# Patient Record
Sex: MALE | Race: WHITE | NOT HISPANIC OR LATINO
[De-identification: names, ages, dates, MRNs, and addresses within clinical notes are randomized per-mention and may not be internally consistent; named-entity substitution may affect disease eponyms.]

---

## 2017-02-14 ENCOUNTER — APPOINTMENT (OUTPATIENT)
Dept: NUCLEAR MEDICINE | Facility: IMAGING CENTER | Age: 67
End: 2017-02-14

## 2017-02-14 ENCOUNTER — APPOINTMENT (OUTPATIENT)
Dept: MRI IMAGING | Facility: IMAGING CENTER | Age: 67
End: 2017-02-14

## 2017-02-14 ENCOUNTER — APPOINTMENT (OUTPATIENT)
Dept: CT IMAGING | Facility: IMAGING CENTER | Age: 67
End: 2017-02-14

## 2017-02-14 ENCOUNTER — OUTPATIENT (OUTPATIENT)
Dept: OUTPATIENT SERVICES | Facility: HOSPITAL | Age: 67
LOS: 1 days | End: 2017-02-14
Payer: MEDICARE

## 2017-02-14 DIAGNOSIS — Z00.8 ENCOUNTER FOR OTHER GENERAL EXAMINATION: ICD-10-CM

## 2017-02-14 PROCEDURE — 70553 MRI BRAIN STEM W/O & W/DYE: CPT

## 2017-02-14 PROCEDURE — 78306 BONE IMAGING WHOLE BODY: CPT

## 2017-02-14 PROCEDURE — 70549 MR ANGIOGRAPH NECK W/O&W/DYE: CPT

## 2017-02-14 PROCEDURE — 74177 CT ABD & PELVIS W/CONTRAST: CPT

## 2017-02-14 PROCEDURE — A9561: CPT

## 2017-02-14 PROCEDURE — 70544 MR ANGIOGRAPHY HEAD W/O DYE: CPT

## 2017-02-14 PROCEDURE — 82565 ASSAY OF CREATININE: CPT

## 2017-02-14 PROCEDURE — 71260 CT THORAX DX C+: CPT

## 2017-02-15 ENCOUNTER — OUTPATIENT (OUTPATIENT)
Dept: OUTPATIENT SERVICES | Facility: HOSPITAL | Age: 67
LOS: 1 days | End: 2017-02-15
Payer: MEDICARE

## 2017-02-15 ENCOUNTER — APPOINTMENT (OUTPATIENT)
Dept: MRI IMAGING | Facility: IMAGING CENTER | Age: 67
End: 2017-02-15

## 2017-02-15 DIAGNOSIS — Z00.8 ENCOUNTER FOR OTHER GENERAL EXAMINATION: ICD-10-CM

## 2017-02-15 PROCEDURE — 71550 MRI CHEST W/O DYE: CPT

## 2017-02-23 DIAGNOSIS — M89.8X8 OTHER SPECIFIED DISORDERS OF BONE, OTHER SITE: ICD-10-CM

## 2017-02-23 DIAGNOSIS — R91.8 OTHER NONSPECIFIC ABNORMAL FINDING OF LUNG FIELD: ICD-10-CM

## 2017-02-23 DIAGNOSIS — I63.9 CEREBRAL INFARCTION, UNSPECIFIED: ICD-10-CM

## 2017-02-23 DIAGNOSIS — K40.90 UNILATERAL INGUINAL HERNIA, WITHOUT OBSTRUCTION OR GANGRENE, NOT SPECIFIED AS RECURRENT: ICD-10-CM

## 2017-02-23 DIAGNOSIS — K57.30 DIVERTICULOSIS OF LARGE INTESTINE WITHOUT PERFORATION OR ABSCESS WITHOUT BLEEDING: ICD-10-CM

## 2017-08-04 ENCOUNTER — EMERGENCY (EMERGENCY)
Facility: HOSPITAL | Age: 67
LOS: 1 days | Discharge: HOME | End: 2017-08-04
Attending: EMERGENCY MEDICINE | Admitting: EMERGENCY MEDICINE
Payer: MEDICARE

## 2017-08-04 VITALS
OXYGEN SATURATION: 97 % | HEART RATE: 78 BPM | TEMPERATURE: 98 F | SYSTOLIC BLOOD PRESSURE: 162 MMHG | RESPIRATION RATE: 18 BRPM | DIASTOLIC BLOOD PRESSURE: 80 MMHG

## 2017-08-04 VITALS
OXYGEN SATURATION: 97 % | RESPIRATION RATE: 18 BRPM | TEMPERATURE: 98 F | SYSTOLIC BLOOD PRESSURE: 150 MMHG | HEART RATE: 80 BPM | DIASTOLIC BLOOD PRESSURE: 80 MMHG

## 2017-08-04 LAB
ACETONE SERPL-MCNC: NEGATIVE — SIGNIFICANT CHANGE UP
ALBUMIN SERPL ELPH-MCNC: 4.9 G/DL — SIGNIFICANT CHANGE UP (ref 3.3–5)
ALP SERPL-CCNC: 83 U/L — SIGNIFICANT CHANGE UP (ref 40–120)
ALT FLD-CCNC: 22 U/L RC — SIGNIFICANT CHANGE UP (ref 10–45)
ANION GAP SERPL CALC-SCNC: 16 MMOL/L — SIGNIFICANT CHANGE UP (ref 5–17)
AST SERPL-CCNC: 19 U/L — SIGNIFICANT CHANGE UP (ref 10–40)
B-OH-BUTYR SERPL-SCNC: 0.2 MMOL/L — SIGNIFICANT CHANGE UP
BASOPHILS # BLD AUTO: 0.1 K/UL — SIGNIFICANT CHANGE UP (ref 0–0.2)
BASOPHILS NFR BLD AUTO: 0.8 % — SIGNIFICANT CHANGE UP (ref 0–2)
BILIRUB SERPL-MCNC: 0.3 MG/DL — SIGNIFICANT CHANGE UP (ref 0.2–1.2)
BUN SERPL-MCNC: 27 MG/DL — HIGH (ref 7–23)
CALCIUM SERPL-MCNC: 10.6 MG/DL — HIGH (ref 8.4–10.5)
CHLORIDE SERPL-SCNC: 96 MMOL/L — SIGNIFICANT CHANGE UP (ref 96–108)
CHOLEST SERPL-MCNC: 179 MG/DL — SIGNIFICANT CHANGE UP (ref 10–199)
CO2 SERPL-SCNC: 28 MMOL/L — SIGNIFICANT CHANGE UP (ref 22–31)
CREAT SERPL-MCNC: 1.18 MG/DL — SIGNIFICANT CHANGE UP (ref 0.5–1.3)
EOSINOPHIL # BLD AUTO: 0.1 K/UL — SIGNIFICANT CHANGE UP (ref 0–0.5)
EOSINOPHIL NFR BLD AUTO: 1.2 % — SIGNIFICANT CHANGE UP (ref 0–6)
GAS PNL BLDV: SIGNIFICANT CHANGE UP
GLUCOSE SERPL-MCNC: 553 MG/DL — CRITICAL HIGH (ref 70–99)
HBA1C BLD-MCNC: 10.6 % — HIGH (ref 4–5.6)
HCT VFR BLD CALC: 51.3 % — HIGH (ref 39–50)
HDLC SERPL-MCNC: 53 MG/DL — SIGNIFICANT CHANGE UP (ref 40–125)
HGB BLD-MCNC: 17.1 G/DL — HIGH (ref 13–17)
LIPID PNL WITH DIRECT LDL SERPL: 76 MG/DL — SIGNIFICANT CHANGE UP
LYMPHOCYTES # BLD AUTO: 1.3 K/UL — SIGNIFICANT CHANGE UP (ref 1–3.3)
LYMPHOCYTES # BLD AUTO: 19.4 % — SIGNIFICANT CHANGE UP (ref 13–44)
MCHC RBC-ENTMCNC: 30.3 PG — SIGNIFICANT CHANGE UP (ref 27–34)
MCHC RBC-ENTMCNC: 33.3 GM/DL — SIGNIFICANT CHANGE UP (ref 32–36)
MCV RBC AUTO: 91.1 FL — SIGNIFICANT CHANGE UP (ref 80–100)
MONOCYTES # BLD AUTO: 0.6 K/UL — SIGNIFICANT CHANGE UP (ref 0–0.9)
MONOCYTES NFR BLD AUTO: 9.1 % — SIGNIFICANT CHANGE UP (ref 2–14)
NEUTROPHILS # BLD AUTO: 4.8 K/UL — SIGNIFICANT CHANGE UP (ref 1.8–7.4)
NEUTROPHILS NFR BLD AUTO: 69.5 % — SIGNIFICANT CHANGE UP (ref 43–77)
PLATELET # BLD AUTO: 278 K/UL — SIGNIFICANT CHANGE UP (ref 150–400)
POTASSIUM SERPL-MCNC: 4.6 MMOL/L — SIGNIFICANT CHANGE UP (ref 3.5–5.3)
POTASSIUM SERPL-SCNC: 4.6 MMOL/L — SIGNIFICANT CHANGE UP (ref 3.5–5.3)
PROT SERPL-MCNC: 8.2 G/DL — SIGNIFICANT CHANGE UP (ref 6–8.3)
RBC # BLD: 5.62 M/UL — SIGNIFICANT CHANGE UP (ref 4.2–5.8)
RBC # FLD: 11.7 % — SIGNIFICANT CHANGE UP (ref 10.3–14.5)
SODIUM SERPL-SCNC: 140 MMOL/L — SIGNIFICANT CHANGE UP (ref 135–145)
TOTAL CHOLESTEROL/HDL RATIO MEASUREMENT: 3.4 RATIO — SIGNIFICANT CHANGE UP (ref 3.4–9.6)
TRIGL SERPL-MCNC: 248 MG/DL — HIGH (ref 10–149)
TROPONIN T SERPL-MCNC: <0.01 NG/ML — SIGNIFICANT CHANGE UP (ref 0–0.06)
WBC # BLD: 6.9 K/UL — SIGNIFICANT CHANGE UP (ref 3.8–10.5)
WBC # FLD AUTO: 6.9 K/UL — SIGNIFICANT CHANGE UP (ref 3.8–10.5)

## 2017-08-04 PROCEDURE — 70450 CT HEAD/BRAIN W/O DYE: CPT

## 2017-08-04 PROCEDURE — 84295 ASSAY OF SERUM SODIUM: CPT

## 2017-08-04 PROCEDURE — 82435 ASSAY OF BLOOD CHLORIDE: CPT

## 2017-08-04 PROCEDURE — 82009 KETONE BODYS QUAL: CPT

## 2017-08-04 PROCEDURE — 70450 CT HEAD/BRAIN W/O DYE: CPT | Mod: 26

## 2017-08-04 PROCEDURE — 83605 ASSAY OF LACTIC ACID: CPT

## 2017-08-04 PROCEDURE — 80061 LIPID PANEL: CPT

## 2017-08-04 PROCEDURE — 70544 MR ANGIOGRAPHY HEAD W/O DYE: CPT

## 2017-08-04 PROCEDURE — 82803 BLOOD GASES ANY COMBINATION: CPT

## 2017-08-04 PROCEDURE — 70551 MRI BRAIN STEM W/O DYE: CPT | Mod: 26

## 2017-08-04 PROCEDURE — 70548 MR ANGIOGRAPHY NECK W/DYE: CPT

## 2017-08-04 PROCEDURE — 84484 ASSAY OF TROPONIN QUANT: CPT

## 2017-08-04 PROCEDURE — 82010 KETONE BODYS QUAN: CPT

## 2017-08-04 PROCEDURE — 70548 MR ANGIOGRAPHY NECK W/DYE: CPT | Mod: 26

## 2017-08-04 PROCEDURE — 84132 ASSAY OF SERUM POTASSIUM: CPT

## 2017-08-04 PROCEDURE — 99285 EMERGENCY DEPT VISIT HI MDM: CPT | Mod: 25

## 2017-08-04 PROCEDURE — 80053 COMPREHEN METABOLIC PANEL: CPT

## 2017-08-04 PROCEDURE — 85014 HEMATOCRIT: CPT

## 2017-08-04 PROCEDURE — 93005 ELECTROCARDIOGRAM TRACING: CPT

## 2017-08-04 PROCEDURE — 82330 ASSAY OF CALCIUM: CPT

## 2017-08-04 PROCEDURE — 99284 EMERGENCY DEPT VISIT MOD MDM: CPT | Mod: GC

## 2017-08-04 PROCEDURE — 83036 HEMOGLOBIN GLYCOSYLATED A1C: CPT

## 2017-08-04 PROCEDURE — 82947 ASSAY GLUCOSE BLOOD QUANT: CPT

## 2017-08-04 PROCEDURE — 85027 COMPLETE CBC AUTOMATED: CPT

## 2017-08-04 PROCEDURE — 70551 MRI BRAIN STEM W/O DYE: CPT

## 2017-08-04 RX ORDER — SODIUM CHLORIDE 9 MG/ML
1000 INJECTION INTRAMUSCULAR; INTRAVENOUS; SUBCUTANEOUS ONCE
Qty: 0 | Refills: 0 | Status: COMPLETED | OUTPATIENT
Start: 2017-08-04 | End: 2017-08-04

## 2017-08-04 RX ORDER — ACETAMINOPHEN 500 MG
325 TABLET ORAL EVERY 4 HOURS
Qty: 0 | Refills: 0 | Status: DISCONTINUED | OUTPATIENT
Start: 2017-08-04 | End: 2017-08-08

## 2017-08-04 RX ORDER — ATORVASTATIN CALCIUM 80 MG/1
40 TABLET, FILM COATED ORAL AT BEDTIME
Qty: 0 | Refills: 0 | Status: DISCONTINUED | OUTPATIENT
Start: 2017-08-04 | End: 2017-08-08

## 2017-08-04 RX ORDER — ASPIRIN/CALCIUM CARB/MAGNESIUM 324 MG
81 TABLET ORAL DAILY
Qty: 0 | Refills: 0 | Status: DISCONTINUED | OUTPATIENT
Start: 2017-08-04 | End: 2017-08-04

## 2017-08-04 RX ORDER — SODIUM CHLORIDE 9 MG/ML
1000 INJECTION INTRAMUSCULAR; INTRAVENOUS; SUBCUTANEOUS
Qty: 0 | Refills: 0 | Status: DISCONTINUED | OUTPATIENT
Start: 2017-08-04 | End: 2017-08-08

## 2017-08-04 RX ORDER — CLOPIDOGREL BISULFATE 75 MG/1
75 TABLET, FILM COATED ORAL DAILY
Qty: 0 | Refills: 0 | Status: DISCONTINUED | OUTPATIENT
Start: 2017-08-04 | End: 2017-08-08

## 2017-08-04 RX ORDER — ASPIRIN/CALCIUM CARB/MAGNESIUM 324 MG
325 TABLET ORAL ONCE
Qty: 0 | Refills: 0 | Status: DISCONTINUED | OUTPATIENT
Start: 2017-08-04 | End: 2017-08-08

## 2017-08-04 RX ORDER — ENOXAPARIN SODIUM 100 MG/ML
40 INJECTION SUBCUTANEOUS EVERY 24 HOURS
Qty: 0 | Refills: 0 | Status: DISCONTINUED | OUTPATIENT
Start: 2017-08-04 | End: 2017-08-04

## 2017-08-04 RX ADMIN — SODIUM CHLORIDE 3000 MILLILITER(S): 9 INJECTION INTRAMUSCULAR; INTRAVENOUS; SUBCUTANEOUS at 05:59

## 2017-08-04 RX ADMIN — SODIUM CHLORIDE 3000 MILLILITER(S): 9 INJECTION INTRAMUSCULAR; INTRAVENOUS; SUBCUTANEOUS at 11:30

## 2017-08-04 NOTE — CONSULT NOTE ADULT - ATTENDING COMMENTS
VASCULAR NEUROLOGY ATTENDING  The patient is seen and examined the history and imaging are reviewed. I agree with the resident note unless otherwise noted. On my exam he has L face weakness involving upper and lower face. He has mild dysarthria and dysphagia. Sensation is full. Eye movements full. He has slight gait ataxia. 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 His R vert ends in PICA his L vert has a stenosis. Suspect symptomatic intracranial atherosclerotic disease. Would start ASA, Plavix statin per SAMMPRIS criteria. Will need PT and SS evals. I had an extensive conversation with patient who is refusing to remain in the hospital. I advised that his symptoms may worsen and that potential dysphagia may cause a pneumonia. And ataxia may cause falls. He understands the risks and is apparently refusing to stay.

## 2017-08-04 NOTE — ED PROVIDER NOTE - CONSTITUTIONAL, MLM
normal... Well appearing, well nourished, awake, alert, oriented to person, place, time/situation and in no apparent distress.  left facial droop.

## 2017-08-04 NOTE — ED PROVIDER NOTE - PHYSICAL EXAMINATION
Uzair PGY3: uvular deviation of "ahh" to the right. Facial assymetry on left lower facial field with muscle weakness, intact upper eyebrow muscle strength.

## 2017-08-04 NOTE — ED PROVIDER NOTE - OBJECTIVE STATEMENT
Attendinyo male presents with left face numbness, facial droop, and left leg tingling and crampy sensation with intermittent weakness.  Pt noted symptoms at 1am.  No pain.  Of note glucose was 450 by EMS.  Pt states he ate a lot of sugar last night.  No vomiting or nausea.  Pt states he had bell's palsy in the past.

## 2017-08-04 NOTE — ED ADULT NURSE REASSESSMENT NOTE - NS ED NURSE REASSESS COMMENT FT1
Neurology currently at bedside w/ patient. Pt ambulating with steady gait. Neurology currently at bedside w/ patient. Pt ambulating with slightly unsteady gait.

## 2017-08-04 NOTE — CONSULT NOTE ADULT - ASSESSMENT
Pt is 67 yo M with a PMH of HTN, HLD, DM and a right Bell's Palsy (20 years ago) who presented to the emergency room due to a right facial droop, slurred speech and a throat tightening sensation. NIHSS: 2. DOLORES: 0. Considering hx and exam findings, most likely diagnosis is Bell's Palsy. However considering the pt's comorbidities further imaging should be pursued. At the same time pt's metabolic derangements including a very elevated glucose should be fixed to better assess neurological status.    Recommendations:  - Start SQ Insulin to better control pt's blood glucose  - Once glucose is better controlled can start Prednisone 60mg PO QD for 7 days  - Start Valacyclovir 1000mg PO TID for 7 days  - MRI Head w/ contrast  - Outpatient Neuro follow up

## 2017-08-04 NOTE — CONSULT NOTE ADULT - SUBJECTIVE AND OBJECTIVE BOX
Pt is 65 yo M with a PMH of HTN, HLD, DM and a right Bell's Palsy (20 years ago) who presented to the emergency room due to a right facial droop, slurred speech and a throat tightening sensation. Pt endorses that these symptoms started around noon the day prior to presentation and gradually worsened, so his daughter told him to call EMS. Associated with these symptoms the pt endorses a headache (that has now dissipated), otherwise pt denies any recent fevers, CP, cough, SOB, changes in BM/urination, recent sick contacts or travel. NIHSS: 2. DOLORES: 0.          MEDICATIONS  (STANDING):    MEDICATIONS  (PRN):    PAST MEDICAL & SURGICAL HISTORY:  Hypercholesterolemia  HTN (Hypertension)  Diabetes Mellitus Type II    FAMILY HISTORY:    Allergies    No Known Allergies    Intolerances        SHx - No smoking, No ETOH, No drug abuse      Review of Systems:  CONSTITUTIONAL:   HEENT:  No visual loss, blurred vision, double vision.  CARDIOVASCULAR:  No chest pain, chest pressure or chest discomfort.  RESPIRATORY:  No shortness of breath, cough.  NEUROLOGICAL:  See HPI  MUSCULOSKELETAL:  No muscle, back pain, joint pain or stiffness.        Vital Signs Last 24 Hrs  T(C): 36.9 (04 Aug 2017 04:20), Max: 36.9 (04 Aug 2017 04:20)  T(F): 98.4 (04 Aug 2017 04:20), Max: 98.4 (04 Aug 2017 04:20)  HR: 83 (04 Aug 2017 05:05) (80 - 83)  BP: 154/76 (04 Aug 2017 05:05) (150/80 - 154/76)  BP(mean): --  RR: 18 (04 Aug 2017 05:05) (18 - 18)  SpO2: 98% (04 Aug 2017 05:05) (97% - 98%)    General Exam:   General appearance: No acute distress    Neurological Exam:  Mental Status: Orientated to self, date and place.  Attention intact.  Mild dysarthria. Speech fluent.  Cranial Nerves:   PERRL, EOMI, VFF, no nystagmus. CN V1-3 intact to light touch . Right lower facial droop, with a mild upper facial droop.    Motor:   Tone: normal.                  Strength:     [] Upper extremity                      Delt       Bicep    Tricep                                                  R         5/5        5/5        5/5       5/5                                               L          5/5        5/5        5/5       5/5  [] Lower extremity                       HF          KE          KF        DF         PF                                               R        5/5 5/5 5/5 5/5 5/5                                               L         5/5 5/5 5/5 5/5 5/5  Pronator drift: none                 Dysmetria: None to finger-nose-finger or heel-shin-heel b/l  No truncal ataxia.    Tremor: No resting, postural or action tremor.  No myoclonus.    Sensation: intact to light touch throuhout    Deep Tendon Reflexes:              Biceps          Patellar        Ankle      Babinski                                         R       2+                2+               2+           downgoing                                         L        2+                2+               2+           downgoing    Gait: normal.    08-04    140  |  96  |  27<H>  ----------------------------<  553<HH>  4.6   |  28  |  1.18                                17.1   6.9   )-----------( 278      ( 04 Aug 2017 04:22 )             51.3       Radiology  < from: CT Brain Stroke Protocol (08.04.17 @ 04:50) >  No acute intracranial hemorrhage, mass effect, or CT evidence of an acute   vascular territorial infarct.    Mild chronic ischemic changes in the frontoparietal white matter, old   bilateral thalamic lacunar infarcts, and old left corona radiata/basal   ganglia infarct.

## 2017-08-04 NOTE — ED PROVIDER NOTE - PROGRESS NOTE DETAILS
Patient's FSG improving, now under 400, patient does not want insulin, wants to continue his oral hypoglycemics. Patient does not want to stay in the hospital, able to tolerate PO intake and ambulate. Patient understands the risks and benefits of staying versus being discharged, adamantly would want to go home rather than staying in the hospital.  - Rodríguez Castro MD Patient's FSG improving, now under 400, patient does not want insulin, wants to continue his oral hypoglycemics. Patient does not want to stay in the hospital, able to tolerate PO intake and ambulate. Ambulating well, does not want to wait for PT consult. Passed bedside speech swallow test without any evidence of aspiration. Patient understands the risks and benefits of staying versus being discharged, adamantly would want to go home rather than staying in the hospital. Patient to follow-up with Dr. Jakob Seay for outpatient follow-up and TTE. Neuro recommending 81mg ASA daily (Patient already taking 325mg daily).  - Rodríguez Castro MD

## 2017-08-04 NOTE — PROGRESS NOTE ADULT - ASSESSMENT
Pt is 65 yo M with a PMH of HTN, HLD, DM and a right Bell's Palsy (20 years ago) who presented to the emergency room due to a right facial droop, slurred speech and a throat tightening sensation. 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 Pt declined pt/ot assessment and ER dysphagia screen passed per staff.    -cont at least an asa 81mg from a neurological perspective. cont statin  -outpt f/u with Dr. Seay [phone] Pt is 65 yo M with a PMH of HTN, HLD, DM and a right Bell's Palsy (20 years ago) who presented to the emergency room due to a right facial droop, slurred speech and a throat tightening sensation. 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 Pt declined pt/ot assessment and ER dysphagia screen passed per staff.    - Cont at least an asa 81mg from a neurological perspective. cont Plavix for 3 months then asa only. cont statin  - Outpt f/u with Dr. Seay [phone]

## 2017-08-04 NOTE — ED ADULT NURSE REASSESSMENT NOTE - NS ED NURSE REASSESS COMMENT FT1
Report received from Vamsi BAEZ. Pt currently resting in bed, waiting for CDU consult. VSS. Will continue to monitor and assess while offering support and reassurance.

## 2017-08-04 NOTE — H&P ADULT - NSHPPHYSICALEXAM_GEN_ALL_CORE
Physical Exam: General appearance: No acute distress    Neurological Exam:  Mental Status: Orientated to self, date and place.  Attention intact.  Mild dysarthria. Speech fluent.  Cranial Nerves:   PERRL, EOMI, VFF, no nystagmus. CN V1-3 intact to light touch . Right lower facial droop, with a mild upper facial droop.    Motor:   Tone: normal.                  Strength:     [] Upper extremity                      Delt       Bicep    Tricep                                                  R         5/5 5/5 5/5 5/5                                               L          5/5 5/5 5/5 5/5  [] Lower extremity                       HF          KE          KF        DF         PF                                               R        5/5 5/5 5/5 5/5 5/5                                               L         5/5 5/5 5/5 5/5 5/5  Pronator drift: none                 Dysmetria: None to finger-nose-finger or heel-shin-heel b/l  No truncal ataxia.    Tremor: No resting, postural or action tremor.  No myoclonus.    Sensation: intact to light touch throghout     Deep Tendon Reflexes:              Biceps          Patellar        Ankle      Babinski                                         R       2+                2+               2+           downgoing                                         L        2+                2+               2+           downgoing    Gait: normal.

## 2017-08-04 NOTE — ED ADULT NURSE NOTE - OBJECTIVE STATEMENT
65 y/o M presents to the ED via EMS c/o numbness.  Pt presents with L sides facial droop, L leg numbness and numbness to L cheek and lips.    Pt states the symptoms started about 14 hours ago when he woke up.  Pt states he is coming to ED now because his daughter called the ambulance because she states he was off.  Pt is dysarthric with L sided facial droop in ED.  Pt also states he has some slurring of the speech throughout the day.  Pt has hx HTN, DM.  Finger stick in .  NIHSS Score of 2 in ED by stroke team.  No signs of distress.    A&Ox4.  Patient has good strength b/l.  VSS.  Pt safety and comfort measures provided.

## 2017-08-04 NOTE — H&P ADULT - ASSESSMENT
67 yo M with a PMH of HTN, HLD, DM and a right Bell's Palsy (20 years ago) who presented to the emergency room due to a right facial droop, slurred speech and a throat tightening sensation. NIHSS: 2. DOLORES: 0 w/ MRI findings of left lateral medullary infarct.     Plan:   - not a tPA candidate 2/2 to out of window   - TTE  - HgA1c, lipid panel   - ASA 81, Lipitor 80 mg
67 yo M with a PMH of HTN, HLD, DM and a right Bell's Palsy (20 years ago) who presented to the emergency room due to a right facial droop, slurred speech and a throat tightening sensation. NIHSS: 2. DOLORES: 0 w/ MRI findings of left lateral medullary infarct.     Plan:   - not a tPA candidate 2/2 to out of window   - TTE  - HgA1c, lipid panel   - ASA 81, Lipitor 80 mg

## 2017-08-04 NOTE — ED PROVIDER NOTE - PLAN OF CARE
1) Please follow-up with your primary care doctor within the next 3 days.  Please call today or tomorrow for an appointment.  If you cannot follow-up with your doctor(s), please return to the ED for any urgent issues.  2) If you have any worsening of symptoms or any other concerns please return to the ED immediately.  3) Please continue taking your home medications as directed.  4) You may have been given a copy of your labs and/or imaging.  Please go over these with your primary care doctor. 1) Please follow-up with your primary care doctor and Dr. Jakob Seay (neurology ([phone])) within the next 3 days for echocardiogram.  Please call today or tomorrow for an appointment.  If you cannot follow-up with your doctor(s), please return to the ED for any urgent issues.  2) If you have any worsening of symptoms or any other concerns please return to the ED immediately.  3) Please continue taking your home medications as directed. Continue your aspirin and your diabetes mellitus medications.  4) You may have been given a copy of your labs and/or imaging.  Please go over these with your primary care doctor.

## 2017-08-04 NOTE — ED PROVIDER NOTE - CARE PLAN
Principal Discharge DX:	Stroke  Instructions for follow-up, activity and diet:	1) Please follow-up with your primary care doctor within the next 3 days.  Please call today or tomorrow for an appointment.  If you cannot follow-up with your doctor(s), please return to the ED for any urgent issues.  2) If you have any worsening of symptoms or any other concerns please return to the ED immediately.  3) Please continue taking your home medications as directed.  4) You may have been given a copy of your labs and/or imaging.  Please go over these with your primary care doctor.  Secondary Diagnosis:	Hyperglycemia Principal Discharge DX:	Stroke  Instructions for follow-up, activity and diet:	1) Please follow-up with your primary care doctor and Dr. Jakbo Seay (neurology ([phone])) within the next 3 days for echocardiogram.  Please call today or tomorrow for an appointment.  If you cannot follow-up with your doctor(s), please return to the ED for any urgent issues.  2) If you have any worsening of symptoms or any other concerns please return to the ED immediately.  3) Please continue taking your home medications as directed. Continue your aspirin and your diabetes mellitus medications.  4) You may have been given a copy of your labs and/or imaging.  Please go over these with your primary care doctor.  Secondary Diagnosis:	Hyperglycemia Principal Discharge DX:	Stroke  Instructions for follow-up, activity and diet:	1) Please follow-up with your primary care doctor and Dr. Jakob Seay (neurology ([phone])) within the next 3 days for echocardiogram.  Please call today or tomorrow for an appointment.  If you cannot follow-up with your doctor(s), please return to the ED for any urgent issues.  2) If you have any worsening of symptoms or any other concerns please return to the ED immediately.  3) Please continue taking your home medications as directed. Continue your aspirin and your diabetes mellitus medications.  4) You may have been given a copy of your labs and/or imaging.  Please go over these with your primary care doctor.  Secondary Diagnosis:	Hyperglycemia

## 2017-08-04 NOTE — PROGRESS NOTE ADULT - SUBJECTIVE AND OBJECTIVE BOX
Neurology Follow up note    Name  FRANKO SPANN      S: no overnight complaints        MEDICATIONS  (STANDING):  atorvastatin 40 milliGRAM(s) Oral at bedtime  sodium chloride 0.9%. 1000 milliLiter(s) (75 mL/Hr) IV Continuous <Continuous>  aspirin 325 milliGRAM(s) Oral once      Allergies: No Known Allergies      Objective:   Vital Signs Last 24 Hrs  T(C): 36.9 (04 Aug 2017 12:25), Max: 36.9 (04 Aug 2017 04:20)  T(F): 98.4 (04 Aug 2017 12:25), Max: 98.4 (04 Aug 2017 04:20)  HR: 78 (04 Aug 2017 12:25) (66 - 83)  BP: 162/80 (04 Aug 2017 12:25) (150/80 - 164/81)  BP(mean): --  RR: 18 (04 Aug 2017 12:25) (18 - 18)  SpO2: 97% (04 Aug 2017 12:25) (96% - 98%)      Mental Status: Orientated to self, date and place.  Attention intact.  Mild dysarthria. Speech fluent.  Cranial Nerves:   PERRL, EOMI, VFF, no nystagmus. CN V1-3 intact to light touch . Right lower facial droop, with a mild upper facial droop.  Motor:5/5 x4 and able to independently ambulate  Sensory: grossly intact to light     08-04    140  |  96  |  27<H>  ----------------------------<  553<HH>  4.6   |  28  |  1.18    Ca    10.6<H>      04 Aug 2017 04:22    TPro  8.2  /  Alb  4.9  /  TBili  0.3  /  DBili  x   /  AST  19  /  ALT  22  /  AlkPhos  83  08-04 08-04    140  |  96  |  27<H>  ----------------------------<  553<HH>  4.6   |  28  |  1.18    Ca    10.6<H>      04 Aug 2017 04:22    TPro  8.2  /  Alb  4.9  /  TBili  0.3  /  DBili  x   /  AST  19  /  ALT  22  /  AlkPhos  83  08-04    LIVER FUNCTIONS - ( 04 Aug 2017 04:22 )  Alb: 4.9 g/dL / Pro: 8.2 g/dL / ALK PHOS: 83 U/L / ALT: 22 U/L RC / AST: 19 U/L / GGT: x             Radiology    MRI b sig for left lat medullary stroke Neurology Follow up note    Name  FRANKO FERNÁNDEZ: no overnight complaints. Pt declining admission.        MEDICATIONS  (STANDING):  atorvastatin 40 milliGRAM(s) Oral at bedtime  sodium chloride 0.9%. 1000 milliLiter(s) (75 mL/Hr) IV Continuous <Continuous>  aspirin 325 milliGRAM(s) Oral once      Allergies: No Known Allergies      Objective:   Vital Signs Last 24 Hrs  T(C): 36.9 (04 Aug 2017 12:25), Max: 36.9 (04 Aug 2017 04:20)  T(F): 98.4 (04 Aug 2017 12:25), Max: 98.4 (04 Aug 2017 04:20)  HR: 78 (04 Aug 2017 12:25) (66 - 83)  BP: 162/80 (04 Aug 2017 12:25) (150/80 - 164/81)  BP(mean): --  RR: 18 (04 Aug 2017 12:25) (18 - 18)  SpO2: 97% (04 Aug 2017 12:25) (96% - 98%)      Mental Status: Orientated to self, date and place.  Attention intact.  Mild dysarthria. Speech fluent.  Cranial Nerves:   PERRL, EOMI, VFF, no nystagmus. CN V1-3 intact to light touch . Right lower facial droop, with a mild upper facial droop.  Motor:5/5 x4 and able to independently ambulate  Sensory: grossly intact to light     08-04    140  |  96  |  27<H>  ----------------------------<  553<HH>  4.6   |  28  |  1.18    Ca    10.6<H>      04 Aug 2017 04:22    TPro  8.2  /  Alb  4.9  /  TBili  0.3  /  DBili  x   /  AST  19  /  ALT  22  /  AlkPhos  83  08-04    08-04    140  |  96  |  27<H>  ----------------------------<  553<HH>  4.6   |  28  |  1.18    Ca    10.6<H>      04 Aug 2017 04:22    TPro  8.2  /  Alb  4.9  /  TBili  0.3  /  DBili  x   /  AST  19  /  ALT  22  /  AlkPhos  83  08-04    LIVER FUNCTIONS - ( 04 Aug 2017 04:22 )  Alb: 4.9 g/dL / Pro: 8.2 g/dL / ALK PHOS: 83 U/L / ALT: 22 U/L RC / AST: 19 U/L / GGT: x             Radiology    MRI b sig for left lat medullary stroke

## 2017-08-04 NOTE — H&P ADULT - NSHPPHYSICALEXAM_GEN_ALL_CORE
General appearance: No acute distress    Neurological Exam:  Mental Status: Orientated to self, date and place.  Attention intact.  Mild dysarthria. Speech fluent.  Cranial Nerves:   PERRL, EOMI, VFF, no nystagmus. CN V1-3 intact to light touch . Right lower facial droop, with a mild upper facial droop.    Motor:   Tone: normal.                  Strength:     [] Upper extremity                      Delt       Bicep    Tricep                                                  R         5/5 5/5 5/5 5/5                                               L          5/5 5/5 5/5 5/5  [] Lower extremity                       HF          KE          KF        DF         PF                                               R        5/5 5/5 5/5 5/5 5/5                                               L         5/5 5/5 5/5 5/5 5/5  Pronator drift: none                 Dysmetria: None to finger-nose-finger or heel-shin-heel b/l  No truncal ataxia.    Tremor: No resting, postural or action tremor.  No myoclonus.    Sensation: intact to light touch throghout     Deep Tendon Reflexes:              Biceps          Patellar        Ankle      Babinski                                         R       2+                2+               2+           downgoing                                         L        2+                2+               2+           downgoing    Gait: normal.

## 2017-08-04 NOTE — ED PROVIDER NOTE - MEDICAL DECISION MAKING DETAILS
left facial droop with left leg crampy and tingling.  possible CVA given patient's risk factors.  could be bells palsy and diabetic complications due to hyperglycemia.  code stroke called.

## 2017-08-04 NOTE — H&P ADULT - HISTORY OF PRESENT ILLNESS
Pt is 67 yo M with a PMH of HTN, HLD, DM and a right Bell's Palsy (20 years ago) who presented to the emergency room due to a right facial droop, slurred speech and a throat tightening sensation. Pt endorses that these symptoms started around noon the day prior to presentation and gradually worsened, so his daughter told him to call EMS. Associated with these symptoms the pt endorses a headache (that has now dissipated), otherwise pt denies any recent fevers, CP, cough, SOB, changes in BM/urination, recent sick contacts or travel. NIHSS: 2. DOLORES: 0.  MRI showing left lateral medullary infarct, vessel imaging with no acute changes, but showing previously seen right vertebral that appears to terminate in the R PICA and narrowed distal left vertebral segment w/ irregular narrowing in the proximal basilar.
Pt is 67 yo M with a PMH of HTN, HLD, DM and a right Bell's Palsy (20 years ago) who presented to the emergency room due to a right facial droop, slurred speech and a throat tightening sensation. Pt endorses that these symptoms started around noon the day prior to presentation and gradually worsened, so his daughter told him to call EMS. Associated with these symptoms the pt endorses a headache (that has now dissipated), otherwise pt denies any recent fevers, CP, cough, SOB, changes in BM/urination, recent sick contacts or travel. NIHSS: 2. DOLORES: 0.  MRI showing left lateral medullary infarct, vessel imaging with no acute changes, but showing previously seen right vertebral that appears to terminate in the R PICA and narrowed distal left vertebral segment w/ irregular narrowing in the proximal basilar.

## 2017-08-05 NOTE — ED POST DISCHARGE NOTE - ADDITIONAL DOCUMENTATION
8/7/17: Spoke with patient regarding elevated A1C and elevated trigs. Patient has appointment with PCP today and will make doctor aware of results. -Lorene Davey PA-C

## 2017-08-29 ENCOUNTER — APPOINTMENT (OUTPATIENT)
Dept: SPEECH THERAPY | Facility: CLINIC | Age: 67
End: 2017-08-29

## 2017-08-29 ENCOUNTER — OUTPATIENT (OUTPATIENT)
Dept: OUTPATIENT SERVICES | Facility: HOSPITAL | Age: 67
LOS: 1 days | Discharge: HOME | End: 2017-08-29

## 2017-09-27 ENCOUNTER — OUTPATIENT (OUTPATIENT)
Dept: OUTPATIENT SERVICES | Facility: HOSPITAL | Age: 67
LOS: 1 days | End: 2017-09-27

## 2017-09-27 ENCOUNTER — APPOINTMENT (OUTPATIENT)
Dept: CV DIAGNOSITCS | Facility: HOSPITAL | Age: 67
End: 2017-09-27
Payer: MEDICARE

## 2017-09-27 ENCOUNTER — APPOINTMENT (OUTPATIENT)
Dept: RADIOLOGY | Facility: HOSPITAL | Age: 67
End: 2017-09-27
Payer: MEDICARE

## 2017-09-27 DIAGNOSIS — E11.65 TYPE 2 DIABETES MELLITUS WITH HYPERGLYCEMIA: ICD-10-CM

## 2017-09-27 DIAGNOSIS — I63.9 CEREBRAL INFARCTION, UNSPECIFIED: ICD-10-CM

## 2017-09-27 PROCEDURE — 74230 X-RAY XM SWLNG FUNCJ C+: CPT | Mod: 26

## 2017-09-27 PROCEDURE — 93923 UPR/LXTR ART STDY 3+ LVLS: CPT | Mod: 26

## 2017-10-11 DIAGNOSIS — R13.13 DYSPHAGIA, PHARYNGEAL PHASE: ICD-10-CM

## 2019-06-20 ENCOUNTER — APPOINTMENT (OUTPATIENT)
Dept: CT IMAGING | Facility: IMAGING CENTER | Age: 69
End: 2019-06-20
Payer: MEDICARE

## 2019-06-20 ENCOUNTER — OUTPATIENT (OUTPATIENT)
Dept: OUTPATIENT SERVICES | Facility: HOSPITAL | Age: 69
LOS: 1 days | End: 2019-06-20
Payer: MEDICARE

## 2019-06-20 DIAGNOSIS — Z00.8 ENCOUNTER FOR OTHER GENERAL EXAMINATION: ICD-10-CM

## 2019-06-20 PROCEDURE — 71250 CT THORAX DX C-: CPT | Mod: 26

## 2019-06-20 PROCEDURE — 70486 CT MAXILLOFACIAL W/O DYE: CPT | Mod: 26

## 2019-06-20 PROCEDURE — 70486 CT MAXILLOFACIAL W/O DYE: CPT

## 2019-06-20 PROCEDURE — 71250 CT THORAX DX C-: CPT

## 2019-12-22 ENCOUNTER — OUTPATIENT (OUTPATIENT)
Dept: OUTPATIENT SERVICES | Facility: HOSPITAL | Age: 69
LOS: 1 days | End: 2019-12-22
Payer: MEDICARE

## 2019-12-22 ENCOUNTER — APPOINTMENT (OUTPATIENT)
Dept: MRI IMAGING | Facility: IMAGING CENTER | Age: 69
End: 2019-12-22
Payer: MEDICARE

## 2019-12-22 DIAGNOSIS — Z00.8 ENCOUNTER FOR OTHER GENERAL EXAMINATION: ICD-10-CM

## 2019-12-22 PROCEDURE — 70551 MRI BRAIN STEM W/O DYE: CPT | Mod: 26

## 2019-12-22 PROCEDURE — 70551 MRI BRAIN STEM W/O DYE: CPT

## 2020-07-26 ENCOUNTER — APPOINTMENT (OUTPATIENT)
Dept: CT IMAGING | Facility: IMAGING CENTER | Age: 70
End: 2020-07-26
Payer: MEDICARE

## 2020-07-26 ENCOUNTER — OUTPATIENT (OUTPATIENT)
Dept: OUTPATIENT SERVICES | Facility: HOSPITAL | Age: 70
LOS: 1 days | End: 2020-07-26
Payer: MEDICARE

## 2020-07-26 DIAGNOSIS — Z00.8 ENCOUNTER FOR OTHER GENERAL EXAMINATION: ICD-10-CM

## 2020-07-26 PROCEDURE — 71250 CT THORAX DX C-: CPT

## 2020-07-26 PROCEDURE — 74176 CT ABD & PELVIS W/O CONTRAST: CPT

## 2020-07-26 PROCEDURE — 71250 CT THORAX DX C-: CPT | Mod: 26

## 2020-07-26 PROCEDURE — 74176 CT ABD & PELVIS W/O CONTRAST: CPT | Mod: 26

## 2021-03-14 ENCOUNTER — OUTPATIENT (OUTPATIENT)
Dept: OUTPATIENT SERVICES | Facility: HOSPITAL | Age: 71
LOS: 1 days | End: 2021-03-14
Payer: MEDICARE

## 2021-03-14 ENCOUNTER — APPOINTMENT (OUTPATIENT)
Dept: CT IMAGING | Facility: IMAGING CENTER | Age: 71
End: 2021-03-14
Payer: MEDICARE

## 2021-03-14 DIAGNOSIS — Z00.8 ENCOUNTER FOR OTHER GENERAL EXAMINATION: ICD-10-CM

## 2021-03-14 PROCEDURE — 74176 CT ABD & PELVIS W/O CONTRAST: CPT

## 2021-03-14 PROCEDURE — 74176 CT ABD & PELVIS W/O CONTRAST: CPT | Mod: 26,MH

## 2021-04-19 ENCOUNTER — APPOINTMENT (OUTPATIENT)
Dept: UROLOGY | Facility: CLINIC | Age: 71
End: 2021-04-19
Payer: MEDICARE

## 2021-04-19 VITALS
DIASTOLIC BLOOD PRESSURE: 83 MMHG | TEMPERATURE: 98 F | RESPIRATION RATE: 17 BRPM | WEIGHT: 210 LBS | HEART RATE: 71 BPM | BODY MASS INDEX: 31.01 KG/M2 | SYSTOLIC BLOOD PRESSURE: 172 MMHG

## 2021-04-19 PROCEDURE — 99205 OFFICE O/P NEW HI 60 MIN: CPT

## 2021-04-19 PROCEDURE — 51798 US URINE CAPACITY MEASURE: CPT

## 2021-04-26 ENCOUNTER — NON-APPOINTMENT (OUTPATIENT)
Age: 71
End: 2021-04-26

## 2021-04-27 ENCOUNTER — NON-APPOINTMENT (OUTPATIENT)
Age: 71
End: 2021-04-27

## 2021-04-27 LAB
ALBUMIN SERPL ELPH-MCNC: 4.1 G/DL
ALP BLD-CCNC: 95 U/L
ALT SERPL-CCNC: 25 U/L
ANION GAP SERPL CALC-SCNC: 14 MMOL/L
APPEARANCE: CLEAR
AST SERPL-CCNC: 32 U/L
BACTERIA UR CULT: NORMAL
BACTERIA: NEGATIVE
BILIRUB SERPL-MCNC: 0.4 MG/DL
BILIRUBIN URINE: NEGATIVE
BLOOD URINE: NEGATIVE
BUN SERPL-MCNC: 20 MG/DL
CALCIUM SERPL-MCNC: 10.2 MG/DL
CHLORIDE SERPL-SCNC: 101 MMOL/L
CO2 SERPL-SCNC: 29 MMOL/L
COLOR: NORMAL
CREAT SERPL-MCNC: 1.17 MG/DL
GLUCOSE QUALITATIVE U: ABNORMAL
GLUCOSE SERPL-MCNC: 59 MG/DL
HYALINE CASTS: 0 /LPF
KETONES URINE: NEGATIVE
LEUKOCYTE ESTERASE URINE: NEGATIVE
MICROSCOPIC-UA: NORMAL
NITRITE URINE: NEGATIVE
PH URINE: 6.5
POTASSIUM SERPL-SCNC: 5.3 MMOL/L
PROT SERPL-MCNC: 7.1 G/DL
PROTEIN URINE: ABNORMAL
PSA FREE FLD-MCNC: 28 %
PSA FREE SERPL-MCNC: 1.37 NG/ML
PSA SERPL-MCNC: 4.94 NG/ML
RED BLOOD CELLS URINE: 1 /HPF
SODIUM SERPL-SCNC: 144 MMOL/L
SPECIFIC GRAVITY URINE: 1.01
SQUAMOUS EPITHELIAL CELLS: 0 /HPF
URINE CYTOLOGY: NORMAL
UROBILINOGEN URINE: NORMAL
WHITE BLOOD CELLS URINE: 1 /HPF

## 2021-05-04 ENCOUNTER — OUTPATIENT (OUTPATIENT)
Dept: OUTPATIENT SERVICES | Facility: HOSPITAL | Age: 71
LOS: 1 days | End: 2021-05-04
Payer: MEDICARE

## 2021-05-04 ENCOUNTER — APPOINTMENT (OUTPATIENT)
Dept: ULTRASOUND IMAGING | Facility: HOSPITAL | Age: 71
End: 2021-05-04
Payer: MEDICARE

## 2021-05-04 DIAGNOSIS — Z00.00 ENCOUNTER FOR GENERAL ADULT MEDICAL EXAMINATION WITHOUT ABNORMAL FINDINGS: ICD-10-CM

## 2021-05-04 DIAGNOSIS — R97.20 ELEVATED PROSTATE SPECIFIC ANTIGEN [PSA]: ICD-10-CM

## 2021-05-04 DIAGNOSIS — E11.9 TYPE 2 DIABETES MELLITUS WITHOUT COMPLICATIONS: ICD-10-CM

## 2021-05-04 PROCEDURE — 76872 US TRANSRECTAL: CPT | Mod: 26

## 2021-05-04 PROCEDURE — 76700 US EXAM ABDOM COMPLETE: CPT

## 2021-05-04 PROCEDURE — 76700 US EXAM ABDOM COMPLETE: CPT | Mod: 26

## 2021-05-04 PROCEDURE — 76872 US TRANSRECTAL: CPT

## 2021-05-13 ENCOUNTER — NON-APPOINTMENT (OUTPATIENT)
Age: 71
End: 2021-05-13

## 2021-07-08 ENCOUNTER — APPOINTMENT (OUTPATIENT)
Dept: UROLOGY | Facility: CLINIC | Age: 71
End: 2021-07-08
Payer: MEDICARE

## 2021-07-08 DIAGNOSIS — R39.9 UNSPECIFIED SYMPTOMS AND SIGNS INVOLVING THE GENITOURINARY SYSTEM: ICD-10-CM

## 2021-07-08 PROCEDURE — 99214 OFFICE O/P EST MOD 30 MIN: CPT

## 2021-07-08 PROCEDURE — 51798 US URINE CAPACITY MEASURE: CPT

## 2021-07-22 ENCOUNTER — APPOINTMENT (OUTPATIENT)
Dept: UROLOGY | Facility: CLINIC | Age: 71
End: 2021-07-22

## 2021-10-13 ENCOUNTER — NON-APPOINTMENT (OUTPATIENT)
Age: 71
End: 2021-10-13

## 2021-10-13 ENCOUNTER — APPOINTMENT (OUTPATIENT)
Dept: NEUROLOGY | Facility: CLINIC | Age: 71
End: 2021-10-13
Payer: MEDICARE

## 2021-10-13 VITALS
OXYGEN SATURATION: 98 % | SYSTOLIC BLOOD PRESSURE: 167 MMHG | WEIGHT: 210 LBS | TEMPERATURE: 98.3 F | HEART RATE: 71 BPM | DIASTOLIC BLOOD PRESSURE: 80 MMHG | BODY MASS INDEX: 31.1 KG/M2 | HEIGHT: 69 IN

## 2021-10-13 DIAGNOSIS — Z86.79 PERSONAL HISTORY OF OTHER DISEASES OF THE CIRCULATORY SYSTEM: ICD-10-CM

## 2021-10-13 PROCEDURE — 99205 OFFICE O/P NEW HI 60 MIN: CPT

## 2021-10-13 RX ORDER — FOSINOPRIL SODIUM 40 MG/1
40 TABLET ORAL
Refills: 0 | Status: ACTIVE | COMMUNITY

## 2021-10-13 NOTE — CONSULT LETTER
[Dear  ___] : Dear  [unfilled], [Consult Letter:] : I had the pleasure of evaluating your patient, [unfilled]. [Please see my note below.] : Please see my note below. [Consult Closing:] : Thank you very much for allowing me to participate in the care of this patient.  If you have any questions, please do not hesitate to contact me. [Sincerely,] : Sincerely, [FreeTextEntry3] : Rohit Ortega MD\par Chief, Vascular Neurology and Neurology Service , NeuroEndovascular Surgery\par  of Neurology and Radiology\par [facility]\par Director, Comprehensive Stroke Center and Stroke Unit\par [facility]\par Director, NeuroEndovascular Surgery\par [facility]\par

## 2021-10-13 NOTE — HISTORY OF PRESENT ILLNESS
[FreeTextEntry1] : Mr. Rubi is a 70 year-old  man with PMH left lateral medullary stroke (2017), HTN, HLD, DM who was referred to me by Dr. Bird for stroke neurovascular evaluation. He reports a recent episode of right eye diplopia (skew) that lasted eight days, right-sided numbness and balance issues. He denies any other new TIA or stroke-like symptoms. According to Dr. Bird, outpatient MRI showed a pontine stroke and question of basilar artery dissection. I have not seen any of the imaging yet unfortunately. 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 He has a CTA scheduled for this coming week. He is currently on ASA, which he was taking at the time of the most recent stroke. In terms of risk factors, he reports poor diabetes control, with a HbA1c of 10. He states that he thinks his LDL is 70 and his BP runs in the 135 range. He does not smoke and according to his wife he does not exercise.

## 2021-10-13 NOTE — PHYSICAL EXAM
[General Appearance - Alert] : alert [General Appearance - In No Acute Distress] : in no acute distress [Oriented To Time, Place, And Person] : oriented to person, place, and time [Impaired Insight] : insight and judgment were intact [Affect] : the affect was normal [Mood] : the mood was normal [Person] : oriented to person [Place] : oriented to place [Time] : oriented to time [Short Term Intact] : short term memory intact [Span Intact] : the attention span was normal [Visual Intact] : visual attention was ~T not ~L decreased [Naming Objects] : no difficulty naming common objects [Fluency] : fluency intact [Cranial Nerves Optic (II)] : visual acuity intact bilaterally,  visual fields full to confrontation, pupils equal round and reactive to light [Cranial Nerves Oculomotor (III)] : extraocular motion intact [Cranial Nerves Trigeminal (V)] : facial sensation intact symmetrically [Cranial Nerves Facial (VII)] : face symmetrical [Cranial Nerves Vestibulocochlear (VIII)] : hearing was intact bilaterally [Cranial Nerves Glossopharyngeal (IX)] : tongue and palate midline [Cranial Nerves Accessory (XI - Cranial And Spinal)] : head turning and shoulder shrug symmetric [Cranial Nerves Hypoglossal (XII)] : there was no tongue deviation with protrusion [Motor Strength] : muscle strength was normal in all four extremities [Motor Handedness Left-Handed] : the patient is left hand dominant [Sensation Tactile Decrease] : light touch was intact [2+] : Patella left 2+ [Sclera] : the sclera and conjunctiva were normal [PERRL With Normal Accommodation] : pupils were equal in size, round, reactive to light, with normal accommodation [Extraocular Movements] : extraocular movements were intact [Full Visual Field] : full visual field [Outer Ear] : the ears and nose were normal in appearance [Hearing Threshold Finger Rub Not [place]] : hearing was normal [Neck Appearance] : the appearance of the neck was normal [] : no respiratory distress [Respiration, Rhythm And Depth] : normal respiratory rhythm and effort [Heart Rate And Rhythm] : heart rate was normal and rhythm regular [Edema] : there was no peripheral edema [Abnormal Walk] : normal gait [Motor Tone] : muscle strength and tone were normal [Skin Color & Pigmentation] : normal skin color and pigmentation [Skin Turgor] : normal skin turgor [Paresis Pronator Drift Right-Sided] : no pronator drift on the right [Paresis Pronator Drift Left-Sided] : no pronator drift on the left [Motor Strength Upper Extremities Bilaterally] : strength was normal in both upper extremities [Motor Strength Lower Extremities Bilaterally] : strength was normal in both lower extremities [Romberg's Sign] : Romberg's sign was negtive [Dysdiadochokinesia Bilaterally] : not present [Coordination - Dysmetria Impaired Finger-to-Nose Bilateral] : not present [FreeTextEntry7] : Hypersensitivity on RUE and RLE

## 2021-10-13 NOTE — REVIEW OF SYSTEMS
[Numbness] : numbness [Abnormal Sensation] : an abnormal sensation [Hypersensitivity] : hypersensitivity [As Noted in HPI] : as noted in HPI [Eyesight Problems] : eyesight problems [Fever] : no fever [Chills] : no chills [Anxiety] : no anxiety [Depression] : no depression [Confused or Disoriented] : no confusion [Memory Lapses or Loss] : no memory loss [Decr. Concentrating Ability] : no decrease in concentrating ability [Difficulty with Language] : no ~M difficulty with language [Changed Thought Patterns] : no change in thought patterns [Facial Weakness] : no facial weakness [Arm Weakness] : no arm weakness [Hand Weakness] : no hand weakness [Leg Weakness] : no leg weakness [Poor Coordination] : good coordination [Seizures] : no convulsions [Dizziness] : no dizziness [Difficulty Walking] : no difficulty walking [Inability to Walk] : able to walk [Loss Of Hearing] : no hearing loss [Chest Pain] : no chest pain [Palpitations] : no palpitations [Shortness Of Breath] : no shortness of breath [Cough] : no cough [SOB on Exertion] : no shortness of breath during exertion [Constipation] : no constipation [Diarrhea] : no diarrhea [Joint Pain] : no joint pain [Joint Swelling] : no joint swelling [Joint Stiffness] : no joint stiffness

## 2021-10-13 NOTE — DISCUSSION/SUMMARY
[FreeTextEntry1] : Mr. 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 Given the previous imaging, I doubt he has a dissection of the basilar artery, and what is being seen is plaque. Dr. Bird will bring me the CD this week of his MRI and MRA. I recommend beginning DAPT with ASA/Plavix but will review the CTA first. After three months, we will discontinue ASA and continue Plavix for secondary stroke prevention. We discussed the importance of risk factor control, better diet, and exercise. I will see him again next week after the CTA is completed and hopefully by then I will have seen his MRI imaging. [Antithrombotic therapy with ___] : antithrombotic therapy with  [unfilled] [Intensive Blood Pressure Control] : intensive blood pressure control [Lipid Lowering Therapy] : lipid lowering therapy [Patient encouraged to discuss with Primary MD] : I encouraged the patient to discuss these important issues with ~his/her~ primary care doctor [Goals and Counseling] : I have reviewed the goals of stroke risk factor modification. I counseled the patient on measures to reduce stroke risk, including the importance of medication compliance, risk factor control, exercise, healthy diet and avoidance of smoking. I reviewed stroke warning signs and symptoms and appropriate actions to take if such occur.

## 2021-10-15 ENCOUNTER — OUTPATIENT (OUTPATIENT)
Dept: OUTPATIENT SERVICES | Facility: HOSPITAL | Age: 71
LOS: 1 days | End: 2021-10-15
Payer: MEDICARE

## 2021-10-15 ENCOUNTER — APPOINTMENT (OUTPATIENT)
Dept: CT IMAGING | Facility: IMAGING CENTER | Age: 71
End: 2021-10-15
Payer: MEDICARE

## 2021-10-15 DIAGNOSIS — I63.9 CEREBRAL INFARCTION, UNSPECIFIED: ICD-10-CM

## 2021-10-15 PROCEDURE — 70498 CT ANGIOGRAPHY NECK: CPT | Mod: ME

## 2021-10-15 PROCEDURE — 70496 CT ANGIOGRAPHY HEAD: CPT | Mod: 26,ME

## 2021-10-15 PROCEDURE — G1004: CPT

## 2021-10-15 PROCEDURE — 70496 CT ANGIOGRAPHY HEAD: CPT | Mod: ME

## 2021-10-15 PROCEDURE — 82565 ASSAY OF CREATININE: CPT

## 2021-10-15 PROCEDURE — 70498 CT ANGIOGRAPHY NECK: CPT | Mod: 26,ME

## 2021-10-20 ENCOUNTER — NON-APPOINTMENT (OUTPATIENT)
Age: 71
End: 2021-10-20

## 2021-10-27 ENCOUNTER — NON-APPOINTMENT (OUTPATIENT)
Age: 71
End: 2021-10-27

## 2021-10-27 ENCOUNTER — APPOINTMENT (OUTPATIENT)
Dept: NEUROLOGY | Facility: CLINIC | Age: 71
End: 2021-10-27
Payer: MEDICARE

## 2021-10-27 VITALS — DIASTOLIC BLOOD PRESSURE: 89 MMHG | SYSTOLIC BLOOD PRESSURE: 188 MMHG

## 2021-10-27 VITALS
DIASTOLIC BLOOD PRESSURE: 88 MMHG | BODY MASS INDEX: 31.1 KG/M2 | SYSTOLIC BLOOD PRESSURE: 191 MMHG | TEMPERATURE: 97.9 F | WEIGHT: 210 LBS | HEART RATE: 70 BPM | HEIGHT: 69 IN | OXYGEN SATURATION: 97 %

## 2021-10-27 DIAGNOSIS — I65.1 OCCLUSION AND STENOSIS OF BASILAR ARTERY: ICD-10-CM

## 2021-10-27 PROCEDURE — 99215 OFFICE O/P EST HI 40 MIN: CPT

## 2021-10-27 NOTE — DISCUSSION/SUMMARY
[FreeTextEntry1] : Mr. Rubi is a 70 year-old  man with PMH left lateral medullary stroke (2017), new pontine stroke a couple of weeks ago, likely secondary to severe posterior circulation intracranial atherosclerotic stenosis, with risk factors of HTN, HLD, DM. 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 Plan to begin MRA with NOVA to evaluate blood flow in BA and bilateral PCA as per VERITAS. I recommend performing cerebral angiogram to further assess areas of intracranial stenosis and identify locations for stenting in the case that medical management fails. I reviewed the goals, alternatives, benefits and risks, including but not limited to stroke, bleeding, and dissection, of performing a cerebral angiogram and he agrees to proceed. Plan to perform cerebral angiogram November 4th. Continue DAPT as ordered. Follow-up with me in the office after cerebral angiogram and NOVA MRA.\par

## 2021-10-27 NOTE — REVIEW OF SYSTEMS
[Fever] : no fever [Chills] : no chills [Anxiety] : no anxiety [Depression] : no depression [Confused or Disoriented] : no confusion [Memory Lapses or Loss] : no memory loss [Decr. Concentrating Ability] : no decrease in concentrating ability [Difficulty with Language] : no ~M difficulty with language [Changed Thought Patterns] : no change in thought patterns [Facial Weakness] : no facial weakness [Arm Weakness] : no arm weakness [Hand Weakness] : no hand weakness [Leg Weakness] : no leg weakness [Poor Coordination] : good coordination [Seizures] : no convulsions [Dizziness] : no dizziness [Difficulty Walking] : no difficulty walking [Inability to Walk] : able to walk [Loss Of Hearing] : no hearing loss [Chest Pain] : no chest pain [Palpitations] : no palpitations [Shortness Of Breath] : no shortness of breath [Cough] : no cough [SOB on Exertion] : no shortness of breath during exertion [Constipation] : no constipation [Diarrhea] : no diarrhea [Joint Pain] : no joint pain [Joint Swelling] : no joint swelling [Joint Stiffness] : no joint stiffness

## 2021-10-27 NOTE — HISTORY OF PRESENT ILLNESS
[FreeTextEntry1] : Mr. Rubi is a 70 year-old  man with PMH left lateral medullary stroke (2017), HTN, HLD, DM who was referred to me by Dr. Bird for stroke neurovascular evaluation. He reports a recent episode of right eye diplopia (skew) that lasted eight days, right-sided numbness and balance issues.  According to Dr. Bird, outpatient MRI showed a pontine stroke and question of basilar artery dissection. CTA head and neck performed on 10/15/21 demonstrated severe vertebrobasilar disease including bilateral intracranial vertebral artery stenosis and multifocal moderate to severe stenosis within the basilar artery and the bilateral intracranial ICA segments. The origin of the left vertebral artery is not visualized on CTA. When I reviewed these results I called the patient and instructed him to begin DAPT with ASA and Plavix, however he has not started taking these medications because he wants to speak with his cardiologist first. He denies new TIA or stroke-like symptoms. In terms of risk factors, he reports poor diabetes control, with a HbA1c of 10. He states that he thinks his LDL is 70 and his BP runs in the 135 range. He does not smoke and according to his wife he does not exercise. He denies new TIA or stroke-like symptoms.

## 2021-10-27 NOTE — PHYSICAL EXAM
[Paresis Pronator Drift Right-Sided] : no pronator drift on the right [Paresis Pronator Drift Left-Sided] : no pronator drift on the left [Motor Strength Upper Extremities Bilaterally] : strength was normal in both upper extremities [Motor Strength Lower Extremities Bilaterally] : strength was normal in both lower extremities [Romberg's Sign] : Romberg's sign was negtive [Dysdiadochokinesia Bilaterally] : not present [Coordination - Dysmetria Impaired Finger-to-Nose Bilateral] : not present [FreeTextEntry7] : Hypersensitivity on RUE and RLE

## 2021-10-27 NOTE — CONSULT LETTER
[FreeTextEntry3] : Rohit Ortega MD\par Chief, Vascular Neurology and Neurology Service , NeuroEndovascular Surgery\par  of Neurology and Radiology\par [facility] [employer] [facility]\par Director, Comprehensive Stroke Center and Stroke Unit\par [facility]\par Director, NeuroEndovascular Surgery\par [facility]\par

## 2021-10-28 ENCOUNTER — APPOINTMENT (OUTPATIENT)
Dept: UROLOGY | Facility: CLINIC | Age: 71
End: 2021-10-28
Payer: MEDICARE

## 2021-10-28 VITALS
BODY MASS INDEX: 31.1 KG/M2 | HEART RATE: 73 BPM | SYSTOLIC BLOOD PRESSURE: 156 MMHG | WEIGHT: 210 LBS | DIASTOLIC BLOOD PRESSURE: 82 MMHG | HEIGHT: 69 IN

## 2021-10-28 DIAGNOSIS — N13.8 BENIGN PROSTATIC HYPERPLASIA WITH LOWER URINARY TRACT SYMPMS: ICD-10-CM

## 2021-10-28 DIAGNOSIS — R97.20 ELEVATED PROSTATE, SPECIFIC ANTIGEN [PSA]: ICD-10-CM

## 2021-10-28 DIAGNOSIS — N40.1 BENIGN PROSTATIC HYPERPLASIA WITH LOWER URINARY TRACT SYMPMS: ICD-10-CM

## 2021-10-28 PROCEDURE — 99214 OFFICE O/P EST MOD 30 MIN: CPT

## 2021-10-28 PROCEDURE — 51798 US URINE CAPACITY MEASURE: CPT

## 2021-11-01 ENCOUNTER — OUTPATIENT (OUTPATIENT)
Dept: OUTPATIENT SERVICES | Facility: HOSPITAL | Age: 71
LOS: 1 days | End: 2021-11-01
Payer: MEDICARE

## 2021-11-01 ENCOUNTER — OUTPATIENT (OUTPATIENT)
Dept: OUTPATIENT SERVICES | Facility: HOSPITAL | Age: 71
LOS: 1 days | End: 2021-11-01

## 2021-11-01 ENCOUNTER — APPOINTMENT (OUTPATIENT)
Dept: MRI IMAGING | Facility: HOSPITAL | Age: 71
End: 2021-11-01

## 2021-11-01 VITALS
RESPIRATION RATE: 16 BRPM | DIASTOLIC BLOOD PRESSURE: 82 MMHG | SYSTOLIC BLOOD PRESSURE: 155 MMHG | OXYGEN SATURATION: 96 % | TEMPERATURE: 97 F | HEIGHT: 68 IN | WEIGHT: 218.92 LBS | HEART RATE: 80 BPM

## 2021-11-01 DIAGNOSIS — I65.1 OCCLUSION AND STENOSIS OF BASILAR ARTERY: ICD-10-CM

## 2021-11-01 DIAGNOSIS — I63.9 CEREBRAL INFARCTION, UNSPECIFIED: ICD-10-CM

## 2021-11-01 DIAGNOSIS — Z11.52 ENCOUNTER FOR SCREENING FOR COVID-19: ICD-10-CM

## 2021-11-01 DIAGNOSIS — Z01.818 ENCOUNTER FOR OTHER PREPROCEDURAL EXAMINATION: ICD-10-CM

## 2021-11-01 DIAGNOSIS — Z98.890 OTHER SPECIFIED POSTPROCEDURAL STATES: Chronic | ICD-10-CM

## 2021-11-01 DIAGNOSIS — E11.9 TYPE 2 DIABETES MELLITUS WITHOUT COMPLICATIONS: ICD-10-CM

## 2021-11-01 DIAGNOSIS — I10 ESSENTIAL (PRIMARY) HYPERTENSION: ICD-10-CM

## 2021-11-01 DIAGNOSIS — Z00.00 ENCOUNTER FOR GENERAL ADULT MEDICAL EXAMINATION WITHOUT ABNORMAL FINDINGS: ICD-10-CM

## 2021-11-01 LAB
A1C WITH ESTIMATED AVERAGE GLUCOSE RESULT: 11.6 % — HIGH (ref 4–5.6)
ANION GAP SERPL CALC-SCNC: 17 MMOL/L — SIGNIFICANT CHANGE UP (ref 5–17)
BLD GP AB SCN SERPL QL: NEGATIVE — SIGNIFICANT CHANGE UP
BUN SERPL-MCNC: 26 MG/DL — HIGH (ref 7–23)
CALCIUM SERPL-MCNC: 10 MG/DL — SIGNIFICANT CHANGE UP (ref 8.4–10.5)
CHLORIDE SERPL-SCNC: 97 MMOL/L — SIGNIFICANT CHANGE UP (ref 96–108)
CO2 SERPL-SCNC: 24 MMOL/L — SIGNIFICANT CHANGE UP (ref 22–31)
CREAT SERPL-MCNC: 1.11 MG/DL — SIGNIFICANT CHANGE UP (ref 0.5–1.3)
ESTIMATED AVERAGE GLUCOSE: 286 MG/DL — HIGH (ref 68–114)
GLUCOSE SERPL-MCNC: 246 MG/DL — HIGH (ref 70–99)
HCT VFR BLD CALC: 46.4 % — SIGNIFICANT CHANGE UP (ref 39–50)
HGB BLD-MCNC: 15 G/DL — SIGNIFICANT CHANGE UP (ref 13–17)
MCHC RBC-ENTMCNC: 28.6 PG — SIGNIFICANT CHANGE UP (ref 27–34)
MCHC RBC-ENTMCNC: 32.3 GM/DL — SIGNIFICANT CHANGE UP (ref 32–36)
MCV RBC AUTO: 88.4 FL — SIGNIFICANT CHANGE UP (ref 80–100)
NRBC # BLD: 0 /100 WBCS — SIGNIFICANT CHANGE UP (ref 0–0)
PA ADP PRP-ACNC: 172 PRU — LOW (ref 194–417)
PLATELET # BLD AUTO: 279 K/UL — SIGNIFICANT CHANGE UP (ref 150–400)
PLATELET RESPONSE ASPIRIN RESULT: 395 ARU — SIGNIFICANT CHANGE UP (ref 350–700)
POTASSIUM SERPL-MCNC: 4.1 MMOL/L — SIGNIFICANT CHANGE UP (ref 3.5–5.3)
POTASSIUM SERPL-SCNC: 4.1 MMOL/L — SIGNIFICANT CHANGE UP (ref 3.5–5.3)
RBC # BLD: 5.25 M/UL — SIGNIFICANT CHANGE UP (ref 4.2–5.8)
RBC # FLD: 12.3 % — SIGNIFICANT CHANGE UP (ref 10.3–14.5)
RH IG SCN BLD-IMP: POSITIVE — SIGNIFICANT CHANGE UP
SARS-COV-2 RNA SPEC QL NAA+PROBE: SIGNIFICANT CHANGE UP
SODIUM SERPL-SCNC: 138 MMOL/L — SIGNIFICANT CHANGE UP (ref 135–145)
WBC # BLD: 7.78 K/UL — SIGNIFICANT CHANGE UP (ref 3.8–10.5)
WBC # FLD AUTO: 7.78 K/UL — SIGNIFICANT CHANGE UP (ref 3.8–10.5)

## 2021-11-01 PROCEDURE — 86900 BLOOD TYPING SEROLOGIC ABO: CPT

## 2021-11-01 PROCEDURE — 86850 RBC ANTIBODY SCREEN: CPT

## 2021-11-01 PROCEDURE — 83036 HEMOGLOBIN GLYCOSYLATED A1C: CPT

## 2021-11-01 PROCEDURE — 85027 COMPLETE CBC AUTOMATED: CPT

## 2021-11-01 PROCEDURE — 85576 BLOOD PLATELET AGGREGATION: CPT

## 2021-11-01 PROCEDURE — G0463: CPT

## 2021-11-01 PROCEDURE — C9803: CPT

## 2021-11-01 PROCEDURE — G1004: CPT

## 2021-11-01 PROCEDURE — U0003: CPT

## 2021-11-01 PROCEDURE — 70544 MR ANGIOGRAPHY HEAD W/O DYE: CPT | Mod: ME

## 2021-11-01 PROCEDURE — 36415 COLL VENOUS BLD VENIPUNCTURE: CPT

## 2021-11-01 PROCEDURE — 80048 BASIC METABOLIC PNL TOTAL CA: CPT

## 2021-11-01 PROCEDURE — 86901 BLOOD TYPING SEROLOGIC RH(D): CPT

## 2021-11-01 PROCEDURE — 70544 MR ANGIOGRAPHY HEAD W/O DYE: CPT | Mod: 26,ME

## 2021-11-01 PROCEDURE — U0005: CPT

## 2021-11-01 NOTE — H&P PST ADULT - PROBLEM SELECTOR PLAN 1
scheduled Cerebral Angiogram on 11/4/21.  preop instructions given  -Labs: CBC, BMP, A1c, T&S, P2Y12, ASA Response done in PST  -obtain Endocrinology clearance

## 2021-11-01 NOTE — H&P PST ADULT - OTHER CARE PROVIDERS
Dr. Derrell Leung [phone] (last visit 9/2021), Dr. Chery ([facility]) [phone]

## 2021-11-01 NOTE — H&P PST ADULT - HISTORY OF PRESENT ILLNESS
70 yr old male with PMH of  70 yr old male with PMH of HTN, HLD, Stroke 2017, DM Type 2, BPH. Pt c/o  diplopia 2 weeks ago that resolved on its own. Pt denies headaches, dizziness, or parasthenia. Pt evaluated by Dr. Ortega for a scheduled Cerebral Angiogram on 11/4/21. Pt denies recent travel or sick contact.     **Covid swab on 11/1/21 at [facility] 70 yr old male with PMH of HTN, HLD, Stroke 2017, DM Type 2, BPH. Pt c/o  diplopia 2 weeks ago that resolved on its own. Pt denies headaches, dizziness, or parasthenia. Pt evaluated by Dr. Ortega for a scheduled Cerebral Angiogram on 11/4/21. Pt denies recent travel or sick contact.     **Covid swab on 11/1/21 at [facility]    **Of note, Pt stated last A1c to be 10. Pt will need Endocrinology and Medical clearance. Case discussed with Dr. Allen.

## 2021-11-01 NOTE — H&P PST ADULT - NSICDXPASTMEDICALHX_GEN_ALL_CORE_FT
PAST MEDICAL HISTORY:  BPH with elevated PSA     Diabetes Mellitus Type II     HTN (Hypertension)     Hypercholesterolemia     Stroke 2017, 10/2021:  with right diplobia resolving in a week

## 2021-11-01 NOTE — H&P PST ADULT - NEGATIVE NEUROLOGICAL SYMPTOMS
no transient paralysis/no weakness/no paresthesias/no syncope/no tremors/no vertigo/no loss of sensation/no headache/no hemiparesis/no confusion

## 2021-11-02 ENCOUNTER — NON-APPOINTMENT (OUTPATIENT)
Age: 71
End: 2021-11-02

## 2021-11-04 ENCOUNTER — APPOINTMENT (OUTPATIENT)
Dept: NEUROLOGY | Facility: CLINIC | Age: 71
End: 2021-11-04

## 2022-01-26 ENCOUNTER — RX RENEWAL (OUTPATIENT)
Age: 72
End: 2022-01-26

## 2022-02-14 ENCOUNTER — NON-APPOINTMENT (OUTPATIENT)
Age: 72
End: 2022-02-14

## 2022-03-29 ENCOUNTER — RX RENEWAL (OUTPATIENT)
Age: 72
End: 2022-03-29

## 2022-10-15 ENCOUNTER — INPATIENT (INPATIENT)
Facility: HOSPITAL | Age: 72
LOS: 2 days | Discharge: HOME HEALTH | DRG: 871 | End: 2022-10-18
Attending: INTERNAL MEDICINE | Admitting: INTERNAL MEDICINE
Payer: MEDICARE

## 2022-10-15 VITALS
OXYGEN SATURATION: 98 % | SYSTOLIC BLOOD PRESSURE: 190 MMHG | HEIGHT: 68 IN | HEART RATE: 80 BPM | DIASTOLIC BLOOD PRESSURE: 130 MMHG | WEIGHT: 190.04 LBS

## 2022-10-15 DIAGNOSIS — Z98.890 OTHER SPECIFIED POSTPROCEDURAL STATES: Chronic | ICD-10-CM

## 2022-10-15 DIAGNOSIS — I10 ESSENTIAL (PRIMARY) HYPERTENSION: ICD-10-CM

## 2022-10-15 DIAGNOSIS — E11.9 TYPE 2 DIABETES MELLITUS WITHOUT COMPLICATIONS: ICD-10-CM

## 2022-10-15 DIAGNOSIS — N39.0 URINARY TRACT INFECTION, SITE NOT SPECIFIED: ICD-10-CM

## 2022-10-15 DIAGNOSIS — E78.5 HYPERLIPIDEMIA, UNSPECIFIED: ICD-10-CM

## 2022-10-15 PROBLEM — I63.9 CEREBRAL INFARCTION, UNSPECIFIED: Chronic | Status: ACTIVE | Noted: 2021-11-01

## 2022-10-15 PROBLEM — N40.0 BENIGN PROSTATIC HYPERPLASIA WITHOUT LOWER URINARY TRACT SYMPTOMS: Chronic | Status: ACTIVE | Noted: 2021-11-01

## 2022-10-15 LAB
ALBUMIN SERPL ELPH-MCNC: 3.7 G/DL — SIGNIFICANT CHANGE UP (ref 3.3–5)
ALP SERPL-CCNC: 85 U/L — SIGNIFICANT CHANGE UP (ref 40–120)
ALT FLD-CCNC: 17 U/L — SIGNIFICANT CHANGE UP (ref 10–45)
ANION GAP SERPL CALC-SCNC: 10 MMOL/L — SIGNIFICANT CHANGE UP (ref 5–17)
APPEARANCE UR: CLEAR — SIGNIFICANT CHANGE UP
APTT BLD: 33.7 SEC — SIGNIFICANT CHANGE UP (ref 27.5–35.5)
AST SERPL-CCNC: 19 U/L — SIGNIFICANT CHANGE UP (ref 10–40)
BACTERIA # UR AUTO: ABNORMAL
BASOPHILS # BLD AUTO: 0.06 K/UL — SIGNIFICANT CHANGE UP (ref 0–0.2)
BASOPHILS NFR BLD AUTO: 0.5 % — SIGNIFICANT CHANGE UP (ref 0–2)
BILIRUB SERPL-MCNC: 0.4 MG/DL — SIGNIFICANT CHANGE UP (ref 0.2–1.2)
BILIRUB UR-MCNC: NEGATIVE — SIGNIFICANT CHANGE UP
BUN SERPL-MCNC: 22 MG/DL — SIGNIFICANT CHANGE UP (ref 7–23)
CALCIUM SERPL-MCNC: 9.5 MG/DL — SIGNIFICANT CHANGE UP (ref 8.4–10.5)
CHLORIDE SERPL-SCNC: 101 MMOL/L — SIGNIFICANT CHANGE UP (ref 96–108)
CO2 SERPL-SCNC: 29 MMOL/L — SIGNIFICANT CHANGE UP (ref 22–31)
COLOR SPEC: SIGNIFICANT CHANGE UP
CREAT SERPL-MCNC: 1.34 MG/DL — HIGH (ref 0.5–1.3)
DIFF PNL FLD: ABNORMAL
EGFR: 56 ML/MIN/1.73M2 — LOW
EOSINOPHIL # BLD AUTO: 0.29 K/UL — SIGNIFICANT CHANGE UP (ref 0–0.5)
EOSINOPHIL NFR BLD AUTO: 2.3 % — SIGNIFICANT CHANGE UP (ref 0–6)
EPI CELLS # UR: 0 /HPF — SIGNIFICANT CHANGE UP
GLUCOSE BLDC GLUCOMTR-MCNC: 104 MG/DL — HIGH (ref 70–99)
GLUCOSE BLDC GLUCOMTR-MCNC: 178 MG/DL — HIGH (ref 70–99)
GLUCOSE SERPL-MCNC: 130 MG/DL — HIGH (ref 70–99)
GLUCOSE UR QL: ABNORMAL
HCT VFR BLD CALC: 46.7 % — SIGNIFICANT CHANGE UP (ref 39–50)
HGB BLD-MCNC: 15 G/DL — SIGNIFICANT CHANGE UP (ref 13–17)
HYALINE CASTS # UR AUTO: 5 /LPF — HIGH (ref 0–2)
IMM GRANULOCYTES NFR BLD AUTO: 0.5 % — SIGNIFICANT CHANGE UP (ref 0–0.9)
INR BLD: 1.07 RATIO — SIGNIFICANT CHANGE UP (ref 0.88–1.16)
KETONES UR-MCNC: SIGNIFICANT CHANGE UP
LEUKOCYTE ESTERASE UR-ACNC: ABNORMAL
LYMPHOCYTES # BLD AUTO: 1.18 K/UL — SIGNIFICANT CHANGE UP (ref 1–3.3)
LYMPHOCYTES # BLD AUTO: 9.5 % — LOW (ref 13–44)
MCHC RBC-ENTMCNC: 29 PG — SIGNIFICANT CHANGE UP (ref 27–34)
MCHC RBC-ENTMCNC: 32.1 GM/DL — SIGNIFICANT CHANGE UP (ref 32–36)
MCV RBC AUTO: 90.3 FL — SIGNIFICANT CHANGE UP (ref 80–100)
MONOCYTES # BLD AUTO: 0.77 K/UL — SIGNIFICANT CHANGE UP (ref 0–0.9)
MONOCYTES NFR BLD AUTO: 6.2 % — SIGNIFICANT CHANGE UP (ref 2–14)
NEUTROPHILS # BLD AUTO: 10.09 K/UL — HIGH (ref 1.8–7.4)
NEUTROPHILS NFR BLD AUTO: 81 % — HIGH (ref 43–77)
NITRITE UR-MCNC: NEGATIVE — SIGNIFICANT CHANGE UP
NRBC # BLD: 0 /100 WBCS — SIGNIFICANT CHANGE UP (ref 0–0)
NT-PROBNP SERPL-SCNC: 624 PG/ML — HIGH (ref 0–300)
PH UR: 8 — SIGNIFICANT CHANGE UP (ref 5–8)
PLATELET # BLD AUTO: 277 K/UL — SIGNIFICANT CHANGE UP (ref 150–400)
POTASSIUM SERPL-MCNC: 4.1 MMOL/L — SIGNIFICANT CHANGE UP (ref 3.5–5.3)
POTASSIUM SERPL-SCNC: 4.1 MMOL/L — SIGNIFICANT CHANGE UP (ref 3.5–5.3)
PROT SERPL-MCNC: 6.9 G/DL — SIGNIFICANT CHANGE UP (ref 6–8.3)
PROT UR-MCNC: ABNORMAL
PROTHROM AB SERPL-ACNC: 12.4 SEC — SIGNIFICANT CHANGE UP (ref 10.5–13.4)
RBC # BLD: 5.17 M/UL — SIGNIFICANT CHANGE UP (ref 4.2–5.8)
RBC # FLD: 12.2 % — SIGNIFICANT CHANGE UP (ref 10.3–14.5)
RBC CASTS # UR COMP ASSIST: 37 /HPF — HIGH (ref 0–4)
SARS-COV-2 RNA SPEC QL NAA+PROBE: SIGNIFICANT CHANGE UP
SODIUM SERPL-SCNC: 140 MMOL/L — SIGNIFICANT CHANGE UP (ref 135–145)
SP GR SPEC: 1.02 — SIGNIFICANT CHANGE UP (ref 1.01–1.02)
TROPONIN T, HIGH SENSITIVITY RESULT: 34 NG/L — SIGNIFICANT CHANGE UP (ref 0–51)
UROBILINOGEN FLD QL: NEGATIVE — SIGNIFICANT CHANGE UP
WBC # BLD: 12.45 K/UL — HIGH (ref 3.8–10.5)
WBC # FLD AUTO: 12.45 K/UL — HIGH (ref 3.8–10.5)
WBC UR QL: 138 /HPF — HIGH (ref 0–5)

## 2022-10-15 PROCEDURE — 70496 CT ANGIOGRAPHY HEAD: CPT | Mod: 26,MA

## 2022-10-15 PROCEDURE — 99291 CRITICAL CARE FIRST HOUR: CPT

## 2022-10-15 PROCEDURE — 0042T: CPT | Mod: MA

## 2022-10-15 PROCEDURE — 70551 MRI BRAIN STEM W/O DYE: CPT | Mod: 26,MA

## 2022-10-15 PROCEDURE — 70498 CT ANGIOGRAPHY NECK: CPT | Mod: 26,MA

## 2022-10-15 PROCEDURE — 70450 CT HEAD/BRAIN W/O DYE: CPT | Mod: 26,59,MA

## 2022-10-15 RX ORDER — CEFTRIAXONE 500 MG/1
1000 INJECTION, POWDER, FOR SOLUTION INTRAMUSCULAR; INTRAVENOUS EVERY 24 HOURS
Refills: 0 | Status: DISCONTINUED | OUTPATIENT
Start: 2022-10-16 | End: 2022-10-17

## 2022-10-15 RX ORDER — INSULIN LISPRO 100/ML
VIAL (ML) SUBCUTANEOUS
Refills: 0 | Status: DISCONTINUED | OUTPATIENT
Start: 2022-10-15 | End: 2022-10-18

## 2022-10-15 RX ORDER — LISINOPRIL 2.5 MG/1
40 TABLET ORAL DAILY
Refills: 0 | Status: DISCONTINUED | OUTPATIENT
Start: 2022-10-15 | End: 2022-10-18

## 2022-10-15 RX ORDER — DEXTROSE 50 % IN WATER 50 %
15 SYRINGE (ML) INTRAVENOUS ONCE
Refills: 0 | Status: DISCONTINUED | OUTPATIENT
Start: 2022-10-15 | End: 2022-10-18

## 2022-10-15 RX ORDER — GLUCAGON INJECTION, SOLUTION 0.5 MG/.1ML
1 INJECTION, SOLUTION SUBCUTANEOUS ONCE
Refills: 0 | Status: DISCONTINUED | OUTPATIENT
Start: 2022-10-15 | End: 2022-10-18

## 2022-10-15 RX ORDER — FUROSEMIDE 40 MG
40 TABLET ORAL EVERY 24 HOURS
Refills: 0 | Status: DISCONTINUED | OUTPATIENT
Start: 2022-10-15 | End: 2022-10-17

## 2022-10-15 RX ORDER — DEXTROSE 50 % IN WATER 50 %
25 SYRINGE (ML) INTRAVENOUS ONCE
Refills: 0 | Status: DISCONTINUED | OUTPATIENT
Start: 2022-10-15 | End: 2022-10-18

## 2022-10-15 RX ORDER — SODIUM CHLORIDE 9 MG/ML
1000 INJECTION, SOLUTION INTRAVENOUS
Refills: 0 | Status: DISCONTINUED | OUTPATIENT
Start: 2022-10-15 | End: 2022-10-18

## 2022-10-15 RX ORDER — CEFTRIAXONE 500 MG/1
1000 INJECTION, POWDER, FOR SOLUTION INTRAMUSCULAR; INTRAVENOUS ONCE
Refills: 0 | Status: COMPLETED | OUTPATIENT
Start: 2022-10-15 | End: 2022-10-15

## 2022-10-15 RX ORDER — FUROSEMIDE 40 MG
60 TABLET ORAL DAILY
Refills: 0 | Status: DISCONTINUED | OUTPATIENT
Start: 2022-10-15 | End: 2022-10-15

## 2022-10-15 RX ORDER — ATORVASTATIN CALCIUM 80 MG/1
80 TABLET, FILM COATED ORAL AT BEDTIME
Refills: 0 | Status: DISCONTINUED | OUTPATIENT
Start: 2022-10-15 | End: 2022-10-18

## 2022-10-15 RX ORDER — METOPROLOL TARTRATE 50 MG
200 TABLET ORAL DAILY
Refills: 0 | Status: DISCONTINUED | OUTPATIENT
Start: 2022-10-15 | End: 2022-10-18

## 2022-10-15 RX ORDER — ASPIRIN/CALCIUM CARB/MAGNESIUM 324 MG
325 TABLET ORAL DAILY
Refills: 0 | Status: DISCONTINUED | OUTPATIENT
Start: 2022-10-15 | End: 2022-10-16

## 2022-10-15 RX ORDER — CLOPIDOGREL BISULFATE 75 MG/1
75 TABLET, FILM COATED ORAL DAILY
Refills: 0 | Status: DISCONTINUED | OUTPATIENT
Start: 2022-10-15 | End: 2022-10-18

## 2022-10-15 RX ORDER — ENOXAPARIN SODIUM 100 MG/ML
40 INJECTION SUBCUTANEOUS EVERY 24 HOURS
Refills: 0 | Status: DISCONTINUED | OUTPATIENT
Start: 2022-10-15 | End: 2022-10-18

## 2022-10-15 RX ORDER — INSULIN GLARGINE 100 [IU]/ML
5 INJECTION, SOLUTION SUBCUTANEOUS AT BEDTIME
Refills: 0 | Status: DISCONTINUED | OUTPATIENT
Start: 2022-10-15 | End: 2022-10-16

## 2022-10-15 RX ORDER — AMLODIPINE BESYLATE 2.5 MG/1
5 TABLET ORAL ONCE
Refills: 0 | Status: COMPLETED | OUTPATIENT
Start: 2022-10-15 | End: 2022-10-15

## 2022-10-15 RX ORDER — DEXTROSE 50 % IN WATER 50 %
12.5 SYRINGE (ML) INTRAVENOUS ONCE
Refills: 0 | Status: DISCONTINUED | OUTPATIENT
Start: 2022-10-15 | End: 2022-10-18

## 2022-10-15 RX ADMIN — Medication 0: at 18:02

## 2022-10-15 RX ADMIN — Medication 200 MILLIGRAM(S): at 18:00

## 2022-10-15 RX ADMIN — CEFTRIAXONE 100 MILLIGRAM(S): 500 INJECTION, POWDER, FOR SOLUTION INTRAMUSCULAR; INTRAVENOUS at 23:30

## 2022-10-15 RX ADMIN — ATORVASTATIN CALCIUM 80 MILLIGRAM(S): 80 TABLET, FILM COATED ORAL at 22:04

## 2022-10-15 RX ADMIN — CEFTRIAXONE 100 MILLIGRAM(S): 500 INJECTION, POWDER, FOR SOLUTION INTRAMUSCULAR; INTRAVENOUS at 12:17

## 2022-10-15 RX ADMIN — INSULIN GLARGINE 5 UNIT(S): 100 INJECTION, SOLUTION SUBCUTANEOUS at 22:04

## 2022-10-15 RX ADMIN — ENOXAPARIN SODIUM 40 MILLIGRAM(S): 100 INJECTION SUBCUTANEOUS at 18:01

## 2022-10-15 RX ADMIN — AMLODIPINE BESYLATE 5 MILLIGRAM(S): 2.5 TABLET ORAL at 13:06

## 2022-10-15 RX ADMIN — Medication 325 MILLIGRAM(S): at 17:59

## 2022-10-15 RX ADMIN — CLOPIDOGREL BISULFATE 75 MILLIGRAM(S): 75 TABLET, FILM COATED ORAL at 17:59

## 2022-10-15 RX ADMIN — LISINOPRIL 40 MILLIGRAM(S): 2.5 TABLET ORAL at 18:01

## 2022-10-15 RX ADMIN — Medication 40 MILLIGRAM(S): at 15:50

## 2022-10-15 NOTE — H&P ADULT - PROBLEM/PLAN-3
DISPLAY PLAN FREE TEXT Island Pedicle Flap With Canthal Suspension Text: The defect edges were debeveled with a #15 scalpel blade.  Given the location of the defect, shape of the defect and the proximity to free margins an island pedicle advancement flap was deemed most appropriate.  Using a sterile surgical marker, an appropriate advancement flap was drawn incorporating the defect, outlining the appropriate donor tissue and placing the expected incisions within the relaxed skin tension lines where possible. The area thus outlined was incised deep to adipose tissue with a #15 scalpel blade.  The skin margins were undermined to an appropriate distance in all directions around the primary defect and laterally outward around the island pedicle utilizing iris scissors.  There was minimal undermining beneath the pedicle flap. A suspension suture was placed in the canthal tendon to prevent tension and prevent ectropion.

## 2022-10-15 NOTE — H&P ADULT - NSHPPHYSICALEXAM_GEN_ALL_CORE
PHYSICAL EXAM: vital signs noted on Sunrise  in no apparent distress  HEENT: HAZEL EOMI  Neck: Supple, no JVD, no thyromegaly  Lungs: decreased breath sounds bases   CVS: S1 S2 systolic murmur +   Abdomen: no tenderness, no organomegaly, BS present  Neuro: AO x 1-2 no focal weakness, no sensory abnormalities  Skin: warm, dry  Ext: no cyanosis or clubbing, 2 + edema right leg 1+ left leg  Msk: no joint swelling or deformities  Back: no CVA tenderness, no kyphosis/scoliosis

## 2022-10-15 NOTE — H&P ADULT - ASSESSMENT
71y PMH gentleman PMH DM2, HTN, CVA who presents to the Emergency Department via EMS on the morning of 10/15/22 now admitted for management and treatment of likely UTI with encephalopathy

## 2022-10-15 NOTE — H&P ADULT - PROBLEM SELECTOR PLAN 1
with sepsis and septic encephalopathy on admission   will continue ceftriaxone IV   urine culture pending   hemodynamically stable

## 2022-10-15 NOTE — ED ADULT NURSE NOTE - OBJECTIVE STATEMENT
71y male A&OX2 (doesn't know time) BIBEMS complaining of R side facial droop slurred speech. PMHX DM2, HTN, CVA. Code stroke was called and pt went straight to CT scan. Pt wife reports pt had slurred speech when pt was woken up. Pt wife states he hasn't been himself for the past two days. Pt wife states he loves staying in bed and watch TV. Pt follow commands. Pt is able to move all extremities strongly.  Pupils are equal and reactive to light. Labs was collected and sent to lab. Pt awaiting ct scan. Pt pending dispo. 71y male A&OX2 (doesn't know time) BIBEMS complaining of R side facial droop slurred speech. PMHX DM2, HTN, CVA. Code stroke was called and pt went straight to CT scan. Pt wife reports pt had slurred speech when pt was woken up. Pt wife states he hasn't been himself for the past two days. Pt follow commands. strength 5/5 in all extremities.  Pupils are equal and reactive to light. Labs was collected and sent to lab. Pt awaiting ct scan. Pt pending dispo. 71y male A&OX2 not oriented to time, BIBEMS from home complaining of R side facial droop slurred speech. PMHX DM2, HTN, CVA. Code stroke was called and sent directly to CT scan. As per wife at bedside reports pt had slurred speech when pt woke this am. Wife states he hasn't been himself for the past two days. Pt follow commands. strength 5/5 in all extremities.  Pupils are equal and reactive to light. Labs was collected and sent to lab. Pt awaiting ct scan. Pt pending dispo.

## 2022-10-15 NOTE — ED ADULT NURSE REASSESSMENT NOTE - NS ED NURSE REASSESS COMMENT FT1
pt BP improving s/p oral antihypertensive administration. pt attached to "primo"fit, 300 cc noted in container. pt in nad, pending admission bed.

## 2022-10-15 NOTE — CONSULT NOTE ADULT - CRITICAL CARE ATTENDING COMMENT
code stroke called and neurology emergently assessed patient at bedside.   Briefly    71y LH gentleman with DM2, HTN, prior strokes who presents to the ED via EMS on the morning of 10/15/22  complaining of R side facial droop slurred speech. Code stroke was called and pt went straight to CT scan.   Last known normal of 9pm the day prior. Pt wife states he hasn't been himself for the past two days but focal deficits were most notable today. Code Stroke called on arrival, NIHSS of 7.   CT Head noncontrast: No acute intracranial hemorrhage, midline shift, or hydrocephalus. Chronic bilateral thalamic lacunar infarcts and left corona radiata/basal ganglia infarct.   CTA Head and Neck:   CT brain perfusion: No core infarct. Delayed mean transit time in the bilateral occipital lobes and right frontal lobe may represent brain at risk versus artifact.  CTA head and neck: No large vessel occlusion. Wake Up Protocol initiated: No acute infarct.  MRI brain neg for AIS   + UA  +BEAR     No TPA given due to patient with no evidence of infarction on MRI and questionable last known well, no MT performed due to no evidence of LVO.  10/16 exam improved.  AMANDA AAOx3     Impression: Mild dysarthria, R facial droop, and subtle R hemiparesis likely due to recrudescence of L corona radiata infarct in the setting of UTI    Recommendations:      -  ASA 81mg PO daily.  - High dose statin therapy - atorvastatin 40mg PO daily. LDL goal <70mg/dL.   - Hemoglobin A1c and lipid panel  - treatment of infection per primary team   - telemetry  - PT/OT/SS/SLP, OOBC  - EEG if no improvement   - check FS, glucose control <180  - GI/DVT ppx  - Counseling on diet, exercise, and medication adherence was done  - Counseling on smoking cessation and alcohol consumption offered when appropriate.  - Pain assessed and judicious use of narcotics when appropriate was discussed.    - Stroke education given when appropriate.  - Importance of fall prevention discussed.   - Differential diagnosis and plan of care discussed with patient and/or family and primary team  - Thank you for allowing me to participate in the care of this patient. Call with questions.   - spoke with patient and son in ED at bedside   Aguila Barnett MD  Vascular Neurology  Office: [phone] code stroke called and neurology emergently assessed patient at bedside.   Briefly    71y LH gentleman with DM2 with also peripheral neuropathy, HTN, prior strokes who presents to the ED via EMS on the morning of 10/15/22  complaining of R side facial droop slurred speech. Code stroke was called and pt went straight to CT scan.   Last known normal of 9pm the day prior. Pt wife states he hasn't been himself for the past two days but focal deficits were most notable today. Code Stroke called on arrival, NIHSS of 7.   CT Head noncontrast: No acute intracranial hemorrhage, midline shift, or hydrocephalus. Chronic bilateral thalamic lacunar infarcts and left corona radiata/basal ganglia infarct.   CTA Head and Neck:   CT brain perfusion: No core infarct. Delayed mean transit time in the bilateral occipital lobes and right frontal lobe may represent brain at risk versus artifact.  CTA head and neck: No large vessel occlusion. Wake Up Protocol initiated: No acute infarct.  MRI brain neg for AIS   + UA  +BEAR     No TPA given due to patient with no evidence of infarction on MRI and questionable last known well, no MT performed due to no evidence of LVO.  10/16 exam improved.  AMANDA AAOx3     Impression: Mild dysarthria, R facial droop, and subtle R hemiparesis likely due to recrudescence of L corona radiata infarct in the setting of UTI    Recommendations:      -  ASA 81mg PO daily.  - High dose statin therapy - atorvastatin 40mg PO daily. LDL goal <70mg/dL.   - Hemoglobin A1c and lipid panel  - treatment of infection per primary team   - telemetry  - PT/OT/SS/SLP, OOBC  - EEG if no improvement   - check FS, glucose control <180  - GI/DVT ppx  - Counseling on diet, exercise, and medication adherence was done  - Counseling on smoking cessation and alcohol consumption offered when appropriate.  - Pain assessed and judicious use of narcotics when appropriate was discussed.    - Stroke education given when appropriate.  - Importance of fall prevention discussed.   - Differential diagnosis and plan of care discussed with patient and/or family and primary team  - Thank you for allowing me to participate in the care of this patient. Call with questions.   - spoke with patient and son in ED at bedside   Aguila Barnett MD  Vascular Neurology  Office: [phone]

## 2022-10-15 NOTE — ED PROVIDER NOTE - CLINICAL SUMMARY MEDICAL DECISION MAKING FREE TEXT BOX
72 yo M with hx of BPH, diabetes mellitus type 2, hypertension, & hypercholesterolemia presenting as code stroke for abnormal speech with possible R facial droop and RUE weakness. 72 yo M with hx of BPH, diabetes mellitus type 2, hypertension, & hypercholesterolemia presenting as code stroke for abnormal speech with possible R facial droop and RUE weakness. Mild facial droop and RUE strength deficit. VSS, afebrile. Code stroke initiated. DDx includes intracranial vs metabolic vs infectious etiology. Plan for labs, CT/CTA head/neck, and dispo pending workup and neuro recs.

## 2022-10-15 NOTE — ED PROVIDER NOTE - PHYSICAL EXAMINATION
Gen: NAD, AAOx2, non-toxic appearing  HEENT: NCAT, normal conjunctiva, oral mucosa moist  Lung: speaking in full sentences, good aeration bilaterally, lungs CTA b/l  CV: regular rate and rhythm. cap refill <2x. peripheral pulses 2+bilaterally   Abd: soft, ND, NT  MSK: no visible deformities  Neuro: Mild R facial droop, moving all extremities, 4/5 strength in RUE, 5/5 of RLE and LUE/LLE  Skin: Intact  Psych: normal affect

## 2022-10-15 NOTE — CONSULT NOTE ADULT - ASSESSMENT
71y LH gentleman who presents to the ED via EMS on the morning of 10/15/22  A&OX2 (doesn't know time) BIBEMS complaining of R side facial droop slurred speech. PMHX DM2, HTN, CVA. Code stroke was called and pt went straight to CT scan. Pt wife reports pt had slurred speech when pt was woken up. Last known normal of 9pm the day prior. Pt wife states he hasn't been himself for the past two days but focal deficits were most notable today. Code Stroke called on arrival, NIHSS of 7. CT Head noncontrast: No acute intracranial hemorrhage, midline shift, or hydrocephalus. Chronic bilateral thalamic lacunar infarcts and left corona radiata/basal ganglia infarct. CTA Head and Neck: CT brain perfusion: No core infarct. Delayed mean transit time in the bilateral occipital lobes and right frontal lobe may represent brain at risk versus artifact.CTA head and neck: No large vessel occlusion. Wake Up Protocol initiated: No acute infarct. Exam reveals a disoriented, encephalopathic patient with mild dysarthria, R facial droop, and subtle R hemiparesis. No evidence of infarction on MRI suggesting this may be recrudescence of L corona radiata infarct in the setting of UTI. No TPA given due to patient with no evidence of infarction on MRI and questionable last known well, no MT performed due to no evidence of LVO.      Impression: Mild dysarthria, R facial droop, and subtle R hemiparesis likely due to recrudescence of L corona radiata infarct in the setting of UTI    Recommendations:    Admit to medicine  Investigate and address any toxic/metabolic/infections insults, treat UTI  Continue with Aspirin 81mg qd po  Lipitor 80mg qd po  Neurochecks q4h  Repeat Head CT for any change in neuro examination  Supportive care as per primary team

## 2022-10-15 NOTE — ED PROVIDER NOTE - PROGRESS NOTE DETAILS
Thomas, PGY3: CT/MRI nondiagnostic. Patient noted to have UTI, abx ordered. Wife states that he still is not acting himself. Will admit for further management.

## 2022-10-15 NOTE — CONSULT NOTE ADULT - SUBJECTIVE AND OBJECTIVE BOX
HPI:      Patient is a 71y LH gentleman who presents to the ED via EMS on the morning of 10/15/22  A&OX2 (doesn't know time) BIBEMS complaining of R side facial droop slurred speech. PMHX DM2, HTN, CVA. Code stroke was called and pt went straight to CT scan. Pt wife reports pt had slurred speech when pt was woken up. Pt wife states he hasn't been himself for the past two days. Code Stroke called on arrival, NIHSS of 7. CT Head noncontrast: No acute intracranial hemorrhage, midline shift, or hydrocephalus. Chronic bilateral thalamic lacunar infarcts and left corona radiata/basal ganglia infarct. CTA Head and Neck: CT brain perfusion: No core infarct. Delayed mean transit time in the bilateral occipital lobes and right frontal lobe may represent brain at risk versus artifact. Consider MRI for further evaluation. CTA head and neck: No large vessel occlusion. Wake Up Protocol initiated: No acute infarct. Neurology consulted via Code Stroke    (Stroke only)  NIHSS: 7  MRS: 2  ICH: 0    PAST MEDICAL & SURGICAL HISTORY:  Diabetes Mellitus Type II    HTN (Hypertension)    Hypercholesterolemia    BPH with elevated PSA    Stroke  2017, 10/2021:  with right diplobia resolving in a week      S/P colonoscopy    MEDICATIONS (HOME):  Home Medications:  amLODIPine: 7.5 milligram(s) orally once a day (2021 15:44)  aspirin 325 mg oral tablet: 1 tab(s) orally once a day (2021 15:44)  atorvastatin 80 mg oral tablet: 1 tab(s) orally once a day (2021 15:44)  clopidogrel 75 mg oral tablet: 1 tab(s) orally once a day (2021 15:44)  fosinopril 40 mg oral tablet: 1 tab(s) orally once a day (2021 15:44)  furosemide: 60 milligram(s) orally once a day (2021 15:44)  glipiZIDE 10 mg oral tablet: 1 tab(s) orally 2 times a day (2021 15:44)  Jardiance 25 mg oral tablet: 1 tab(s) orally once a day (in the morning) (2021 15:44)  metFORMIN 1000 mg oral tablet: 1 tab(s) orally 2 times a day (2021 15:44)  metoprolol succinate 200 mg oral tablet, extended release: 1 tab(s) orally once a day (2021 15:44)  Soliqua 100/33 subcutaneous solution: 24 milliliter(s) subcutaneous once a day (2021 15:44)    MEDICATIONS  (STANDING):  amLODIPine   Tablet 5 milliGRAM(s) Oral Once    MEDICATIONS  (PRN):    ALLERGIES/INTOLERANCES:  Allergies  No Known Allergies    Intolerances    VITALS & EXAMINATION:  Vital Signs Last 24 Hrs  T(C): 36.7 (15 Oct 2022 10:38), Max: 36.7 (15 Oct 2022 10:38)  T(F): 98.1 (15 Oct 2022 10:38), Max: 98.1 (15 Oct 2022 10:38)  HR: 80 (15 Oct 2022 10:38) (80 - 80)  BP: 197/99 (15 Oct 2022 10:38) (190/130 - 197/99)  BP(mean): --  RR: 18 (15 Oct 2022 10:38) (18 - 18)  SpO2: 94% (15 Oct 2022 10:38) (94% - 98%)    Parameters below as of 15 Oct 2022 10:38  Patient On (Oxygen Delivery Method): nasal cannula  O2 Flow (L/min): 2      General:  Constitutional: Obese Male, appears stated age, in no apparent distress including pain  Head: Normocephalic & atraumatic.  Skin: No rashes, bruising, or discoloration.      Neurological (>12):  MS: Awake but requires frequent prompting to maintain arousal, alert, oriented to person, but not to place or situationNormal affect. Follows some one step commands intermittently    Language: Speech is dysarthric, dysfluent with impaired repetition & intermittent comprehension    CNs: PERRLA (R = 3mm, L = 3mm). VFF. EOMI no nystagmus,V1-3 intact to LT/pinprick, well developed masseter muscles b/l. R nasolabial fold flattening, full eye closure strength b/l, Mild left eye ptosis. Hearing grossly normal (rubbing fingers) b/l. Symmetric palate elevation in midline.  Head turning & shoulder shrug intact b/l. Tongue midline, normal movements, no atrophy.      Motor: Normal muscle bulk & tone. No noticeable tremor or seizure. R pronator drift, Drift in RLE              Deltoid	Biceps	Triceps	Wrist	Finger ABd	   R	4	5	5	5	5		4 	  L	5	5	5	5	5		5    	H-Flex	H-Ext	H-ABd	H-ADd	K-Flex	K-Ext	D-Flex	P-Flex  R	5	5	5	5	5	5	5	5 	   L	5	5	5	5	5	5	5	5	     Sensation: Intact to LT/PP b/l throughout.     Cortical: Extinction on DSS (neglect): none    Coordination:  No dysmetria to FTN/HTS    LABORATORY:  CBC                       15.0   12.45 )-----------( 277      ( 15 Oct 2022 10:20 )             46.7     Chem 10-15    140  |  101  |  22  ----------------------------<  130<H>  4.1   |  29  |  1.34<H>    Ca    9.5      15 Oct 2022 10:20    TPro  6.9  /  Alb  3.7  /  TBili  0.4  /  DBili  x   /  AST  19  /  ALT  17  /  AlkPhos  85  10-15    LFTs LIVER FUNCTIONS - ( 15 Oct 2022 10:20 )  Alb: 3.7 g/dL / Pro: 6.9 g/dL / ALK PHOS: 85 U/L / ALT: 17 U/L / AST: 19 U/L / GGT: x           Coagulopathy PT/INR - ( 15 Oct 2022 10:20 )   PT: 12.4 sec;   INR: 1.07 ratio         PTT - ( 15 Oct 2022 10:20 )  PTT:33.7 sec  Lipid Panel   A1c   Cardiac enzymes     U/A Urinalysis Basic - ( 15 Oct 2022 11:32 )    Color: Light Yellow / Appearance: Clear / S.020 / pH: x  Gluc: x / Ketone: Trace  / Bili: Negative / Urobili: Negative   Blood: x / Protein: 300 mg/dL / Nitrite: Negative   Leuk Esterase: Moderate / RBC: 37 /hpf /  /HPF   Sq Epi: x / Non Sq Epi: 0 /hpf / Bacteria: Few    Radiology (XR, CT, MR, U/S, TTE/SANDI):    CT Brain Stroke Protocol (10.15.22 @ 10:29)   IMPRESSION:  No acute intracranial hemorrhage, midline shift, or hydrocephalus.    Chronic bilateral thalamic lacunar infarcts and left corona radiata/basal   ganglia infarct.    CT Angio Brain Stroke Protocol  w/ IV Cont (10.15.22 @ 10:29)     IMPRESSION:  CT brain perfusion: No core infarct. Delayed mean transit time in the   bilateral occipital lobes and right frontal lobe may represent brain at   risk versus artifact. Consider MRI for further evaluation.    CTA head and neck: No large vessel occlusion.

## 2022-10-15 NOTE — H&P ADULT - HISTORY OF PRESENT ILLNESS
71y M with PMH DM2, HTN, CVA who presents to the Emergency Department  via EMS on the morning of 10/15/22. The patient usually AO x 2 (doesn't know time) BIBEMS complaining of R side facial droop and slurred speech. A Code Stroke was called and pt went straight to CT scan. Pt wife reports pt had slurred speech when pt was woken up. Pt wife states he hasn't been himself for the past two days. Code Stroke called on arrival, NIHSS of 7. CT Head noncontrast: No acute intracranial hemorrhage, midline shift, or hydrocephalus. Chronic bilateral thalamic lacunar infarcts and left corona radiata/basal ganglia infarct. CTA Head and Neck: CT brain perfusion: No core infarct. Delayed mean transit time in the bilateral occipital lobes and right frontal lobe may represent brain at risk versus artifact, an MR brain was done which did not reveal any new infarcts

## 2022-10-15 NOTE — ED PROVIDER NOTE - ATTENDING CONTRIBUTION TO CARE
Patient is a 71-year-old with history of BPH, diabetes mellitus type 2, hypertension, & hypercholesterolemia who reports with the family's complaint that he has right-sided facial droop slurred speech and possibly right upper extremity weakness.  Patient is speaking in Greenlandic when asked questions in English although he speaks English as primary language, and his fever responses are not congruent with the questions asked.  Patient has not had any fevers or chills of note per family member.  Mild flattening of the right nasolabial fold  ncat otherwise  non-tachycardic  non-tachypneic   cooperative, capacity and insight  In this physician's medical judgement based on clinical history and physical exam: Patient with signs and symptoms of confusion and weakness to the right face and right upper extremity along with altered speech.  We will get CT head, CTA head and neck neuro stroke code called prior to arrival we will get CBC CMP urinalysis and urine culture.  Will follow up on labs, analgesia, imaging, reassess and disposition to the inpatient team as clinically indicated.  *The above represents an initial assessment/impression. Please refer to my progress notes below for potential changes in patient clinical course*   MRI ordered for wake up stroke protocol   Patient noted to have uti and will admit secondary to confusion Patient is a 71-year-old with history of BPH, diabetes mellitus type 2, hypertension, & hypercholesterolemia who reports with the family's complaint that he has right-sided facial droop slurred speech and possibly right upper extremity weakness.  Patient is speaking in Pashto when asked questions in English although he speaks English as primary language, and his fever responses are not congruent with the questions asked.  Patient has not had any fevers or chills of note per family member.  Mild flattening of the right nasolabial fold  ncat otherwise  non-tachycardic  non-tachypneic   cooperative, capacity and insight  In this physician's medical judgement based on clinical history and physical exam: Patient with signs and symptoms of confusion and weakness to the right face and right upper extremity along with altered speech.  We will get CT head, CTA head and neck neuro stroke code called prior to arrival we will get CBC CMP urinalysis and urine culture.  Will follow up on labs, analgesia, imaging, reassess and disposition to the inpatient team as clinically indicated.  *The above represents an initial assessment/impression. Please refer to my progress notes below for potential changes in patient clinical course*   MRI ordered for wake up stroke protocol   Patient noted to have uti and will admit secondary to confusion  Patient endorsed to Dr. Nieto at the time of admission. Based on patient's history and physical exam, as well as the results of today's workup, I feel that patient warrants admission to the hospital for further workup/evaluation and continued management. I discussed the findings of today's workup with the patient and addressed the patient's questions and concerns. The patient was agreeable with admission. Our team spoke with the inpatient receiving team who accepted the patient for admission and subsequently took over the patient's care at the time of admission. The receiving team will follow up on pending labs, analgesia, any clinical imaging results, ancillary findings, reassess, and disposition as clinically indicated. Details of patient and plan conveyed to receiving physician team and conveyed back for understanding. There were no questions at this time about the patient's status, disposition, and plan. Patient's care to be taken over by receiving physician team at this time, all decisions regarding the progression of care will be made at their discretion.

## 2022-10-15 NOTE — H&P ADULT - PROBLEM SELECTOR PLAN 4
finger sticks with short acting insulin sliding scale  HbA1C  diabetic diet  Lantus small dose for now

## 2022-10-15 NOTE — H&P ADULT - NSHPREVIEWOFSYSTEMS_GEN_ALL_CORE
via daughter at bedside   Gen: no loss of wt no loss of appetite  ENT: no dizziness no hearing loss  Ophth: no blurring of vision no loss of vision  Resp: No cough no sputum production  CVS: No chest pain no palpitations no orthopnea  GI: no nausea, vomiting or diarrhea   : no dysuria, hematuria  Endo: no polyuria no excessive sweating  Neuro: see above HPI   Heme: No petechiae no easy bruising  Msk: No joint pain no swelling  Skin: No rash no itching

## 2022-10-15 NOTE — ED PROVIDER NOTE - OBJECTIVE STATEMENT
70 yo M with hx of DM2, HTN, CVA presenting with slurred speech since this morning. Code stroke initiated on arrival. LKN 10PM the night prior. Patient wife states that he was not acting himself this morning, speaking incoherently. She did not note any facial asymmetry or focal weakness. Patient denies complaints at time evaluation. Denies recent falls, headache, n/v, vision changes.

## 2022-10-16 DIAGNOSIS — I50.33 ACUTE ON CHRONIC DIASTOLIC (CONGESTIVE) HEART FAILURE: ICD-10-CM

## 2022-10-16 LAB
A1C WITH ESTIMATED AVERAGE GLUCOSE RESULT: 9.1 % — HIGH (ref 4–5.6)
ANION GAP SERPL CALC-SCNC: 12 MMOL/L — SIGNIFICANT CHANGE UP (ref 5–17)
BUN SERPL-MCNC: 22 MG/DL — SIGNIFICANT CHANGE UP (ref 7–23)
CALCIUM SERPL-MCNC: 9.1 MG/DL — SIGNIFICANT CHANGE UP (ref 8.4–10.5)
CHLORIDE SERPL-SCNC: 101 MMOL/L — SIGNIFICANT CHANGE UP (ref 96–108)
CO2 SERPL-SCNC: 26 MMOL/L — SIGNIFICANT CHANGE UP (ref 22–31)
CREAT SERPL-MCNC: 1.41 MG/DL — HIGH (ref 0.5–1.3)
EGFR: 53 ML/MIN/1.73M2 — LOW
ESTIMATED AVERAGE GLUCOSE: 214 MG/DL — HIGH (ref 68–114)
GLUCOSE BLDC GLUCOMTR-MCNC: 182 MG/DL — HIGH (ref 70–99)
GLUCOSE BLDC GLUCOMTR-MCNC: 231 MG/DL — HIGH (ref 70–99)
GLUCOSE BLDC GLUCOMTR-MCNC: 285 MG/DL — HIGH (ref 70–99)
GLUCOSE BLDC GLUCOMTR-MCNC: 312 MG/DL — HIGH (ref 70–99)
GLUCOSE SERPL-MCNC: 277 MG/DL — HIGH (ref 70–99)
HCT VFR BLD CALC: 42.6 % — SIGNIFICANT CHANGE UP (ref 39–50)
HCV AB S/CO SERPL IA: 0.06 S/CO — SIGNIFICANT CHANGE UP (ref 0–0.99)
HCV AB SERPL-IMP: SIGNIFICANT CHANGE UP
HGB BLD-MCNC: 13.9 G/DL — SIGNIFICANT CHANGE UP (ref 13–17)
MCHC RBC-ENTMCNC: 29.1 PG — SIGNIFICANT CHANGE UP (ref 27–34)
MCHC RBC-ENTMCNC: 32.6 GM/DL — SIGNIFICANT CHANGE UP (ref 32–36)
MCV RBC AUTO: 89.3 FL — SIGNIFICANT CHANGE UP (ref 80–100)
NRBC # BLD: 0 /100 WBCS — SIGNIFICANT CHANGE UP (ref 0–0)
PLATELET # BLD AUTO: 241 K/UL — SIGNIFICANT CHANGE UP (ref 150–400)
POTASSIUM SERPL-MCNC: 3.7 MMOL/L — SIGNIFICANT CHANGE UP (ref 3.5–5.3)
POTASSIUM SERPL-SCNC: 3.7 MMOL/L — SIGNIFICANT CHANGE UP (ref 3.5–5.3)
RBC # BLD: 4.77 M/UL — SIGNIFICANT CHANGE UP (ref 4.2–5.8)
RBC # FLD: 12 % — SIGNIFICANT CHANGE UP (ref 10.3–14.5)
SODIUM SERPL-SCNC: 139 MMOL/L — SIGNIFICANT CHANGE UP (ref 135–145)
WBC # BLD: 7.24 K/UL — SIGNIFICANT CHANGE UP (ref 3.8–10.5)
WBC # FLD AUTO: 7.24 K/UL — SIGNIFICANT CHANGE UP (ref 3.8–10.5)

## 2022-10-16 PROCEDURE — 93970 EXTREMITY STUDY: CPT | Mod: 26

## 2022-10-16 RX ORDER — INSULIN GLARGINE 100 [IU]/ML
15 INJECTION, SOLUTION SUBCUTANEOUS AT BEDTIME
Refills: 0 | Status: DISCONTINUED | OUTPATIENT
Start: 2022-10-16 | End: 2022-10-18

## 2022-10-16 RX ORDER — ASPIRIN/CALCIUM CARB/MAGNESIUM 324 MG
81 TABLET ORAL DAILY
Refills: 0 | Status: DISCONTINUED | OUTPATIENT
Start: 2022-10-16 | End: 2022-10-18

## 2022-10-16 RX ADMIN — Medication 40 MILLIGRAM(S): at 15:20

## 2022-10-16 RX ADMIN — ENOXAPARIN SODIUM 40 MILLIGRAM(S): 100 INJECTION SUBCUTANEOUS at 17:25

## 2022-10-16 RX ADMIN — LISINOPRIL 40 MILLIGRAM(S): 2.5 TABLET ORAL at 05:32

## 2022-10-16 RX ADMIN — CLOPIDOGREL BISULFATE 75 MILLIGRAM(S): 75 TABLET, FILM COATED ORAL at 09:14

## 2022-10-16 RX ADMIN — Medication 4: at 17:25

## 2022-10-16 RX ADMIN — Medication 1: at 14:28

## 2022-10-16 RX ADMIN — Medication 2: at 09:14

## 2022-10-16 RX ADMIN — INSULIN GLARGINE 15 UNIT(S): 100 INJECTION, SOLUTION SUBCUTANEOUS at 23:29

## 2022-10-16 RX ADMIN — CEFTRIAXONE 100 MILLIGRAM(S): 500 INJECTION, POWDER, FOR SOLUTION INTRAMUSCULAR; INTRAVENOUS at 23:29

## 2022-10-16 RX ADMIN — Medication 200 MILLIGRAM(S): at 05:32

## 2022-10-16 RX ADMIN — Medication 81 MILLIGRAM(S): at 09:14

## 2022-10-16 RX ADMIN — ATORVASTATIN CALCIUM 80 MILLIGRAM(S): 80 TABLET, FILM COATED ORAL at 22:45

## 2022-10-17 ENCOUNTER — TRANSCRIPTION ENCOUNTER (OUTPATIENT)
Age: 72
End: 2022-10-17

## 2022-10-17 LAB
ANION GAP SERPL CALC-SCNC: 9 MMOL/L — SIGNIFICANT CHANGE UP (ref 5–17)
BUN SERPL-MCNC: 21 MG/DL — SIGNIFICANT CHANGE UP (ref 7–23)
CALCIUM SERPL-MCNC: 9.1 MG/DL — SIGNIFICANT CHANGE UP (ref 8.4–10.5)
CHLORIDE SERPL-SCNC: 103 MMOL/L — SIGNIFICANT CHANGE UP (ref 96–108)
CO2 SERPL-SCNC: 30 MMOL/L — SIGNIFICANT CHANGE UP (ref 22–31)
CREAT SERPL-MCNC: 1.37 MG/DL — HIGH (ref 0.5–1.3)
CULTURE RESULTS: SIGNIFICANT CHANGE UP
EGFR: 55 ML/MIN/1.73M2 — LOW
GLUCOSE BLDC GLUCOMTR-MCNC: 165 MG/DL — HIGH (ref 70–99)
GLUCOSE BLDC GLUCOMTR-MCNC: 173 MG/DL — HIGH (ref 70–99)
GLUCOSE BLDC GLUCOMTR-MCNC: 279 MG/DL — HIGH (ref 70–99)
GLUCOSE BLDC GLUCOMTR-MCNC: 381 MG/DL — HIGH (ref 70–99)
GLUCOSE BLDC GLUCOMTR-MCNC: 97 MG/DL — SIGNIFICANT CHANGE UP (ref 70–99)
GLUCOSE SERPL-MCNC: 170 MG/DL — HIGH (ref 70–99)
POTASSIUM SERPL-MCNC: 4 MMOL/L — SIGNIFICANT CHANGE UP (ref 3.5–5.3)
POTASSIUM SERPL-SCNC: 4 MMOL/L — SIGNIFICANT CHANGE UP (ref 3.5–5.3)
SODIUM SERPL-SCNC: 142 MMOL/L — SIGNIFICANT CHANGE UP (ref 135–145)
SPECIMEN SOURCE: SIGNIFICANT CHANGE UP

## 2022-10-17 PROCEDURE — 93306 TTE W/DOPPLER COMPLETE: CPT | Mod: 26

## 2022-10-17 RX ORDER — FUROSEMIDE 40 MG
60 TABLET ORAL
Qty: 0 | Refills: 0 | DISCHARGE

## 2022-10-17 RX ORDER — METFORMIN HYDROCHLORIDE 850 MG/1
1 TABLET ORAL
Qty: 0 | Refills: 0 | DISCHARGE

## 2022-10-17 RX ORDER — ASPIRIN/CALCIUM CARB/MAGNESIUM 324 MG
1 TABLET ORAL
Qty: 0 | Refills: 0 | DISCHARGE
Start: 2022-10-17

## 2022-10-17 RX ORDER — EMPAGLIFLOZIN 10 MG/1
1 TABLET, FILM COATED ORAL
Qty: 0 | Refills: 0 | DISCHARGE

## 2022-10-17 RX ORDER — ASPIRIN/CALCIUM CARB/MAGNESIUM 324 MG
1 TABLET ORAL
Qty: 0 | Refills: 0 | DISCHARGE

## 2022-10-17 RX ORDER — FUROSEMIDE 40 MG
60 TABLET ORAL DAILY
Refills: 0 | Status: DISCONTINUED | OUTPATIENT
Start: 2022-10-18 | End: 2022-10-18

## 2022-10-17 RX ORDER — METOPROLOL TARTRATE 50 MG
1 TABLET ORAL
Qty: 0 | Refills: 0 | DISCHARGE

## 2022-10-17 RX ADMIN — INSULIN GLARGINE 15 UNIT(S): 100 INJECTION, SOLUTION SUBCUTANEOUS at 21:21

## 2022-10-17 RX ADMIN — Medication 81 MILLIGRAM(S): at 12:37

## 2022-10-17 RX ADMIN — Medication 1: at 08:55

## 2022-10-17 RX ADMIN — Medication 200 MILLIGRAM(S): at 05:02

## 2022-10-17 RX ADMIN — CLOPIDOGREL BISULFATE 75 MILLIGRAM(S): 75 TABLET, FILM COATED ORAL at 12:37

## 2022-10-17 RX ADMIN — LISINOPRIL 40 MILLIGRAM(S): 2.5 TABLET ORAL at 05:02

## 2022-10-17 RX ADMIN — Medication 3: at 17:49

## 2022-10-17 RX ADMIN — ENOXAPARIN SODIUM 40 MILLIGRAM(S): 100 INJECTION SUBCUTANEOUS at 17:50

## 2022-10-17 RX ADMIN — Medication 1: at 12:53

## 2022-10-17 RX ADMIN — Medication 40 MILLIGRAM(S): at 16:24

## 2022-10-17 RX ADMIN — ATORVASTATIN CALCIUM 80 MILLIGRAM(S): 80 TABLET, FILM COATED ORAL at 21:20

## 2022-10-17 NOTE — DISCHARGE NOTE PROVIDER - CARE PROVIDER_API CALL
Aguila Barnett)  Neurology; Vascular Neurology  [address] [facility] [address], [address]  [place] [address]  Phone: [phone]  Fax: [phone]  Follow Up Time: 2 weeks

## 2022-10-17 NOTE — DISCHARGE NOTE PROVIDER - HOSPITAL COURSE
71y PMH gentleman PMH DM2, HTN, CVA who presents to the Emergency Department via EMS on the morning of 10/15/22 now admitted for management and treatment of likely UTI with encephalopathy     Acute UTI.   ·  Plan: with sepsis and septic encephalopathy on admission   now resolved  continue ceftriaxone IV day 2 today  urine culture pending   remains hemodynamically stable.    Acute on chronic heart failure with preserved ejection fraction (HFpEF).   ·  Plan: on admission  now improved  leg edema improved   continue IV Lasix today  change to po tomorrow   echocardiogram ordered.    HTN (hypertension).   ·  Plan: uncontrolled on admission  much improved overnight   will continue home meds with hold parameters  increased as tolerated.     HLD (hyperlipidemia).   ·  Plan: continue statin.    Diabetes mellitus.   ·  Plan: finger sticks with short acting insulin sliding scale  HbA1C > 9  diabetic diet  increased Lantus to 15 units.    R/O Stroke.   ·  Plan: ruled out after admission  CT and MRI negative.     71y PMH gentleman PMH DM2, HTN, CVA who presents to the Emergency Department via EMS on the morning of 10/15/22 now admitted for management and treatment of likely UTI with encephalopathy     Acute UTI.   ·  Plan: with sepsis and septic encephalopathy on admission   now resolved  continue ceftriaxone IV day 2 today  urine culture pending   remains hemodynamically stable.    Acute on chronic heart failure with preserved ejection fraction (HFpEF).   ·  Plan: on admission  now improved  leg edema improved   continue IV Lasix today  change to po tomorrow   echocardiogram ordered.    HTN (hypertension).   ·  Plan: uncontrolled on admission  much improved overnight   will continue home meds with hold parameters  increased as tolerated.     HLD (hyperlipidemia).   ·  Plan: continue statin.    Diabetes mellitus.   ·  Plan: finger sticks with short acting insulin sliding scale  HbA1C > 9  diabetic diet  increased Lantus to 15 units.    R/O Stroke.   ·  Plan: ruled out after admission  CT and MRI negative.    As per attending, patient stable for discharge.

## 2022-10-17 NOTE — DISCHARGE NOTE PROVIDER - NSDCCPCAREPLAN_GEN_ALL_CORE_FT
PRINCIPAL DISCHARGE DIAGNOSIS  Diagnosis: Urinary tract infection  Assessment and Plan of Treatment: Please follow up with your primary care physician regarding your hospitalization and take all medications prescribed.      SECONDARY DISCHARGE DIAGNOSES  Diagnosis: Delirium  Assessment and Plan of Treatment: Please follow up with your primary care physician regarding your hospitalization and take all medications prescribed.

## 2022-10-17 NOTE — DISCHARGE NOTE PROVIDER - NSDCMRMEDTOKEN_GEN_ALL_CORE_FT
amLODIPine: 7.5 milligram(s) orally once a day  aspirin 325 mg oral tablet: 1 tab(s) orally once a day  atorvastatin 80 mg oral tablet: 1 tab(s) orally once a day  clopidogrel 75 mg oral tablet: 1 tab(s) orally once a day  fosinopril 40 mg oral tablet: 1 tab(s) orally once a day  furosemide: 60 milligram(s) orally once a day  glipiZIDE 10 mg oral tablet: 1 tab(s) orally 2 times a day  Jardiance 25 mg oral tablet: 1 tab(s) orally once a day (in the morning)  metFORMIN 1000 mg oral tablet: 1 tab(s) orally 2 times a day  metoprolol succinate 200 mg oral tablet, extended release: 1 tab(s) orally once a day  Kamilla Thomas: Maninder Thomas   Dx R26.81  Disp# 1  CLIFF 99  Soliqua 100/33 subcutaneous solution: 24 milliliter(s) subcutaneous once a day   amLODIPine: 7.5 milligram(s) orally once a day  aspirin 81 mg oral delayed release tablet: 1 tab(s) orally once a day  atorvastatin 80 mg oral tablet: 1 tab(s) orally once a day  clopidogrel 75 mg oral tablet: 1 tab(s) orally once a day  fosinopril 40 mg oral tablet: 1 tab(s) orally once a day  furosemide: 60 milligram(s) orally once a day  glipiZIDE 10 mg oral tablet: 1 tab(s) orally 2 times a day  Jardiance 25 mg oral tablet: 1 tab(s) orally once a day (in the morning)  metFORMIN 1000 mg oral tablet: 1 tab(s) orally 2 times a day  metoprolol succinate 200 mg oral tablet, extended release: 1 tab(s) orally once a day  Kamilla Thomas: Maninder Thomas   Dx R26.81  Disp# 1  CLIFF 99  Soliqua 100/33 subcutaneous solution: 24 milliliter(s) subcutaneous once a day

## 2022-10-17 NOTE — PHYSICAL THERAPY INITIAL EVALUATION ADULT - GAIT DISTANCE, PT EVAL
pt amb an additional 50ft x 2 using RW with Sup for safety awareness. Pt would benefit using RW at this time./10 feet

## 2022-10-17 NOTE — PHYSICAL THERAPY INITIAL EVALUATION ADULT - PERTINENT HX OF CURRENT PROBLEM, REHAB EVAL
71y PMH gentleman PMH DM2, HTN, CVA who presents to the Emergency Department via EMS on the morning of 10/15/22 now admitted for management and treatment of likely UTI with encephalopathy  CT brain perfusion: No core infarct. Delayed mean transit time in the bilateral occipital lobes and right frontal lobe may represent brain at risk versus artifact. Consider MRI for further evaluation. CTA head and neck: No large vessel occlusion. MR head: No acute infarct. VA duplex: No evidence of deep venous thrombosis in either lower extremity.

## 2022-10-17 NOTE — PHYSICAL THERAPY INITIAL EVALUATION ADULT - ADDITIONAL COMMENTS
Prior to admission pt reports being independent of all ADL's & functional mobility without AD. Pt resides in house with spouse, 2 steps to enter, 1 flight to bedroom. +HR. Pt has walk in shower.

## 2022-10-17 NOTE — PATIENT PROFILE ADULT - FALL HARM RISK - HARM RISK INTERVENTIONS

## 2022-10-17 NOTE — PATIENT PROFILE ADULT - NS PRO AD ANY ON CHART
8/25: RBC: 4.67, H/H: 11.6/37.5, BUN:7, Alkaline phosphatase: 245    6/22/21: A1c: 5.8% vs 6/21/21 6.3% ? No

## 2022-10-18 ENCOUNTER — TRANSCRIPTION ENCOUNTER (OUTPATIENT)
Age: 72
End: 2022-10-18

## 2022-10-18 VITALS
OXYGEN SATURATION: 97 % | DIASTOLIC BLOOD PRESSURE: 87 MMHG | TEMPERATURE: 98 F | SYSTOLIC BLOOD PRESSURE: 192 MMHG | RESPIRATION RATE: 18 BRPM | HEART RATE: 56 BPM

## 2022-10-18 LAB
GLUCOSE BLDC GLUCOMTR-MCNC: 172 MG/DL — HIGH (ref 70–99)
GLUCOSE BLDC GLUCOMTR-MCNC: 181 MG/DL — HIGH (ref 70–99)
GLUCOSE BLDC GLUCOMTR-MCNC: 309 MG/DL — HIGH (ref 70–99)

## 2022-10-18 PROCEDURE — 85025 COMPLETE CBC W/AUTO DIFF WBC: CPT

## 2022-10-18 PROCEDURE — 84132 ASSAY OF SERUM POTASSIUM: CPT

## 2022-10-18 PROCEDURE — 70498 CT ANGIOGRAPHY NECK: CPT | Mod: MA

## 2022-10-18 PROCEDURE — 99291 CRITICAL CARE FIRST HOUR: CPT

## 2022-10-18 PROCEDURE — 82962 GLUCOSE BLOOD TEST: CPT

## 2022-10-18 PROCEDURE — 84295 ASSAY OF SERUM SODIUM: CPT

## 2022-10-18 PROCEDURE — 83605 ASSAY OF LACTIC ACID: CPT

## 2022-10-18 PROCEDURE — 70551 MRI BRAIN STEM W/O DYE: CPT | Mod: MA

## 2022-10-18 PROCEDURE — 70450 CT HEAD/BRAIN W/O DYE: CPT | Mod: MA

## 2022-10-18 PROCEDURE — 83880 ASSAY OF NATRIURETIC PEPTIDE: CPT

## 2022-10-18 PROCEDURE — 82330 ASSAY OF CALCIUM: CPT

## 2022-10-18 PROCEDURE — 85730 THROMBOPLASTIN TIME PARTIAL: CPT

## 2022-10-18 PROCEDURE — 84484 ASSAY OF TROPONIN QUANT: CPT

## 2022-10-18 PROCEDURE — 93970 EXTREMITY STUDY: CPT

## 2022-10-18 PROCEDURE — 80048 BASIC METABOLIC PNL TOTAL CA: CPT

## 2022-10-18 PROCEDURE — 97162 PT EVAL MOD COMPLEX 30 MIN: CPT

## 2022-10-18 PROCEDURE — 70496 CT ANGIOGRAPHY HEAD: CPT | Mod: MA

## 2022-10-18 PROCEDURE — 87635 SARS-COV-2 COVID-19 AMP PRB: CPT

## 2022-10-18 PROCEDURE — 83036 HEMOGLOBIN GLYCOSYLATED A1C: CPT

## 2022-10-18 PROCEDURE — 96374 THER/PROPH/DIAG INJ IV PUSH: CPT

## 2022-10-18 PROCEDURE — 36415 COLL VENOUS BLD VENIPUNCTURE: CPT

## 2022-10-18 PROCEDURE — 81001 URINALYSIS AUTO W/SCOPE: CPT

## 2022-10-18 PROCEDURE — 86803 HEPATITIS C AB TEST: CPT

## 2022-10-18 PROCEDURE — 87086 URINE CULTURE/COLONY COUNT: CPT

## 2022-10-18 PROCEDURE — 82435 ASSAY OF BLOOD CHLORIDE: CPT

## 2022-10-18 PROCEDURE — 80053 COMPREHEN METABOLIC PANEL: CPT

## 2022-10-18 PROCEDURE — 82947 ASSAY GLUCOSE BLOOD QUANT: CPT

## 2022-10-18 PROCEDURE — 85610 PROTHROMBIN TIME: CPT

## 2022-10-18 PROCEDURE — 85027 COMPLETE CBC AUTOMATED: CPT

## 2022-10-18 PROCEDURE — 82803 BLOOD GASES ANY COMBINATION: CPT

## 2022-10-18 PROCEDURE — 85014 HEMATOCRIT: CPT

## 2022-10-18 PROCEDURE — 85018 HEMOGLOBIN: CPT

## 2022-10-18 PROCEDURE — 93306 TTE W/DOPPLER COMPLETE: CPT

## 2022-10-18 PROCEDURE — 0042T: CPT | Mod: MA

## 2022-10-18 RX ADMIN — Medication 4: at 17:45

## 2022-10-18 RX ADMIN — CLOPIDOGREL BISULFATE 75 MILLIGRAM(S): 75 TABLET, FILM COATED ORAL at 13:08

## 2022-10-18 RX ADMIN — Medication 81 MILLIGRAM(S): at 13:08

## 2022-10-18 RX ADMIN — Medication 200 MILLIGRAM(S): at 06:02

## 2022-10-18 RX ADMIN — Medication 60 MILLIGRAM(S): at 06:02

## 2022-10-18 RX ADMIN — Medication 1: at 13:07

## 2022-10-18 RX ADMIN — ENOXAPARIN SODIUM 40 MILLIGRAM(S): 100 INJECTION SUBCUTANEOUS at 17:46

## 2022-10-18 RX ADMIN — LISINOPRIL 40 MILLIGRAM(S): 2.5 TABLET ORAL at 06:03

## 2022-10-18 RX ADMIN — Medication 1: at 09:03

## 2022-10-18 NOTE — PROGRESS NOTE ADULT - PROBLEM SELECTOR PLAN 3
uncontrolled on admission  much improved   will continue home meds with hold parameters  resume home meds on discharge
much improved   will continue home meds with hold parameters  resume home meds on discharge
uncontrolled on admission  much improved overnight   will continue home meds with hold parameters  increased as tolerated

## 2022-10-18 NOTE — PROGRESS NOTE ADULT - PROBLEM SELECTOR PLAN 1
with sepsis and septic encephalopathy on admission   now resolved  continue ceftriaxone IV day 3 today  urine culture positive for CNS  will discontinue antibiotics after today
with sepsis and septic encephalopathy on admission   now resolved fully   completed antibiotics   no need for further antibiotics
with sepsis and septic encephalopathy on admission   now resolved  continue ceftriaxone IV day 2 today  urine culture pending   remains hemodynamically stable

## 2022-10-18 NOTE — DISCHARGE NOTE NURSING/CASE MANAGEMENT/SOCIAL WORK - PATIENT PORTAL LINK FT
Writer was almost finished with phone assessment with the pt when phone got disconnected.  Once reconnected, writer discussed pt's lack of follow through with PHP, IOP or OP and the benefits of following through with one of those treatment options. Pt states, \"I don't know why I didn't, you'd have to talk to my mom, that's my mom's thing.\"   Pt then states, \"I'm going to talk to the doctor, and I'm done. I am not dealing with this.\"  When writer explains that she will triage with the psychiatrist, pt states, \"that's great, I'm done,\" and hung up the phone on the writer.      You can access the FollowMyHealth Patient Portal offered by [facility] by registering at the following website: [email or web address]. By joining [facility]’s FollowMyHealth portal, you will also be able to view your health information using other applications (apps) compatible with our system.

## 2022-10-18 NOTE — PROGRESS NOTE ADULT - PROBLEM SELECTOR PROBLEM 2
Acute on chronic heart failure with preserved ejection fraction (HFpEF)

## 2022-10-18 NOTE — PROGRESS NOTE ADULT - NSPROGADDITIONALINFOA_GEN_ALL_CORE
- Counseling on diet, exercise, and medication adherence was done  - Counseling on smoking cessation and alcohol consumption offered when appropriate.  - Pain assessed and judicious use of narcotics when appropriate was discussed.    - Stroke education given when appropriate.  - Importance of fall prevention discussed.   - Differential diagnosis and plan of care discussed with patient and/or family and primary team
discussed with patient in detail, expresses understanding of treatment plans.  discussed with patient's family at bedside in detail
- Counseling on diet, exercise, and medication adherence was done  - Counseling on smoking cessation and alcohol consumption offered when appropriate.  - Pain assessed and judicious use of narcotics when appropriate was discussed.    - Stroke education given when appropriate.  - Importance of fall prevention discussed.   - Differential diagnosis and plan of care discussed with patient and/or family and primary team
compliance with low salt and diabetic diet reinforced at length   discussed with patient's wife at bedside in detail
discussed with patient in detail, expresses understanding of treatment plans.  discussed with patient's wife at bedside in detail   discussed with covering ACP  encounter 45 min  left message with PCP to call me on my cell

## 2022-10-18 NOTE — PROGRESS NOTE ADULT - SUBJECTIVE AND OBJECTIVE BOX
Patient is a 71y old  Male who presents with a chief complaint of altered mental status (15 Oct 2022 14:41)      DATE OF SERVICE: 10-16-22 @ 16:19    SUBJECTIVE / OVERNIGHT EVENTS: overnight events noted  much improved today    ROS:  Resp: No cough no sputum production  CVS: No chest pain no palpitations no orthopnea  GI: no N/V/D  : no dysuria, no hematuria  Neuro: no weakness no paresthesias  Heme: No petechiae no easy bruising  Msk: No joint pain no swelling  Skin: No rash no itching        MEDICATIONS  (STANDING):  aspirin enteric coated 81 milliGRAM(s) Oral daily  atorvastatin 80 milliGRAM(s) Oral at bedtime  cefTRIAXone   IVPB 1000 milliGRAM(s) IV Intermittent every 24 hours  clopidogrel Tablet 75 milliGRAM(s) Oral daily  dextrose 5%. 1000 milliLiter(s) (50 mL/Hr) IV Continuous <Continuous>  dextrose 5%. 1000 milliLiter(s) (100 mL/Hr) IV Continuous <Continuous>  dextrose 50% Injectable 25 Gram(s) IV Push once  dextrose 50% Injectable 12.5 Gram(s) IV Push once  dextrose 50% Injectable 25 Gram(s) IV Push once  enoxaparin Injectable 40 milliGRAM(s) SubCutaneous every 24 hours  furosemide   Injectable 40 milliGRAM(s) IV Push every 24 hours  glucagon  Injectable 1 milliGRAM(s) IntraMuscular once  insulin glargine Injectable (LANTUS) 15 Unit(s) SubCutaneous at bedtime  insulin lispro (ADMELOG) corrective regimen sliding scale   SubCutaneous three times a day before meals  lisinopril 40 milliGRAM(s) Oral daily  metoprolol succinate  milliGRAM(s) Oral daily    MEDICATIONS  (PRN):  dextrose Oral Gel 15 Gram(s) Oral once PRN Blood Glucose LESS THAN 70 milliGRAM(s)/deciliter        CAPILLARY BLOOD GLUCOSE      POCT Blood Glucose.: 182 mg/dL (16 Oct 2022 14:19)  POCT Blood Glucose.: 231 mg/dL (16 Oct 2022 08:35)  POCT Blood Glucose.: 178 mg/dL (15 Oct 2022 21:47)  POCT Blood Glucose.: 104 mg/dL (15 Oct 2022 17:56)    I&O's Summary      Vital Signs Last 24 Hrs  T(C): 37.1 (16 Oct 2022 13:32), Max: 37.6 (16 Oct 2022 02:43)  T(F): 98.8 (16 Oct 2022 13:32), Max: 99.6 (16 Oct 2022 02:43)  HR: 60 (16 Oct 2022 15:25) (52 - 81)  BP: 144/70 (16 Oct 2022 15:25) (144/70 - 166/77)  BP(mean): --  RR: 18 (16 Oct 2022 13:32) (17 - 18)  SpO2: 94% (16 Oct 2022 13:32) (92% - 96%)     PHYSICAL EXAM:   HEENT: HAZEL EOMI  Neck: Supple, no JVD  Lungs: bilateral basal crackles  CVS: S1 S2 systolic murmur +   Abdomen: no tenderness, no organomegaly, BS present  Neuro: AO x 3 today nonfocal   Skin: warm, dry  Ext: improved edema right leg   resolved edema left leg  Msk: no joint swelling   Back: no CVA tenderness,    LABS:                        13.9   7.24  )-----------( 241      ( 16 Oct 2022 06:53 )             42.6     10-16    139  |  101  |  22  ----------------------------<  277<H>  3.7   |  26  |  1.41<H>    Ca    9.1      16 Oct 2022 06:53    TPro  6.9  /  Alb  3.7  /  TBili  0.4  /  DBili  x   /  AST  19  /  ALT  17  /  AlkPhos  85  10-15    PT/INR - ( 15 Oct 2022 10:20 )   PT: 12.4 sec;   INR: 1.07 ratio         PTT - ( 15 Oct 2022 10:20 )  PTT:33.7 sec      Urinalysis Basic - ( 15 Oct 2022 11:32 )    Color: Light Yellow / Appearance: Clear / S.020 / pH: x  Gluc: x / Ketone: Trace  / Bili: Negative / Urobili: Negative   Blood: x / Protein: 300 mg/dL / Nitrite: Negative   Leuk Esterase: Moderate / RBC: 37 /hpf /  /HPF   Sq Epi: x / Non Sq Epi: 0 /hpf / Bacteria: Few          All consultant(s) notes reviewed and care discussed with other providers        Contact Number, Dr Nieto [phone]
Neurology Progress Note    S: Patient seen and examined. No new events overnight. patient denied CP, SOB, HA or pain.      Medication:  MEDICATIONS  (STANDING):  aspirin enteric coated 81 milliGRAM(s) Oral daily  atorvastatin 80 milliGRAM(s) Oral at bedtime  clopidogrel Tablet 75 milliGRAM(s) Oral daily  dextrose 5%. 1000 milliLiter(s) (50 mL/Hr) IV Continuous <Continuous>  dextrose 5%. 1000 milliLiter(s) (100 mL/Hr) IV Continuous <Continuous>  dextrose 50% Injectable 25 Gram(s) IV Push once  dextrose 50% Injectable 12.5 Gram(s) IV Push once  dextrose 50% Injectable 25 Gram(s) IV Push once  enoxaparin Injectable 40 milliGRAM(s) SubCutaneous every 24 hours  furosemide    Tablet 60 milliGRAM(s) Oral daily  glucagon  Injectable 1 milliGRAM(s) IntraMuscular once  insulin glargine Injectable (LANTUS) 15 Unit(s) SubCutaneous at bedtime  insulin lispro (ADMELOG) corrective regimen sliding scale   SubCutaneous three times a day before meals  lisinopril 40 milliGRAM(s) Oral daily  metoprolol succinate  milliGRAM(s) Oral daily    MEDICATIONS  (PRN):  dextrose Oral Gel 15 Gram(s) Oral once PRN Blood Glucose LESS THAN 70 milliGRAM(s)/deciliter      Vitals:    Vital Signs Last 24 Hrs  T(C): 36.8 (10-18-22 @ 12:52), Max: 37.2 (10-17-22 @ 22:00)  T(F): 98.2 (10-18-22 @ 12:52), Max: 98.9 (10-17-22 @ 22:00)  HR: 59 (10-18-22 @ 12:52) (57 - 62)  BP: 170/82 (10-18-22 @ 11:01) (152/71 - 200/90)  BP(mean): --  RR: 18 (10-18-22 @ 12:52) (17 - 18)  SpO2: 94% (10-18-22 @ 12:52) (94% - 97%)          General Exam:   General Appearance: Appropriately dressed and in no acute distress       Head: Normocephalic, atraumatic and no dysmorphic features  Ear, Nose, and Throat: Moist mucous membranes  CVS: S1S2+  Resp: No SOB, no wheeze or rhonchi  Abd: soft NTND  Extremities: No edema, no cyanosis  Skin: No bruises, no rashes     Neurological Exam:  Mental Status: Awake, alert and oriented x 2-3.  Able to follow simple and complex verbal commands. Able to name and repeat. fluent speech. No obvious aphasia mild dysarthria noted.   Cranial Nerves: PERRL, EOMI, VFFC, sensation V1-V3 intact,  R facial asymmetry , equal elevation of palate, scm/trap 5/5, tongue is midline on protrusion. no obvious papilledema on fundoscopic exam. Hearing is grossly intact.   Motor: Normal bulk, tone and strength throughout with mild R HP. Fine finger movements were intact and symmetric. no tremors or drift noted.    Sensation: Intact to light touch and pinprick throughout. no right/left confusion. no extinction to tactile on DSS.    Reflexes: 1+ throughout at biceps, brachioradialis, triceps, patellars and ankles bilaterally and equal. No clonus. R toe and L toe were both downgoing.  Coordination: No dysmetria on FNF or HKS  Gait: deferred     I personally reviewed the below data/images/labs:  no new labs     CBC Full  -  ( 16 Oct 2022 06:53 )  WBC Count : 7.24 K/uL  RBC Count : 4.77 M/uL  Hemoglobin : 13.9 g/dL  Hematocrit : 42.6 %  Platelet Count - Automated : 241 K/uL  Mean Cell Volume : 89.3 fl  Mean Cell Hemoglobin : 29.1 pg  Mean Cell Hemoglobin Concentration : 32.6 gm/dL  Auto Neutrophil # : x  Auto Lymphocyte # : x  Auto Monocyte # : x  Auto Eosinophil # : x  Auto Basophil # : x  Auto Neutrophil % : x  Auto Lymphocyte % : x  Auto Monocyte % : x  Auto Eosinophil % : x  Auto Basophil % : x    10-16    139  |  101  |  22  ----------------------------<  277<H>  3.7   |  26  |  1.41<H>    Ca    9.1      16 Oct 2022 06:53    TPro  6.9  /  Alb  3.7  /  TBili  0.4  /  DBili  x   /  AST  19  /  ALT  17  /  AlkPhos  85  10-15    LIVER FUNCTIONS - ( 15 Oct 2022 10:20 )  Alb: 3.7 g/dL / Pro: 6.9 g/dL / ALK PHOS: 85 U/L / ALT: 17 U/L / AST: 19 U/L / GGT: x           PT/INR - ( 15 Oct 2022 10:20 )   PT: 12.4 sec;   INR: 1.07 ratio         PTT - ( 15 Oct 2022 10:20 )  PTT:33.7 sec  Urinalysis Basic - ( 15 Oct 2022 11:32 )    Color: Light Yellow / Appearance: Clear / S.020 / pH: x  Gluc: x / Ketone: Trace  / Bili: Negative / Urobili: Negative   Blood: x / Protein: 300 mg/dL / Nitrite: Negative   Leuk Esterase: Moderate / RBC: 37 /hpf /  /HPF   Sq Epi: x / Non Sq Epi: 0 /hpf / Bacteria: Few      CT Brain Stroke Protocol (10.15.22 @ 10:29)   IMPRESSION:  No acute intracranial hemorrhage, midline shift, or hydrocephalus.    Chronic bilateral thalamic lacunar infarcts and left corona radiata/basal   ganglia infarct.    CT Angio Brain Stroke Protocol  w/ IV Cont (10.15.22 @ 10:29)     IMPRESSION:  CT brain perfusion: No core infarct. Delayed mean transit time in the   bilateral occipital lobes and right frontal lobe may represent brain at   risk versus artifact. Consider MRI for further evaluation.    CTA head and neck: No large vessel occlusion.    < from: MR Head No Cont (10.15.22 @ 11:39) >    ACC: [id number] EXAM:  MR BRAIN                          PROCEDURE DATE:  10/15/2022          INTERPRETATION:  Clinical Indication: Slurred speech, facial droop    Technique: Limited noncontrast brain MRI consisting of axial DWI and   axial T2 FLAIR was performed.    Comparison: CTA head and neck 10/15/2022. MRI brain 2019.    Findings:    Study is limited by patient motion.    There is no acute infarct.    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 Chronic lacunar infarcts are seen.    IMPRESSION:    No acute infarct.    --- End of Report ---            DAVID DURAN MD; Attending Radiologist  This document has been electronically signed. Oct 15 2022 12:21PM    < end of copied text >      
Neurology Progress Note    S: Patient seen and examined. No new events overnight. patient denied CP, SOB, HA or pain. wife at bedside     Medication:  aspirin enteric coated 81 milliGRAM(s) Oral daily  atorvastatin 80 milliGRAM(s) Oral at bedtime  cefTRIAXone   IVPB 1000 milliGRAM(s) IV Intermittent every 24 hours  clopidogrel Tablet 75 milliGRAM(s) Oral daily  dextrose 5%. 1000 milliLiter(s) IV Continuous <Continuous>  dextrose 5%. 1000 milliLiter(s) IV Continuous <Continuous>  dextrose 50% Injectable 25 Gram(s) IV Push once  dextrose 50% Injectable 12.5 Gram(s) IV Push once  dextrose 50% Injectable 25 Gram(s) IV Push once  dextrose Oral Gel 15 Gram(s) Oral once PRN  enoxaparin Injectable 40 milliGRAM(s) SubCutaneous every 24 hours  furosemide   Injectable 40 milliGRAM(s) IV Push every 24 hours  glucagon  Injectable 1 milliGRAM(s) IntraMuscular once  insulin glargine Injectable (LANTUS) 15 Unit(s) SubCutaneous at bedtime  insulin lispro (ADMELOG) corrective regimen sliding scale   SubCutaneous three times a day before meals  lisinopril 40 milliGRAM(s) Oral daily  metoprolol succinate  milliGRAM(s) Oral daily      Vitals:  Vital Signs Last 24 Hrs  T(C): 36.6 (17 Oct 2022 05:00), Max: 37.1 (16 Oct 2022 13:32)  T(F): 97.9 (17 Oct 2022 05:00), Max: 98.8 (16 Oct 2022 13:32)  HR: 62 (17 Oct 2022 05:00) (52 - 70)  BP: 177/82 (17 Oct 2022 05:00) (144/70 - 189/87)  BP(mean): --  RR: 18 (17 Oct 2022 05:00) (17 - 18)  SpO2: 98% (17 Oct 2022 05:00) (94% - 98%)    Parameters below as of 17 Oct 2022 05:00  Patient On (Oxygen Delivery Method): room air        General Exam:   General Appearance: Appropriately dressed and in no acute distress       Head: Normocephalic, atraumatic and no dysmorphic features  Ear, Nose, and Throat: Moist mucous membranes  CVS: S1S2+  Resp: No SOB, no wheeze or rhonchi  Abd: soft NTND  Extremities: No edema, no cyanosis  Skin: No bruises, no rashes     Neurological Exam:  Mental Status: Awake, alert and oriented x 2-3.  Able to follow simple and complex verbal commands. Able to name and repeat. fluent speech. No obvious aphasia mild dysarthria noted.   Cranial Nerves: PERRL, EOMI, VFFC, sensation V1-V3 intact,  R facial asymmetry , equal elevation of palate, scm/trap 5/5, tongue is midline on protrusion. no obvious papilledema on fundoscopic exam. Hearing is grossly intact.   Motor: Normal bulk, tone and strength throughout with mild R HP. Fine finger movements were intact and symmetric. no tremors or drift noted.    Sensation: Intact to light touch and pinprick throughout. no right/left confusion. no extinction to tactile on DSS.    Reflexes: 1+ throughout at biceps, brachioradialis, triceps, patellars and ankles bilaterally and equal. No clonus. R toe and L toe were both downgoing.  Coordination: No dysmetria on FNF or HKS  Gait: deferred     I personally reviewed the below data/images/labs:      CBC Full  -  ( 16 Oct 2022 06:53 )  WBC Count : 7.24 K/uL  RBC Count : 4.77 M/uL  Hemoglobin : 13.9 g/dL  Hematocrit : 42.6 %  Platelet Count - Automated : 241 K/uL  Mean Cell Volume : 89.3 fl  Mean Cell Hemoglobin : 29.1 pg  Mean Cell Hemoglobin Concentration : 32.6 gm/dL  Auto Neutrophil # : x  Auto Lymphocyte # : x  Auto Monocyte # : x  Auto Eosinophil # : x  Auto Basophil # : x  Auto Neutrophil % : x  Auto Lymphocyte % : x  Auto Monocyte % : x  Auto Eosinophil % : x  Auto Basophil % : x    10-16    139  |  101  |  22  ----------------------------<  277<H>  3.7   |  26  |  1.41<H>    Ca    9.1      16 Oct 2022 06:53    TPro  6.9  /  Alb  3.7  /  TBili  0.4  /  DBili  x   /  AST  19  /  ALT  17  /  AlkPhos  85  10-15    LIVER FUNCTIONS - ( 15 Oct 2022 10:20 )  Alb: 3.7 g/dL / Pro: 6.9 g/dL / ALK PHOS: 85 U/L / ALT: 17 U/L / AST: 19 U/L / GGT: x           PT/INR - ( 15 Oct 2022 10:20 )   PT: 12.4 sec;   INR: 1.07 ratio         PTT - ( 15 Oct 2022 10:20 )  PTT:33.7 sec  Urinalysis Basic - ( 15 Oct 2022 11:32 )    Color: Light Yellow / Appearance: Clear / S.020 / pH: x  Gluc: x / Ketone: Trace  / Bili: Negative / Urobili: Negative   Blood: x / Protein: 300 mg/dL / Nitrite: Negative   Leuk Esterase: Moderate / RBC: 37 /hpf /  /HPF   Sq Epi: x / Non Sq Epi: 0 /hpf / Bacteria: Few      CT Brain Stroke Protocol (10.15.22 @ 10:29)   IMPRESSION:  No acute intracranial hemorrhage, midline shift, or hydrocephalus.    Chronic bilateral thalamic lacunar infarcts and left corona radiata/basal   ganglia infarct.    CT Angio Brain Stroke Protocol  w/ IV Cont (10.15.22 @ 10:29)     IMPRESSION:  CT brain perfusion: No core infarct. Delayed mean transit time in the   bilateral occipital lobes and right frontal lobe may represent brain at   risk versus artifact. Consider MRI for further evaluation.    CTA head and neck: No large vessel occlusion.    < from: MR Head No Cont (10.15.22 @ 11:39) >    ACC: [id number] EXAM:  MR BRAIN                          PROCEDURE DATE:  10/15/2022          INTERPRETATION:  Clinical Indication: Slurred speech, facial droop    Technique: Limited noncontrast brain MRI consisting of axial DWI and   axial T2 FLAIR was performed.    Comparison: CTA head and neck 10/15/2022. MRI brain 2019.    Findings:    Study is limited by patient motion.    There is no acute infarct.    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 Chronic lacunar infarcts are seen.    IMPRESSION:    No acute infarct.    --- End of Report ---            DAVID DURAN MD; Attending Radiologist  This document has been electronically signed. Oct 15 2022 12:21PM    < end of copied text >      
Patient is a 71y old  Male who presents with a chief complaint of altered mental status (18 Oct 2022 07:43)      DATE OF SERVICE: 10-18-22 @ 15:34    SUBJECTIVE / OVERNIGHT EVENTS: overnight events noted    ROS:  Resp: No cough no sputum production  CVS: No chest pain no palpitations no orthopnea  GI: no N/V/D  : no dysuria, no hematuria  Neuro: no weakness no paresthesias  Heme: No petechiae no easy bruising  "I feel fine'         MEDICATIONS  (STANDING):  aspirin enteric coated 81 milliGRAM(s) Oral daily  atorvastatin 80 milliGRAM(s) Oral at bedtime  clopidogrel Tablet 75 milliGRAM(s) Oral daily  dextrose 5%. 1000 milliLiter(s) (100 mL/Hr) IV Continuous <Continuous>  dextrose 5%. 1000 milliLiter(s) (50 mL/Hr) IV Continuous <Continuous>  dextrose 50% Injectable 25 Gram(s) IV Push once  dextrose 50% Injectable 12.5 Gram(s) IV Push once  dextrose 50% Injectable 25 Gram(s) IV Push once  enoxaparin Injectable 40 milliGRAM(s) SubCutaneous every 24 hours  furosemide    Tablet 60 milliGRAM(s) Oral daily  glucagon  Injectable 1 milliGRAM(s) IntraMuscular once  insulin glargine Injectable (LANTUS) 15 Unit(s) SubCutaneous at bedtime  insulin lispro (ADMELOG) corrective regimen sliding scale   SubCutaneous three times a day before meals  lisinopril 40 milliGRAM(s) Oral daily  metoprolol succinate  milliGRAM(s) Oral daily    MEDICATIONS  (PRN):  dextrose Oral Gel 15 Gram(s) Oral once PRN Blood Glucose LESS THAN 70 milliGRAM(s)/deciliter        CAPILLARY BLOOD GLUCOSE      POCT Blood Glucose.: 181 mg/dL (18 Oct 2022 12:15)  POCT Blood Glucose.: 172 mg/dL (18 Oct 2022 08:29)  POCT Blood Glucose.: 381 mg/dL (17 Oct 2022 21:09)  POCT Blood Glucose.: 279 mg/dL (17 Oct 2022 17:15)    I&O's Summary    17 Oct 2022 07:01  -  18 Oct 2022 07:00  --------------------------------------------------------  IN: 0 mL / OUT: 500 mL / NET: -500 mL    18 Oct 2022 07:01  -  18 Oct 2022 15:34  --------------------------------------------------------  IN: 600 mL / OUT: 0 mL / NET: 600 mL        Vital Signs Last 24 Hrs  T(C): 36.8 (18 Oct 2022 12:52), Max: 37.2 (17 Oct 2022 22:00)  T(F): 98.2 (18 Oct 2022 12:52), Max: 98.9 (17 Oct 2022 22:00)  HR: 59 (18 Oct 2022 12:52) (57 - 62)  BP: 170/82 (18 Oct 2022 11:01) (152/71 - 200/90)  BP(mean): --  RR: 18 (18 Oct 2022 12:52) (17 - 18)  SpO2: 94% (18 Oct 2022 12:52) (94% - 97%)     PHYSICAL EXAM:   HEENT: HAZEL EOMI  Neck: Supple, no JVD  Lungs: clear lungs today  CVS: S1 S2 systolic murmur +   Abdomen: no tenderness  Neuro: AO x 3 today nonfocal   Skin: warm, dry  Ext: no edema    LABS:    10-17    142  |  103  |  21  ----------------------------<  170<H>  4.0   |  30  |  1.37<H>    Ca    9.1      17 Oct 2022 13:09                  All consultant(s) notes reviewed and care discussed with other providers        Contact Number, Dr Nieto [phone]
Patient is a 71y old  Male who presents with a chief complaint of altered mental status (17 Oct 2022 15:37)      DATE OF SERVICE: 10-17-22 @ 17:28    SUBJECTIVE / OVERNIGHT EVENTS: overnight events noted    ROS:  Resp: No cough no sputum production  CVS: No chest pain no palpitations no orthopnea  GI: no N/V/D  : no dysuria, no hematuria  "I feel fine'   wife at bedside         MEDICATIONS  (STANDING):  aspirin enteric coated 81 milliGRAM(s) Oral daily  atorvastatin 80 milliGRAM(s) Oral at bedtime  cefTRIAXone   IVPB 1000 milliGRAM(s) IV Intermittent every 24 hours  clopidogrel Tablet 75 milliGRAM(s) Oral daily  dextrose 5%. 1000 milliLiter(s) (50 mL/Hr) IV Continuous <Continuous>  dextrose 5%. 1000 milliLiter(s) (100 mL/Hr) IV Continuous <Continuous>  dextrose 50% Injectable 25 Gram(s) IV Push once  dextrose 50% Injectable 12.5 Gram(s) IV Push once  dextrose 50% Injectable 25 Gram(s) IV Push once  enoxaparin Injectable 40 milliGRAM(s) SubCutaneous every 24 hours  glucagon  Injectable 1 milliGRAM(s) IntraMuscular once  insulin glargine Injectable (LANTUS) 15 Unit(s) SubCutaneous at bedtime  insulin lispro (ADMELOG) corrective regimen sliding scale   SubCutaneous three times a day before meals  lisinopril 40 milliGRAM(s) Oral daily  metoprolol succinate  milliGRAM(s) Oral daily    MEDICATIONS  (PRN):  dextrose Oral Gel 15 Gram(s) Oral once PRN Blood Glucose LESS THAN 70 milliGRAM(s)/deciliter        CAPILLARY BLOOD GLUCOSE      POCT Blood Glucose.: 279 mg/dL (17 Oct 2022 17:15)  POCT Blood Glucose.: 165 mg/dL (17 Oct 2022 12:21)  POCT Blood Glucose.: 173 mg/dL (17 Oct 2022 08:38)  POCT Blood Glucose.: 285 mg/dL (16 Oct 2022 22:30)    I&O's Summary    16 Oct 2022 07:01  -  17 Oct 2022 07:00  --------------------------------------------------------  IN: 0 mL / OUT: 1000 mL / NET: -1000 mL        Vital Signs Last 24 Hrs  T(C): 36.9 (17 Oct 2022 16:54), Max: 37 (17 Oct 2022 00:44)  T(F): 98.5 (17 Oct 2022 16:54), Max: 98.6 (17 Oct 2022 00:44)  HR: 60 (17 Oct 2022 16:54) (56 - 70)  BP: 152/71 (17 Oct 2022 16:54) (151/69 - 189/87)  BP(mean): --  RR: 18 (17 Oct 2022 16:54) (17 - 18)  SpO2: 95% (17 Oct 2022 16:54) (94% - 98%)     PHYSICAL EXAM:   HEENT: HAZEL EOMI  Neck: Supple, no JVD  Lungs: clear lungs today  CVS: S1 S2 systolic murmur +   Abdomen: no tenderness  Neuro: AO x 3 today nonfocal   Skin: warm, dry  Ext: improved edema right leg   resolved edema left leg      LABS:                        13.9   7.24  )-----------( 241      ( 16 Oct 2022 06:53 )             42.6     10-17    142  |  103  |  21  ----------------------------<  170<H>  4.0   |  30  |  1.37<H>    Ca    9.1      17 Oct 2022 13:09                  All consultant(s) notes reviewed and care discussed with other providers        Contact Number, Dr Nieto [phone]

## 2022-10-18 NOTE — DISCHARGE NOTE NURSING/CASE MANAGEMENT/SOCIAL WORK - NSPROEXTENSIONSOFSELF_GEN_A_NUR
eyeglasses 45 y/o female presents to [facility] in preparation for debridement of osteomyleittis of right foot with Dr. Artis in [facility] under [facility] on 8/23/21 eyeglasses/walker

## 2022-10-18 NOTE — PROGRESS NOTE ADULT - PROBLEM SELECTOR PLAN 2
resolved now euvolemic   continue po furosemide 60 po qd   echocardiogram noted  normal LV function  severe LAE  change ASA to 81 mg po qd  no clear reason for 325 mg dose (adds no benefit and increases the likelihood of side effects)  also has no stents or known CAD however will leave on clopidogrel until sees outpatient cardiology
on admission  now improved  leg edema improved   change to po furosemide 60 po qd tomorrow   echocardiogram noted  normal LV function  severe LAE
on admission  now improved  leg edema improved   continue IV Lasix today  change to po tomorrow   echocardiogram ordered

## 2022-10-18 NOTE — PROGRESS NOTE ADULT - PROBLEM SELECTOR PLAN 5
finger sticks with short acting insulin sliding scale  finger sticks consistent with uncontrolled DM Type 2 with hyperglycemia   HbA1C > 9  diabetic diet  resume home DM meds on discharge
finger sticks with short acting insulin sliding scale  finger sticks consistent with uncontrolled DM Type 2 with hyperglycemia   HbA1C > 9  diabetic diet  continue Lantus to 15 units
finger sticks with short acting insulin sliding scale  HbA1C > 9  diabetic diet  increased Lantus to 15 units

## 2022-10-18 NOTE — PROGRESS NOTE ADULT - ASSESSMENT
71y LH gentleman with DM2 with also peripheral neuropathy, HTN, prior strokes who presents to the ED via EMS on the morning of 10/15/22  complaining of R side facial droop slurred speech. Code stroke was called and pt went straight to CT scan.   Last known normal of 9pm the day prior. Pt wife states he hasn't been himself for the past two days but focal deficits were most notable today. Code Stroke called on arrival, NIHSS of 7.   CT Head noncontrast: No acute intracranial hemorrhage, midline shift, or hydrocephalus. Chronic bilateral thalamic lacunar infarcts and left corona radiata/basal ganglia infarct.   CTA Head and Neck:   CT brain perfusion: No core infarct. Delayed mean transit time in the bilateral occipital lobes and right frontal lobe may represent brain at risk versus artifact.  CTA head and neck: No large vessel occlusion. Wake Up Protocol initiated: No acute infarct.  MRI brain neg for AIS   + UA  +BEAR   MRI brain neg for infarct   No TPA given due to patient with no evidence of infarction on MRI and questionable last known well, no MT performed due to no evidence of LVO.  10/16 exam improved.  AMANDA AAOx3   A1c 9.1  Impression: Mild dysarthria, R facial droop, and subtle R hemiparesis likely due to recrudescence of L corona radiata infarct in the setting of UTI    Recommendations:      -  ASA 81mg PO daily.  - High dose statin therapy - atorvastatin 40mg PO daily. LDL goal <70mg/dL.   -   lipid panel  - treatment of infection per primary team   - telemetry  - PT/OT/SS/SLP, OOBC  - EEG if no improvement   - check FS, glucose control <180  - GI/DVT ppx  - Thank you for allowing me to participate in the care of this patient. Call with questions.   - spoke with patient and son in ED at bedside 10/16, wife 10/17 bedside  - d/c planning when able. no further inpatient neuro workup.    Aguila Barnett MD  Vascular Neurology  Office: [phone].  
71y PMH gentleman PMH DM2, HTN, CVA who presents to the Emergency Department via EMS on the morning of 10/15/22 now admitted for management and treatment of likely UTI with encephalopathy 
 71y LH gentleman with DM2 with also peripheral neuropathy, HTN, prior strokes who presents to the ED via EMS on the morning of 10/15/22  complaining of R side facial droop slurred speech. Code stroke was called and pt went straight to CT scan.   Last known normal of 9pm the day prior. Pt wife states he hasn't been himself for the past two days but focal deficits were most notable today. Code Stroke called on arrival, NIHSS of 7.   CT Head noncontrast: No acute intracranial hemorrhage, midline shift, or hydrocephalus. Chronic bilateral thalamic lacunar infarcts and left corona radiata/basal ganglia infarct.   CTA Head and Neck:   CT brain perfusion: No core infarct. Delayed mean transit time in the bilateral occipital lobes and right frontal lobe may represent brain at risk versus artifact.  CTA head and neck: No large vessel occlusion. Wake Up Protocol initiated: No acute infarct.  MRI brain neg for AIS   + UA  +BEAR   MRI brain neg for infarct   No TPA given due to patient with no evidence of infarction on MRI and questionable last known well, no MT performed due to no evidence of LVO.  10/16 exam improved.  AMANDA AAOx3   A1c 9.1  Impression: Mild dysarthria, R facial droop, and subtle R hemiparesis likely due to recrudescence of L corona radiata infarct in the setting of UTI    Recommendations:      -  ASA 81mg PO daily.  - High dose statin therapy - atorvastatin 40mg PO daily. LDL goal <70mg/dL.   -   lipid panel  - treatment of infection per primary team   - telemetry  - PT/OT/SS/SLP, OOBC  - EEG if no improvement   - check FS, glucose control <180  - GI/DVT ppx  - Thank you for allowing me to participate in the care of this patient. Call with questions.   - spoke with patient and son in ED at bedside 10/16, wife 10/17 bedside  - d/c planning when able. no further inpatient neuro workup.    Aguila Barnett MD  Vascular Neurology  Office: [phone].  
71y PMH gentleman PMH DM2, HTN, CVA who presents to the Emergency Department via EMS on the morning of 10/15/22 now admitted for management and treatment of likely UTI with encephalopathy 
71y PMH gentleman PMH DM2, HTN, CVA who presents to the Emergency Department via EMS on the morning of 10/15/22 now admitted for management and treatment of likely UTI with encephalopathy

## 2022-10-18 NOTE — DISCHARGE NOTE NURSING/CASE MANAGEMENT/SOCIAL WORK - NSDCPEFALRISK_GEN_ALL_CORE
For information on Fall & Injury Prevention, visit: [email or web address] OR  [email or web address] OR  https://www.cdc.gov/steadi/patient.html

## 2022-11-03 ENCOUNTER — OFFICE (OUTPATIENT)
Dept: URBAN - METROPOLITAN AREA CLINIC 77 | Facility: CLINIC | Age: 72
Setting detail: OPHTHALMOLOGY
End: 2022-11-03
Payer: MEDICARE

## 2022-11-03 DIAGNOSIS — I63.50: ICD-10-CM

## 2022-11-03 DIAGNOSIS — H16.223: ICD-10-CM

## 2022-11-03 DIAGNOSIS — H43.813: ICD-10-CM

## 2022-11-03 DIAGNOSIS — H25.13: ICD-10-CM

## 2022-11-03 DIAGNOSIS — H43.12: ICD-10-CM

## 2022-11-03 DIAGNOSIS — E11.3592: ICD-10-CM

## 2022-11-03 DIAGNOSIS — H35.373: ICD-10-CM

## 2022-11-03 DIAGNOSIS — E11.3591: ICD-10-CM

## 2022-11-03 PROCEDURE — 92250 FUNDUS PHOTOGRAPHY W/I&R: CPT | Performed by: OPHTHALMOLOGY

## 2022-11-03 PROCEDURE — 99214 OFFICE O/P EST MOD 30 MIN: CPT | Performed by: OPHTHALMOLOGY

## 2022-11-03 PROCEDURE — 92235 FLUORESCEIN ANGRPH MLTIFRAME: CPT | Performed by: OPHTHALMOLOGY

## 2022-11-03 ASSESSMENT — KERATOMETRY
OS_K2POWER_DIOPTERS: 43.75
OS_AXISANGLE_DEGREES: 171
OD_K2POWER_DIOPTERS: 43.75
OS_K1POWER_DIOPTERS: 43.00
OD_AXISANGLE_DEGREES: 013
OD_K1POWER_DIOPTERS: 43.25

## 2022-11-03 ASSESSMENT — REFRACTION_MANIFEST
OS_CYLINDER: -0.50
OD_AXIS: 45
OD_VA1: 20/40
OS_SPHERE: -0.75
OS_VA1: 20/40
OD_SPHERE: +0.25
OS_ADD: +2.50
OD_CYLINDER: -1.00
OD_SPHERE: +0.75
OD_CYLINDER: -1.25
OD_VA1: 20/50
OS_CYLINDER: -1.00
OS_AXIS: 155
OS_VA1: 20/50
OS_SPHERE: -1.00
OD_ADD: +2.50
OD_AXIS: 80
OS_ADD: +2.75
OS_AXIS: 68

## 2022-11-03 ASSESSMENT — SPHEQUIV_DERIVED
OD_SPHEQUIV: 0.5
OS_SPHEQUIV: -1.25
OD_SPHEQUIV: 0.125
OS_SPHEQUIV: -1.25
OS_SPHEQUIV: -1.5
OD_SPHEQUIV: -0.25

## 2022-11-03 ASSESSMENT — AXIALLENGTH_DERIVED
OD_AL: 23.6897
OD_AL: 23.5433
OS_AL: 24.1371
OS_AL: 24.2395
OS_AL: 24.1371
OD_AL: 23.3987

## 2022-11-03 ASSESSMENT — TONOMETRY
OS_IOP_MMHG: 14
OD_IOP_MMHG: 13

## 2022-11-03 ASSESSMENT — VISUAL ACUITY
OD_BCVA: 20/70
OS_BCVA: 20/50

## 2022-11-03 ASSESSMENT — REFRACTION_AUTOREFRACTION
OS_SPHERE: -1.25
OS_AXIS: 157
OD_CYLINDER: -2.00
OD_AXIS: 81
OS_CYLINDER: -0.50
OD_SPHERE: +1.50

## 2022-11-03 ASSESSMENT — CONFRONTATIONAL VISUAL FIELD TEST (CVF)
OD_FINDINGS: FULL
OS_FINDINGS: FULL

## 2022-11-03 ASSESSMENT — SUPERFICIAL PUNCTATE KERATITIS (SPK)
OD_SPK: 1+
OS_SPK: 1+

## 2023-04-27 NOTE — ED ADULT TRIAGE NOTE - NS ED NOTE AC HIGH RISK COUNTRIES
No What Is The Reason For Today's Visit?: Full Body Skin Examination What Is The Reason For Today's Visit? (Being Monitored For X): concerning skin lesions on an annual basis Additional History: No spots of concern.

## 2023-05-17 ENCOUNTER — OFFICE (OUTPATIENT)
Dept: URBAN - METROPOLITAN AREA CLINIC 77 | Facility: CLINIC | Age: 73
Setting detail: OPHTHALMOLOGY
End: 2023-05-17
Payer: MEDICARE

## 2023-05-17 DIAGNOSIS — H16.223: ICD-10-CM

## 2023-05-17 DIAGNOSIS — H43.12: ICD-10-CM

## 2023-05-17 DIAGNOSIS — H25.13: ICD-10-CM

## 2023-05-17 DIAGNOSIS — H43.813: ICD-10-CM

## 2023-05-17 DIAGNOSIS — I63.50: ICD-10-CM

## 2023-05-17 DIAGNOSIS — E11.3592: ICD-10-CM

## 2023-05-17 DIAGNOSIS — H35.373: ICD-10-CM

## 2023-05-17 DIAGNOSIS — E11.3591: ICD-10-CM

## 2023-05-17 PROCEDURE — 99214 OFFICE O/P EST MOD 30 MIN: CPT | Performed by: OPHTHALMOLOGY

## 2023-05-17 PROCEDURE — 92250 FUNDUS PHOTOGRAPHY W/I&R: CPT | Performed by: OPHTHALMOLOGY

## 2023-05-17 ASSESSMENT — REFRACTION_MANIFEST
OS_AXIS: 155
OS_SPHERE: -0.75
OS_AXIS: 68
OD_VA1: 20/50
OD_CYLINDER: -1.00
OS_CYLINDER: -1.00
OD_SPHERE: +0.25
OD_ADD: +2.50
OD_SPHERE: +0.75
OD_AXIS: 80
OD_CYLINDER: -1.25
OS_VA1: 20/50
OD_VA1: 20/40
OS_VA1: 20/40
OS_ADD: +2.50
OD_AXIS: 45
OS_CYLINDER: -0.50
OS_ADD: +2.75
OS_SPHERE: -1.00

## 2023-05-17 ASSESSMENT — REFRACTION_AUTOREFRACTION
OS_AXIS: 157
OD_CYLINDER: -2.00
OS_SPHERE: -1.25
OS_CYLINDER: -0.50
OD_AXIS: 81
OD_SPHERE: +1.50

## 2023-05-17 ASSESSMENT — AXIALLENGTH_DERIVED
OD_AL: 23.5433
OS_AL: 24.2395
OD_AL: 23.6897
OD_AL: 23.3987
OS_AL: 24.1371
OS_AL: 24.1371

## 2023-05-17 ASSESSMENT — KERATOMETRY
OD_AXISANGLE_DEGREES: 013
OD_K1POWER_DIOPTERS: 43.25
OS_AXISANGLE_DEGREES: 171
OS_K1POWER_DIOPTERS: 43.00
OD_K2POWER_DIOPTERS: 43.75
OS_K2POWER_DIOPTERS: 43.75

## 2023-05-17 ASSESSMENT — SUPERFICIAL PUNCTATE KERATITIS (SPK)
OD_SPK: 1+
OS_SPK: 1+

## 2023-05-17 ASSESSMENT — CONFRONTATIONAL VISUAL FIELD TEST (CVF)
OD_FINDINGS: FULL
OS_FINDINGS: FULL

## 2023-05-17 ASSESSMENT — SPHEQUIV_DERIVED
OS_SPHEQUIV: -1.25
OS_SPHEQUIV: -1.5
OS_SPHEQUIV: -1.25
OD_SPHEQUIV: 0.125
OD_SPHEQUIV: 0.5
OD_SPHEQUIV: -0.25

## 2023-05-17 ASSESSMENT — VISUAL ACUITY
OS_BCVA: 20/50-
OD_BCVA: 20/40

## 2023-05-22 NOTE — DISCHARGE NOTE PROVIDER - PROVIDER TOKENS
PROVIDER:[TOKEN:[[id number]],FOLLOWUP:[2 weeks]] Spironolactone Pregnancy And Lactation Text: This medication can cause feminization of the male fetus and should be avoided during pregnancy. The active metabolite is also found in breast milk.

## 2023-05-31 ENCOUNTER — NON-APPOINTMENT (OUTPATIENT)
Age: 73
End: 2023-05-31

## 2023-05-31 ENCOUNTER — APPOINTMENT (OUTPATIENT)
Dept: CARDIOLOGY | Facility: CLINIC | Age: 73
End: 2023-05-31
Payer: MEDICARE

## 2023-05-31 VITALS — HEART RATE: 78 BPM | SYSTOLIC BLOOD PRESSURE: 180 MMHG | DIASTOLIC BLOOD PRESSURE: 94 MMHG

## 2023-05-31 VITALS
DIASTOLIC BLOOD PRESSURE: 100 MMHG | WEIGHT: 210 LBS | SYSTOLIC BLOOD PRESSURE: 160 MMHG | HEART RATE: 78 BPM | HEIGHT: 69 IN | OXYGEN SATURATION: 96 % | BODY MASS INDEX: 31.1 KG/M2

## 2023-05-31 VITALS — HEART RATE: 78 BPM | DIASTOLIC BLOOD PRESSURE: 90 MMHG | SYSTOLIC BLOOD PRESSURE: 170 MMHG

## 2023-05-31 DIAGNOSIS — R60.0 LOCALIZED EDEMA: ICD-10-CM

## 2023-05-31 DIAGNOSIS — I10 ESSENTIAL (PRIMARY) HYPERTENSION: ICD-10-CM

## 2023-05-31 DIAGNOSIS — E11.9 TYPE 2 DIABETES MELLITUS W/OUT COMPLICATIONS: ICD-10-CM

## 2023-05-31 DIAGNOSIS — E78.5 HYPERLIPIDEMIA, UNSPECIFIED: ICD-10-CM

## 2023-05-31 DIAGNOSIS — I44.7 LEFT BUNDLE-BRANCH BLOCK, UNSPECIFIED: ICD-10-CM

## 2023-05-31 DIAGNOSIS — R06.02 SHORTNESS OF BREATH: ICD-10-CM

## 2023-05-31 PROCEDURE — 36415 COLL VENOUS BLD VENIPUNCTURE: CPT

## 2023-05-31 PROCEDURE — 93000 ELECTROCARDIOGRAM COMPLETE: CPT

## 2023-05-31 PROCEDURE — 99205 OFFICE O/P NEW HI 60 MIN: CPT

## 2023-06-01 PROBLEM — R06.02 EXERCISE-INDUCED SHORTNESS OF BREATH: Status: ACTIVE | Noted: 2023-05-31

## 2023-06-01 PROBLEM — R60.0 EDEMA OF BOTH LOWER EXTREMITIES: Status: ACTIVE | Noted: 2023-06-01

## 2023-06-01 PROBLEM — I44.7 LBBB (LEFT BUNDLE BRANCH BLOCK): Status: ACTIVE | Noted: 2023-06-01

## 2023-06-01 LAB
ALBUMIN SERPL ELPH-MCNC: 3.7 G/DL
ALP BLD-CCNC: 88 U/L
ALT SERPL-CCNC: 16 U/L
ANION GAP SERPL CALC-SCNC: 11 MMOL/L
AST SERPL-CCNC: 17 U/L
BILIRUB SERPL-MCNC: 0.3 MG/DL
BUN SERPL-MCNC: 23 MG/DL
CALCIUM SERPL-MCNC: 9.7 MG/DL
CHLORIDE SERPL-SCNC: 101 MMOL/L
CHOLEST SERPL-MCNC: 182 MG/DL
CO2 SERPL-SCNC: 28 MMOL/L
CREAT SERPL-MCNC: 1.44 MG/DL
EGFR: 51 ML/MIN/1.73M2
ESTIMATED AVERAGE GLUCOSE: 200 MG/DL
GLUCOSE SERPL-MCNC: 259 MG/DL
HBA1C MFR BLD HPLC: 8.6 %
HDLC SERPL-MCNC: 56 MG/DL
LDLC SERPL CALC-MCNC: 87 MG/DL
NONHDLC SERPL-MCNC: 126 MG/DL
NT-PROBNP SERPL-MCNC: 627 PG/ML
POTASSIUM SERPL-SCNC: 4.6 MMOL/L
PROT SERPL-MCNC: 6.1 G/DL
SODIUM SERPL-SCNC: 140 MMOL/L
TRIGL SERPL-MCNC: 194 MG/DL
TSH SERPL-ACNC: 1.59 UIU/ML

## 2023-06-01 RX ORDER — METOPROLOL SUCCINATE 100 MG/1
100 TABLET, EXTENDED RELEASE ORAL
Qty: 360 | Refills: 0 | Status: ACTIVE | COMMUNITY
Start: 2023-02-27

## 2023-06-01 RX ORDER — EMPAGLIFLOZIN 25 MG/1
25 TABLET, FILM COATED ORAL DAILY
Qty: 90 | Refills: 1 | Status: ACTIVE | COMMUNITY

## 2023-06-01 RX ORDER — ASPIRIN 81 MG/1
81 TABLET, CHEWABLE ORAL
Qty: 90 | Refills: 1 | Status: DISCONTINUED | COMMUNITY
Start: 2021-10-20 | End: 2023-06-01

## 2023-06-02 ENCOUNTER — NON-APPOINTMENT (OUTPATIENT)
Age: 73
End: 2023-06-02

## 2023-06-02 NOTE — PHYSICAL EXAM
[Well Developed] : well developed [Well Nourished] : well nourished [No Acute Distress] : no acute distress [Normal Conjunctiva] : normal conjunctiva [Normal Venous Pressure] : normal venous pressure [No Carotid Bruit] : no carotid bruit [Normal S1, S2] : normal S1, S2 [No Murmur] : no murmur [No Rub] : no rub [No Gallop] : no gallop [Clear Lung Fields] : clear lung fields [Good Air Entry] : good air entry [No Respiratory Distress] : no respiratory distress  [Soft] : abdomen soft [Non Tender] : non-tender [No Masses/organomegaly] : no masses/organomegaly [Normal Bowel Sounds] : normal bowel sounds [Abnormal Gait] : abnormal gait [No Cyanosis] : no cyanosis [No Clubbing] : no clubbing [No Varicosities] : no varicosities [Normal Radial B/L] : normal radial B/L [Normal PT B/L] : normal PT B/L [Normal DP B/L] : normal DP B/L [No Rash] : no rash [No Skin Lesions] : no skin lesions [Moves all extremities] : moves all extremities [Normal Speech] : normal speech [Alert and Oriented] : alert and oriented [Normal memory] : normal memory [de-identified] : Mildly overweight [de-identified] : No carotid bruits noted.  No JVD at 90 degrees. [de-identified] : S2 paradoxically split.  No murmur no S3. [de-identified] : 2+ edema 1/3-1/2 up bilaterally.  Normal pedal pulses. [de-identified] : Probably mild residual defect from CVA in 20 17

## 2023-06-02 NOTE — CARDIOLOGY SUMMARY
[de-identified] : 2021.  Sinus rhythm with left bundle branch block. [de-identified] : October 17, 2022.  Done at [facility].  Normal mitral valve with no MR.  Calcified aortic valve with normal opening and no AI severe LAE.  Normal LV size and systolic function with LVEF 59%.  At 1 point the report says normal diastolic function and at another part it says indeterminant diastolic function.  Mild concentric LVH.  Normal RV size and function with no TR.  No pericardial effusion. [de-identified] : May 24, 2023.  Done at [facility].  The conclusion is that the differential includes subacute myocarditis, hypertensive heart disease, or early amyloid and recommended to repeat in 3 to 6 months.  There did seem to be asymmetric hypertrophy of the mid inferoseptum measuring 1.7 cm.  LVEF was 54%.  There were some abnormal findings but now active myocardial inflammation.  LV myocardial scar burden is 13%.  Normal right ventricular size and function.  Valves and other chambers within normal limits as well.

## 2023-06-02 NOTE — HISTORY OF PRESENT ILLNESS
[FreeTextEntry1] : May 31, 2023.  72-year-old man.  Patient was a 4 pack/day smoker but stopped at the age of 45.  Long history of hypertension and diabetes 2017 had a left lateral medullary's stroke with mild residual.  2021 had an episode of right eye diplopia that lasted for 8 days along with some right-sided numbness and balance issues outpatient MRI showed a pontine stroke and question of the basilar artery dissection.  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  Origin of left vertebral artery not visualized.  Based on these results the patient was placed on DAPT with aspirin and Plavix (this was supposed to be for 3 months and then stop aspirin and continue Plavix) patient is still on DAPT at this time with the aspirin dose being 325 mg.  At that time his diabetes was poorly controlled and blood pressure was acceptable and LDL was 70.  MRA was done November 1, 2021 showing bilateral cavernous internal carotid artery stenosis and distal left vertebral artery stenosis and just mild basilar stenosis.  Good intracranial flow using noninvasive flow MRA.  I do not believe patient has had any further neuro follow-up since November 2021.\par On October 15, 2022 the patient came to the emergency room with some slurred speech and a right facial droop and code stroke was called but work-up revealed no new neurologic findings and the patient had a UTI and most of his symptoms seem to resolve with treatment of the infection.(Felt to be a recrudescence of the left corona radiata infarct in the setting of a UTI).  Echocardiogram done at that time showed normal mitral valve with no MR, minimally calcified aortic valve with no AI, severe LAE, mild concentric LVH with normal systolic and diastolic function.  LVEF 59% with no wall motion abnormalities.  Normal RV size and function.  Patient also with chronic left bundle branch block.\par Patient claims that for the last few months his blood pressure has been very hard to control all of a sudden.  He has chronically been on amlodipine 5 mg, fosinopril 40 mg, metoprolol 200 twice daily, furosemide 60, along with his diabetic regimen of metformin 1000, Jardiance 25, glipizide 10, Lantus 16, and recently Mounjaro 5 mg was added.  In addition he is on atorvastatin 80.  Upon further discussion with the patient by the end of the exam it is clear that his compliance is somewhat suspect as especially on the Caoda[employer] holidays and on the Sabbath he does not take the medicines because he runs to the bathroom.  He does have significant edema as well.  Meanwhile he is still on clopidogrel +325 mg of aspirin.  His daughter who works at [facility] [employer] was discussing the problem with one of the cardiologist they are and they were referred to see me for further help in management of his blood pressure.  Also according to the patient there was a question of some abnormality on an office echo recently and the patient was sent to [facility] for a cardiac MRI about 2 weeks ago which reportedly was unremarkable but the results are unavailable at this time.  He denies exertional chest pain or shortness of breath.\par June 2, 2023. Reviewed the somewhat confusing MRI report from [place] dated May 24. Normal size left ventricle but severe hypertrophy with thickness up to 1.7 cm. LVEF 54%. Paradoxical septal motion from the left bundle branch block. Some findings consistent with interstitial edema but no active myocardial inflammation. Normal right ventricular size. Normal left atrial size. Valves normal. On late gadolinium enhancement imaging there is patchy delayed enhancement in the basal inferior, anterolateral, and inferolateral walls which are nonspecific. Differential includes hypertensive heart disease. Also focal mid wall delayed enhancement in the mid inferior wall. The conclusion is subacute myocarditis hypertensive heart disease or early amyloid and recommends repeating the cardiac MRI in 3 to 6 months if clinically indicated. Myocardial scar burden is 13%. All of these findings are despite the patient having an echocardiogram at [facility] just last October and had normal function and just mild concentric LVH. \par \par

## 2023-06-02 NOTE — REVIEW OF SYSTEMS
[Lower Ext Edema] : lower extremity edema [Urinary Frequency] : urinary frequency [Negative] : Heme/Lymph [Feeling Fatigued] : not feeling fatigued [Weight Loss (___ Lbs)] : no recent weight loss [Dyspnea on exertion] : not dyspnea during exertion [Chest Discomfort] : no chest discomfort [Palpitations] : no palpitations [Orthopnea] : no orthopnea [PND] : no PND [Syncope] : no syncope [Hematuria] : no hematuria [Pain During Urination] : no dysuria [FreeTextEntry6] : No known COPD despite previous extensive smoking history [FreeTextEntry8] : Prostate issues and urinary frequency [de-identified] : Left with some balance issues from his CVA and some paresthesias

## 2023-06-14 ENCOUNTER — APPOINTMENT (OUTPATIENT)
Dept: NEUROLOGY | Facility: CLINIC | Age: 73
End: 2023-06-14
Payer: MEDICARE

## 2023-06-14 VITALS
BODY MASS INDEX: 30.36 KG/M2 | WEIGHT: 205 LBS | HEIGHT: 69 IN | HEART RATE: 82 BPM | DIASTOLIC BLOOD PRESSURE: 79 MMHG | SYSTOLIC BLOOD PRESSURE: 173 MMHG

## 2023-06-14 PROCEDURE — 99215 OFFICE O/P EST HI 40 MIN: CPT

## 2023-06-14 RX ORDER — SILODOSIN 4 MG/1
4 CAPSULE ORAL
Qty: 90 | Refills: 3 | Status: DISCONTINUED | COMMUNITY
Start: 2021-05-14 | End: 2023-06-14

## 2023-06-14 NOTE — HISTORY OF PRESENT ILLNESS
[FreeTextEntry1] : He is a 72 year old left handed gentleman.\par \par Summary from Dr. 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 \par - MRI Brain 2021 To my eye, there was no acute infarct and there was no obvious pontine infarct. The study was suboptimal. \par - CTA Head and Neck: To my eye, there is moderate multifocal moderate to severe stenosis in the proximal basilar artery.  Moderate bilateral cavernous carotid artery calcifications causing severe stenosis on the left and mild to moderate stenosis on the right. Early termination of the right vertebral artery. There also appears to be left vertebral artery stenosis, though not officially commented on in the report.\par - MR NOVA 11/1/21: Bilateral cavernous internal carotid artery stenosis. Distal left vertebral artery stenosis(dominant vertebral artery).Mid basilar stenosis. Good intracranial flow using noninvasive flow MR angiography.\par \par 6/14/23\par He presents today with his wife. He presented to the hospital in October 2022 with dysarthria and was found to have a UTI. Repeat imaging did not reveal a new infarct. He has not had a stroke since 2021. He was scheduled to have a cerebral angiogram in 2021, but had to cancel it due to "high blood pressure" and has not rescheduled it.\par \par PCP Dr. Derrell York

## 2023-06-14 NOTE — DISCUSSION/SUMMARY
[FreeTextEntry1] : His neurologic exam shows mild hypnesthesia to temperature on the right side of his face and right leg, consistent with his previous lateral medullary infarct, but also temperature sensation loss on his left arm which is not readily explained; a stocking distribution deficit to temperature on both legs, consistent with diabetic polyneuropathy; loss of manual dexterity in his left hand which may be related to previous stroke or peripheral nerve entrapment; mild or mild-moderate postural instability, probably related to previous stroke and also possibly multifactorial.\par \par To summarize, he had a left lateral medullary infarct in 2017 presumably due to intracranial atherosclerosis (left vertebral artery stenosis) and a pontine infarct in 2021  likely due to basilar stenosis. His MRA NOVA in November 2021, confirmed severe intracranial atherosclerosis. He is neurologically stable and has not had a recurrent stroke since 2021 and intracranial flow is in the low-normal range. Since he is doing well clinically, there is likely no benefir to a cerebral angiogram at this time. This can be reconsidered if he develops recurrent infarction. \par \par He is taking Plavix for secondary stroke prevention.\par \par He should benefit from aggressive vascular risk factor control: Target LDL <70, control BP which was elevated today in the office, control diabetes, eat healthfully, and exercise as tolerated.\par \par He endorses daytime fatigue, raising concern for obstructive sleep apnea. I have requested a home sleep study.\par \par Carotid and transcranial Dopplers should be assessed; can be done with you or in our office.\par \par He can follow up in 3 months. I hope he remains free of further serious trouble.

## 2023-06-14 NOTE — PHYSICAL EXAM
[General Appearance - Alert] : alert [General Appearance - In No Acute Distress] : in no acute distress [Oriented To Time, Place, And Person] : oriented to person, place, and time [Impaired Insight] : insight and judgment were intact [Affect] : the affect was normal [Sclera] : the sclera and conjunctiva were normal [Full Visual Field] : full visual field [Outer Ear] : the ears and nose were normal in appearance [Examination Of The Oral Cavity] : the lips and gums were normal [Neck Appearance] : the appearance of the neck was normal [Neck Cervical Mass (___cm)] : no neck mass was observed [Auscultation Breath Sounds / Voice Sounds] : lungs were clear to auscultation bilaterally [Heart Rate And Rhythm] : heart rate was normal and rhythm regular [Heart Sounds] : normal S1 and S2 [Heart Sounds Gallop] : no gallops [Murmurs] : no murmurs [Heart Sounds Pericardial Friction Rub] : no pericardial rub [Arterial Pulses Carotid] : carotid pulses were normal with no bruits [Veins - Varicosity Changes] : there were no varicosital changes [Nail Clubbing] : no clubbing  or cyanosis of the fingernails [Musculoskeletal - Swelling] : no joint swelling seen [Motor Tone] : muscle strength and tone were normal [Skin Color & Pigmentation] : normal skin color and pigmentation [Skin Turgor] : normal skin turgor [] : no rash [FreeTextEntry1] : Generally looked well. Mental status exam: Alert, oriented x3, speech fluent/prosodic without paraphasias; comprehension intact; memory and fund of knowledge intact. On cranial nerve exam, cranial nerves II through XII was intact. On motor exam tone was normal. There was no drift. Fine finger movements are mildly clumsy on the left. Power was normal throughout. Mild action tremor bilaterally. Reflexes were 1+ in the arms, trace at the patella and absent at the Achilles, and plantar reflexes were silent on the right and downgoing on the left. Coordination at the arms was normal. Gait was mildly unsteady, turned in 3 steps while holding on, unable to walk in tandem. Romberg test was negative. Decreased sensation to temperature of the right face, left arm, and right leg.  Possible stocking distribution deficit to temperature  6 inches below the knees.\par

## 2023-09-20 ENCOUNTER — INPATIENT (INPATIENT)
Facility: HOSPITAL | Age: 73
LOS: 6 days | Discharge: HOME | DRG: 871 | End: 2023-09-27
Attending: INTERNAL MEDICINE | Admitting: INTERNAL MEDICINE
Payer: MEDICARE

## 2023-09-20 VITALS
TEMPERATURE: 98 F | RESPIRATION RATE: 18 BRPM | SYSTOLIC BLOOD PRESSURE: 112 MMHG | HEART RATE: 88 BPM | OXYGEN SATURATION: 94 % | DIASTOLIC BLOOD PRESSURE: 70 MMHG

## 2023-09-20 DIAGNOSIS — J18.9 PNEUMONIA, UNSPECIFIED ORGANISM: ICD-10-CM

## 2023-09-20 DIAGNOSIS — Z98.890 OTHER SPECIFIED POSTPROCEDURAL STATES: Chronic | ICD-10-CM

## 2023-09-20 LAB
ALBUMIN SERPL ELPH-MCNC: 3.4 G/DL — SIGNIFICANT CHANGE UP (ref 3.3–5)
ALP SERPL-CCNC: 67 U/L — SIGNIFICANT CHANGE UP (ref 40–120)
ALT FLD-CCNC: 13 U/L — SIGNIFICANT CHANGE UP (ref 10–45)
ANION GAP SERPL CALC-SCNC: 18 MMOL/L — HIGH (ref 5–17)
ANION GAP SERPL CALC-SCNC: 20 MMOL/L — HIGH (ref 5–17)
APPEARANCE UR: ABNORMAL
APTT BLD: 37.8 SEC — HIGH (ref 24.5–35.6)
AST SERPL-CCNC: 29 U/L — SIGNIFICANT CHANGE UP (ref 10–40)
B-OH-BUTYR SERPL-SCNC: 0.6 MMOL/L — HIGH
B-OH-BUTYR SERPL-SCNC: 0.7 MMOL/L — HIGH
BACTERIA # UR AUTO: NEGATIVE — SIGNIFICANT CHANGE UP
BASE EXCESS BLDV CALC-SCNC: -0.7 MMOL/L — SIGNIFICANT CHANGE UP (ref -2–3)
BASE EXCESS BLDV CALC-SCNC: -2.8 MMOL/L — LOW (ref -2–3)
BASOPHILS # BLD AUTO: 0 K/UL — SIGNIFICANT CHANGE UP (ref 0–0.2)
BASOPHILS NFR BLD AUTO: 0 % — SIGNIFICANT CHANGE UP (ref 0–2)
BILIRUB SERPL-MCNC: 0.8 MG/DL — SIGNIFICANT CHANGE UP (ref 0.2–1.2)
BILIRUB UR-MCNC: NEGATIVE — SIGNIFICANT CHANGE UP
BUN SERPL-MCNC: 40 MG/DL — HIGH (ref 7–23)
BUN SERPL-MCNC: 41 MG/DL — HIGH (ref 7–23)
CA-I SERPL-SCNC: 1.22 MMOL/L — SIGNIFICANT CHANGE UP (ref 1.15–1.33)
CA-I SERPL-SCNC: 1.27 MMOL/L — SIGNIFICANT CHANGE UP (ref 1.15–1.33)
CALCIUM SERPL-MCNC: 10 MG/DL — SIGNIFICANT CHANGE UP (ref 8.4–10.5)
CALCIUM SERPL-MCNC: 9.8 MG/DL — SIGNIFICANT CHANGE UP (ref 8.4–10.5)
CHLORIDE BLDV-SCNC: 102 MMOL/L — SIGNIFICANT CHANGE UP (ref 96–108)
CHLORIDE BLDV-SCNC: 102 MMOL/L — SIGNIFICANT CHANGE UP (ref 96–108)
CHLORIDE SERPL-SCNC: 101 MMOL/L — SIGNIFICANT CHANGE UP (ref 96–108)
CHLORIDE SERPL-SCNC: 102 MMOL/L — SIGNIFICANT CHANGE UP (ref 96–108)
CO2 BLDV-SCNC: 29 MMOL/L — HIGH (ref 22–26)
CO2 BLDV-SCNC: 30 MMOL/L — HIGH (ref 22–26)
CO2 SERPL-SCNC: 21 MMOL/L — LOW (ref 22–31)
CO2 SERPL-SCNC: 22 MMOL/L — SIGNIFICANT CHANGE UP (ref 22–31)
COLOR SPEC: YELLOW — SIGNIFICANT CHANGE UP
CREAT SERPL-MCNC: 1.82 MG/DL — HIGH (ref 0.5–1.3)
CREAT SERPL-MCNC: 1.9 MG/DL — HIGH (ref 0.5–1.3)
DIFF PNL FLD: ABNORMAL
EGFR: 37 ML/MIN/1.73M2 — LOW
EGFR: 39 ML/MIN/1.73M2 — LOW
EOSINOPHIL # BLD AUTO: 0 K/UL — SIGNIFICANT CHANGE UP (ref 0–0.5)
EOSINOPHIL NFR BLD AUTO: 0 % — SIGNIFICANT CHANGE UP (ref 0–6)
EPI CELLS # UR: 3 /HPF — SIGNIFICANT CHANGE UP
GAS PNL BLDV: 139 MMOL/L — SIGNIFICANT CHANGE UP (ref 136–145)
GAS PNL BLDV: 139 MMOL/L — SIGNIFICANT CHANGE UP (ref 136–145)
GAS PNL BLDV: SIGNIFICANT CHANGE UP
GIANT PLATELETS BLD QL SMEAR: PRESENT — SIGNIFICANT CHANGE UP
GLUCOSE BLDC GLUCOMTR-MCNC: 255 MG/DL — HIGH (ref 70–99)
GLUCOSE BLDV-MCNC: 219 MG/DL — HIGH (ref 70–99)
GLUCOSE BLDV-MCNC: 246 MG/DL — HIGH (ref 70–99)
GLUCOSE SERPL-MCNC: 221 MG/DL — HIGH (ref 70–99)
GLUCOSE SERPL-MCNC: 230 MG/DL — HIGH (ref 70–99)
GLUCOSE UR QL: ABNORMAL
HCO3 BLDV-SCNC: 27 MMOL/L — SIGNIFICANT CHANGE UP (ref 22–29)
HCO3 BLDV-SCNC: 28 MMOL/L — SIGNIFICANT CHANGE UP (ref 22–29)
HCT VFR BLD CALC: 47.8 % — SIGNIFICANT CHANGE UP (ref 39–50)
HCT VFR BLDA CALC: 42 % — SIGNIFICANT CHANGE UP (ref 39–51)
HCT VFR BLDA CALC: 46 % — SIGNIFICANT CHANGE UP (ref 39–51)
HGB BLD CALC-MCNC: 14.1 G/DL — SIGNIFICANT CHANGE UP (ref 12.6–17.4)
HGB BLD CALC-MCNC: 15.2 G/DL — SIGNIFICANT CHANGE UP (ref 12.6–17.4)
HGB BLD-MCNC: 15 G/DL — SIGNIFICANT CHANGE UP (ref 13–17)
HYALINE CASTS # UR AUTO: 1 /LPF — SIGNIFICANT CHANGE UP (ref 0–2)
INR BLD: 1.18 RATIO — SIGNIFICANT CHANGE UP (ref 0.85–1.18)
KETONES UR-MCNC: SIGNIFICANT CHANGE UP
LACTATE BLDV-MCNC: 3.3 MMOL/L — HIGH (ref 0.5–2)
LACTATE BLDV-MCNC: 4 MMOL/L — CRITICAL HIGH (ref 0.5–2)
LACTATE BLDV-MCNC: 5.7 MMOL/L — CRITICAL HIGH (ref 0.5–2)
LEUKOCYTE ESTERASE UR-ACNC: NEGATIVE — SIGNIFICANT CHANGE UP
LYMPHOCYTES # BLD AUTO: 0.48 K/UL — LOW (ref 1–3.3)
LYMPHOCYTES # BLD AUTO: 3.6 % — LOW (ref 13–44)
MAGNESIUM SERPL-MCNC: 1.7 MG/DL — SIGNIFICANT CHANGE UP (ref 1.6–2.6)
MANUAL SMEAR VERIFICATION: SIGNIFICANT CHANGE UP
MCHC RBC-ENTMCNC: 29.1 PG — SIGNIFICANT CHANGE UP (ref 27–34)
MCHC RBC-ENTMCNC: 31.4 GM/DL — LOW (ref 32–36)
MCV RBC AUTO: 92.6 FL — SIGNIFICANT CHANGE UP (ref 80–100)
METAMYELOCYTES # FLD: 6.4 % — HIGH (ref 0–0)
MONOCYTES # BLD AUTO: 0.48 K/UL — SIGNIFICANT CHANGE UP (ref 0–0.9)
MONOCYTES NFR BLD AUTO: 3.6 % — SIGNIFICANT CHANGE UP (ref 2–14)
MYELOCYTES NFR BLD: 2.7 % — HIGH (ref 0–0)
NEUTROPHILS # BLD AUTO: 11.1 K/UL — HIGH (ref 1.8–7.4)
NEUTROPHILS NFR BLD AUTO: 63.7 % — SIGNIFICANT CHANGE UP (ref 43–77)
NEUTS BAND # BLD: 20 % — HIGH (ref 0–8)
NITRITE UR-MCNC: NEGATIVE — SIGNIFICANT CHANGE UP
PCO2 BLDV: 62 MMHG — HIGH (ref 42–55)
PCO2 BLDV: 67 MMHG — HIGH (ref 42–55)
PH BLDV: 7.21 — LOW (ref 7.32–7.43)
PH BLDV: 7.26 — LOW (ref 7.32–7.43)
PH UR: 5.5 — SIGNIFICANT CHANGE UP (ref 5–8)
PHOSPHATE SERPL-MCNC: 3.9 MG/DL — SIGNIFICANT CHANGE UP (ref 2.5–4.5)
PLAT MORPH BLD: ABNORMAL
PLATELET # BLD AUTO: 292 K/UL — SIGNIFICANT CHANGE UP (ref 150–400)
PO2 BLDV: 14 MMHG — LOW (ref 25–45)
PO2 BLDV: 26 MMHG — SIGNIFICANT CHANGE UP (ref 25–45)
POTASSIUM BLDV-SCNC: 3.9 MMOL/L — SIGNIFICANT CHANGE UP (ref 3.5–5.1)
POTASSIUM BLDV-SCNC: 3.9 MMOL/L — SIGNIFICANT CHANGE UP (ref 3.5–5.1)
POTASSIUM SERPL-MCNC: 3.9 MMOL/L — SIGNIFICANT CHANGE UP (ref 3.5–5.3)
POTASSIUM SERPL-MCNC: 4.3 MMOL/L — SIGNIFICANT CHANGE UP (ref 3.5–5.3)
POTASSIUM SERPL-SCNC: 3.9 MMOL/L — SIGNIFICANT CHANGE UP (ref 3.5–5.3)
POTASSIUM SERPL-SCNC: 4.3 MMOL/L — SIGNIFICANT CHANGE UP (ref 3.5–5.3)
PROT SERPL-MCNC: 6.9 G/DL — SIGNIFICANT CHANGE UP (ref 6–8.3)
PROT UR-MCNC: ABNORMAL
PROTHROM AB SERPL-ACNC: 12.3 SEC — SIGNIFICANT CHANGE UP (ref 9.5–13)
RAPID RVP RESULT: SIGNIFICANT CHANGE UP
RBC # BLD: 5.16 M/UL — SIGNIFICANT CHANGE UP (ref 4.2–5.8)
RBC # FLD: 13.1 % — SIGNIFICANT CHANGE UP (ref 10.3–14.5)
RBC BLD AUTO: SIGNIFICANT CHANGE UP
RBC CASTS # UR COMP ASSIST: 7 /HPF — HIGH (ref 0–4)
SAO2 % BLDV: 14.1 % — LOW (ref 67–88)
SAO2 % BLDV: 37 % — LOW (ref 67–88)
SARS-COV-2 RNA SPEC QL NAA+PROBE: SIGNIFICANT CHANGE UP
SODIUM SERPL-SCNC: 142 MMOL/L — SIGNIFICANT CHANGE UP (ref 135–145)
SODIUM SERPL-SCNC: 142 MMOL/L — SIGNIFICANT CHANGE UP (ref 135–145)
SP GR SPEC: 1.02 — SIGNIFICANT CHANGE UP (ref 1.01–1.02)
TROPONIN T, HIGH SENSITIVITY RESULT: 233 NG/L — HIGH (ref 0–51)
TROPONIN T, HIGH SENSITIVITY RESULT: 268 NG/L — HIGH (ref 0–51)
UROBILINOGEN FLD QL: NEGATIVE — SIGNIFICANT CHANGE UP
WBC # BLD: 13.26 K/UL — HIGH (ref 3.8–10.5)
WBC # FLD AUTO: 13.26 K/UL — HIGH (ref 3.8–10.5)
WBC UR QL: 4 /HPF — SIGNIFICANT CHANGE UP (ref 0–5)

## 2023-09-20 PROCEDURE — 71045 X-RAY EXAM CHEST 1 VIEW: CPT | Mod: 26

## 2023-09-20 PROCEDURE — 99291 CRITICAL CARE FIRST HOUR: CPT

## 2023-09-20 PROCEDURE — 99222 1ST HOSP IP/OBS MODERATE 55: CPT | Mod: GC

## 2023-09-20 PROCEDURE — 93308 TTE F-UP OR LMTD: CPT | Mod: 26

## 2023-09-20 PROCEDURE — 71275 CT ANGIOGRAPHY CHEST: CPT | Mod: 26,MA

## 2023-09-20 RX ORDER — METOPROLOL TARTRATE 50 MG
1 TABLET ORAL
Qty: 0 | Refills: 0 | DISCHARGE

## 2023-09-20 RX ORDER — PIPERACILLIN AND TAZOBACTAM 4; .5 G/20ML; G/20ML
3.38 INJECTION, POWDER, LYOPHILIZED, FOR SOLUTION INTRAVENOUS ONCE
Refills: 0 | Status: DISCONTINUED | OUTPATIENT
Start: 2023-09-20 | End: 2023-09-20

## 2023-09-20 RX ORDER — DEXTROSE 50 % IN WATER 50 %
25 SYRINGE (ML) INTRAVENOUS ONCE
Refills: 0 | Status: DISCONTINUED | OUTPATIENT
Start: 2023-09-20 | End: 2023-09-27

## 2023-09-20 RX ORDER — DEXTROSE 50 % IN WATER 50 %
15 SYRINGE (ML) INTRAVENOUS ONCE
Refills: 0 | Status: DISCONTINUED | OUTPATIENT
Start: 2023-09-20 | End: 2023-09-27

## 2023-09-20 RX ORDER — FUROSEMIDE 40 MG
60 TABLET ORAL
Qty: 0 | Refills: 0 | DISCHARGE

## 2023-09-20 RX ORDER — CLOPIDOGREL BISULFATE 75 MG/1
1 TABLET, FILM COATED ORAL
Qty: 0 | Refills: 0 | DISCHARGE

## 2023-09-20 RX ORDER — INSULIN GLARGINE AND LIXISENATIDE 100; 33 U/ML; UG/ML
24 INJECTION, SOLUTION SUBCUTANEOUS
Qty: 0 | Refills: 0 | DISCHARGE

## 2023-09-20 RX ORDER — VANCOMYCIN HCL 1 G
1000 VIAL (EA) INTRAVENOUS ONCE
Refills: 0 | Status: COMPLETED | OUTPATIENT
Start: 2023-09-20 | End: 2023-09-20

## 2023-09-20 RX ORDER — METOPROLOL TARTRATE 50 MG
100 TABLET ORAL
Refills: 0 | Status: DISCONTINUED | OUTPATIENT
Start: 2023-09-20 | End: 2023-09-22

## 2023-09-20 RX ORDER — METFORMIN HYDROCHLORIDE 850 MG/1
1 TABLET ORAL
Qty: 0 | Refills: 0 | DISCHARGE

## 2023-09-20 RX ORDER — ALBUTEROL 90 UG/1
2.5 AEROSOL, METERED ORAL
Refills: 0 | Status: COMPLETED | OUTPATIENT
Start: 2023-09-20 | End: 2023-09-20

## 2023-09-20 RX ORDER — ATORVASTATIN CALCIUM 80 MG/1
1 TABLET, FILM COATED ORAL
Qty: 0 | Refills: 0 | DISCHARGE

## 2023-09-20 RX ORDER — SODIUM CHLORIDE 9 MG/ML
1000 INJECTION, SOLUTION INTRAVENOUS
Refills: 0 | Status: DISCONTINUED | OUTPATIENT
Start: 2023-09-20 | End: 2023-09-21

## 2023-09-20 RX ORDER — PANTOPRAZOLE SODIUM 20 MG/1
40 TABLET, DELAYED RELEASE ORAL DAILY
Refills: 0 | Status: DISCONTINUED | OUTPATIENT
Start: 2023-09-20 | End: 2023-09-27

## 2023-09-20 RX ORDER — GLUCAGON INJECTION, SOLUTION 0.5 MG/.1ML
1 INJECTION, SOLUTION SUBCUTANEOUS ONCE
Refills: 0 | Status: DISCONTINUED | OUTPATIENT
Start: 2023-09-20 | End: 2023-09-27

## 2023-09-20 RX ORDER — INSULIN LISPRO 100/ML
VIAL (ML) SUBCUTANEOUS
Refills: 0 | Status: DISCONTINUED | OUTPATIENT
Start: 2023-09-20 | End: 2023-09-27

## 2023-09-20 RX ORDER — FOSINOPRIL SODIUM 10 MG/1
1 TABLET ORAL
Qty: 0 | Refills: 0 | DISCHARGE

## 2023-09-20 RX ORDER — CEFEPIME 1 G/1
2000 INJECTION, POWDER, FOR SOLUTION INTRAMUSCULAR; INTRAVENOUS ONCE
Refills: 0 | Status: COMPLETED | OUTPATIENT
Start: 2023-09-20 | End: 2023-09-20

## 2023-09-20 RX ORDER — AMLODIPINE BESYLATE 2.5 MG/1
5 TABLET ORAL DAILY
Refills: 0 | Status: DISCONTINUED | OUTPATIENT
Start: 2023-09-20 | End: 2023-09-22

## 2023-09-20 RX ORDER — ATORVASTATIN CALCIUM 80 MG/1
80 TABLET, FILM COATED ORAL AT BEDTIME
Refills: 0 | Status: DISCONTINUED | OUTPATIENT
Start: 2023-09-20 | End: 2023-09-27

## 2023-09-20 RX ORDER — SODIUM CHLORIDE 9 MG/ML
500 INJECTION INTRAMUSCULAR; INTRAVENOUS; SUBCUTANEOUS ONCE
Refills: 0 | Status: COMPLETED | OUTPATIENT
Start: 2023-09-20 | End: 2023-09-20

## 2023-09-20 RX ORDER — INSULIN GLARGINE 100 [IU]/ML
15 INJECTION, SOLUTION SUBCUTANEOUS AT BEDTIME
Refills: 0 | Status: DISCONTINUED | OUTPATIENT
Start: 2023-09-20 | End: 2023-09-21

## 2023-09-20 RX ORDER — INSULIN GLARGINE 100 [IU]/ML
8 INJECTION, SOLUTION SUBCUTANEOUS ONCE
Refills: 0 | Status: COMPLETED | OUTPATIENT
Start: 2023-09-20 | End: 2023-09-20

## 2023-09-20 RX ORDER — SODIUM CHLORIDE 9 MG/ML
250 INJECTION INTRAMUSCULAR; INTRAVENOUS; SUBCUTANEOUS ONCE
Refills: 0 | Status: COMPLETED | OUTPATIENT
Start: 2023-09-20 | End: 2023-09-20

## 2023-09-20 RX ORDER — SODIUM CHLORIDE 9 MG/ML
1000 INJECTION, SOLUTION INTRAVENOUS
Refills: 0 | Status: DISCONTINUED | OUTPATIENT
Start: 2023-09-20 | End: 2023-09-27

## 2023-09-20 RX ORDER — CEFEPIME 1 G/1
1000 INJECTION, POWDER, FOR SOLUTION INTRAMUSCULAR; INTRAVENOUS EVERY 8 HOURS
Refills: 0 | Status: DISCONTINUED | OUTPATIENT
Start: 2023-09-20 | End: 2023-09-21

## 2023-09-20 RX ORDER — HEPARIN SODIUM 5000 [USP'U]/ML
5000 INJECTION INTRAVENOUS; SUBCUTANEOUS EVERY 8 HOURS
Refills: 0 | Status: DISCONTINUED | OUTPATIENT
Start: 2023-09-20 | End: 2023-09-22

## 2023-09-20 RX ORDER — EMPAGLIFLOZIN 10 MG/1
1 TABLET, FILM COATED ORAL
Qty: 0 | Refills: 0 | DISCHARGE

## 2023-09-20 RX ORDER — AMLODIPINE BESYLATE 2.5 MG/1
7.5 TABLET ORAL
Qty: 0 | Refills: 0 | DISCHARGE

## 2023-09-20 RX ORDER — DEXTROSE 50 % IN WATER 50 %
12.5 SYRINGE (ML) INTRAVENOUS ONCE
Refills: 0 | Status: DISCONTINUED | OUTPATIENT
Start: 2023-09-20 | End: 2023-09-27

## 2023-09-20 RX ORDER — CLOPIDOGREL BISULFATE 75 MG/1
75 TABLET, FILM COATED ORAL DAILY
Refills: 0 | Status: DISCONTINUED | OUTPATIENT
Start: 2023-09-20 | End: 2023-09-27

## 2023-09-20 RX ADMIN — ALBUTEROL 2.5 MILLIGRAM(S): 90 AEROSOL, METERED ORAL at 17:57

## 2023-09-20 RX ADMIN — ATORVASTATIN CALCIUM 80 MILLIGRAM(S): 80 TABLET, FILM COATED ORAL at 22:11

## 2023-09-20 RX ADMIN — CEFEPIME 100 MILLIGRAM(S): 1 INJECTION, POWDER, FOR SOLUTION INTRAMUSCULAR; INTRAVENOUS at 14:25

## 2023-09-20 RX ADMIN — ALBUTEROL 2.5 MILLIGRAM(S): 90 AEROSOL, METERED ORAL at 15:38

## 2023-09-20 RX ADMIN — SODIUM CHLORIDE 500 MILLILITER(S): 9 INJECTION INTRAMUSCULAR; INTRAVENOUS; SUBCUTANEOUS at 14:25

## 2023-09-20 RX ADMIN — HEPARIN SODIUM 5000 UNIT(S): 5000 INJECTION INTRAVENOUS; SUBCUTANEOUS at 21:52

## 2023-09-20 RX ADMIN — ALBUTEROL 2.5 MILLIGRAM(S): 90 AEROSOL, METERED ORAL at 19:25

## 2023-09-20 RX ADMIN — SODIUM CHLORIDE 250 MILLILITER(S): 9 INJECTION INTRAMUSCULAR; INTRAVENOUS; SUBCUTANEOUS at 19:24

## 2023-09-20 RX ADMIN — INSULIN GLARGINE 15 UNIT(S): 100 INJECTION, SOLUTION SUBCUTANEOUS at 21:58

## 2023-09-20 RX ADMIN — Medication 250 MILLIGRAM(S): at 14:54

## 2023-09-20 RX ADMIN — INSULIN GLARGINE 8 UNIT(S): 100 INJECTION, SOLUTION SUBCUTANEOUS at 15:36

## 2023-09-20 RX ADMIN — Medication 100 MILLIGRAM(S): at 18:39

## 2023-09-20 RX ADMIN — CEFEPIME 100 MILLIGRAM(S): 1 INJECTION, POWDER, FOR SOLUTION INTRAMUSCULAR; INTRAVENOUS at 21:50

## 2023-09-20 RX ADMIN — SODIUM CHLORIDE 60 MILLILITER(S): 9 INJECTION, SOLUTION INTRAVENOUS at 22:10

## 2023-09-20 NOTE — CONSULT NOTE ADULT - SUBJECTIVE AND OBJECTIVE BOX
CHIEF COMPLAINT: hypoxia     HPI: Pt is a 72 year old man with HFpEF (EF 59% 2022), DM2, htn, CVA x2 last in '21 who presents to the ED after feeling weak since last night. He had been feeling at baseline previously. He notes urinating more frequently over this time. Also with nonproductive cough over past 2 days or so. Denies fever/sweats. No dysuria or hematuria - just increased frequency. At one point he felt too weak to make it to the bathroom and slowly worked his way to the floor where his wife found him. She called EMS. According to EMS, O2sat initially high 70s. Of note, smoked 4 pks/day for 20 years, quit 31 years ago. Denies COPD.     ED: O2 improved to 90s on nonrebreather, then placed on BiPAP for WOB and hypercarbia. S/p 750cc IVF, 8u long acting insulin, 1 dose each cefepime and doxy.    PAST MEDICAL & SURGICAL HISTORY:  Diabetes Mellitus Type II      HTN (Hypertension)      Hypercholesterolemia      BPH with elevated PSA      Stroke  2017, 10/2021:  with right diplobia resolving in a week      S/P colonoscopy          FAMILY HISTORY:  No pertinent family history in first degree relatives        SOCIAL HISTORY:  Smoking: __ packs x ___ years  EtOH Use:  Marital Status:  Occupation:  Recent Travel:  Country of Birth:  Advance Directives:    Allergies    No Known Allergies    Intolerances            REVIEW OF SYSTEMS:  Constitutional: feels well, improvement of fatigue. No fevers  Eyes: no vision changes  ENT: on Bipap, uncomfortable but tolerating well  CV: no chest pain, palps   Resp: breathing comfortably on Bipap. +cough, nonproductive  GI: no abd pain, reports normal bowel movements   : +frequency, no dysuria. no blood   Neurological: feels at baseline    OBJECTIVE:  ICU Vital Signs Last 24 Hrs  T(C): 36.1 (20 Sep 2023 19:47), Max: 37.4 (20 Sep 2023 13:55)  T(F): 97 (20 Sep 2023 19:47), Max: 99.3 (20 Sep 2023 13:55)  HR: 95 (20 Sep 2023 19:47) (84 - 95)  BP: 124/87 (20 Sep 2023 19:47) (110/70 - 127/69)  BP(mean): 97 (20 Sep 2023 19:47) (84 - 97)  ABP: --  ABP(mean): --  RR: 24 (20 Sep 2023 19:47) (18 - 27)  SpO2: 97% (20 Sep 2023 19:47) (89% - 97%)    O2 Parameters below as of 20 Sep 2023 19:47  Patient On (Oxygen Delivery Method): mask, nonrebreather  O2 Flow (L/min): 12            CAPILLARY BLOOD GLUCOSE      POCT Blood Glucose.: 209 mg/dL (20 Sep 2023 14:48)      PHYSICAL EXAM:  General: NAD, on bipap   HEENT: on bipap, moist mucosa   Respiratory: breathing comfortably, able to speak. Coarse lung sounds throughout all fields. no wheezes  Cardiovascular: RRR, no mrg  Abdomen: obese, nontender. +BS  Extremities: wwp. 1+ pitting edema bl   Skin: no rashes noted  Neurological: ao3, conversational    HOSPITAL MEDICATIONS:  MEDICATIONS  (STANDING):    MEDICATIONS  (PRN):      LABS:                        15.0   13.26 )-----------( 292      ( 20 Sep 2023 13:22 )             47.8     09-20    142  |  102  |  41<H>  ----------------------------<  221<H>  3.9   |  22  |  1.82<H>    Ca    9.8      20 Sep 2023 16:57  Phos  3.9     09-20  Mg     1.7     09-20    TPro  6.9  /  Alb  3.4  /  TBili  0.8  /  DBili  x   /  AST  29  /  ALT  13  /  AlkPhos  67  09-20    PT/INR - ( 20 Sep 2023 13:22 )   PT: 12.3 sec;   INR: 1.18 ratio         PTT - ( 20 Sep 2023 13:22 )  PTT:37.8 sec  Urinalysis Basic - ( 20 Sep 2023 16:57 )    Color: x / Appearance: x / SG: x / pH: x  Gluc: 221 mg/dL / Ketone: x  / Bili: x / Urobili: x   Blood: x / Protein: x / Nitrite: x   Leuk Esterase: x / RBC: x / WBC x   Sq Epi: x / Non Sq Epi: x / Bacteria: x        Venous Blood Gas:  09-20 @ 16:57  7.26/62/26/28/37.0  VBG Lactate: 3.3  Venous Blood Gas:  09-20 @ 15:45  --/--/--/--/--  VBG Lactate: 4.0  Venous Blood Gas:  09-20 @ 13:05  7.21/67/14/27/14.1  VBG Lactate: 5.7      RECOMMENDATIONS:  - WOB improved on BIPAP and pCO2 67 -->62  - would continue BiPAP overnight and trend blood gas   - CT scan showing evidence of multifocal pneumonia - continue abx coverage and ensure full course is completed if pt insists on leaving hospital with po abx   - improvement of lactate 5.7-->3.3  - UA notable for glucose and no UTI  - BHB and anion gap noted; more likely AG due to lactate and is closing. Agree with insulin to manage sugars. BHB minimally elevated - pt taking several antidiabetic meds and no insulin so euglycemic DKA theoretically possible but not fitting clinical picture   - troponin downtrending, very low concern for ACS, more likely demand   - proBNP markedly elevated, noting history of HF (though no WMA noted on POCUS TTE)  would be judicious with any fluids given. Pitting edema and coarse lung sounds --> fluid overloaded on exam  - per wife, intubation would be outside the scope of what the patient would ever want as part of his care  - at this point, with improvement of hypercarbia and WOB, pt not requiring MICU level of care

## 2023-09-20 NOTE — ED PROVIDER NOTE - PROGRESS NOTE DETAILS
Andrew,nt currently on nonrebreather 15 L satting SPO2 94 to 95%, still with some increased work of breathing PCO2 67 and patient ordered for BiPAP.  Patient noted to have 20% bands, leukocytosis of 1113.26, lactate of 5.7.  Bedside POCUS was concerning for possible left lower lobe consolidation which in the setting may be pneumonia.  Patient was written for empiric antibiotics to cover pneumonia, IVC collapse with inspiration and patient was written for IV fluids.  Labs additionally notable for BEAR creatinine 1.9 baseline appears to be 1-1.3, troponin 268 in the setting of an BEAR, ECG shows T wave inversions in anterior all lateral leads which appears unchanged from prior ECG in 2022.  Beta hCG 0.6.  Acidemia of 7.21 likely multifactorial given elevated lactate level, beta hCG, hypercapnia.  CXR and and POCUS did not reveal lung pathology that would be deemed consistent with patient's degree of new hypoxia will obtain CT angio chest to rule out PE. Pt given Lantus for poss early DKA. d/w MICU, will see pt. -Panda Vang PA-C patient has acute heart failure / pulmonary edema. this is a contra-indication to 30 cc/kg fluid bolus. pt got  500 cc. Patient reassessed, appears to have clinically improved.  Patient now more awake and alert on BiPAP, reports feeling subjective improvement on BiPAP as well.  Patient SPO2 99% on BiPAP with FiO2 100, FiO2 is down titrated.  Patient responding appropriately to questioning.  Patient hemodynamically stable this time pending repeat blood work.  CT angio chest returned showing multifocal pneumonia, no PE. -Panda Vang PA-C Labs all grossly improving, beta hydroxybutyrate uptrending minimally to 0.7, patient received 8 units of subcu Lantus.  I discussed with MICU fellow who evaluated and reevaluated the patient in ED, they report that the patient does not require MICU level care at this time they do not believe that this is true DKA as acidemia is multifactorial given lactic acidosis, respiratory acidosis, BEAR with elevated BUN.  Anion gap is also improved with IV fluids, I performed a bedside ultrasound patient has collapsible IVC and A-line predominant lung fields, has a small right pleural effusion which may be infectious in etiology given multifocal pneumonia.  Patient still with traces of fluids from prior 500 cc bolus, doxycycline hanging now, patient with QTc over her 520 and thus did not give wen quinolone or macrolide for Pneumonia, will give additional 250 cc bolus at this time.  MICU does not have specific recommendations regarding insulin for this patient they report that they recommend IV hydration and standard insulin regimen from medicine team.  Patient stable for admission to medicine discussed with patient and family members serially throughout patient's stay in the ED.  Patient has clinically improved and reports subjective improvement, states he wanted a brief break from the BiPAP and was transiently placed on nonrebreather but is aware he needs to go back on BiPAP for further improvement.  Patient and family appreciative of care. -Panda Vang PA-C Patient currently on nonrebreather 15 L satting SPO2 94 to 95%, still with some increased work of breathing PCO2 67 and patient ordered for BiPAP.  Patient noted to have 20% bands, leukocytosis of 1113.26, lactate of 5.7.  Bedside POCUS was concerning for possible left lower lobe consolidation which in the setting may be pneumonia.  Patient was written for empiric antibiotics to cover pneumonia, IVC collapse with inspiration and patient was written for IV fluids.  Labs additionally notable for BEAR creatinine 1.9 baseline appears to be 1-1.3, troponin 268 in the setting of an BEAR, ECG shows T wave inversions in anterior all lateral leads which appears unchanged from prior ECG in 2022.  Beta hCG 0.6.  Acidemia of 7.21 likely multifactorial given elevated lactate level, beta hCG, hypercapnia.  CXR and and POCUS did not reveal lung pathology that would be deemed consistent with patient's degree of new hypoxia will obtain CT angio chest to rule out PE. Pt given Lantus for poss early DKA. d/w MICU, will see pt. -Panda Vang PA-C Pt received upon sign out from REZA Nguyen. PCP Dr. White. Will admit to unattached. Luz Can PA-C

## 2023-09-20 NOTE — H&P ADULT - NSHPPHYSICALEXAM_GEN_ALL_CORE
T(C): 36.1 (09-20-23 @ 19:47), Max: 37.4 (09-20-23 @ 13:55)  HR: 95 (09-20-23 @ 19:47) (84 - 95)  BP: 124/87 (09-20-23 @ 19:47) (110/70 - 127/69)  RR: 24 (09-20-23 @ 19:47) (18 - 27)  SpO2: 97% (09-20-23 @ 19:47) (89% - 97%)    PHYSICAL EXAM:  GENERAL: NAD, well-developed  HEAD:  Atraumatic, Normocephalic  EYES: EOMI, PERRLA, conjunctiva and sclera clear  NECK: Supple, No JVD  CHEST/LUNG: Clear to auscultation bilaterally; No wheeze  HEART: Regular rate and rhythm; No murmurs, rubs, or gallops  ABDOMEN: Soft, Nontender, Nondistended; Bowel sounds present  EXTREMITIES:  2+ Peripheral Pulses, No clubbing, cyanosis, or edema  PSYCH: AAOx3  NEUROLOGY: non-focal  SKIN: No rashes or lesions

## 2023-09-20 NOTE — H&P ADULT - NSHPREVIEWOFSYSTEMS_GEN_ALL_CORE
ROS as noted in HPI, all else negative.     REVIEW OF SYSTEMS:    CONSTITUTIONAL:  Weakness, lethargy, no fevers or chills  EYES/ENT:  No visual changes;  No vertigo or throat pain   NECK:  No pain or stiffness  RESPIRATORY:  No cough, wheezing, hemoptysis; with shortness of breath  CARDIOVASCULAR:  No chest pain or palpitations  GASTROINTESTINAL:  No abdominal or epigastric pain. No nausea, vomiting, or hematemesis; No diarrhea or constipation. No melena or hematochezia.  GENITOURINARY:  No dysuria, frequency or hematuria  MUSCULOSKELETAL:  Denies muscle pain, weakness  NEUROLOGICAL:  No numbness or weakness  SKIN:  No itching, rashes

## 2023-09-20 NOTE — ED ADULT NURSE REASSESSMENT NOTE - NS ED NURSE REASSESS COMMENT FT1
Admitting MD came down and spoke with family and need for BiPAP machine to be placed back, family member agreed, respiratory contacted @[phone] and pt placed back on BiPAP machine.

## 2023-09-20 NOTE — ED PROVIDER NOTE - CLINICAL SUMMARY MEDICAL DECISION MAKING FREE TEXT BOX
72-year-old male PMH of HTN, HLD, T2DM, BPH, age, prior CVA in 2017 and 2021 uncertain of residual deficits, HFpEF, Presents to the ED feeling unwell for several days, shortness of breath, general weakness, hypoxic requiring 6 L via nasal cannula, to maintain SPO2 sat over 92%, not on home O2.  On exam patient is chronically ill-appearing, difficulty speaking in full sentences.  Coarse breath sounds throughout with bibasilar rales R>L.  Bilateral lower extremity edema.  Differential diagnosis includes but not limited to all failure exacerbation, pneumonia, viral process, atypical ACS, less likely COPD exacerbation as patient has no endorsed history of COPD, PE, r/o superimposed DKA given hyperglycemia.  Plan for symptomatic treatment, infectious work-up including RVP, urine and blood cultures, troponin and proBNP, VBG and beta hydroxybutyrate, caution with any IV fluid administration consider diuretics, POCUS echo and lung to further guide management.  Will reassess, patient will require admission. -Panda Vang PA-C

## 2023-09-20 NOTE — ED ADULT NURSE REASSESSMENT NOTE - NS ED NURSE REASSESS COMMENT FT1
Post void bladder scan done, bladder scan shows approximately 50-60cc of urine in bladder via bladder scanner.

## 2023-09-20 NOTE — ED ADULT NURSE REASSESSMENT NOTE - NS ED NURSE REASSESS COMMENT FT1
pt cleaned and changed by ED RN, boosted in bed and made comfortable. Vital signs retaken and documented. Pt bladder scanned, currently bladder scan shows approximately 214cc of urine in bladder, pt denies need to urinate at this time. Will rescan bladder post next void. 70

## 2023-09-20 NOTE — ED PROCEDURE NOTE - PROCEDURE ADDITIONAL DETAILS
Emergency Department Focused Ultrasound performed at patient's bedside. The study was performed by a credentialed ultrasound provider. IVC collapsible, small right pleural effusion, A-line predominant lung fields, LV contractility grossly preserved. -Panda Vang PA-C
POCUS: Emergency Department Focused Ultrasound performed at patient's bedside.  The complete report will be available in PACS.

## 2023-09-20 NOTE — ED ADULT NURSE NOTE - OBJECTIVE STATEMENT
72 yr old male to ED via EMS with H/O HTN, TYPE 2 DIABETES, BPH . Per EMS pt became weak when going to BR and defecated himself with near syncope episode. Has not been feeling well x last several days and has not taken his meds today Pt also hyperglycemic with low 02 sat at 78% r/a at home. Patient  c/o shortness of breath. 6L O2 via NC to maintain SpO2 of 93% in ED. Denies fever or chills Denies abd pain Denies N/V/D. BEFAST neg Congestion noted. Rales diffuse auscultated.

## 2023-09-20 NOTE — ED PROVIDER NOTE - OBJECTIVE STATEMENT
72-year-old male PMH of HTN, HLD, T2DM, BPH, age, prior CVA in 2017 and 2021 uncertain of residual deficits, HFpEF reportedly on 20mg lasix QD (TTE from 2022 EF 59%) presents to the ED complaining of generalized weakness today, feeling generally unwell for several days is unable to quantify how many days or specific symptomatology.  Patient reports feeling some shortness of breath today was found to be hypoxic by EMS (78% on RA), and requiring 6L O2 via NC to maintain SpO2 of 93% in ED. patient also reports blood glucose at home by EMS was elevated but uncertain of the number, reports he has not taken any of his antidiabetic medication for at least a day due to feeling unwell, has not taken his daily Lasix.  Patient notes possibly some URI symptoms over the past few days.  Patient denies urinary complaints, abdominal pain, vomiting, diarrhea, melena, hematochezia.

## 2023-09-20 NOTE — ED ADULT NURSE REASSESSMENT NOTE - NS ED NURSE REASSESS COMMENT FT1
Pt asking to eat, no diet order put in, pt on 12L NRB, RN contacted admitting team MD Tim Allen via TEAMS, MD responded and pt not able to eat tonight as requires high amounts of O2 and will be placed back on BiPAP overnight and is unsafe to do so, pndng orders for BiPAP to be put in. Pt asking to eat, no diet order put in, pt on 12L NRB, RN contacted admitting team MD Tim Allen via TEAMS, MD responded and pt not able to eat tonight as requires high amounts of O2 and will be placed back on BiPAP overnight and is unsafe to do so, pndng orders for BiPAP to be put in. Pt family member unhappy and wants to speak with MD about situation, pnding MD to come down and speak with family member Pt asking to eat, no diet order put in, pt on 12L NRB, RN contacted admitting team MD Tim Allen via TEAMS, MD responded and pt not able to eat tonight as requires high amounts of O2 and will be placed back on BiPAP overnight and is unsafe to do so, pndng orders for BiPAP to be put in. Pt family member unhappy and wants to speak with MD about situation, pnding MD to come down and speak with family member. update*** MD Tim Allen responded and respiratory contacted to place pt back on BiPAP, pt family member now refusing BiPAP and MD made aware, family does not want any BiPAP machine applied onto pt until she physically sees and speaks with the MD. MD aware waiting to come to ED to assess pt.

## 2023-09-20 NOTE — ED PROVIDER NOTE - PHYSICAL EXAMINATION
GEN: Pt chronically ill appearing, answers questions appropriately.  PSYCH: Affect appropriate.  EYES: Sclera white w/o injection.   ENT: Head NCAT. MMM. Neck supple FROM.  RESP: SpO2 94% on 6L NC, speaking in partial sentences. Coarse breath sounds b/l, +bibasilar rales R>L.  CARDIAC: RRR, clear distinct S1, S2, no appreciable murmurs.  ABD: Abdomen soft, non-tender. No CVAT b/l.  VASC: 2+ radial and dorsalis pedis pulses b/l. 1+ b/l LE edema, no calf tenderness.  SKIN: No rashes on the trunk.

## 2023-09-20 NOTE — ED PROVIDER NOTE - SECONDARY DIAGNOSIS.
Acute respiratory failure with hypoxia and hypercapnia Severe sepsis with lactic acidosis BEAR (acute kidney injury) Bandemia

## 2023-09-20 NOTE — H&P ADULT - ASSESSMENT
72F Hx HTN, T2DM, CVAs x 2 (2017 and 2021), HFrEF 59%, BPH p/w ED Generalized Weakness, SOB and WOB started on AFMO2 escalated to BiPAP Support with CXR c/w Lt Retrocardiac Infiltrates R/O Aspiration.   - Acute Hypoxic Hypercarbic Respiratory Failure on BiPAP Support   - seen by MICU, not a candidate at present  - No Impending Respiratory Failure, answered questions through BiPAP   - Sponte TV > 450-500cc on minimal setting RR=16, 10/5, FiO2 80% SpO2 100%   - Wean BiPAP as tolerated over 6-8 Hr   - Serial vBG showed improvement PaCO2 67 to 62   - Clear speech and joking with son at bedside   - cont outptn meds  - IVF  - check TTE  - dvt ppx w HSC  - GI ppx w PPI

## 2023-09-20 NOTE — H&P ADULT - NSHPLABSRESULTS_GEN_ALL_CORE
15.0   13.26 )-----------( 292      ( 20 Sep 2023 13:22 )             47.8         142  |  101  |  40<H>  ----------------------------<  230<H>  4.3   |  21<L>  |  1.90<H>    Ca    10.0      20 Sep 2023 13:22    TPro  6.9  /  Alb  3.4  /  TBili  0.8  /  DBili  x   /  AST  29  /  ALT  13  /  AlkPhos  67            Urinalysis Basic - ( 20 Sep 2023 15:16 )    Color: Yellow / Appearance: Slightly Turbid / S.020 / pH: x  Gluc: x / Ketone: Trace  / Bili: Negative / Urobili: Negative   Blood: x / Protein: 300 mg/dL / Nitrite: Negative   Leuk Esterase: Negative / RBC: 7 /hpf / WBC 4 /HPF   Sq Epi: x / Non Sq Epi: x / Bacteria: Negative      PT/INR - ( 20 Sep 2023 13:22 )   PT: 12.3 sec;   INR: 1.18 ratio         PTT - ( 20 Sep 2023 13:22 )  PTT:37.8 sec  Lactate Trend        CAPILLARY BLOOD GLUCOSE      POCT Blood Glucose.: 209 mg/dL (20 Sep 2023 14:48)

## 2023-09-20 NOTE — ED PROCEDURE NOTE - US CPT CODES
07267 US Chest (PTX, Pleural Effussion/CHF vs COPD)/50823 Echocardiography Transthoracic with Image 2D (Echo/FAST)

## 2023-09-20 NOTE — ED PROVIDER NOTE - DATE/TIME 3
20-Sep-2023 16:29

## 2023-09-20 NOTE — ED PROVIDER NOTE - CRITICAL CARE ATTENDING CONTRIBUTION TO CARE
Attending Statement: This was a shared visit with the FELICITAS. I reviewed and verified the documentation and independently performed the documented:     Medical Decision Making.    ems reported to me pt collapsed to knees / hypoxic 78% ra / 96 over palp  pt feels cool / clammy / pale / bibasilar rales / le edema / no jvd / rrr / non-tender abdomen / holds arms and legs wo drift bl equal / very slow speech w/o dysarthria    bedside echo noted - see chart  xr to my independent interpretation showing lll pna  xr findings do not fully explain labs / bnp / hypoxia -> will do cta chest to check for PE    pt requires cpap/bipap. micu consulted and bedside

## 2023-09-20 NOTE — ED PROVIDER NOTE - CARE PLAN
1 Principal Discharge DX:	Multifocal pneumonia  Secondary Diagnosis:	Acute respiratory failure with hypoxia and hypercapnia  Secondary Diagnosis:	BEAR (acute kidney injury)  Secondary Diagnosis:	Severe sepsis with lactic acidosis  Secondary Diagnosis:	Bandemia

## 2023-09-21 DIAGNOSIS — J96.01 ACUTE RESPIRATORY FAILURE WITH HYPOXIA: ICD-10-CM

## 2023-09-21 DIAGNOSIS — E11.9 TYPE 2 DIABETES MELLITUS WITHOUT COMPLICATIONS: ICD-10-CM

## 2023-09-21 DIAGNOSIS — N17.9 ACUTE KIDNEY FAILURE, UNSPECIFIED: ICD-10-CM

## 2023-09-21 DIAGNOSIS — J18.9 PNEUMONIA, UNSPECIFIED ORGANISM: ICD-10-CM

## 2023-09-21 LAB
A1C WITH ESTIMATED AVERAGE GLUCOSE RESULT: 7.3 % — HIGH (ref 4–5.6)
ANION GAP SERPL CALC-SCNC: 15 MMOL/L — SIGNIFICANT CHANGE UP (ref 5–17)
BASE EXCESS BLDV CALC-SCNC: 1.2 MMOL/L — SIGNIFICANT CHANGE UP (ref -2–3)
BUN SERPL-MCNC: 47 MG/DL — HIGH (ref 7–23)
CA-I SERPL-SCNC: 1.19 MMOL/L — SIGNIFICANT CHANGE UP (ref 1.15–1.33)
CALCIUM SERPL-MCNC: 9.7 MG/DL — SIGNIFICANT CHANGE UP (ref 8.4–10.5)
CHLORIDE BLDV-SCNC: 104 MMOL/L — SIGNIFICANT CHANGE UP (ref 96–108)
CHLORIDE SERPL-SCNC: 103 MMOL/L — SIGNIFICANT CHANGE UP (ref 96–108)
CHLORIDE UR-SCNC: 23 MMOL/L — SIGNIFICANT CHANGE UP
CO2 BLDV-SCNC: 30 MMOL/L — HIGH (ref 22–26)
CO2 SERPL-SCNC: 24 MMOL/L — SIGNIFICANT CHANGE UP (ref 22–31)
CREAT ?TM UR-MCNC: 88 MG/DL — SIGNIFICANT CHANGE UP
CREAT SERPL-MCNC: 1.98 MG/DL — HIGH (ref 0.5–1.3)
CULTURE RESULTS: NO GROWTH — SIGNIFICANT CHANGE UP
EGFR: 35 ML/MIN/1.73M2 — LOW
ESTIMATED AVERAGE GLUCOSE: 163 MG/DL — HIGH (ref 68–114)
GAS PNL BLDV: 138 MMOL/L — SIGNIFICANT CHANGE UP (ref 136–145)
GAS PNL BLDV: SIGNIFICANT CHANGE UP
GAS PNL BLDV: SIGNIFICANT CHANGE UP
GLUCOSE BLDC GLUCOMTR-MCNC: 107 MG/DL — HIGH (ref 70–99)
GLUCOSE BLDC GLUCOMTR-MCNC: 151 MG/DL — HIGH (ref 70–99)
GLUCOSE BLDC GLUCOMTR-MCNC: 172 MG/DL — HIGH (ref 70–99)
GLUCOSE BLDC GLUCOMTR-MCNC: 242 MG/DL — HIGH (ref 70–99)
GLUCOSE BLDV-MCNC: 204 MG/DL — HIGH (ref 70–99)
GLUCOSE SERPL-MCNC: 197 MG/DL — HIGH (ref 70–99)
HCO3 BLDV-SCNC: 28 MMOL/L — SIGNIFICANT CHANGE UP (ref 22–29)
HCT VFR BLD CALC: 42.3 % — SIGNIFICANT CHANGE UP (ref 39–50)
HCT VFR BLDA CALC: 46 % — SIGNIFICANT CHANGE UP (ref 39–51)
HGB BLD CALC-MCNC: 15.2 G/DL — SIGNIFICANT CHANGE UP (ref 12.6–17.4)
HGB BLD-MCNC: 13.6 G/DL — SIGNIFICANT CHANGE UP (ref 13–17)
LACTATE BLDV-MCNC: 2.9 MMOL/L — HIGH (ref 0.5–2)
LEGIONELLA AG UR QL: NEGATIVE — SIGNIFICANT CHANGE UP
MCHC RBC-ENTMCNC: 29 PG — SIGNIFICANT CHANGE UP (ref 27–34)
MCHC RBC-ENTMCNC: 32.2 GM/DL — SIGNIFICANT CHANGE UP (ref 32–36)
MCV RBC AUTO: 90.2 FL — SIGNIFICANT CHANGE UP (ref 80–100)
MRSA PCR RESULT.: SIGNIFICANT CHANGE UP
NRBC # BLD: 0 /100 WBCS — SIGNIFICANT CHANGE UP (ref 0–0)
PCO2 BLDV: 52 MMHG — SIGNIFICANT CHANGE UP (ref 42–55)
PH BLDV: 7.34 — SIGNIFICANT CHANGE UP (ref 7.32–7.43)
PLATELET # BLD AUTO: 256 K/UL — SIGNIFICANT CHANGE UP (ref 150–400)
PO2 BLDV: 36 MMHG — SIGNIFICANT CHANGE UP (ref 25–45)
POTASSIUM BLDV-SCNC: 4.1 MMOL/L — SIGNIFICANT CHANGE UP (ref 3.5–5.1)
POTASSIUM SERPL-MCNC: 4.3 MMOL/L — SIGNIFICANT CHANGE UP (ref 3.5–5.3)
POTASSIUM SERPL-SCNC: 4.3 MMOL/L — SIGNIFICANT CHANGE UP (ref 3.5–5.3)
POTASSIUM UR-SCNC: 44 MMOL/L — SIGNIFICANT CHANGE UP
RBC # BLD: 4.69 M/UL — SIGNIFICANT CHANGE UP (ref 4.2–5.8)
RBC # FLD: 13.3 % — SIGNIFICANT CHANGE UP (ref 10.3–14.5)
S AUREUS DNA NOSE QL NAA+PROBE: SIGNIFICANT CHANGE UP
SAO2 % BLDV: 65.9 % — LOW (ref 67–88)
SODIUM SERPL-SCNC: 142 MMOL/L — SIGNIFICANT CHANGE UP (ref 135–145)
SODIUM UR-SCNC: 16 MMOL/L — SIGNIFICANT CHANGE UP
SPECIMEN SOURCE: SIGNIFICANT CHANGE UP
WBC # BLD: 15.05 K/UL — HIGH (ref 3.8–10.5)
WBC # FLD AUTO: 15.05 K/UL — HIGH (ref 3.8–10.5)

## 2023-09-21 PROCEDURE — 76770 US EXAM ABDO BACK WALL COMP: CPT | Mod: 26

## 2023-09-21 RX ORDER — CEFTRIAXONE 500 MG/1
INJECTION, POWDER, FOR SOLUTION INTRAMUSCULAR; INTRAVENOUS
Refills: 0 | Status: COMPLETED | OUTPATIENT
Start: 2023-09-21 | End: 2023-09-27

## 2023-09-21 RX ORDER — AZITHROMYCIN 500 MG/1
TABLET, FILM COATED ORAL
Refills: 0 | Status: DISCONTINUED | OUTPATIENT
Start: 2023-09-21 | End: 2023-09-23

## 2023-09-21 RX ORDER — AZITHROMYCIN 500 MG/1
500 TABLET, FILM COATED ORAL EVERY 24 HOURS
Refills: 0 | Status: DISCONTINUED | OUTPATIENT
Start: 2023-09-22 | End: 2023-09-23

## 2023-09-21 RX ORDER — ACETAMINOPHEN 500 MG
1000 TABLET ORAL ONCE
Refills: 0 | Status: COMPLETED | OUTPATIENT
Start: 2023-09-21 | End: 2023-09-21

## 2023-09-21 RX ORDER — CEFTRIAXONE 500 MG/1
1000 INJECTION, POWDER, FOR SOLUTION INTRAMUSCULAR; INTRAVENOUS EVERY 24 HOURS
Refills: 0 | Status: COMPLETED | OUTPATIENT
Start: 2023-09-22 | End: 2023-09-26

## 2023-09-21 RX ORDER — INSULIN GLARGINE 100 [IU]/ML
18 INJECTION, SOLUTION SUBCUTANEOUS AT BEDTIME
Refills: 0 | Status: DISCONTINUED | OUTPATIENT
Start: 2023-09-21 | End: 2023-09-22

## 2023-09-21 RX ORDER — CEFTRIAXONE 500 MG/1
1000 INJECTION, POWDER, FOR SOLUTION INTRAMUSCULAR; INTRAVENOUS ONCE
Refills: 0 | Status: COMPLETED | OUTPATIENT
Start: 2023-09-21 | End: 2023-09-21

## 2023-09-21 RX ORDER — AZITHROMYCIN 500 MG/1
500 TABLET, FILM COATED ORAL ONCE
Refills: 0 | Status: COMPLETED | OUTPATIENT
Start: 2023-09-21 | End: 2023-09-21

## 2023-09-21 RX ORDER — FUROSEMIDE 40 MG
40 TABLET ORAL ONCE
Refills: 0 | Status: COMPLETED | OUTPATIENT
Start: 2023-09-21 | End: 2023-09-21

## 2023-09-21 RX ORDER — INSULIN LISPRO 100/ML
6 VIAL (ML) SUBCUTANEOUS
Refills: 0 | Status: DISCONTINUED | OUTPATIENT
Start: 2023-09-21 | End: 2023-09-22

## 2023-09-21 RX ORDER — IPRATROPIUM/ALBUTEROL SULFATE 18-103MCG
3 AEROSOL WITH ADAPTER (GRAM) INHALATION EVERY 6 HOURS
Refills: 0 | Status: DISCONTINUED | OUTPATIENT
Start: 2023-09-21 | End: 2023-09-27

## 2023-09-21 RX ORDER — ACETYLCYSTEINE 200 MG/ML
3 VIAL (ML) MISCELLANEOUS EVERY 6 HOURS
Refills: 0 | Status: COMPLETED | OUTPATIENT
Start: 2023-09-21 | End: 2023-09-24

## 2023-09-21 RX ADMIN — Medication 2: at 08:06

## 2023-09-21 RX ADMIN — INSULIN GLARGINE 18 UNIT(S): 100 INJECTION, SOLUTION SUBCUTANEOUS at 21:56

## 2023-09-21 RX ADMIN — HEPARIN SODIUM 5000 UNIT(S): 5000 INJECTION INTRAVENOUS; SUBCUTANEOUS at 16:10

## 2023-09-21 RX ADMIN — Medication 100 MILLIGRAM(S): at 06:13

## 2023-09-21 RX ADMIN — Medication 1: at 12:26

## 2023-09-21 RX ADMIN — CEFTRIAXONE 1000 MILLIGRAM(S): 500 INJECTION, POWDER, FOR SOLUTION INTRAMUSCULAR; INTRAVENOUS at 16:12

## 2023-09-21 RX ADMIN — HEPARIN SODIUM 5000 UNIT(S): 5000 INJECTION INTRAVENOUS; SUBCUTANEOUS at 21:56

## 2023-09-21 RX ADMIN — Medication 400 MILLIGRAM(S): at 13:57

## 2023-09-21 RX ADMIN — Medication 40 MILLIGRAM(S): at 10:30

## 2023-09-21 RX ADMIN — HEPARIN SODIUM 5000 UNIT(S): 5000 INJECTION INTRAVENOUS; SUBCUTANEOUS at 06:13

## 2023-09-21 RX ADMIN — Medication 3 MILLILITER(S): at 12:26

## 2023-09-21 RX ADMIN — SODIUM CHLORIDE 60 MILLILITER(S): 9 INJECTION, SOLUTION INTRAVENOUS at 07:57

## 2023-09-21 RX ADMIN — PANTOPRAZOLE SODIUM 40 MILLIGRAM(S): 20 TABLET, DELAYED RELEASE ORAL at 12:26

## 2023-09-21 RX ADMIN — Medication 100 MILLIGRAM(S): at 06:22

## 2023-09-21 RX ADMIN — AZITHROMYCIN 500 MILLIGRAM(S): 500 TABLET, FILM COATED ORAL at 18:33

## 2023-09-21 RX ADMIN — Medication 3 MILLILITER(S): at 17:45

## 2023-09-21 RX ADMIN — CEFEPIME 100 MILLIGRAM(S): 1 INJECTION, POWDER, FOR SOLUTION INTRAMUSCULAR; INTRAVENOUS at 06:12

## 2023-09-21 RX ADMIN — Medication 3 MILLILITER(S): at 12:56

## 2023-09-21 RX ADMIN — Medication 3 MILLILITER(S): at 17:46

## 2023-09-21 RX ADMIN — AMLODIPINE BESYLATE 5 MILLIGRAM(S): 2.5 TABLET ORAL at 06:13

## 2023-09-21 NOTE — SWALLOW BEDSIDE ASSESSMENT ADULT - CONSISTENCIES ADMINISTERED
PO trials deferred given recent episode of desaturation earlier today (during high flow trial which required bipap support) and Pt's risk for aspiration.

## 2023-09-21 NOTE — CONSULT NOTE ADULT - SUBJECTIVE AND OBJECTIVE BOX
HPI:  72-year-old male PMH of HTN, HLD, T2DM, BPH, age, prior CVA in 2017 and 2021 uncertain of residual deficits, HFpEF reportedly on 20mg lasix QD (TTE from 2022 EF 59%) presents to the ED complaining of generalized weakness today, feeling generally unwell for several days is unable to quantify how many days or specific symptomatology.  Patient reports feeling some shortness of breath today was found to be hypoxic by EMS (78% on RA), and requiring 6L O2 via NC to maintain SpO2 of 93% in ED. patient also reports blood glucose at home by EMS was elevated but uncertain of the number, reports he has not taken any of his antidiabetic medication for at least a day due to feeling unwell, has not taken his daily Lasix.  Patient notes possibly some URI symptoms over the past few days.  Patient denies urinary complaints, abdominal pain, vomiting, diarrhea, melena, hematochezia. (20 Sep 2023 21:12)      Patient has history of diabetes, A1C  9.1 % on home oral DM meds and senait Rhodes was consulted for glycemic control.      PAST MEDICAL & SURGICAL HISTORY:  Diabetes Mellitus Type II      HTN (Hypertension)      Hypercholesterolemia      BPH with elevated PSA      Stroke  2017, 10/2021:  with right diplobia resolving in a week      S/P colonoscopy          FAMILY HISTORY:  No pertinent family history in first degree relatives        Social History:  no IVDA  no ETOH abuse   lives with family (20 Sep 2023 21:12)            HOME MEDICATIONS:  Home Medications:  amLODIPine 5 mg oral tablet: 1 tab(s) orally once a day (20 Sep 2023 21:52)  atorvastatin 80 mg oral tablet: 1 tab(s) orally once a day (20 Sep 2023 21:52)  cholecalciferol 25 mcg (1000 intl units) oral tablet: 2 tab(s) orally once a day (20 Sep 2023 21:51)  clopidogrel 75 mg oral tablet: 1 tab(s) orally once a day (20 Sep 2023 21:52)  fosinopril 40 mg oral tablet: 1 tab(s) orally once a day (20 Sep 2023 21:52)  furosemide 20 mg oral tablet: 1 tab(s) orally once a day (20 Sep 2023 21:52)  glipiZIDE 10 mg oral tablet, extended release: 1 tab(s) orally once a day (20 Sep 2023 21:52)  Jardiance 25 mg oral tablet: 1 tab(s) orally once a day (20 Sep 2023 21:52)  metFORMIN 1000 mg oral tablet: 1 tab(s) orally 2 times a day (20 Sep 2023 21:52)  metoprolol succinate 100 mg oral tablet, extended release: 2 tab(s) orally 2 times a day (20 Sep 2023 21:52)  Mounjaro 10 mg/0.5 mL subcutaneous solution: 10 milligram(s) subcutaneously once a week on mondays (20 Sep 2023 21:52)  omeprazole 20 mg oral delayed release capsule: 1 cap(s) orally once a day (20 Sep 2023 21:52)            MEDICATIONS  (STANDING):  acetylcysteine 20%  Inhalation 3 milliLiter(s) Inhalation every 6 hours  albuterol/ipratropium for Nebulization 3 milliLiter(s) Nebulizer every 6 hours  amLODIPine   Tablet 5 milliGRAM(s) Oral daily  atorvastatin 80 milliGRAM(s) Oral at bedtime  azithromycin  IVPB 500 milliGRAM(s) IV Intermittent once  azithromycin  IVPB      cefTRIAXone   IVPB      cefTRIAXone   IVPB 1000 milliGRAM(s) IV Intermittent once  clopidogrel Tablet 75 milliGRAM(s) Oral daily  dextrose 5%. 1000 milliLiter(s) (50 mL/Hr) IV Continuous <Continuous>  dextrose 5%. 1000 milliLiter(s) (100 mL/Hr) IV Continuous <Continuous>  dextrose 50% Injectable 12.5 Gram(s) IV Push once  dextrose 50% Injectable 25 Gram(s) IV Push once  dextrose 50% Injectable 25 Gram(s) IV Push once  glucagon  Injectable 1 milliGRAM(s) IntraMuscular once  heparin   Injectable 5000 Unit(s) SubCutaneous every 8 hours  insulin glargine Injectable (LANTUS) 18 Unit(s) SubCutaneous at bedtime  insulin lispro (ADMELOG) corrective regimen sliding scale   SubCutaneous three times a day before meals  insulin lispro Injectable (ADMELOG) 6 Unit(s) SubCutaneous three times a day before meals  metoprolol succinate  milliGRAM(s) Oral two times a day  pantoprazole  Injectable 40 milliGRAM(s) IV Push daily    MEDICATIONS  (PRN):  dextrose Oral Gel 15 Gram(s) Oral once PRN Blood Glucose LESS THAN 70 milliGRAM(s)/deciliter      Allergies    No Known Allergies    Intolerances        Review of Systems:  Neuro: No HA, no dizziness  Cardiovascular: No chest pain, no palpitations  Respiratory: no SOB, no cough  GI: No nausea, vomiting, abdominal pain  MSK: Denies joint/muscle pain      ALL OTHER SYSTEMS REVIEWED AND NEGATIVE        PHYSICAL EXAM:  VITALS: T(C): 38.6 (09-21-23 @ 13:40)  T(F): 101.5 (09-21-23 @ 13:40), Max: 101.5 (09-21-23 @ 13:40)  HR: 94 (09-21-23 @ 13:41) (82 - 104)  BP: 124/73 (09-21-23 @ 10:30) (120/74 - 128/77)  RR:  (22 - 33)  SpO2:  (93% - 102%)  Wt(kg): --  GENERAL: NAD, well-groomed, well-developed  NEURO:  alert and oriented  RESPIRATORY: Clear to auscultation bilaterally; No rales, rhonchi, wheezing  CARDIOVASCULAR: Si S2  GI: Soft, non distended, normal bowel sounds  MUSCULOSKELETAL: Moves all extremities equally       POCT Blood Glucose.: 172 mg/dL (09-21-23 @ 11:47)  POCT Blood Glucose.: 242 mg/dL (09-21-23 @ 08:04)  POCT Blood Glucose.: 255 mg/dL (09-20-23 @ 21:57)  POCT Blood Glucose.: 209 mg/dL (09-20-23 @ 14:48)                            13.6   15.05 )-----------( 256      ( 21 Sep 2023 07:02 )             42.3       09-21    142  |  103  |  47<H>  ----------------------------<  197<H>  4.3   |  24  |  1.98<H>    eGFR: 35<L>    Ca    9.7      09-21  Mg     1.7     09-20  Phos  3.9     09-20    TPro  6.9  /  Alb  3.4  /  TBili  0.8  /  DBili  x   /  AST  29  /  ALT  13  /  AlkPhos  67  09-20      Thyroid Function Tests:    Diet, Consistent Carbohydrate w/Evening Snack:   Minced and Moist (MINCEDMOIST)  Moderately Thick Liquids (MODTHICKLIQS)  Kosher (09-21-23 @ 12:36) [Active]          A1C with Estimated Average Glucose Result: 9.1 % (10-16-22 @ 06:53)                     HPI:  72-year-old male PMH of HTN, HLD, T2DM, BPH, age, prior CVA in 2017 and 2021 uncertain of residual deficits, HFpEF reportedly on 20mg lasix QD (TTE from 2022 EF 59%) presents to the ED complaining of generalized weakness today, feeling generally unwell for several days is unable to quantify how many days or specific symptomatology.  Patient reports feeling some shortness of  breath today was found to be hypoxic by EMS (78% on RA), and requiring 6L O2 via NC to maintain SpO2 of 93% in ED. patient also reports blood glucose at home by EMS was elevated but uncertain of the number, reports he has not taken any of his antidiabetic medication for at least a day due to feeling unwell, has not taken his daily Lasix.  Patient notes possibly some URI symptoms over the past few days.  Patient denies urinary complaints, abdominal pain, vomiting, diarrhea, melena, hematochezia. (20 Sep 2023 21:12)      Patient has history of diabetes, A1C  9.1 % on home oral DM meds and senait Rhodes was consulted for glycemic control.      PAST MEDICAL & SURGICAL HISTORY:  Diabetes Mellitus Type II      HTN (Hypertension)      Hypercholesterolemia      BPH with elevated PSA      Stroke  2017, 10/2021:  with right diplobia resolving in a week      S/P colonoscopy          FAMILY HISTORY:  No pertinent family history in first degree relatives        Social History:  no IVDA  no ETOH abuse   lives with family (20 Sep 2023 21:12)            HOME MEDICATIONS:  Home Medications:  amLODIPine 5 mg oral tablet: 1 tab(s) orally once a day (20 Sep 2023 21:52)  atorvastatin 80 mg oral tablet: 1 tab(s) orally once a day (20 Sep 2023 21:52)  cholecalciferol 25 mcg (1000 intl units) oral tablet: 2 tab(s) orally once a day (20 Sep 2023 21:51)  clopidogrel 75 mg oral tablet: 1 tab(s) orally once a day (20 Sep 2023 21:52)  fosinopril 40 mg oral tablet: 1 tab(s) orally once a day (20 Sep 2023 21:52)  furosemide 20 mg oral tablet: 1 tab(s) orally once a day (20 Sep 2023 21:52)  glipiZIDE 10 mg oral tablet, extended release: 1 tab(s) orally once a day (20 Sep 2023 21:52)  Jardiance 25 mg oral tablet: 1 tab(s) orally once a day (20 Sep 2023 21:52)  metFORMIN 1000 mg oral tablet: 1 tab(s) orally 2 times a day (20 Sep 2023 21:52)  metoprolol succinate 100 mg oral tablet, extended release: 2 tab(s) orally 2 times a day (20 Sep 2023 21:52)  Mounjaro 10 mg/0.5 mL subcutaneous solution: 10 milligram(s) subcutaneously once a week on mondays (20 Sep 2023 21:52)  omeprazole 20 mg oral delayed release capsule: 1 cap(s) orally once a day (20 Sep 2023 21:52)            MEDICATIONS  (STANDING):  acetylcysteine 20%  Inhalation 3 milliLiter(s) Inhalation every 6 hours  albuterol/ipratropium for Nebulization 3 milliLiter(s) Nebulizer every 6 hours  amLODIPine   Tablet 5 milliGRAM(s) Oral daily  atorvastatin 80 milliGRAM(s) Oral at bedtime  azithromycin  IVPB 500 milliGRAM(s) IV Intermittent once  azithromycin  IVPB      cefTRIAXone   IVPB      cefTRIAXone   IVPB 1000 milliGRAM(s) IV Intermittent once  clopidogrel Tablet 75 milliGRAM(s) Oral daily  dextrose 5%. 1000 milliLiter(s) (50 mL/Hr) IV Continuous <Continuous>  dextrose 5%. 1000 milliLiter(s) (100 mL/Hr) IV Continuous <Continuous>  dextrose 50% Injectable 12.5 Gram(s) IV Push once  dextrose 50% Injectable 25 Gram(s) IV Push once  dextrose 50% Injectable 25 Gram(s) IV Push once  glucagon  Injectable 1 milliGRAM(s) IntraMuscular once  heparin   Injectable 5000 Unit(s) SubCutaneous every 8 hours  insulin glargine Injectable (LANTUS) 18 Unit(s) SubCutaneous at bedtime  insulin lispro (ADMELOG) corrective regimen sliding scale   SubCutaneous three times a day before meals  insulin lispro Injectable (ADMELOG) 6 Unit(s) SubCutaneous three times a day before meals  metoprolol succinate  milliGRAM(s) Oral two times a day  pantoprazole  Injectable 40 milliGRAM(s) IV Push daily    MEDICATIONS  (PRN):  dextrose Oral Gel 15 Gram(s) Oral once PRN Blood Glucose LESS THAN 70 milliGRAM(s)/deciliter      Allergies    No Known Allergies    Intolerances        Review of Systems:  Neuro: No HA, no dizziness  Cardiovascular: No chest pain, no palpitations  Respiratory: no SOB, no cough  GI: No nausea, vomiting, abdominal pain  MSK: Denies joint/muscle pain      ALL OTHER SYSTEMS REVIEWED AND NEGATIVE        PHYSICAL EXAM:  VITALS: T(C): 38.6 (09-21-23 @ 13:40)  T(F): 101.5 (09-21-23 @ 13:40), Max: 101.5 (09-21-23 @ 13:40)  HR: 94 (09-21-23 @ 13:41) (82 - 104)  BP: 124/73 (09-21-23 @ 10:30) (120/74 - 128/77)  RR:  (22 - 33)  SpO2:  (93% - 102%)  Wt(kg): --  GENERAL: NAD, well-groomed, well-developed  NEURO:  alert and oriented  RESPIRATORY: Clear to auscultation bilaterally; No rales, rhonchi, wheezing  CARDIOVASCULAR: Si S2  GI: Soft, non distended, normal bowel sounds  MUSCULOSKELETAL: Moves all extremities equally       POCT Blood Glucose.: 172 mg/dL (09-21-23 @ 11:47)  POCT Blood Glucose.: 242 mg/dL (09-21-23 @ 08:04)  POCT Blood Glucose.: 255 mg/dL (09-20-23 @ 21:57)  POCT Blood Glucose.: 209 mg/dL (09-20-23 @ 14:48)                            13.6   15.05 )-----------( 256      ( 21 Sep 2023 07:02 )             42.3       09-21    142  |  103  |  47<H>  ----------------------------<  197<H>  4.3   |  24  |  1.98<H>    eGFR: 35<L>    Ca    9.7      09-21  Mg     1.7     09-20  Phos  3.9     09-20    TPro  6.9  /  Alb  3.4  /  TBili  0.8  /  DBili  x   /  AST  29  /  ALT  13  /  AlkPhos  67  09-20      Thyroid Function Tests:    Diet, Consistent Carbohydrate w/Evening Snack:   Minced and Moist (MINCEDMOIST)  Moderately Thick Liquids (MODTHICKLIQS)  Kosher (09-21-23 @ 12:36) [Active]          A1C with Estimated Average Glucose Result: 9.1 % (10-16-22 @ 06:53)

## 2023-09-21 NOTE — CONSULT NOTE ADULT - ASSESSMENT
Patient is a 72 year old male with PMH of HTN, HLD, T2DM, BPH, age, prior CVA in 2017 and 2021 uncertain of residual deficits, HFpEF reportedly on 20mg lasix QD (TTE from 2022 EF 59%) presents to the ED complaining of generalized weakness today, feeling generally unwell for several days is unable to quantify how many days or specific symptomatology.  Patient reports feeling some shortness of breath today was found to be hypoxic by EMS (78% on RA), and requiring 6L O2 via NC to maintain SpO2 of 93% in ED.    AHRF due to multifocal pneumonia   Leukocytosis likely due to above   - started on BIPAP--now plan to wean to NC this am  - RVP/COVID negative   - CXR reported LLL atelectasis vs consolidation   - CTA chest with no PE; noted with consolidations and nodular opacities in b/l LLs, RML and lingula   - per wife has not noted any s/s concerning for aspiration   - s/p cefepime, vancomycin, doxycycline in ER    Recommendations  Follow blood cultures, in process x2   Send sputum culture if able to expectorate  Follow MRSA PCR screen, in process   Follow legionella urine ag, in process  Send strep pneumoniae urine ag-ordered   Continue on cefepime and doxycycline for now   Pulmonary following, on nebs, chest PT, mucomyst  SLP evaluation, aspiration precautions   Monitor temps/WBC      Vinicio Steve M.D.  [facility], Division of Infectious Diseases  [phone]  After 5pm on weekdays and all day on weekends - please call [phone] Patient is a 72 year old male with PMH of HTN, HLD, T2DM, BPH, age, prior CVA in 2017 and 2021 uncertain of residual deficits, HFpEF reportedly on 20mg lasix QD (TTE from 2022 EF 59%) presents to the ED complaining of generalized weakness today, feeling generally unwell for several days is unable to quantify how many days or specific symptomatology.  Patient reports feeling some shortness of breath today was found to be hypoxic by EMS (78% on RA), and requiring 6L O2 via NC to maintain SpO2 of 93% in ED.    AHRF due to multifocal pneumonia   Leukocytosis likely due to above   - started on BIPAP--now plan to wean to NC this am  - RVP/COVID negative   - CXR reported LLL atelectasis vs consolidation   - CTA chest with no PE; noted with consolidations and nodular opacities in b/l LLs, RML and lingula   - per wife has not noted any s/s concerning for aspiration   - s/p cefepime, vancomycin, doxycycline in ER  - prior cultures noted, no significant growth    Recommendations  Follow blood cultures, in process x2   Send sputum culture if able to expectorate  Follow MRSA PCR screen, in process   Follow legionella urine ag, in process  Send strep pneumoniae urine ag-ordered   Continue on cefepime and doxycycline for now   Pulmonary following, on nebs, chest PT, mucomyst  Nephrology following for BEAR  SLP evaluation, aspiration precautions   Monitor temps/WBC, Praveen Steve M.D.  [facility], Division of Infectious Diseases  [phone]  After 5pm on weekdays and all day on weekends - please call [phone] Patient is a 72 year old male with PMH of HTN, HLD, T2DM, BPH, age, prior CVA in 2017 and 2021 uncertain of residual deficits, HFpEF reportedly on 20mg lasix QD (TTE from 2022 EF 59%) presents to the ED complaining of generalized weakness today, feeling generally unwell for several days is unable to quantify how many days or specific symptomatology.  Patient reports feeling some shortness of breath today was found to be hypoxic by EMS (78% on RA), and requiring 6L O2 via NC to maintain SpO2 of 93% in ED.    AHRF due to multifocal pneumonia   Leukocytosis likely due to above   - started on BIPAP--now plan to wean to NC this am  - RVP/COVID negative   - CXR reported LLL atelectasis vs consolidation   - CTA chest with no PE; noted with consolidations and nodular opacities in b/l LLs, RML and lingula   - per wife has not noted any s/s concerning for aspiration   - s/p cefepime, vancomycin, doxycycline in ER  - prior cultures noted, no significant growth    Recommendations  Follow blood cultures, in process x2   Send sputum culture if able to expectorate  Follow MRSA PCR screen, in process   Follow legionella urine ag, in process  Send strep pneumoniae urine ag-ordered   Continue on cefepime and doxycycline for now   Pulmonary following, on nebs, chest PT, mucomyst  Nephrology following for BEAR  SLP evaluation, aspiration precautions   Monitor temps/WBC, Cr   Continue rest of care per primary team    D/w NP Kierra; pts spouse in the ER  Vinicio Steve M.D.  [facility], Division of Infectious Diseases  [phone]  After 5pm on weekdays and all day on weekends - please call [phone]

## 2023-09-21 NOTE — CONSULT NOTE ADULT - ASSESSMENT
73 y/o M with PMH of HTN, HLD, T2DM, BPH, CVA in 2017 and 2021 uncertain of residual deficits, HFpEF reportedly on 20mg lasix QD (TTE from 2022 EF 59%). Presents with c/o generalized weakness, feeling generally unwell for several days is unable to quantify how many days or specific symptomatology. Endorses some SOB, cough. Found to be hypoxic by EMS (78% on RA). CTA chest neg PE, + multifocal PNA.

## 2023-09-21 NOTE — CONSULT NOTE ADULT - NS ATTEND AMEND GEN_ALL_CORE FT
pt with multifocal basal pna ?aspiration  change to ceftriaxone/zithromax  start hiflo nc 80% 60lpm and tirtate to keep sat>90%  s and s consult  dysphagia diet  bipap qhs and prn wob  dw pt and son
Chart, labs, vitals, radiology reviewed. Above H&P reviewed and edited where appropriate. Agree with history and physical exam. Agree with assessment and plan. I reviewed the overnight course of events and discussed the care with the patient/ family.  All the decisions in assessment and plan are solely made by me..

## 2023-09-21 NOTE — SWALLOW BEDSIDE ASSESSMENT ADULT - SLP GENERAL OBSERVATIONS
Pt awake, alert and oriented 2-3; son at b/s. +high flow; wet congested cough at baseline. Pt followed commands and answered simple questions; Pt endorses sensation of globus with solids pta but denies s/s of aspiration.

## 2023-09-21 NOTE — SWALLOW BEDSIDE ASSESSMENT ADULT - SLP PERTINENT HISTORY OF CURRENT PROBLEM
72 year old male with PMH of HTN, HLD, T2DM, BPH, age, prior CVA in 2017 and 2021 uncertain of residual deficits, HFpEF reportedly on 20mg lasix QD (TTE from 2022 EF 59%) presented to the ED complaining of generalized weakness today, feeling generally unwell for several days is unable to quantify how many days or specific symptomatology.  Patient reported feeling some shortness of breath on day of admit; hypoxic by EMS (78% on RA), and requiring 6L O2 via NC to maintain SpO2 of 93% in ED. Patient also reported blood glucose at home by EMS; reportedly Pt did not take any of his antidiabetic medication for at least a days pta due to feeling unwell and had not taken his daily Lasix.  Patient noted possibly some URI symptoms over the past few days. Pt admitted with multi focal Pneumonia and Acute respiratory failure with hypoxia and hypercapnia. Pt is followed by Pulmonary. 72 year old male with PMH of HTN, HLD, T2DM, BPH, age, prior CVA in 2017 and 2021 uncertain of residual deficits, HFpEF reportedly on 20mg lasix QD (TTE from 2022 EF 59%) presented to the ED complaining of generalized weakness today, feeling generally unwell for several days is unable to quantify how many days or specific symptomatology.  Patient reported feeling some shortness of breath on day of admit; hypoxic by EMS (78% on RA), and requiring 6L O2 via NC to maintain SpO2 of 93% in ED. Patient also reported blood glucose at home by EMS; reportedly Pt did not take any of his antidiabetic medication for at least a days pta due to feeling unwell and had not taken his daily Lasix.  Patient noted possibly some URI symptoms over the past few days. Pt admitted with multi focal Pneumonia and Acute respiratory failure with hypoxia and hypercapnia. Pt is followed by Pulmonary. Pt initially placed on Bipap in ED.

## 2023-09-21 NOTE — SWALLOW BEDSIDE ASSESSMENT ADULT - COMMENTS
Per Nephrology: CKD - stage 3, with nephrotic-range proteinuria - likely due to diabetic nephropathy; BEAR - mild - likely prerenally mediated  CT Chest: Impressions :No pulmonary embolus. Consolidations and nodular opacities within bilateral lower lobes, right middle lobe and lingula representing multifocal pneumonia. Recommend follow-up chest CT in 1-3 months to determine resolution. Recd: follow-up chest CT in 1-3 months to determine resolution. 9/21/23 Pt placed back on bipap, pt was desating to 88-90 on 6LNC; Pt then switched back to Nasal canula in the afternoon.    Per Nephrology: CKD - stage 3, with nephrotic-range proteinuria - likely due to diabetic nephropathy; BEAR - mild - likely prerenally mediated.     9/20/23 CT Chest: Impressions :No pulmonary embolus. Consolidations and nodular opacities within bilateral lower lobes, right middle lobe and lingula representing multifocal pneumonia. Recommend follow-up chest CT in 1-3 months to determine resolution. Recd: follow-up chest CT in 1-3 months to determine resolution.

## 2023-09-21 NOTE — PROGRESS NOTE ADULT - ASSESSMENT
72F Hx HTN, T2DM, CVAs x 2 (2017 and 2021), HFrEF 59%, BPH p/w ED Generalized Weakness, SOB and WOB started on AFMO2 escalated to BiPAP Support with CXR c/w Lt Retrocardiac Infiltrates R/O Aspiration.     - Acute Hypoxic Hypercarbic Respiratory Failure on BiPAP Support  2/2 Multifocal PNA 2/2 Aspiration, presumed  - seen by MICU on admission, not a candidate  - on 9/21 ptn is more awake, states he is hungry today, on BIPAP at present since shortly after removing BIPAP this am and after a meal became hypoxic again. Im concerned he is aspirating.   - need a speech eval and R/O CVA. seen by ID, Pulm, Renal, awaiting Neuro. ABx changed to CTX/Azithro. ptn spiked a temp, blood Cx re drawn. VSS otherwise. no events on tele  - DUonebs, Mucomyst, Chest PT, sputum Cx  - awaiting Legionella and S. Pneumoniae U Ag, awaiting MRSA/MSSA PCR  - nell, off IVF, give IV Lasix. no ARBS /ACE-I for now  - check TTE, check MR brain  - DM , on Insulin on a SS  - dvt ppx w HSC  - GI ppx w PPI

## 2023-09-21 NOTE — SWALLOW BEDSIDE ASSESSMENT ADULT - SWALLOW EVAL: DIAGNOSIS
Pt is a 73 y/o male admitted with multi focal pneumonia with episodes of hypoxia therefore PO trials deferred given Pt's risk for aspiration. Pharyngeal stage deficits not r/o at this time. Oral exam was unremarkable. Pt currently off Bipap and is receiving supplemental 02 via high flow NC.

## 2023-09-21 NOTE — PROGRESS NOTE ADULT - SUBJECTIVE AND OBJECTIVE BOX
Patient is a 72y old  Male who presents with a chief complaint of sepsis (21 Sep 2023 10:02)      SUBJECTIVE / OVERNIGHT EVENTS: ptn is more awake, states he is hungry today, on BIPAP at present since shortly after removing BIPAP this am and after a meal became hypoxic again. Im concerned he is aspirating. need a speech eval and R/O CVA. seen by ID, Pulm, Renal, awaiting Neuro. ABx changed to CTX/Azithro. ptn spiked a temp, blood Cx re drawn. VSS otherwise. no events on tele    MEDICATIONS  (STANDING):  acetylcysteine 20%  Inhalation 3 milliLiter(s) Inhalation every 6 hours  albuterol/ipratropium for Nebulization 3 milliLiter(s) Nebulizer every 6 hours  amLODIPine   Tablet 5 milliGRAM(s) Oral daily  atorvastatin 80 milliGRAM(s) Oral at bedtime  azithromycin  IVPB 500 milliGRAM(s) IV Intermittent once  azithromycin  IVPB      cefTRIAXone   IVPB 1000 milliGRAM(s) IV Intermittent once  cefTRIAXone   IVPB      clopidogrel Tablet 75 milliGRAM(s) Oral daily  dextrose 5%. 1000 milliLiter(s) (100 mL/Hr) IV Continuous <Continuous>  dextrose 5%. 1000 milliLiter(s) (50 mL/Hr) IV Continuous <Continuous>  dextrose 50% Injectable 12.5 Gram(s) IV Push once  dextrose 50% Injectable 25 Gram(s) IV Push once  dextrose 50% Injectable 25 Gram(s) IV Push once  glucagon  Injectable 1 milliGRAM(s) IntraMuscular once  heparin   Injectable 5000 Unit(s) SubCutaneous every 8 hours  insulin glargine Injectable (LANTUS) 18 Unit(s) SubCutaneous at bedtime  insulin lispro (ADMELOG) corrective regimen sliding scale   SubCutaneous three times a day before meals  insulin lispro Injectable (ADMELOG) 6 Unit(s) SubCutaneous three times a day before meals  metoprolol succinate  milliGRAM(s) Oral two times a day  pantoprazole  Injectable 40 milliGRAM(s) IV Push daily    MEDICATIONS  (PRN):  dextrose Oral Gel 15 Gram(s) Oral once PRN Blood Glucose LESS THAN 70 milliGRAM(s)/deciliter      Vital Signs Last 24 Hrs  T(F): 101.5 (09-21-23 @ 13:40), Max: 101.5 (09-21-23 @ 13:40)  HR: 94 (09-21-23 @ 13:41) (82 - 104)  BP: 124/73 (09-21-23 @ 10:30) (111/62 - 128/77)  RR: 25 (09-21-23 @ 13:41) (22 - 33)  SpO2: 98% (09-21-23 @ 13:41) (93% - 102%)  Telemetry:   CAPILLARY BLOOD GLUCOSE      POCT Blood Glucose.: 172 mg/dL (21 Sep 2023 11:47)  POCT Blood Glucose.: 242 mg/dL (21 Sep 2023 08:04)  POCT Blood Glucose.: 255 mg/dL (20 Sep 2023 21:57)  POCT Blood Glucose.: 209 mg/dL (20 Sep 2023 14:48)    I&O's Summary      PHYSICAL EXAM:  GENERAL: NAD, well-developed  HEAD:  Atraumatic, Normocephalic  EYES: EOMI, PERRLA, conjunctiva and sclera clear  NECK: Supple, No JVD  CHEST/LUNG: Clear to auscultation bilaterally; No wheeze  HEART: Regular rate and rhythm; No murmurs, rubs, or gallops  ABDOMEN: Soft, Nontender, Nondistended; Bowel sounds present  EXTREMITIES:  2+ Peripheral Pulses, No clubbing, cyanosis, or edema  PSYCH: AAOx3  NEUROLOGY: non-focal  SKIN: No rashes or lesions    LABS:                        13.6   15.05 )-----------( 256      ( 21 Sep 2023 07:02 )             42.3     09-21    142  |  103  |  47<H>  ----------------------------<  197<H>  4.3   |  24  |  1.98<H>    Ca    9.7      21 Sep 2023 07:02  Phos  3.9     09-20  Mg     1.7     09-20    TPro  6.9  /  Alb  3.4  /  TBili  0.8  /  DBili  x   /  AST  29  /  ALT  13  /  AlkPhos  67  09-20    PT/INR - ( 20 Sep 2023 13:22 )   PT: 12.3 sec;   INR: 1.18 ratio         PTT - ( 20 Sep 2023 13:22 )  PTT:37.8 sec      Urinalysis Basic - ( 21 Sep 2023 07:02 )    Color: x / Appearance: x / SG: x / pH: x  Gluc: 197 mg/dL / Ketone: x  / Bili: x / Urobili: x   Blood: x / Protein: x / Nitrite: x   Leuk Esterase: x / RBC: x / WBC x   Sq Epi: x / Non Sq Epi: x / Bacteria: x        RADIOLOGY & ADDITIONAL TESTS:    Imaging Personally Reviewed:    Consultant(s) Notes Reviewed:      Care Discussed with Consultants/Other Providers:

## 2023-09-21 NOTE — CONSULT NOTE ADULT - SUBJECTIVE AND OBJECTIVE BOX
HPI: Mr. Rubi is a 72 year-old man with history of multiple medical issues including hypertension, type 2 diabetes mellitus, BPH, cerebrovascular disease, and congestive heart failure with preserved EF. He presented yesterday to the [facility] ER with generalized weakness and malaise for several days, as well as shortness of breath for 1 day. He was noted by EMS to be hypoxic with SpO2 78% on room air. CTPA was performed in the ER; it was negative for PE, and instead suggested multifocal pneumonia.    Mr. Rubi was placed on IV Cefepime and Doxycycline on admission for presumed pneumonia. He received 750cc of isotonic IVF in boluses in the ER and was placed on 1/2NS @60cc/h. He was noted on admission to have azotemia with a creatinine of 1.9mg/dL; in light of this, a renal consultation was requested on admission.    I see that Mr. Rubi's medications from home include Fosinopril 40mg po qd, Lasix 20mg po qd, Jardiance, and Metformin.    PAST MEDICAL & SURGICAL HISTORY:  HTN  HLD  DM2  BPH  CVA - 2017, 2021  HFpEF    Allergies  No Known Allergies    SOCIAL HISTORY:  Denies ETOh,Smoking,     FAMILY HISTORY:  No pertinent family history in first degree relatives    REVIEW OF SYSTEMS:  CONSTITUTIONAL: (+)generalized weakness, (+)malaise  EYES/ENT: No visual changes;  No vertigo or throat pain   NECK: No pain or stiffness  RESPIRATORY: (+)SOB  CARDIOVASCULAR: No chest pain or palpitations  GASTROINTESTINAL: No abdominal or epigastric pain. No nausea, vomiting, or hematemesis; No diarrhea or constipation. No melena or hematochezia.  GENITOURINARY: No dysuria, frequency or hematuria  NEUROLOGICAL: No numbness or tingling  SKIN: No itching, burning, rashes, or lesions   All other review of systems is negative unless indicated above.    VITAL:  T(C): , Max: 37.4 (09-20-23 @ 13:55)  T(F): , Max: 99.3 (09-20-23 @ 13:55)  HR: 97 (09-21-23 @ 06:07)  BP: 120/74 (09-21-23 @ 05:33)  BP(mean): 89 (09-21-23 @ 04:32)  RR: 22 (09-21-23 @ 05:33)  SpO2: 102% (09-21-23 @ 06:07)    PHYSICAL EXAM:  Constitutional: NAD, Alert  HEENT: NCAT, MMM  Neck: Supple, No JVD  Respiratory: CTA-b/l  Cardiovascular: RRR s1s2, no m/r/g  Gastrointestinal: BS+, soft, NT/ND  Extremities: No peripheral edema b/l  Neurological: no focal deficits; strength grossly intact  Back: no CVAT b/l  Skin: No rashes, no nevi    LABS:                        13.6   15.05 )-----------( 256      ( 21 Sep 2023 07:02 )             42.3     Na(142)/K(4.3)/Cl(103)/HCO3(24)/BUN(47)/Cr(1.98)Glu(197)/Ca(9.7)/Mg(--)/PO4(--)    09-21 @ 07:02  Na(142)/K(3.9)/Cl(102)/HCO3(22)/BUN(41)/Cr(1.82)Glu(221)/Ca(9.8)/Mg(1.7)/PO4(3.9)    09-20 @ 16:57  Na(142)/K(4.3)/Cl(101)/HCO3(21)/BUN(40)/Cr(1.90)Glu(230)/Ca(10.0)/Mg(--)/PO4(--)    09-20 @ 13:22    (8/30/23) - BUN/creatinine: 21/1.55  (10/17/22) - BUN/creatinine: 21/1.37    (3/29/23) - Uacr 2501mg/g  (10/15/22) - UA - 300prot, 138wbc, 37rbc      IMAGING:  < from: CT Angio Chest PE Protocol w/ IV Cont (09.20.23 @ 15:15) >  No pulmonary embolus.  Consolidations and nodular opacities within bilateral lower lobes, right   middle lobe and lingula representing multifocal pneumonia. Recommend   follow-up chest CT in 1-3 months to determine resolution.    < from: POCUS ED TTE 2D F/U, Limited w/o Cont. (09.20.23 @ 13:52) >  No global LV hypokinesis.  Left lower lung atelectasis vs consolidation.      ASSESSMENT:  (1)CKD - stage 3, with nephrotic-range proteinuria - likely due to diabetic nephropathy  (2)BEAR - mild - likely prerenally mediated  (3)CV - acceptable volume status at present  (4)ID - multifocal pneumonia - on broad-spectrum abx    RECOMMEND:  (1)Can d/c IVF; no diuretics for now  (2)No ACEI/ARB for now  (3)Urine: lytes, creatinine, protein  (4)Renal US  (5)Dose new meds for GFR 30-40ml/min (present dosing is acceptable)    Thank you for involving [facility] in this patient's care.    With warm regards,    Rohit Magallanes MD   [facility]  Office: [phone]  Cell: [phone]             HPI: Mr. Rubi is a 72 year-old man with history of multiple medical issues including hypertension, type 2 diabetes mellitus, BPH, cerebrovascular disease, and congestive heart failure with preserved EF. He presented yesterday to the [facility] ER with generalized weakness and malaise for several days, as well as shortness of breath for 1 day. He was noted by EMS to be hypoxic with SpO2 78% on room air. CTPA was performed in the ER; it was negative for PE, and instead suggested multifocal pneumonia.    Mr. Rubi was placed on IV Cefepime and Doxycycline on admission for presumed pneumonia. He received 750cc of isotonic IVF in boluses in the ER and was placed on 1/2NS @60cc/h. He was noted on admission to have azotemia with a creatinine of 1.9mg/dL; in light of this, a renal consultation was requested on admission.    I see that Mr. Rubi's medications from home include Fosinopril 40mg po qd, Lasix 20mg po qd, Jardiance, and Metformin. Wife at bedside and providing history on patient's behalf. She denies known prior history of CKD/BEAR. She shares that his BIPAP device was removed earlier this a.m.; he remains altered/not at baseline mentation.    PAST MEDICAL & SURGICAL HISTORY:  HTN  HLD  DM2  BPH  CVA - 2017, 2021  HFpEF    Allergies  No Known Allergies    SOCIAL HISTORY:  Denies ETOh,Smoking,     FAMILY HISTORY:  No pertinent family history in first degree relatives    REVIEW OF SYSTEMS:  CONSTITUTIONAL: (+)generalized weakness, (+)malaise  EYES/ENT: No visual changes;  No vertigo or throat pain   NECK: No pain or stiffness  RESPIRATORY: (+)SOB  CARDIOVASCULAR: No chest pain or palpitations  GASTROINTESTINAL: No abdominal or epigastric pain. No nausea, vomiting, or hematemesis; No diarrhea or constipation. No melena or hematochezia.  GENITOURINARY: No dysuria, frequency or hematuria  NEUROLOGICAL: (+)confusion  SKIN: No itching, burning, rashes, or lesions   All other review of systems is negative unless indicated above.    VITAL:  T(C): , Max: 37.4 (09-20-23 @ 13:55)  T(F): , Max: 99.3 (09-20-23 @ 13:55)  HR: 97 (09-21-23 @ 06:07)  BP: 120/74 (09-21-23 @ 05:33)  BP(mean): 89 (09-21-23 @ 04:32)  RR: 22 (09-21-23 @ 05:33)  SpO2: 102% (09-21-23 @ 06:07)    PHYSICAL EXAM:  Constitutional: Lethargic/confused  HEENT: (+)DMM, (+)NCO2  Neck: Supple, No JVD  Respiratory: (+)b/l rhonchi  Cardiovascular: RRR s1s2, no m/r/g  Gastrointestinal: BS+, soft, NT/ND  Extremities: No peripheral edema b/l  Neurological: reduced generalized strength  Back: no CVAT b/l  Skin: No rashes, no nevi    LABS:                        13.6   15.05 )-----------( 256      ( 21 Sep 2023 07:02 )             42.3     Na(142)/K(4.3)/Cl(103)/HCO3(24)/BUN(47)/Cr(1.98)Glu(197)/Ca(9.7)/Mg(--)/PO4(--)    09-21 @ 07:02  Na(142)/K(3.9)/Cl(102)/HCO3(22)/BUN(41)/Cr(1.82)Glu(221)/Ca(9.8)/Mg(1.7)/PO4(3.9)    09-20 @ 16:57  Na(142)/K(4.3)/Cl(101)/HCO3(21)/BUN(40)/Cr(1.90)Glu(230)/Ca(10.0)/Mg(--)/PO4(--)    09-20 @ 13:22    (8/30/23) - BUN/creatinine: 21/1.55  (10/17/22) - BUN/creatinine: 21/1.37    (3/29/23) - Uacr 2501mg/g  (10/15/22) - UA - 300prot, 138wbc, 37rbc      IMAGING:  < from: CT Angio Chest PE Protocol w/ IV Cont (09.20.23 @ 15:15) >  No pulmonary embolus.  Consolidations and nodular opacities within bilateral lower lobes, right   middle lobe and lingula representing multifocal pneumonia. Recommend   follow-up chest CT in 1-3 months to determine resolution.    < from: POCUS ED TTE 2D F/U, Limited w/o Cont. (09.20.23 @ 13:52) >  No global LV hypokinesis.  Left lower lung atelectasis vs consolidation.      ASSESSMENT:  (1)CKD - stage 3, with nephrotic-range proteinuria - likely due to diabetic nephropathy  (2)BEAR - mild - likely prerenally mediated  (3)CV - acceptable volume status at present  (4)ID - multifocal pneumonia - on broad-spectrum abx    RECOMMEND:  (1)Can d/c IVF; no diuretics for now  (2)No ACEI/ARB for now  (3)Urine: lytes, creatinine, protein  (4)Renal US  (5)Dose new meds for GFR 30-40ml/min (present dosing is acceptable)    Thank you for involving [facility] in this patient's care.    With warm regards,    Rohit Magallanes MD   [facility]  Office: [phone]  Cell: [phone]             HPI: Mr. Rubi is a 72 year-old man with history of multiple medical issues including hypertension, type 2 diabetes mellitus, BPH, cerebrovascular disease, and congestive heart failure with preserved EF. He presented yesterday to the [facility] ER with generalized weakness and malaise for several days, as well as shortness of breath for 1 day. He was noted by EMS to be hypoxic with SpO2 78% on room air. CTPA was performed in the ER; it was negative for PE, and instead suggested multifocal pneumonia.    Mr. Rubi was placed on IV Cefepime and Doxycycline on admission for presumed pneumonia. He received 750cc of isotonic IVF in boluses in the ER and was placed on 1/2NS @60cc/h. He was noted on admission to have azotemia with a creatinine of 1.9mg/dL; in light of this, a renal consultation was requested on admission.    I see that Mr. Rubi's medications from home include Fosinopril 40mg po qd, Lasix 20mg po qd, Jardiance, and Metformin. Wife at bedside and providing history on patient's behalf. She denies known prior history of CKD/BEAR. She shares that his BIPAP device was removed earlier this a.m.; he remains altered/not at baseline mentation.    PAST MEDICAL & SURGICAL HISTORY:  HTN  HLD  DM2  BPH  CVA - 2017, 2021  HFpEF    Allergies  No Known Allergies    SOCIAL HISTORY:  Denies ETOh,Smoking,     FAMILY HISTORY:  No pertinent family history in first degree relatives    REVIEW OF SYSTEMS:  CONSTITUTIONAL: (+)generalized weakness, (+)malaise  EYES/ENT: No visual changes;  No vertigo or throat pain   NECK: No pain or stiffness  RESPIRATORY: (+)SOB  CARDIOVASCULAR: No chest pain or palpitations  GASTROINTESTINAL: No abdominal or epigastric pain. No nausea, vomiting, or hematemesis; No diarrhea or constipation. No melena or hematochezia.  GENITOURINARY: No dysuria, frequency or hematuria  NEUROLOGICAL: (+)confusion  SKIN: No itching, burning, rashes, or lesions   All other review of systems is negative unless indicated above.    VITAL:  T(C): , Max: 37.4 (09-20-23 @ 13:55)  T(F): , Max: 99.3 (09-20-23 @ 13:55)  HR: 97 (09-21-23 @ 06:07)  BP: 120/74 (09-21-23 @ 05:33)  BP(mean): 89 (09-21-23 @ 04:32)  RR: 22 (09-21-23 @ 05:33)  SpO2: 102% (09-21-23 @ 06:07)    PHYSICAL EXAM:  Constitutional: Lethargic/confused  HEENT: (+)DMM, (+)NCO2  Neck: Supple, No JVD  Respiratory: (+)b/l rhonchi  Cardiovascular: RRR s1s2, no m/r/g  Gastrointestinal: BS+, soft, NT/ND  Extremities: No peripheral edema b/l  Neurological: reduced generalized strength  Back: no CVAT b/l  Skin: No rashes, no nevi    LABS:                        13.6   15.05 )-----------( 256      ( 21 Sep 2023 07:02 )             42.3     Na(142)/K(4.3)/Cl(103)/HCO3(24)/BUN(47)/Cr(1.98)Glu(197)/Ca(9.7)/Mg(--)/PO4(--)    09-21 @ 07:02  Na(142)/K(3.9)/Cl(102)/HCO3(22)/BUN(41)/Cr(1.82)Glu(221)/Ca(9.8)/Mg(1.7)/PO4(3.9)    09-20 @ 16:57  Na(142)/K(4.3)/Cl(101)/HCO3(21)/BUN(40)/Cr(1.90)Glu(230)/Ca(10.0)/Mg(--)/PO4(--)    09-20 @ 13:22    (8/30/23) - BUN/creatinine: 21/1.55  (10/17/22) - BUN/creatinine: 21/1.37    (3/29/23) - Uacr 2501mg/g  (10/15/22) - UA - 300prot, 138wbc, 37rbc      IMAGING:  < from: CT Angio Chest PE Protocol w/ IV Cont (09.20.23 @ 15:15) >  No pulmonary embolus.  Consolidations and nodular opacities within bilateral lower lobes, right   middle lobe and lingula representing multifocal pneumonia. Recommend   follow-up chest CT in 1-3 months to determine resolution.    < from: POCUS ED TTE 2D F/U, Limited w/o Cont. (09.20.23 @ 13:52) >  No global LV hypokinesis.  Left lower lung atelectasis vs consolidation.      ASSESSMENT:  (1)CKD - stage 3, with nephrotic-range proteinuria - likely due to diabetic nephropathy  (2)BEAR - mild - likely prerenally mediated  (3)CV - acceptable volume status at present  (4)ID - multifocal pneumonia - on broad-spectrum abx    RECOMMEND:  (1)Can d/c IVF  (2)No objection to intermittent-dose IV Lasix  (3)No ACEI/ARB for now  (4)Urine: lytes, creatinine, protein  (5)Renal US  (6)Dose new meds for GFR 30-40ml/min (present dosing is acceptable)    Thank you for involving [facility] in this patient's care.    With warm regards,    Rohit Magallanes MD   [facility]  Office: [phone]  Cell: [phone]

## 2023-09-21 NOTE — CONSULT NOTE ADULT - SUBJECTIVE AND OBJECTIVE BOX
hospitals DIVISION OF INFECTIOUS DISEASES  CINTIA Razo, YANY [facility]  [phone]  ([phone] - weekdays after 5pm and weekends)    FRANKO SPANN  72y, Male  [id number]    HPI:  Patient is a 72 year old male with PMH of HTN, HLD, T2DM, BPH, age, prior CVA in 2017 and 2021 uncertain of residual deficits, HFpEF reportedly on 20mg lasix QD (TTE from 2022 EF 59%) presents to the ED complaining of generalized weakness today, feeling generally unwell for several days is unable to quantify how many days or specific symptomatology.  Patient reports feeling some shortness of breath today was found to be hypoxic by EMS (78% on RA), and requiring 6L O2 via NC to maintain SpO2 of 93% in ED. patient also reports blood glucose at home by EMS was elevated but uncertain of the number, reports he has not taken any of his antidiabetic medication for at least a day due to feeling unwell, has not taken his daily Lasix.  Patient notes possibly some URI symptoms over the past few days.  Patient denies urinary complaints, abdominal pain, vomiting, diarrhea, melena, hematochezia. Patient was evaluated by MICU 9/20. (20 Sep 2023 21:12) Patient seen and examined at bedside this morning in the ER, wife and NP Kierra present. Patients wife confirms above history, wants to have patient try NC. Patient currently denies dysuria, pain or fever.   ROS: 14 point review of systems completed, pertinent positives and negatives as per HPI.    Allergies: No Known Allergies    PMH -- Diabetes Mellitus Type II  HTN (Hypertension)  Hypercholesterolemia  BPH with elevated PSA  Stroke    PSH -- S/P colonoscopy  FH -- No pertinent family history in first degree relatives  Social History -- denies tobacco, alcohol or illicit drug use, , lives with wife     Physical Exam--  Vital Signs Last 24 Hrs  T(F): 98.2 (21 Sep 2023 05:33), Max: 99.3 (20 Sep 2023 13:55)  HR: 90 (21 Sep 2023 10:40) (82 - 104)  BP: 120/74 (21 Sep 2023 05:33) (110/70 - 128/77)  RR: 22 (21 Sep 2023 05:33) (18 - 27)  SpO2: 94% (21 Sep 2023 10:40) (89% - 102%)  General: no acute distress, BiPAP  HEENT: NC/AT, EOMI, anicteric, neck supple  Lungs: decreased breath sounds b/l  Heart: S1, S2 present, normal rate, no murmur heard   Abdomen: Soft. Nondistended. Nontender. BS present.   Neuro: awake, alert, answers, follows  Extremities: No cyanosis or clubbing. LE edema.   Skin: Warm. Dry. Good turgor. No visible rash.   Psychiatric: Appropriate affect and mood for situation.   Lines: PIV    Laboratory & Imaging Data--  CBC:                       13.6   15.05 )-----------( 256      ( 21 Sep 2023 07:02 )             42.3     WBC Count: 15.05 K/uL (09-21-23 @ 07:02)  WBC Count: 13.26 K/uL (09-20-23 @ 13:22)    CMP: 09-21    142  |  103  |  47<H>  ----------------------------<  197<H>  4.3   |  24  |  1.98<H>    Ca    9.7      21 Sep 2023 07:02  Phos  3.9     09-20  Mg     1.7     09-20    TPro  6.9  /  Alb  3.4  /  TBili  0.8  /  DBili  x   /  AST  29  /  ALT  13  /  AlkPhos  67  09-20    LIVER FUNCTIONS - ( 20 Sep 2023 13:22 )  Alb: 3.4 g/dL / Pro: 6.9 g/dL / ALK PHOS: 67 U/L / ALT: 13 U/L / AST: 29 U/L / GGT: x           Urinalysis (09.20.23 @ 15:16)    Glucose Qualitative, Urine: 1000 mg/dL   Blood, Urine: Small   pH Urine: 5.5   Color: Yellow   Urine Appearance: Slightly Turbid   Bilirubin: Negative   Ketone - Urine: Trace   Specific Gravity: 1.020   Protein, Urine: 300 mg/dL   Urobilinogen: Negative   Nitrite: Negative   Leukocyte Esterase Concentration: Negative    Microbiology: reviewed  Respiratory Viral Panel with COVID-19 by MICKEY (09.20.23 @ 13:20)    Rapid RVP Result: NotDete   SARS-CoV-2: [facility]: This Respiratory Panel uses polymerase chain reaction (PCR) to detect for  adenovirus; coronavirus (HKU1, NL63, 229E, OC43); human metapneumovirus  (hMPV); human enterovirus/rhinovirus (Entero/RV); influenza A; influenza  A/H1; influenza A/H3; influenza A/H1-2009; influenza B; parainfluenza  viruses 1, 2, 3, 4; respiratory syncytial virus; Mycoplasma pneumoniae;  Chlamydophila pneumoniae; and SARS-CoV-2.    Radiology--reviewed  < from: CT Angio Chest PE Protocol w/ IV Cont (09.20.23 @ 15:15) >  IMPRESSION:    No pulmonary embolus.    Consolidations and nodular opacities within bilateral lower lobes, right   middle lobe and lingula representing multifocal pneumonia. Recommend   follow-up chest CT in 1-3 months to determine resolution.    < end of copied text >    < from: Xray Chest 1 View- PORTABLE-Urgent (09.20.23 @ 14:52) >  IMPRESSION:    Patchy opacity at the left lung base, likely atelectasis or pneumonia and   small effusion. .    < end of copied text >    < from: POCUS ED TTE 2D F/U, Limited w/o Cont. (09.20.23 @ 13:52) >    IMPRESSION:  No global LV hypokinesis.  Left lower lung atelectasis vs consolidation.    < end of copied text >      Active Medications--  acetylcysteine 20%  Inhalation 3 milliLiter(s) Inhalation every 6 hours  albuterol/ipratropium for Nebulization 3 milliLiter(s) Nebulizer every 6 hours  amLODIPine   Tablet 5 milliGRAM(s) Oral daily  atorvastatin 80 milliGRAM(s) Oral at bedtime  cefepime   IVPB 1000 milliGRAM(s) IV Intermittent every 8 hours  clopidogrel Tablet 75 milliGRAM(s) Oral daily  dextrose 5%. 1000 milliLiter(s) IV Continuous <Continuous>  dextrose 5%. 1000 milliLiter(s) IV Continuous <Continuous>  dextrose 50% Injectable 25 Gram(s) IV Push once  dextrose 50% Injectable 25 Gram(s) IV Push once  dextrose 50% Injectable 12.5 Gram(s) IV Push once  dextrose Oral Gel 15 Gram(s) Oral once PRN  doxycycline IVPB 100 milliGRAM(s) IV Intermittent every 12 hours  glucagon  Injectable 1 milliGRAM(s) IntraMuscular once  heparin   Injectable 5000 Unit(s) SubCutaneous every 8 hours  insulin glargine Injectable (LANTUS) 18 Unit(s) SubCutaneous at bedtime  insulin lispro (ADMELOG) corrective regimen sliding scale   SubCutaneous three times a day before meals  insulin lispro Injectable (ADMELOG) 6 Unit(s) SubCutaneous three times a day before meals  metoprolol succinate  milliGRAM(s) Oral two times a day  pantoprazole  Injectable 40 milliGRAM(s) IV Push daily    Current Antimicrobials:   cefepime   IVPB 1000 milliGRAM(s) IV Intermittent every 8 hours  doxycycline IVPB 100 milliGRAM(s) IV Intermittent every 12 hours    Prior/Completed Antimicrobials:  cefepime   IVPB  doxycycline IVPB  vancomycin  IVPB.

## 2023-09-21 NOTE — CONSULT NOTE ADULT - SUBJECTIVE AND OBJECTIVE BOX
PULMONARY CONSULT    HPI: 73 y/o M with PMH of HTN, HLD, T2DM, BPH, CVA in 2017 and 2021 uncertain of residual deficits, HFpEF reportedly on 20mg lasix QD (TTE from 2022 EF 59%). Presents with c/o generalized weakness, feeling generally unwell for several days is unable to quantify how many days or specific symptomatology. Endorses some SOB, cough. Found to be hypoxic by EMS (78% on RA). CTA chest neg PE, + multifocal PNA. Initially requiring bipap in ED. Endorses difficult to expectorate sputum. Denies fevers, chills, CP, pleuritic CP.       PAST MEDICAL & SURGICAL HISTORY:  Diabetes Mellitus Type II  HTN (Hypertension)  Hypercholesterolemia  BPH with elevated PSA  Stroke 2017, 10/2021:  with right diplobia resolving in a week  S/P colonoscopy        Allergies  No Known Allergies        FAMILY HISTORY:  No pertinent family history in first degree relatives      Social history: former heavy smoker - quit abut 30 years ago     Review of Systems:  CONSTITUTIONAL: Per above   EYES: No eye pain, visual disturbances, or discharge  ENMT:  No difficulty hearing, tinnitus, vertigo; No sinus or throat pain  NECK: No pain or stiffness  RESPIRATORY: Per above  CARDIOVASCULAR: No chest pain, palpitations, dizziness, or leg swelling  GASTROINTESTINAL: No abdominal or epigastric pain. No nausea, vomiting, or hematemesis; No diarrhea or constipation. No melena or hematochezia.  GENITOURINARY: No dysuria, frequency, hematuria, or incontinence  NEUROLOGICAL: No headaches, memory loss, loss of strength, numbness, or tremors  SKIN: No itching, burning, rashes, or lesions   MUSCULOSKELETAL: No joint pain or swelling; No muscle, back, or extremity pain  PSYCHIATRIC: No depression, anxiety, mood swings, or difficulty sleeping      Medications:  MEDICATIONS  (STANDING):  acetylcysteine 20%  Inhalation 3 milliLiter(s) Inhalation every 6 hours  albuterol/ipratropium for Nebulization 3 milliLiter(s) Nebulizer every 6 hours  amLODIPine   Tablet 5 milliGRAM(s) Oral daily  atorvastatin 80 milliGRAM(s) Oral at bedtime  cefepime   IVPB 1000 milliGRAM(s) IV Intermittent every 8 hours  clopidogrel Tablet 75 milliGRAM(s) Oral daily  dextrose 5%. 1000 milliLiter(s) (50 mL/Hr) IV Continuous <Continuous>  dextrose 5%. 1000 milliLiter(s) (100 mL/Hr) IV Continuous <Continuous>  dextrose 50% Injectable 25 Gram(s) IV Push once  dextrose 50% Injectable 25 Gram(s) IV Push once  dextrose 50% Injectable 12.5 Gram(s) IV Push once  doxycycline IVPB 100 milliGRAM(s) IV Intermittent every 12 hours  furosemide   Injectable 40 milliGRAM(s) IV Push once  glucagon  Injectable 1 milliGRAM(s) IntraMuscular once  heparin   Injectable 5000 Unit(s) SubCutaneous every 8 hours  insulin glargine Injectable (LANTUS) 18 Unit(s) SubCutaneous at bedtime  insulin lispro (ADMELOG) corrective regimen sliding scale   SubCutaneous three times a day before meals  insulin lispro Injectable (ADMELOG) 6 Unit(s) SubCutaneous three times a day before meals  metoprolol succinate  milliGRAM(s) Oral two times a day  pantoprazole  Injectable 40 milliGRAM(s) IV Push daily    MEDICATIONS  (PRN):  dextrose Oral Gel 15 Gram(s) Oral once PRN Blood Glucose LESS THAN 70 milliGRAM(s)/deciliter            Vital Signs Last 24 Hrs  T(C): 36.8 (21 Sep 2023 05:33), Max: 37.4 (20 Sep 2023 13:55)  T(F): 98.2 (21 Sep 2023 05:33), Max: 99.3 (20 Sep 2023 13:55)  HR: 97 (21 Sep 2023 06:07) (82 - 104)  BP: 120/74 (21 Sep 2023 05:33) (110/70 - 128/77)  BP(mean): 89 (21 Sep 2023 04:32) (77 - 97)  RR: 22 (21 Sep 2023 05:33) (18 - 27)  SpO2: 102% (21 Sep 2023 06:07) (89% - 102%)    Parameters below as of 21 Sep 2023 05:33  Patient On (Oxygen Delivery Method): mask, nonrebreather  O2 Flow (L/min): 15        VBG pH 7.34 09-21 @ 07:02  VBG pCO2 52 09-21 @ 07:02  VBG O2 sat 65.9 09-21 @ 07:02  VBG lactate 2.9 09-21 @ 07:02  VBG pH 7.26 09-20 @ 16:57  VBG pCO2 62 09-20 @ 16:57  VBG O2 sat 37.0 09-20 @ 16:57  VBG lactate 3.3 09-20 @ 16:57  VBG pH -- 09-20 @ 15:45  VBG pCO2 -- 09-20 @ 15:45  VBG O2 sat -- 09-20 @ 15:45  VBG lactate 4.0 09-20 @ 15:45  VBG pH 7.21 09-20 @ 13:05  VBG pCO2 67 09-20 @ 13:05  VBG O2 sat 14.1 09-20 @ 13:05  VBG lactate 5.7 09-20 @ 13:05            LABS:                        13.6   15.05 )-----------( 256      ( 21 Sep 2023 07:02 )             42.3     09-21    142  |  103  |  47<H>  ----------------------------<  197<H>  4.3   |  24  |  1.98<H>    Ca    9.7      21 Sep 2023 07:02  Phos  3.9     09-20  Mg     1.7     09-20    TPro  6.9  /  Alb  3.4  /  TBili  0.8  /  DBili  x   /  AST  29  /  ALT  13  /  AlkPhos  67  09-20          CAPILLARY BLOOD GLUCOSE      POCT Blood Glucose.: 242 mg/dL (21 Sep 2023 08:04)    PT/INR - ( 20 Sep 2023 13:22 )   PT: 12.3 sec;   INR: 1.18 ratio         PTT - ( 20 Sep 2023 13:22 )  PTT:37.8 sec  Urinalysis Basic - ( 21 Sep 2023 07:02 )    Color: x / Appearance: x / SG: x / pH: x  Gluc: 197 mg/dL / Ketone: x  / Bili: x / Urobili: x   Blood: x / Protein: x / Nitrite: x   Leuk Esterase: x / RBC: x / WBC x   Sq Epi: x / Non Sq Epi: x / Bacteria: x                Physical Examination:    General: No acute distress.      HEENT: Pupils equal, reactive to light.  Symmetric.    PULM: bibasilar rhonchi     CVS: S1, S2    ABD: Soft, nondistended, nontender, normoactive bowel sounds, no masses    EXT: No edema, nontender    SKIN: Warm and well perfused, no rashes noted.    NEURO: Alert, oriented, interactive, nonfocal        RADIOLOGY REVIEWED    CT chest:  < from: CT Angio Chest PE Protocol w/ IV Cont (09.20.23 @ 15:15) >    FINDINGS:    PULMONARY VESSELS: No pulmonary embolus. Main pulmonary artery normal in   diameter.    HEART AND VASCULATURE: Heart size is normal. No pericardial effusion. No   aortic aneurysm or dissection. Mild coronary and aortic calcifications.    LUNGS, AIRWAYS, PLEURA: Patent central airways. Consolidations and   nodular opacities within bilateral lower lobes, right middle lobe and   lingula representing multifocal pneumonia. No pleural effusions or   pneumothorax.    MEDIASTINUM: Mildly enlarged subcarinal lymph node measuring 1.5 cm short   axis.    UPPER ABDOMEN: Within normal limits.    BONES AND SOFT TISSUES: No aggressive osseous lesion.    LOWER NECK: Within normal limits.    IMPRESSION:    No pulmonary embolus.    Consolidations and nodular opacities within bilateral lower lobes, right   middle lobe and lingula representing multifocal pneumonia. Recommend   follow-up chest CT in 1-3 months to determine resolution.      < end of copied text >

## 2023-09-21 NOTE — CONSULT NOTE ADULT - SUBJECTIVE AND OBJECTIVE BOX
[facility]), [facility]        Patient is a 72y old  Male who presents with a chief complaint of sepsis (21 Sep 2023 15:03)    Excerpt from H&P,"     72-year-old male PMH of HTN, HLD, T2DM, BPH, age, prior CVA in  and  uncertain of residual deficits, HFpEF reportedly on 20mg lasix QD (TTE from  EF 59%) presents to the ED complaining of generalized weakness today, feeling generally unwell for several days is unable to quantify how many days or specific symptomatology.  Patient reports feeling some shortness of breath today was found to be hypoxic by EMS (78% on RA), and requiring 6L O2 via NC to maintain SpO2 of 93% in ED. patient also reports blood glucose at home by EMS was elevated but uncertain of the number, reports he has not taken any of his antidiabetic medication for at least a day due to feeling unwell, has not taken his daily Lasix.  Patient notes possibly some URI symptoms over the past few days.  Patient denies urinary complaints, abdominal pain, vomiting, diarrhea, melena, hematochezia. (20 Sep 2023 21:12)           *****PAST MEDICAL / Surgical  HISTORY:  PAST MEDICAL & SURGICAL HISTORY:  Diabetes Mellitus Type II      HTN (Hypertension)      Hypercholesterolemia      BPH with elevated PSA      Stroke  2017, 10/2021:  with right diplobia resolving in a week      S/P colonoscopy               *****FAMILY HISTORY:  FAMILY HISTORY:  No pertinent family history in first degree relatives             *****SOCIAL HISTORY:  Alcohol: None  Smoking: None         *****ALLERGIES:   Allergies    No Known Allergies    Intolerances             *****MEDICATIONS: current medication reviewed and documented.   MEDICATIONS  (STANDING):  acetylcysteine 20%  Inhalation 3 milliLiter(s) Inhalation every 6 hours  albuterol/ipratropium for Nebulization 3 milliLiter(s) Nebulizer every 6 hours  amLODIPine   Tablet 5 milliGRAM(s) Oral daily  atorvastatin 80 milliGRAM(s) Oral at bedtime  azithromycin  IVPB 500 milliGRAM(s) IV Intermittent once  azithromycin  IVPB      cefTRIAXone   IVPB      cefTRIAXone   IVPB 1000 milliGRAM(s) IV Intermittent once  clopidogrel Tablet 75 milliGRAM(s) Oral daily  dextrose 5%. 1000 milliLiter(s) (50 mL/Hr) IV Continuous <Continuous>  dextrose 5%. 1000 milliLiter(s) (100 mL/Hr) IV Continuous <Continuous>  dextrose 50% Injectable 12.5 Gram(s) IV Push once  dextrose 50% Injectable 25 Gram(s) IV Push once  dextrose 50% Injectable 25 Gram(s) IV Push once  glucagon  Injectable 1 milliGRAM(s) IntraMuscular once  heparin   Injectable 5000 Unit(s) SubCutaneous every 8 hours  insulin glargine Injectable (LANTUS) 18 Unit(s) SubCutaneous at bedtime  insulin lispro (ADMELOG) corrective regimen sliding scale   SubCutaneous three times a day before meals  insulin lispro Injectable (ADMELOG) 6 Unit(s) SubCutaneous three times a day before meals  metoprolol succinate  milliGRAM(s) Oral two times a day  pantoprazole  Injectable 40 milliGRAM(s) IV Push daily    MEDICATIONS  (PRN):  dextrose Oral Gel 15 Gram(s) Oral once PRN Blood Glucose LESS THAN 70 milliGRAM(s)/deciliter           *****REVIEW OF SYSTEM:  GEN: no fever, no chills, no pain  RESP: no SOB, no cough, no sputum  CVS: no chest pain, no palpitations, no edema  GI: no abdominal pain, no nausea, no vomiting, no constipation, no diarrhea  : no dysurea, no frequency, no hematurea  Neuro: no headache, no dizziness  PSYCH: no anxiety, no depression  Derm : no itching, no rash         *****VITAL SIGNS:  T(C): 38.1 (23 @ 15:40), Max: 38.6 (23 @ 13:40)  HR: 94 (23 @ 13:41) (82 - 104)  BP: 124/72 (23 @ 12:30) (120/74 - 128/77)  RR: 25 (23 @ 13:41) (22 - 33)  SpO2: 98% (23 @ 13:41) (93% - 102%)  Wt(kg): --           *****PHYSICAL EXAM:   Alert oriented x 2  Attention comprehension are limited.  Able to name, repeat,  without any difficulty.   Able to follow 1 step commands.     EOMI fundi not visualized, blinks to threat   No facial asymmetry   Tongue is midline      Moving all 4 ext symmetrically no pronator drift   finger to nose no dysmetria    sensation is grossly symmetric  Gait : not assessed.  B/L down going toes               *****LAB AND IMAGIN.6   15.05 )-----------( 256      ( 21 Sep 2023 07:02 )             42.3                   142  |  103  |  47<H>  ----------------------------<  197<H>  4.3   |  24  |  1.98<H>    Ca    9.7      21 Sep 2023 07:02  Phos  3.9       Mg     1.7         TPro  6.9  /  Alb  3.4  /  TBili  0.8  /  DBili  x   /  AST  29  /  ALT  13  /  AlkPhos  67      PT/INR - ( 20 Sep 2023 13:22 )   PT: 12.3 sec;   INR: 1.18 ratio         PTT - ( 20 Sep 2023 13:22 )  PTT:37.8 sec                        Urinalysis Basic - ( 21 Sep 2023 07:02 )    Color: x / Appearance: x / SG: x / pH: x  Gluc: 197 mg/dL / Ketone: x  / Bili: x / Urobili: x   Blood: x / Protein: x / Nitrite: x   Leuk Esterase: x / RBC: x / WBC x   Sq Epi: x / Non Sq Epi: x / Bacteria: x        [All pertinent recent Imaging reports reviewed]         *****A S S E S S M E N T   A N D   P L A N :  Excerpt from H&P,"     72-year-old male PMH of HTN, HLD, T2DM, BPH, age, prior CVA in  and  uncertain of residual deficits, HFpEF reportedly on 20mg lasix QD (TTE from  EF 59%) presents to the ED complaining of generalized weakness today, feeling generally unwell for several days is unable to quantify how many days or specific symptomatology.  Patient reports feeling some shortness of breath today was found to be hypoxic by EMS (78% on RA), and requiring 6L O2 via NC to maintain SpO2 of 93% in ED. patient also reports blood glucose at home by EMS was elevated but uncertain of the number, reports he has not taken any of his antidiabetic medication for at least a day due to feeling unwell, has not taken his daily Lasix.  Patient notes possibly some URI symptoms over the past few days.  Patient denies urinary complaints, abdominal pain, vomiting, diarrhea, melena, hematochezia.         Problem/Recommendations 1: dysphagia likely progressive   in 2017 noted trace aspiration   in  pt had tia, mri neg presented with dysarthria facial droop     would repeat mri brain to eval for any new ischemia       would repeat s/s eval and consider swallow exercises   would change to thickened liquid   only feed when wide awake   nih 4   mrs 2       Problem/Recommendations 2:    pneumonia   ? obesity hypopnia syndrome possible pulm htn   r/o aspiration   pt with recent noncompliance to dm meds   weight loss          pt at risk for developing delirium, therefore please institute the following preventative measures if possible          - initiating early mobilization          -minimizing "tethers" - IV, oxygen, catheters, etc          -avoiding   sedatives          -maintaining hydration/nutrition          -avoid anticholinergics - diphenhydramine, etc          -pain control          -sleep wake cycle regulation; avoid day time somnolence           -supportive environment    ___________________________  Will follow with you.  Thank you,  Faye Mackenzie MD  Diplomate of the American Board of Neurology and Psychiatry.  Diplomate of the American Board of Vascular Neurology.   [facility] ([facility]), [facility]   Ph: [phone]    Differential diagnosis and plan of care discussed with patient after the evaluation.   Advanced care planning options discussed.   Pain assessed and judicious use of narcotics when appropriate was discussed.  Importance of Fall prevention discussed.  Counseling on Smoking and Alcohol cessation was offered when appropriate.  Counseling on Diet, exercise, and medication compliance was done.   83 minutes spent on the total encounter;  more than 50 % of the visit was spent on counseling  and or coordinating care by the attending physician.    Thank you for allowing me to participate in the care of this raad patient. Please do not hesitate to call me if you have any questions.     This and subsequent notes  will  inherently be subject to errors including those of syntax and substitutions which may escape proofreading. In such instances original meaning may be extrapolated by contextual derivation.

## 2023-09-21 NOTE — ED ADULT NURSE REASSESSMENT NOTE - NS ED NURSE REASSESS COMMENT FT1
ED RN went to assess pt and retake vital signs, pt found in bed with BiPAP mask off, pt stated he took it off himself because it was uncomfortable and pt ripped out one of his PIV on the L forearm, pt educated on need to keep BiPAP mask on, respiratory called and placed back on BiPAP, vital signs retaken and documented, pt cleaned and changed, made comfortable in stretcher. pending bed at this time.

## 2023-09-22 DIAGNOSIS — I48.91 UNSPECIFIED ATRIAL FIBRILLATION: ICD-10-CM

## 2023-09-22 DIAGNOSIS — I48.0 PAROXYSMAL ATRIAL FIBRILLATION: ICD-10-CM

## 2023-09-22 LAB
ANION GAP SERPL CALC-SCNC: 14 MMOL/L — SIGNIFICANT CHANGE UP (ref 5–17)
APTT BLD: 159.8 SEC — CRITICAL HIGH (ref 24.5–35.6)
APTT BLD: 41.1 SEC — HIGH (ref 24.5–35.6)
APTT BLD: 41.6 SEC — HIGH (ref 24.5–35.6)
BASE EXCESS BLDV CALC-SCNC: 2.2 MMOL/L — SIGNIFICANT CHANGE UP (ref -2–3)
BUN SERPL-MCNC: 56 MG/DL — HIGH (ref 7–23)
CALCIUM SERPL-MCNC: 9.4 MG/DL — SIGNIFICANT CHANGE UP (ref 8.4–10.5)
CHLORIDE SERPL-SCNC: 109 MMOL/L — HIGH (ref 96–108)
CO2 BLDV-SCNC: 30 MMOL/L — HIGH (ref 22–26)
CO2 SERPL-SCNC: 24 MMOL/L — SIGNIFICANT CHANGE UP (ref 22–31)
CREAT SERPL-MCNC: 2.2 MG/DL — HIGH (ref 0.5–1.3)
EGFR: 31 ML/MIN/1.73M2 — LOW
GAS PNL BLDV: SIGNIFICANT CHANGE UP
GLUCOSE BLDC GLUCOMTR-MCNC: 119 MG/DL — HIGH (ref 70–99)
GLUCOSE BLDC GLUCOMTR-MCNC: 125 MG/DL — HIGH (ref 70–99)
GLUCOSE BLDC GLUCOMTR-MCNC: 149 MG/DL — HIGH (ref 70–99)
GLUCOSE BLDC GLUCOMTR-MCNC: 167 MG/DL — HIGH (ref 70–99)
GLUCOSE BLDC GLUCOMTR-MCNC: 202 MG/DL — HIGH (ref 70–99)
GLUCOSE BLDC GLUCOMTR-MCNC: 53 MG/DL — CRITICAL LOW (ref 70–99)
GLUCOSE BLDC GLUCOMTR-MCNC: 53 MG/DL — CRITICAL LOW (ref 70–99)
GLUCOSE SERPL-MCNC: 54 MG/DL — CRITICAL LOW (ref 70–99)
GRAM STN FLD: SIGNIFICANT CHANGE UP
HCO3 BLDV-SCNC: 29 MMOL/L — SIGNIFICANT CHANGE UP (ref 22–29)
HCT VFR BLD CALC: 38.1 % — LOW (ref 39–50)
HCT VFR BLD CALC: 38.5 % — LOW (ref 39–50)
HGB BLD-MCNC: 12.1 G/DL — LOW (ref 13–17)
HGB BLD-MCNC: 12.4 G/DL — LOW (ref 13–17)
MCHC RBC-ENTMCNC: 29.1 PG — SIGNIFICANT CHANGE UP (ref 27–34)
MCHC RBC-ENTMCNC: 29.3 PG — SIGNIFICANT CHANGE UP (ref 27–34)
MCHC RBC-ENTMCNC: 31.8 GM/DL — LOW (ref 32–36)
MCHC RBC-ENTMCNC: 32.2 GM/DL — SIGNIFICANT CHANGE UP (ref 32–36)
MCV RBC AUTO: 91 FL — SIGNIFICANT CHANGE UP (ref 80–100)
MCV RBC AUTO: 91.6 FL — SIGNIFICANT CHANGE UP (ref 80–100)
NRBC # BLD: 0 /100 WBCS — SIGNIFICANT CHANGE UP (ref 0–0)
NRBC # BLD: 0 /100 WBCS — SIGNIFICANT CHANGE UP (ref 0–0)
PCO2 BLDV: 52 MMHG — SIGNIFICANT CHANGE UP (ref 42–55)
PH BLDV: 7.35 — SIGNIFICANT CHANGE UP (ref 7.32–7.43)
PLATELET # BLD AUTO: 284 K/UL — SIGNIFICANT CHANGE UP (ref 150–400)
PLATELET # BLD AUTO: 284 K/UL — SIGNIFICANT CHANGE UP (ref 150–400)
PO2 BLDV: 56 MMHG — HIGH (ref 25–45)
POTASSIUM SERPL-MCNC: 3.4 MMOL/L — LOW (ref 3.5–5.3)
POTASSIUM SERPL-SCNC: 3.4 MMOL/L — LOW (ref 3.5–5.3)
PROT ?TM UR-MCNC: 164 MG/DL — HIGH (ref 0–12)
RBC # BLD: 4.16 M/UL — LOW (ref 4.2–5.8)
RBC # BLD: 4.23 M/UL — SIGNIFICANT CHANGE UP (ref 4.2–5.8)
RBC # FLD: 13.3 % — SIGNIFICANT CHANGE UP (ref 10.3–14.5)
RBC # FLD: 13.4 % — SIGNIFICANT CHANGE UP (ref 10.3–14.5)
S PNEUM AG UR QL: NEGATIVE — SIGNIFICANT CHANGE UP
SAO2 % BLDV: 87.1 % — SIGNIFICANT CHANGE UP (ref 67–88)
SODIUM SERPL-SCNC: 147 MMOL/L — HIGH (ref 135–145)
SPECIMEN SOURCE: SIGNIFICANT CHANGE UP
WBC # BLD: 16.31 K/UL — HIGH (ref 3.8–10.5)
WBC # BLD: 17.63 K/UL — HIGH (ref 3.8–10.5)
WBC # FLD AUTO: 16.31 K/UL — HIGH (ref 3.8–10.5)
WBC # FLD AUTO: 17.63 K/UL — HIGH (ref 3.8–10.5)

## 2023-09-22 PROCEDURE — 93010 ELECTROCARDIOGRAM REPORT: CPT

## 2023-09-22 PROCEDURE — 99233 SBSQ HOSP IP/OBS HIGH 50: CPT

## 2023-09-22 PROCEDURE — 93306 TTE W/DOPPLER COMPLETE: CPT | Mod: 26

## 2023-09-22 RX ORDER — APIXABAN 2.5 MG/1
5 TABLET, FILM COATED ORAL EVERY 12 HOURS
Refills: 0 | Status: DISCONTINUED | OUTPATIENT
Start: 2023-09-22 | End: 2023-09-27

## 2023-09-22 RX ORDER — DILTIAZEM HCL 120 MG
5 CAPSULE, EXT RELEASE 24 HR ORAL
Qty: 125 | Refills: 0 | Status: DISCONTINUED | OUTPATIENT
Start: 2023-09-22 | End: 2023-09-22

## 2023-09-22 RX ORDER — DILTIAZEM HCL 120 MG
5 CAPSULE, EXT RELEASE 24 HR ORAL ONCE
Refills: 0 | Status: COMPLETED | OUTPATIENT
Start: 2023-09-22 | End: 2023-09-22

## 2023-09-22 RX ORDER — CHLORHEXIDINE GLUCONATE 213 G/1000ML
1 SOLUTION TOPICAL
Refills: 0 | Status: DISCONTINUED | OUTPATIENT
Start: 2023-09-22 | End: 2023-09-27

## 2023-09-22 RX ORDER — METOPROLOL TARTRATE 50 MG
5 TABLET ORAL ONCE
Refills: 0 | Status: COMPLETED | OUTPATIENT
Start: 2023-09-22 | End: 2023-09-22

## 2023-09-22 RX ORDER — METOPROLOL TARTRATE 50 MG
5 TABLET ORAL EVERY 6 HOURS
Refills: 0 | Status: DISCONTINUED | OUTPATIENT
Start: 2023-09-22 | End: 2023-09-27

## 2023-09-22 RX ORDER — DIGOXIN 250 MCG
250 TABLET ORAL ONCE
Refills: 0 | Status: COMPLETED | OUTPATIENT
Start: 2023-09-22 | End: 2023-09-22

## 2023-09-22 RX ORDER — HEPARIN SODIUM 5000 [USP'U]/ML
INJECTION INTRAVENOUS; SUBCUTANEOUS
Qty: 25000 | Refills: 0 | Status: DISCONTINUED | OUTPATIENT
Start: 2023-09-22 | End: 2023-09-22

## 2023-09-22 RX ORDER — HEPARIN SODIUM 5000 [USP'U]/ML
3500 INJECTION INTRAVENOUS; SUBCUTANEOUS EVERY 6 HOURS
Refills: 0 | Status: DISCONTINUED | OUTPATIENT
Start: 2023-09-22 | End: 2023-09-22

## 2023-09-22 RX ORDER — METOPROLOL TARTRATE 50 MG
50 TABLET ORAL
Refills: 0 | Status: DISCONTINUED | OUTPATIENT
Start: 2023-09-22 | End: 2023-09-27

## 2023-09-22 RX ORDER — METOPROLOL TARTRATE 50 MG
25 TABLET ORAL ONCE
Refills: 0 | Status: COMPLETED | OUTPATIENT
Start: 2023-09-22 | End: 2023-09-22

## 2023-09-22 RX ORDER — DEXTROSE 50 % IN WATER 50 %
25 SYRINGE (ML) INTRAVENOUS ONCE
Refills: 0 | Status: COMPLETED | OUTPATIENT
Start: 2023-09-22 | End: 2023-09-22

## 2023-09-22 RX ORDER — HEPARIN SODIUM 5000 [USP'U]/ML
7000 INJECTION INTRAVENOUS; SUBCUTANEOUS EVERY 6 HOURS
Refills: 0 | Status: DISCONTINUED | OUTPATIENT
Start: 2023-09-22 | End: 2023-09-22

## 2023-09-22 RX ORDER — SODIUM CHLORIDE 9 MG/ML
1000 INJECTION INTRAMUSCULAR; INTRAVENOUS; SUBCUTANEOUS
Refills: 0 | Status: DISCONTINUED | OUTPATIENT
Start: 2023-09-22 | End: 2023-09-25

## 2023-09-22 RX ORDER — INSULIN GLARGINE 100 [IU]/ML
12 INJECTION, SOLUTION SUBCUTANEOUS AT BEDTIME
Refills: 0 | Status: DISCONTINUED | OUTPATIENT
Start: 2023-09-22 | End: 2023-09-26

## 2023-09-22 RX ORDER — METOPROLOL TARTRATE 50 MG
25 TABLET ORAL DAILY
Refills: 0 | Status: DISCONTINUED | OUTPATIENT
Start: 2023-09-22 | End: 2023-09-22

## 2023-09-22 RX ORDER — DILTIAZEM HCL 120 MG
30 CAPSULE, EXT RELEASE 24 HR ORAL THREE TIMES A DAY
Refills: 0 | Status: DISCONTINUED | OUTPATIENT
Start: 2023-09-22 | End: 2023-09-22

## 2023-09-22 RX ORDER — POTASSIUM CHLORIDE 20 MEQ
40 PACKET (EA) ORAL EVERY 4 HOURS
Refills: 0 | Status: COMPLETED | OUTPATIENT
Start: 2023-09-22 | End: 2023-09-22

## 2023-09-22 RX ADMIN — Medication 3 MILLILITER(S): at 00:21

## 2023-09-22 RX ADMIN — Medication 3 MILLILITER(S): at 00:22

## 2023-09-22 RX ADMIN — Medication 50 MILLIGRAM(S): at 18:42

## 2023-09-22 RX ADMIN — Medication 50 MILLIGRAM(S): at 23:36

## 2023-09-22 RX ADMIN — SODIUM CHLORIDE 50 MILLILITER(S): 9 INJECTION INTRAMUSCULAR; INTRAVENOUS; SUBCUTANEOUS at 13:34

## 2023-09-22 RX ADMIN — Medication 5 MG/HR: at 13:57

## 2023-09-22 RX ADMIN — HEPARIN SODIUM 1300 UNIT(S)/HR: 5000 INJECTION INTRAVENOUS; SUBCUTANEOUS at 15:28

## 2023-09-22 RX ADMIN — HEPARIN SODIUM 0 UNIT(S)/HR: 5000 INJECTION INTRAVENOUS; SUBCUTANEOUS at 14:06

## 2023-09-22 RX ADMIN — HEPARIN SODIUM 1600 UNIT(S)/HR: 5000 INJECTION INTRAVENOUS; SUBCUTANEOUS at 07:10

## 2023-09-22 RX ADMIN — Medication 3 MILLILITER(S): at 11:37

## 2023-09-22 RX ADMIN — CHLORHEXIDINE GLUCONATE 1 APPLICATION(S): 213 SOLUTION TOPICAL at 11:38

## 2023-09-22 RX ADMIN — Medication 1: at 12:05

## 2023-09-22 RX ADMIN — APIXABAN 5 MILLIGRAM(S): 2.5 TABLET, FILM COATED ORAL at 19:45

## 2023-09-22 RX ADMIN — Medication 3 MILLILITER(S): at 17:24

## 2023-09-22 RX ADMIN — Medication 5 MILLIGRAM(S): at 06:43

## 2023-09-22 RX ADMIN — Medication 5 MILLIGRAM(S): at 08:56

## 2023-09-22 RX ADMIN — Medication 3 MILLILITER(S): at 05:29

## 2023-09-22 RX ADMIN — AZITHROMYCIN 255 MILLIGRAM(S): 500 TABLET, FILM COATED ORAL at 12:04

## 2023-09-22 RX ADMIN — Medication 5 MILLIGRAM(S): at 07:29

## 2023-09-22 RX ADMIN — Medication 3 MILLILITER(S): at 11:38

## 2023-09-22 RX ADMIN — Medication 5 MILLIGRAM(S): at 14:45

## 2023-09-22 RX ADMIN — CEFTRIAXONE 100 MILLIGRAM(S): 500 INJECTION, POWDER, FOR SOLUTION INTRAMUSCULAR; INTRAVENOUS at 12:04

## 2023-09-22 RX ADMIN — ATORVASTATIN CALCIUM 80 MILLIGRAM(S): 80 TABLET, FILM COATED ORAL at 21:33

## 2023-09-22 RX ADMIN — Medication 25 GRAM(S): at 06:43

## 2023-09-22 RX ADMIN — Medication 3 MILLILITER(S): at 05:28

## 2023-09-22 RX ADMIN — PANTOPRAZOLE SODIUM 40 MILLIGRAM(S): 20 TABLET, DELAYED RELEASE ORAL at 11:38

## 2023-09-22 RX ADMIN — Medication 250 MICROGRAM(S): at 10:02

## 2023-09-22 RX ADMIN — INSULIN GLARGINE 12 UNIT(S): 100 INJECTION, SOLUTION SUBCUTANEOUS at 21:33

## 2023-09-22 RX ADMIN — Medication 5 MILLIGRAM(S): at 08:13

## 2023-09-22 RX ADMIN — HEPARIN SODIUM 5000 UNIT(S): 5000 INJECTION INTRAVENOUS; SUBCUTANEOUS at 05:29

## 2023-09-22 NOTE — PROGRESS NOTE ADULT - SUBJECTIVE AND OBJECTIVE BOX
Chief complaint  Patient is a 72y old  Male who presents with a chief complaint of sepsis (22 Sep 2023 11:41)         Labs and Fingersticks  CAPILLARY BLOOD GLUCOSE      POCT Blood Glucose.: 167 mg/dL (22 Sep 2023 11:55)  POCT Blood Glucose.: 149 mg/dL (22 Sep 2023 06:59)  POCT Blood Glucose.: 53 mg/dL (22 Sep 2023 06:34)  POCT Blood Glucose.: 53 mg/dL (22 Sep 2023 06:33)  POCT Blood Glucose.: 119 mg/dL (22 Sep 2023 00:18)  POCT Blood Glucose.: 151 mg/dL (21 Sep 2023 21:14)  POCT Blood Glucose.: 107 mg/dL (21 Sep 2023 17:22)      Anion Gap: 14 (09-22 @ 06:43)  Anion Gap: 15 (09-21 @ 07:02)  Anion Gap: 18 *H* (09-20 @ 16:57)      Calcium: 9.4 (09-22 @ 06:43)  Calcium: 9.7 (09-21 @ 07:02)  Calcium: 9.8 (09-20 @ 16:57)          09-22    147<H>  |  109<H>  |  56<H>  ----------------------------<  54<LL>  3.4<L>   |  24  |  2.20<H>    Ca    9.4      22 Sep 2023 06:43  Phos  3.9     09-20  Mg     1.7     09-20                          12.4   17.63 )-----------( 284      ( 22 Sep 2023 13:24 )             38.5     Medications  MEDICATIONS  (STANDING):  acetylcysteine 20%  Inhalation 3 milliLiter(s) Inhalation every 6 hours  albuterol/ipratropium for Nebulization 3 milliLiter(s) Nebulizer every 6 hours  amLODIPine   Tablet 5 milliGRAM(s) Oral daily  atorvastatin 80 milliGRAM(s) Oral at bedtime  azithromycin  IVPB      azithromycin  IVPB 500 milliGRAM(s) IV Intermittent every 24 hours  cefTRIAXone   IVPB 1000 milliGRAM(s) IV Intermittent every 24 hours  cefTRIAXone   IVPB      chlorhexidine 2% Cloths 1 Application(s) Topical <User Schedule>  clopidogrel Tablet 75 milliGRAM(s) Oral daily  dextrose 5%. 1000 milliLiter(s) (50 mL/Hr) IV Continuous <Continuous>  dextrose 5%. 1000 milliLiter(s) (100 mL/Hr) IV Continuous <Continuous>  dextrose 50% Injectable 12.5 Gram(s) IV Push once  dextrose 50% Injectable 25 Gram(s) IV Push once  dextrose 50% Injectable 25 Gram(s) IV Push once  diltiazem Infusion 5 mG/Hr (5 mL/Hr) IV Continuous <Continuous>  diltiazem Injectable 5 milliGRAM(s) IV Push once  glucagon  Injectable 1 milliGRAM(s) IntraMuscular once  heparin  Infusion.  Unit(s)/Hr (16 mL/Hr) IV Continuous <Continuous>  insulin glargine Injectable (LANTUS) 12 Unit(s) SubCutaneous at bedtime  insulin lispro (ADMELOG) corrective regimen sliding scale   SubCutaneous three times a day before meals  metoprolol succinate  milliGRAM(s) Oral two times a day  pantoprazole  Injectable 40 milliGRAM(s) IV Push daily  sodium chloride 0.9%. 1000 milliLiter(s) (50 mL/Hr) IV Continuous <Continuous>      Physical Exam  General: Patient comfortable in bed   Vital Signs Last 12 Hrs  T(F): 98.3 (09-22-23 @ 12:14), Max: 98.3 (09-22-23 @ 12:14)  HR: 148 (09-22-23 @ 13:57) (82 - 160)  BP: 120/68 (09-22-23 @ 13:57) (101/66 - 132/72)  BP(mean): --  RR: 18 (09-22-23 @ 04:31) (18 - 18)  SpO2: 96% (09-22-23 @ 11:26) (94% - 98%)    CVS: S1S2   Respiratory: No wheezing, no crepitations  GI: Abdomen soft, bowel sounds positive  Musculoskeletal:  moves all extremities  : Voiding        Chief complaint  Patient is a 72y old  Male who presents with a chief complaint of sepsis (22 Sep 2023 11:41)         Labs and Fingersticks  CAPILLARY BLOOD GLUCOSE      POCT Blood Glucose.: 167 mg/dL (22 Sep 2023 11:55)  POCT Blood Glucose.: 149 mg/dL (22 Sep 2023 06:59)  POCT Blood Glucose.: 53 mg/dL (22 Sep 2023 06:34)  POCT Blood Glucose.: 53 mg/dL (22 Sep 2023 06:33)  POCT Blood Glucose.: 119 mg/dL (22 Sep 2023 00:18)  POCT Blood Glucose.: 151 mg/dL (21 Sep 2023 21:14)  POCT Blood Glucose.: 107 mg/dL (21 Sep 2023 17:22)      Anion Gap: 14 (09-22 @ 06:43)  Anion Gap: 15 (09-21 @ 07:02)  Anion Gap: 18 *H* (09-20 @ 16:57)      Calcium: 9.4 (09-22 @ 06:43)  Calcium: 9.7 (09-21 @ 07:02)  Calcium: 9.8 (09-20 @ 16:57)          09-22    147<H>  |  109<H>  |  56<H>  ----------------------------<  54<LL>  3.4<L>   |  24  |  2.20<H>    Ca    9.4      22 Sep 2023 06:43  Phos  3.9     09-20  Mg     1.7     09-20                          12.4   17.63 )-----------( 284      ( 22 Sep 2023 13:24 )             38.5     Medications  MEDICATIONS  (STANDING):  acetylcysteine 20%  Inhalation 3 milliLiter(s) Inhalation every 6 hours  albuterol/ipratropium for Nebulization 3 milliLiter(s) Nebulizer every 6 hours  amLODIPine   Tablet 5 milliGRAM(s) Oral daily  atorvastatin 80 milliGRAM(s) Oral at bedtime  azithromycin  IVPB      azithromycin  IVPB 500 milliGRAM(s) IV Intermittent every 24 hours  cefTRIAXone   IVPB 1000 milliGRAM(s) IV Intermittent every 24 hours  cefTRIAXone   IVPB      chlorhexidine 2% Cloths 1 Application(s) Topical <User Schedule>  clopidogrel Tablet 75 milliGRAM(s) Oral daily  dextrose 5%. 1000 milliLiter(s) (50 mL/Hr) IV Continuous <Continuous>  dextrose 5%. 1000 milliLiter(s) (100 mL/Hr) IV Continuous <Continuous>  dextrose 50% Injectable 12.5 Gram(s) IV Push once  dextrose 50% Injectable 25 Gram(s) IV Push once  dextrose 50% Injectable 25 Gram(s) IV Push once  diltiazem Infusion 5 mG/Hr (5 mL/Hr) IV Continuous <Continuous>  diltiazem Injectable 5 milliGRAM(s) IV Push once  glucagon  Injectable 1 milliGRAM(s) IntraMuscular once  heparin  Infusion.  Unit(s)/Hr (16 mL/Hr) IV Continuous <Continuous>  insulin glargine Injectable (LANTUS) 12 Unit(s) SubCutaneous at bedtime  insulin lispro (ADMELOG) corrective regimen sliding scale   SubCutaneous three times a day before meals  metoprolol succinate  milliGRAM(s) Oral two times a day  pantoprazole  Injectable 40 milliGRAM(s) IV Push daily  sodium chloride 0.9%. 1000 milliLiter(s) (50 mL/Hr) IV Continuous <Continuous>      Physical Exam  General: Patient comfortable in bed   Vital Signs Last 12 Hrs  T(F): 98.3 (09-22-23 @ 12:14), Max: 98.3 (09-22-23 @ 12:14)  HR: 148 (09-22-23 @ 13:57) (82 - 160)  BP: 120/68 (09-22-23 @ 13:57) (101/66 - 132/72)  BP(mean): --  RR: 18 (09-22-23 @ 04:31) (18 - 18)  SpO2: 96% (09-22-23 @ 11:26) (94% - 98%)    CVS: S1S2   Respiratory: No wheezing, no crepitations  GI: Abdomen soft, bowel sounds positive  Musculoskeletal:  moves all extremities  : Voiding

## 2023-09-22 NOTE — CONSULT NOTE ADULT - CONSULT REQUESTED DATE/TIME
21-Sep-2023 08:31
21-Sep-2023 15:57
20-Sep-2023 17:52
22-Sep-2023 14:47
21-Sep-2023 15:04
21-Sep-2023 08:12
21-Sep-2023 10:02
22-Sep-2023 08:22

## 2023-09-22 NOTE — CONSULT NOTE ADULT - PROBLEM SELECTOR PROBLEM 3
BEAR (acute kidney injury)
BEAR (acute kidney injury)
Acute respiratory failure with hypoxia and hypercapnia

## 2023-09-22 NOTE — SWALLOW FEES ASSESSMENT ADULT - SUCCESSFUL STRATEGIES TRIALED DURING PROCEDURE
although penetration evident prior to swallow, penetrated material appeared to be retrieved with chin tuck posture as material not evident in the laryngeal vestibule./chin tuck

## 2023-09-22 NOTE — SWALLOW FEES ASSESSMENT ADULT - RECOMMENDED FEEDING/EATING TECHNIQUES
CHIN TUCK; TEASPOON AMOUNTS/allow for swallow between intakes/crush medication (when feasible)/maintain upright posture during/after eating for 30 mins/position upright (90 degrees)/small sips/bites

## 2023-09-22 NOTE — CONSULT NOTE ADULT - PROBLEM SELECTOR RECOMMENDATION 9
CTA chest with multifocal PNA  -Continue abx  -F/u urine legionella  -Duoneb q6h started  -Mucomyst 20% 3cc q6h started (give with Duoneb)  -Chest PT q6h  -F/u MRSA/MSSA nasal PCR  -Sputum culture ordered
Will start Lantus 18 units at bed time.  Will start Admelog 6 units before each meal in addition to Admelog correction scale coverage.  Will continue monitoring FS, log, and glucose trends, will Follow up.  Patient counseled for compliance with consistent low carb diet and exercise as tolerated outpatient.
With RVR   Lopressor 5 mg IV x 1 now and q6 hours for HR > 110  Increase Toprol 125 daily for now   CHADSVASC2 score > 3 would need lifelong AC barring any contraindications   Check TTE   check Thyroid panel  Trop leak likely demand mediated in setting of hypoxic respiratory failure and PNA   Would need to obtain outpt cardiac records

## 2023-09-22 NOTE — PROGRESS NOTE ADULT - SUBJECTIVE AND OBJECTIVE BOX
Patient is a 72y old  Male who presents with a chief complaint of sepsis (22 Sep 2023 14:46)      SUBJECTIVE / OVERNIGHT EVENTS: ptn developed overnight Afib w RVR, presently is in NSR. he was given IV DIgoxin, Norvasc DCed, started Cardizem qid, toprol changed to Loprssor 50 qid. these changes made since pills need to be crushed and given w food. Ptn was initially place on Heparin drip since he was NPO, once cleared by speech for a pureed diet: placed on Eliquis. all meds to be crushed and given w food. there is a concern ptn may have had an embolic stroke 2/2 PAF. concern for a brainstem CVA w airway compromise. awaiting MRI brain. seen by neuro and  cardiology. he is stable on HF O2, pulm folloiwng, on ABx for aspiration PNA    MEDICATIONS  (STANDING):  acetylcysteine 20%  Inhalation 3 milliLiter(s) Inhalation every 6 hours  albuterol/ipratropium for Nebulization 3 milliLiter(s) Nebulizer every 6 hours  apixaban 5 milliGRAM(s) Oral every 12 hours  atorvastatin 80 milliGRAM(s) Oral at bedtime  azithromycin  IVPB      azithromycin  IVPB 500 milliGRAM(s) IV Intermittent every 24 hours  cefTRIAXone   IVPB 1000 milliGRAM(s) IV Intermittent every 24 hours  cefTRIAXone   IVPB      chlorhexidine 2% Cloths 1 Application(s) Topical <User Schedule>  clopidogrel Tablet 75 milliGRAM(s) Oral daily  dextrose 5%. 1000 milliLiter(s) (50 mL/Hr) IV Continuous <Continuous>  dextrose 5%. 1000 milliLiter(s) (100 mL/Hr) IV Continuous <Continuous>  dextrose 50% Injectable 12.5 Gram(s) IV Push once  dextrose 50% Injectable 25 Gram(s) IV Push once  dextrose 50% Injectable 25 Gram(s) IV Push once  glucagon  Injectable 1 milliGRAM(s) IntraMuscular once  insulin glargine Injectable (LANTUS) 12 Unit(s) SubCutaneous at bedtime  insulin lispro (ADMELOG) corrective regimen sliding scale   SubCutaneous three times a day before meals  metoprolol tartrate 50 milliGRAM(s) Oral four times a day  pantoprazole  Injectable 40 milliGRAM(s) IV Push daily  sodium chloride 0.9%. 1000 milliLiter(s) (50 mL/Hr) IV Continuous <Continuous>    MEDICATIONS  (PRN):  dextrose Oral Gel 15 Gram(s) Oral once PRN Blood Glucose LESS THAN 70 milliGRAM(s)/deciliter  metoprolol tartrate Injectable 5 milliGRAM(s) IV Push every 6 hours PRN HR > 110      Vital Signs Last 24 Hrs  T(F): 99.2 (09-22-23 @ 20:43), Max: 99.2 (09-22-23 @ 20:43)  HR: 85 (09-22-23 @ 20:43) (80 - 160)  BP: 146/76 (09-22-23 @ 20:43) (101/66 - 146/76)  RR: 18 (09-22-23 @ 20:43) (18 - 18)  SpO2: 97% (09-22-23 @ 20:43) (94% - 98%)  Telemetry:   CAPILLARY BLOOD GLUCOSE      POCT Blood Glucose.: 202 mg/dL (22 Sep 2023 21:31)  POCT Blood Glucose.: 125 mg/dL (22 Sep 2023 17:17)  POCT Blood Glucose.: 167 mg/dL (22 Sep 2023 11:55)  POCT Blood Glucose.: 149 mg/dL (22 Sep 2023 06:59)  POCT Blood Glucose.: 53 mg/dL (22 Sep 2023 06:34)  POCT Blood Glucose.: 53 mg/dL (22 Sep 2023 06:33)  POCT Blood Glucose.: 119 mg/dL (22 Sep 2023 00:18)    I&O's Summary    21 Sep 2023 07:01  -  22 Sep 2023 07:00  --------------------------------------------------------  IN: 0 mL / OUT: 700 mL / NET: -700 mL    22 Sep 2023 07:01  -  22 Sep 2023 23:26  --------------------------------------------------------  IN: 0 mL / OUT: 300 mL / NET: -300 mL        PHYSICAL EXAM:  GENERAL: NAD, well-developed  HEAD:  Atraumatic, Normocephalic  EYES: EOMI, PERRLA, conjunctiva and sclera clear  NECK: Supple, No JVD  CHEST/LUNG: Clear to auscultation bilaterally; No wheeze  HEART: Regular rate and rhythm; No murmurs, rubs, or gallops  ABDOMEN: Soft, Nontender, Nondistended; Bowel sounds present  EXTREMITIES:  2+ Peripheral Pulses, No clubbing, cyanosis, or edema  PSYCH: AAOx3  NEUROLOGY: non-focal  SKIN: No rashes or lesions    LABS:                        12.4   17.63 )-----------( 284      ( 22 Sep 2023 13:24 )             38.5     09-22    147<H>  |  109<H>  |  56<H>  ----------------------------<  54<LL>  3.4<L>   |  24  |  2.20<H>    Ca    9.4      22 Sep 2023 06:43      PTT - ( 22 Sep 2023 22:21 )  PTT:41.6 sec      Urinalysis Basic - ( 22 Sep 2023 06:43 )    Color: x / Appearance: x / SG: x / pH: x  Gluc: 54 mg/dL / Ketone: x  / Bili: x / Urobili: x   Blood: x / Protein: x / Nitrite: x   Leuk Esterase: x / RBC: x / WBC x   Sq Epi: x / Non Sq Epi: x / Bacteria: x        RADIOLOGY & ADDITIONAL TESTS:    Imaging Personally Reviewed:    Consultant(s) Notes Reviewed:      Care Discussed with Consultants/Other Providers:

## 2023-09-22 NOTE — CHART NOTE - NSCHARTNOTEFT_GEN_A_CORE
72 year old male with PMH of HTN, HLD, T2DM, BPH, age, prior CVA in 2017 and 2021 uncertain of residual deficits, HFpEF reportedly on 20mg lasix QD (TTE from 2022 EF 59%) presented to the ED complaining of generalized weakness today, feeling generally unwell for several days is unable to quantify how many days or specific symptomatology.  Patient reported feeling some shortness of breath on day of admit; hypoxic by EMS (78% on RA), and requiring 6L O2 via NC to maintain SpO2 of 93% in ED. Patient also reported blood glucose at home by EMS; reportedly Pt did not take any of his antidiabetic medication for at least a days pta due to feeling unwell and had not taken his daily Lasix.  Patient noted possibly some URI symptoms over the past few days. Pt admitted with multi focal Pneumonia and Acute respiratory failure with hypoxia and hypercapnia. Pt is followed by Pulmonary. Pt initially placed on Bipap in ED. 9/21/23 Pt placed back on bipap, pt was desating to 88-90 on 6LNC; Pt then switched back to Nasal canula in the afternoon. Initial swallowing evaluation recommended NPO, with non-oral nutrition/hydration/medications.  Per Nephrology: CKD - stage 3, with nephrotic-range proteinuria - likely due to diabetic nephropathy; BEAR - mild - likely prerenally mediated.   9/20/23 CT Chest: Impressions :No pulmonary embolus. Consolidations and nodular opacities within bilateral lower lobes, right middle lobe and lingula representing multifocal pneumonia. Recommend follow-up chest CT in 1-3 months to determine resolution. Recd: follow-up chest CT in 1-3 months to determine resolution.  Pt seen for swallowing therapy f/u with son at b/s. Supplemental 02 via high flow continues; SPO2 94-95%. Pt with episodes of Afib per d/w Nsg and ACP. Pt requesting to eat/drink; seated upright in bed. Good phonation achieved although wet, congested baseline cough noted. Pt provided with 2 ice chips with delayed pharyngeal swallow and productive cough noted following 2nd trial. Decreased laryngeal elevation was suspected upon palpation. Aspiration not r/o at b/s. Formation, control and transfer of the bolus were judged to be adequate with ice chips.  Impressions: Pt presents with suspected pharyngeal stage dysphagia.  Recommendations; NPO, with non-oral nutrition/hydration/medications. Maintain aspiration precaution for Pt' own secretions and during enteral feeds, if initiated. FEES to objectively assess pharyngeal swallow and r/o aspiration. D/W ALEXANDRIA Pascal; f/u with MD for approval for FEES order pending.  Alejandra Castro MS CCC-SLP   Pgr # [phone]

## 2023-09-22 NOTE — SWALLOW FEES ASSESSMENT ADULT - ORAL PHASE
Spillover to the pyriform sinuses/Uncontrolled bolus/spillover in karrie-pharynx/Uncontrolled bolus/spillover in hypopharynx

## 2023-09-22 NOTE — PROGRESS NOTE ADULT - SUBJECTIVE AND OBJECTIVE BOX
[facility]      Seen earlier today [Please note that date of entry above  is the actual  DATE OF SERVICE] No new neurological symptoms reported. Remains stable neurologically.   - Today, patient is without complaints.          *****MEDICATIONS: Current medication reviewed and documented.    MEDICATIONS  (STANDING):  acetylcysteine 20%  Inhalation 3 milliLiter(s) Inhalation every 6 hours  albuterol/ipratropium for Nebulization 3 milliLiter(s) Nebulizer every 6 hours  apixaban 5 milliGRAM(s) Oral every 12 hours  atorvastatin 80 milliGRAM(s) Oral at bedtime  azithromycin  IVPB      azithromycin  IVPB 500 milliGRAM(s) IV Intermittent every 24 hours  cefTRIAXone   IVPB      cefTRIAXone   IVPB 1000 milliGRAM(s) IV Intermittent every 24 hours  chlorhexidine 2% Cloths 1 Application(s) Topical <User Schedule>  clopidogrel Tablet 75 milliGRAM(s) Oral daily  dextrose 5%. 1000 milliLiter(s) (100 mL/Hr) IV Continuous <Continuous>  dextrose 5%. 1000 milliLiter(s) (50 mL/Hr) IV Continuous <Continuous>  dextrose 50% Injectable 12.5 Gram(s) IV Push once  dextrose 50% Injectable 25 Gram(s) IV Push once  dextrose 50% Injectable 25 Gram(s) IV Push once  glucagon  Injectable 1 milliGRAM(s) IntraMuscular once  insulin glargine Injectable (LANTUS) 12 Unit(s) SubCutaneous at bedtime  insulin lispro (ADMELOG) corrective regimen sliding scale   SubCutaneous three times a day before meals  metoprolol tartrate 50 milliGRAM(s) Oral four times a day  pantoprazole  Injectable 40 milliGRAM(s) IV Push daily  sodium chloride 0.9%. 1000 milliLiter(s) (50 mL/Hr) IV Continuous <Continuous>    MEDICATIONS  (PRN):  dextrose Oral Gel 15 Gram(s) Oral once PRN Blood Glucose LESS THAN 70 milliGRAM(s)/deciliter  metoprolol tartrate Injectable 5 milliGRAM(s) IV Push every 6 hours PRN HR > 110          ***** VITAL SIGNS:   Vital Signs Last 24 Hrs      I&O's Summary    22 Sep 2023 07:01  -  23 Sep 2023 07:00  --------------------------------------------------------  IN: 0 mL / OUT: 1100 mL / NET: -1100 mL    23 Sep 2023 07:01  -  23 Sep 2023 10:44  --------------------------------------------------------  IN: 200 mL / OUT: 0 mL / NET: 200 mL             *****PHYSICAL EXAM: Alert oriented x 2  Attention comprehension are limited.  Able to name, repeat,  without any difficulty.   Able to follow 1 step commands.     EOMI fundi not visualized, blinks to threat   No facial asymmetry   Tongue is midline      Moving all 4 ext symmetrically no pronator drift   finger to nose no dysmetria    sensation is grossly symmetric  Gait : not assessed.  B/L down going toes          *****LAB AND IMAGIN.0   15.70 )-----------( 274      ( 23 Sep 2023 06:12 )             40.9               09-    147<H>  |  108  |  61<H>  ----------------------------<  89  3.5   |  26  |  2.14<H>    Ca    9.7      23 Sep 2023 06:12      PTT - ( 22 Sep 2023 22:21 )  PTT:41.6 sec                   Urinalysis Basic - ( 23 Sep 2023 06:12 )    Color: x / Appearance: x / SG: x / pH: x  Gluc: 89 mg/dL / Ketone: x  / Bili: x / Urobili: x   Blood: x / Protein: x / Nitrite: x   Leuk Esterase: x / RBC: x / WBC x   Sq Epi: x / Non Sq Epi: x / Bacteria: x      [All pertinent recent Imaging/Reports reviewed]            *****A S S E S S M E N T   A N D   P L A N :Excerpt from H&P,"     72-year-old male PMH of HTN, HLD, T2DM, BPH, age, prior CVA in  and  uncertain of residual deficits, HFpEF reportedly on 20mg lasix QD (TTE from  EF 59%) presents to the ED complaining of generalized weakness today, feeling generally unwell for several days is unable to quantify how many days or specific symptomatology.  Patient reports feeling some shortness of breath today was found to be hypoxic by EMS (78% on RA), and requiring 6L O2 via NC to maintain SpO2 of 93% in ED. patient also reports blood glucose at home by EMS was elevated but uncertain of the number, reports he has not taken any of his antidiabetic medication for at least a day due to feeling unwell, has not taken his daily Lasix.  Patient notes possibly some URI symptoms over the past few days.  Patient denies urinary complaints, abdominal pain, vomiting, diarrhea, melena, hematochezia.         Problem/Recommendations 1: dysphagia likely progressive   in  noted trace aspiration   in  pt had tia, mri neg presented with dysarthria facial droop     would repeat mri brain to eval for any new ischemia       would repeat s/s eval and consider swallow exercises   would change to thickened liquid   only feed when wide awake if pt passes p  nih 4   mrs 2       Problem/Recommendations 2:    pneumonia   likely aspiration   pt with recent noncompliance to dm meds             pt at risk for developing delirium, therefore please institute the following preventative measures if possible          - initiating early mobilization          -minimizing "tethers" - IV, oxygen, catheters, etc          -avoiding   sedatives          -maintaining hydration/nutrition          -avoid anticholinergics - diphenhydramine, etc          -pain control          -sleep wake cycle regulation; avoid day time somnolence           -supportive environment       Thank you for allowing me to participate in the care of this patient. Will continue to follow patient periodically. Please do not hesitate to call me if you have any  questions or if there has been a change in patients neurological status     ________________  Faye Mackenzie MD  [facility]  [phone]      33 minutes spent on total encounter; more than 50 % of the visit was  spent counseling about plan of care, compliance to diet/exercise and medication regimen and or  coordinating care by the attending physician.      It is advised that stroke patients follow up with ALEXANDRIA Eddy @ [phone] in 1- 2 weeks.   Others please follow up with Dr. Michael Nissenbaum [phone]

## 2023-09-22 NOTE — SWALLOW FEES ASSESSMENT ADULT - ROSENBEK'S PENETRATION ASPIRATION SCALE
evident on one trial of thick puree alternating with cup sip of moderately thickened liquids;/(6) material passes glottis, no subglottic residue remains (aspiration)

## 2023-09-22 NOTE — CHART NOTE - NSCHARTNOTEFT_GEN_A_CORE
Pt noted to be in afib RVR per overnight ACP, heparin drip and lopressor IV pushes given in AM per d/w overnight provider and Dr Bettencourt  Pt still persistently in afib with RVR, denying any sx, cp, sob, palpitations, diaphoresis  Pt likely in afib w rvr 2/2 npo for FEES with speech and swallow, pt missed 9/21 evening and 9/22 morning PO metoprolol succinate 100mg BID    Plan  - s/p digoxin 250mcg per cards Dr Guillen  - Cardizem drip 5mg/hr with 5mg IVP per Dr Bettencourt  - pt underwent FEES, diet per s/s recs  - continue to monitor pt closely on tele   - EP cs placed  - TTE pending    Kacey Pascal PA-C  [id number] Pt noted to be in afib RVR per overnight ACP, heparin drip and lopressor IV pushes given in AM per d/w overnight provider and Dr Bettencourt  Pt still persistently in afib with RVR, denying any sx, cp, sob, palpitations, diaphoresis  Pt likely in afib w rvr 2/2 npo for FEES with speech and swallow, pt missed 9/21 evening and 9/22 morning PO metoprolol succinate 100mg BID    Plan  - s/p digoxin 250mcg per cards Dr Guillen  - cont hep gtt  - Cardizem drip 5mg/hr with 5mg IVP per Dr Bettencourt  - pt underwent FEES, diet per s/s recs  - continue to monitor pt closely on tele   - EP cs placed  - TTE pending    Kacey Pascal PA-C  [id number]

## 2023-09-22 NOTE — PROGRESS NOTE ADULT - ASSESSMENT
72F Hx HTN, T2DM, CVAs x 2 (2017 and 2021), HFrEF 59%, BPH p/w ED Generalized Weakness, SOB and WOB started on AFMO2 escalated to BiPAP Support with CXR c/w Lt Retrocardiac Infiltrates R/O Aspiration.   Assessment  DMT2: 72y Male with DM T2 with hyperglycemia, A1C 9.1% (2022) rpt pending, was on oral meds and Mounjaro at home, now on basal bolus insulin with coverage, blood sugars were running high, now with hypoglycemia.   Shortness of breath: on medications, BIPAP support, monitored.  HTN: on antihypertensive medications, monitored, asymptomatic.  BEAR/CKD: Monitor labs/BMP, renal fu.       Discussed plan and management wit Dr Clarisse Robb MD  Cell: [phone]  Office: [phone]             72F Hx HTN, T2DM, CVAs x 2 (2017 and 2021), HFrEF 59%, BPH p/w ED Generalized Weakness, SOB and WOB started on AFMO2 escalated to BiPAP Support with CXR c/w Lt Retrocardiac Infiltrates R/O Aspiration.   Assessment  DMT2: 72y Male with DM T2 with hyperglycemia, A1C 9.1% (2022) rpt pending, was on oral meds and Mounjaro at home, now on basal bolus insulin with coverage,  blood sugars were running high, now with hypoglycemia.   Shortness of breath: on medications, BIPAP support, monitored.  HTN: on antihypertensive medications, monitored, asymptomatic.  BEAR/CKD: Monitor labs/BMP, renal fu.       Discussed plan and management wit Dr Clarisse Robb MD  Cell: [phone]  Office: [phone]

## 2023-09-22 NOTE — CONSULT NOTE ADULT - SUBJECTIVE AND OBJECTIVE BOX
Patient seen and evaluated at bedside    Chief Complaint: SOB    HPI:  72-year-old male PMH of HTN, HLD, T2DM, BPH, age, prior CVA in 2017 and 2021 uncertain of residual deficits, HFpEF reportedly on 20mg lasix QD (TTE from 2022 EF 59%) presented to the ED complaining of generalized weakness, found to be hypoxic by EMS (78% on RA), and requiring 6L O2 via NC to maintain SpO2 of 93% in ED. Admitted 9/20, CT Chest with multifocal PNA, started on abx. Placed on BiPAP initially, now on HFNC to maintain oxygenation. Admission ECG sinus with known LBBB.    On 9/22 around 0600 rhythm transitioned to a fib with RVR, rates 140s-160s. Found to be hypoglycemic to 50s Trialed IV Metoprolol 5mg x2 without improvement, Digoxin 250 mcg x1, started on Dilt gtt. Of note, pt NPO due to aspiration concern, pending swallow study. Was on Metoprolol Succinate 100mg daily at home, did not take for the past 2 days    PMHx:   Diabetes Mellitus Type II    HTN (Hypertension)    Hypercholesterolemia    BPH with elevated PSA    Stroke        PSHx:   No significant past surgical history    S/P colonoscopy        Allergies:  No Known Allergies      Home Meds:  Home Medications:  amLODIPine 5 mg oral tablet: 1 tab(s) orally once a day (20 Sep 2023 21:52)  atorvastatin 80 mg oral tablet: 1 tab(s) orally once a day (20 Sep 2023 21:52)  cholecalciferol 25 mcg (1000 intl units) oral tablet: 2 tab(s) orally once a day (20 Sep 2023 21:51)  clopidogrel 75 mg oral tablet: 1 tab(s) orally once a day (20 Sep 2023 21:52)  fosinopril 40 mg oral tablet: 1 tab(s) orally once a day (20 Sep 2023 21:52)  furosemide 20 mg oral tablet: 1 tab(s) orally once a day (20 Sep 2023 21:52)  glipiZIDE 10 mg oral tablet, extended release: 1 tab(s) orally once a day (20 Sep 2023 21:52)  Jardiance 25 mg oral tablet: 1 tab(s) orally once a day (20 Sep 2023 21:52)  metFORMIN 1000 mg oral tablet: 1 tab(s) orally 2 times a day (20 Sep 2023 21:52)  metoprolol succinate 100 mg oral tablet, extended release: 2 tab(s) orally 2 times a day (20 Sep 2023 21:52)  Mounjaro 10 mg/0.5 mL subcutaneous solution: 10 milligram(s) subcutaneously once a week on mondays (20 Sep 2023 21:52)  omeprazole 20 mg oral delayed release capsule: 1 cap(s) orally once a day (20 Sep 2023 21:52)      Current Medications:   acetylcysteine 20%  Inhalation 3 milliLiter(s) Inhalation every 6 hours  albuterol/ipratropium for Nebulization 3 milliLiter(s) Nebulizer every 6 hours  amLODIPine   Tablet 5 milliGRAM(s) Oral daily  atorvastatin 80 milliGRAM(s) Oral at bedtime  azithromycin  IVPB      azithromycin  IVPB 500 milliGRAM(s) IV Intermittent every 24 hours  cefTRIAXone   IVPB      cefTRIAXone   IVPB 1000 milliGRAM(s) IV Intermittent every 24 hours  chlorhexidine 2% Cloths 1 Application(s) Topical <User Schedule>  clopidogrel Tablet 75 milliGRAM(s) Oral daily  dextrose 5%. 1000 milliLiter(s) IV Continuous <Continuous>  dextrose 5%. 1000 milliLiter(s) IV Continuous <Continuous>  dextrose 50% Injectable 12.5 Gram(s) IV Push once  dextrose 50% Injectable 25 Gram(s) IV Push once  dextrose 50% Injectable 25 Gram(s) IV Push once  dextrose Oral Gel 15 Gram(s) Oral once PRN  diltiazem Infusion 5 mG/Hr IV Continuous <Continuous>  glucagon  Injectable 1 milliGRAM(s) IntraMuscular once  heparin   Injectable 7000 Unit(s) IV Push every 6 hours PRN  heparin   Injectable 3500 Unit(s) IV Push every 6 hours PRN  heparin  Infusion.  Unit(s)/Hr IV Continuous <Continuous>  insulin glargine Injectable (LANTUS) 12 Unit(s) SubCutaneous at bedtime  insulin lispro (ADMELOG) corrective regimen sliding scale   SubCutaneous three times a day before meals  metoprolol succinate  milliGRAM(s) Oral two times a day  metoprolol tartrate Injectable 5 milliGRAM(s) IV Push every 6 hours PRN  pantoprazole  Injectable 40 milliGRAM(s) IV Push daily  sodium chloride 0.9%. 1000 milliLiter(s) IV Continuous <Continuous>      FAMILY HISTORY:  No pertinent family history in first degree relatives        Social History:  Smoking History:  Alcohol Use:  Drug Use:    REVIEW OF SYSTEMS:  CONSTITUTIONAL: No weakness, fevers or chills  EYES/ENT: No visual changes;  No dysphagia  NECK: No pain or stiffness  RESPIRATORY: No cough, wheezing, hemoptysis; No shortness of breath  CARDIOVASCULAR: No chest pain or palpitations; No lower extremity edema  GASTROINTESTINAL: No abdominal or epigastric pain. No nausea, vomiting, or hematemesis; No diarrhea or constipation. No melena or hematochezia.  BACK: No back pain  GENITOURINARY: No dysuria, frequency or hematuria  NEUROLOGICAL: No numbness or weakness  SKIN: No itching, burning, rashes, or lesions   All other review of systems is negative unless indicated above.    Physical Exam:  T(F): 98.3 (09-22), Max: 100.5 (09-21)  HR: 148 (09-22) (82 - 160)  BP: 120/68 (09-22) (101/66 - 132/72)  RR: 18 (09-22)  SpO2: 96% (09-22)  GENERAL: No acute distress, well-developed  HEAD:  Atraumatic, Normocephalic  ENT: EOMI, PERRLA, conjunctiva and sclera clear, Neck supple, No JVD, moist mucosa  CHEST/LUNG: Clear to auscultation bilaterally; No wheeze, equal breath sounds bilaterally   BACK: No spinal tenderness  HEART: Regular rate and rhythm; No murmurs, rubs, or gallops  ABDOMEN: Soft, Nontender, Nondistended; Bowel sounds present  EXTREMITIES:  No clubbing, cyanosis, or edema  PSYCH: Nl behavior, nl affect  NEUROLOGY: AAOx3, non-focal, cranial nerves intact  SKIN: Normal color, No rashes or lesions  LINES:    Cardiovascular Diagnostic Testing:    ECG: Personally reviewed:    Echo: Personally reviewed:    Stress Testing:    Cath:    Imaging:    CXR: Personally reviewed    Labs: Personally reviewed                        12.4   17.63 )-----------( 284      ( 22 Sep 2023 13:24 )             38.5     09-22    147<H>  |  109<H>  |  56<H>  ----------------------------<  54<LL>  3.4<L>   |  24  |  2.20<H>    Ca    9.4      22 Sep 2023 06:43  Phos  3.9     09-20  Mg     1.7     09-20      PTT - ( 22 Sep 2023 13:24 )  PTT:159.8 sec    CARDIAC MARKERS ( 20 Sep 2023 16:10 )  233 ng/L / x     / x     / x     / x     / x      CARDIAC MARKERS ( 20 Sep 2023 13:22 )  268 ng/L / x     / x     / x     / x     / x                     Patient seen and evaluated at bedside    Chief Complaint: SOB    HPI:  72-year-old male PMH of HTN, HLD, T2DM, BPH, age, prior CVA in 2017 and 2021 uncertain of residual deficits, HFpEF reportedly on 20mg lasix QD (TTE from 2022 EF 59%) presented to the ED complaining of generalized weakness, found to be hypoxic by EMS (78% on RA), and requiring 6L O2 via NC to maintain SpO2 of 93% in ED. Admitted 9/20, CT Chest with multifocal PNA, started on abx. Placed on BiPAP initially, now on HFNC to maintain oxygenation. Admission ECG sinus with known LBBB.    On 9/22 around 0600 rhythm transitioned to a fib with RVR, rates 140s-160s. Found to be hypoglycemic to 50s Trialed IV Metoprolol 5mg x2 without improvement, Digoxin 250 mcg x1, started on Dilt gtt. Of note, pt NPO due to aspiration concern, pending swallow study. Was on Metoprolol Succinate 100mg daily at home, did not take for the past 2 days due to being NPO here.    PMHx:   Diabetes Mellitus Type II    HTN (Hypertension)    Hypercholesterolemia    BPH with elevated PSA    Stroke        PSHx:   No significant past surgical history    S/P colonoscopy        Allergies:  No Known Allergies      Home Meds:  Home Medications:  amLODIPine 5 mg oral tablet: 1 tab(s) orally once a day (20 Sep 2023 21:52)  atorvastatin 80 mg oral tablet: 1 tab(s) orally once a day (20 Sep 2023 21:52)  cholecalciferol 25 mcg (1000 intl units) oral tablet: 2 tab(s) orally once a day (20 Sep 2023 21:51)  clopidogrel 75 mg oral tablet: 1 tab(s) orally once a day (20 Sep 2023 21:52)  fosinopril 40 mg oral tablet: 1 tab(s) orally once a day (20 Sep 2023 21:52)  furosemide 20 mg oral tablet: 1 tab(s) orally once a day (20 Sep 2023 21:52)  glipiZIDE 10 mg oral tablet, extended release: 1 tab(s) orally once a day (20 Sep 2023 21:52)  Jardiance 25 mg oral tablet: 1 tab(s) orally once a day (20 Sep 2023 21:52)  metFORMIN 1000 mg oral tablet: 1 tab(s) orally 2 times a day (20 Sep 2023 21:52)  metoprolol succinate 100 mg oral tablet, extended release: 2 tab(s) orally 2 times a day (20 Sep 2023 21:52)  Mounjaro 10 mg/0.5 mL subcutaneous solution: 10 milligram(s) subcutaneously once a week on mondays (20 Sep 2023 21:52)  omeprazole 20 mg oral delayed release capsule: 1 cap(s) orally once a day (20 Sep 2023 21:52)      Current Medications:   acetylcysteine 20%  Inhalation 3 milliLiter(s) Inhalation every 6 hours  albuterol/ipratropium for Nebulization 3 milliLiter(s) Nebulizer every 6 hours  amLODIPine   Tablet 5 milliGRAM(s) Oral daily  atorvastatin 80 milliGRAM(s) Oral at bedtime  azithromycin  IVPB      azithromycin  IVPB 500 milliGRAM(s) IV Intermittent every 24 hours  cefTRIAXone   IVPB      cefTRIAXone   IVPB 1000 milliGRAM(s) IV Intermittent every 24 hours  chlorhexidine 2% Cloths 1 Application(s) Topical <User Schedule>  clopidogrel Tablet 75 milliGRAM(s) Oral daily  dextrose 5%. 1000 milliLiter(s) IV Continuous <Continuous>  dextrose 5%. 1000 milliLiter(s) IV Continuous <Continuous>  dextrose 50% Injectable 12.5 Gram(s) IV Push once  dextrose 50% Injectable 25 Gram(s) IV Push once  dextrose 50% Injectable 25 Gram(s) IV Push once  dextrose Oral Gel 15 Gram(s) Oral once PRN  diltiazem Infusion 5 mG/Hr IV Continuous <Continuous>  glucagon  Injectable 1 milliGRAM(s) IntraMuscular once  heparin   Injectable 7000 Unit(s) IV Push every 6 hours PRN  heparin   Injectable 3500 Unit(s) IV Push every 6 hours PRN  heparin  Infusion.  Unit(s)/Hr IV Continuous <Continuous>  insulin glargine Injectable (LANTUS) 12 Unit(s) SubCutaneous at bedtime  insulin lispro (ADMELOG) corrective regimen sliding scale   SubCutaneous three times a day before meals  metoprolol succinate  milliGRAM(s) Oral two times a day  metoprolol tartrate Injectable 5 milliGRAM(s) IV Push every 6 hours PRN  pantoprazole  Injectable 40 milliGRAM(s) IV Push daily  sodium chloride 0.9%. 1000 milliLiter(s) IV Continuous <Continuous>      FAMILY HISTORY:  No pertinent family history in first degree relatives        Social History:  As per H&P    REVIEW OF SYSTEMS:  All other review of systems is negative unless indicated above.    Physical Exam:  T(F): 98.3 (09-22), Max: 100.5 (09-21)  HR: 148 (09-22) (82 - 160)  BP: 120/68 (09-22) (101/66 - 132/72)  RR: 18 (09-22)  SpO2: 96% (09-22)  GENERAL: Comfortable in bed, HFNC in place  ENT: EOMI, PERRLA, conjunctiva and sclera clear, Neck supple, No JVD, moist mucosa  CHEST/LUNG: Diffuse rhonchi  HEART: Irregularly irregular; No murmurs, rubs, or gallops  ABDOMEN: Soft, Nontender, Nondistended; Bowel sounds present  EXTREMITIES:  No clubbing, cyanosis, or edema  NEUROLOGY: AAOx3, non-focal, cranial nerves intact  SKIN: Normal color, No rashes or lesions  LINES:    Cardiovascular Diagnostic Testing:    ECG: Personally reviewed: a fib with RVR, LBBB    Echo: Personally reviewed: pending    Stress Testing:    Cath:    Imaging:    CXR: Personally reviewed    Labs: Personally reviewed                        12.4   17.63 )-----------( 284      ( 22 Sep 2023 13:24 )             38.5     09-22    147<H>  |  109<H>  |  56<H>  ----------------------------<  54<LL>  3.4<L>   |  24  |  2.20<H>    Ca    9.4      22 Sep 2023 06:43  Phos  3.9     09-20  Mg     1.7     09-20      PTT - ( 22 Sep 2023 13:24 )  PTT:159.8 sec    CARDIAC MARKERS ( 20 Sep 2023 16:10 )  233 ng/L / x     / x     / x     / x     / x      CARDIAC MARKERS ( 20 Sep 2023 13:22 )  268 ng/L / x     / x     / x     / x     / x

## 2023-09-22 NOTE — CONSULT NOTE ADULT - ATTENDING COMMENTS
seen and agree  Af with RVR in setting of pneumonia  patients metoprolol was held (NPO)    would restart IV metoprolol q6  titrate dilt drip  AC with DOAC  Consider SANDI/CV Monday Abdominal pain x several weeks

## 2023-09-22 NOTE — SWALLOW FEES ASSESSMENT ADULT - COMMENTS
Pt admitted with multi focal Pneumonia and Acute respiratory failure with hypoxia and hypercapnia. Pt is followed by Pulmonary. Pt initially placed on Bipap in ED. 9/21/23 Pt placed back on bipap, pt was desating to 88-90 on 6LNC; Pt then switched back to Nasal canula in the afternoon. Initial swallowing evaluation recommended NPO, with non-oral nutrition/hydration/medications.  Per Nephrology: CKD - stage 3, with nephrotic-range proteinuria - likely due to diabetic nephropathy; BEAR - mild - likely prerenally mediated.   9/20/23 CT Chest: Impressions :No pulmonary embolus. Consolidations and nodular opacities within bilateral lower lobes, right middle lobe and lingula representing multifocal pneumonia. Recommend follow-up chest CT in 1-3 months to determine resolution. Recd: follow-up chest CT in 1-3 months to determine resolution. Pt and son at b/s. Pt eager to eat/drink. Decreased awareness/sensation for pharyngeal residue; cued for repeat swallows. Pt followed commands and verbalized simple needs/wants.

## 2023-09-22 NOTE — PROGRESS NOTE ADULT - PROBLEM SELECTOR PLAN 1
CTA chest with multifocal PNA  -Continue ceftriaxone/azithromycin  -Urine legionella negative   -Continue Duoneb q6h   -Continue Mucomyst 20% 3cc q6h (give with Duoneb)  -Chest PT q6h  -MRSA/MSSA nasal PCR negative   -F/u sputum culture  -ID f/u  -Speech f/u to r/o aspiration

## 2023-09-22 NOTE — CONSULT NOTE ADULT - PROBLEM SELECTOR RECOMMENDATION 2
Suggest to continue medications, monitoring, FU primary team recommendations.
2nd to PNA  -CTA chest neg PE  -Initially requiring continuous bipap on admission   -Bipap changed to 12/5/50% qHS and PRN daytime for increased WOB  -VBG in AM  -O2 increased from 4LNC to 6LNC. Keep sats >90% with o2. If sats <90% on 6LNC, start HFNC.
IV abx   Pulm consulted

## 2023-09-22 NOTE — SWALLOW FEES ASSESSMENT ADULT - PRELIMINARY ENDOSCOPIC EXAMINATIONS
Baseline cough in absence of laryngeal penetration or aspiration of secretions./Baseline pooling of secretions

## 2023-09-22 NOTE — PROGRESS NOTE ADULT - ASSESSMENT
Patient is a 72 year old male with PMH of HTN, HLD, T2DM, BPH, age, prior CVA in 2017 and 2021 uncertain of residual deficits, HFpEF reportedly on 20mg lasix QD (TTE from 2022 EF 59%) presents to the ED complaining of generalized weakness today, feeling generally unwell for several days is unable to quantify how many days or specific symptomatology.  Patient reports feeling some shortness of breath today was found to be hypoxic by EMS (78% on RA), and requiring 6L O2 via NC to maintain SpO2 of 93% in ED.    AHRF due to multifocal pneumonia   Leukocytosis likely due to above   - s/p BiPAP-now on HFNC, afebrile Tm 100F, WBC trend noted, overnight with afib with RVR  - RVP/COVID negative   - CXR reported LLL atelectasis vs consolidation   - CTA chest with no PE; noted with consolidations and nodular opacities in b/l LLs, RML and lingula   - MRSA PCR screen negative  - Legionella and Strep pneumoniae ur ags negative   - per wife has not noted any s/s concerning for aspiration   - s/p cefepime, vancomycin, doxycycline in ER > ceftriaxone and azithromycin   - prior cultures noted, no significant growth    Recommendations  Follow blood cultures - NGTD x2   Follow sputum culture - in process   Continue ceftriaxone and azithromycin for now   Pulmonary following, on nebs, chest PT, mucomyst  Nephrology following for BEAR  SLP evaluation, aspiration precautions   Monitor temps/WBC, Cr   Continue rest of care per primary team    Over the weekend Dr. Yara Queen will be covering for our group. If you have any questions, concerns or new micro data, please reach out to them at [phone]     D/w spouse and son at bedside  Vinicio Steve M.D.  [facility], Division of Infectious Diseases  [phone]  After 5pm on weekdays and all day on weekends - please call [phone]

## 2023-09-22 NOTE — PROGRESS NOTE ADULT - SUBJECTIVE AND OBJECTIVE BOX
Follow-up Pulm Progress Note    overnight events noted  afib with RVR  bipap worn overnight  feeling a little better today, able to expectorate some secretions now  denies SOB/CP  Sats mid 90s on HFNC 50L/60%, settings changed to 45L/50%        Medications:  MEDICATIONS  (STANDING):  acetylcysteine 20%  Inhalation 3 milliLiter(s) Inhalation every 6 hours  albuterol/ipratropium for Nebulization 3 milliLiter(s) Nebulizer every 6 hours  amLODIPine   Tablet 5 milliGRAM(s) Oral daily  atorvastatin 80 milliGRAM(s) Oral at bedtime  azithromycin  IVPB      azithromycin  IVPB 500 milliGRAM(s) IV Intermittent every 24 hours  cefTRIAXone   IVPB      cefTRIAXone   IVPB 1000 milliGRAM(s) IV Intermittent every 24 hours  clopidogrel Tablet 75 milliGRAM(s) Oral daily  dextrose 5%. 1000 milliLiter(s) (100 mL/Hr) IV Continuous <Continuous>  dextrose 5%. 1000 milliLiter(s) (50 mL/Hr) IV Continuous <Continuous>  dextrose 50% Injectable 12.5 Gram(s) IV Push once  dextrose 50% Injectable 25 Gram(s) IV Push once  dextrose 50% Injectable 25 Gram(s) IV Push once  glucagon  Injectable 1 milliGRAM(s) IntraMuscular once  heparin  Infusion.  Unit(s)/Hr (16 mL/Hr) IV Continuous <Continuous>  insulin glargine Injectable (LANTUS) 18 Unit(s) SubCutaneous at bedtime  insulin lispro (ADMELOG) corrective regimen sliding scale   SubCutaneous three times a day before meals  insulin lispro Injectable (ADMELOG) 6 Unit(s) SubCutaneous three times a day before meals  metoprolol succinate  milliGRAM(s) Oral two times a day  pantoprazole  Injectable 40 milliGRAM(s) IV Push daily  potassium chloride    Tablet ER 40 milliEquivalent(s) Oral every 4 hours    MEDICATIONS  (PRN):  dextrose Oral Gel 15 Gram(s) Oral once PRN Blood Glucose LESS THAN 70 milliGRAM(s)/deciliter  heparin   Injectable 3500 Unit(s) IV Push every 6 hours PRN For aPTT between 40 - 57  heparin   Injectable 7000 Unit(s) IV Push every 6 hours PRN For aPTT less than 40  metoprolol tartrate Injectable 5 milliGRAM(s) IV Push every 6 hours PRN HR > 110          Vital Signs Last 24 Hrs  T(C): 36.7 (22 Sep 2023 04:31), Max: 38.6 (21 Sep 2023 13:40)  T(F): 98.1 (22 Sep 2023 04:31), Max: 101.5 (21 Sep 2023 13:40)  HR: 150 (22 Sep 2023 10:02) (82 - 155)  BP: 101/66 (22 Sep 2023 09:46) (101/66 - 132/72)  BP(mean): --  RR: 18 (22 Sep 2023 04:31) (18 - 33)  SpO2: 95% (22 Sep 2023 09:46) (94% - 98%)    Parameters below as of 22 Sep 2023 09:46  Patient On (Oxygen Delivery Method): nasal cannula, high flow  O2 Flow (L/min): 50  O2 Concentration (%): 60      VBG pH 7.35 09-22 @ 07:19    VBG pCO2 52 09-22 @ 07:19    VBG O2 sat 87.1 09-22 @ 07:19    VBG lactate -- 09-22 @ 07:19  VBG pH 7.34 09-21 @ 07:02    VBG pCO2 52 09-21 @ 07:02    VBG O2 sat 65.9 09-21 @ 07:02    VBG lactate 2.9 09-21 @ 07:02  VBG pH 7.26 09-20 @ 16:57    VBG pCO2 62 09-20 @ 16:57    VBG O2 sat 37.0 09-20 @ 16:57    VBG lactate 3.3 09-20 @ 16:57  VBG pH -- 09-20 @ 15:45    VBG pCO2 -- 09-20 @ 15:45    VBG O2 sat -- 09-20 @ 15:45    VBG lactate 4.0 09-20 @ 15:45  VBG pH 7.21 09-20 @ 13:05    VBG pCO2 67 09-20 @ 13:05    VBG O2 sat 14.1 09-20 @ 13:05    VBG lactate 5.7 09-20 @ 13:05      09-21 @ 07:01  -  09-22 @ 07:00  --------------------------------------------------------  IN: 0 mL / OUT: 700 mL / NET: -700 mL          LABS:                        12.1   16.31 )-----------( 284      ( 22 Sep 2023 06:43 )             38.1     09-22    147<H>  |  109<H>  |  56<H>  ----------------------------<  54<LL>  3.4<L>   |  24  |  2.20<H>    Ca    9.4      22 Sep 2023 06:43  Phos  3.9     09-20  Mg     1.7     09-20    TPro  6.9  /  Alb  3.4  /  TBili  0.8  /  DBili  x   /  AST  29  /  ALT  13  /  AlkPhos  67  09-20          CAPILLARY BLOOD GLUCOSE      POCT Blood Glucose.: 149 mg/dL (22 Sep 2023 06:59)    PT/INR - ( 20 Sep 2023 13:22 )   PT: 12.3 sec;   INR: 1.18 ratio         PTT - ( 22 Sep 2023 07:06 )  PTT:41.1 sec  Urinalysis Basic - ( 22 Sep 2023 06:43 )    Color: x / Appearance: x / SG: x / pH: x  Gluc: 54 mg/dL / Ketone: x  / Bili: x / Urobili: x   Blood: x / Protein: x / Nitrite: x   Leuk Esterase: x / RBC: x / WBC x   Sq Epi: x / Non Sq Epi: x / Bacteria: x                      CULTURES:     Culture - Blood (collected 09-20-23 @ 13:15)  Source: .Blood Blood-Peripheral  Preliminary Report (09-21-23 @ 18:01):    No growth at 24 hours    Culture - Blood (collected 09-20-23 @ 13:05)  Source: .Blood Blood-Peripheral  Preliminary Report (09-21-23 @ 18:01):    No growth at 24 hours        Culture - Urine (collected 09-20-23 @ 15:16)  Source: Clean Catch Clean Catch (Midstream)  Final Report (09-21-23 @ 16:21):    No growth              Physical Examination:  PULM: bibasilar rhonchi   CVS: S1, S2 heard    RADIOLOGY REVIEWED    CT chest:    < from: CT Angio Chest PE Protocol w/ IV Cont (09.20.23 @ 15:15) >  FINDINGS:    PULMONARY VESSELS: No pulmonary embolus. Main pulmonary artery normal in   diameter.    HEART AND VASCULATURE: Heart size is normal. No pericardial effusion. No   aortic aneurysm or dissection. Mild coronary and aortic calcifications.    LUNGS, AIRWAYS, PLEURA: Patent central airways. Consolidations and   nodular opacities within bilateral lower lobes, right middle lobe and   lingula representing multifocal pneumonia. No pleural effusions or   pneumothorax.    MEDIASTINUM: Mildly enlarged subcarinal lymph node measuring 1.5 cm short   axis.    UPPER ABDOMEN: Within normal limits.    BONES AND SOFT TISSUES: No aggressive osseous lesion.    LOWER NECK: Within normal limits.    IMPRESSION:    No pulmonary embolus.    Consolidations and nodular opacities within bilateral lower lobes, right   middle lobe and lingula representing multifocal pneumonia. Recommend   follow-up chest CT in 1-3 months to determine resolution.    < end of copied text >

## 2023-09-22 NOTE — PROGRESS NOTE ADULT - ASSESSMENT
72F Hx HTN, T2DM, CVAs x 2 (2017 and 2021), HFrEF 59%, BPH p/w ED Generalized Weakness, SOB and WOB started on AFMO2 escalated to BiPAP Support with CXR c/w Lt Retrocardiac Infiltrates R/O Aspiration.     - Acute Hypoxic Hypercarbic Respiratory Failure on BiPAP Support  2/2 Multifocal PNA 2/2 Aspiration, presumed  - seen by MICU on admission, not a candidate   9/21 ptn is more awake, states he is hungry today, on BIPAP at present since shortly after removing BIPAP this am and after a meal became hypoxic again. Im concerned he is aspirating.   - need a speech eval and R/O CVA. seen by ID, Pulm, Renal, awaiting Neuro. ABx changed to CTX/Azithro. ptn spiked a temp, blood Cx re drawn. VSS otherwise. no events on tele  - DUonebs, Mucomyst, Chest PT, sputum Cx  - awaiting Legionella and S. Pneumoniae U Ag, awaiting MRSA/MSSA PCR  - nell, off IVF, given IV Lasix. no ARBS /ACE-I for now  - check TTE, check MR brain  - DM , on Insulin on a SS  9/22:  ptn developed overnight Afib w RVR, presently is in NSR. he was given IV DIgoxin, Norvasc DCed, started Cardizem qid, toprol changed to Loprssor 50 qid. these changes made since pills need to be crushed and given w food. Ptn was initially place on Heparin drip since he was NPO, once cleared by speech for a pureed diet: placed on Eliquis. all meds to be crushed and given w food. there is a concern ptn may have had an embolic stroke 2/2 PAF. concern for a brainstem CVA w airway compromise. awaiting MRI brain. seen by neuro and  cardiology. he is stable on HF O2, pulm folloiwng, on ABx for aspiration PNA. TTE w decreased EF but done while in Afib w RVR, EF was 40-45%. WOuld need to rpt  - GI ppx w PPI

## 2023-09-22 NOTE — PROGRESS NOTE ADULT - SUBJECTIVE AND OBJECTIVE BOX
Overnight events noted      VITAL:  T(C): , Max: 38.6 (09-21-23 @ 13:40)  T(F): , Max: 101.5 (09-21-23 @ 13:40)  HR: 90s-160s  BP: 130/56 (09-22-23 @ 11:26)  BP(mean): --  RR: 18 (09-22-23 @ 04:31)  SpO2: 96% (09-22-23 @ 11:26)  Wt(kg): --      PHYSICAL EXAM:  Constitutional: Lethargic/confused  HEENT: (+)DMM, (+)BIPAP  Neck: Supple, No JVD  Respiratory: (+)b/l rhonchi  Cardiovascular: tachy irreg s1s2  Gastrointestinal: BS+, soft, NT/ND  Extremities: No peripheral edema b/l  Neurological: reduced generalized strength  Back: no CVAT b/l  Skin: No rashes, no nevi      LABS:                        12.1   16.31 )-----------( 284      ( 22 Sep 2023 06:43 )             38.1     Na(147)/K(3.4)/Cl(109)/HCO3(24)/BUN(56)/Cr(2.20)Glu(54)/Ca(9.4)/Mg(--)/PO4(--)    09-22 @ 06:43  Na(142)/K(4.3)/Cl(103)/HCO3(24)/BUN(47)/Cr(1.98)Glu(197)/Ca(9.7)/Mg(--)/PO4(--)    09-21 @ 07:02  Na(142)/K(3.9)/Cl(102)/HCO3(22)/BUN(41)/Cr(1.82)Glu(221)/Ca(9.8)/Mg(1.7)/PO4(3.9)    09-20 @ 16:57  Na(142)/K(4.3)/Cl(101)/HCO3(21)/BUN(40)/Cr(1.90)Glu(230)/Ca(10.0)/Mg(--)/PO4(--)    09-20 @ 13:22    Sodium, Random Urine: 16 mmol/L (09-21 @ 18:51)  Chloride, Random Urine: 23 mmol/L (09-21 @ 18:51)  Potassium, Random Urine: 44 mmol/L (09-21 @ 18:51)  Creatinine, Random Urine: 88 mg/dL (09-21 @ 18:51)  Uprot 164mg/dL  FENa 0.2%    VBG 7.35/52/56/29    IMAGING:  < from: US Kidney and Bladder (09.21.23 @ 13:58) >  Right kidney: 11.7. No renal mass, hydronephrosis or calculi.  Left kidney: 12.5. No renal mass, hydronephrosis or calculi.  Urinary bladder: Within normal limits.    < from: CT Angio Chest PE Protocol w/ IV Cont (09.20.23 @ 15:15) >  No pulmonary embolus.  Consolidations and nodular opacities within bilateral lower lobes, right   middle lobe and lingula representing multifocal pneumonia. Recommend   follow-up chest CT in 1-3 months to determine resolution.        IMPRESSION: 72M w/ HTN, DM2, CKD3, BPH, CVA, and HFpEF, 9/20/23 p/w multifocal PNA    (1)CKD - stage 3, with ~2gm proteinuria - likely due to diabetic nephropathy    (2)BEAR - mild - likely prerenally mediated    (3)CV - urine studies c/w prerenal azotemia; numbers worsening further as of today. Did receive 1 dose of IV Lasix yesterday, as well as gentle IVF. S/p CTPA; FENa can be low from contrast nephropathy...that being said, the BEAR was already developing prior to the CTPA. Likely mildly dry.    (4)ID/Pulm- multifocal pneumonia - ID and Pulm on board; on broad-spectrum abx; on BIPAP; on Duoneb/Mucomyst; getting chest PT    (5)EP - rapid afib - Cardiology on board - receiving beta-blockade      RECOMMEND:  (1)NS 50cc/h x 1L  (2)No diuretics for now  (3)No ACEI/ARB for now  (4)Dose new meds for GFR 20-30ml/min (present dosing is acceptable)  (5)BMP+Mg+PO4 daily        Rohit Magallanes MD  [facility]  Office/on call physician: [phone]  Cell (7a-7p): [phone]       Family at bedside  Patient feeling better today; less SOB (on HFNCO2); less malaise      VITAL:  T(C): , Max: 38.6 (09-21-23 @ 13:40)  T(F): , Max: 101.5 (09-21-23 @ 13:40)  HR: 90s-160s  BP: 130/56 (09-22-23 @ 11:26)  RR: 18 (09-22-23 @ 04:31)  SpO2: 96% (09-22-23 @ 11:26)  Wt(kg): --      PHYSICAL EXAM:  Constitutional: Lethargic but alert; NAD on HFNCO2  HEENT: (+)DMM,   Neck: Supple, No JVD  Respiratory: (+)b/l rhonchi  Cardiovascular: tachy irreg s1s2  Gastrointestinal: BS+, soft, NT/ND  Extremities: No peripheral edema b/l  Neurological: reduced generalized strength  Back: no CVAT b/l  Skin: No rashes, no nevi      LABS:                        12.1   16.31 )-----------( 284      ( 22 Sep 2023 06:43 )             38.1     Na(147)/K(3.4)/Cl(109)/HCO3(24)/BUN(56)/Cr(2.20)Glu(54)/Ca(9.4)/Mg(--)/PO4(--)    09-22 @ 06:43  Na(142)/K(4.3)/Cl(103)/HCO3(24)/BUN(47)/Cr(1.98)Glu(197)/Ca(9.7)/Mg(--)/PO4(--)    09-21 @ 07:02  Na(142)/K(3.9)/Cl(102)/HCO3(22)/BUN(41)/Cr(1.82)Glu(221)/Ca(9.8)/Mg(1.7)/PO4(3.9)    09-20 @ 16:57  Na(142)/K(4.3)/Cl(101)/HCO3(21)/BUN(40)/Cr(1.90)Glu(230)/Ca(10.0)/Mg(--)/PO4(--)    09-20 @ 13:22    Sodium, Random Urine: 16 mmol/L (09-21 @ 18:51)  Chloride, Random Urine: 23 mmol/L (09-21 @ 18:51)  Potassium, Random Urine: 44 mmol/L (09-21 @ 18:51)  Creatinine, Random Urine: 88 mg/dL (09-21 @ 18:51)  Uprot 164mg/dL  FENa 0.2%    VBG 7.35/52/56/29    IMAGING:  < from: US Kidney and Bladder (09.21.23 @ 13:58) >  Right kidney: 11.7. No renal mass, hydronephrosis or calculi.  Left kidney: 12.5. No renal mass, hydronephrosis or calculi.  Urinary bladder: Within normal limits.    < from: CT Angio Chest PE Protocol w/ IV Cont (09.20.23 @ 15:15) >  No pulmonary embolus.  Consolidations and nodular opacities within bilateral lower lobes, right   middle lobe and lingula representing multifocal pneumonia. Recommend   follow-up chest CT in 1-3 months to determine resolution.        IMPRESSION: 72M w/ HTN, DM2, CKD3, BPH, CVA, and HFpEF, 9/20/23 p/w multifocal PNA    (1)CKD - stage 3, with ~2gm proteinuria - likely due to diabetic nephropathy    (2)BEAR - mild - likely prerenally mediated    (3)Hypernatremia - in setting of insensible losses/limited free water intake by mouth - not overly concerning for now    (4)Hypokalemia - whole body deficit due to limited intake/Lasix - repleted orally    (5)CV - urine studies c/w prerenal azotemia; numbers worsening further as of today. Did receive 1 dose of IV Lasix yesterday, as well as gentle IVF. S/p CTPA; FENa can be low from contrast nephropathy...that being said, the BEAR was already developing prior to the CTPA. Likely mildly dry.    (6)ID/Pulm- multifocal pneumonia - ID and Pulm on board; on broad-spectrum abx; on BIPAP; on Duoneb/Mucomyst; getting chest PT    (7)EP - rapid afib - Cardiology on board - receiving beta-blockade      RECOMMEND:  (1)NS 50cc/h x 1L  (2)K+ repletion as ordered  (3)No diuretics for now  (4)No ACEI/ARB for now  (5)Dose new meds for GFR 20-30ml/min (present dosing is acceptable)  (6)BMP+Mg+PO4 daily  (7)CXR in a.m.      Rohit Magallanes MD  [facility]  Office/on call physician: [phone]  Cell (7a-7p): [phone]

## 2023-09-22 NOTE — PROGRESS NOTE ADULT - PROBLEM SELECTOR PLAN 1
Will decrease Lantus to 12 u at bedtime.  Will stop premeal insulin   Admelog correction scale coverage. Will continue monitoring FS and Follow up.

## 2023-09-22 NOTE — SWALLOW FEES ASSESSMENT ADULT - SLP PERTINENT HISTORY OF CURRENT PROBLEM
72 year old male with PMH of HTN, HLD, T2DM, BPH, age, prior CVA in 2017 and 2021 uncertain of residual deficits, HFpEF reportedly on 20mg lasix QD (TTE from 2022 EF 59%) presented to the ED complaining of generalized weakness today, feeling generally unwell for several days is unable to quantify how many days or specific symptomatology.  Patient reported feeling some shortness of breath on day of admit; hypoxic by EMS (78% on RA), and requiring 6L O2 via NC to maintain SpO2 of 93% in ED. Patient also reported blood glucose at home by EMS; reportedly Pt did not take any of his antidiabetic medication for at least a days pta due to feeling unwell and had not taken his daily Lasix.  Patient noted possibly some URI symptoms over the past few days. Pt admitted with multi focal Pneumonia and Acute respiratory failure with hypoxia and hypercapnia. Pt is followed by Pulmonary. Pt initially placed on Bipap in ED.

## 2023-09-22 NOTE — SWALLOW FEES ASSESSMENT ADULT - DIAGNOSTIC IMPRESSIONS
Pt is a 73 y/o male admitted with multi focal pneumonia with hx as above, episodes of hypoxia (supplemental 02 via high flow) and Afib who presents with moderate to significant oropharyngeal dysphagia which may be superimposed upon by decreased respiratory status and high flow requirements. A-p spillage and laryngeal penetration were evident prior to swallowsbut appeared to be retrieved with chin tuck posture on tspn trials of purees and tspn of moderately thickened liquids. Aspiration was evident on one trial, post swallow, with alternation of textures however aspiration was not evident with chin tuck posture and tspn amounts of the most conservative textures. Pharyngeal residue was effectively reduced with cued repeat swallows. Formation, control and transfer of bolus are impaired. Disorders include: reduced lingual strength/ROM/Rate of motion, reduced BOT to posterior pharyngeal wall contact, delay in trigger of the swallow reflex, reduced laryngeal closure, reduced pharyngeal contractility, reduced supraglottic sensation, abd reduced subglottic sensation.

## 2023-09-22 NOTE — CONSULT NOTE ADULT - ASSESSMENT
72-year-old male PMH of HTN, HLD, T2DM, BPH, age, prior CVA in 2017 and 2021 uncertain of residual deficits, HFpEF reportedly on 20mg lasix QD (TTE from 2022 EF 59%) presented to the ED with multifocal PNA. Course c/b new a fib with RVR starting 9/22 at 0600, likely secondary to infectious process.    Recommendations:  - Continue tele monitoring  - Would hold further Digoxin in the setting of renal dysfunction  - Cont Dilt gtt, increase dose if BP tolerates to achieve rate control  - Would give IV Metoprolol 5mg q6 with hold parameters to avoid withdrawal tachycardia from holding home PO Metoprolol  - If BP borderline, can give IV Amio bolus (needs central line) as has been in fib <48h. If unstable, please emergently perform DCCV   - Continue AC with heparin gtt for now, can transition to DOAC when able to swallow     Case discussed with EP attending Dr Christianson

## 2023-09-22 NOTE — SWALLOW FEES ASSESSMENT ADULT - RECOMMENDED CONSISTENCY
Purees with tspn of moderately thickened liquids with chin tuck posture and teaspoon amounts only; allow for repeat swallows.

## 2023-09-22 NOTE — PROGRESS NOTE ADULT - SUBJECTIVE AND OBJECTIVE BOX
[facility] DISEASES  CINTIA Razo Y. Patel, S. Shah, G. Michael  [phone]  ([phone] - weekdays after 5pm and weekends)    Name: FRANKO SPANN  Age/Gender: 72y Male  MRN: [id number]    Interval History:  Patient seen and examined this morning.   Remains on HFNC, feels hungry.   Wife and son at bedside.   Notes reviewed. Afebrile.   Allergies: No Known Allergies      Objective:  Vitals:   T(F): 98.3 (09-22-23 @ 12:14), Max: 100 (09-21-23 @ 22:43)  HR: 85 (09-22-23 @ 16:24) (80 - 160)  BP: 122/68 (09-22-23 @ 16:24) (101/66 - 132/72)  RR: 18 (09-22-23 @ 16:24) (18 - 18)  SpO2: 97% (09-22-23 @ 16:24) (94% - 98%)  Physical Examination:  General: no acute distress, HFNC  HEENT: NC/AT, EOMI, anicteric, neck supple  Cardio: S1, S2 present, normal rate  Resp: decreased breath sounds b/l  Abd: soft, NT, ND, + BS  Neuro: AAOx3, no obvious focal deficits  Ext: edema, no cyanosis, moving extremities  Skin: warm, dry, no visible rash  Psych: appropriate affect and mood for situation  Lines: PIV    Laboratory Studies:  CBC:                       12.4   17.63 )-----------( 284      ( 22 Sep 2023 13:24 )             38.5     WBC Trend:  17.63 09-22-23 @ 13:24  16.31 09-22-23 @ 06:43  15.05 09-21-23 @ 07:02  13.26 09-20-23 @ 13:22    CMP: 09-22    147<H>  |  109<H>  |  56<H>  ----------------------------<  54<LL>  3.4<L>   |  24  |  2.20<H>    Ca    9.4      22 Sep 2023 06:43      Creatinine: 2.20 mg/dL (09-22-23 @ 06:43)  Creatinine: 1.98 mg/dL (09-21-23 @ 07:02)  Creatinine: 1.82 mg/dL (09-20-23 @ 16:57)  Creatinine: 1.90 mg/dL (09-20-23 @ 13:22)    Microbiology: reviewed   Culture - Urine (collected 09-20-23 @ 15:16)  Source: Clean Catch Clean Catch (Midstream)  Final Report (09-21-23 @ 16:21):    No growth    Culture - Blood (collected 09-20-23 @ 13:15)  Source: .Blood Blood-Peripheral  Preliminary Report (09-21-23 @ 18:01):    No growth at 24 hours    Culture - Blood (collected 09-20-23 @ 13:05)  Source: .Blood Blood-Peripheral  Preliminary Report (09-21-23 @ 18:01):    No growth at 24 hours    Radiology: reviewed   < from: US Kidney and Bladder (09.21.23 @ 13:58) >  IMPRESSION:    Normal renal ultrasound.    < end of copied text >  < from: CT Angio Chest PE Protocol w/ IV Cont (09.20.23 @ 15:15) >  IMPRESSION:    No pulmonary embolus.    Consolidations and nodular opacities within bilateral lower lobes, right   middle lobe and lingula representing multifocal pneumonia. Recommend   follow-up chest CT in 1-3 months to determine resolution.    < end of copied text >    Medications:  acetylcysteine 20%  Inhalation 3 milliLiter(s) Inhalation every 6 hours  albuterol/ipratropium for Nebulization 3 milliLiter(s) Nebulizer every 6 hours  amLODIPine   Tablet 5 milliGRAM(s) Oral daily  atorvastatin 80 milliGRAM(s) Oral at bedtime  azithromycin  IVPB      azithromycin  IVPB 500 milliGRAM(s) IV Intermittent every 24 hours  cefTRIAXone   IVPB 1000 milliGRAM(s) IV Intermittent every 24 hours  cefTRIAXone   IVPB      chlorhexidine 2% Cloths 1 Application(s) Topical <User Schedule>  clopidogrel Tablet 75 milliGRAM(s) Oral daily  dextrose 5%. 1000 milliLiter(s) IV Continuous <Continuous>  dextrose 5%. 1000 milliLiter(s) IV Continuous <Continuous>  dextrose 50% Injectable 12.5 Gram(s) IV Push once  dextrose 50% Injectable 25 Gram(s) IV Push once  dextrose 50% Injectable 25 Gram(s) IV Push once  dextrose Oral Gel 15 Gram(s) Oral once PRN  diltiazem Infusion 5 mG/Hr IV Continuous <Continuous>  glucagon  Injectable 1 milliGRAM(s) IntraMuscular once  heparin   Injectable 7000 Unit(s) IV Push every 6 hours PRN  heparin   Injectable 3500 Unit(s) IV Push every 6 hours PRN  heparin  Infusion.  Unit(s)/Hr IV Continuous <Continuous>  insulin glargine Injectable (LANTUS) 12 Unit(s) SubCutaneous at bedtime  insulin lispro (ADMELOG) corrective regimen sliding scale   SubCutaneous three times a day before meals  metoprolol succinate  milliGRAM(s) Oral two times a day  metoprolol tartrate Injectable 5 milliGRAM(s) IV Push every 6 hours PRN  pantoprazole  Injectable 40 milliGRAM(s) IV Push daily  sodium chloride 0.9%. 1000 milliLiter(s) IV Continuous <Continuous>    Current Antimicrobials:  azithromycin  IVPB      azithromycin  IVPB 500 milliGRAM(s) IV Intermittent every 24 hours  cefTRIAXone   IVPB 1000 milliGRAM(s) IV Intermittent every 24 hours  cefTRIAXone   IVPB        Prior/Completed Antimicrobials:  azithromycin  IVPB  cefepime   IVPB  cefTRIAXone   IVPB  doxycycline IVPB  vancomycin  IVPB.

## 2023-09-22 NOTE — CHART NOTE - NSCHARTNOTEFT_GEN_A_CORE
MEDICINE PA EPISODIC NOTE    Notified by RN pt with afib RVR on tele with rate to 160s. 12 lead ECG confirmed afib RVR. Pt seen at bedside. Pt asymptomatic, resting comfortably on Bipap.   Blood glucose 53, given D50 amp, improved  Discussed with attending Dr. Bettencourt, advised to start heparin gtt  - Endorse to day team MEDICINE PA EPISODIC NOTE    Notified by RN pt with afib RVR on tele with rate to 160s. 12 lead ECG confirmed Afib RVR. Pt seen at bedside. Pt asymptomatic, resting comfortably on Bipap.   Blood glucose 53, given D50 amp, improved to 149.   Plan:  - IV lopressor for rate control  - start heparin gtt  - discussed above plan with attending Dr. Bettencourt  - Endorse to day team

## 2023-09-22 NOTE — CONSULT NOTE ADULT - ASSESSMENT
71 y/o M with PMH of CAD/MI HTN, HLD, T2DM, BPH, CVA in 2017 and 2021 uncertain of residual deficits, HFpEF reportedly on 20mg lasix QD (TTE from 2022 EF 59%). Presents with c/o generalized weakness, feeling generally unwell for several days is unable to quantify how many days or specific symptomatology. Endorses some SOB, cough. Found to be hypoxic by EMS (78% on RA). CTA chest neg PE, + multifocal PNA. Initially requiring bipap in ED. Endorses difficult to expectorate sputum. Denies fevers, chills, CP, pleuritic CP  Converted to Afib RVR this am   Denies any known history of this

## 2023-09-23 LAB
ANION GAP SERPL CALC-SCNC: 13 MMOL/L — SIGNIFICANT CHANGE UP (ref 5–17)
BUN SERPL-MCNC: 61 MG/DL — HIGH (ref 7–23)
CALCIUM SERPL-MCNC: 9.7 MG/DL — SIGNIFICANT CHANGE UP (ref 8.4–10.5)
CHLORIDE SERPL-SCNC: 108 MMOL/L — SIGNIFICANT CHANGE UP (ref 96–108)
CO2 SERPL-SCNC: 26 MMOL/L — SIGNIFICANT CHANGE UP (ref 22–31)
CREAT SERPL-MCNC: 2.14 MG/DL — HIGH (ref 0.5–1.3)
EGFR: 32 ML/MIN/1.73M2 — LOW
GLUCOSE BLDC GLUCOMTR-MCNC: 116 MG/DL — HIGH (ref 70–99)
GLUCOSE BLDC GLUCOMTR-MCNC: 141 MG/DL — HIGH (ref 70–99)
GLUCOSE BLDC GLUCOMTR-MCNC: 145 MG/DL — HIGH (ref 70–99)
GLUCOSE BLDC GLUCOMTR-MCNC: 222 MG/DL — HIGH (ref 70–99)
GLUCOSE SERPL-MCNC: 89 MG/DL — SIGNIFICANT CHANGE UP (ref 70–99)
HCT VFR BLD CALC: 40.9 % — SIGNIFICANT CHANGE UP (ref 39–50)
HGB BLD-MCNC: 13 G/DL — SIGNIFICANT CHANGE UP (ref 13–17)
MCHC RBC-ENTMCNC: 29.1 PG — SIGNIFICANT CHANGE UP (ref 27–34)
MCHC RBC-ENTMCNC: 31.8 GM/DL — LOW (ref 32–36)
MCV RBC AUTO: 91.7 FL — SIGNIFICANT CHANGE UP (ref 80–100)
NRBC # BLD: 0 /100 WBCS — SIGNIFICANT CHANGE UP (ref 0–0)
PLATELET # BLD AUTO: 274 K/UL — SIGNIFICANT CHANGE UP (ref 150–400)
POTASSIUM SERPL-MCNC: 3.5 MMOL/L — SIGNIFICANT CHANGE UP (ref 3.5–5.3)
POTASSIUM SERPL-SCNC: 3.5 MMOL/L — SIGNIFICANT CHANGE UP (ref 3.5–5.3)
RBC # BLD: 4.46 M/UL — SIGNIFICANT CHANGE UP (ref 4.2–5.8)
RBC # FLD: 13.3 % — SIGNIFICANT CHANGE UP (ref 10.3–14.5)
SODIUM SERPL-SCNC: 147 MMOL/L — HIGH (ref 135–145)
WBC # BLD: 15.7 K/UL — HIGH (ref 3.8–10.5)
WBC # FLD AUTO: 15.7 K/UL — HIGH (ref 3.8–10.5)

## 2023-09-23 PROCEDURE — 99233 SBSQ HOSP IP/OBS HIGH 50: CPT

## 2023-09-23 PROCEDURE — 71045 X-RAY EXAM CHEST 1 VIEW: CPT | Mod: 26

## 2023-09-23 RX ORDER — THIAMINE MONONITRATE (VIT B1) 100 MG
100 TABLET ORAL THREE TIMES A DAY
Refills: 0 | Status: COMPLETED | OUTPATIENT
Start: 2023-09-23 | End: 2023-09-25

## 2023-09-23 RX ORDER — HYDRALAZINE HCL 50 MG
25 TABLET ORAL THREE TIMES A DAY
Refills: 0 | Status: DISCONTINUED | OUTPATIENT
Start: 2023-09-23 | End: 2023-09-25

## 2023-09-23 RX ADMIN — Medication 3 MILLILITER(S): at 11:48

## 2023-09-23 RX ADMIN — AZITHROMYCIN 255 MILLIGRAM(S): 500 TABLET, FILM COATED ORAL at 11:47

## 2023-09-23 RX ADMIN — Medication 2: at 17:29

## 2023-09-23 RX ADMIN — Medication 50 MILLIGRAM(S): at 17:29

## 2023-09-23 RX ADMIN — Medication 25 MILLIGRAM(S): at 18:38

## 2023-09-23 RX ADMIN — Medication 3 MILLILITER(S): at 05:38

## 2023-09-23 RX ADMIN — CHLORHEXIDINE GLUCONATE 1 APPLICATION(S): 213 SOLUTION TOPICAL at 05:46

## 2023-09-23 RX ADMIN — APIXABAN 5 MILLIGRAM(S): 2.5 TABLET, FILM COATED ORAL at 21:16

## 2023-09-23 RX ADMIN — Medication 100 MILLIGRAM(S): at 21:16

## 2023-09-23 RX ADMIN — INSULIN GLARGINE 12 UNIT(S): 100 INJECTION, SOLUTION SUBCUTANEOUS at 21:43

## 2023-09-23 RX ADMIN — CEFTRIAXONE 100 MILLIGRAM(S): 500 INJECTION, POWDER, FOR SOLUTION INTRAMUSCULAR; INTRAVENOUS at 11:47

## 2023-09-23 RX ADMIN — Medication 3 MILLILITER(S): at 17:30

## 2023-09-23 RX ADMIN — ATORVASTATIN CALCIUM 80 MILLIGRAM(S): 80 TABLET, FILM COATED ORAL at 21:15

## 2023-09-23 RX ADMIN — Medication 50 MILLIGRAM(S): at 11:48

## 2023-09-23 RX ADMIN — PANTOPRAZOLE SODIUM 40 MILLIGRAM(S): 20 TABLET, DELAYED RELEASE ORAL at 11:49

## 2023-09-23 RX ADMIN — Medication 3 MILLILITER(S): at 00:30

## 2023-09-23 RX ADMIN — APIXABAN 5 MILLIGRAM(S): 2.5 TABLET, FILM COATED ORAL at 07:59

## 2023-09-23 RX ADMIN — Medication 50 MILLIGRAM(S): at 05:38

## 2023-09-23 RX ADMIN — Medication 3 MILLILITER(S): at 05:37

## 2023-09-23 RX ADMIN — Medication 50 MILLIGRAM(S): at 23:58

## 2023-09-23 RX ADMIN — Medication 100 MILLIGRAM(S): at 17:29

## 2023-09-23 RX ADMIN — Medication 100 MILLIGRAM(S): at 13:27

## 2023-09-23 RX ADMIN — CLOPIDOGREL BISULFATE 75 MILLIGRAM(S): 75 TABLET, FILM COATED ORAL at 11:48

## 2023-09-23 NOTE — PROVIDER CONTACT NOTE (OTHER) - ACTION/TREATMENT ORDERED:
ACP will consult with cardiology
Continue to monitor on telemetry
No new order at this time. Continue to monitor.
No new order at this time. will continue to monitor.

## 2023-09-23 NOTE — PROVIDER CONTACT NOTE (OTHER) - REASON
PAF x 25 sec, 
PAF x 4.4 sec, HR up to 133
Patient converted to NSR
Telemetry continues to be Afib RVR rate 150's

## 2023-09-23 NOTE — PROGRESS NOTE ADULT - ASSESSMENT
Patient is a 72 year old male with PMH of HTN, HLD, T2DM, BPH, age, prior CVA in 2017 and 2021 uncertain of residual deficits, HFpEF reportedly on 20mg lasix QD (TTE from 2022 EF 59%) presents to the ED complaining of generalized weakness today, feeling generally unwell for several days is unable to quantify how many days or specific symptomatology.  Patient reports feeling some shortness of breath today was found to be hypoxic by EMS (78% on RA), and requiring 6L O2 via NC to maintain SpO2 of 93% in ED.    AHRF due to multifocal pneumonia   Leukocytosis likely due to above   atrial fib   nell  - RVP/COVID negative   - CXR reported LLL atelectasis vs consolidation   - CTA chest with no PE; noted with consolidations and nodular opacities in b/l LLs, RML and lingula   - MRSA PCR screen negative  - Legionella and Strep pneumoniae ur ags negative   - s/p cefepime, vancomycin, doxycycline in ER > ceftriaxone and azithromycin       Recommendations  9/20, 21 blood cultures - NGTD x2   Follow sputum culture - in process     Continue ceftriaxone   can stop azithromycin   Pulmonary following, on nebs, chest PT, mucomyst    SLP evaluation, aspiration precautions   Monitor temps/WBC, Cr   Continue rest of care per primary team    pt will need surveillance imaging in 8-12 weeks

## 2023-09-23 NOTE — PROGRESS NOTE ADULT - SUBJECTIVE AND OBJECTIVE BOX
24H hour events: Tele now in NSR rates ~60-90s, intermittent rapid pAF. Pt states he feels the same, doesn't notice a difference in palpitations/SOB. Denies chest pain/dizziness.     MEDICATIONS:  apixaban 5 milliGRAM(s) Oral every 12 hours  clopidogrel Tablet 75 milliGRAM(s) Oral daily  metoprolol tartrate 50 milliGRAM(s) Oral four times a day  metoprolol tartrate Injectable 5 milliGRAM(s) IV Push every 6 hours PRN  azithromycin  IVPB      azithromycin  IVPB 500 milliGRAM(s) IV Intermittent every 24 hours  cefTRIAXone   IVPB      cefTRIAXone   IVPB 1000 milliGRAM(s) IV Intermittent every 24 hours  acetylcysteine 20%  Inhalation 3 milliLiter(s) Inhalation every 6 hours  albuterol/ipratropium for Nebulization 3 milliLiter(s) Nebulizer every 6 hours  pantoprazole  Injectable 40 milliGRAM(s) IV Push daily  atorvastatin 80 milliGRAM(s) Oral at bedtime  dextrose 50% Injectable 25 Gram(s) IV Push once  dextrose 50% Injectable 25 Gram(s) IV Push once  dextrose 50% Injectable 12.5 Gram(s) IV Push once  dextrose Oral Gel 15 Gram(s) Oral once PRN  glucagon  Injectable 1 milliGRAM(s) IntraMuscular once  insulin glargine Injectable (LANTUS) 12 Unit(s) SubCutaneous at bedtime  insulin lispro (ADMELOG) corrective regimen sliding scale   SubCutaneous three times a day before meals  chlorhexidine 2% Cloths 1 Application(s) Topical <User Schedule>  dextrose 5%. 1000 milliLiter(s) IV Continuous <Continuous>  dextrose 5%. 1000 milliLiter(s) IV Continuous <Continuous>  sodium chloride 0.9%. 1000 milliLiter(s) IV Continuous <Continuous>  thiamine Injectable 100 milliGRAM(s) IV Push three times a day      REVIEW OF SYSTEMS:  Complete 12point ROS negative.    PHYSICAL EXAM:  T(C): 36.9 (09-23-23 @ 11:00), Max: 37.3 (09-22-23 @ 20:43)  HR: 71 (09-23-23 @ 11:00) (70 - 148)  BP: 166/79 (09-23-23 @ 11:00) (110/64 - 170/82)  RR: 18 (09-23-23 @ 11:00) (18 - 18)  SpO2: 97% (09-23-23 @ 11:00) (95% - 98%)  Wt(kg): --  I&O's Summary    22 Sep 2023 07:01  -  23 Sep 2023 07:00  --------------------------------------------------------  IN: 0 mL / OUT: 1100 mL / NET: -1100 mL    23 Sep 2023 07:01  -  23 Sep 2023 12:20  --------------------------------------------------------  IN: 200 mL / OUT: 0 mL / NET: 200 mL        Appearance: Normal	  HEENT: NC/AT  Cardiovascular: Normal S1 S2  Respiratory: rales bilaterally, on HFNC  Psychiatry: A & O x 3, Mood & affect appropriate	  Neurologic: Non-focal  Extremities: No BLE edema      LABS:	 	    CBC Full  -  ( 23 Sep 2023 06:12 )  WBC Count : 15.70 K/uL  Hemoglobin : 13.0 g/dL  Hematocrit : 40.9 %  Platelet Count - Automated : 274 K/uL  Mean Cell Volume : 91.7 fl  Mean Cell Hemoglobin : 29.1 pg  Mean Cell Hemoglobin Concentration : 31.8 gm/dL  09-23    147<H>  |  108  |  61<H>  ----------------------------<  89  3.5   |  26  |  2.14<H>  09-22    147<H>  |  109<H>  |  56<H>  ----------------------------<  54<LL>  3.4<L>   |  24  |  2.20<H>    Ca    9.7      23 Sep 2023 06:12  Ca    9.4      22 Sep 2023 06:43        proBNP: Pro-Brain Natriuretic Peptide (09.20.23 @ 13:22)    Pro-Brain Natriuretic Peptide: 82362 pg/mL    CARDIAC MARKERS: Troponin T, High Sensitivity (09.20.23 @ 16:10)    Troponin T, High Sensitivity Result: 233: *  *  Rapid upward or downward changes in high-sensitivity troponin levels  suggest acute myocardial injury. Renal impairment may cause sustained  troponin elevations.  Normal: <6 - 14 ng/L  Indeterminate: 15-51 ng/L  Elevated: > 51 ng/L  See http://labs/test/[email or web address] on the [facility] intranet for more  information ng/L  RADIOLOGY: < from: CT Angio Chest PE Protocol w/ IV Cont (09.20.23 @ 15:15) >  IMPRESSION:    No pulmonary embolus.    Consolidations and nodular opacities within bilateral lower lobes, right   middle lobe and lingula representing multifocal pneumonia. Recommend   follow-up chest CT in 1-3 months to determine resolution.    < end of copied text >  	    PREVIOUS DIAGNOSTIC TESTING:    [ ] Echocardiogram: < from: TTE W or WO Ultrasound Enhancing Agent (09.22.23 @ 12:28) >  CONCLUSIONS:      1. Left ventricle was not well visualized.   2. Left ventricular wall thickness is normal. Abnormal (paradoxical) septal motion consistent with conduction delay. Left ventricular systolic function is mildly decreased with an ejection fraction visually estimated at 40 to 45 %. There are regional wall motion abnormalities.   3. Entire septum, entire inferior wall, and apex are abnormal.   4. Right ventricular cavity is normal in size, normal wall thickness and reduced systolic function.   5. The left atrium is normal in size.   6. Normal atria.   7. No significant valvular disease.   8. No pericardial effusion seen.   9. No prior echocardiogram is available for comparison.      < end of copied text >      [ ]  Catheterization: < from: Cardiac Cath Lab (06.20.11 @ 07:50) >  Ventricles: Analysis of regional contractile function demonstrated mild  diaphragmatic hypokinesis. EF estimated by contrast ventriculography was  55 %.  Valves: Mitral valve: The mitral valve exhibited no regurgitation.  Coronary vessels: The coronary circulation is right dominant.  LM:      LM: Normal.  LAD:      LAD: Angiography showed minor luminal irregularities with no flow  limiting lesions.  Proximal LAD: There was a 20 % stenosis.  CX:      Mid circumflex: Codominant large vessel. There was a 30 %  stenosis.  RI:  Mid ramus intermedius: There was a 60 % -70% diffuse stenosis in some  views.  RCA:      RCA: Angiography showed minor luminal irregularities with no flow  limiting lesions.  Complications: There were no complications.  Recommendations:  Patient has overall preserved LV function with some segmental abnormality  (?LBBB), markedly elevated LVEDP that normalized after IC NTG and decrease  in BP and nonobstructive CAD except for ramus intermedianus that has  moderate disease. We recommend medical therapy and risk factor  modification.    < end of copied text >

## 2023-09-23 NOTE — PROGRESS NOTE ADULT - SUBJECTIVE AND OBJECTIVE BOX
Follow-up Pulm Progress Note    bipap only worn for a few hrs overnight  currently on HFNC 45L/50%   HFNC changed to 40L/40% sats maintaining 96%  able to expectorate secretions now  denies SOB/CP    Medications:  MEDICATIONS  (STANDING):  acetylcysteine 20%  Inhalation 3 milliLiter(s) Inhalation every 6 hours  albuterol/ipratropium for Nebulization 3 milliLiter(s) Nebulizer every 6 hours  apixaban 5 milliGRAM(s) Oral every 12 hours  atorvastatin 80 milliGRAM(s) Oral at bedtime  azithromycin  IVPB      azithromycin  IVPB 500 milliGRAM(s) IV Intermittent every 24 hours  cefTRIAXone   IVPB 1000 milliGRAM(s) IV Intermittent every 24 hours  cefTRIAXone   IVPB      chlorhexidine 2% Cloths 1 Application(s) Topical <User Schedule>  clopidogrel Tablet 75 milliGRAM(s) Oral daily  dextrose 5%. 1000 milliLiter(s) (50 mL/Hr) IV Continuous <Continuous>  dextrose 5%. 1000 milliLiter(s) (100 mL/Hr) IV Continuous <Continuous>  dextrose 50% Injectable 12.5 Gram(s) IV Push once  dextrose 50% Injectable 25 Gram(s) IV Push once  dextrose 50% Injectable 25 Gram(s) IV Push once  glucagon  Injectable 1 milliGRAM(s) IntraMuscular once  insulin glargine Injectable (LANTUS) 12 Unit(s) SubCutaneous at bedtime  insulin lispro (ADMELOG) corrective regimen sliding scale   SubCutaneous three times a day before meals  metoprolol tartrate 50 milliGRAM(s) Oral four times a day  pantoprazole  Injectable 40 milliGRAM(s) IV Push daily  sodium chloride 0.9%. 1000 milliLiter(s) (50 mL/Hr) IV Continuous <Continuous>    MEDICATIONS  (PRN):  dextrose Oral Gel 15 Gram(s) Oral once PRN Blood Glucose LESS THAN 70 milliGRAM(s)/deciliter  metoprolol tartrate Injectable 5 milliGRAM(s) IV Push every 6 hours PRN HR > 110          Vital Signs Last 24 Hrs  T(C): 36.8 (23 Sep 2023 06:10), Max: 37.3 (22 Sep 2023 20:43)  T(F): 98.2 (23 Sep 2023 06:10), Max: 99.2 (22 Sep 2023 20:43)  HR: 70 (23 Sep 2023 09:09) (70 - 160)  BP: 153/54 (23 Sep 2023 06:10) (110/64 - 170/82)  BP(mean): --  RR: 18 (23 Sep 2023 09:09) (18 - 18)  SpO2: 98% (23 Sep 2023 09:09) (94% - 98%)    Parameters below as of 23 Sep 2023 09:09  Patient On (Oxygen Delivery Method): nasal cannula, high flow  O2 Flow (L/min): 45  O2 Concentration (%): 50      VBG pH 7.35 09-22 @ 07:19    VBG pCO2 52 09-22 @ 07:19    VBG O2 sat 87.1 09-22 @ 07:19    VBG lactate -- 09-22 @ 07:19      09-22 @ 07:01  -  09-23 @ 07:00  --------------------------------------------------------  IN: 0 mL / OUT: 1100 mL / NET: -1100 mL          LABS:                        13.0   15.70 )-----------( 274      ( 23 Sep 2023 06:12 )             40.9     09-23    147<H>  |  108  |  61<H>  ----------------------------<  89  3.5   |  26  |  2.14<H>    Ca    9.7      23 Sep 2023 06:12            CAPILLARY BLOOD GLUCOSE      POCT Blood Glucose.: 116 mg/dL (23 Sep 2023 07:47)    PTT - ( 22 Sep 2023 22:21 )  PTT:41.6 sec  Urinalysis Basic - ( 23 Sep 2023 06:12 )    Color: x / Appearance: x / SG: x / pH: x  Gluc: 89 mg/dL / Ketone: x  / Bili: x / Urobili: x   Blood: x / Protein: x / Nitrite: x   Leuk Esterase: x / RBC: x / WBC x   Sq Epi: x / Non Sq Epi: x / Bacteria: x                      CULTURES:     Culture - Blood (collected 09-21-23 @ 14:31)  Source: .Blood Blood  Preliminary Report (09-22-23 @ 17:01):    No growth at 24 hours    Culture - Blood (collected 09-21-23 @ 14:31)  Source: .Blood Blood  Preliminary Report (09-22-23 @ 17:01):    No growth at 24 hours    Culture - Blood (collected 09-20-23 @ 13:15)  Source: .Blood Blood-Peripheral  Preliminary Report (09-22-23 @ 18:01):    No growth at 48 Hours    Culture - Blood (collected 09-20-23 @ 13:05)  Source: .Blood Blood-Peripheral  Preliminary Report (09-22-23 @ 18:01):    No growth at 48 Hours        Culture - Urine (collected 09-20-23 @ 15:16)  Source: Clean Catch Clean Catch (Midstream)  Final Report (09-21-23 @ 16:21):    No growth            Physical Examination:  PULM: bibasilar rhonchi   CVS: S1, S2 heard    RADIOLOGY REVIEWED    CT chest:    < from: CT Angio Chest PE Protocol w/ IV Cont (09.20.23 @ 15:15) >  FINDINGS:    PULMONARY VESSELS: No pulmonary embolus. Main pulmonary artery normal in   diameter.    HEART AND VASCULATURE: Heart size is normal. No pericardial effusion. No   aortic aneurysm or dissection. Mild coronary and aortic calcifications.    LUNGS, AIRWAYS, PLEURA: Patent central airways. Consolidations and   nodular opacities within bilateral lower lobes, right middle lobe and   lingula representing multifocal pneumonia. No pleural effusions or   pneumothorax.    MEDIASTINUM: Mildly enlarged subcarinal lymph node measuring 1.5 cm short   axis.    UPPER ABDOMEN: Within normal limits.    BONES AND SOFT TISSUES: No aggressive osseous lesion.    LOWER NECK: Within normal limits.    IMPRESSION:    No pulmonary embolus.    Consolidations and nodular opacities within bilateral lower lobes, right   middle lobe and lingula representing multifocal pneumonia. Recommend   follow-up chest CT in 1-3 months to determine resolution.    < end of copied text >

## 2023-09-23 NOTE — PROGRESS NOTE ADULT - SUBJECTIVE AND OBJECTIVE BOX
[facility], Division of Infectious Diseases  CINTIA Razo Y. Patel, S. Shah, G. Michael  [phone]  after hours and weekends [phone]    Name: FRANKO SPANN  Age: 72y  Gender: Male  MRN: [id number]    Interval History--  Notes reviewed  feels breathing a bit better  wife at bedside       Allergies    No Known Allergies    Intolerances        Medications--  Antibiotics:  azithromycin  IVPB      azithromycin  IVPB 500 milliGRAM(s) IV Intermittent every 24 hours  cefTRIAXone   IVPB 1000 milliGRAM(s) IV Intermittent every 24 hours  cefTRIAXone   IVPB        Immunologic:    Other:  acetylcysteine 20%  Inhalation  albuterol/ipratropium for Nebulization  apixaban  atorvastatin  chlorhexidine 2% Cloths  clopidogrel Tablet  dextrose 5%.  dextrose 5%.  dextrose 50% Injectable  dextrose 50% Injectable  dextrose 50% Injectable  dextrose Oral Gel PRN  glucagon  Injectable  hydrALAZINE  insulin glargine Injectable (LANTUS)  insulin lispro (ADMELOG) corrective regimen sliding scale  metoprolol tartrate  metoprolol tartrate Injectable PRN  pantoprazole  Injectable  sodium chloride 0.9%.  thiamine Injectable      Review of Systems--  A 10-point review of systems was obtained.     Pertinent positives and negatives--  Constitutional: No fevers. No Chills. No Rigors.   Cardiovascular: No chest pain. No palpitations.  Respiratory: + shortness of breath. No cough.  Gastrointestinal: No nausea or vomiting. No diarrhea or constipation.   Psychiatric: Pleasant. Appropriate affect.    Review of systems otherwise negative except as previously noted.    Physical Examination--  Vital Signs: T(F): 98.5 (09-23-23 @ 11:00), Max: 99.2 (09-22-23 @ 20:43)  HR: 79 (09-23-23 @ 17:14)  BP: 170/85 (09-23-23 @ 17:14)  RR: 18 (09-23-23 @ 17:14)  SpO2: 96% (09-23-23 @ 17:14)  Wt(kg): --  General: Nontoxic-appearing Male in no acute distress.  HEENT: AT/NC.r.  Neck: Not rigid. No sense of mass.  Nodes: None palpable.  Lungs: b/l rhonchi  Heart: Regular rate and rhythm.   Abdomen: Bowel sounds present and normoactive. Soft. Nondistended. Nontender.   Back: No spinal tenderness. No costovertebral angle tenderness.   Extremities: No cyanosis or clubbing. No edema.   Skin: Warm. Dry. Good turgor. No rash. No vasculitic stigmata.  Psychiatric: Appropriate affect and mood for situation.         Laboratory Studies--  CBC                        13.0   15.70 )-----------( 274      ( 23 Sep 2023 06:12 )             40.9       Chemistries  09-23    147<H>  |  108  |  61<H>  ----------------------------<  89  3.5   |  26  |  2.14<H>    Ca    9.7      23 Sep 2023 06:12        Culture Data    Culture - Sputum (collected 22 Sep 2023 10:30)  Source: .Sputum Sputum  Gram Stain (22 Sep 2023 23:04):    Rare polymorphonuclear leukocytes per low power field    Moderate Squamous epithelial cells per low power field    Moderate Gram Positive Rods per oil power field    Few Gram Negative Rods per oil power field    Few Gram Positive Cocci in Pairs and Chains per oil power field    Culture - Blood (collected 21 Sep 2023 14:31)  Source: .Blood Blood  Preliminary Report (23 Sep 2023 17:01):    No growth at 48 Hours    Culture - Blood (collected 21 Sep 2023 14:31)  Source: .Blood Blood  Preliminary Report (23 Sep 2023 17:01):    No growth at 48 Hours    Culture - Urine (collected 20 Sep 2023 15:16)  Source: Clean Catch Clean Catch (Midstream)  Final Report (21 Sep 2023 16:21):    No growth    Culture - Blood (collected 20 Sep 2023 13:15)  Source: .Blood Blood-Peripheral  Preliminary Report (23 Sep 2023 18:01):    No growth at 72 Hours    Culture - Blood (collected 20 Sep 2023 13:05)  Source: .Blood Blood-Peripheral  Preliminary Report (23 Sep 2023 18:01):    No growth at 72 Hours

## 2023-09-23 NOTE — PROGRESS NOTE ADULT - SUBJECTIVE AND OBJECTIVE BOX
Patient is a 72y old  Male who presents with a chief complaint of sepsis (23 Sep 2023 13:15)      SUBJECTIVE / OVERNIGHT EVENTS: ptn's wife is refusing MR Light. " i want him to get better, we will do after he comes home" ptn remains full code. he feels better, he is tolerating a pureed diet. I explained to her he may have had a stroke, affecting his breathing and swallowing. " thats ok" was her reply. ptn is now on 6 LO2NC, earlier today was on HFNC 45/50    MEDICATIONS  (STANDING):  acetylcysteine 20%  Inhalation 3 milliLiter(s) Inhalation every 6 hours  albuterol/ipratropium for Nebulization 3 milliLiter(s) Nebulizer every 6 hours  apixaban 5 milliGRAM(s) Oral every 12 hours  atorvastatin 80 milliGRAM(s) Oral at bedtime  azithromycin  IVPB      azithromycin  IVPB 500 milliGRAM(s) IV Intermittent every 24 hours  cefTRIAXone   IVPB 1000 milliGRAM(s) IV Intermittent every 24 hours  cefTRIAXone   IVPB      chlorhexidine 2% Cloths 1 Application(s) Topical <User Schedule>  clopidogrel Tablet 75 milliGRAM(s) Oral daily  dextrose 5%. 1000 milliLiter(s) (50 mL/Hr) IV Continuous <Continuous>  dextrose 5%. 1000 milliLiter(s) (100 mL/Hr) IV Continuous <Continuous>  dextrose 50% Injectable 12.5 Gram(s) IV Push once  dextrose 50% Injectable 25 Gram(s) IV Push once  dextrose 50% Injectable 25 Gram(s) IV Push once  glucagon  Injectable 1 milliGRAM(s) IntraMuscular once  hydrALAZINE 25 milliGRAM(s) Oral three times a day  insulin glargine Injectable (LANTUS) 12 Unit(s) SubCutaneous at bedtime  insulin lispro (ADMELOG) corrective regimen sliding scale   SubCutaneous three times a day before meals  metoprolol tartrate 50 milliGRAM(s) Oral four times a day  pantoprazole  Injectable 40 milliGRAM(s) IV Push daily  sodium chloride 0.9%. 1000 milliLiter(s) (50 mL/Hr) IV Continuous <Continuous>  thiamine Injectable 100 milliGRAM(s) IV Push three times a day    MEDICATIONS  (PRN):  dextrose Oral Gel 15 Gram(s) Oral once PRN Blood Glucose LESS THAN 70 milliGRAM(s)/deciliter  metoprolol tartrate Injectable 5 milliGRAM(s) IV Push every 6 hours PRN HR > 110      Vital Signs Last 24 Hrs  T(F): 98.5 (09-23-23 @ 11:00), Max: 99.2 (09-22-23 @ 20:43)  HR: 79 (09-23-23 @ 17:14) (70 - 92)  BP: 170/85 (09-23-23 @ 17:14) (146/76 - 170/85)  RR: 18 (09-23-23 @ 17:14) (18 - 20)  SpO2: 96% (09-23-23 @ 17:14) (96% - 99%)  Telemetry:   CAPILLARY BLOOD GLUCOSE      POCT Blood Glucose.: 222 mg/dL (23 Sep 2023 17:21)  POCT Blood Glucose.: 141 mg/dL (23 Sep 2023 11:35)  POCT Blood Glucose.: 116 mg/dL (23 Sep 2023 07:47)  POCT Blood Glucose.: 202 mg/dL (22 Sep 2023 21:31)    I&O's Summary    22 Sep 2023 07:01  -  23 Sep 2023 07:00  --------------------------------------------------------  IN: 0 mL / OUT: 1100 mL / NET: -1100 mL    23 Sep 2023 07:01  -  23 Sep 2023 18:32  --------------------------------------------------------  IN: 200 mL / OUT: 400 mL / NET: -200 mL        PHYSICAL EXAM:  GENERAL: NAD, well-developed  HEAD:  Atraumatic, Normocephalic  EYES: EOMI, PERRLA, conjunctiva and sclera clear  NECK: Supple, No JVD  CHEST/LUNG: Clear to auscultation bilaterally; No wheeze  HEART: Regular rate and rhythm; No murmurs, rubs, or gallops  ABDOMEN: Soft, Nontender, Nondistended; Bowel sounds present  EXTREMITIES:  2+ Peripheral Pulses, No clubbing, cyanosis, or edema  PSYCH: AAOx3  NEUROLOGY: non-focal  SKIN: No rashes or lesions    LABS:                        13.0   15.70 )-----------( 274      ( 23 Sep 2023 06:12 )             40.9     09-23    147<H>  |  108  |  61<H>  ----------------------------<  89  3.5   |  26  |  2.14<H>    Ca    9.7      23 Sep 2023 06:12      PTT - ( 22 Sep 2023 22:21 )  PTT:41.6 sec      Urinalysis Basic - ( 23 Sep 2023 06:12 )    Color: x / Appearance: x / SG: x / pH: x  Gluc: 89 mg/dL / Ketone: x  / Bili: x / Urobili: x   Blood: x / Protein: x / Nitrite: x   Leuk Esterase: x / RBC: x / WBC x   Sq Epi: x / Non Sq Epi: x / Bacteria: x        RADIOLOGY & ADDITIONAL TESTS:    Imaging Personally Reviewed:    Consultant(s) Notes Reviewed:      Care Discussed with Consultants/Other Providers:

## 2023-09-23 NOTE — PROGRESS NOTE ADULT - PROBLEM SELECTOR PLAN 1
s/p AFib RVR    - trop leak likely demand mediated in setting of hypoxic resp failure and PNA  now in SR on tele   c/w meds as ordered  CHADSVASC2 score >3 - eliquis started  monitor on tele

## 2023-09-23 NOTE — PROVIDER CONTACT NOTE (OTHER) - BACKGROUND
Admitted for PNA. Afib RVR this AM
Admitted with PNA
Pt was admitted with multifocal pna, nell, new afib RVR.
Pt was admitted with multifocal pna, nell, new afib rvr.

## 2023-09-23 NOTE — PROGRESS NOTE ADULT - SUBJECTIVE AND OBJECTIVE BOX
[facility]      Seen earlier today [Please note that date of entry above  is the actual  DATE OF SERVICE] No new neurological symptoms reported. Remains stable neurologically.   - Today, patient is without complaints.          *****MEDICATIONS: Current medication reviewed and documented.    MEDICATIONS  (STANDING):  acetylcysteine 20%  Inhalation 3 milliLiter(s) Inhalation every 6 hours  albuterol/ipratropium for Nebulization 3 milliLiter(s) Nebulizer every 6 hours  apixaban 5 milliGRAM(s) Oral every 12 hours  atorvastatin 80 milliGRAM(s) Oral at bedtime  azithromycin  IVPB      azithromycin  IVPB 500 milliGRAM(s) IV Intermittent every 24 hours  cefTRIAXone   IVPB 1000 milliGRAM(s) IV Intermittent every 24 hours  cefTRIAXone   IVPB      chlorhexidine 2% Cloths 1 Application(s) Topical <User Schedule>  clopidogrel Tablet 75 milliGRAM(s) Oral daily  dextrose 5%. 1000 milliLiter(s) (50 mL/Hr) IV Continuous <Continuous>  dextrose 5%. 1000 milliLiter(s) (100 mL/Hr) IV Continuous <Continuous>  dextrose 50% Injectable 12.5 Gram(s) IV Push once  dextrose 50% Injectable 25 Gram(s) IV Push once  dextrose 50% Injectable 25 Gram(s) IV Push once  glucagon  Injectable 1 milliGRAM(s) IntraMuscular once  insulin glargine Injectable (LANTUS) 12 Unit(s) SubCutaneous at bedtime  insulin lispro (ADMELOG) corrective regimen sliding scale   SubCutaneous three times a day before meals  metoprolol tartrate 50 milliGRAM(s) Oral four times a day  pantoprazole  Injectable 40 milliGRAM(s) IV Push daily  sodium chloride 0.9%. 1000 milliLiter(s) (50 mL/Hr) IV Continuous <Continuous>  thiamine Injectable 100 milliGRAM(s) IV Push three times a day    MEDICATIONS  (PRN):  dextrose Oral Gel 15 Gram(s) Oral once PRN Blood Glucose LESS THAN 70 milliGRAM(s)/deciliter  metoprolol tartrate Injectable 5 milliGRAM(s) IV Push every 6 hours PRN HR > 110          ***** VITAL SIGNS:   Vital Signs Last 24 Hrs      I&O's Summary    22 Sep 2023 07:01  -  23 Sep 2023 07:00  --------------------------------------------------------  IN: 0 mL / OUT: 1100 mL / NET: -1100 mL    23 Sep 2023 07:01  -  23 Sep 2023 13:16  --------------------------------------------------------  IN: 200 mL / OUT: 0 mL / NET: 200 mL             *****PHYSICAL EXAM:   alert oriented x 3 attention comprehension are fair.  Able to name, repeat.   EOmi fundi not visualized   no nystagmus VFF to confrontation  Tongue is midline  Palate elevates symmetrically   Moving all 4 ext spontaneously no drift appreciated    Gait not assessed.            *****LAB AND IMAGIN.0   15.70 )-----------( 274      ( 23 Sep 2023 06:12 )             40.9               09-    147<H>  |  108  |  61<H>  ----------------------------<  89  3.5   |  26  |  2.14<H>    Ca    9.7      23 Sep 2023 06:12      PTT - ( 22 Sep 2023 22:21 )  PTT:41.6 sec                   Urinalysis Basic - ( 23 Sep 2023 06:12 )    Color: x / Appearance: x / SG: x / pH: x  Gluc: 89 mg/dL / Ketone: x  / Bili: x / Urobili: x   Blood: x / Protein: x / Nitrite: x   Leuk Esterase: x / RBC: x / WBC x   Sq Epi: x / Non Sq Epi: x / Bacteria: x      [All pertinent recent Imaging/Reports reviewed]            *****A S S E S S M E N T   A N D   P L A N :72-year-old male PMH of HTN, HLD, T2DM, BPH, age, prior CVA in  and  uncertain of residual deficits, HFpEF reportedly on 20mg lasix QD (TTE from  EF 59%) presents to the ED complaining of generalized weakness today, feeling generally unwell for several days is unable to quantify how many days or specific symptomatology.  Patient reports feeling some shortness of breath today was found to be hypoxic by EMS (78% on RA), and requiring 6L O2 via NC to maintain SpO2 of 93% in ED. patient also reports blood glucose at home by EMS was elevated but uncertain of the number, reports he has not taken any of his antidiabetic medication for at least a day due to feeling unwell, has not taken his daily Lasix.  Patient notes possibly some URI symptoms over the past few days.  Patient denies urinary complaints, abdominal pain, vomiting, diarrhea, melena, hematochezia.         Problem/Recommendations 1: dysphagia likely progressive   in 2017 noted trace aspiration   in  pt had tia, mri neg presented with dysarthria facial droop     would repeat mri brain to eval for any new ischemia however pts wife is refusing and would like for him to get it as outpt   she and pt understand risk of not doing imaging   ct head when off high flow         would  consider swallow exercises   would change to thickened liquid   only feed when wide awake   nih 4   mrs 2   continue plavix apixaban and statin   follow up with dr. libman   chest pt     Problem/Recommendations 2:    pneumonia   likely aspiration   pt with recent noncompliance to dm meds             pt at risk for developing delirium, therefore please institute the following preventative measures if possible          - initiating early mobilization          -minimizing "tethers" - IV, oxygen, catheters, etc          -avoiding   sedatives          -maintaining hydration/nutrition          -avoid anticholinergics - diphenhydramine, etc          -pain control          -sleep wake cycle regulation; avoid day time somnolence           -supportive environment       Thank you for allowing me to participate in the care of this patient. Will continue to follow patient periodically. Please do not hesitate to call me if you have any  questions or if there has been a change in patients neurological status     ________________  Faye Mackenzie MD  [facility] ([facility]  [phone]      33 minutes spent on total encounter; more than 50 % of the visit was  spent counseling about plan of care, compliance to diet/exercise and medication regimen and or  coordinating care by the attending physician.      It is advised that stroke patients follow up with ALEXANDRIA Eddy @ [phone] in 1- 2 weeks.   Others please follow up with Dr. Michael Nissenbaum [phone]

## 2023-09-23 NOTE — PROGRESS NOTE ADULT - SUBJECTIVE AND OBJECTIVE BOX
Subjective: Patient seen and examined. No new events except as noted.   Now in SR on tele.    REVIEW OF SYSTEMS:    CONSTITUTIONAL: + weakness, fevers or chills  EYES/ENT: No visual changes;  No vertigo or throat pain   NECK: No pain or stiffness  RESPIRATORY: No cough, wheezing, hemoptysis; No shortness of breath  CARDIOVASCULAR: No chest pain or palpitations  GASTROINTESTINAL: No abdominal or epigastric pain. No nausea, vomiting, or hematemesis; No diarrhea or constipation. No melena or hematochezia.  GENITOURINARY: No dysuria, frequency or hematuria  NEUROLOGICAL: No numbness or weakness  SKIN: No itching, burning, rashes, or lesions   All other review of systems is negative unless indicated above.    MEDICATIONS:  MEDICATIONS  (STANDING):  acetylcysteine 20%  Inhalation 3 milliLiter(s) Inhalation every 6 hours  albuterol/ipratropium for Nebulization 3 milliLiter(s) Nebulizer every 6 hours  apixaban 5 milliGRAM(s) Oral every 12 hours  atorvastatin 80 milliGRAM(s) Oral at bedtime  azithromycin  IVPB      azithromycin  IVPB 500 milliGRAM(s) IV Intermittent every 24 hours  cefTRIAXone   IVPB 1000 milliGRAM(s) IV Intermittent every 24 hours  cefTRIAXone   IVPB      chlorhexidine 2% Cloths 1 Application(s) Topical <User Schedule>  clopidogrel Tablet 75 milliGRAM(s) Oral daily  dextrose 5%. 1000 milliLiter(s) (100 mL/Hr) IV Continuous <Continuous>  dextrose 5%. 1000 milliLiter(s) (50 mL/Hr) IV Continuous <Continuous>  dextrose 50% Injectable 12.5 Gram(s) IV Push once  dextrose 50% Injectable 25 Gram(s) IV Push once  dextrose 50% Injectable 25 Gram(s) IV Push once  glucagon  Injectable 1 milliGRAM(s) IntraMuscular once  insulin glargine Injectable (LANTUS) 12 Unit(s) SubCutaneous at bedtime  insulin lispro (ADMELOG) corrective regimen sliding scale   SubCutaneous three times a day before meals  metoprolol tartrate 50 milliGRAM(s) Oral four times a day  pantoprazole  Injectable 40 milliGRAM(s) IV Push daily  sodium chloride 0.9%. 1000 milliLiter(s) (50 mL/Hr) IV Continuous <Continuous>    PHYSICAL EXAM:  T(C): 36.6 (09-23-23 @ 04:42), Max: 37.3 (09-22-23 @ 20:43)  HR: 75 (09-23-23 @ 04:42) (75 - 160)  BP: 170/82 (09-23-23 @ 04:42) (101/66 - 170/82)  RR: 18 (09-23-23 @ 04:42) (18 - 18)  SpO2: 97% (09-23-23 @ 04:42) (94% - 98%)  Wt(kg): --  I&O's Summary    21 Sep 2023 07:01  -  22 Sep 2023 07:00  --------------------------------------------------------  IN: 0 mL / OUT: 700 mL / NET: -700 mL    22 Sep 2023 07:01  -  23 Sep 2023 06:18  --------------------------------------------------------  IN: 0 mL / OUT: 300 mL / NET: -300 mL    Appearance: Normal	  HEENT:   Normal oral mucosa, PERRL, EOMI	  Lymphatic: No lymphadenopathy , no edema  Cardiovascular: Normal S1 S2, No JVD, No murmurs , Peripheral pulses palpable 2+ bilaterally  Respiratory: Decreased BS  Gastrointestinal:  Soft, Non-tender, + BS	  Skin: No rashes, No ecchymoses, No cyanosis, warm to touch  Musculoskeletal: Normal range of motion, normal strength  Psychiatry:  Mood & affect appropriate  Ext: No edema    LABS:    CARDIAC MARKERS:               12.4   17.63 )-----------( 284      ( 22 Sep 2023 13:24 )             38.5     09-22    147<H>  |  109<H>  |  56<H>  ----------------------------<  54<LL>  3.4<L>   |  24  |  2.20<H>    Ca    9.4      22 Sep 2023 06:43    proBNP:   Lipid Profile:   HgA1c:   TSH:     TELEMETRY: SR	    ECG:  	  RADIOLOGY:   DIAGNOSTIC TESTING:  [ ] Echocardiogram:  [ ]  Catheterization:  [ ] Stress Test:    OTHER:

## 2023-09-23 NOTE — PROVIDER CONTACT NOTE (OTHER) - SITUATION
Patient converted to NSR - rate 80's
Telemetry continues to be Afib RVR rate 150's  Patient s/p 5 doses of IVP Metoprolol
PAF x 25 sec, 
PAF x 4.4 sec, HR up to 133

## 2023-09-23 NOTE — PROGRESS NOTE ADULT - PROBLEM SELECTOR PLAN 1
CTA chest with multifocal PNA  -Continue ceftriaxone/azithromycin. Abx as per ID.   -Urine legionella negative   -Continue Duoneb q6h   -Continue Mucomyst 20% 3cc q6h (give with Duoneb)  -Chest PT q6h  -MRSA/MSSA nasal PCR negative   -F/u sputum culture  -ID f/u

## 2023-09-23 NOTE — PROGRESS NOTE ADULT - ASSESSMENT
73 y/o M with PMH of CAD/MI HTN, HLD, T2DM, BPH, CVA in 2017 and 2021 uncertain of residual deficits, HFpEF reportedly on 20mg lasix QD (TTE from 2022 EF 59%). Presents with c/o generalized weakness, feeling generally unwell for several days is unable to quantify how many days or specific symptomatology. Endorses some SOB, cough. Found to be hypoxic by EMS (78% on RA). CTA chest neg PE, + multifocal PNA. Initially requiring bipap in ED. Endorses difficult to expectorate sputum. Denies fevers, chills, CP, pleuritic CP  Converted to Afib RVR this am   Denies any known history of this

## 2023-09-23 NOTE — PROGRESS NOTE ADULT - SUBJECTIVE AND OBJECTIVE BOX
Family at bedside  on HFNC appear comfortable       VITAL:  T(C): , Max: 38.6 (09-21-23 @ 13:40)  T(F): , Max: 101.5 (09-21-23 @ 13:40)  HR: 90s-160s  BP: 130/56 (09-22-23 @ 11:26)  RR: 18 (09-22-23 @ 04:31)  SpO2: 96% (09-22-23 @ 11:26)  Wt(kg): --      PHYSICAL EXAM:  Constitutional: Lethargic but alert; NAD on HFNCO2  HEENT: (+)DMM,   Neck: Supple, No JVD  Respiratory: (+)b/l rhonchi  Cardiovascular: tachy irreg s1s2  Gastrointestinal: BS+, soft, NT/ND  Extremities: No peripheral edema b/l  Neurological: reduced generalized strength  Back: no CVAT b/l  Skin: No rashes, no nevi      LABS:                        12.1   16.31 )-----------( 284      ( 22 Sep 2023 06:43 )             38.1     Na(147)/K(3.4)/Cl(109)/HCO3(24)/BUN(56)/Cr(2.20)Glu(54)/Ca(9.4)/Mg(--)/PO4(--)    09-22 @ 06:43  Na(142)/K(4.3)/Cl(103)/HCO3(24)/BUN(47)/Cr(1.98)Glu(197)/Ca(9.7)/Mg(--)/PO4(--)    09-21 @ 07:02  Na(142)/K(3.9)/Cl(102)/HCO3(22)/BUN(41)/Cr(1.82)Glu(221)/Ca(9.8)/Mg(1.7)/PO4(3.9)    09-20 @ 16:57  Na(142)/K(4.3)/Cl(101)/HCO3(21)/BUN(40)/Cr(1.90)Glu(230)/Ca(10.0)/Mg(--)/PO4(--)    09-20 @ 13:22    Sodium, Random Urine: 16 mmol/L (09-21 @ 18:51)  Chloride, Random Urine: 23 mmol/L (09-21 @ 18:51)  Potassium, Random Urine: 44 mmol/L (09-21 @ 18:51)  Creatinine, Random Urine: 88 mg/dL (09-21 @ 18:51)  Uprot 164mg/dL  FENa 0.2%    VBG 7.35/52/56/29    IMAGING:  < from: US Kidney and Bladder (09.21.23 @ 13:58) >  Right kidney: 11.7. No renal mass, hydronephrosis or calculi.  Left kidney: 12.5. No renal mass, hydronephrosis or calculi.  Urinary bladder: Within normal limits.    < from: CT Angio Chest PE Protocol w/ IV Cont (09.20.23 @ 15:15) >  No pulmonary embolus.  Consolidations and nodular opacities within bilateral lower lobes, right   middle lobe and lingula representing multifocal pneumonia. Recommend   follow-up chest CT in 1-3 months to determine resolution.        IMPRESSION: 72M w/ HTN, DM2, CKD3, BPH, CVA, and HFpEF, 9/20/23 p/w multifocal PNA    (1)CKD - stage 3, with ~2gm proteinuria - likely due to diabetic nephropathy    (2)BEAR - mild - likely prerenally mediated    (3)Hypernatremia - in setting of insensible losses/limited free water intake by mouth - not overly concerning for now    (4)Hypokalemia - whole body deficit due to limited intake/Lasix - repleted orally    (5)CV - urine studies c/w prerenal azotemia; numbers worsening further as of today. Did receive 1 dose of IV Lasix yesterday, as well as gentle IVF. S/p CTPA; FENa can be low from contrast nephropathy...that being said, the BEAR was already developing prior to the CTPA. Likely mildly dry.    (6)ID/Pulm- multifocal pneumonia - ID and Pulm on board; on broad-spectrum abx; on BIPAP; on Duoneb/Mucomyst; getting chest PT    (7)EP - rapid afib - Cardiology on board - receiving beta-blockade      RECOMMEND:  (1)cr slight better   (2)K+ repletion as ordered  (3)No diuretics for now  (4)No ACEI/ARB for now  (5)Dose new meds for GFR 20-30ml/min (present dosing is acceptable)  (6)BMP+Mg+PO4 daily  (7)CXR today   (8) encourage PO intake free water 200c every 6h       Thank you for involving [facility] in this patient's care.    With warm regards    Kaley Liao  [facility]  Office: [phone]

## 2023-09-23 NOTE — PROGRESS NOTE ADULT - ASSESSMENT
72F Hx HTN, T2DM, CVAs x 2 (2017 and 2021), HFrEF 59%, BPH p/w ED Generalized Weakness, SOB and WOB started on AFMO2 escalated to BiPAP Support with CXR c/w Lt Retrocardiac Infiltrates R/O Aspiration.     - Acute Hypoxic Hypercarbic Respiratory Failure on BiPAP Support  2/2 Multifocal PNA 2/2 Aspiration, presumed  - seen by MICU on admission, not a candidate   9/21 ptn is more awake, states he is hungry today, on BIPAP at present since shortly after removing BIPAP this am and after a meal became hypoxic again. Im concerned he is aspirating.   - need a speech eval and R/O CVA. seen by ID, Pulm, Renal, awaiting Neuro. ABx changed to CTX/Azithro. ptn spiked a temp, blood Cx re drawn. VSS otherwise. no events on tele  - DUonebs, Mucomyst, Chest PT, sputum Cx  - awaiting Legionella and S. Pneumoniae U Ag, awaiting MRSA/MSSA PCR  - nell, off IVF, given IV Lasix. no ARBS /ACE-I for now  - check TTE, check MR brain  - DM , on Insulin on a SS    9/22:  ptn developed overnight Afib w RVR, presently is in NSR. he was given IV DIgoxin, Norvasc DCed, started Cardizem qid, toprol changed to Loprssor 50 qid. these changes made since pills need to be crushed and given w food. Ptn was initially place on Heparin drip since he was NPO, once cleared by speech for a pureed diet: placed on Eliquis. all meds to be crushed and given w food. there is a concern ptn may have had an embolic stroke 2/2 PAF. concern for a brainstem CVA w airway compromise. awaiting MRI brain. seen by neuro and  cardiology. he is stable on HF O2, pulm folloiwng, on ABx for aspiration PNA. TTE w decreased EF but done while in Afib w RVR, EF was 40-45%. WOuld need to rpt    9/23: ptn's wife is refusing MR Brain. " i want him to get better, we will do after he comes home" ptn remains full code. he feels better, he is tolerating a pureed diet. I explained to her he may have had a stroke, affecting his breathing and swallowing. " thats ok" was her reply. ptn is now on 6 LO2NC, earlier today was on HFNC 45/50       - GI ppx w PPI 72F Hx HTN, T2DM, CVAs x 2 (2017 and 2021), HFrEF 59%, BPH p/w ED Generalized Weakness, SOB and WOB started on AFMO2 escalated to BiPAP Support with CXR c/w Lt Retrocardiac Infiltrates R/O Aspiration.     - Acute Hypoxic Hypercarbic Respiratory Failure on BiPAP Support  2/2 Multifocal PNA 2/2 Aspiration, presumed  - seen by MICU on admission, not a candidate   9/21 ptn is more awake, states he is hungry today, on BIPAP at present since shortly after removing BIPAP this am and after a meal became hypoxic again. Im concerned he is aspirating.   - need a speech eval and R/O CVA. seen by ID, Pulm, Renal, awaiting Neuro. ABx changed to CTX/Azithro. ptn spiked a temp, blood Cx re drawn. VSS otherwise. no events on tele  - DUonebs, Mucomyst, Chest PT, sputum Cx  - awaiting Legionella and S. Pneumoniae U Ag, awaiting MRSA/MSSA PCR  - nell, off IVF, given IV Lasix. no ARBS /ACE-I for now  - check TTE, check MR brain  - DM , on Insulin on a SS    9/22:  ptn developed overnight Afib w RVR, presently is in NSR. he was given IV DIgoxin, Norvasc DCed, started Cardizem qid, toprol changed to Loprssor 50 qid. these changes made since pills need to be crushed and given w food. Ptn was initially place on Heparin drip since he was NPO, once cleared by speech for a pureed diet: placed on Eliquis. all meds to be crushed and given w food. there is a concern ptn may have had an embolic stroke 2/2 PAF. concern for a brainstem CVA w airway compromise. awaiting MRI brain. seen by neuro and  cardiology. he is stable on HF O2, pulm folloiwng, on ABx for aspiration PNA. TTE w decreased EF but done while in Afib w RVR, EF was 40-45%. Would need to rpt    9/23: ptn's wife is refusing MR Brain. " i want him to get better, we will do after he comes home" ptn remains full code. he feels better, he is tolerating a pureed diet. I explained to her he may have had a stroke, affecting his breathing and swallowing. " thats ok" was her reply. ptn is now on 6 LO2NC, earlier today was on HFNC 45/50. BP not controlled, will add hydralazine 25 mg tid       - GI ppx w PPI

## 2023-09-23 NOTE — PROGRESS NOTE ADULT - ASSESSMENT
72F Hx HTN, T2DM, CVAs x 2 (2017 and 2021), HFrEF 59%, BPH p/w ED Generalized Weakness, SOB and WOB started on AFMO2 escalated to BiPAP Support with CXR c/w Lt Retrocardiac Infiltrates R/O Aspiration.   Assessment  DMT2: 72y Male with DM T2 with hyperglycemia, A1C 9.1% (2022) rpt pending, was on oral meds and Mounjaro at home, now on basal bolus insulin with coverage,  blood sugars are trending within acceptable range.  Shortness of breath: on medications, BIPAP support, monitored.  HTN: on antihypertensive medications, monitored, asymptomatic.  BEAR/CKD: Monitor labs/BMP, renal fu.         Yanni Robb MD  Cell: [phone]  Office: [phone]

## 2023-09-24 LAB
-  LEVOFLOXACIN: SIGNIFICANT CHANGE UP
-  MINOCYCLINE: SIGNIFICANT CHANGE UP
-  TRIMETHOPRIM/SULFAMETHOXAZOLE: SIGNIFICANT CHANGE UP
ANION GAP SERPL CALC-SCNC: 10 MMOL/L — SIGNIFICANT CHANGE UP (ref 5–17)
BUN SERPL-MCNC: 51 MG/DL — HIGH (ref 7–23)
CALCIUM SERPL-MCNC: 9.5 MG/DL — SIGNIFICANT CHANGE UP (ref 8.4–10.5)
CHLORIDE SERPL-SCNC: 108 MMOL/L — SIGNIFICANT CHANGE UP (ref 96–108)
CO2 SERPL-SCNC: 25 MMOL/L — SIGNIFICANT CHANGE UP (ref 22–31)
CREAT SERPL-MCNC: 1.83 MG/DL — HIGH (ref 0.5–1.3)
CULTURE RESULTS: SIGNIFICANT CHANGE UP
EGFR: 38 ML/MIN/1.73M2 — LOW
GLUCOSE BLDC GLUCOMTR-MCNC: 184 MG/DL — HIGH (ref 70–99)
GLUCOSE BLDC GLUCOMTR-MCNC: 190 MG/DL — HIGH (ref 70–99)
GLUCOSE BLDC GLUCOMTR-MCNC: 193 MG/DL — HIGH (ref 70–99)
GLUCOSE BLDC GLUCOMTR-MCNC: 209 MG/DL — HIGH (ref 70–99)
GLUCOSE SERPL-MCNC: 181 MG/DL — HIGH (ref 70–99)
HCT VFR BLD CALC: 39.7 % — SIGNIFICANT CHANGE UP (ref 39–50)
HGB BLD-MCNC: 12.7 G/DL — LOW (ref 13–17)
MAGNESIUM SERPL-MCNC: 2.2 MG/DL — SIGNIFICANT CHANGE UP (ref 1.6–2.6)
MCHC RBC-ENTMCNC: 29.3 PG — SIGNIFICANT CHANGE UP (ref 27–34)
MCHC RBC-ENTMCNC: 32 GM/DL — SIGNIFICANT CHANGE UP (ref 32–36)
MCV RBC AUTO: 91.7 FL — SIGNIFICANT CHANGE UP (ref 80–100)
METHOD TYPE: SIGNIFICANT CHANGE UP
METHOD TYPE: SIGNIFICANT CHANGE UP
NRBC # BLD: 0 /100 WBCS — SIGNIFICANT CHANGE UP (ref 0–0)
ORGANISM # SPEC MICROSCOPIC CNT: SIGNIFICANT CHANGE UP
PHOSPHATE SERPL-MCNC: 3.3 MG/DL — SIGNIFICANT CHANGE UP (ref 2.5–4.5)
PLATELET # BLD AUTO: 253 K/UL — SIGNIFICANT CHANGE UP (ref 150–400)
POTASSIUM SERPL-MCNC: 4 MMOL/L — SIGNIFICANT CHANGE UP (ref 3.5–5.3)
POTASSIUM SERPL-SCNC: 4 MMOL/L — SIGNIFICANT CHANGE UP (ref 3.5–5.3)
RBC # BLD: 4.33 M/UL — SIGNIFICANT CHANGE UP (ref 4.2–5.8)
RBC # FLD: 13 % — SIGNIFICANT CHANGE UP (ref 10.3–14.5)
SODIUM SERPL-SCNC: 143 MMOL/L — SIGNIFICANT CHANGE UP (ref 135–145)
SPECIMEN SOURCE: SIGNIFICANT CHANGE UP
WBC # BLD: 8.24 K/UL — SIGNIFICANT CHANGE UP (ref 3.8–10.5)
WBC # FLD AUTO: 8.24 K/UL — SIGNIFICANT CHANGE UP (ref 3.8–10.5)

## 2023-09-24 PROCEDURE — 99233 SBSQ HOSP IP/OBS HIGH 50: CPT

## 2023-09-24 RX ADMIN — Medication 100 MILLIGRAM(S): at 13:15

## 2023-09-24 RX ADMIN — Medication 100 MILLIGRAM(S): at 22:32

## 2023-09-24 RX ADMIN — Medication 50 MILLIGRAM(S): at 17:24

## 2023-09-24 RX ADMIN — Medication 25 MILLIGRAM(S): at 04:39

## 2023-09-24 RX ADMIN — APIXABAN 5 MILLIGRAM(S): 2.5 TABLET, FILM COATED ORAL at 07:36

## 2023-09-24 RX ADMIN — Medication 2: at 17:24

## 2023-09-24 RX ADMIN — Medication 3 MILLILITER(S): at 04:39

## 2023-09-24 RX ADMIN — ATORVASTATIN CALCIUM 80 MILLIGRAM(S): 80 TABLET, FILM COATED ORAL at 21:58

## 2023-09-24 RX ADMIN — Medication 25 MILLIGRAM(S): at 21:59

## 2023-09-24 RX ADMIN — Medication 50 MILLIGRAM(S): at 11:56

## 2023-09-24 RX ADMIN — Medication 100 MILLIGRAM(S): at 04:40

## 2023-09-24 RX ADMIN — PANTOPRAZOLE SODIUM 40 MILLIGRAM(S): 20 TABLET, DELAYED RELEASE ORAL at 11:56

## 2023-09-24 RX ADMIN — Medication 1: at 11:55

## 2023-09-24 RX ADMIN — Medication 3 MILLILITER(S): at 17:24

## 2023-09-24 RX ADMIN — CHLORHEXIDINE GLUCONATE 1 APPLICATION(S): 213 SOLUTION TOPICAL at 11:56

## 2023-09-24 RX ADMIN — Medication 1: at 07:35

## 2023-09-24 RX ADMIN — Medication 3 MILLILITER(S): at 11:55

## 2023-09-24 RX ADMIN — CEFTRIAXONE 100 MILLIGRAM(S): 500 INJECTION, POWDER, FOR SOLUTION INTRAMUSCULAR; INTRAVENOUS at 11:55

## 2023-09-24 RX ADMIN — APIXABAN 5 MILLIGRAM(S): 2.5 TABLET, FILM COATED ORAL at 21:59

## 2023-09-24 RX ADMIN — CLOPIDOGREL BISULFATE 75 MILLIGRAM(S): 75 TABLET, FILM COATED ORAL at 11:56

## 2023-09-24 RX ADMIN — Medication 3 MILLILITER(S): at 04:40

## 2023-09-24 RX ADMIN — INSULIN GLARGINE 12 UNIT(S): 100 INJECTION, SOLUTION SUBCUTANEOUS at 22:00

## 2023-09-24 RX ADMIN — Medication 50 MILLIGRAM(S): at 04:39

## 2023-09-24 RX ADMIN — Medication 25 MILLIGRAM(S): at 13:15

## 2023-09-24 NOTE — PROGRESS NOTE ADULT - SUBJECTIVE AND OBJECTIVE BOX
Patient is a 72y old  Male who presents with a chief complaint of sepsis (24 Sep 2023 13:56)      SUBJECTIVE / OVERNIGHT EVENTS: hypoxic respiratory failure is improving, now stable w 2LO2NC. recs re BIPAP at hs as per pulm. may not need anymore. in NSR on tele. will rpt TTE in NSR to R/O CM. will need an ischemic work up if EF reduced to 40 %. wife/ptn refuse MR. HIGH suspicion of embolic CVA. ptn is on ELiquis, tolerating it. BP is stable, cont hydralazine, cont Lopressor. all meds need to be crushed, unable to give long XL/SR formulations. cont CTX for aspiration PNA. FS have improved, BEAR improving. PT eval    MEDICATIONS  (STANDING):  albuterol/ipratropium for Nebulization 3 milliLiter(s) Nebulizer every 6 hours  apixaban 5 milliGRAM(s) Oral every 12 hours  atorvastatin 80 milliGRAM(s) Oral at bedtime  cefTRIAXone   IVPB 1000 milliGRAM(s) IV Intermittent every 24 hours  cefTRIAXone   IVPB      chlorhexidine 2% Cloths 1 Application(s) Topical <User Schedule>  clopidogrel Tablet 75 milliGRAM(s) Oral daily  dextrose 5%. 1000 milliLiter(s) (100 mL/Hr) IV Continuous <Continuous>  dextrose 5%. 1000 milliLiter(s) (50 mL/Hr) IV Continuous <Continuous>  dextrose 50% Injectable 12.5 Gram(s) IV Push once  dextrose 50% Injectable 25 Gram(s) IV Push once  dextrose 50% Injectable 25 Gram(s) IV Push once  glucagon  Injectable 1 milliGRAM(s) IntraMuscular once  hydrALAZINE 25 milliGRAM(s) Oral three times a day  insulin glargine Injectable (LANTUS) 12 Unit(s) SubCutaneous at bedtime  insulin lispro (ADMELOG) corrective regimen sliding scale   SubCutaneous three times a day before meals  metoprolol tartrate 50 milliGRAM(s) Oral four times a day  pantoprazole  Injectable 40 milliGRAM(s) IV Push daily  sodium chloride 0.9%. 1000 milliLiter(s) (50 mL/Hr) IV Continuous <Continuous>  thiamine Injectable 100 milliGRAM(s) IV Push three times a day    MEDICATIONS  (PRN):  dextrose Oral Gel 15 Gram(s) Oral once PRN Blood Glucose LESS THAN 70 milliGRAM(s)/deciliter  metoprolol tartrate Injectable 5 milliGRAM(s) IV Push every 6 hours PRN HR > 110      Vital Signs Last 24 Hrs  T(F): 97.9 (09-24-23 @ 14:26), Max: 98.4 (09-23-23 @ 20:15)  HR: 80 (09-24-23 @ 14:26) (67 - 86)  BP: 148/76 (09-24-23 @ 14:26) (148/76 - 179/80)  RR: 18 (09-24-23 @ 14:26) (18 - 18)  SpO2: 94% (09-24-23 @ 15:39) (92% - 97%)  Telemetry:   CAPILLARY BLOOD GLUCOSE      POCT Blood Glucose.: 190 mg/dL (24 Sep 2023 11:32)  POCT Blood Glucose.: 184 mg/dL (24 Sep 2023 07:29)  POCT Blood Glucose.: 145 mg/dL (23 Sep 2023 21:22)  POCT Blood Glucose.: 222 mg/dL (23 Sep 2023 17:21)    I&O's Summary    23 Sep 2023 07:01  -  24 Sep 2023 07:00  --------------------------------------------------------  IN: 500 mL / OUT: 1400 mL / NET: -900 mL    24 Sep 2023 07:01  -  24 Sep 2023 16:14  --------------------------------------------------------  IN: 240 mL / OUT: 500 mL / NET: -260 mL        PHYSICAL EXAM:  GENERAL: NAD, well-developed  HEAD:  Atraumatic, Normocephalic  EYES: EOMI, PERRLA, conjunctiva and sclera clear  NECK: Supple, No JVD  CHEST/LUNG: Clear to auscultation bilaterally; No wheeze  HEART: Regular rate and rhythm; No murmurs, rubs, or gallops  ABDOMEN: Soft, Nontender, Nondistended; Bowel sounds present  EXTREMITIES:  2+ Peripheral Pulses, No clubbing, cyanosis, or edema  PSYCH: AAOx3  NEUROLOGY: non-focal  SKIN: No rashes or lesions    LABS:                        12.7   8.24  )-----------( 253      ( 24 Sep 2023 06:21 )             39.7     09-24    143  |  108  |  51<H>  ----------------------------<  181<H>  4.0   |  25  |  1.83<H>    Ca    9.5      24 Sep 2023 06:21      PTT - ( 22 Sep 2023 22:21 )  PTT:41.6 sec      Urinalysis Basic - ( 24 Sep 2023 06:21 )    Color: x / Appearance: x / SG: x / pH: x  Gluc: 181 mg/dL / Ketone: x  / Bili: x / Urobili: x   Blood: x / Protein: x / Nitrite: x   Leuk Esterase: x / RBC: x / WBC x   Sq Epi: x / Non Sq Epi: x / Bacteria: x        RADIOLOGY & ADDITIONAL TESTS:    Imaging Personally Reviewed:    Consultant(s) Notes Reviewed:      Care Discussed with Consultants/Other Providers:

## 2023-09-24 NOTE — PROGRESS NOTE ADULT - ASSESSMENT
72-year-old male PMH of HTN, HLD, T2DM, BPH, age, prior CVA in 2017 and 2021 uncertain of residual deficits, HFpEF reportedly on 20mg lasix QD (TTE from 2022 EF 59%) presented to the ED with multifocal PNA. Course c/b new a fib with RVR starting 9/22 at 0600, likely secondary to infectious process.    Recommendations:  - self-converted overnight 9/22, currently sinus 60-90s  - recommend amiodarone 400mg BID (10g load) to maintain sinus rhythm; patient ultimately refusing  - d/c'd Diltiazem and Digoxin   - Continue Metoprolol, would switch to Toprol XL 100mg BID when tolerating full diet  - Now on AC with Eliquis 5mg BID, continue. CHADSVASC 6  - Keep on tele. Keep K>4, Mg>2    Ankit Ambrocio MD  EP Fellow  For all New Consults and Questions:  [email or web address]   Login: [email or web address]    72-year-old male PMH of HTN, HLD, T2DM, BPH, age, prior CVA in 2017 and 2021 uncertain of residual deficits, HFpEF reportedly on 20mg lasix QD (TTE from 2022 EF 59%) presented to the ED with multifocal PNA. Course c/b new a fib with RVR starting 9/22 at 0600, likely secondary to infectious process.    Recommendations:  - self-converted overnight 9/22, currently sinus 60-90s  - recommend amiodarone 400mg BID (10g load) to maintain sinus rhythm; patient ultimately refusing  - d/c'd Diltiazem and Digoxin   - Continue Metoprolol, would switch to Toprol XL 100mg BID when tolerating full diet and uptitrate as tolerated  - Now on AC with Eliquis 5mg BID, continue. CHADSVASC 6  - Keep on tele. Keep K>4, Mg>2    Will sign off.  Please call us back as needed.    Ankit Ambrocio MD  EP Fellow  For all New Consults and Questions:  [email or web address]   Login: [employer]

## 2023-09-24 NOTE — PROGRESS NOTE ADULT - SUBJECTIVE AND OBJECTIVE BOX
Family at bedside  on HFNC appear comfortable       VITAL:  T(C): , Max: 38.6 (09-21-23 @ 13:40)  T(F): , Max: 101.5 (09-21-23 @ 13:40)  HR: 90s-160s  BP: 130/56 (09-22-23 @ 11:26)  RR: 18 (09-22-23 @ 04:31)  SpO2: 96% (09-22-23 @ 11:26)  Wt(kg): --      PHYSICAL EXAM:  Constitutional:  alert; NAD on HFNCO2  HEENT: (+)DMM,   Neck: Supple, No JVD  Respiratory: (+)b/l rhonchi  Cardiovascular: tachy irreg s1s2  Gastrointestinal: BS+, soft, NT/ND  Extremities: No peripheral edema b/l  Neurological: reduced generalized strength  Back: no CVAT b/l  Skin: No rashes, no nevi      LABS:                        12.1   16.31 )-----------( 284      ( 22 Sep 2023 06:43 )             38.1     Na(147)/K(3.4)/Cl(109)/HCO3(24)/BUN(56)/Cr(2.20)Glu(54)/Ca(9.4)/Mg(--)/PO4(--)    09-22 @ 06:43  Na(142)/K(4.3)/Cl(103)/HCO3(24)/BUN(47)/Cr(1.98)Glu(197)/Ca(9.7)/Mg(--)/PO4(--)    09-21 @ 07:02  Na(142)/K(3.9)/Cl(102)/HCO3(22)/BUN(41)/Cr(1.82)Glu(221)/Ca(9.8)/Mg(1.7)/PO4(3.9)    09-20 @ 16:57  Na(142)/K(4.3)/Cl(101)/HCO3(21)/BUN(40)/Cr(1.90)Glu(230)/Ca(10.0)/Mg(--)/PO4(--)    09-20 @ 13:22    Sodium, Random Urine: 16 mmol/L (09-21 @ 18:51)  Chloride, Random Urine: 23 mmol/L (09-21 @ 18:51)  Potassium, Random Urine: 44 mmol/L (09-21 @ 18:51)  Creatinine, Random Urine: 88 mg/dL (09-21 @ 18:51)  Uprot 164mg/dL  FENa 0.2%    VBG 7.35/52/56/29    IMAGING:  < from: US Kidney and Bladder (09.21.23 @ 13:58) >  Right kidney: 11.7. No renal mass, hydronephrosis or calculi.  Left kidney: 12.5. No renal mass, hydronephrosis or calculi.  Urinary bladder: Within normal limits.    < from: CT Angio Chest PE Protocol w/ IV Cont (09.20.23 @ 15:15) >  No pulmonary embolus.  Consolidations and nodular opacities within bilateral lower lobes, right   middle lobe and lingula representing multifocal pneumonia. Recommend   follow-up chest CT in 1-3 months to determine resolution.        IMPRESSION: 72M w/ HTN, DM2, CKD3, BPH, CVA, and HFpEF, 9/20/23 p/w multifocal PNA    (1)CKD - stage 3, with ~2gm proteinuria - likely due to diabetic nephropathy    (2)BEAR - mild - likely prerenally mediated    (3)Hypernatremia - in setting of insensible losses/limited free water intake by mouth - not overly concerning for now    (4)Hypokalemia - whole body deficit due to limited intake/Lasix - repleted orally    (5)CV - urine studies c/w prerenal azotemia; numbers worsening further as of today. Did receive 1 dose of IV Lasix yesterday, as well as gentle IVF. S/p CTPA; FENa can be low from contrast nephropathy...that being said, the BEAR was already developing prior to the CTPA. Likely mildly dry.    (6)ID/Pulm- multifocal pneumonia - ID and Pulm on board; on broad-spectrum abx; on BIPAP; on Duoneb/Mucomyst; getting chest PT    (7)EP - rapid afib - Cardiology on board - receiving beta-blockade      RECOMMEND:  (1)cr better   (2)K+ repletion as ordered  (3)No diuretics for now  (4)No ACEI/ARB for now  (5)Dose new meds for GFR 20-30ml/min (present dosing is acceptable)  (6)BMP+Mg+PO4 daily  (7)CXR today   (8) encourage PO intake free water 200c every 6h           [facility]  [facility]  Office: [phone]

## 2023-09-24 NOTE — PROGRESS NOTE ADULT - ATTENDING COMMENTS
given underlying lung disease and illnesses I suspect AF will be a recurring issue and that is why I recommend amiodarone  Team/OP Cards does not wish to pursue, therefore will sign off

## 2023-09-24 NOTE — PROGRESS NOTE ADULT - ASSESSMENT
72F Hx HTN, T2DM, CVAs x 2 (2017 and 2021), HFrEF 59%, BPH p/w ED Generalized Weakness, SOB and WOB started on AFMO2 escalated to BiPAP Support with CXR c/w Lt Retrocardiac Infiltrates R/O Aspiration.     - Acute Hypoxic Hypercarbic Respiratory Failure on BiPAP Support  2/2 Multifocal PNA 2/2 Aspiration, presumed  - seen by MICU on admission, not a candidate   9/21 ptn is more awake, states he is hungry today, on BIPAP at present since shortly after removing BIPAP this am and after a meal became hypoxic again. Im concerned he is aspirating.   - need a speech eval and R/O CVA. seen by ID, Pulm, Renal, awaiting Neuro. ABx changed to CTX/Azithro. ptn spiked a temp, blood Cx re drawn. VSS otherwise. no events on tele  - DUonebs, Mucomyst, Chest PT, sputum Cx  - awaiting Legionella and S. Pneumoniae U Ag, awaiting MRSA/MSSA PCR  - bear, off IVF, given IV Lasix. no ARBS /ACE-I for now  - check TTE, check MR brain  - DM , on Insulin on a SS    9/22:  ptn developed overnight Afib w RVR, presently is in NSR. he was given IV DIgoxin, Norvasc DCed, started Cardizem qid, toprol changed to Loprssor 50 qid. these changes made since pills need to be crushed and given w food. Ptn was initially place on Heparin drip since he was NPO, once cleared by speech for a pureed diet: placed on Eliquis. all meds to be crushed and given w food. there is a concern ptn may have had an embolic stroke 2/2 PAF. concern for a brainstem CVA w airway compromise. awaiting MRI brain. seen by neuro and  cardiology. he is stable on HF O2, pulm folloiwng, on ABx for aspiration PNA. TTE w decreased EF but done while in Afib w RVR, EF was 40-45%. Would need to rpt    9/23: ptn's wife is refusing MR Brain. " i want him to get better, we will do after he comes home" ptn remains full code. he feels better, he is tolerating a pureed diet. I explained to her he may have had a stroke, affecting his breathing and swallowing. " thats ok" was her reply. ptn is now on 6 LO2NC, earlier today was on HFNC 45/50. BP not controlled, will add hydralazine 25 mg tid    9/24: hypoxic respiratory failure is improving, now stable w 2LO2NC. recs re BIPAP at hs as per pulm. may not need anymore. in NSR on tele. will rpt TTE in NSR to R/O CM. will need an ischemic work up if EF reduced to 40 %. wife/ptn refuse MR. HIGH suspicion of embolic CVA. ptn is on ELiquis, tolerating it. BP is stable, cont hydralazine, cont Lopressor. all meds need to be crushed, unable to give long XL/SR formulations. cont CTX for aspiration PNA. FS have improved, BEAR improving. PT eval         - GI ppx w PPI

## 2023-09-24 NOTE — PROGRESS NOTE ADULT - SUBJECTIVE AND OBJECTIVE BOX
Chief complaint  Patient is a 72y old  Male who presents with a chief complaint of sepsis (24 Sep 2023 09:48)         Labs and Fingersticks  CAPILLARY BLOOD GLUCOSE      POCT Blood Glucose.: 184 mg/dL (24 Sep 2023 07:29)  POCT Blood Glucose.: 145 mg/dL (23 Sep 2023 21:22)  POCT Blood Glucose.: 222 mg/dL (23 Sep 2023 17:21)  POCT Blood Glucose.: 141 mg/dL (23 Sep 2023 11:35)      Anion Gap: 10 (09-24 @ 06:21)  Anion Gap: 13 (09-23 @ 06:12)      Calcium: 9.5 (09-24 @ 06:21)  Calcium: 9.7 (09-23 @ 06:12)          09-24    143  |  108  |  51<H>  ----------------------------<  181<H>  4.0   |  25  |  1.83<H>    Ca    9.5      24 Sep 2023 06:21                          12.7   8.24  )-----------( 253      ( 24 Sep 2023 06:21 )             39.7     Medications  MEDICATIONS  (STANDING):  albuterol/ipratropium for Nebulization 3 milliLiter(s) Nebulizer every 6 hours  apixaban 5 milliGRAM(s) Oral every 12 hours  atorvastatin 80 milliGRAM(s) Oral at bedtime  cefTRIAXone   IVPB 1000 milliGRAM(s) IV Intermittent every 24 hours  cefTRIAXone   IVPB      chlorhexidine 2% Cloths 1 Application(s) Topical <User Schedule>  clopidogrel Tablet 75 milliGRAM(s) Oral daily  dextrose 5%. 1000 milliLiter(s) (50 mL/Hr) IV Continuous <Continuous>  dextrose 5%. 1000 milliLiter(s) (100 mL/Hr) IV Continuous <Continuous>  dextrose 50% Injectable 12.5 Gram(s) IV Push once  dextrose 50% Injectable 25 Gram(s) IV Push once  dextrose 50% Injectable 25 Gram(s) IV Push once  glucagon  Injectable 1 milliGRAM(s) IntraMuscular once  hydrALAZINE 25 milliGRAM(s) Oral three times a day  insulin glargine Injectable (LANTUS) 12 Unit(s) SubCutaneous at bedtime  insulin lispro (ADMELOG) corrective regimen sliding scale   SubCutaneous three times a day before meals  metoprolol tartrate 50 milliGRAM(s) Oral four times a day  pantoprazole  Injectable 40 milliGRAM(s) IV Push daily  sodium chloride 0.9%. 1000 milliLiter(s) (50 mL/Hr) IV Continuous <Continuous>  thiamine Injectable 100 milliGRAM(s) IV Push three times a day      Physical Exam  General: Patient comfortable in bed   Vital Signs Last 12 Hrs  T(F): 98.1 (09-24-23 @ 04:33), Max: 98.1 (09-24-23 @ 04:33)  HR: 67 (09-24-23 @ 09:06) (67 - 83)  BP: 160/76 (09-24-23 @ 09:06) (155/90 - 179/80)  BP(mean): --  RR: 18 (09-24-23 @ 08:13) (18 - 18)  SpO2: 94% (09-24-23 @ 09:06) (94% - 95%)    CVS: S1S2   Respiratory: No wheezing, no crepitations  GI: Abdomen soft, bowel sounds positive  Musculoskeletal:  moves all extremities  : Voiding        Chief complaint  Patient is a 72y old  Male who presents with a chief complaint of sepsis (24 Sep 2023 09:48)         Labs and Fingersticks  CAPILLARY BLOOD GLUCOSE      POCT Blood Glucose.: 184 mg/dL (24 Sep 2023 07:29)  POCT Blood Glucose.: 145 mg/dL (23 Sep 2023 21:22)  POCT Blood Glucose.: 222 mg/dL (23 Sep 2023 17:21)  POCT Blood Glucose.: 141 mg/dL (23 Sep 2023 11:35)      Anion Gap: 10 (09-24 @ 06:21)  Anion Gap: 13 (09-23 @ 06:12)      Calcium: 9.5 (09-24 @ 06:21)  Calcium: 9.7 (09-23 @ 06:12)          09-24    143  |  108  |  51<H>  ----------------------------<  181<H>  4.0   |  25  |  1.83<H>    Ca    9.5      24 Sep 2023 06:21                          12.7   8.24  )-----------( 253      ( 24 Sep 2023 06:21 )             39.7     Medications  MEDICATIONS  (STANDING):  albuterol/ipratropium for Nebulization 3 milliLiter(s) Nebulizer every 6 hours  apixaban 5 milliGRAM(s) Oral every 12 hours  atorvastatin 80 milliGRAM(s) Oral at bedtime  cefTRIAXone   IVPB 1000 milliGRAM(s) IV Intermittent every 24 hours  cefTRIAXone   IVPB      chlorhexidine 2% Cloths 1 Application(s) Topical <User Schedule>  clopidogrel Tablet 75 milliGRAM(s) Oral daily  dextrose 5%. 1000 milliLiter(s) (50 mL/Hr) IV Continuous <Continuous>  dextrose 5%. 1000 milliLiter(s) (100 mL/Hr) IV Continuous <Continuous>  dextrose 50% Injectable 12.5 Gram(s) IV Push once  dextrose 50% Injectable 25 Gram(s) IV Push once  dextrose 50% Injectable 25 Gram(s) IV Push once  glucagon  Injectable 1 milliGRAM(s) IntraMuscular once  hydrALAZINE 25 milliGRAM(s) Oral three times a day  insulin glargine Injectable (LANTUS) 12 Unit(s) SubCutaneous at bedtime  insulin lispro (ADMELOG) corrective regimen sliding scale   SubCutaneous three times a day before meals  metoprolol tartrate 50 milliGRAM(s) Oral four times a day  pantoprazole  Injectable 40 milliGRAM(s) IV Push daily  sodium chloride 0.9%. 1000 milliLiter(s) (50 mL/Hr) IV Continuous <Continuous>  thiamine Injectable 100 milliGRAM(s) IV Push three times a day      Physical Exam  General: Patient comfortable in bed   Vital Signs Last 12 Hrs  T(F): 98.1 (09-24-23 @ 04:33), Max: 98.1 (09-24-23 @ 04:33)  HR: 67 (09-24-23 @ 09:06) (67 - 83)  BP: 160/76 (09-24-23 @ 09:06) (155/90 - 179/80)  BP(mean): --  RR: 18 (09-24-23 @ 08:13) (18 - 18)  SpO2: 94% (09-24-23 @ 09:06) (94% - 95%)    CVS: S1S2   Respiratory: No wheezing, no crepitations  GI: Abdomen soft, bowel sounds positive  Musculoskeletal:  moves all extremities  : Voiding

## 2023-09-24 NOTE — PROGRESS NOTE ADULT - SUBJECTIVE AND OBJECTIVE BOX
Subjective: Patient seen and examined. No new events except as noted.   On NC oxygen   + Cough   feeling better   In SR on tele   REVIEW OF SYSTEMS:    CONSTITUTIONAL: + weakness, fevers or chills  EYES/ENT: No visual changes;  No vertigo or throat pain   NECK: No pain or stiffness  RESPIRATORY: +cough, wheezing, hemoptysis; No shortness of breath  CARDIOVASCULAR: No chest pain or palpitations  GASTROINTESTINAL: No abdominal or epigastric pain. No nausea, vomiting, or hematemesis; No diarrhea or constipation. No melena or hematochezia.  GENITOURINARY: No dysuria, frequency or hematuria  NEUROLOGICAL: No numbness or weakness  SKIN: No itching, burning, rashes, or lesions   All other review of systems is negative unless indicated above.    MEDICATIONS:  MEDICATIONS  (STANDING):  albuterol/ipratropium for Nebulization 3 milliLiter(s) Nebulizer every 6 hours  apixaban 5 milliGRAM(s) Oral every 12 hours  atorvastatin 80 milliGRAM(s) Oral at bedtime  cefTRIAXone   IVPB 1000 milliGRAM(s) IV Intermittent every 24 hours  cefTRIAXone   IVPB      chlorhexidine 2% Cloths 1 Application(s) Topical <User Schedule>  clopidogrel Tablet 75 milliGRAM(s) Oral daily  dextrose 5%. 1000 milliLiter(s) (50 mL/Hr) IV Continuous <Continuous>  dextrose 5%. 1000 milliLiter(s) (100 mL/Hr) IV Continuous <Continuous>  dextrose 50% Injectable 12.5 Gram(s) IV Push once  dextrose 50% Injectable 25 Gram(s) IV Push once  dextrose 50% Injectable 25 Gram(s) IV Push once  glucagon  Injectable 1 milliGRAM(s) IntraMuscular once  hydrALAZINE 25 milliGRAM(s) Oral three times a day  insulin glargine Injectable (LANTUS) 12 Unit(s) SubCutaneous at bedtime  insulin lispro (ADMELOG) corrective regimen sliding scale   SubCutaneous three times a day before meals  metoprolol tartrate 50 milliGRAM(s) Oral four times a day  pantoprazole  Injectable 40 milliGRAM(s) IV Push daily  sodium chloride 0.9%. 1000 milliLiter(s) (50 mL/Hr) IV Continuous <Continuous>  thiamine Injectable 100 milliGRAM(s) IV Push three times a day      PHYSICAL EXAM:  T(C): 36.7 (09-24-23 @ 04:33), Max: 36.9 (09-23-23 @ 11:00)  HR: 67 (09-24-23 @ 09:06) (67 - 86)  BP: 160/76 (09-24-23 @ 09:06) (148/87 - 179/80)  RR: 18 (09-24-23 @ 08:13) (18 - 20)  SpO2: 94% (09-24-23 @ 09:06) (92% - 99%)  Wt(kg): --  I&O's Summary    23 Sep 2023 07:01  -  24 Sep 2023 07:00  --------------------------------------------------------  IN: 500 mL / OUT: 1400 mL / NET: -900 mL          Appearance: NAD  HEENT:   Dry  oral mucosa, PERRL, EOMI	  Lymphatic: No lymphadenopathy , no edema  Cardiovascular: Normal S1 S2, No JVD, No murmurs , Peripheral pulses palpable 2+ bilaterally  Respiratory: crackles bilaterally    Gastrointestinal:  Soft, Non-tender, + BS	  Skin: No rashes, No ecchymoses, No cyanosis, warm to touch  Musculoskeletal: Normal range of motion, normal strength  Psychiatry:  Mood & affect appropriate  Ext: No edema      LABS:    CARDIAC MARKERS:                                12.7   8.24  )-----------( 253      ( 24 Sep 2023 06:21 )             39.7     09-24    143  |  108  |  51<H>  ----------------------------<  181<H>  4.0   |  25  |  1.83<H>    Ca    9.5      24 Sep 2023 06:21          TELEMETRY: 	  SR  ECG:  	  RADIOLOGY: xr< from: Xray Chest 1 View- PORTABLE-Routine (Xray Chest 1 View- PORTABLE-Routine .) (09.23.23 @ 09:12) >    ACC: [id number] EXAM:  XR CHEST PORTABLE ROUTINE 1V   ORDERED BY: THIAGO MARCELINO     PROCEDURE DATE:  09/23/2023          INTERPRETATION:  Chest one view    HISTORY: Aspiration pneumonia    COMPARISON STUDY: 9/20/2023    Frontal expiratory view ofthe chest shows the heart to be borderline in   size. The lungs show bibasilar atelectasis with small left effusion and   there is no evidence of pneumothorax nor definite right pleural effusion.    IMPRESSION:  Atelectasis with effusion. No focal infiltrate identified.        Thank you for the courtesy of this referral.    --- End of Report ---      < end of copied text >    DIAGNOSTIC TESTING:  [ ] Echocardiogram:  [ ]  Catheterization:  [ ] Stress Test:    OTHER:

## 2023-09-24 NOTE — PHYSICAL THERAPY INITIAL EVALUATION ADULT - ADDITIONAL COMMENTS
Patient reports he lives with his spouse in a private house. He has 3 steps to enter and 1 flight of stairs to bedroom. Patient states he was fully independent prior.

## 2023-09-24 NOTE — PROGRESS NOTE ADULT - SUBJECTIVE AND OBJECTIVE BOX
[facility]      Seen earlier today [Please note that date of entry above  is the actual  DATE OF SERVICE] No new neurological symptoms reported. Remains stable neurologically.   - Today, patient is without complaints.          *****MEDICATIONS: Current medication reviewed and documented.    MEDICATIONS  (STANDING):  albuterol/ipratropium for Nebulization 3 milliLiter(s) Nebulizer every 6 hours  apixaban 5 milliGRAM(s) Oral every 12 hours  atorvastatin 80 milliGRAM(s) Oral at bedtime  cefTRIAXone   IVPB      cefTRIAXone   IVPB 1000 milliGRAM(s) IV Intermittent every 24 hours  chlorhexidine 2% Cloths 1 Application(s) Topical <User Schedule>  clopidogrel Tablet 75 milliGRAM(s) Oral daily  dextrose 5%. 1000 milliLiter(s) (50 mL/Hr) IV Continuous <Continuous>  dextrose 5%. 1000 milliLiter(s) (100 mL/Hr) IV Continuous <Continuous>  dextrose 50% Injectable 12.5 Gram(s) IV Push once  dextrose 50% Injectable 25 Gram(s) IV Push once  dextrose 50% Injectable 25 Gram(s) IV Push once  glucagon  Injectable 1 milliGRAM(s) IntraMuscular once  hydrALAZINE 25 milliGRAM(s) Oral three times a day  insulin glargine Injectable (LANTUS) 12 Unit(s) SubCutaneous at bedtime  insulin lispro (ADMELOG) corrective regimen sliding scale   SubCutaneous three times a day before meals  metoprolol tartrate 50 milliGRAM(s) Oral four times a day  pantoprazole  Injectable 40 milliGRAM(s) IV Push daily  sodium chloride 0.9%. 1000 milliLiter(s) (50 mL/Hr) IV Continuous <Continuous>  thiamine Injectable 100 milliGRAM(s) IV Push three times a day    MEDICATIONS  (PRN):  dextrose Oral Gel 15 Gram(s) Oral once PRN Blood Glucose LESS THAN 70 milliGRAM(s)/deciliter  metoprolol tartrate Injectable 5 milliGRAM(s) IV Push every 6 hours PRN HR > 110          ***** VITAL SIGNS:   Vital Signs Last 24 Hrs      I&O's Summary    23 Sep 2023 07:01  -  24 Sep 2023 07:00  --------------------------------------------------------  IN: 500 mL / OUT: 1400 mL / NET: -900 mL    24 Sep 2023 07:01  -  24 Sep 2023 20:03  --------------------------------------------------------  IN: 290 mL / OUT: 500 mL / NET: -210 mL             *****PHYSICAL EXAM:   alert oriented x 3 attention comprehension are fair.  Able to name, repeat.   EOmi fundi not visualized   no nystagmus VFF to confrontation  Tongue is midline  Palate elevates symmetrically   Moving all 4 ext spontaneously no drift appreciated    Gait not assessed.            *****LAB AND IMAGIN.7   8.24  )-----------( 253      ( 24 Sep 2023 06:21 )             39.7                   143  |  108  |  51<H>  ----------------------------<  181<H>  4.0   |  25  |  1.83<H>    Ca    9.5      24 Sep 2023 06:21  Phos  3.3       Mg     2.2           PTT - ( 22 Sep 2023 22:21 )  PTT:41.6 sec                   Urinalysis Basic - ( 24 Sep 2023 06:21 )    Color: x / Appearance: x / SG: x / pH: x  Gluc: 181 mg/dL / Ketone: x  / Bili: x / Urobili: x   Blood: x / Protein: x / Nitrite: x   Leuk Esterase: x / RBC: x / WBC x   Sq Epi: x / Non Sq Epi: x / Bacteria: x      [All pertinent recent Imaging/Reports reviewed]            *****A S S E S S M E N T   A N D   P L A N ::72-year-old male PMH of HTN, HLD, T2DM, BPH, age, prior CVA in  and  uncertain of residual deficits, HFpEF reportedly on 20mg lasix QD (TTE from  EF 59%) presents to the ED complaining of generalized weakness today, feeling generally unwell for several days is unable to quantify how many days or specific symptomatology.  Patient reports feeling some shortness of breath today was found to be hypoxic by EMS (78% on RA), and requiring 6L O2 via NC to maintain SpO2 of 93% in ED. patient also reports blood glucose at home by EMS was elevated but uncertain of the number, reports he has not taken any of his antidiabetic medication for at least a day due to feeling unwell, has not taken his daily Lasix.  Patient notes possibly some URI symptoms over the past few days.  Patient denies urinary complaints, abdominal pain, vomiting, diarrhea, melena, hematochezia.         Problem/Recommendations 1: dysphagia likely progressive   in 2017 noted trace aspiration   in  pt had tia, mri neg presented with dysarthria facial droop     would repeat mri brain to eval for any new ischemia however pts wife is refusing and would like for him to get it as outpt   she and pt understand risk of not doing imaging   ct head now off high flow on 2 litre nasal canulla         would  consider swallow exercises   would change to thickened liquid   only feed when wide awake   nih 4   mrs 2   continue plavix apixaban and statin ( understands increased risk with dual antithrombotic )  follow up with dr. libman   chest pt     Problem/Recommendations 2:    pneumonia   likely aspiration   pt with recent noncompliance to dm meds             Thank you for allowing me to participate in the care of this patient. Will continue to follow patient periodically. Please do not hesitate to call me if you have any  questions or if there has been a change in patients neurological status     ________________  Faye Mackenzie MD  [facility]  [phone]      33 minutes spent on total encounter; more than 50 % of the visit was  spent counseling about plan of care, compliance to diet/exercise and medication regimen and or  coordinating care by the attending physician.      It is advised that stroke patients follow up with ALEXANDRIA Eddy @ [phone] in 1- 2 weeks.   Others please follow up with Dr. Michael Nissenbaum [phone]

## 2023-09-24 NOTE — PROGRESS NOTE ADULT - ASSESSMENT
72F Hx HTN, T2DM, CVAs x 2 (2017 and 2021), HFrEF 59%, BPH p/w ED Generalized Weakness, SOB and WOB started on AFMO2 escalated to BiPAP Support with CXR c/w Lt Retrocardiac Infiltrates R/O Aspiration.   Assessment  DMT2: 72y Male with DM T2 with hyperglycemia, A1C 9.1% (2022) rpt 7.3%, was on oral meds and Mounjaro at home, now on basal bolus insulin with coverage,  blood sugars are trending within acceptable range.  Shortness of breath: on medications, BIPAP support, monitored.  HTN: on antihypertensive medications, monitored, asymptomatic.  BEAR/CKD: Monitor labs/BMP, renal fu.           Discussed plan and management with Dr Clarisse Oviedo NP - TEAMS  Yanni Robb MD  Cell: [phone]  Office: [phone]            72F Hx HTN, T2DM, CVAs x 2 (2017 and 2021), HFrEF 59%, BPH p/w ED Generalized Weakness, SOB and WOB started on AFMO2 escalated to BiPAP Support with CXR c/w Lt Retrocardiac Infiltrates R/O Aspiration.   Assessment  DMT2: 72y Male with DM T2 with hyperglycemia, A1C 9.1% (2022) rpt 7.3%, was on oral meds and Mounjaro at home, now on basal bolus insulin with coverage, blood sugars are trending within acceptable range.  Shortness of breath: on medications, BIPAP support, monitored.  HTN: on antihypertensive medications, monitored, asymptomatic.  BEAR/CKD: Monitor labs/BMP, renal fu.           Discussed plan and management with Dr Clarisse Oviedo NP - TEAMS  Yanni Robb MD  Cell: [phone]  Office: [phone]

## 2023-09-24 NOTE — PHYSICAL THERAPY INITIAL EVALUATION ADULT - PERTINENT HX OF CURRENT PROBLEM, REHAB EVAL
72-year-old male PMH of HTN, HLD, T2DM, BPH, age, prior CVA in 2017 and 2021 uncertain of residual deficits, HFpEF reportedly on 20mg lasix QD (TTE from 2022 EF 59%) presents to the ED complaining of generalized weakness today, feeling generally unwell for several days is unable to quantify how many days or specific symptomatology.  Patient reports feeling some shortness of breath today was found to be hypoxic by EMS (78% on RA), and requiring 6L O2 via NC to maintain SpO2 of 93% in ED. patient also reports blood glucose at home by EMS was elevated but uncertain of the number, reports he has not taken any of his antidiabetic medication for at least a day due to feeling unwell, has not taken his daily Lasix. 72-year-old male PMH of HTN, HLD, T2DM, BPH, age, prior CVA in 2017 and 2021 uncertain of residual deficits, HFpEF reportedly on 20mg lasix QD (TTE from 2022 EF 59%) presents to the ED complaining of generalized weakness today, feeling generally unwell for several days is unable to quantify how many days or specific symptomatology.  Patient reports feeling some shortness of breath today was found to be hypoxic by EMS (78% on RA), and requiring 6L O2 via NC to maintain SpO2 of 93% in ED. patient also reports blood glucose at home by EMS was elevated but uncertain of the number, reports he has not taken any of his antidiabetic medication for at least a day due to feeling unwell, has not taken his daily Lasix. CT angio: neg for PE.  Xray chest: Atelectasis with effusion

## 2023-09-24 NOTE — PROGRESS NOTE ADULT - SUBJECTIVE AND OBJECTIVE BOX
Patient seen and examined at bedside.    Overnight Events:  No events overnight.  improving respiratory status     Review Of Systems: No chest pain, shortness of breath, or palpitations            Current Meds:  albuterol/ipratropium for Nebulization 3 milliLiter(s) Nebulizer every 6 hours  apixaban 5 milliGRAM(s) Oral every 12 hours  atorvastatin 80 milliGRAM(s) Oral at bedtime  cefTRIAXone   IVPB 1000 milliGRAM(s) IV Intermittent every 24 hours  cefTRIAXone   IVPB      chlorhexidine 2% Cloths 1 Application(s) Topical <User Schedule>  clopidogrel Tablet 75 milliGRAM(s) Oral daily  dextrose 5%. 1000 milliLiter(s) IV Continuous <Continuous>  dextrose 5%. 1000 milliLiter(s) IV Continuous <Continuous>  dextrose 50% Injectable 12.5 Gram(s) IV Push once  dextrose 50% Injectable 25 Gram(s) IV Push once  dextrose 50% Injectable 25 Gram(s) IV Push once  dextrose Oral Gel 15 Gram(s) Oral once PRN  glucagon  Injectable 1 milliGRAM(s) IntraMuscular once  hydrALAZINE 25 milliGRAM(s) Oral three times a day  insulin glargine Injectable (LANTUS) 12 Unit(s) SubCutaneous at bedtime  insulin lispro (ADMELOG) corrective regimen sliding scale   SubCutaneous three times a day before meals  metoprolol tartrate 50 milliGRAM(s) Oral four times a day  metoprolol tartrate Injectable 5 milliGRAM(s) IV Push every 6 hours PRN  pantoprazole  Injectable 40 milliGRAM(s) IV Push daily  sodium chloride 0.9%. 1000 milliLiter(s) IV Continuous <Continuous>  thiamine Injectable 100 milliGRAM(s) IV Push three times a day      Vitals:  T(F): 98.1 (09-24), Max: 98.5 (09-23)  HR: 67 (09-24) (67 - 86)  BP: 160/76 (09-24) (148/87 - 179/80)  RR: 18 (09-24)  SpO2: 94% (09-24)  I&O's Summary    23 Sep 2023 07:01  -  24 Sep 2023 07:00  --------------------------------------------------------  IN: 500 mL / OUT: 1400 mL / NET: -900 mL        Physical Exam:  Appearance: No acute distress; well appearing  Eyes: PERRL, EOMI, pink conjunctiva  HEENT: Normal oral mucosa  Cardiovascular: RRR, S1, S2, no murmurs, rubs, or gallops; no edema; no JVD  Respiratory: Clear to auscultation bilaterally  Gastrointestinal: soft, non-tender, non-distended with normal bowel sounds  Musculoskeletal: No clubbing; no joint deformity   Neurologic: Non-focal  Lymphatic: No lymphadenopathy  Psychiatry: AAOx3, mood & affect appropriate  Skin: No rashes, ecchymoses, or cyanosis                          12.7   8.24  )-----------( 253      ( 24 Sep 2023 06:21 )             39.7     09-24    143  |  108  |  51<H>  ----------------------------<  181<H>  4.0   |  25  |  1.83<H>    Ca    9.5      24 Sep 2023 06:21      PTT - ( 22 Sep 2023 22:21 )  PTT:41.6 sec  CARDIAC MARKERS ( 20 Sep 2023 16:10 )  233 ng/L / x     / x     / x     / x     / x      CARDIAC MARKERS ( 20 Sep 2023 13:22 )  268 ng/L / x     / x     / x     / x     / x                Interpretation of Telemetry:  sinus rhythm 60-80s

## 2023-09-24 NOTE — PHYSICAL THERAPY INITIAL EVALUATION ADULT - PRECAUTIONS/LIMITATIONS, REHAB EVAL
Adelaide Mccain had walk-in EKG completed on 04/12/22 at 3:26 PM by Frances Terry  no known precautions/limitations

## 2023-09-24 NOTE — PHYSICAL THERAPY INITIAL EVALUATION ADULT - PLANNED THERAPY INTERVENTIONS, PT EVAL
Goal: Patient will negotiate 10 steps independently in 2 weeks/balance training/bed mobility training/gait training/transfer training

## 2023-09-24 NOTE — PROGRESS NOTE ADULT - PROBLEM SELECTOR PLAN 1
s/p AFib RVR    - trop leak likely demand mediated in setting of hypoxic resp failure and PNA  now in SR on tele. EP note reviewed. Pt and family refusing Amiodarone. Discussed with Dr. Bird as well as requested by family  c/w meds as ordered  CHADSVASC2 score >3 - eliquis started  monitor on tele

## 2023-09-25 LAB
ANION GAP SERPL CALC-SCNC: 12 MMOL/L — SIGNIFICANT CHANGE UP (ref 5–17)
BUN SERPL-MCNC: 40 MG/DL — HIGH (ref 7–23)
CALCIUM SERPL-MCNC: 10 MG/DL — SIGNIFICANT CHANGE UP (ref 8.4–10.5)
CHLORIDE SERPL-SCNC: 107 MMOL/L — SIGNIFICANT CHANGE UP (ref 96–108)
CO2 SERPL-SCNC: 25 MMOL/L — SIGNIFICANT CHANGE UP (ref 22–31)
CREAT SERPL-MCNC: 1.5 MG/DL — HIGH (ref 0.5–1.3)
CULTURE RESULTS: SIGNIFICANT CHANGE UP
CULTURE RESULTS: SIGNIFICANT CHANGE UP
EGFR: 49 ML/MIN/1.73M2 — LOW
GLUCOSE BLDC GLUCOMTR-MCNC: 142 MG/DL — HIGH (ref 70–99)
GLUCOSE BLDC GLUCOMTR-MCNC: 162 MG/DL — HIGH (ref 70–99)
GLUCOSE BLDC GLUCOMTR-MCNC: 162 MG/DL — HIGH (ref 70–99)
GLUCOSE BLDC GLUCOMTR-MCNC: 230 MG/DL — HIGH (ref 70–99)
GLUCOSE SERPL-MCNC: 145 MG/DL — HIGH (ref 70–99)
HCT VFR BLD CALC: 42.6 % — SIGNIFICANT CHANGE UP (ref 39–50)
HGB BLD-MCNC: 13.6 G/DL — SIGNIFICANT CHANGE UP (ref 13–17)
MCHC RBC-ENTMCNC: 29.2 PG — SIGNIFICANT CHANGE UP (ref 27–34)
MCHC RBC-ENTMCNC: 31.9 GM/DL — LOW (ref 32–36)
MCV RBC AUTO: 91.4 FL — SIGNIFICANT CHANGE UP (ref 80–100)
NRBC # BLD: 0 /100 WBCS — SIGNIFICANT CHANGE UP (ref 0–0)
PLATELET # BLD AUTO: 262 K/UL — SIGNIFICANT CHANGE UP (ref 150–400)
POTASSIUM SERPL-MCNC: 4.1 MMOL/L — SIGNIFICANT CHANGE UP (ref 3.5–5.3)
POTASSIUM SERPL-SCNC: 4.1 MMOL/L — SIGNIFICANT CHANGE UP (ref 3.5–5.3)
RBC # BLD: 4.66 M/UL — SIGNIFICANT CHANGE UP (ref 4.2–5.8)
RBC # FLD: 12.9 % — SIGNIFICANT CHANGE UP (ref 10.3–14.5)
SODIUM SERPL-SCNC: 144 MMOL/L — SIGNIFICANT CHANGE UP (ref 135–145)
SPECIMEN SOURCE: SIGNIFICANT CHANGE UP
SPECIMEN SOURCE: SIGNIFICANT CHANGE UP
WBC # BLD: 8.53 K/UL — SIGNIFICANT CHANGE UP (ref 3.8–10.5)
WBC # FLD AUTO: 8.53 K/UL — SIGNIFICANT CHANGE UP (ref 3.8–10.5)

## 2023-09-25 RX ORDER — ACETYLCYSTEINE 200 MG/ML
3 VIAL (ML) MISCELLANEOUS EVERY 6 HOURS
Refills: 0 | Status: COMPLETED | OUTPATIENT
Start: 2023-09-25 | End: 2023-09-27

## 2023-09-25 RX ADMIN — Medication 50 MILLIGRAM(S): at 00:28

## 2023-09-25 RX ADMIN — Medication 50 MILLIGRAM(S): at 23:01

## 2023-09-25 RX ADMIN — PANTOPRAZOLE SODIUM 40 MILLIGRAM(S): 20 TABLET, DELAYED RELEASE ORAL at 12:43

## 2023-09-25 RX ADMIN — Medication 3 MILLILITER(S): at 17:57

## 2023-09-25 RX ADMIN — Medication 50 MILLIGRAM(S): at 12:44

## 2023-09-25 RX ADMIN — Medication 3 MILLILITER(S): at 12:44

## 2023-09-25 RX ADMIN — CEFTRIAXONE 100 MILLIGRAM(S): 500 INJECTION, POWDER, FOR SOLUTION INTRAMUSCULAR; INTRAVENOUS at 12:45

## 2023-09-25 RX ADMIN — CLOPIDOGREL BISULFATE 75 MILLIGRAM(S): 75 TABLET, FILM COATED ORAL at 12:44

## 2023-09-25 RX ADMIN — Medication 2: at 08:33

## 2023-09-25 RX ADMIN — ATORVASTATIN CALCIUM 80 MILLIGRAM(S): 80 TABLET, FILM COATED ORAL at 21:02

## 2023-09-25 RX ADMIN — CHLORHEXIDINE GLUCONATE 1 APPLICATION(S): 213 SOLUTION TOPICAL at 05:24

## 2023-09-25 RX ADMIN — Medication 3 MILLILITER(S): at 00:29

## 2023-09-25 RX ADMIN — Medication 3 MILLILITER(S): at 23:01

## 2023-09-25 RX ADMIN — Medication 100 MILLIGRAM(S): at 05:23

## 2023-09-25 RX ADMIN — INSULIN GLARGINE 12 UNIT(S): 100 INJECTION, SOLUTION SUBCUTANEOUS at 21:02

## 2023-09-25 RX ADMIN — APIXABAN 5 MILLIGRAM(S): 2.5 TABLET, FILM COATED ORAL at 21:02

## 2023-09-25 RX ADMIN — Medication 50 MILLIGRAM(S): at 05:23

## 2023-09-25 RX ADMIN — Medication 25 MILLIGRAM(S): at 05:23

## 2023-09-25 RX ADMIN — Medication 3 MILLILITER(S): at 05:24

## 2023-09-25 RX ADMIN — APIXABAN 5 MILLIGRAM(S): 2.5 TABLET, FILM COATED ORAL at 09:16

## 2023-09-25 RX ADMIN — Medication 50 MILLIGRAM(S): at 17:58

## 2023-09-25 RX ADMIN — Medication 1 TABLET(S): at 17:58

## 2023-09-25 NOTE — PROGRESS NOTE ADULT - SUBJECTIVE AND OBJECTIVE BOX
[facility] DISEASES  CINTIA Rzao Y. Patel, S. Shah, G. Michael  [phone]  ([phone] - weekdays after 5pm and weekends)    Name: FRANKO SPANN  Age/Gender: 72y Male  MRN: [id number]    Interval History:  Patient seen and examined this morning.   Sitting up in chair on NC, feels better.   No new complaints  Notes reviewed.   No concerning overnight events.  Afebrile.   Allergies: No Known Allergies      Objective:  Vitals:   T(F): 97.9 (09-25-23 @ 12:42), Max: 98.3 (09-24-23 @ 17:24)  HR: 82 (09-25-23 @ 12:42) (76 - 86)  BP: 133/80 (09-25-23 @ 12:42) (133/80 - 172/80)  RR: 18 (09-25-23 @ 12:42) (17 - 18)  SpO2: 95% (09-25-23 @ 12:42) (90% - 97%)  Physical Examination:  General: no acute distress, NC  HEENT: NC/AT, EOMI, anicteric, neck supple  Cardio: S1, S2 present, normal rate  Resp: decreased breath sounds b/l  Abd: soft, NT, ND, + BS  Neuro: AAOx3, no obvious focal deficits  Ext: no edema, no cyanosis, moves extremities  Skin: warm, dry, no visible rash  Psych: appropriate affect and mood for situation  Lines: PIV    Laboratory Studies:  CBC:                       13.6   8.53  )-----------( 262      ( 25 Sep 2023 06:55 )             42.6     WBC Trend:  8.53 09-25-23 @ 06:55  8.24 09-24-23 @ 06:21  15.70 09-23-23 @ 06:12  17.63 09-22-23 @ 13:24  16.31 09-22-23 @ 06:43  15.05 09-21-23 @ 07:02  13.26 09-20-23 @ 13:22    CMP: 09-25    144  |  107  |  40<H>  ----------------------------<  145<H>  4.1   |  25  |  1.50<H>    Ca    10.0      25 Sep 2023 06:49  Phos  3.3     09-24  Mg     2.2     09-24      Creatinine: 1.50 mg/dL (09-25-23 @ 06:49)  Creatinine: 1.83 mg/dL (09-24-23 @ 06:21)  Creatinine: 2.14 mg/dL (09-23-23 @ 06:12)  Creatinine: 2.20 mg/dL (09-22-23 @ 06:43)  Creatinine: 1.98 mg/dL (09-21-23 @ 07:02)  Creatinine: 1.82 mg/dL (09-20-23 @ 16:57)  Creatinine: 1.90 mg/dL (09-20-23 @ 13:22)    Microbiology: reviewed   Culture - Sputum (collected 09-22-23 @ 10:30)  Source: .Sputum Sputum  Gram Stain (09-22-23 @ 23:04):    Rare polymorphonuclear leukocytes per low power field    Moderate Squamous epithelial cells per low power field    Moderate Gram Positive Rods per oil power field    Few Gram Negative Rods per oil power field    Few Gram Positive Cocci in Pairs and Chains per oil power field  Final Report (09-24-23 @ 21:22):    Moderate Stenotrophomonas maltophilia    Normal Respiratory Erica present  Organism: Stenotrophomonas maltophilia (09-24-23 @ 21:22)  Organism: Stenotrophomonas maltophilia (09-24-23 @ 21:22)      Method Type: KB      -  Minocycline: S  Organism: Stenotrophomonas maltophilia (09-24-23 @ 21:22)      Method Type: DONNA      -  Levofloxacin: S <=0.5      -  Trimethoprim/Sulfamethoxazole: S <=0.5/9.5    Culture - Blood (collected 09-21-23 @ 14:31)  Source: .Blood Blood  Preliminary Report (09-24-23 @ 17:01):    No growth at 72 Hours    Culture - Blood (collected 09-21-23 @ 14:31)  Source: .Blood Blood  Preliminary Report (09-24-23 @ 17:01):    No growth at 72 Hours    Culture - Urine (collected 09-20-23 @ 15:16)  Source: Clean Catch Clean Catch (Midstream)  Final Report (09-21-23 @ 16:21):    No growth    Culture - Blood (collected 09-20-23 @ 13:15)  Source: .Blood Blood-Peripheral  Preliminary Report (09-24-23 @ 18:01):    No growth at 4 days    Culture - Blood (collected 09-20-23 @ 13:05)  Source: .Blood Blood-Peripheral  Preliminary Report (09-24-23 @ 18:01):    No growth at 4 days    Radiology: reviewed     Medications:  acetylcysteine 20%  Inhalation 3 milliLiter(s) Inhalation every 6 hours  albuterol/ipratropium for Nebulization 3 milliLiter(s) Nebulizer every 6 hours  apixaban 5 milliGRAM(s) Oral every 12 hours  atorvastatin 80 milliGRAM(s) Oral at bedtime  cefTRIAXone   IVPB 1000 milliGRAM(s) IV Intermittent every 24 hours  cefTRIAXone   IVPB      chlorhexidine 2% Cloths 1 Application(s) Topical <User Schedule>  clopidogrel Tablet 75 milliGRAM(s) Oral daily  dextrose 5%. 1000 milliLiter(s) IV Continuous <Continuous>  dextrose 5%. 1000 milliLiter(s) IV Continuous <Continuous>  dextrose 50% Injectable 12.5 Gram(s) IV Push once  dextrose 50% Injectable 25 Gram(s) IV Push once  dextrose 50% Injectable 25 Gram(s) IV Push once  dextrose Oral Gel 15 Gram(s) Oral once PRN  glucagon  Injectable 1 milliGRAM(s) IntraMuscular once  insulin glargine Injectable (LANTUS) 12 Unit(s) SubCutaneous at bedtime  insulin lispro (ADMELOG) corrective regimen sliding scale   SubCutaneous three times a day before meals  metoprolol tartrate 50 milliGRAM(s) Oral four times a day  metoprolol tartrate Injectable 5 milliGRAM(s) IV Push every 6 hours PRN  pantoprazole  Injectable 40 milliGRAM(s) IV Push daily  trimethoprim  160 mG/sulfamethoxazole 800 mG 1 Tablet(s) Oral every 12 hours    Current Antimicrobials:  cefTRIAXone   IVPB      cefTRIAXone   IVPB 1000 milliGRAM(s) IV Intermittent every 24 hours  trimethoprim  160 mG/sulfamethoxazole 800 mG 1 Tablet(s) Oral every 12 hours    Prior/Completed Antimicrobials:  azithromycin  IVPB  cefepime   IVPB  cefTRIAXone   IVPB  doxycycline IVPB  vancomycin  IVPB.

## 2023-09-25 NOTE — PROGRESS NOTE ADULT - SUBJECTIVE AND OBJECTIVE BOX
Subjective: Patient seen and examined. No new events except as noted.   OOB in chair.  No complaints.    REVIEW OF SYSTEMS:    CONSTITUTIONAL: + weakness, fevers or chills  EYES/ENT: No visual changes;  No vertigo or throat pain   NECK: No pain or stiffness  RESPIRATORY: +cough, wheezing, hemoptysis; No shortness of breath  CARDIOVASCULAR: No chest pain or palpitations  GASTROINTESTINAL: No abdominal or epigastric pain. No nausea, vomiting, or hematemesis; No diarrhea or constipation. No melena or hematochezia.  GENITOURINARY: No dysuria, frequency or hematuria  NEUROLOGICAL: No numbness or weakness  SKIN: No itching, burning, rashes, or lesions   All other review of systems is negative unless indicated above.    MEDICATIONS:  MEDICATIONS  (STANDING):  albuterol/ipratropium for Nebulization 3 milliLiter(s) Nebulizer every 6 hours  apixaban 5 milliGRAM(s) Oral every 12 hours  atorvastatin 80 milliGRAM(s) Oral at bedtime  cefTRIAXone   IVPB 1000 milliGRAM(s) IV Intermittent every 24 hours  cefTRIAXone   IVPB      chlorhexidine 2% Cloths 1 Application(s) Topical <User Schedule>  clopidogrel Tablet 75 milliGRAM(s) Oral daily  dextrose 5%. 1000 milliLiter(s) (50 mL/Hr) IV Continuous <Continuous>  dextrose 5%. 1000 milliLiter(s) (100 mL/Hr) IV Continuous <Continuous>  dextrose 50% Injectable 12.5 Gram(s) IV Push once  dextrose 50% Injectable 25 Gram(s) IV Push once  dextrose 50% Injectable 25 Gram(s) IV Push once  glucagon  Injectable 1 milliGRAM(s) IntraMuscular once  hydrALAZINE 25 milliGRAM(s) Oral three times a day  insulin glargine Injectable (LANTUS) 12 Unit(s) SubCutaneous at bedtime  insulin lispro (ADMELOG) corrective regimen sliding scale   SubCutaneous three times a day before meals  metoprolol tartrate 50 milliGRAM(s) Oral four times a day  pantoprazole  Injectable 40 milliGRAM(s) IV Push daily  sodium chloride 0.9%. 1000 milliLiter(s) (50 mL/Hr) IV Continuous <Continuous>  thiamine Injectable 100 milliGRAM(s) IV Push three times a day    PHYSICAL EXAM:  Vital Signs Last 24 Hrs  T(C): 36.8 (25 Sep 2023 08:00), Max: 36.8 (24 Sep 2023 17:24)  T(F): 98.2 (25 Sep 2023 08:00), Max: 98.3 (24 Sep 2023 17:24)  HR: 83 (25 Sep 2023 08:00) (76 - 86)  BP: 162/82 (25 Sep 2023 08:00) (148/76 - 172/80)  BP(mean): --  RR: 18 (25 Sep 2023 08:00) (17 - 18)  SpO2: 91% (25 Sep 2023 08:00) (90% - 97%)    Parameters below as of 25 Sep 2023 08:00  Patient On (Oxygen Delivery Method): nasal cannula  O2 Flow (L/min): 3    I&O's Summary    24 Sep 2023 07:01  -  25 Sep 2023 07:00  --------------------------------------------------------  IN: 290 mL / OUT: 2800 mL / NET: -2510 mL    25 Sep 2023 07:01  -  25 Sep 2023 12:24  --------------------------------------------------------  IN: 360 mL / OUT: 0 mL / NET: 360 mL    Appearance: NAD  HEENT:   Dry  oral mucosa, PERRL, EOMI	  Lymphatic: No lymphadenopathy , no edema  Cardiovascular: Normal S1 S2, No JVD, No murmurs , Peripheral pulses palpable 2+ bilaterally  Respiratory: crackles bilaterally    Gastrointestinal:  Soft, Non-tender, + BS	  Skin: No rashes, No ecchymoses, No cyanosis, warm to touch  Musculoskeletal: Normal range of motion, normal strength  Psychiatry:  Mood & affect appropriate  Ext: No edema    LABS:    CARDIAC MARKERS:                        13.6   8.53  )-----------( 262      ( 25 Sep 2023 06:55 )             42.6     09-25    144  |  107  |  40<H>  ----------------------------<  145<H>  4.1   |  25  |  1.50<H>    Ca    10.0      25 Sep 2023 06:49  Phos  3.3     09-24  Mg     2.2     09-24    TELEMETRY: SR  ECG:  	  RADIOLOGY:  DIAGNOSTIC TESTING:  [ ] Echocardiogram:  [ ]  Catheterization:  [ ] Stress Test:    OTHER:

## 2023-09-25 NOTE — PROGRESS NOTE ADULT - PROBLEM SELECTOR PLAN 1
s/p AFib RVR    - trop leak likely demand mediated in setting of hypoxic resp failure and PNA  now in SR on tele    - EP note reviewed (Pt and family refusing Amiodarone. Discussed with Dr. Bird as well as requested by family)  c/w meds as ordered  CHADSVASC2 score >3 - eliquis  monitor on tele

## 2023-09-25 NOTE — PROGRESS NOTE ADULT - SUBJECTIVE AND OBJECTIVE BOX
Patient is a 72y old  Male who presents with a chief complaint of sepsis (25 Sep 2023 13:22)      SUBJECTIVE / OVERNIGHT EVENTS: on 3 L O2NC, BEAR improving, tolerating ELiquis, FS in acceptable range, on CTX, 5 day course finishes 9/25, , and now also BACTRIM through 10/1 2/2 sputum Cx : Stenotrophonas    MEDICATIONS  (STANDING):  acetylcysteine 20%  Inhalation 3 milliLiter(s) Inhalation every 6 hours  albuterol/ipratropium for Nebulization 3 milliLiter(s) Nebulizer every 6 hours  apixaban 5 milliGRAM(s) Oral every 12 hours  atorvastatin 80 milliGRAM(s) Oral at bedtime  cefTRIAXone   IVPB      cefTRIAXone   IVPB 1000 milliGRAM(s) IV Intermittent every 24 hours  chlorhexidine 2% Cloths 1 Application(s) Topical <User Schedule>  clopidogrel Tablet 75 milliGRAM(s) Oral daily  dextrose 5%. 1000 milliLiter(s) (50 mL/Hr) IV Continuous <Continuous>  dextrose 5%. 1000 milliLiter(s) (100 mL/Hr) IV Continuous <Continuous>  dextrose 50% Injectable 12.5 Gram(s) IV Push once  dextrose 50% Injectable 25 Gram(s) IV Push once  dextrose 50% Injectable 25 Gram(s) IV Push once  glucagon  Injectable 1 milliGRAM(s) IntraMuscular once  insulin glargine Injectable (LANTUS) 12 Unit(s) SubCutaneous at bedtime  insulin lispro (ADMELOG) corrective regimen sliding scale   SubCutaneous three times a day before meals  metoprolol tartrate 50 milliGRAM(s) Oral four times a day  pantoprazole  Injectable 40 milliGRAM(s) IV Push daily  trimethoprim  160 mG/sulfamethoxazole 800 mG 1 Tablet(s) Oral every 12 hours    MEDICATIONS  (PRN):  dextrose Oral Gel 15 Gram(s) Oral once PRN Blood Glucose LESS THAN 70 milliGRAM(s)/deciliter  metoprolol tartrate Injectable 5 milliGRAM(s) IV Push every 6 hours PRN HR > 110      Vital Signs Last 24 Hrs  T(F): 97.4 (09-25-23 @ 20:20), Max: 98.2 (09-25-23 @ 08:00)  HR: 85 (09-25-23 @ 20:20) (76 - 85)  BP: 151/81 (09-25-23 @ 20:20) (133/80 - 172/80)  RR: 18 (09-25-23 @ 20:20) (17 - 18)  SpO2: 96% (09-25-23 @ 20:20) (90% - 96%)  Telemetry:   CAPILLARY BLOOD GLUCOSE      POCT Blood Glucose.: 162 mg/dL (25 Sep 2023 21:01)  POCT Blood Glucose.: 142 mg/dL (25 Sep 2023 17:05)  POCT Blood Glucose.: 162 mg/dL (25 Sep 2023 11:31)  POCT Blood Glucose.: 230 mg/dL (25 Sep 2023 07:46)    I&O's Summary    24 Sep 2023 07:01  -  25 Sep 2023 07:00  --------------------------------------------------------  IN: 290 mL / OUT: 2800 mL / NET: -2510 mL    25 Sep 2023 07:01  -  25 Sep 2023 22:05  --------------------------------------------------------  IN: 620 mL / OUT: 800 mL / NET: -180 mL        PHYSICAL EXAM:  GENERAL: NAD, well-developed  HEAD:  Atraumatic, Normocephalic  EYES: EOMI, PERRLA, conjunctiva and sclera clear  NECK: Supple, No JVD  CHEST/LUNG: Clear to auscultation bilaterally; No wheeze  HEART: Regular rate and rhythm; No murmurs, rubs, or gallops  ABDOMEN: Soft, Nontender, Nondistended; Bowel sounds present  EXTREMITIES:  2+ Peripheral Pulses, No clubbing, cyanosis, or edema  PSYCH: AAOx3  NEUROLOGY: non-focal  SKIN: No rashes or lesions    LABS:                        13.6   8.53  )-----------( 262      ( 25 Sep 2023 06:55 )             42.6     09-25    144  |  107  |  40<H>  ----------------------------<  145<H>  4.1   |  25  |  1.50<H>    Ca    10.0      25 Sep 2023 06:49  Phos  3.3     09-24  Mg     2.2     09-24            Urinalysis Basic - ( 25 Sep 2023 06:49 )    Color: x / Appearance: x / SG: x / pH: x  Gluc: 145 mg/dL / Ketone: x  / Bili: x / Urobili: x   Blood: x / Protein: x / Nitrite: x   Leuk Esterase: x / RBC: x / WBC x   Sq Epi: x / Non Sq Epi: x / Bacteria: x        RADIOLOGY & ADDITIONAL TESTS:    Imaging Personally Reviewed:    Consultant(s) Notes Reviewed:      Care Discussed with Consultants/Other Providers:

## 2023-09-25 NOTE — PROGRESS NOTE ADULT - SUBJECTIVE AND OBJECTIVE BOX
Chief complaint  Patient is a 72y old  Male who presents with a chief complaint of sepsis (25 Sep 2023 12:23)         Labs and Fingersticks  CAPILLARY BLOOD GLUCOSE      POCT Blood Glucose.: 162 mg/dL (25 Sep 2023 11:31)  POCT Blood Glucose.: 230 mg/dL (25 Sep 2023 07:46)  POCT Blood Glucose.: 193 mg/dL (24 Sep 2023 21:43)  POCT Blood Glucose.: 209 mg/dL (24 Sep 2023 17:17)      Anion Gap: 12 (09-25 @ 06:49)  Anion Gap: 10 (09-24 @ 06:21)      Calcium: 10.0 (09-25 @ 06:49)  Calcium: 9.5 (09-24 @ 06:21)          09-25    144  |  107  |  40<H>  ----------------------------<  145<H>  4.1   |  25  |  1.50<H>    Ca    10.0      25 Sep 2023 06:49  Phos  3.3     09-24  Mg     2.2     09-24                          13.6   8.53  )-----------( 262      ( 25 Sep 2023 06:55 )             42.6     Medications  MEDICATIONS  (STANDING):  acetylcysteine 20%  Inhalation 3 milliLiter(s) Inhalation every 6 hours  albuterol/ipratropium for Nebulization 3 milliLiter(s) Nebulizer every 6 hours  apixaban 5 milliGRAM(s) Oral every 12 hours  atorvastatin 80 milliGRAM(s) Oral at bedtime  cefTRIAXone   IVPB 1000 milliGRAM(s) IV Intermittent every 24 hours  cefTRIAXone   IVPB      chlorhexidine 2% Cloths 1 Application(s) Topical <User Schedule>  clopidogrel Tablet 75 milliGRAM(s) Oral daily  dextrose 5%. 1000 milliLiter(s) (100 mL/Hr) IV Continuous <Continuous>  dextrose 5%. 1000 milliLiter(s) (50 mL/Hr) IV Continuous <Continuous>  dextrose 50% Injectable 12.5 Gram(s) IV Push once  dextrose 50% Injectable 25 Gram(s) IV Push once  dextrose 50% Injectable 25 Gram(s) IV Push once  glucagon  Injectable 1 milliGRAM(s) IntraMuscular once  insulin glargine Injectable (LANTUS) 12 Unit(s) SubCutaneous at bedtime  insulin lispro (ADMELOG) corrective regimen sliding scale   SubCutaneous three times a day before meals  metoprolol tartrate 50 milliGRAM(s) Oral four times a day  pantoprazole  Injectable 40 milliGRAM(s) IV Push daily  trimethoprim  160 mG/sulfamethoxazole 800 mG 1 Tablet(s) Oral every 12 hours      Physical Exam  General: Patient comfortable in bed   Vital Signs Last 12 Hrs  T(F): 97.9 (09-25-23 @ 12:42), Max: 98.2 (09-25-23 @ 08:00)  HR: 82 (09-25-23 @ 12:42) (79 - 83)  BP: 133/80 (09-25-23 @ 12:42) (133/80 - 162/82)  BP(mean): --  RR: 18 (09-25-23 @ 12:42) (17 - 18)  SpO2: 95% (09-25-23 @ 12:42) (90% - 95%)    CVS: S1S2   Respiratory: No wheezing, no crepitations  GI: Abdomen soft, bowel sounds positive  Musculoskeletal:  moves all extremities  : Voiding      Chief complaint  Patient is a 72y old  Male who presents with a chief complaint of sepsis (25 Sep 2023 12:23)         Labs and Fingersticks  CAPILLARY BLOOD GLUCOSE      POCT Blood Glucose.: 162 mg/dL (25 Sep 2023 11:31)  POCT Blood Glucose.: 230 mg/dL (25 Sep 2023 07:46)  POCT Blood Glucose.: 193 mg/dL (24 Sep 2023 21:43)  POCT Blood Glucose.: 209 mg/dL (24 Sep 2023 17:17)      Anion Gap: 12 (09-25 @ 06:49)  Anion Gap: 10 (09-24 @ 06:21)      Calcium: 10.0 (09-25 @ 06:49)  Calcium: 9.5 (09-24 @ 06:21)          09-25    144  |  107  |  40<H>  ----------------------------<  145<H>  4.1   |  25  |  1.50<H>    Ca    10.0      25 Sep 2023 06:49  Phos  3.3     09-24  Mg     2.2     09-24                          13.6   8.53  )-----------( 262      ( 25 Sep 2023 06:55 )             42.6     Medications  MEDICATIONS  (STANDING):  acetylcysteine 20%  Inhalation 3 milliLiter(s) Inhalation every 6 hours  albuterol/ipratropium for Nebulization 3 milliLiter(s) Nebulizer every 6 hours  apixaban 5 milliGRAM(s) Oral every 12 hours  atorvastatin 80 milliGRAM(s) Oral at bedtime  cefTRIAXone   IVPB 1000 milliGRAM(s) IV Intermittent every 24 hours  cefTRIAXone   IVPB      chlorhexidine 2% Cloths 1 Application(s) Topical <User Schedule>  clopidogrel Tablet 75 milliGRAM(s) Oral daily  dextrose 5%. 1000 milliLiter(s) (100 mL/Hr) IV Continuous <Continuous>  dextrose 5%. 1000 milliLiter(s) (50 mL/Hr) IV Continuous <Continuous>  dextrose 50% Injectable 12.5 Gram(s) IV Push once  dextrose 50% Injectable 25 Gram(s) IV Push once  dextrose 50% Injectable 25 Gram(s) IV Push once  glucagon  Injectable 1 milliGRAM(s) IntraMuscular once  insulin glargine Injectable (LANTUS) 12 Unit(s) SubCutaneous at bedtime  insulin lispro (ADMELOG) corrective regimen sliding scale   SubCutaneous three times a day before meals  metoprolol tartrate 50 milliGRAM(s) Oral four times a day  pantoprazole  Injectable 40 milliGRAM(s) IV Push daily  trimethoprim  160 mG/sulfamethoxazole 800 mG 1 Tablet(s) Oral every 12 hours      Physical Exam  General: Patient comfortable in bed   Vital Signs Last 12 Hrs  T(F): 97.9 (09-25-23 @ 12:42), Max: 98.2 (09-25-23 @ 08:00)  HR: 82 (09-25-23 @ 12:42) (79 - 83)  BP: 133/80 (09-25-23 @ 12:42) (133/80 - 162/82)  BP(mean): --  RR: 18 (09-25-23 @ 12:42) (17 - 18)  SpO2: 95% (09-25-23 @ 12:42) (90% - 95%)    CVS: S1S2   Respiratory: No wheezing, no crepitations  GI: Abdomen soft, bowel sounds positive  Musculoskeletal:  moves all extremities  : Voiding

## 2023-09-25 NOTE — PROGRESS NOTE ADULT - ASSESSMENT
Patient is a 72 year old male with PMH of HTN, HLD, T2DM, BPH, age, prior CVA in 2017 and 2021 uncertain of residual deficits, HFpEF reportedly on 20mg lasix QD (TTE from 2022 EF 59%) presents to the ED complaining of generalized weakness today, feeling generally unwell for several days is unable to quantify how many days or specific symptomatology.  Patient reports feeling some shortness of breath today was found to be hypoxic by EMS (78% on RA), and requiring 6L O2 via NC to maintain SpO2 of 93% in ED.    AHRF due to multifocal pneumonia   Leukocytosis likely due to above   atrial fib   nell  - RVP/COVID negative   - CXR reported LLL atelectasis vs consolidation   - CTA chest with no PE; noted with consolidations and nodular opacities in b/l LLs, RML and lingula   - MRSA PCR screen negative  - Legionella and Strep pneumoniae ur ags negative   - s/p cefepime, vancomycin, doxycycline in ER > ceftriaxone and azithromycin > ceftriaxone   - sputum culture noted with Stenotrophomonas maltophilia   - 9/20, 9/21 Bcx NGTD    Recommendations  Continue ceftriaxone - complete 5d course today 9/25  Started on bactrim DS 1 tablet PO BID this morning to complete 7d course on 10/1  Pulmonary following, on nebs, chest PT, mucomyst  Wean supplemental O2 as tolerated   Aspiration precautions   Monitor temps/WBC, Cr   Continue rest of care per primary team  surveillance imaging in 8-12 weeks

## 2023-09-25 NOTE — PROGRESS NOTE ADULT - SUBJECTIVE AND OBJECTIVE BOX
Follow-up Pulm Progress Note    difficult to expectorate secretions  denies SOB/CP  OOB to chair  sats 94% 3LNC     Medications:  MEDICATIONS  (STANDING):  acetylcysteine 20%  Inhalation 3 milliLiter(s) Inhalation every 6 hours  albuterol/ipratropium for Nebulization 3 milliLiter(s) Nebulizer every 6 hours  apixaban 5 milliGRAM(s) Oral every 12 hours  atorvastatin 80 milliGRAM(s) Oral at bedtime  cefTRIAXone   IVPB      cefTRIAXone   IVPB 1000 milliGRAM(s) IV Intermittent every 24 hours  chlorhexidine 2% Cloths 1 Application(s) Topical <User Schedule>  clopidogrel Tablet 75 milliGRAM(s) Oral daily  dextrose 5%. 1000 milliLiter(s) (100 mL/Hr) IV Continuous <Continuous>  dextrose 5%. 1000 milliLiter(s) (50 mL/Hr) IV Continuous <Continuous>  dextrose 50% Injectable 12.5 Gram(s) IV Push once  dextrose 50% Injectable 25 Gram(s) IV Push once  dextrose 50% Injectable 25 Gram(s) IV Push once  glucagon  Injectable 1 milliGRAM(s) IntraMuscular once  insulin glargine Injectable (LANTUS) 12 Unit(s) SubCutaneous at bedtime  insulin lispro (ADMELOG) corrective regimen sliding scale   SubCutaneous three times a day before meals  metoprolol tartrate 50 milliGRAM(s) Oral four times a day  pantoprazole  Injectable 40 milliGRAM(s) IV Push daily  trimethoprim  160 mG/sulfamethoxazole 800 mG 1 Tablet(s) Oral every 12 hours    MEDICATIONS  (PRN):  dextrose Oral Gel 15 Gram(s) Oral once PRN Blood Glucose LESS THAN 70 milliGRAM(s)/deciliter  metoprolol tartrate Injectable 5 milliGRAM(s) IV Push every 6 hours PRN HR > 110          Vital Signs Last 24 Hrs  T(C): 36.8 (25 Sep 2023 08:00), Max: 36.8 (24 Sep 2023 17:24)  T(F): 98.2 (25 Sep 2023 08:00), Max: 98.3 (24 Sep 2023 17:24)  HR: 83 (25 Sep 2023 08:00) (76 - 86)  BP: 162/82 (25 Sep 2023 08:00) (148/76 - 172/80)  BP(mean): --  RR: 18 (25 Sep 2023 08:00) (17 - 18)  SpO2: 91% (25 Sep 2023 08:00) (90% - 97%)    Parameters below as of 25 Sep 2023 08:00  Patient On (Oxygen Delivery Method): nasal cannula  O2 Flow (L/min): 3            09-24 @ 07:01  -  09-25 @ 07:00  --------------------------------------------------------  IN: 290 mL / OUT: 2800 mL / NET: -2510 mL          LABS:                        13.6   8.53  )-----------( 262      ( 25 Sep 2023 06:55 )             42.6     09-25    144  |  107  |  40<H>  ----------------------------<  145<H>  4.1   |  25  |  1.50<H>    Ca    10.0      25 Sep 2023 06:49  Phos  3.3     09-24  Mg     2.2     09-24            CAPILLARY BLOOD GLUCOSE      POCT Blood Glucose.: 230 mg/dL (25 Sep 2023 07:46)      Urinalysis Basic - ( 25 Sep 2023 06:49 )    Color: x / Appearance: x / SG: x / pH: x  Gluc: 145 mg/dL / Ketone: x  / Bili: x / Urobili: x   Blood: x / Protein: x / Nitrite: x   Leuk Esterase: x / RBC: x / WBC x   Sq Epi: x / Non Sq Epi: x / Bacteria: x                      CULTURES:    Culture - Blood (collected 09-21-23 @ 14:31)  Source: .Blood Blood  Preliminary Report (09-24-23 @ 17:01):    No growth at 72 Hours    Culture - Blood (collected 09-21-23 @ 14:31)  Source: .Blood Blood  Preliminary Report (09-24-23 @ 17:01):    No growth at 72 Hours    Culture - Blood (collected 09-20-23 @ 13:15)  Source: .Blood Blood-Peripheral  Preliminary Report (09-24-23 @ 18:01):    No growth at 4 days    Culture - Blood (collected 09-20-23 @ 13:05)  Source: .Blood Blood-Peripheral  Preliminary Report (09-24-23 @ 18:01):    No growth at 4 days        Culture - Urine (collected 09-20-23 @ 15:16)  Source: Clean Catch Clean Catch (Midstream)  Final Report (09-21-23 @ 16:21):    No growth                                Physical Examination:  PULM: scattered rhonchi    CVS: S1, S2 heard    RADIOLOGY REVIEWED    CT chest:    < from: CT Angio Chest PE Protocol w/ IV Cont (09.20.23 @ 15:15) >  FINDINGS:    PULMONARY VESSELS: No pulmonary embolus. Main pulmonary artery normal in   diameter.    HEART AND VASCULATURE: Heart size is normal. No pericardial effusion. No   aortic aneurysm or dissection. Mild coronary and aortic calcifications.    LUNGS, AIRWAYS, PLEURA: Patent central airways. Consolidations and   nodular opacities within bilateral lower lobes, right middle lobe and   lingula representing multifocal pneumonia. No pleural effusions or   pneumothorax.    MEDIASTINUM: Mildly enlarged subcarinal lymph node measuring 1.5 cm short   axis.    UPPER ABDOMEN: Within normal limits.    BONES AND SOFT TISSUES: No aggressive osseous lesion.    LOWER NECK: Within normal limits.    IMPRESSION:    No pulmonary embolus.    Consolidations and nodular opacities within bilateral lower lobes, right   middle lobe and lingula representing multifocal pneumonia. Recommend   follow-up chest CT in 1-3 months to determine resolution.    < end of copied text >

## 2023-09-25 NOTE — PROGRESS NOTE ADULT - SUBJECTIVE AND OBJECTIVE BOX
Overnight events noted      VITAL:  T(C): , Max: 36.8 (09-24-23 @ 17:24)  T(F): , Max: 98.3 (09-24-23 @ 17:24)  HR: 79 (09-25-23 @ 04:26)  BP: 152/66 (09-25-23 @ 04:26)  BP(mean): --  RR: 17 (09-25-23 @ 04:26)  SpO2: 90% (09-25-23 @ 04:26)  Wt(kg): --      PHYSICAL EXAM:  Constitutional: Lethargic/confused  HEENT: (+)DMM, (+)NCO2  Neck: Supple, No JVD  Respiratory: (+)b/l rhonchi  Cardiovascular: RRR s1s2, no m/r/g  Gastrointestinal: BS+, soft, NT/ND  Extremities: No peripheral edema b/l  Neurological: reduced generalized strength  Back: no CVAT b/l  Skin: No rashes, no nevi    LABS:                        13.6   8.53  )-----------( 262      ( 25 Sep 2023 06:55 )             42.6     Na(144)/K(4.1)/Cl(107)/HCO3(25)/BUN(40)/Cr(1.50)Glu(145)/Ca(10.0)/Mg(--)/PO4(--)    09-25 @ 06:49  Na(143)/K(4.0)/Cl(108)/HCO3(25)/BUN(51)/Cr(1.83)Glu(181)/Ca(9.5)/Mg(2.2)/PO4(3.3)    09-24 @ 06:21  Na(147)/K(3.5)/Cl(108)/HCO3(26)/BUN(61)/Cr(2.14)Glu(89)/Ca(9.7)/Mg(--)/PO4(--)    09-23 @ 06:12      IMAGING:  < from: US Kidney and Bladder (09.21.23 @ 13:58) >  Right kidney: 11.7. No renal mass, hydronephrosis or calculi.  Left kidney: 12.5. No renal mass, hydronephrosis or calculi.  Urinary bladder: Within normal limits.      IMPRESSION: 72M w/ HTN, DM2, BPH, CVA, and HFpEF, 9/20/23 p/w multifocal pneumonia    (1)CKD - stage 3, with nephrotic-range proteinuria - likely due to diabetic nephropathy  (2)BEAR - prerenal, resolving  (3)CV - mildly hypertensive - warranting reinitiation of ACEI/ARB  (4)ID - multifocal pneumonia - on broad-spectrum abx    RECOMMEND:  (1)Add Lisinopril 10mg po qd  (2)D/C Hydralazine  (3)Dose new meds for GFR 40-50ml/min (present dosing is acceptable)        Rohit Magallanes MD  [facility]  Office/on call physician: [phone]  Cell (7a-7p): [phone]       Wife at bedside  Patient without complaints      VITAL:  T(C): , Max: 36.8 (09-24-23 @ 17:24)  T(F): , Max: 98.3 (09-24-23 @ 17:24)  HR: 79 (09-25-23 @ 04:26)  BP: 152/66 (09-25-23 @ 04:26)  BP(mean): --  RR: 17 (09-25-23 @ 04:26)  SpO2: 90% (09-25-23 @ 04:26)  Wt(kg): --      PHYSICAL EXAM:  Constitutional: NAD, alert  HEENT: NCAT, (+)DMM  Neck: Supple, No JVD  Respiratory: (+)b/l rhonchi  Cardiovascular: RRR s1s2, no m/r/g  Gastrointestinal: BS+, soft, NT/ND  Extremities: No peripheral edema b/l  Neurological: reduced generalized strength  Back: no CVAT b/l  Skin: No rashes, no nevi    LABS:                        13.6   8.53  )-----------( 262      ( 25 Sep 2023 06:55 )             42.6     Na(144)/K(4.1)/Cl(107)/HCO3(25)/BUN(40)/Cr(1.50)Glu(145)/Ca(10.0)/Mg(--)/PO4(--)    09-25 @ 06:49  Na(143)/K(4.0)/Cl(108)/HCO3(25)/BUN(51)/Cr(1.83)Glu(181)/Ca(9.5)/Mg(2.2)/PO4(3.3)    09-24 @ 06:21  Na(147)/K(3.5)/Cl(108)/HCO3(26)/BUN(61)/Cr(2.14)Glu(89)/Ca(9.7)/Mg(--)/PO4(--)    09-23 @ 06:12      IMAGING:  < from: US Kidney and Bladder (09.21.23 @ 13:58) >  Right kidney: 11.7. No renal mass, hydronephrosis or calculi.  Left kidney: 12.5. No renal mass, hydronephrosis or calculi.  Urinary bladder: Within normal limits.      IMPRESSION: 72M w/ HTN, DM2, BPH, CVA, and HFpEF, 9/20/23 p/w multifocal pneumonia    (1)CKD - stage 3, with nephrotic-range proteinuria - likely due to diabetic nephropathy  (2)BEAR - prerenal, resolving  (3)CV - mildly hypertensive - warranting reinitiation of ACEI/ARB  (4)ID - multifocal pneumonia - on broad-spectrum abx    RECOMMEND:  (1)Add Lisinopril 10mg po qd  (2)D/C Hydralazine  (3)Dose new meds for GFR 40-50ml/min (present dosing is acceptable)        Rohit Magallanes MD  [facility]  Office/on call physician: [phone]  Cell (7a-7p): [phone]

## 2023-09-25 NOTE — PROGRESS NOTE ADULT - PROBLEM SELECTOR PLAN 1
Will continue current insulin regimen for now. Will continue monitoring  blood sugars, will Follow up.  Patient counseled for compliance with consistent low carb diet.    Has CKD, stop home Glipizide and Metformin   DC on Jardiance 10 mg daily and his weekly Mounjaro injections  FU outpatient 2-4 weeks

## 2023-09-25 NOTE — PROGRESS NOTE ADULT - PROBLEM SELECTOR PLAN 1
CTA chest with multifocal PNA  -Urine legionella negative   -Continue Duoneb q6h   -Continue Mucomyst 20% 3cc q6h (give with Duoneb)  -Chest PT q6h  -MRSA/MSSA nasal PCR negative   -Continue Ceftriaxone   -Sputum culture with Stenotrophomonas. Bactrim started per ID.   -ID f/u

## 2023-09-25 NOTE — PROGRESS NOTE ADULT - ASSESSMENT
72F Hx HTN, T2DM, CVAs x 2 (2017 and 2021), HFrEF 59%, BPH p/w ED Generalized Weakness, SOB and WOB started on AFMO2 escalated to BiPAP Support with CXR c/w Lt Retrocardiac Infiltrates R/O Aspiration.   Assessment  DMT2: 72y Male with DM T2 with hyperglycemia, A1C 9.1% (2022) rpt 7.3%, was on oral meds and Mounjaro at home, now on basal bolus insulin with coverage, blood sugars are trending within acceptable range.  Shortness of breath: on medications, BIPAP support, monitored.  HTN: on antihypertensive medications, monitored, asymptomatic.  BEAR/CKD: Monitor labs/BMP, renal fu.           Discussed plan and management with Dr Clarisse Oviedo NP - TEAMS  Yanni Robb MD  Cell: [phone]  Office: [phone]            72F Hx HTN, T2DM, CVAs x 2 (2017 and 2021), HFrEF 59%, BPH p/w ED Generalized Weakness, SOB and WOB started on AFMO2 escalated to BiPAP Support with CXR c/w Lt Retrocardiac Infiltrates R/O Aspiration.   Assessment  DMT2: 72y Male with DM T2 with hyperglycemia, A1C 9.1% (2022) rpt 7.3%, was on oral meds and Mounjaro at home, now on basal bolus insulin with coverage,  blood sugars are trending within acceptable range.  Shortness of breath: on medications, BIPAP support, monitored.  HTN: on antihypertensive medications, monitored, asymptomatic.  BEAR/CKD: Monitor labs/BMP, renal fu.           Discussed plan and management with Dr Clarisse Oviedo NP - TEAMS  Yanni Robb MD  Cell: [phone]  Office: [phone]

## 2023-09-25 NOTE — PROGRESS NOTE ADULT - ASSESSMENT
72F Hx HTN, T2DM, CVAs x 2 (2017 and 2021), HFrEF 59%, BPH p/w ED Generalized Weakness, SOB and WOB started on AFMO2 escalated to BiPAP Support with CXR c/w Lt Retrocardiac Infiltrates R/O Aspiration.     - Acute Hypoxic Hypercarbic Respiratory Failure on BiPAP Support  2/2 Multifocal PNA 2/2 Aspiration, presumed  - seen by MICU on admission, not a candidate   9/21 ptn is more awake, states he is hungry today, on BIPAP at present since shortly after removing BIPAP this am and after a meal became hypoxic again. Im concerned he is aspirating.   - need a speech eval and R/O CVA. seen by ID, Pulm, Renal, awaiting Neuro. ABx changed to CTX/Azithro. ptn spiked a temp, blood Cx re drawn. VSS otherwise. no events on tele  - DUonebs, Mucomyst, Chest PT, sputum Cx  - awaiting Legionella and S. Pneumoniae U Ag, awaiting MRSA/MSSA PCR  - bear, off IVF, given IV Lasix. no ARBS /ACE-I for now  - check TTE, check MR brain  - DM , on Insulin on a SS    9/22:  ptn developed overnight Afib w RVR, presently is in NSR. he was given IV DIgoxin, Norvasc DCed, started Cardizem qid, toprol changed to Loprssor 50 qid. these changes made since pills need to be crushed and given w food. Ptn was initially place on Heparin drip since he was NPO, once cleared by speech for a pureed diet: placed on Eliquis. all meds to be crushed and given w food. there is a concern ptn may have had an embolic stroke 2/2 PAF. concern for a brainstem CVA w airway compromise. awaiting MRI brain. seen by neuro and  cardiology. he is stable on HF O2, pulm folloiwng, on ABx for aspiration PNA. TTE w decreased EF but done while in Afib w RVR, EF was 40-45%. Would need to rpt    9/23: ptn's wife is refusing MR Brain. " i want him to get better, we will do after he comes home" ptn remains full code. he feels better, he is tolerating a pureed diet. I explained to her he may have had a stroke, affecting his breathing and swallowing. " thats ok" was her reply. ptn is now on 6 LO2NC, earlier today was on HFNC 45/50. BP not controlled, will add hydralazine 25 mg tid    9/24: hypoxic respiratory failure is improving, now stable w 2LO2NC. recs re BIPAP at hs as per pulm. may not need anymore. in NSR on tele. will rpt TTE in NSR to R/O CM. will need an ischemic work up if EF reduced to 40 %. wife/ptn refuse MR. HIGH suspicion of embolic CVA. ptn is on ELiquis, tolerating it. BP is stable, cont hydralazine, cont Lopressor. all meds need to be crushed, unable to give long XL/SR formulations. cont CTX for aspiration PNA. FS have improved, BEAR improving. PT eval    9/25: on 3 L O2NC, BEAR improving, tolerating ELiquis, FS in acceptable range, on CTX, 5 day course finishes 9/25, , and now also BACTRIM through 10/1 2/2 sputum Cx : Stenotrophonas. will d/w pulm, ID if ptn is ready for DC home. wife wants to take him home asap.            - GI ppx w PPI

## 2023-09-26 LAB
CULTURE RESULTS: SIGNIFICANT CHANGE UP
CULTURE RESULTS: SIGNIFICANT CHANGE UP
GLUCOSE BLDC GLUCOMTR-MCNC: 185 MG/DL — HIGH (ref 70–99)
GLUCOSE BLDC GLUCOMTR-MCNC: 212 MG/DL — HIGH (ref 70–99)
GLUCOSE BLDC GLUCOMTR-MCNC: 218 MG/DL — HIGH (ref 70–99)
GLUCOSE BLDC GLUCOMTR-MCNC: 242 MG/DL — HIGH (ref 70–99)
HCT VFR BLD CALC: 40.4 % — SIGNIFICANT CHANGE UP (ref 39–50)
HGB BLD-MCNC: 13.1 G/DL — SIGNIFICANT CHANGE UP (ref 13–17)
MCHC RBC-ENTMCNC: 29.2 PG — SIGNIFICANT CHANGE UP (ref 27–34)
MCHC RBC-ENTMCNC: 32.4 GM/DL — SIGNIFICANT CHANGE UP (ref 32–36)
MCV RBC AUTO: 90.2 FL — SIGNIFICANT CHANGE UP (ref 80–100)
NRBC # BLD: 0 /100 WBCS — SIGNIFICANT CHANGE UP (ref 0–0)
PLATELET # BLD AUTO: 294 K/UL — SIGNIFICANT CHANGE UP (ref 150–400)
RBC # BLD: 4.48 M/UL — SIGNIFICANT CHANGE UP (ref 4.2–5.8)
RBC # FLD: 12.9 % — SIGNIFICANT CHANGE UP (ref 10.3–14.5)
SPECIMEN SOURCE: SIGNIFICANT CHANGE UP
SPECIMEN SOURCE: SIGNIFICANT CHANGE UP
WBC # BLD: 8.42 K/UL — SIGNIFICANT CHANGE UP (ref 3.8–10.5)
WBC # FLD AUTO: 8.42 K/UL — SIGNIFICANT CHANGE UP (ref 3.8–10.5)

## 2023-09-26 PROCEDURE — 70547 MR ANGIOGRAPHY NECK W/O DYE: CPT | Mod: 26

## 2023-09-26 PROCEDURE — 70544 MR ANGIOGRAPHY HEAD W/O DYE: CPT | Mod: 26,XU

## 2023-09-26 PROCEDURE — 93308 TTE F-UP OR LMTD: CPT | Mod: 26

## 2023-09-26 PROCEDURE — 93321 DOPPLER ECHO F-UP/LMTD STD: CPT | Mod: 26

## 2023-09-26 PROCEDURE — 70450 CT HEAD/BRAIN W/O DYE: CPT | Mod: 26

## 2023-09-26 PROCEDURE — 70551 MRI BRAIN STEM W/O DYE: CPT | Mod: 26

## 2023-09-26 PROCEDURE — 74230 X-RAY XM SWLNG FUNCJ C+: CPT | Mod: 26

## 2023-09-26 RX ORDER — LISINOPRIL 2.5 MG/1
10 TABLET ORAL DAILY
Refills: 0 | Status: DISCONTINUED | OUTPATIENT
Start: 2023-09-26 | End: 2023-09-27

## 2023-09-26 RX ORDER — INSULIN GLARGINE 100 [IU]/ML
15 INJECTION, SOLUTION SUBCUTANEOUS AT BEDTIME
Refills: 0 | Status: DISCONTINUED | OUTPATIENT
Start: 2023-09-26 | End: 2023-09-27

## 2023-09-26 RX ORDER — APIXABAN 2.5 MG/1
1 TABLET, FILM COATED ORAL
Qty: 60 | Refills: 0
Start: 2023-09-26 | End: 2023-10-25

## 2023-09-26 RX ADMIN — ATORVASTATIN CALCIUM 80 MILLIGRAM(S): 80 TABLET, FILM COATED ORAL at 22:19

## 2023-09-26 RX ADMIN — Medication 3 MILLILITER(S): at 05:11

## 2023-09-26 RX ADMIN — Medication 50 MILLIGRAM(S): at 17:28

## 2023-09-26 RX ADMIN — Medication 50 MILLIGRAM(S): at 05:11

## 2023-09-26 RX ADMIN — Medication 1 TABLET(S): at 17:28

## 2023-09-26 RX ADMIN — Medication 3 MILLILITER(S): at 17:27

## 2023-09-26 RX ADMIN — Medication 3 MILLILITER(S): at 12:15

## 2023-09-26 RX ADMIN — APIXABAN 5 MILLIGRAM(S): 2.5 TABLET, FILM COATED ORAL at 22:20

## 2023-09-26 RX ADMIN — Medication 50 MILLIGRAM(S): at 12:14

## 2023-09-26 RX ADMIN — LISINOPRIL 10 MILLIGRAM(S): 2.5 TABLET ORAL at 12:17

## 2023-09-26 RX ADMIN — Medication 3 MILLILITER(S): at 12:14

## 2023-09-26 RX ADMIN — Medication 2: at 17:27

## 2023-09-26 RX ADMIN — Medication 1 TABLET(S): at 05:11

## 2023-09-26 RX ADMIN — PANTOPRAZOLE SODIUM 40 MILLIGRAM(S): 20 TABLET, DELAYED RELEASE ORAL at 12:15

## 2023-09-26 RX ADMIN — Medication 3 MILLILITER(S): at 22:25

## 2023-09-26 RX ADMIN — Medication 1: at 07:58

## 2023-09-26 RX ADMIN — CHLORHEXIDINE GLUCONATE 1 APPLICATION(S): 213 SOLUTION TOPICAL at 05:10

## 2023-09-26 RX ADMIN — CEFTRIAXONE 100 MILLIGRAM(S): 500 INJECTION, POWDER, FOR SOLUTION INTRAMUSCULAR; INTRAVENOUS at 12:14

## 2023-09-26 RX ADMIN — Medication 2: at 12:15

## 2023-09-26 RX ADMIN — APIXABAN 5 MILLIGRAM(S): 2.5 TABLET, FILM COATED ORAL at 08:02

## 2023-09-26 RX ADMIN — INSULIN GLARGINE 15 UNIT(S): 100 INJECTION, SOLUTION SUBCUTANEOUS at 22:20

## 2023-09-26 RX ADMIN — CLOPIDOGREL BISULFATE 75 MILLIGRAM(S): 75 TABLET, FILM COATED ORAL at 12:14

## 2023-09-26 NOTE — PROGRESS NOTE ADULT - SUBJECTIVE AND OBJECTIVE BOX
Follow-up Pulm Progress Note    feeling better  OOB to chair   sats 97% on 2LNC  o2 lowered to 1LNC    Medications:  MEDICATIONS  (STANDING):  acetylcysteine 20%  Inhalation 3 milliLiter(s) Inhalation every 6 hours  albuterol/ipratropium for Nebulization 3 milliLiter(s) Nebulizer every 6 hours  apixaban 5 milliGRAM(s) Oral every 12 hours  atorvastatin 80 milliGRAM(s) Oral at bedtime  chlorhexidine 2% Cloths 1 Application(s) Topical <User Schedule>  clopidogrel Tablet 75 milliGRAM(s) Oral daily  dextrose 5%. 1000 milliLiter(s) (100 mL/Hr) IV Continuous <Continuous>  dextrose 5%. 1000 milliLiter(s) (50 mL/Hr) IV Continuous <Continuous>  dextrose 50% Injectable 12.5 Gram(s) IV Push once  dextrose 50% Injectable 25 Gram(s) IV Push once  dextrose 50% Injectable 25 Gram(s) IV Push once  glucagon  Injectable 1 milliGRAM(s) IntraMuscular once  insulin glargine Injectable (LANTUS) 12 Unit(s) SubCutaneous at bedtime  insulin lispro (ADMELOG) corrective regimen sliding scale   SubCutaneous three times a day before meals  lisinopril 10 milliGRAM(s) Oral daily  metoprolol tartrate 50 milliGRAM(s) Oral four times a day  pantoprazole  Injectable 40 milliGRAM(s) IV Push daily  trimethoprim  160 mG/sulfamethoxazole 800 mG 1 Tablet(s) Oral every 12 hours    MEDICATIONS  (PRN):  dextrose Oral Gel 15 Gram(s) Oral once PRN Blood Glucose LESS THAN 70 milliGRAM(s)/deciliter  metoprolol tartrate Injectable 5 milliGRAM(s) IV Push every 6 hours PRN HR > 110          Vital Signs Last 24 Hrs  T(C): 36.5 (26 Sep 2023 11:17), Max: 36.6 (26 Sep 2023 00:07)  T(F): 97.7 (26 Sep 2023 11:17), Max: 97.9 (26 Sep 2023 00:07)  HR: 76 (26 Sep 2023 11:17) (75 - 92)  BP: 176/82 (26 Sep 2023 11:17) (146/80 - 176/82)  BP(mean): --  RR: 18 (26 Sep 2023 11:17) (18 - 18)  SpO2: 94% (26 Sep 2023 11:17) (90% - 96%)    Parameters below as of 26 Sep 2023 11:17  Patient On (Oxygen Delivery Method): nasal cannula  O2 Flow (L/min): 2            09-25 @ 07:01  -  09-26 @ 07:00  --------------------------------------------------------  IN: 620 mL / OUT: 2200 mL / NET: -1580 mL          LABS:                        13.1   8.42  )-----------( 294      ( 26 Sep 2023 07:08 )             40.4     09-25    144  |  107  |  40<H>  ----------------------------<  145<H>  4.1   |  25  |  1.50<H>    Ca    10.0      25 Sep 2023 06:49            CAPILLARY BLOOD GLUCOSE      POCT Blood Glucose.: 218 mg/dL (26 Sep 2023 12:09)      Urinalysis Basic - ( 25 Sep 2023 06:49 )    Color: x / Appearance: x / SG: x / pH: x  Gluc: 145 mg/dL / Ketone: x  / Bili: x / Urobili: x   Blood: x / Protein: x / Nitrite: x   Leuk Esterase: x / RBC: x / WBC x   Sq Epi: x / Non Sq Epi: x / Bacteria: x              CULTURES:    Culture - Blood (collected 09-21-23 @ 14:31)  Source: .Blood Blood  Preliminary Report (09-25-23 @ 17:01):    No growth at 4 days    Culture - Blood (collected 09-21-23 @ 14:31)  Source: .Blood Blood  Preliminary Report (09-25-23 @ 17:01):    No growth at 4 days    Culture - Blood (collected 09-20-23 @ 13:15)  Source: .Blood Blood-Peripheral  Final Report (09-25-23 @ 18:01):    No growth at 5 days    Culture - Blood (collected 09-20-23 @ 13:05)  Source: .Blood Blood-Peripheral  Final Report (09-25-23 @ 18:01):    No growth at 5 days        Culture - Urine (collected 09-20-23 @ 15:16)  Source: Clean Catch Clean Catch (Midstream)  Final Report (09-21-23 @ 16:21):    No growth              Physical Examination:  PULM: minimal scattered rhonchi   CVS: S1, S2 heard    RADIOLOGY REVIEWED    CT chest:    < from: CT Angio Chest PE Protocol w/ IV Cont (09.20.23 @ 15:15) >  FINDINGS:    PULMONARY VESSELS: No pulmonary embolus. Main pulmonary artery normal in   diameter.    HEART AND VASCULATURE: Heart size is normal. No pericardial effusion. No   aortic aneurysm or dissection. Mild coronary and aortic calcifications.    LUNGS, AIRWAYS, PLEURA: Patent central airways. Consolidations and   nodular opacities within bilateral lower lobes, right middle lobe and   lingula representing multifocal pneumonia. No pleural effusions or   pneumothorax.    MEDIASTINUM: Mildly enlarged subcarinal lymph node measuring 1.5 cm short   axis.    UPPER ABDOMEN: Within normal limits.    BONES AND SOFT TISSUES: No aggressive osseous lesion.    LOWER NECK: Within normal limits.    IMPRESSION:    No pulmonary embolus.    Consolidations and nodular opacities within bilateral lower lobes, right   middle lobe and lingula representing multifocal pneumonia. Recommend   follow-up chest CT in 1-3 months to determine resolution.    < end of copied text >

## 2023-09-26 NOTE — PROGRESS NOTE ADULT - ASSESSMENT
72F Hx HTN, T2DM, CVAs x 2 (2017 and 2021), HFrEF 59%, BPH p/w ED Generalized Weakness, SOB and WOB started on AFMO2 escalated to BiPAP Support with CXR c/w Lt Retrocardiac Infiltrates R/O Aspiration.   Assessment  DMT2: 72y Male with DM T2 with hyperglycemia, A1C 9.1% (2022) rpt 7.3%, was on oral meds and Mounjaro at home, now on basal bolus insulin with coverage,  blood sugars are fluctuating at times.  Shortness of breath: on medications, BIPAP support, monitored.  HTN: on antihypertensive medications, monitored, asymptomatic.  BEAR/CKD: Monitor labs/BMP, renal fu.         Discussed plan and management with Dr Clarisse Oviedo NP - TEAMS  Yanni Robb MD  Cell: [phone]  Office: [phone]            72F Hx HTN, T2DM, CVAs x 2 (2017 and 2021), HFrEF 59%, BPH p/w ED Generalized Weakness, SOB and WOB started on AFMO2 escalated to BiPAP Support with CXR c/w Lt Retrocardiac Infiltrates R/O Aspiration.   Assessment  DMT2: 72y Male with DM T2 with hyperglycemia, A1C 9.1% (2022) rpt 7.3%, was on oral meds and Mounjaro at home,  now on basal bolus insulin with coverage,  blood sugars are fluctuating at times.  Shortness of breath: on medications, BIPAP support, monitored.  HTN: on antihypertensive medications, monitored, asymptomatic.  BEAR/CKD: Monitor labs/BMP, renal fu.         Discussed plan and management with Dr Clarisse Oviedo NP - TEAMS  Yanni Robb MD  Cell: [phone]  Office: [phone]

## 2023-09-26 NOTE — SWALLOW VFSS/MBS ASSESSMENT ADULT - PHARYNGEAL PHASE COMMENTS
Residue reduced with repeat swallow Residue reduces with repeat swallow Residue is reduced with repeat swallow + thin liquid wash

## 2023-09-26 NOTE — SWALLOW VFSS/MBS ASSESSMENT ADULT - DIAGNOSTIC IMPRESSIONS
Pt presents with an oropharyngeal dysphagia, with swallow function improved from prior exam. There is adequate mastication and oral transfer of all textures. Premature spillage is seen with thin liquids. Laryngeal vestibule closure is reduced, resulting in laryngeal penetration of solids and thin liquids. Material is retrieved during the swallow or during subsequent swallows. Depth/ amount of laryngeal penetration for thin liquids is reduced with use of small, single cup sips. No aspiration observed across exam. Moderate pharyngeal residue with purees and solids is reduced with repeat swallow.

## 2023-09-26 NOTE — SWALLOW VFSS/MBS ASSESSMENT ADULT - ADDITIONAL RECOMMENDATIONS
Maintain good oral care     GOAL: Pt will tolerate diet without complications/ clinical signs of aspiration.

## 2023-09-26 NOTE — SWALLOW VFSS/MBS ASSESSMENT ADULT - ASPIRATION PRECAUTIONS
Monitor for s/s aspiration/laryngeal penetration. If noted:  D/C p.o. intake, provide non-oral nutrition/hydration/meds, and contact this service @ [phone]

## 2023-09-26 NOTE — PROGRESS NOTE ADULT - SUBJECTIVE AND OBJECTIVE BOX
Patient is a 72y old  Male who presents with a chief complaint of sepsis (26 Sep 2023 14:29)      SUBJECTIVE / OVERNIGHT EVENTS: wife now agrees for MRI brain as long as it doesnt delay length of stay. will arrange for HOME O2. rpt TTE done    MEDICATIONS  (STANDING):  acetylcysteine 20%  Inhalation 3 milliLiter(s) Inhalation every 6 hours  albuterol/ipratropium for Nebulization 3 milliLiter(s) Nebulizer every 6 hours  apixaban 5 milliGRAM(s) Oral every 12 hours  atorvastatin 80 milliGRAM(s) Oral at bedtime  chlorhexidine 2% Cloths 1 Application(s) Topical <User Schedule>  clopidogrel Tablet 75 milliGRAM(s) Oral daily  dextrose 5%. 1000 milliLiter(s) (50 mL/Hr) IV Continuous <Continuous>  dextrose 5%. 1000 milliLiter(s) (100 mL/Hr) IV Continuous <Continuous>  dextrose 50% Injectable 12.5 Gram(s) IV Push once  dextrose 50% Injectable 25 Gram(s) IV Push once  dextrose 50% Injectable 25 Gram(s) IV Push once  glucagon  Injectable 1 milliGRAM(s) IntraMuscular once  insulin glargine Injectable (LANTUS) 15 Unit(s) SubCutaneous at bedtime  insulin lispro (ADMELOG) corrective regimen sliding scale   SubCutaneous three times a day before meals  lisinopril 10 milliGRAM(s) Oral daily  metoprolol tartrate 50 milliGRAM(s) Oral four times a day  pantoprazole  Injectable 40 milliGRAM(s) IV Push daily  trimethoprim  160 mG/sulfamethoxazole 800 mG 1 Tablet(s) Oral every 12 hours    MEDICATIONS  (PRN):  dextrose Oral Gel 15 Gram(s) Oral once PRN Blood Glucose LESS THAN 70 milliGRAM(s)/deciliter  metoprolol tartrate Injectable 5 milliGRAM(s) IV Push every 6 hours PRN HR > 110      Vital Signs Last 24 Hrs  T(F): 97.7 (09-26-23 @ 11:17), Max: 97.9 (09-26-23 @ 00:07)  HR: 76 (09-26-23 @ 11:17) (75 - 92)  BP: 176/82 (09-26-23 @ 11:17) (146/80 - 176/82)  RR: 18 (09-26-23 @ 11:17) (18 - 18)  SpO2: 94% (09-26-23 @ 11:17) (90% - 96%)  Telemetry:   CAPILLARY BLOOD GLUCOSE      POCT Blood Glucose.: 218 mg/dL (26 Sep 2023 12:09)  POCT Blood Glucose.: 185 mg/dL (26 Sep 2023 07:20)  POCT Blood Glucose.: 162 mg/dL (25 Sep 2023 21:01)  POCT Blood Glucose.: 142 mg/dL (25 Sep 2023 17:05)    I&O's Summary    25 Sep 2023 07:01  -  26 Sep 2023 07:00  --------------------------------------------------------  IN: 620 mL / OUT: 2200 mL / NET: -1580 mL    26 Sep 2023 07:01  -  26 Sep 2023 15:31  --------------------------------------------------------  IN: 480 mL / OUT: 850 mL / NET: -370 mL        PHYSICAL EXAM:  GENERAL: NAD, well-developed  HEAD:  Atraumatic, Normocephalic  EYES: EOMI, PERRLA, conjunctiva and sclera clear  NECK: Supple, No JVD  CHEST/LUNG: Clear to auscultation bilaterally; No wheeze  HEART: Regular rate and rhythm; No murmurs, rubs, or gallops  ABDOMEN: Soft, Nontender, Nondistended; Bowel sounds present  EXTREMITIES:  2+ Peripheral Pulses, No clubbing, cyanosis, or edema  PSYCH: AAOx3  NEUROLOGY: non-focal  SKIN: No rashes or lesions    LABS:                        13.1   8.42  )-----------( 294      ( 26 Sep 2023 07:08 )             40.4     09-25    144  |  107  |  40<H>  ----------------------------<  145<H>  4.1   |  25  |  1.50<H>    Ca    10.0      25 Sep 2023 06:49            Urinalysis Basic - ( 25 Sep 2023 06:49 )    Color: x / Appearance: x / SG: x / pH: x  Gluc: 145 mg/dL / Ketone: x  / Bili: x / Urobili: x   Blood: x / Protein: x / Nitrite: x   Leuk Esterase: x / RBC: x / WBC x   Sq Epi: x / Non Sq Epi: x / Bacteria: x        RADIOLOGY & ADDITIONAL TESTS:    Imaging Personally Reviewed:    Consultant(s) Notes Reviewed:      Care Discussed with Consultants/Other Providers:

## 2023-09-26 NOTE — SWALLOW VFSS/MBS ASSESSMENT ADULT - LARYNGEAL PENETRATION DURING THE SWALLOW - SILENT
Retrieved during the swallow or during subsequent swallows/Trace Trace-minimal; with retrieval during the swallow

## 2023-09-26 NOTE — SWALLOW VFSS/MBS ASSESSMENT ADULT - ADDITIONAL INFORMATION
+Calcifications of the laryngeal structures + Calcifications of the laryngeal structures  + ?CP bar vs other at level of C5/C6. Trace retention of material (across trials) noted above  + Cough throughout PO trials, similar to baseline cough, not consistently correlated to episodes of laryngeal penetration

## 2023-09-26 NOTE — PROGRESS NOTE ADULT - ASSESSMENT
72F Hx HTN, T2DM, CVAs x 2 (2017 and 2021), HFrEF 59%, BPH p/w ED Generalized Weakness, SOB and WOB started on AFMO2 escalated to BiPAP Support with CXR c/w Lt Retrocardiac Infiltrates R/O Aspiration.     - Acute Hypoxic Hypercarbic Respiratory Failure on BiPAP Support  2/2 Multifocal PNA 2/2 Aspiration, presumed  - seen by MICU on admission, not a candidate   9/21 ptn is more awake, states he is hungry today, on BIPAP at present since shortly after removing BIPAP this am and after a meal became hypoxic again. Im concerned he is aspirating.   - need a speech eval and R/O CVA. seen by ID, Pulm, Renal, awaiting Neuro. ABx changed to CTX/Azithro. ptn spiked a temp, blood Cx re drawn. VSS otherwise. no events on tele  - DUonebs, Mucomyst, Chest PT, sputum Cx  - awaiting Legionella and S. Pneumoniae U Ag, awaiting MRSA/MSSA PCR  - bear, off IVF, given IV Lasix. no ARBS /ACE-I for now  - check TTE, check MR brain  - DM , on Insulin on a SS    9/22:  ptn developed overnight Afib w RVR, presently is in NSR. he was given IV DIgoxin, Norvasc DCed, started Cardizem qid, toprol changed to Loprssor 50 qid. these changes made since pills need to be crushed and given w food. Ptn was initially place on Heparin drip since he was NPO, once cleared by speech for a pureed diet: placed on Eliquis. all meds to be crushed and given w food. there is a concern ptn may have had an embolic stroke 2/2 PAF. concern for a brainstem CVA w airway compromise. awaiting MRI brain. seen by neuro and  cardiology. he is stable on HF O2, pulm folloiwng, on ABx for aspiration PNA. TTE w decreased EF but done while in Afib w RVR, EF was 40-45%. Would need to rpt    9/23: ptn's wife is refusing MR Brain. " i want him to get better, we will do after he comes home" ptn remains full code. he feels better, he is tolerating a pureed diet. I explained to her he may have had a stroke, affecting his breathing and swallowing. " thats ok" was her reply. ptn is now on 6 LO2NC, earlier today was on HFNC 45/50. BP not controlled, will add hydralazine 25 mg tid    9/24: hypoxic respiratory failure is improving, now stable w 2LO2NC. recs re BIPAP at hs as per pulm. may not need anymore. in NSR on tele. will rpt TTE in NSR to R/O CM. will need an ischemic work up if EF reduced to 40 %. wife/ptn refuse MR. HIGH suspicion of embolic CVA. ptn is on ELiquis, tolerating it. BP is stable, cont hydralazine, cont Lopressor. all meds need to be crushed, unable to give long XL/SR formulations. cont CTX for aspiration PNA. FS have improved, BEAR improving. PT eval    9/25: on 3 L O2NC, BEAR improving, tolerating ELiquis, FS in acceptable range, on CTX, 5 day course finishes 9/25, , and now also BACTRIM through 10/1 2/2 sputum Cx : Stenotrophonas. will d/w pulm, ID if ptn is ready for DC home. wife wants to take him home asap.     9/26: wife now agrees for MRI brain as long as it doesnt delay length of stay. will arrange for HOME O2. rpt TTE done           - GI ppx w PPI

## 2023-09-26 NOTE — PROGRESS NOTE ADULT - SUBJECTIVE AND OBJECTIVE BOX
[facility]      Seen earlier today [Please note that date of entry above  is the actual  DATE OF SERVICE] No new neurological symptoms reported. Remains stable neurologically.   - Today, patient is without complaints.          *****MEDICATIONS: Current medication reviewed and documented.    MEDICATIONS  (STANDING):  acetylcysteine 20%  Inhalation 3 milliLiter(s) Inhalation every 6 hours  albuterol/ipratropium for Nebulization 3 milliLiter(s) Nebulizer every 6 hours  apixaban 5 milliGRAM(s) Oral every 12 hours  atorvastatin 80 milliGRAM(s) Oral at bedtime  chlorhexidine 2% Cloths 1 Application(s) Topical <User Schedule>  clopidogrel Tablet 75 milliGRAM(s) Oral daily  dextrose 5%. 1000 milliLiter(s) (50 mL/Hr) IV Continuous <Continuous>  dextrose 5%. 1000 milliLiter(s) (100 mL/Hr) IV Continuous <Continuous>  dextrose 50% Injectable 12.5 Gram(s) IV Push once  dextrose 50% Injectable 25 Gram(s) IV Push once  dextrose 50% Injectable 25 Gram(s) IV Push once  glucagon  Injectable 1 milliGRAM(s) IntraMuscular once  insulin glargine Injectable (LANTUS) 15 Unit(s) SubCutaneous at bedtime  insulin lispro (ADMELOG) corrective regimen sliding scale   SubCutaneous three times a day before meals  lisinopril 10 milliGRAM(s) Oral daily  metoprolol tartrate 50 milliGRAM(s) Oral four times a day  pantoprazole  Injectable 40 milliGRAM(s) IV Push daily  trimethoprim  160 mG/sulfamethoxazole 800 mG 1 Tablet(s) Oral every 12 hours    MEDICATIONS  (PRN):  dextrose Oral Gel 15 Gram(s) Oral once PRN Blood Glucose LESS THAN 70 milliGRAM(s)/deciliter  metoprolol tartrate Injectable 5 milliGRAM(s) IV Push every 6 hours PRN HR > 110          ***** VITAL SIGNS:   Vital Signs Last 24 Hrs      I&O's Summary    25 Sep 2023 07:01  -  26 Sep 2023 07:00  --------------------------------------------------------  IN: 620 mL / OUT: 2200 mL / NET: -1580 mL    26 Sep 2023 07:01  -  26 Sep 2023 16:02  --------------------------------------------------------  IN: 480 mL / OUT: 850 mL / NET: -370 mL             *****PHYSICAL EXAM:   alert oriented x 3 attention comprehension are fair.  Able to name, repeat.   EOmi fundi not visualized   no nystagmus VFF to confrontation  Tongue is midline  Palate elevates symmetrically   Moving all 4 ext spontaneously no drift appreciated    Gait not assessed.            *****LAB AND IMAGIN.1   8.42  )-----------( 294      ( 26 Sep 2023 07:08 )             40.4               09    144  |  107  |  40<H>  ----------------------------<  145<H>  4.1   |  25  |  1.50<H>    Ca    10.0      25 Sep 2023 06:49                         Urinalysis Basic - ( 25 Sep 2023 06:49 )    Color: x / Appearance: x / SG: x / pH: x  Gluc: 145 mg/dL / Ketone: x  / Bili: x / Urobili: x   Blood: x / Protein: x / Nitrite: x   Leuk Esterase: x / RBC: x / WBC x   Sq Epi: x / Non Sq Epi: x / Bacteria: x      [All pertinent recent Imaging/Reports reviewed]            *****A S S E S S M E N T   A N D   P L A N :    72-year-old male PMH of HTN, HLD, T2DM, BPH, age, prior CVA in  and  uncertain of residual deficits, HFpEF reportedly on 20mg lasix QD (TTE from  EF 59%) presents to the ED complaining of generalized weakness today, feeling generally unwell for several days is unable to quantify how many days or specific symptomatology.  Patient reports feeling some shortness of breath today was found to be hypoxic by EMS (78% on RA), and requiring 6L O2 via NC to maintain SpO2 of 93% in ED. patient also reports blood glucose at home by EMS was elevated but uncertain of the number, reports he has not taken any of his antidiabetic medication for at least a day due to feeling unwell, has not taken his daily Lasix.  Patient notes possibly some URI symptoms over the past few days.  Patient denies urinary complaints, abdominal pain, vomiting, diarrhea, melena, hematochezia.         Problem/Recommendations 1: dysphagia likely progressive   in 2017 noted trace aspiration   in  pt had tia, mri neg presented with dysarthria facial droop      she and pt understand risk of not doing imaging   ct head r pontine  hypodensity ? stroke   mri ordered   pt would  consider swallow exercises   would change to thickened liquid   only feed when wide awake   nih 4   mrs 2   continue plavix apixaban and statin ( understands increased risk with dual antithrombotic )  follow up with dr. libman   chest pt       Problem/Recommendations 2:    pneumonia   likely aspiration   pt with recent noncompliance to dm meds      Thank you for allowing me to participate in the care of this patient. Will continue to follow patient periodically. Please do not hesitate to call me if you have any  questions or if there has been a change in patients neurological status     ________________  Faye Mackenzie MD  [facility]  [phone]      33 minutes spent on total encounter; more than 50 % of the visit was  spent counseling about plan of care, compliance to diet/exercise and medication regimen and or  coordinating care by the attending physician.      It is advised that stroke patients follow up with ALEXANDRIA Eddy @ [phone] in 1- 2 weeks.   Others please follow up with Dr. Michael Nissenbaum [phone]

## 2023-09-26 NOTE — SWALLOW VFSS/MBS ASSESSMENT ADULT - COMMENTS
Pt admitted with multi focal Pneumonia and Acute respiratory failure with hypoxia and hypercapnia. Pt is followed by Pulmonary. Pt initially placed on Bipap in ED. 9/21/23 Pt placed back on bipap, pt was desating to 88-90 on 6LNC; Pt then switched back to Nasal canula in the afternoon. Initial swallowing evaluation recommended NPO, with non-oral nutrition/hydration/medications.  Per Nephrology: CKD - stage 3, with nephrotic-range proteinuria - likely due to diabetic nephropathy; BEAR - mild - likely prerenally mediated.   9/20/23 CT Chest: Impressions :No pulmonary embolus. Consolidations and nodular opacities within bilateral lower lobes, right middle lobe and lingula representing multifocal pneumonia. Recommend follow-up chest CT in 1-3 months to determine resolution. Recd: follow-up chest CT in 1-3 months to determine resolution.    Pt seen for FEES on 9/22 with recommendations for a puree diet with moderately thick liquids via teaspoon with chin tuck posture.

## 2023-09-26 NOTE — SWALLOW VFSS/MBS ASSESSMENT ADULT - RECOMMENDED CONSISTENCY
Regular diet, thin liquids  1) Liquids via single, small sips - no straws  2) Intermittent throat clear throughout meals  3) Repeat swallow

## 2023-09-26 NOTE — PROGRESS NOTE ADULT - PROBLEM SELECTOR PLAN 1
Will continue current insulin regimen for now. Will continue monitoring  blood sugars, will Follow up.  Patient counseled for compliance with consistent low carb diet.    Has CKD, stop home Glipizide and Metformin   DC on Jardiance 10 mg daily + Glimeperide 2 mg daily + and his weekly Mounjaro injections  FU outpatient 2-4 weeks

## 2023-09-26 NOTE — PROGRESS NOTE ADULT - SUBJECTIVE AND OBJECTIVE BOX
Overnight events noted      VITAL:  T(C): , Max: 36.6 (09-25-23 @ 12:42)  T(F): , Max: 97.9 (09-25-23 @ 12:42)  HR: 75 (09-26-23 @ 08:00)  BP: 146/80 (09-26-23 @ 08:00)  BP(mean): --  RR: 18 (09-26-23 @ 08:00)  SpO2: 94% (09-26-23 @ 08:00)  Wt(kg): --      PHYSICAL EXAM:  Constitutional: NAD, alert  HEENT: NCAT, (+)DMM  Neck: Supple, No JVD  Respiratory: (+)b/l rhonchi  Cardiovascular: RRR s1s2, no m/r/g  Gastrointestinal: BS+, soft, NT/ND  Extremities: No peripheral edema b/l  Neurological: reduced generalized strength  Back: no CVAT b/l  Skin: No rashes, no nevi    LABS:                        13.1   8.42  )-----------( 294      ( 26 Sep 2023 07:08 )             40.4     Na(144)/K(4.1)/Cl(107)/HCO3(25)/BUN(40)/Cr(1.50)Glu(145)/Ca(10.0)/Mg(--)/PO4(--)    09-25 @ 06:49  Na(143)/K(4.0)/Cl(108)/HCO3(25)/BUN(51)/Cr(1.83)Glu(181)/Ca(9.5)/Mg(2.2)/PO4(3.3)    09-24 @ 06:21        IMPRESSION: 72M w/ HTN, DM2, BPH, CVA, and HFpEF, 9/20/23 p/w multifocal pneumonia    (1)CKD - stage 3, with nephrotic-range proteinuria - likely due to diabetic nephropathy  (2)BEAR - prerenal, resolving as of yesterday  (3)CV - mildly hypertensive - warranting reinitiation of ACEI/ARB  (4)ID - pneumonia - on IV Rocephin/PO Bactrim    RECOMMEND:  (1)Add Lisinopril 10mg po qd  (2)Dose new meds for GFR 40-50ml/min (present dosing is acceptable)              Rohit Magallanes MD  [facility]  Office/on call physician: [phone]  Cell (7a-7p): [phone]       no complaints      VITAL:  T(C): , Max: 36.6 (09-25-23 @ 12:42)  T(F): , Max: 97.9 (09-25-23 @ 12:42)  HR: 75 (09-26-23 @ 08:00)  BP: 146/80 (09-26-23 @ 08:00)  BP(mean): --  RR: 18 (09-26-23 @ 08:00)  SpO2: 94% (09-26-23 @ 08:00)  Wt(kg): --      PHYSICAL EXAM:  Constitutional: NAD, alert  HEENT: NCAT, (+)DMM  Neck: Supple, No JVD  Respiratory: (+)b/l rhonchi  Cardiovascular: RRR s1s2, no m/r/g  Gastrointestinal: BS+, soft, NT/ND  Extremities: No peripheral edema b/l  Neurological: reduced generalized strength  Back: no CVAT b/l  Skin: No rashes, no nevi    LABS:                        13.1   8.42  )-----------( 294      ( 26 Sep 2023 07:08 )             40.4     Na(144)/K(4.1)/Cl(107)/HCO3(25)/BUN(40)/Cr(1.50)Glu(145)/Ca(10.0)/Mg(--)/PO4(--)    09-25 @ 06:49  Na(143)/K(4.0)/Cl(108)/HCO3(25)/BUN(51)/Cr(1.83)Glu(181)/Ca(9.5)/Mg(2.2)/PO4(3.3)    09-24 @ 06:21        IMPRESSION: 72M w/ HTN, DM2, BPH, CVA, and HFpEF, 9/20/23 p/w multifocal pneumonia    (1)CKD - stage 3, with nephrotic-range proteinuria - likely due to diabetic nephropathy  (2)BEAR - prerenal, resolving as of yesterday  (3)CV - mildly hypertensive - warranting reinitiation of ACEI/ARB  (4)ID - pneumonia - on IV Rocephin/PO Bactrim    RECOMMEND:  (1)Add Lisinopril 10mg po qd  (2)Dose new meds for GFR 40-50ml/min (present dosing is acceptable)              Rohit T. Magallanes, MD  [facility]  Office/on call physician: [phone]  Cell (7a-7p): [phone]

## 2023-09-26 NOTE — PROGRESS NOTE ADULT - ASSESSMENT
Patient is a 72 year old male with PMH of HTN, HLD, T2DM, BPH, age, prior CVA in 2017 and 2021 uncertain of residual deficits, HFpEF reportedly on 20mg lasix QD (TTE from 2022 EF 59%) presents to the ED complaining of generalized weakness today, feeling generally unwell for several days is unable to quantify how many days or specific symptomatology.  Patient reports feeling some shortness of breath today was found to be hypoxic by EMS (78% on RA), and requiring 6L O2 via NC to maintain SpO2 of 93% in ED.    AHRF due to multifocal pneumonia   Leukocytosis likely due to above   atrial fib   nell  - RVP/COVID negative   - CXR reported LLL atelectasis vs consolidation   - CTA chest with no PE; noted with consolidations and nodular opacities in b/l LLs, RML and lingula   - MRSA PCR screen negative  - Legionella and Strep pneumoniae ur ags negative   - s/p cefepime, vancomycin, doxycycline in ER > ceftriaxone and azithromycin > ceftriaxone   - sputum culture noted with Stenotrophomonas maltophilia    - did overall improve on ceftriaxone course but will cover above for now   - 9/20, 9/21 Bcx negative     Recommendations  S/p ceftriaxone - completed 5d course 9/25  Continue on bactrim DS 1 tablet PO BID to complete 7d course on 10/1  Pulmonary following, on nebs, chest PT, mucomyst  Wean supplemental O2 as tolerated   Aspiration precautions   Continue rest of care per primary team  surveillance imaging in 8-12 weeks   patient can follow up with ID as outpatient post discharge   Stable from ID standpoint at this time       Vinicio Steve M.D.  [facility], Division of Infectious Diseases  [phone]  After 5pm on weekdays and all day on weekends - please call [phone]

## 2023-09-26 NOTE — PROGRESS NOTE ADULT - SUBJECTIVE AND OBJECTIVE BOX
Chief complaint  Patient is a 72y old  Male who presents with a chief complaint of sepsis (26 Sep 2023 13:52)         Labs and Fingersticks  CAPILLARY BLOOD GLUCOSE      POCT Blood Glucose.: 218 mg/dL (26 Sep 2023 12:09)  POCT Blood Glucose.: 185 mg/dL (26 Sep 2023 07:20)  POCT Blood Glucose.: 162 mg/dL (25 Sep 2023 21:01)  POCT Blood Glucose.: 142 mg/dL (25 Sep 2023 17:05)      Anion Gap: 12 (09-25 @ 06:49)      Calcium: 10.0 (09-25 @ 06:49)          09-25    144  |  107  |  40<H>  ----------------------------<  145<H>  4.1   |  25  |  1.50<H>    Ca    10.0      25 Sep 2023 06:49                          13.1   8.42  )-----------( 294      ( 26 Sep 2023 07:08 )             40.4     Medications  MEDICATIONS  (STANDING):  acetylcysteine 20%  Inhalation 3 milliLiter(s) Inhalation every 6 hours  albuterol/ipratropium for Nebulization 3 milliLiter(s) Nebulizer every 6 hours  apixaban 5 milliGRAM(s) Oral every 12 hours  atorvastatin 80 milliGRAM(s) Oral at bedtime  chlorhexidine 2% Cloths 1 Application(s) Topical <User Schedule>  clopidogrel Tablet 75 milliGRAM(s) Oral daily  dextrose 5%. 1000 milliLiter(s) (50 mL/Hr) IV Continuous <Continuous>  dextrose 5%. 1000 milliLiter(s) (100 mL/Hr) IV Continuous <Continuous>  dextrose 50% Injectable 12.5 Gram(s) IV Push once  dextrose 50% Injectable 25 Gram(s) IV Push once  dextrose 50% Injectable 25 Gram(s) IV Push once  glucagon  Injectable 1 milliGRAM(s) IntraMuscular once  insulin glargine Injectable (LANTUS) 12 Unit(s) SubCutaneous at bedtime  insulin lispro (ADMELOG) corrective regimen sliding scale   SubCutaneous three times a day before meals  lisinopril 10 milliGRAM(s) Oral daily  metoprolol tartrate 50 milliGRAM(s) Oral four times a day  pantoprazole  Injectable 40 milliGRAM(s) IV Push daily  trimethoprim  160 mG/sulfamethoxazole 800 mG 1 Tablet(s) Oral every 12 hours      Physical Exam  General: Patient comfortable in bed   Vital Signs Last 12 Hrs  T(F): 97.7 (09-26-23 @ 11:17), Max: 97.9 (09-26-23 @ 04:25)  HR: 76 (09-26-23 @ 11:17) (75 - 80)  BP: 176/82 (09-26-23 @ 11:17) (146/80 - 176/82)  BP(mean): --  RR: 18 (09-26-23 @ 11:17) (18 - 18)  SpO2: 94% (09-26-23 @ 11:17) (90% - 94%)    CVS: S1S2   Respiratory: No wheezing, no crepitations  GI: Abdomen soft, bowel sounds positive  Musculoskeletal:  moves all extremities  : Voiding        Chief complaint  Patient is a 72y old  Male who presents with a chief complaint of sepsis (26 Sep 2023 13:52)         Labs and Fingersticks  CAPILLARY BLOOD GLUCOSE      POCT Blood Glucose.: 218 mg/dL (26 Sep 2023 12:09)  POCT Blood Glucose.: 185 mg/dL (26 Sep 2023 07:20)  POCT Blood Glucose.: 162 mg/dL (25 Sep 2023 21:01)  POCT Blood Glucose.: 142 mg/dL (25 Sep 2023 17:05)      Anion Gap: 12 (09-25 @ 06:49)      Calcium: 10.0 (09-25 @ 06:49)          09-25    144  |  107  |  40<H>  ----------------------------<  145<H>  4.1   |  25  |  1.50<H>    Ca    10.0      25 Sep 2023 06:49                          13.1   8.42  )-----------( 294      ( 26 Sep 2023 07:08 )             40.4     Medications  MEDICATIONS  (STANDING):  acetylcysteine 20%  Inhalation 3 milliLiter(s) Inhalation every 6 hours  albuterol/ipratropium for Nebulization 3 milliLiter(s) Nebulizer every 6 hours  apixaban 5 milliGRAM(s) Oral every 12 hours  atorvastatin 80 milliGRAM(s) Oral at bedtime  chlorhexidine 2% Cloths 1 Application(s) Topical <User Schedule>  clopidogrel Tablet 75 milliGRAM(s) Oral daily  dextrose 5%. 1000 milliLiter(s) (50 mL/Hr) IV Continuous <Continuous>  dextrose 5%. 1000 milliLiter(s) (100 mL/Hr) IV Continuous <Continuous>  dextrose 50% Injectable 12.5 Gram(s) IV Push once  dextrose 50% Injectable 25 Gram(s) IV Push once  dextrose 50% Injectable 25 Gram(s) IV Push once  glucagon  Injectable 1 milliGRAM(s) IntraMuscular once  insulin glargine Injectable (LANTUS) 12 Unit(s) SubCutaneous at bedtime  insulin lispro (ADMELOG) corrective regimen sliding scale   SubCutaneous three times a day before meals  lisinopril 10 milliGRAM(s) Oral daily  metoprolol tartrate 50 milliGRAM(s) Oral four times a day  pantoprazole  Injectable 40 milliGRAM(s) IV Push daily  trimethoprim  160 mG/sulfamethoxazole 800 mG 1 Tablet(s) Oral every 12 hours      Physical Exam  General: Patient comfortable in bed   Vital Signs Last 12 Hrs  T(F): 97.7 (09-26-23 @ 11:17), Max: 97.9 (09-26-23 @ 04:25)  HR: 76 (09-26-23 @ 11:17) (75 - 80)  BP: 176/82 (09-26-23 @ 11:17) (146/80 - 176/82)  BP(mean): --  RR: 18 (09-26-23 @ 11:17) (18 - 18)  SpO2: 94% (09-26-23 @ 11:17) (90% - 94%)    CVS: S1S2   Respiratory: No wheezing, no crepitations  GI: Abdomen soft, bowel sounds positive  Musculoskeletal:  moves all extremities  : Voiding

## 2023-09-26 NOTE — PROGRESS NOTE ADULT - ASSESSMENT
71 y/o M with PMH of HTN, HLD, T2DM, BPH, CVA in 2017 and 2021 uncertain of residual deficits, HFpEF reportedly on 20mg lasix QD (TTE from 2022 EF 59%). Presents with c/o generalized weakness, feeling generally unwell for several days is unable to quantify how many days or specific symptomatology. Endorses some SOB, cough. Found to be hypoxic by EMS (78% on RA). CTA chest neg PE, + multifocal PNA.

## 2023-09-26 NOTE — SWALLOW VFSS/MBS ASSESSMENT ADULT - SLP GENERAL OBSERVATIONS
Pt encountered in radiology, secure in ANGIE chair. Awake, alert, on 2L NC. Cooperative with exam. +Baseline cough.

## 2023-09-26 NOTE — PROGRESS NOTE ADULT - SUBJECTIVE AND OBJECTIVE BOX
Subjective: Patient seen and examined. No new events except as noted.   sitting in chair   2 liters NC   feels ok   Cough    REVIEW OF SYSTEMS:    CONSTITUTIONAL: No weakness, fevers or chills  EYES/ENT: No visual changes;  No vertigo or throat pain   NECK: No pain or stiffness  RESPIRATORY: + cough, wheezing, hemoptysis; No shortness of breath  CARDIOVASCULAR: No chest pain or palpitations  GASTROINTESTINAL: No abdominal or epigastric pain. No nausea, vomiting, or hematemesis; No diarrhea or constipation. No melena or hematochezia.  GENITOURINARY: No dysuria, frequency or hematuria  NEUROLOGICAL: No numbness or weakness  SKIN: No itching, burning, rashes, or lesions   All other review of systems is negative unless indicated above.    MEDICATIONS:  MEDICATIONS  (STANDING):  acetylcysteine 20%  Inhalation 3 milliLiter(s) Inhalation every 6 hours  albuterol/ipratropium for Nebulization 3 milliLiter(s) Nebulizer every 6 hours  apixaban 5 milliGRAM(s) Oral every 12 hours  atorvastatin 80 milliGRAM(s) Oral at bedtime  cefTRIAXone   IVPB      cefTRIAXone   IVPB 1000 milliGRAM(s) IV Intermittent every 24 hours  chlorhexidine 2% Cloths 1 Application(s) Topical <User Schedule>  clopidogrel Tablet 75 milliGRAM(s) Oral daily  dextrose 5%. 1000 milliLiter(s) (100 mL/Hr) IV Continuous <Continuous>  dextrose 5%. 1000 milliLiter(s) (50 mL/Hr) IV Continuous <Continuous>  dextrose 50% Injectable 12.5 Gram(s) IV Push once  dextrose 50% Injectable 25 Gram(s) IV Push once  dextrose 50% Injectable 25 Gram(s) IV Push once  glucagon  Injectable 1 milliGRAM(s) IntraMuscular once  insulin glargine Injectable (LANTUS) 12 Unit(s) SubCutaneous at bedtime  insulin lispro (ADMELOG) corrective regimen sliding scale   SubCutaneous three times a day before meals  metoprolol tartrate 50 milliGRAM(s) Oral four times a day  pantoprazole  Injectable 40 milliGRAM(s) IV Push daily  trimethoprim  160 mG/sulfamethoxazole 800 mG 1 Tablet(s) Oral every 12 hours      PHYSICAL EXAM:  T(C): 36.6 (09-26-23 @ 08:00), Max: 36.6 (09-25-23 @ 12:42)  HR: 75 (09-26-23 @ 08:00) (75 - 92)  BP: 146/80 (09-26-23 @ 08:00) (133/80 - 170/84)  RR: 18 (09-26-23 @ 08:00) (18 - 18)  SpO2: 94% (09-26-23 @ 08:00) (90% - 96%)  Wt(kg): --  I&O's Summary    25 Sep 2023 07:01  -  26 Sep 2023 07:00  --------------------------------------------------------  IN: 620 mL / OUT: 2200 mL / NET: -1580 mL          Appearance: NAD 	  HEENT:   Normal oral mucosa, PERRL, EOMI	  Lymphatic: No lymphadenopathy , no edema  Cardiovascular: Normal S1 S2, No JVD, No murmurs , Peripheral pulses palpable 2+ bilaterally  Respiratory: decreased bs   Gastrointestinal:  Soft, Non-tender, + BS	  Skin: No rashes, No ecchymoses, No cyanosis, warm to touch  Musculoskeletal: Normal range of motion, normal strength  Psychiatry:  Mood & affect appropriate  Ext: No edema      LABS:    CARDIAC MARKERS:                                13.1   8.42  )-----------( 294      ( 26 Sep 2023 07:08 )             40.4     09-25    144  |  107  |  40<H>  ----------------------------<  145<H>  4.1   |  25  |  1.50<H>    Ca    10.0      25 Sep 2023 06:49        TELEMETRY: 	SR    ECG:  	  RADIOLOGY:   DIAGNOSTIC TESTING:  [ ] Echocardiogram:  < from: TTE W or WO Ultrasound Enhancing Agent (09.22.23 @ 12:28) >    TRANSTHORACIC ECHOCARDIOGRAM REPORT  ________________________________________________________________________________                                      _______       Pt. Name:       FRANKO SPANN        Study Date:    9/22/2023  MRN:            [id number]        YOB: 1950  Accession #:    [id number]         Age:           72 years  Account#:       [id number]      Gender:        M  Heart Rate:     118 bpm           Height:        67.72 in (172.00 cm)  Rhythm:         sinus tachycardia Weight:        418.87 lb (190.00 kg)  Blood Pressure: 137/77 mmHg       BSA/BMI:       2.79 m² / 64.22 kg/m²  ________________________________________________________________________________________  Referring Physician:    [id number] Tiffany Arreola  Interpreting Physician: Shannon Salgado  Primary Sonographer:    Brandi Owens [facility]    CPT:                ECHO TTE WITH CON COMP W DOPP - .m;DEFINITY ECHO                      CONTRAST PER ML - .m;DEFINITY ECHO CONTRAST PER ML                  WASTED - .m  Indication(s):      Unspecified systolic (congestive) heart failure - I50.20  Procedure:          Transthoracic echocardiogram with 2-D, M-mode and complete                      spectral and color flow Doppler.  Ordering Location:  [facility]  Admission Status:   Inpatient  Contrast Injection: Verbal consent was obtained for injection of Ultrasonic                      Enhancing Agent following a discussion of risks and                      benefits.    Endocardial visualization enhanced with 2 ml of Definity                      Ultrasound enhancing agent (Lot#:[id number] Exp.Date:1SEP24                      Discarded Dose:8ml).  UEA Reaction:       Patient had no adverse reaction after injection of                    Ultrasound Enhancing Agent.  Study Information:  Image quality for this study is adequate.    _______________________________________________________________________________________     CONCLUSIONS:      1. Left ventricle was not well visualized.   2. Left ventricular wall thickness is normal. Abnormal (paradoxical) septal motion consistent with conduction delay. Left ventricular systolic function is mildly decreased with an ejection fraction visually estimated at 40 to 45 %. There are regional wall motion abnormalities.   3. Entire septum, entire inferior wall, and apex are abnormal.   4. Right ventricular cavity is normal in size, normal wall thickness and reduced systolic function.   5. The left atrium is normal in size.   6. Normal atria.   7. No significant valvular disease.   8. No pericardial effusion seen.   9. No prior echocardiogram is available for comparison.    ________________________________________________________________________________________  FINDINGS:     Left Ventricle:  The left ventricle was not well visualized. After obtaining consent, Definity ultrasound enhancing agent was given for enhanced left ventricular opacification and improved delineation of the left ventricular endocardial borders.Left ventricular wall thickness is normal. Abnormal (paradoxical) septal motion consistent with conduction delay. Left ventricular systolic function is mildly decreased with an ejection fraction visually estimated at 40 to 45%. There are regional wall motion abnormalities consistent with ischemic heart disease. Unable to assess left ventricular diastolic function due to insufficient data. There is no evidence of a thrombus in the left ventricle. Unable to perform LV diameter and LV thickness measurements due to poor image quality.  LV Wall Scoring: The entire septum, entire inferior wall, and apex are  hypokinetic. All remaining scored segments are normal.          Right Ventricle:  The right ventricular cavity is normal in size, normal wall thickness and reduced systolic function. Tricuspid annular plane systolic excursion (TAPSE) is 1.2 cm (normal >=1.7 cm).     Left Atrium:  The left atrium is normal in size with an indexed volume of 20.93 ml/m².     Right Atrium:  The right atrium is normal in size with an indexed volume of 8.60 ml/m².     Interatrial Septum:  The interatrial septum appears intact. There is no evidence of an interatrial septal aneurysm.     Aortic Valve:  The aortic valve is tricuspid with normal structure withoutstenosis. There is fibrocalcific aortic valve sclerosis without stenosis.     Mitral Valve:  Structurally normal mitral valve with normal leaflet excursion.     Tricuspid Valve:  Structurally normal tricuspid valve with normal leaflet excursion. There is trace tricuspid regurgitation. There is insufficient tricuspid regurgitation detected to calculate pulmonary artery systolic pressure.     Pulmonic Valve:  Structurally normal pulmonic valve with normal leaflet excursion.     Aorta:  The aortic annulus and aortic root appear normal in size.     Pericardium:  No pericardial effusion seen.     Systemic Veins:  The inferior vena cava is normal in size (normal <2.1cm) with normal inspiratory collapse (normal >50%) consistent with normal right atrial pressure (~3, range 0-5mmHg).  ____________________________________________________________________  Quantitative Data:  Left Ventricle Measurements: (Indexed to BSA)     Visualized LV EF%: 40 to 45%     MV E Vmax: 0.82 m/s    Aorta Measurements: (normal range) (Indexed to BSA)     Sinuses of Valsalva: 3.50 cm (3.1 - 3.7 cm)       Left Atrium Measurements: (Indexed to BSA)  LA Diam 2D: 3.70 cm  LA Vol BP:  58.0 ml. 21 ml/m².    Right Ventricle Measurements: Right Atrial Measurements:     TAPSE:           1.2 cm       RA Vol:       24.00 ml  TV Cesia. S':      8.86 cm/s    RA Vol Index: 8.60 ml/m²  RV Base (RVID1): 2.4 cm  RV Mid (RVID2):  2.4 cm       LVOT / RVOT/ Qp/Qs Data: (Indexed to BSA)  LVOT Diameter: 2.20 cm  LVOT Vmax:     1.01 m/s  LVOT VTI:      16.70 cm  LVOT SV:       63.5 ml  22.75 ml/m²    Mitral Valve Measurements:     MV E Vmax: 0.8 m/s       Tricuspid Valve Measurements:     RA Pressure: 3 mmHg    ________________________________________________________________________________________  Electronically signed on 9/22/2023 at 3:17:59 PM by Shannon Salgado         *** Final ***    < end of copied text >    [ ]  Catheterization:  [ ] Stress Test:    OTHER:

## 2023-09-26 NOTE — PROGRESS NOTE ADULT - SUBJECTIVE AND OBJECTIVE BOX
Subjective: Patient seen and examined. No new events except as noted.   OOB in chair.  No complaints.  On 3L NC.    REVIEW OF SYSTEMS:    CONSTITUTIONAL: + weakness, fevers or chills  EYES/ENT: No visual changes;  No vertigo or throat pain   NECK: No pain or stiffness  RESPIRATORY: +cough, wheezing, hemoptysis; No shortness of breath  CARDIOVASCULAR: No chest pain or palpitations  GASTROINTESTINAL: No abdominal or epigastric pain. No nausea, vomiting, or hematemesis; No diarrhea or constipation. No melena or hematochezia.  GENITOURINARY: No dysuria, frequency or hematuria  NEUROLOGICAL: No numbness or weakness  SKIN: No itching, burning, rashes, or lesions   All other review of systems is negative unless indicated above.    MEDICATIONS:  MEDICATIONS  (STANDING):  albuterol/ipratropium for Nebulization 3 milliLiter(s) Nebulizer every 6 hours  apixaban 5 milliGRAM(s) Oral every 12 hours  atorvastatin 80 milliGRAM(s) Oral at bedtime  cefTRIAXone   IVPB 1000 milliGRAM(s) IV Intermittent every 24 hours  cefTRIAXone   IVPB      chlorhexidine 2% Cloths 1 Application(s) Topical <User Schedule>  clopidogrel Tablet 75 milliGRAM(s) Oral daily  dextrose 5%. 1000 milliLiter(s) (50 mL/Hr) IV Continuous <Continuous>  dextrose 5%. 1000 milliLiter(s) (100 mL/Hr) IV Continuous <Continuous>  dextrose 50% Injectable 12.5 Gram(s) IV Push once  dextrose 50% Injectable 25 Gram(s) IV Push once  dextrose 50% Injectable 25 Gram(s) IV Push once  glucagon  Injectable 1 milliGRAM(s) IntraMuscular once  hydrALAZINE 25 milliGRAM(s) Oral three times a day  insulin glargine Injectable (LANTUS) 12 Unit(s) SubCutaneous at bedtime  insulin lispro (ADMELOG) corrective regimen sliding scale   SubCutaneous three times a day before meals  metoprolol tartrate 50 milliGRAM(s) Oral four times a day  pantoprazole  Injectable 40 milliGRAM(s) IV Push daily  sodium chloride 0.9%. 1000 milliLiter(s) (50 mL/Hr) IV Continuous <Continuous>  thiamine Injectable 100 milliGRAM(s) IV Push three times a day    PHYSICAL EXAM:  Vital Signs Last 24 Hrs  T(C): 36.6 (26 Sep 2023 08:00), Max: 36.6 (25 Sep 2023 12:42)  T(F): 97.9 (26 Sep 2023 08:00), Max: 97.9 (25 Sep 2023 12:42)  HR: 75 (26 Sep 2023 08:00) (75 - 92)  BP: 146/80 (26 Sep 2023 08:00) (133/80 - 170/84)  BP(mean): --  RR: 18 (26 Sep 2023 08:00) (18 - 18)  SpO2: 94% (26 Sep 2023 08:00) (90% - 96%)    Parameters below as of 26 Sep 2023 08:00  Patient On (Oxygen Delivery Method): nasal cannula  O2 Flow (L/min): 3    I&O's Summary    25 Sep 2023 07:01  -  26 Sep 2023 07:00  --------------------------------------------------------  IN: 620 mL / OUT: 2200 mL / NET: -1580 mL    Appearance: NAD  HEENT:   Dry  oral mucosa, PERRL, EOMI	  Lymphatic: No lymphadenopathy , no edema  Cardiovascular: Normal S1 S2, No JVD, No murmurs , Peripheral pulses palpable 2+ bilaterally  Respiratory: crackles bilaterally    Gastrointestinal:  Soft, Non-tender, + BS	  Skin: No rashes, No ecchymoses, No cyanosis, warm to touch  Musculoskeletal: Normal range of motion, normal strength  Psychiatry:  Mood & affect appropriate  Ext: No edema    LABS:    CARDIAC MARKERS:                        13.1   8.42  )-----------( 294      ( 26 Sep 2023 07:08 )             40.4     09-25    144  |  107  |  40<H>  ----------------------------<  145<H>  4.1   |  25  |  1.50<H>    Ca    10.0      25 Sep 2023 06:49    TELEMETRY: SR  ECG:  	  RADIOLOGY:  DIAGNOSTIC TESTING:  [ ] Echocardiogram:  [ ]  Catheterization:  [ ] Stress Test:    OTHER:

## 2023-09-26 NOTE — PROGRESS NOTE ADULT - SUBJECTIVE AND OBJECTIVE BOX
[facility]  CINTIA Razo Y. Patel, S. Shah, G. Michael  [phone]  ([phone] - weekdays after 5pm and weekends)    Name: FRANKO SPANN  Age/Gender: 72y Male  MRN: [id number]    Interval History:  Patient seen and examined this morning.   Feels better, no fever, pain or dyspnea.   No new complaints  Notes reviewed.   No concerning overnight events.  Afebrile.   Allergies: No Known Allergies      Objective:  Vitals:   T(F): 97.7 (09-26-23 @ 11:17), Max: 97.9 (09-25-23 @ 12:42)  HR: 76 (09-26-23 @ 11:17) (75 - 92)  BP: 176/82 (09-26-23 @ 11:17) (133/80 - 176/82)  RR: 18 (09-26-23 @ 11:17) (18 - 18)  SpO2: 94% (09-26-23 @ 11:17) (90% - 96%)  Physical Examination:  General: no acute distress, NC  HEENT: NC/AT, EOMI, anicteric, neck supple  Cardio: S1, S2 present, normal rate  Resp: decreased breath sounds b/l  Abd: soft, NT, ND, + BS  Neuro: AAOx3, no obvious focal deficits  Ext: mild LE edema, no cyanosis  Skin: warm, dry, no visible rash  Psych: appropriate affect and mood for situation  Lines: PIV    Laboratory Studies:  CBC:                       13.1   8.42  )-----------( 294      ( 26 Sep 2023 07:08 )             40.4     WBC Trend:  8.42 09-26-23 @ 07:08  8.53 09-25-23 @ 06:55  8.24 09-24-23 @ 06:21  15.70 09-23-23 @ 06:12  17.63 09-22-23 @ 13:24  16.31 09-22-23 @ 06:43  15.05 09-21-23 @ 07:02  13.26 09-20-23 @ 13:22    CMP: 09-25    144  |  107  |  40<H>  ----------------------------<  145<H>  4.1   |  25  |  1.50<H>    Ca    10.0      25 Sep 2023 06:49    Creatinine: 1.50 mg/dL (09-25-23 @ 06:49)  Creatinine: 1.83 mg/dL (09-24-23 @ 06:21)  Creatinine: 2.14 mg/dL (09-23-23 @ 06:12)  Creatinine: 2.20 mg/dL (09-22-23 @ 06:43)  Creatinine: 1.98 mg/dL (09-21-23 @ 07:02)  Creatinine: 1.82 mg/dL (09-20-23 @ 16:57)  Creatinine: 1.90 mg/dL (09-20-23 @ 13:22)    Microbiology: reviewed   Culture - Sputum (collected 09-22-23 @ 10:30)  Source: .Sputum Sputum  Gram Stain (09-22-23 @ 23:04):    Rare polymorphonuclear leukocytes per low power field    Moderate Squamous epithelial cells per low power field    Moderate Gram Positive Rods per oil power field    Few Gram Negative Rods per oil power field    Few Gram Positive Cocci in Pairs and Chains per oil power field  Final Report (09-24-23 @ 21:22):    Moderate Stenotrophomonas maltophilia    Normal Respiratory Erica present  Organism: Stenotrophomonas maltophilia (09-24-23 @ 21:22)  Organism: Stenotrophomonas maltophilia (09-24-23 @ 21:22)      Method Type: KB      -  Minocycline: S  Organism: Stenotrophomonas maltophilia (09-24-23 @ 21:22)      Method Type: DONNA      -  Levofloxacin: S <=0.5      -  Trimethoprim/Sulfamethoxazole: S <=0.5/9.5    Culture - Blood (collected 09-21-23 @ 14:31)  Source: .Blood Blood  Preliminary Report (09-25-23 @ 17:01):    No growth at 4 days    Culture - Blood (collected 09-21-23 @ 14:31)  Source: .Blood Blood  Preliminary Report (09-25-23 @ 17:01):    No growth at 4 days    Culture - Urine (collected 09-20-23 @ 15:16)  Source: Clean Catch Clean Catch (Midstream)  Final Report (09-21-23 @ 16:21):    No growth    Culture - Blood (collected 09-20-23 @ 13:15)  Source: .Blood Blood-Peripheral  Final Report (09-25-23 @ 18:01):    No growth at 5 days    Culture - Blood (collected 09-20-23 @ 13:05)  Source: .Blood Blood-Peripheral  Final Report (09-25-23 @ 18:01):    No growth at 5 days    Radiology: reviewed     Medications:  acetylcysteine 20%  Inhalation 3 milliLiter(s) Inhalation every 6 hours  albuterol/ipratropium for Nebulization 3 milliLiter(s) Nebulizer every 6 hours  apixaban 5 milliGRAM(s) Oral every 12 hours  atorvastatin 80 milliGRAM(s) Oral at bedtime  cefTRIAXone   IVPB 1000 milliGRAM(s) IV Intermittent every 24 hours  cefTRIAXone   IVPB      chlorhexidine 2% Cloths 1 Application(s) Topical <User Schedule>  clopidogrel Tablet 75 milliGRAM(s) Oral daily  dextrose 5%. 1000 milliLiter(s) IV Continuous <Continuous>  dextrose 5%. 1000 milliLiter(s) IV Continuous <Continuous>  dextrose 50% Injectable 25 Gram(s) IV Push once  dextrose 50% Injectable 25 Gram(s) IV Push once  dextrose 50% Injectable 12.5 Gram(s) IV Push once  dextrose Oral Gel 15 Gram(s) Oral once PRN  glucagon  Injectable 1 milliGRAM(s) IntraMuscular once  insulin glargine Injectable (LANTUS) 12 Unit(s) SubCutaneous at bedtime  insulin lispro (ADMELOG) corrective regimen sliding scale   SubCutaneous three times a day before meals  lisinopril 10 milliGRAM(s) Oral daily  metoprolol tartrate 50 milliGRAM(s) Oral four times a day  metoprolol tartrate Injectable 5 milliGRAM(s) IV Push every 6 hours PRN  pantoprazole  Injectable 40 milliGRAM(s) IV Push daily  trimethoprim  160 mG/sulfamethoxazole 800 mG 1 Tablet(s) Oral every 12 hours    Current Antimicrobials:  cefTRIAXone   IVPB 1000 milliGRAM(s) IV Intermittent every 24 hours  cefTRIAXone   IVPB      trimethoprim  160 mG/sulfamethoxazole 800 mG 1 Tablet(s) Oral every 12 hours    Prior/Completed Antimicrobials:  azithromycin  IVPB  cefepime   IVPB  cefTRIAXone   IVPB  doxycycline IVPB  vancomycin  IVPB.

## 2023-09-26 NOTE — PROGRESS NOTE ADULT - PROBLEM SELECTOR PLAN 1
s/p AFib RVR    - trop leak likely demand mediated in setting of hypoxic resp failure and PNA  now in SR on tele    - EP note reviewed (Pt and family refusing Amiodarone. Discussed with Dr. Bird as well as requested by family)  c/w meds as ordered  CHADSVASC2 score >3 - eliquis  ECHO - EF 40-45% w/ regional wall abnormalities   repeat TTE in SR improved LVEF   monitor on tele

## 2023-09-26 NOTE — PROGRESS NOTE ADULT - PROBLEM SELECTOR PLAN 1
s/p AFib RVR    - trop leak likely demand mediated in setting of hypoxic resp failure and PNA  now in SR on tele    - EP note reviewed (Pt and family refusing Amiodarone. Discussed with Dr. Bird as well as requested by family)  c/w meds as ordered  CHADSVASC2 score >3 - eliquis  monitor on tele s/p AFib RVR    - trop leak likely demand mediated in setting of hypoxic resp failure and PNA  now in SR on tele    - EP note reviewed (Pt and family refusing Amiodarone. Discussed with Dr. Bird as well as requested by family)  c/w meds as ordered  CHADSVASC2 score >3 - eliquis  ECHO - EF 40-45% w/ regional wall abnormalities    - pending repeat TTE in SR  monitor on tele

## 2023-09-27 ENCOUNTER — TRANSCRIPTION ENCOUNTER (OUTPATIENT)
Age: 73
End: 2023-09-27

## 2023-09-27 VITALS — OXYGEN SATURATION: 94 % | RESPIRATION RATE: 18 BRPM

## 2023-09-27 LAB
ANION GAP SERPL CALC-SCNC: 11 MMOL/L — SIGNIFICANT CHANGE UP (ref 5–17)
BUN SERPL-MCNC: 33 MG/DL — HIGH (ref 7–23)
CALCIUM SERPL-MCNC: 9.1 MG/DL — SIGNIFICANT CHANGE UP (ref 8.4–10.5)
CHLORIDE SERPL-SCNC: 104 MMOL/L — SIGNIFICANT CHANGE UP (ref 96–108)
CO2 SERPL-SCNC: 24 MMOL/L — SIGNIFICANT CHANGE UP (ref 22–31)
CREAT SERPL-MCNC: 1.46 MG/DL — HIGH (ref 0.5–1.3)
EGFR: 50 ML/MIN/1.73M2 — LOW
GLUCOSE BLDC GLUCOMTR-MCNC: 199 MG/DL — HIGH (ref 70–99)
GLUCOSE BLDC GLUCOMTR-MCNC: 273 MG/DL — HIGH (ref 70–99)
GLUCOSE SERPL-MCNC: 207 MG/DL — HIGH (ref 70–99)
HCT VFR BLD CALC: 39.7 % — SIGNIFICANT CHANGE UP (ref 39–50)
HGB BLD-MCNC: 12.7 G/DL — LOW (ref 13–17)
MAGNESIUM SERPL-MCNC: 2 MG/DL — SIGNIFICANT CHANGE UP (ref 1.6–2.6)
MCHC RBC-ENTMCNC: 28.9 PG — SIGNIFICANT CHANGE UP (ref 27–34)
MCHC RBC-ENTMCNC: 32 GM/DL — SIGNIFICANT CHANGE UP (ref 32–36)
MCV RBC AUTO: 90.4 FL — SIGNIFICANT CHANGE UP (ref 80–100)
NRBC # BLD: 0 /100 WBCS — SIGNIFICANT CHANGE UP (ref 0–0)
PHOSPHATE SERPL-MCNC: 3.2 MG/DL — SIGNIFICANT CHANGE UP (ref 2.5–4.5)
PLATELET # BLD AUTO: 321 K/UL — SIGNIFICANT CHANGE UP (ref 150–400)
POTASSIUM SERPL-MCNC: 4.3 MMOL/L — SIGNIFICANT CHANGE UP (ref 3.5–5.3)
POTASSIUM SERPL-SCNC: 4.3 MMOL/L — SIGNIFICANT CHANGE UP (ref 3.5–5.3)
RBC # BLD: 4.39 M/UL — SIGNIFICANT CHANGE UP (ref 4.2–5.8)
RBC # FLD: 12.7 % — SIGNIFICANT CHANGE UP (ref 10.3–14.5)
SODIUM SERPL-SCNC: 139 MMOL/L — SIGNIFICANT CHANGE UP (ref 135–145)
WBC # BLD: 8.98 K/UL — SIGNIFICANT CHANGE UP (ref 3.8–10.5)
WBC # FLD AUTO: 8.98 K/UL — SIGNIFICANT CHANGE UP (ref 3.8–10.5)

## 2023-09-27 RX ORDER — AMLODIPINE BESYLATE 2.5 MG/1
1 TABLET ORAL
Refills: 0 | DISCHARGE

## 2023-09-27 RX ORDER — LISINOPRIL 2.5 MG/1
1 TABLET ORAL
Qty: 30 | Refills: 0
Start: 2023-09-27 | End: 2023-10-26

## 2023-09-27 RX ORDER — INSULIN LISPRO 100/ML
4 VIAL (ML) SUBCUTANEOUS
Refills: 0 | Status: DISCONTINUED | OUTPATIENT
Start: 2023-09-27 | End: 2023-09-27

## 2023-09-27 RX ORDER — METOPROLOL TARTRATE 50 MG
2 TABLET ORAL
Refills: 0 | DISCHARGE

## 2023-09-27 RX ORDER — FUROSEMIDE 40 MG
1 TABLET ORAL
Refills: 0 | DISCHARGE

## 2023-09-27 RX ORDER — METOPROLOL TARTRATE 50 MG
1 TABLET ORAL
Qty: 120 | Refills: 0
Start: 2023-09-27 | End: 2023-10-26

## 2023-09-27 RX ADMIN — Medication 3 MILLILITER(S): at 05:17

## 2023-09-27 RX ADMIN — Medication 50 MILLIGRAM(S): at 05:17

## 2023-09-27 RX ADMIN — APIXABAN 5 MILLIGRAM(S): 2.5 TABLET, FILM COATED ORAL at 08:31

## 2023-09-27 RX ADMIN — Medication 4 UNIT(S): at 12:32

## 2023-09-27 RX ADMIN — Medication 1: at 12:31

## 2023-09-27 RX ADMIN — Medication 3: at 08:31

## 2023-09-27 RX ADMIN — LISINOPRIL 10 MILLIGRAM(S): 2.5 TABLET ORAL at 05:17

## 2023-09-27 RX ADMIN — PANTOPRAZOLE SODIUM 40 MILLIGRAM(S): 20 TABLET, DELAYED RELEASE ORAL at 12:33

## 2023-09-27 RX ADMIN — Medication 3 MILLILITER(S): at 12:32

## 2023-09-27 RX ADMIN — CLOPIDOGREL BISULFATE 75 MILLIGRAM(S): 75 TABLET, FILM COATED ORAL at 12:32

## 2023-09-27 RX ADMIN — Medication 50 MILLIGRAM(S): at 00:08

## 2023-09-27 RX ADMIN — Medication 3 MILLILITER(S): at 05:16

## 2023-09-27 RX ADMIN — Medication 1 TABLET(S): at 05:17

## 2023-09-27 RX ADMIN — CHLORHEXIDINE GLUCONATE 1 APPLICATION(S): 213 SOLUTION TOPICAL at 05:17

## 2023-09-27 RX ADMIN — Medication 50 MILLIGRAM(S): at 12:32

## 2023-09-27 NOTE — PROGRESS NOTE ADULT - ASSESSMENT
IMPRESSION: 72M w/ HTN, DM2, BPH, CVA, and HFpEF, 9/20/23 p/w multifocal pneumonia    (1)CKD - stage 3, with nephrotic-range proteinuria - likely due to diabetic nephropathy  (2)BEAR - prerenal, resolving  (3)CV - mildly hypertensive - warranting reinitiation of ACEI/ARB  (4)ID - pneumonia - on IV Rocephin/PO Bactrim    RECOMMEND:  (1)Continue Lisinopril 10mg po qd  (2)Dose new meds for GFR 40-50ml/min (present dosing is acceptable)    Marisol Leonard, MICHELLE  [facility]  [phone]

## 2023-09-27 NOTE — PROGRESS NOTE ADULT - SUBJECTIVE AND OBJECTIVE BOX
Follow-up Pulm Progress Note    feeling better overall  cough more while lying flat  denies SOB/CP    Medications:  MEDICATIONS  (STANDING):  albuterol/ipratropium for Nebulization 3 milliLiter(s) Nebulizer every 6 hours  apixaban 5 milliGRAM(s) Oral every 12 hours  atorvastatin 80 milliGRAM(s) Oral at bedtime  chlorhexidine 2% Cloths 1 Application(s) Topical <User Schedule>  clopidogrel Tablet 75 milliGRAM(s) Oral daily  dextrose 5%. 1000 milliLiter(s) (50 mL/Hr) IV Continuous <Continuous>  dextrose 5%. 1000 milliLiter(s) (100 mL/Hr) IV Continuous <Continuous>  dextrose 50% Injectable 12.5 Gram(s) IV Push once  dextrose 50% Injectable 25 Gram(s) IV Push once  dextrose 50% Injectable 25 Gram(s) IV Push once  glucagon  Injectable 1 milliGRAM(s) IntraMuscular once  insulin glargine Injectable (LANTUS) 15 Unit(s) SubCutaneous at bedtime  insulin lispro (ADMELOG) corrective regimen sliding scale   SubCutaneous three times a day before meals  lisinopril 10 milliGRAM(s) Oral daily  metoprolol tartrate 50 milliGRAM(s) Oral four times a day  pantoprazole  Injectable 40 milliGRAM(s) IV Push daily  trimethoprim  160 mG/sulfamethoxazole 800 mG 1 Tablet(s) Oral every 12 hours    MEDICATIONS  (PRN):  dextrose Oral Gel 15 Gram(s) Oral once PRN Blood Glucose LESS THAN 70 milliGRAM(s)/deciliter  metoprolol tartrate Injectable 5 milliGRAM(s) IV Push every 6 hours PRN HR > 110          Vital Signs Last 24 Hrs  T(C): 36.5 (27 Sep 2023 08:00), Max: 36.7 (26 Sep 2023 20:32)  T(F): 97.7 (27 Sep 2023 08:00), Max: 98.1 (27 Sep 2023 00:02)  HR: 70 (27 Sep 2023 08:00) (70 - 77)  BP: 176/79 (27 Sep 2023 08:00) (168/82 - 177/79)  BP(mean): --  RR: 18 (27 Sep 2023 08:00) (18 - 18)  SpO2: 95% (27 Sep 2023 08:00) (94% - 97%)    Parameters below as of 27 Sep 2023 08:00  Patient On (Oxygen Delivery Method): nasal cannula  O2 Flow (L/min): 1            09-26 @ 07:01  -  09-27 @ 07:00  --------------------------------------------------------  IN: 960 mL / OUT: 1250 mL / NET: -290 mL          LABS:                        12.7   8.98  )-----------( 321      ( 27 Sep 2023 06:14 )             39.7     09-27    139  |  104  |  33<H>  ----------------------------<  207<H>  4.3   |  24  |  1.46<H>    Ca    9.1      27 Sep 2023 06:14  Phos  3.2     09-27  Mg     2.0     09-27            CAPILLARY BLOOD GLUCOSE      POCT Blood Glucose.: 273 mg/dL (27 Sep 2023 07:46)      Urinalysis Basic - ( 27 Sep 2023 06:14 )    Color: x / Appearance: x / SG: x / pH: x  Gluc: 207 mg/dL / Ketone: x  / Bili: x / Urobili: x   Blood: x / Protein: x / Nitrite: x   Leuk Esterase: x / RBC: x / WBC x   Sq Epi: x / Non Sq Epi: x / Bacteria: x                      CULTURES:     Culture - Blood (collected 09-21-23 @ 14:31)  Source: .Blood Blood  Final Report (09-26-23 @ 17:00):    No growth at 5 days    Culture - Blood (collected 09-21-23 @ 14:31)  Source: .Blood Blood  Final Report (09-26-23 @ 17:00):    No growth at 5 days    Culture - Blood (collected 09-20-23 @ 13:15)  Source: .Blood Blood-Peripheral  Final Report (09-25-23 @ 18:01):    No growth at 5 days    Culture - Blood (collected 09-20-23 @ 13:05)  Source: .Blood Blood-Peripheral  Final Report (09-25-23 @ 18:01):    No growth at 5 days        Culture - Urine (collected 09-20-23 @ 15:16)  Source: Clean Catch Clean Catch (Midstream)  Final Report (09-21-23 @ 16:21):    No growth                Physical Examination:  PULM: minimal scattered rhonchi   CVS: S1, S2 heard    RADIOLOGY REVIEWED    CT chest:    < from: CT Angio Chest PE Protocol w/ IV Cont (09.20.23 @ 15:15) >  FINDINGS:    PULMONARY VESSELS: No pulmonary embolus. Main pulmonary artery normal in   diameter.    HEART AND VASCULATURE: Heart size is normal. No pericardial effusion. No   aortic aneurysm or dissection. Mild coronary and aortic calcifications.    LUNGS, AIRWAYS, PLEURA: Patent central airways. Consolidations and   nodular opacities within bilateral lower lobes, right middle lobe and   lingula representing multifocal pneumonia. No pleural effusions or   pneumothorax.    MEDIASTINUM: Mildly enlarged subcarinal lymph node measuring 1.5 cm short   axis.    UPPER ABDOMEN: Within normal limits.    BONES AND SOFT TISSUES: No aggressive osseous lesion.    LOWER NECK: Within normal limits.    IMPRESSION:    No pulmonary embolus.    Consolidations and nodular opacities within bilateral lower lobes, right   middle lobe and lingula representing multifocal pneumonia. Recommend   follow-up chest CT in 1-3 months to determine resolution.    < end of copied text >

## 2023-09-27 NOTE — PROGRESS NOTE ADULT - PROBLEM SELECTOR PROBLEM 3
BEAR (acute kidney injury)
Acute respiratory failure with hypoxia and hypercapnia
BEAR (acute kidney injury)
Acute respiratory failure with hypoxia and hypercapnia
BEAR (acute kidney injury)
BEAR (acute kidney injury)
Acute respiratory failure with hypoxia and hypercapnia

## 2023-09-27 NOTE — PROGRESS NOTE ADULT - SUBJECTIVE AND OBJECTIVE BOX
[facility]  CINTIA Razo Y. Patel, S. Shah, G. Michael  [phone]  ([phone] - weekdays after 5pm and weekends)    Name: FRANKO SPANN  Age/Gender: 72y Male  MRN: [id number]    Interval History:  Patient seen and examined this morning.   States he feels better, hopes to go home.  Denies fever, chills, dyspnea or any pain.   Notes reviewed  No concerning overnight events  Afebrile   Allergies: No Known Allergies      Objective:  Vitals:   T(F): 97.7 (09-27-23 @ 08:00), Max: 98.1 (09-27-23 @ 00:02)  HR: 70 (09-27-23 @ 08:00) (70 - 77)  BP: 176/79 (09-27-23 @ 08:00) (168/82 - 177/79)  RR: 18 (09-27-23 @ 08:00) (18 - 18)  SpO2: 95% (09-27-23 @ 08:00) (94% - 97%)  Physical Examination:  General: no acute distress, NC 1L, nontoxic appearing   HEENT: NC/AT, anicteric, neck supple  Respiratory: no acc muscle use, breathing comfortably  Cardiovascular: S1 and S2 present  Gastrointestinal: normal appearing, nondistended  Extremities: +edema, no cyanosis  Skin: no visible rash    Laboratory Studies:  CBC:                       12.7   8.98  )-----------( 321      ( 27 Sep 2023 06:14 )             39.7     WBC Trend:  8.98 09-27-23 @ 06:14  8.42 09-26-23 @ 07:08  8.53 09-25-23 @ 06:55  8.24 09-24-23 @ 06:21  15.70 09-23-23 @ 06:12  17.63 09-22-23 @ 13:24  16.31 09-22-23 @ 06:43  15.05 09-21-23 @ 07:02  13.26 09-20-23 @ 13:22    CMP: 09-27    139  |  104  |  33<H>  ----------------------------<  207<H>  4.3   |  24  |  1.46<H>    Ca    9.1      27 Sep 2023 06:14  Phos  3.2     09-27  Mg     2.0     09-27      Creatinine: 1.46 mg/dL (09-27-23 @ 06:14)  Creatinine: 1.50 mg/dL (09-25-23 @ 06:49)  Creatinine: 1.83 mg/dL (09-24-23 @ 06:21)  Creatinine: 2.14 mg/dL (09-23-23 @ 06:12)  Creatinine: 2.20 mg/dL (09-22-23 @ 06:43)  Creatinine: 1.98 mg/dL (09-21-23 @ 07:02)  Creatinine: 1.82 mg/dL (09-20-23 @ 16:57)  Creatinine: 1.90 mg/dL (09-20-23 @ 13:22)    Microbiology: reviewed   Culture - Sputum (collected 09-22-23 @ 10:30)  Source: .Sputum Sputum  Gram Stain (09-22-23 @ 23:04):    Rare polymorphonuclear leukocytes per low power field    Moderate Squamous epithelial cells per low power field    Moderate Gram Positive Rods per oil power field    Few Gram Negative Rods per oil power field    Few Gram Positive Cocci in Pairs and Chains per oil power field  Final Report (09-24-23 @ 21:22):    Moderate Stenotrophomonas maltophilia    Normal Respiratory Erica present  Organism: Stenotrophomonas maltophilia (09-24-23 @ 21:22)  Organism: Stenotrophomonas maltophilia (09-24-23 @ 21:22)      -  Minocycline: S      Method Type: KB  Organism: Stenotrophomonas maltophilia (09-24-23 @ 21:22)      -  Levofloxacin: S <=0.5      Method Type: DONNA      -  Trimethoprim/Sulfamethoxazole: S <=0.5/9.5    Culture - Blood (collected 09-21-23 @ 14:31)  Source: .Blood Blood  Final Report (09-26-23 @ 17:00):    No growth at 5 days    Culture - Blood (collected 09-21-23 @ 14:31)  Source: .Blood Blood  Final Report (09-26-23 @ 17:00):    No growth at 5 days    Culture - Urine (collected 09-20-23 @ 15:16)  Source: Clean Catch Clean Catch (Midstream)  Final Report (09-21-23 @ 16:21):    No growth    Culture - Blood (collected 09-20-23 @ 13:15)  Source: .Blood Blood-Peripheral  Final Report (09-25-23 @ 18:01):    No growth at 5 days    Culture - Blood (collected 09-20-23 @ 13:05)  Source: .Blood Blood-Peripheral  Final Report (09-25-23 @ 18:01):    No growth at 5 days    Radiology: reviewed     Medications:  albuterol/ipratropium for Nebulization 3 milliLiter(s) Nebulizer every 6 hours  apixaban 5 milliGRAM(s) Oral every 12 hours  atorvastatin 80 milliGRAM(s) Oral at bedtime  chlorhexidine 2% Cloths 1 Application(s) Topical <User Schedule>  clopidogrel Tablet 75 milliGRAM(s) Oral daily  dextrose 5%. 1000 milliLiter(s) IV Continuous <Continuous>  dextrose 5%. 1000 milliLiter(s) IV Continuous <Continuous>  dextrose 50% Injectable 25 Gram(s) IV Push once  dextrose 50% Injectable 25 Gram(s) IV Push once  dextrose 50% Injectable 12.5 Gram(s) IV Push once  dextrose Oral Gel 15 Gram(s) Oral once PRN  glucagon  Injectable 1 milliGRAM(s) IntraMuscular once  insulin glargine Injectable (LANTUS) 15 Unit(s) SubCutaneous at bedtime  insulin lispro (ADMELOG) corrective regimen sliding scale   SubCutaneous three times a day before meals  lisinopril 10 milliGRAM(s) Oral daily  metoprolol tartrate 50 milliGRAM(s) Oral four times a day  metoprolol tartrate Injectable 5 milliGRAM(s) IV Push every 6 hours PRN  pantoprazole  Injectable 40 milliGRAM(s) IV Push daily  trimethoprim  160 mG/sulfamethoxazole 800 mG 1 Tablet(s) Oral every 12 hours    Current Antimicrobials:  trimethoprim  160 mG/sulfamethoxazole 800 mG 1 Tablet(s) Oral every 12 hours    Prior/Completed Antimicrobials:  azithromycin  IVPB  cefepime   IVPB  cefTRIAXone   IVPB  cefTRIAXone   IVPB  cefTRIAXone   IVPB  doxycycline IVPB  vancomycin  IVPB.

## 2023-09-27 NOTE — PROGRESS NOTE ADULT - PROBLEM SELECTOR PROBLEM 1
Multifocal pneumonia
DM (diabetes mellitus)
Multifocal pneumonia
DM (diabetes mellitus)
DM (diabetes mellitus)
Paroxysmal atrial fibrillation
Multifocal pneumonia
DM (diabetes mellitus)
Multifocal pneumonia
Paroxysmal atrial fibrillation
Multifocal pneumonia
Paroxysmal atrial fibrillation

## 2023-09-27 NOTE — PROGRESS NOTE ADULT - ASSESSMENT
72F Hx HTN, T2DM, CVAs x 2 (2017 and 2021), HFrEF 59%, BPH p/w ED Generalized Weakness, SOB and WOB started on AFMO2 escalated to BiPAP Support with CXR c/w Lt Retrocardiac Infiltrates R/O Aspiration.   Assessment  DMT2: 72y Male with DM T2 with hyperglycemia, A1C 9.1% (2022) rpt 7.3%, was on oral meds and Mounjaro at home,  now on basal bolus insulin with coverage,  blood sugars are fluctuating at times.  Shortness of breath: on medications, BIPAP support, monitored.  HTN: on antihypertensive medications, monitored, asymptomatic.  BEAR/CKD: Monitor labs/BMP, renal fu.         Discussed plan and management with Dr Clarisse Oviedo NP - TEAMS  Yanni Robb MD  Cell: [phone]  Office: [phone]            72F Hx HTN, T2DM, CVAs x 2 (2017 and 2021), HFrEF 59%, BPH p/w ED Generalized Weakness, SOB and WOB started on AFMO2 escalated to BiPAP Support with CXR c/w Lt Retrocardiac Infiltrates R/O Aspiration.   Assessment  DMT2: 72y Male with DM T2 with hyperglycemia, A1C 9.1% (2022) rpt 7.3%, was on oral meds and Mounjaro at home,  now on  basal bolus insulin with coverage,  blood sugars are fluctuating at times.  Shortness of breath: on medications, BIPAP support, monitored.  HTN: on antihypertensive medications, monitored, asymptomatic.  BEAR/CKD: Monitor labs/BMP, renal fu.         Discussed plan and management with Dr Clarisse Oviedo NP - TEAMS  Yanni Robb MD  Cell: [phone]  Office: [phone]

## 2023-09-27 NOTE — PROGRESS NOTE ADULT - PROBLEM SELECTOR PLAN 4
afib with RVR  -Rate control per cards  -AC with Eliquis
BEAR on CKD Stage 3  IVF as per nephro  nephro following
BEAR on CKD Stage 3  improving   nephro following
afib with RVR  -Rate control per cards  -AC with Eliquis
afib with RVR  -Rate control per cards  -AC with Eliquis
afib with RVR  -Rate control per cards  -AC with Heparin gtt
afib with RVR  -Rate control per cards  -AC with Heparin gtt
BEAR on CKD Stage 3  IVF as per nephro  nephro following
BEAR on CKD Stage 3  improving   nephro following

## 2023-09-27 NOTE — DISCHARGE NOTE PROVIDER - HOSPITAL COURSE
72F Hx HTN, T2DM, CVAs x 2 (2017 and 2021), HFrEF 59%, BPH p/w ED Generalized Weakness, SOB and WOB started on AFMO2 escalated to BiPAP Support with CXR c/w Lt Retrocardiac Infiltrates R/O Aspiration.   Pt was admitted with cute Hypoxic Hypercarbic Respiratory Failure on BiPAP Support  2/2 Multifocal PNA 2/2 Aspiration, presumed, seen by MICU on admission, not a candidate. Pt completed course of CTX/azithro. Pt was weaned onto high flow from bipap and then to nasal cannula. Pt does not need home o2.  9/22 pt overnight developed Afib w RVR, he was given IV DIgoxin, Norvasc DCed, started Cardizem qid, toprol changed to Loprssor 50 qid. these changes made since pills need to be crushed and given w food. Ptn was initially place on Heparin drip since he was NPO, once cleared by speech for a pureed diet: placed on Eliquis. all meds to be crushed and given w food. There was a concern ptn may have had an embolic stroke 2/2 PAF. concern for a brainstem CVA w airway compromise. MR showed No evidence of acute infarct, hemorrhage or mass effect and pt will follow up outpt with neuro. Pt to go home on bactrim through 10/1 for positive stenotrophonas sputum culture.  Patient is medically cleared for discharge. Medication reconciliation reviewed with Dr. Bettencourt who has medically cleared patient for discharge with follow-up as advised. Patient is currently stable for discharge to home with home care at this time.

## 2023-09-27 NOTE — DISCHARGE NOTE PROVIDER - NSDCCPCAREPLAN_GEN_ALL_CORE_FT
PRINCIPAL DISCHARGE DIAGNOSIS  Diagnosis: Multifocal pneumonia  Assessment and Plan of Treatment: You were admitted for pneumonia. You were placed on bipap to help with your breathing and over time we weaned you down to not require any oxygen. Please continue your bactrim antibiotic through 10/1.      SECONDARY DISCHARGE DIAGNOSES  Diagnosis: Acute respiratory failure with hypoxia and hypercapnia  Assessment and Plan of Treatment: As above you had pneumonia and admitted due to difficulty breathing    Diagnosis: BEAR (acute kidney injury)  Assessment and Plan of Treatment: You had an acute rise in your creatinine which we treated with IV fluids and has improved now.    Diagnosis: Atrial fibrillation  Assessment and Plan of Treatment: during your admission you had afib with a fast ventricular heart rate. Please continue all your medications as directed. Please crush all medications and take with food.    Diagnosis: Severe sepsis with lactic acidosis  Assessment and Plan of Treatment: You were septic due to pneumonia  Take all antibiotics as ordered.  Call you Health care provider upon arrival home to make a one week follow up appointment.  If you develop fever, chills, malaise, or change in mental status call your Health Care Provider or go to the Emergency Department.  Nutrition is important, eat small frequent meals to help ensure you get adequate calories.  Do not stay in bed all day!  Increase your activity daily as tolerated.

## 2023-09-27 NOTE — PROGRESS NOTE ADULT - PROBLEM SELECTOR PLAN 1
s/p AFib RVR    - trop leak likely demand mediated in setting of hypoxic resp failure and PNA  now in SR     - EP note reviewed (Pt and family refusing Amiodarone. Discussed with Dr. Bird as well as requested by family)  c/w meds as ordered  CHADSVASC2 score >3 - eliquis  ECHO - EF 40-45% w/ regional wall abnormalities   repeat TTE in SR improved LVEF   monitor on tele

## 2023-09-27 NOTE — DISCHARGE NOTE NURSING/CASE MANAGEMENT/SOCIAL WORK - PATIENT PORTAL LINK FT
You can access the FollowMyHealth Patient Portal offered by [facility] by registering at the following website: [email or web address]. By joining [facility]’s FollowMyHealth portal, you will also be able to view your health information using other applications (apps) compatible with our system.

## 2023-09-27 NOTE — PROGRESS NOTE ADULT - NS ATTEND AMEND GEN_ALL_CORE FT
Patient converted to NSR  would load with amiodarone to try to maintain and continue AC  would DC Dig/dilt
Chart, labs, vitals, radiology reviewed. Above H&P reviewed and edited where appropriate. Agree with history and physical exam. Agree with assessment and plan. I reviewed the overnight course of events and discussed the care with the patient/ family.  All the decisions in assessment and plan are solely made by me..
Chart, labs, vitals, radiology reviewed. Above H&P reviewed and edited where appropriate. Agree with history and physical exam. Agree with assessment and plan. I reviewed the overnight course of events and discussed the care with the patient/ family.  All the decisions in assessment and plan are solely made by me..
Chart, labs, vitals, radiology reviewed. Above H&P reviewed and edited where appropriate. Agree with history and physical exam. Agree with assessment and plan. I reviewed the overnight course of events and discussed the care with the patient/ family.  All the decisions in assessment and plan are solely made by me
Lower o2 to 1LNC. Trial pt on RA this afternoon. Keep sats >90% with o2 PRN  dw pt and son
Continue bipap 12/5/50% qHS   HFNC lowered to 45L/50%  Wean FiO2 as tolerated and keep sats >90%.
D/c planning. Hypoxia continues to improve. Check o2 sats on RA at rest and with ambulation. Keep sats >88% with o2 PRN.  bailey pt and son
pt improving  dc bipap  HFNC lowered to 40L/40%. If o2 sats remain mid/high 90s trial of 6l nc  keep sat>90%  continue abx  oob to chair
Chart, labs, vitals, radiology reviewed. Above H&P reviewed and edited where appropriate. Agree with history and physical exam. Agree with assessment and plan. I reviewed the overnight course of events and discussed the care with the patient/ family.  All the decisions in assessment and plan are solely made by me.
Chart, labs, vitals, radiology reviewed. Above H&P reviewed and edited where appropriate. Agree with history and physical exam. Agree with assessment and plan. I reviewed the overnight course of events and discussed the care with the patient/ family.  All the decisions in assessment and plan are solely made by me.

## 2023-09-27 NOTE — DISCHARGE NOTE PROVIDER - NSDCFUADDAPPT_GEN_ALL_CORE_FT
APPTS ARE READY TO BE MADE: [x] YES    Best Family or Patient Contact (if needed):    Additional Information about above appointments (if needed):    1:   2:   3:     Other comments or requests:    APPTS ARE READY TO BE MADE: [x] YES    Best Family or Patient Contact (if needed):    Additional Information about above appointments (if needed):    1:   2:   3:     Other comments or requests:   Patient/Caregiver was provided with follow up request details and prefers to call the providers office on their own to schedule.

## 2023-09-27 NOTE — PROGRESS NOTE ADULT - PROBLEM SELECTOR PLAN 1
CTA chest with multifocal PNA  -Urine legionella negative   -Continue Duoneb q6h   -Continue Mucomyst 20% 3cc q6h (give with Duoneb)  -Chest PT q6h  -MRSA/MSSA nasal PCR negative   -S/p Ceftriaxone   -Sputum culture with Stenotrophomonas. Bactrim per ID.   -ID f/u

## 2023-09-27 NOTE — PROGRESS NOTE ADULT - PROBLEM SELECTOR PROBLEM 4
Atrial fibrillation
Atrial fibrillation
BEAR (acute kidney injury)
Atrial fibrillation
Atrial fibrillation
BEAR (acute kidney injury)
Atrial fibrillation
BEAR (acute kidney injury)
BEAR (acute kidney injury)

## 2023-09-27 NOTE — PROGRESS NOTE ADULT - SUBJECTIVE AND OBJECTIVE BOX
Chief complaint  Patient is a 72y old  Male who presents with a chief complaint of sepsis (27 Sep 2023 14:26)         Labs and Fingersticks  CAPILLARY BLOOD GLUCOSE      POCT Blood Glucose.: 199 mg/dL (27 Sep 2023 11:39)  POCT Blood Glucose.: 273 mg/dL (27 Sep 2023 07:46)  POCT Blood Glucose.: 212 mg/dL (26 Sep 2023 22:06)  POCT Blood Glucose.: 242 mg/dL (26 Sep 2023 17:21)      Anion Gap: 11 (09-27 @ 06:14)      Calcium: 9.1 (09-27 @ 06:14)          09-27    139  |  104  |  33<H>  ----------------------------<  207<H>  4.3   |  24  |  1.46<H>    Ca    9.1      27 Sep 2023 06:14  Phos  3.2     09-27  Mg     2.0     09-27                          12.7   8.98  )-----------( 321      ( 27 Sep 2023 06:14 )             39.7     Medications  MEDICATIONS  (STANDING):  albuterol/ipratropium for Nebulization 3 milliLiter(s) Nebulizer every 6 hours  apixaban 5 milliGRAM(s) Oral every 12 hours  atorvastatin 80 milliGRAM(s) Oral at bedtime  chlorhexidine 2% Cloths 1 Application(s) Topical <User Schedule>  clopidogrel Tablet 75 milliGRAM(s) Oral daily  dextrose 5%. 1000 milliLiter(s) (100 mL/Hr) IV Continuous <Continuous>  dextrose 5%. 1000 milliLiter(s) (50 mL/Hr) IV Continuous <Continuous>  dextrose 50% Injectable 12.5 Gram(s) IV Push once  dextrose 50% Injectable 25 Gram(s) IV Push once  dextrose 50% Injectable 25 Gram(s) IV Push once  glucagon  Injectable 1 milliGRAM(s) IntraMuscular once  insulin glargine Injectable (LANTUS) 15 Unit(s) SubCutaneous at bedtime  insulin lispro (ADMELOG) corrective regimen sliding scale   SubCutaneous three times a day before meals  insulin lispro Injectable (ADMELOG) 4 Unit(s) SubCutaneous three times a day before meals  lisinopril 10 milliGRAM(s) Oral daily  metoprolol tartrate 50 milliGRAM(s) Oral four times a day  pantoprazole  Injectable 40 milliGRAM(s) IV Push daily  trimethoprim  160 mG/sulfamethoxazole 800 mG 1 Tablet(s) Oral every 12 hours      Physical Exam  General: Patient comfortable in bed  Vital Signs Last 12 Hrs  T(F): 97.9 (09-27-23 @ 11:24), Max: 97.9 (09-27-23 @ 11:24)  HR: 71 (09-27-23 @ 11:24) (70 - 75)  BP: 139/74 (09-27-23 @ 11:24) (139/74 - 176/79)  BP(mean): --  RR: 18 (09-27-23 @ 13:11) (18 - 19)  SpO2: 94% (09-27-23 @ 13:11) (90% - 96%)    CVS: S1S2   Respiratory: No wheezing, no crepitations  GI: Abdomen soft, bowel sounds positive  Musculoskeletal:  moves all extremities  : Voiding        Chief complaint  Patient is a 72y old  Male who presents with a chief complaint of sepsis (27 Sep 2023 14:26)         Labs and Fingersticks  CAPILLARY BLOOD GLUCOSE      POCT Blood Glucose.: 199 mg/dL (27 Sep 2023 11:39)  POCT Blood Glucose.: 273 mg/dL (27 Sep 2023 07:46)  POCT Blood Glucose.: 212 mg/dL (26 Sep 2023 22:06)  POCT Blood Glucose.: 242 mg/dL (26 Sep 2023 17:21)      Anion Gap: 11 (09-27 @ 06:14)      Calcium: 9.1 (09-27 @ 06:14)          09-27    139  |  104  |  33<H>  ----------------------------<  207<H>  4.3   |  24  |  1.46<H>    Ca    9.1      27 Sep 2023 06:14  Phos  3.2     09-27  Mg     2.0     09-27                          12.7   8.98  )-----------( 321      ( 27 Sep 2023 06:14 )             39.7     Medications  MEDICATIONS  (STANDING):  albuterol/ipratropium for Nebulization 3 milliLiter(s) Nebulizer every 6 hours  apixaban 5 milliGRAM(s) Oral every 12 hours  atorvastatin 80 milliGRAM(s) Oral at bedtime  chlorhexidine 2% Cloths 1 Application(s) Topical <User Schedule>  clopidogrel Tablet 75 milliGRAM(s) Oral daily  dextrose 5%. 1000 milliLiter(s) (100 mL/Hr) IV Continuous <Continuous>  dextrose 5%. 1000 milliLiter(s) (50 mL/Hr) IV Continuous <Continuous>  dextrose 50% Injectable 12.5 Gram(s) IV Push once  dextrose 50% Injectable 25 Gram(s) IV Push once  dextrose 50% Injectable 25 Gram(s) IV Push once  glucagon  Injectable 1 milliGRAM(s) IntraMuscular once  insulin glargine Injectable (LANTUS) 15 Unit(s) SubCutaneous at bedtime  insulin lispro (ADMELOG) corrective regimen sliding scale   SubCutaneous three times a day before meals  insulin lispro Injectable (ADMELOG) 4 Unit(s) SubCutaneous three times a day before meals  lisinopril 10 milliGRAM(s) Oral daily  metoprolol tartrate 50 milliGRAM(s) Oral four times a day  pantoprazole  Injectable 40 milliGRAM(s) IV Push daily  trimethoprim  160 mG/sulfamethoxazole 800 mG 1 Tablet(s) Oral every 12 hours      Physical Exam  General: Patient comfortable in bed  Vital Signs Last 12 Hrs  T(F): 97.9 (09-27-23 @ 11:24), Max: 97.9 (09-27-23 @ 11:24)  HR: 71 (09-27-23 @ 11:24) (70 - 75)  BP: 139/74 (09-27-23 @ 11:24) (139/74 - 176/79)  BP(mean): --  RR: 18 (09-27-23 @ 13:11) (18 - 19)  SpO2: 94% (09-27-23 @ 13:11) (90% - 96%)    CVS: S1S2   Respiratory: No wheezing, no crepitations  GI: Abdomen soft, bowel sounds positive  Musculoskeletal:  moves all extremities  : Voiding

## 2023-09-27 NOTE — DISCHARGE NOTE PROVIDER - NSDCMRMEDTOKEN_GEN_ALL_CORE_FT
apixaban 5 mg oral tablet: 1 tab(s) orally every 12 hours  atorvastatin 80 mg oral tablet: 1 tab(s) orally once a day  cholecalciferol 25 mcg (1000 intl units) oral tablet: 2 tab(s) orally once a day  clopidogrel 75 mg oral tablet: 1 tab(s) orally once a day  fosinopril 40 mg oral tablet: 1 tab(s) orally once a day  glipiZIDE 10 mg oral tablet, extended release: 1 tab(s) orally once a day  Jardiance 25 mg oral tablet: 1 tab(s) orally once a day  lisinopril 10 mg oral tablet: 1 tab(s) orally once a day  metFORMIN 1000 mg oral tablet: 1 tab(s) orally 2 times a day  metoprolol tartrate 50 mg oral tablet: 1 tab(s) orally 4 times a day  Mounjaro 10 mg/0.5 mL subcutaneous solution: 10 milligram(s) subcutaneously once a week on mondays  omeprazole 20 mg oral delayed release capsule: 1 cap(s) orally once a day  sulfamethoxazole-trimethoprim 800 mg-160 mg oral tablet: 1 tab(s) orally every 12 hours Last dose 10/1 evening   apixaban 5 mg oral tablet: 1 tab(s) orally every 12 hours  atorvastatin 80 mg oral tablet: 1 tab(s) orally once a day  cholecalciferol 25 mcg (1000 intl units) oral tablet: 2 tab(s) orally once a day  clopidogrel 75 mg oral tablet: 1 tab(s) orally once a day  fosinopril 40 mg oral tablet: 1 tab(s) orally once a day  glipiZIDE 10 mg oral tablet, extended release: 1 tab(s) orally once a day  Jardiance 25 mg oral tablet: 1 tab(s) orally once a day  metFORMIN 1000 mg oral tablet: 1 tab(s) orally 2 times a day  metoprolol tartrate 50 mg oral tablet: 1 tab(s) orally 4 times a day  Mounjaro 10 mg/0.5 mL subcutaneous solution: 10 milligram(s) subcutaneously once a week on mondays  omeprazole 20 mg oral delayed release capsule: 1 cap(s) orally once a day  sulfamethoxazole-trimethoprim 800 mg-160 mg oral tablet: 1 tab(s) orally every 12 hours Last dose 10/1 evening

## 2023-09-27 NOTE — PROGRESS NOTE ADULT - NS ATTEND [facility] GEN_ALL_CORE

## 2023-09-27 NOTE — PROGRESS NOTE ADULT - PROBLEM SELECTOR PROBLEM 2
Acute respiratory failure with hypoxia and hypercapnia
Multifocal pneumonia
Acute respiratory failure with hypoxia and hypercapnia
Multifocal pneumonia
Acute respiratory failure with hypoxia and hypercapnia
Multifocal pneumonia
Acute respiratory failure with hypoxia and hypercapnia
Acute respiratory failure with hypoxia and hypercapnia
Multifocal pneumonia

## 2023-09-27 NOTE — PROGRESS NOTE ADULT - SUBJECTIVE AND OBJECTIVE BOX
Subjective: Patient seen and examined. No new events except as noted.   maintaining SR        REVIEW OF SYSTEMS:    CONSTITUTIONAL: +weakness, fevers or chills  EYES/ENT: No visual changes;  No vertigo or throat pain   NECK: No pain or stiffness  RESPIRATORY: No cough, wheezing, hemoptysis; No shortness of breath  CARDIOVASCULAR: No chest pain or palpitations  GASTROINTESTINAL: No abdominal or epigastric pain. No nausea, vomiting, or hematemesis; No diarrhea or constipation. No melena or hematochezia.  GENITOURINARY: No dysuria, frequency or hematuria  NEUROLOGICAL: No numbness or weakness  SKIN: No itching, burning, rashes, or lesions   All other review of systems is negative unless indicated above.    MEDICATIONS:  MEDICATIONS  (STANDING):  albuterol/ipratropium for Nebulization 3 milliLiter(s) Nebulizer every 6 hours  apixaban 5 milliGRAM(s) Oral every 12 hours  atorvastatin 80 milliGRAM(s) Oral at bedtime  chlorhexidine 2% Cloths 1 Application(s) Topical <User Schedule>  clopidogrel Tablet 75 milliGRAM(s) Oral daily  dextrose 5%. 1000 milliLiter(s) (50 mL/Hr) IV Continuous <Continuous>  dextrose 5%. 1000 milliLiter(s) (100 mL/Hr) IV Continuous <Continuous>  dextrose 50% Injectable 12.5 Gram(s) IV Push once  dextrose 50% Injectable 25 Gram(s) IV Push once  dextrose 50% Injectable 25 Gram(s) IV Push once  glucagon  Injectable 1 milliGRAM(s) IntraMuscular once  insulin glargine Injectable (LANTUS) 15 Unit(s) SubCutaneous at bedtime  insulin lispro (ADMELOG) corrective regimen sliding scale   SubCutaneous three times a day before meals  lisinopril 10 milliGRAM(s) Oral daily  metoprolol tartrate 50 milliGRAM(s) Oral four times a day  pantoprazole  Injectable 40 milliGRAM(s) IV Push daily  trimethoprim  160 mG/sulfamethoxazole 800 mG 1 Tablet(s) Oral every 12 hours      PHYSICAL EXAM:  T(C): 36.5 (09-27-23 @ 08:00), Max: 36.7 (09-26-23 @ 20:32)  HR: 70 (09-27-23 @ 08:00) (70 - 77)  BP: 176/79 (09-27-23 @ 08:00) (168/82 - 177/79)  RR: 18 (09-27-23 @ 08:00) (18 - 18)  SpO2: 95% (09-27-23 @ 08:00) (94% - 97%)  Wt(kg): --  I&O's Summary    26 Sep 2023 07:01  -  27 Sep 2023 07:00  --------------------------------------------------------  IN: 960 mL / OUT: 1250 mL / NET: -290 mL          Appearance: NAD  HEENT:   Normal oral mucosa, PERRL, EOMI	  Lymphatic: No lymphadenopathy , no edema  Cardiovascular: Normal S1 S2, No JVD, No murmurs , Peripheral pulses palpable 2+ bilaterally  Respiratory: Decreased bs   	  Gastrointestinal:  Soft, Non-tender, + BS	  Skin: No rashes, No ecchymoses, No cyanosis, warm to touch  Musculoskeletal: Normal range of motion, normal strength  Psychiatry:  Mood & affect appropriate  Ext: No edema      LABS:    CARDIAC MARKERS:                                12.7   8.98  )-----------( 321      ( 27 Sep 2023 06:14 )             39.7     09-27    139  |  104  |  33<H>  ----------------------------<  207<H>  4.3   |  24  |  1.46<H>    Ca    9.1      27 Sep 2023 06:14  Phos  3.2     09-27  Mg     2.0     09-27      proBNP:   Lipid Profile:   HgA1c:   TSH:             TELEMETRY: 	SR     ECG:  	  RADIOLOGY:   DIAGNOSTIC TESTING:  [ ] Echocardiogram:  [ ]  Catheterization:  [ ] Stress Test:    OTHER:

## 2023-09-27 NOTE — PROGRESS NOTE ADULT - PROBLEM SELECTOR PLAN 3
Monitor labs and electrolytes, on renal medications, Renal FU.
-Per renal.
-Per renal.
Monitor labs and electrolytes, on renal medications, Renal FU.
HFNC  meds/nebs as ordered  pulm following
-Per renal.
Monitor labs and electrolytes, on renal medications, Renal FU.
Monitor labs and electrolytes, on renal medications, Renal FU.
meds/nebs as ordered  pulm following
Monitor labs and electrolytes, on renal medications, Renal FU.
HFNC  meds/nebs as ordered  pulm following
-Per renal.
-Per renal.
Monitor labs and electrolytes, on renal medications, Renal FU.
meds/nebs as ordered  pulm following

## 2023-09-27 NOTE — DISCHARGE NOTE PROVIDER - CARE PROVIDER_API CALL
Marco Antonio White.  [facility]  [address] [place] [address]  Phone: [phone]  Fax: [phone]  Established Patient  Follow Up Time: 2 weeks    Nissenbaum, Michael A.  Neurology  [address] [facility], [address]  [place] [address]  Phone: [phone]  Fax: [phone]  Established Patient  Follow Up Time: 2 weeks   Marco Antonio White.  [facility]  [address] [place] [address]  Phone: [phone]  Fax: [phone]  Established Patient  Follow Up Time: 1 week    Nissenbaum, Michael A.  Neurology  [address] [facility], [address]  [place] [address]  Phone: [phone]  Fax: [phone]  Established Patient  Follow Up Time: 2 weeks

## 2023-09-27 NOTE — PHARMACOTHERAPY INTERVENTION NOTE - COMMENTS
Eliquis prescription was sent to [facility] and the cost is $149.93 due to coverage gap with patient's insurance plan. Free trial card will be applied to be $0 for the first fill.    Francisco Connolly (Jian Ming)  Pharmacist  Available on [employer] Teams  Counseled patient and his son on the new medications started here in the hospital. Informed patients the indication, directions and side effects of the medications. Medication listed below.     apixaban 5 milliGRAM(s) Oral every 12 hours  atorvastatin 80 milliGRAM(s) Oral at bedtime  clopidogrel Tablet 75 milliGRAM(s) Oral daily  lisinopril 10 milliGRAM(s) Oral daily  metoprolol tartrate 50 milliGRAM(s) Oral four times a day  pantoprazole  Injectable 40 milliGRAM(s) IV Push daily  trimethoprim  160 mG/sulfamethoxazole 800 mG 1 Tablet(s) Oral every 12 hours    Eliquis prescription was sent to [facility] and the cost is $149.93 due to coverage gap with patient's insurance plan. Free trial card will be applied to be $0 for the first fill. Will deliver the prescription from [facility] to bedside.    Otherwise, the family wants his other medications to be sent over to their home pharmacy, [facility]. Provided CareNotes information sheet to patient at bedside. Patient questions and concerns were answered and addressed. Patient demonstrated understanding.     Francisco Connolly (Jian Ming)  Pharmacist  Available on [employer] Teams  Counseled patient and his son on the new medications started here in the hospital. Informed patients the indication, directions and side effects of the medications. Medication listed below.     apixaban 5 milliGRAM(s) Oral every 12 hours  atorvastatin 80 milliGRAM(s) Oral at bedtime  clopidogrel Tablet 75 milliGRAM(s) Oral daily  lisinopril 10 milliGRAM(s) Oral daily  metoprolol tartrate 50 milliGRAM(s) Oral four times a day  pantoprazole  Injectable 40 milliGRAM(s) IV Push daily  trimethoprim  160 mG/sulfamethoxazole 800 mG 1 Tablet(s) Oral every 12 hours    Eliquis prescription was sent to [facility] pharmacy and the cost is $149.93 due to coverage gap with patient's insurance plan. Patient and son were made aware of the coverage gap. Free trial card will be applied to be $0 for the first fill. Will deliver the prescription from [facility] to bedside.    Otherwise, the family wants his other medications to be sent over to their home pharmacy, [facility]. Provided CareNotes information sheet to patient at bedside. Patient questions and concerns were answered and addressed. Patient demonstrated understanding.     Francisco Connolly (Jian Ming)  Pharmacist  Available on [employer] Teams

## 2023-09-27 NOTE — PROGRESS NOTE ADULT - ASSESSMENT
Patient is a 72 year old male with PMH of HTN, HLD, T2DM, BPH, age, prior CVA in 2017 and 2021 uncertain of residual deficits, HFpEF reportedly on 20mg lasix QD (TTE from 2022 EF 59%) presents to the ED complaining of generalized weakness today, feeling generally unwell for several days is unable to quantify how many days or specific symptomatology.  Patient reports feeling some shortness of breath today was found to be hypoxic by EMS (78% on RA), and requiring 6L O2 via NC to maintain SpO2 of 93% in ED.    AHRF due to multifocal pneumonia   Leukocytosis likely due to above   atrial fib   nell  - RVP/COVID negative   - CXR reported LLL atelectasis vs consolidation   - CTA chest with no PE; noted with consolidations and nodular opacities in b/l LLs, RML and lingula   - MRSA PCR screen negative  - Legionella and Strep pneumoniae ur ags negative   - s/p cefepime, vancomycin, doxycycline in ER > ceftriaxone and azithromycin   - s/p ceftriaxone- completed 5d course 9/25  - sputum culture noted with Stenotrophomonas maltophilia    - did overall improve on ceftriaxone course but will cover above for now   - 9/20, 9/21 Bcx negative     Recommendations  Continue on bactrim DS 1 tablet PO BID to complete 7d course on 10/1  Wean supplemental O2 as tolerated   Aspiration precautions   Continue rest of care per primary team  surveillance imaging in 8-12 weeks     Stable from ID standpoint at this time   Patient can follow up with us as outpatient post discharge     Vinicio Steve M.D.  [facility], Division of Infectious Diseases  [phone]  After 5pm on weekdays and all day on weekends - please call [phone]

## 2023-09-27 NOTE — PROGRESS NOTE ADULT - SUBJECTIVE AND OBJECTIVE BOX
[facility]      Seen earlier today [Please note that date of entry above  is the actual  DATE OF SERVICE] No new neurological symptoms reported. Remains stable neurologically.   - Today, patient is without complaints.          *****MEDICATIONS: Current medication reviewed and documented.    MEDICATIONS  (STANDING):  albuterol/ipratropium for Nebulization 3 milliLiter(s) Nebulizer every 6 hours  apixaban 5 milliGRAM(s) Oral every 12 hours  atorvastatin 80 milliGRAM(s) Oral at bedtime  chlorhexidine 2% Cloths 1 Application(s) Topical <User Schedule>  clopidogrel Tablet 75 milliGRAM(s) Oral daily  dextrose 5%. 1000 milliLiter(s) (50 mL/Hr) IV Continuous <Continuous>  dextrose 5%. 1000 milliLiter(s) (100 mL/Hr) IV Continuous <Continuous>  dextrose 50% Injectable 12.5 Gram(s) IV Push once  dextrose 50% Injectable 25 Gram(s) IV Push once  dextrose 50% Injectable 25 Gram(s) IV Push once  glucagon  Injectable 1 milliGRAM(s) IntraMuscular once  insulin glargine Injectable (LANTUS) 15 Unit(s) SubCutaneous at bedtime  insulin lispro (ADMELOG) corrective regimen sliding scale   SubCutaneous three times a day before meals  insulin lispro Injectable (ADMELOG) 4 Unit(s) SubCutaneous three times a day before meals  lisinopril 10 milliGRAM(s) Oral daily  metoprolol tartrate 50 milliGRAM(s) Oral four times a day  pantoprazole  Injectable 40 milliGRAM(s) IV Push daily  trimethoprim  160 mG/sulfamethoxazole 800 mG 1 Tablet(s) Oral every 12 hours    MEDICATIONS  (PRN):  dextrose Oral Gel 15 Gram(s) Oral once PRN Blood Glucose LESS THAN 70 milliGRAM(s)/deciliter  metoprolol tartrate Injectable 5 milliGRAM(s) IV Push every 6 hours PRN HR > 110          ***** VITAL SIGNS:   Vital Signs Last 24 Hrs      I&O's Summary    26 Sep 2023 07:01  -  27 Sep 2023 07:00  --------------------------------------------------------  IN: 960 mL / OUT: 1250 mL / NET: -290 mL    27 Sep 2023 07:01  -  27 Sep 2023 14:22  --------------------------------------------------------  IN: 480 mL / OUT: 850 mL / NET: -370 mL             *****PHYSICAL EXAM:   alert oriented x 3 attention comprehension are fair.  Able to name, repeat.   EOmi fundi not visualized   no nystagmus VFF to confrontation  Tongue is midline  Palate elevates symmetrically   Moving all 4 ext spontaneously no drift appreciated    Gait not assessed.            *****LAB AND IMAGIN.7   8.98  )-----------( 321      ( 27 Sep 2023 06:14 )             39.7                   139  |  104  |  33<H>  ----------------------------<  207<H>  4.3   |  24  |  1.46<H>    Ca    9.1      27 Sep 2023 06:14  Phos  3.2       Mg     2.0                              Urinalysis Basic - ( 27 Sep 2023 06:14 )    Color: x / Appearance: x / SG: x / pH: x  Gluc: 207 mg/dL / Ketone: x  / Bili: x / Urobili: x   Blood: x / Protein: x / Nitrite: x   Leuk Esterase: x / RBC: x / WBC x   Sq Epi: x / Non Sq Epi: x / Bacteria: x      [All pertinent recent Imaging/Reports reviewed]            *****A S S E S S M E N T   A N D   P L A N :    72-year-old male PMH of HTN, HLD, T2DM, BPH, age, prior CVA in  and  uncertain of residual deficits, HFpEF reportedly on 20mg lasix QD (TTE from  EF 59%) presents to the ED complaining of generalized weakness today, feeling generally unwell for several days is unable to quantify how many days or specific symptomatology.  Patient reports feeling some shortness of breath today was found to be hypoxic by EMS (78% on RA), and requiring 6L O2 via NC to maintain SpO2 of 93% in ED. patient also reports blood glucose at home by EMS was elevated but uncertain of the number, reports he has not taken any of his antidiabetic medication for at least a day due to feeling unwell, has not taken his daily Lasix.  Patient notes possibly some URI symptoms over the past few days.  Patient denies urinary complaints, abdominal pain, vomiting, diarrhea, melena, hematochezia.         Problem/Recommendations 1: dysphagia likely progressive   in  noted trace aspiration   in  pt had tia, mri neg presented with dysarthria facial droop      she and pt understand risk of not doing imaging   ct head r pontine  hypodensity ? stroke   mri brain failed to reveal any acute stroke     pt would  consider swallow exercises   would change to thickened liquid   only feed when wide awake   nih 4   mrs 2   continue plavix apixaban and statin ( understands increased risk with dual antithrombotic )  follow up with dr. libman   chest pt   stroke risk factor modification discussion   stroke education provided     Problem/Recommendations 2:    pneumonia   likely aspiration   pt with recent noncompliance to dm meds         Thank you for allowing me to participate in the care of this patient. Will continue to follow patient periodically. Please do not hesitate to call me if you have any  questions or if there has been a change in patients neurological status     ________________  Faye Mackenzie MD  [facility]  [phone]      33 minutes spent on total encounter; more than 50 % of the visit was  spent counseling about plan of care, compliance to diet/exercise and medication regimen and or  coordinating care by the attending physician.      It is advised that stroke patients follow up with ALEXANDRIA Eddy @ [phone] in 1- 2 weeks.   Others please follow up with Dr. Michael Nissenbaum [phone]

## 2023-09-27 NOTE — PROGRESS NOTE ADULT - PROBLEM SELECTOR PLAN 2
2nd to PNA  -CTA chest neg PE  -Initially requiring continuous bipap on admission   -AM VBG 9/22 noted, no CO2 retention  -Continue bipap 12/5/50% qHS for now  -HFNC lowered to 45L/50%. Continue to wean FiO2 as tolerated, keep sats >90%.
2nd to PNA  -CTA chest neg PE  -Initially requiring continuous bipap on admission. No longer requiring.   -AM VBG 9/22 noted, no CO2 retention  -No longer requiring HFNC  -Continue to wean o2 as tolerated, keep sats >90% (currently 3LNC)   -OOB to chair/ambulate as tolerated
2nd to PNA  -CTA chest neg PE  -Initially requiring continuous bipap on admission. No longer requiring.   -AM VBG 9/22 noted, no CO2 retention  -No longer requiring HFNC  -OOB to chair/ambulate as tolerated  -D/c planning. Hypoxia continues to improve. Check o2 sats on RA at rest and with ambulation. Keep sats >88% with o2 PRN.
2nd to PNA  -CTA chest neg PE  -Initially requiring continuous bipap on admission   -AM VBG 9/22 noted, no CO2 retention  -Bipap d/c'd. VBG ordered for AM. If CO2 retention, please re-instate bipap 12/5 qHS.   -HFNC lowered to 40L/40%. If o2 sats remain mid/high 90s this afternoon please trial pt on 6LNC.  -Wean O2 as tolerated, keep sats >90%.  -Please get pt OOB to chair (d/w RN)  -PT lila
2nd to PNA  -CTA chest neg PE  -Initially requiring continuous bipap on admission. No longer requiring.   -AM VBG 9/22 noted, no CO2 retention  -No longer requiring HFNC  -OOB to chair/ambulate as tolerated  -O2 lowered to 1LNC. Trial pt on RA this afternoon. Keep sats >90% with o2 PRN.
IV antibiotics as ordered  pulm following
Suggest to continue medications, monitoring, FU primary team recommendations.
Suggest to continue medications, monitoring, FU primary team recommendations.
IV antibiotics as ordered  pulm following
IV antibiotics as ordered  pulm following
Suggest to continue medications, monitoring, FU primary team recommendations.
Suggest to continue medications, monitoring, FU primary team recommendations.
IV antibiotics as ordered  pulm following
IV antibiotics as ordered  pulm following
Suggest to continue medications, monitoring, FU primary team recommendations.
Suggest to continue medications, monitoring, FU primary team recommendations.
IV antibiotics as ordered  pulm following

## 2023-09-27 NOTE — PROGRESS NOTE ADULT - SUBJECTIVE AND OBJECTIVE BOX
Patient is a 72y old  Male who presents with a chief complaint of sepsis (27 Sep 2023 14:22)      SUBJECTIVE / OVERNIGHT EVENTS: ptn is ambulating comfortably without O2, ready for DC home. will need outptn Pulm, Card and Neuro F/U,     MEDICATIONS  (STANDING):  albuterol/ipratropium for Nebulization 3 milliLiter(s) Nebulizer every 6 hours  apixaban 5 milliGRAM(s) Oral every 12 hours  atorvastatin 80 milliGRAM(s) Oral at bedtime  chlorhexidine 2% Cloths 1 Application(s) Topical <User Schedule>  clopidogrel Tablet 75 milliGRAM(s) Oral daily  dextrose 5%. 1000 milliLiter(s) (100 mL/Hr) IV Continuous <Continuous>  dextrose 5%. 1000 milliLiter(s) (50 mL/Hr) IV Continuous <Continuous>  dextrose 50% Injectable 12.5 Gram(s) IV Push once  dextrose 50% Injectable 25 Gram(s) IV Push once  dextrose 50% Injectable 25 Gram(s) IV Push once  glucagon  Injectable 1 milliGRAM(s) IntraMuscular once  insulin glargine Injectable (LANTUS) 15 Unit(s) SubCutaneous at bedtime  insulin lispro (ADMELOG) corrective regimen sliding scale   SubCutaneous three times a day before meals  insulin lispro Injectable (ADMELOG) 4 Unit(s) SubCutaneous three times a day before meals  lisinopril 10 milliGRAM(s) Oral daily  metoprolol tartrate 50 milliGRAM(s) Oral four times a day  pantoprazole  Injectable 40 milliGRAM(s) IV Push daily  trimethoprim  160 mG/sulfamethoxazole 800 mG 1 Tablet(s) Oral every 12 hours    MEDICATIONS  (PRN):  dextrose Oral Gel 15 Gram(s) Oral once PRN Blood Glucose LESS THAN 70 milliGRAM(s)/deciliter  metoprolol tartrate Injectable 5 milliGRAM(s) IV Push every 6 hours PRN HR > 110      Vital Signs Last 24 Hrs  T(F): 97.9 (09-27-23 @ 11:24), Max: 98.1 (09-27-23 @ 00:02)  HR: 71 (09-27-23 @ 11:24) (70 - 77)  BP: 139/74 (09-27-23 @ 11:24) (139/74 - 177/79)  RR: 18 (09-27-23 @ 13:11) (18 - 19)  SpO2: 94% (09-27-23 @ 13:11) (90% - 97%)  Telemetry:   CAPILLARY BLOOD GLUCOSE      POCT Blood Glucose.: 199 mg/dL (27 Sep 2023 11:39)  POCT Blood Glucose.: 273 mg/dL (27 Sep 2023 07:46)  POCT Blood Glucose.: 212 mg/dL (26 Sep 2023 22:06)  POCT Blood Glucose.: 242 mg/dL (26 Sep 2023 17:21)    I&O's Summary    26 Sep 2023 07:01  -  27 Sep 2023 07:00  --------------------------------------------------------  IN: 960 mL / OUT: 1250 mL / NET: -290 mL    27 Sep 2023 07:01  -  27 Sep 2023 14:26  --------------------------------------------------------  IN: 480 mL / OUT: 850 mL / NET: -370 mL        PHYSICAL EXAM:  GENERAL: NAD, well-developed  HEAD:  Atraumatic, Normocephalic  EYES: EOMI, PERRLA, conjunctiva and sclera clear  NECK: Supple, No JVD  CHEST/LUNG: Clear to auscultation bilaterally; No wheeze  HEART: Regular rate and rhythm; No murmurs, rubs, or gallops  ABDOMEN: Soft, Nontender, Nondistended; Bowel sounds present  EXTREMITIES:  2+ Peripheral Pulses, No clubbing, cyanosis, or edema  PSYCH: AAOx3  NEUROLOGY: non-focal  SKIN: No rashes or lesions    LABS:                        12.7   8.98  )-----------( 321      ( 27 Sep 2023 06:14 )             39.7     09-27    139  |  104  |  33<H>  ----------------------------<  207<H>  4.3   |  24  |  1.46<H>    Ca    9.1      27 Sep 2023 06:14  Phos  3.2     09-27  Mg     2.0     09-27            Urinalysis Basic - ( 27 Sep 2023 06:14 )    Color: x / Appearance: x / SG: x / pH: x  Gluc: 207 mg/dL / Ketone: x  / Bili: x / Urobili: x   Blood: x / Protein: x / Nitrite: x   Leuk Esterase: x / RBC: x / WBC x   Sq Epi: x / Non Sq Epi: x / Bacteria: x        RADIOLOGY & ADDITIONAL TESTS:    Imaging Personally Reviewed:    Consultant(s) Notes Reviewed:      Care Discussed with Consultants/Other Providers:

## 2023-09-27 NOTE — DISCHARGE NOTE PROVIDER - PROVIDER TOKENS
PROVIDER:[TOKEN:[[id number]],FOLLOWUP:[2 weeks],ESTABLISHEDPATIENT:[T]],PROVIDER:[TOKEN:[[id number]],FOLLOWUP:[2 weeks],ESTABLISHEDPATIENT:[T]] PROVIDER:[TOKEN:[[id number]],FOLLOWUP:[1 week],ESTABLISHEDPATIENT:[T]],PROVIDER:[TOKEN:[[id number]],FOLLOWUP:[2 weeks],ESTABLISHEDPATIENT:[T]]

## 2023-09-27 NOTE — PROGRESS NOTE ADULT - ASSESSMENT
72F Hx HTN, T2DM, CVAs x 2 (2017 and 2021), HFrEF 59%, BPH p/w ED Generalized Weakness, SOB and WOB started on AFMO2 escalated to BiPAP Support with CXR c/w Lt Retrocardiac Infiltrates R/O Aspiration.     - Acute Hypoxic Hypercarbic Respiratory Failure on BiPAP Support  2/2 Multifocal PNA 2/2 Aspiration, presumed  - seen by MICU on admission, not a candidate   9/21 ptn is more awake, states he is hungry today, on BIPAP at present since shortly after removing BIPAP this am and after a meal became hypoxic again. Im concerned he is aspirating.   - need a speech eval and R/O CVA. seen by ID, Pulm, Renal, awaiting Neuro. ABx changed to CTX/Azithro. ptn spiked a temp, blood Cx re drawn. VSS otherwise. no events on tele  - DUonebs, Mucomyst, Chest PT, sputum Cx  - awaiting Legionella and S. Pneumoniae U Ag, awaiting MRSA/MSSA PCR  - bear, off IVF, given IV Lasix. no ARBS /ACE-I for now  - check TTE, check MR brain  - DM , on Insulin on a SS    9/22:  ptn developed overnight Afib w RVR, presently is in NSR. he was given IV DIgoxin, Norvasc DCed, started Cardizem qid, toprol changed to Loprssor 50 qid. these changes made since pills need to be crushed and given w food. Ptn was initially place on Heparin drip since he was NPO, once cleared by speech for a pureed diet: placed on Eliquis. all meds to be crushed and given w food. there is a concern ptn may have had an embolic stroke 2/2 PAF. concern for a brainstem CVA w airway compromise. awaiting MRI brain. seen by neuro and  cardiology. he is stable on HF O2, pulm folloiwng, on ABx for aspiration PNA. TTE w decreased EF but done while in Afib w RVR, EF was 40-45%. Would need to rpt    9/23: ptn's wife is refusing MR Brain. " i want him to get better, we will do after he comes home" ptn remains full code. he feels better, he is tolerating a pureed diet. I explained to her he may have had a stroke, affecting his breathing and swallowing. " thats ok" was her reply. ptn is now on 6 LO2NC, earlier today was on HFNC 45/50. BP not controlled, will add hydralazine 25 mg tid    9/24: hypoxic respiratory failure is improving, now stable w 2LO2NC. recs re BIPAP at hs as per pulm. may not need anymore. in NSR on tele. will rpt TTE in NSR to R/O CM. will need an ischemic work up if EF reduced to 40 %. wife/ptn refuse MR. HIGH suspicion of embolic CVA. ptn is on ELiquis, tolerating it. BP is stable, cont hydralazine, cont Lopressor. all meds need to be crushed, unable to give long XL/SR formulations. cont CTX for aspiration PNA. FS have improved, BEAR improving. PT eval    9/25: on 3 L O2NC, BEAR improving, tolerating ELiquis, FS in acceptable range, on CTX, 5 day course finishes 9/25, , and now also BACTRIM through 10/1 2/2 sputum Cx : Stenotrophonas. will d/w pulm, ID if ptn is ready for DC home. wife wants to take him home asap.     9/26: wife now agrees for MRI brain as long as it doesnt delay length of stay. will arrange for HOME O2. rpt TTE done    9/27:  ptn is ambulating comfortably without O2, ready for DC home. will need outptn Pulm, Card and Neuro F/U,            - GI ppx w PPI

## 2023-09-27 NOTE — PROGRESS NOTE ADULT - REASON FOR ADMISSION
sepsis

## 2023-09-27 NOTE — PROGRESS NOTE ADULT - SUBJECTIVE AND OBJECTIVE BOX
NEPHROLOGY     Patient seen and examined sitting in chair, son at bedside, no new complaints, on room air, in no acute distress.     MEDICATIONS  (STANDING):  albuterol/ipratropium for Nebulization 3 milliLiter(s) Nebulizer every 6 hours  apixaban 5 milliGRAM(s) Oral every 12 hours  atorvastatin 80 milliGRAM(s) Oral at bedtime  chlorhexidine 2% Cloths 1 Application(s) Topical <User Schedule>  clopidogrel Tablet 75 milliGRAM(s) Oral daily  dextrose 5%. 1000 milliLiter(s) (100 mL/Hr) IV Continuous <Continuous>  dextrose 5%. 1000 milliLiter(s) (50 mL/Hr) IV Continuous <Continuous>  dextrose 50% Injectable 12.5 Gram(s) IV Push once  dextrose 50% Injectable 25 Gram(s) IV Push once  dextrose 50% Injectable 25 Gram(s) IV Push once  glucagon  Injectable 1 milliGRAM(s) IntraMuscular once  insulin glargine Injectable (LANTUS) 15 Unit(s) SubCutaneous at bedtime  insulin lispro (ADMELOG) corrective regimen sliding scale   SubCutaneous three times a day before meals  insulin lispro Injectable (ADMELOG) 4 Unit(s) SubCutaneous three times a day before meals  lisinopril 10 milliGRAM(s) Oral daily  metoprolol tartrate 50 milliGRAM(s) Oral four times a day  pantoprazole  Injectable 40 milliGRAM(s) IV Push daily  trimethoprim  160 mG/sulfamethoxazole 800 mG 1 Tablet(s) Oral every 12 hours    VITALS:  T(C): , Max: 36.7 (09-26-23 @ 20:32)  T(F): , Max: 98.1 (09-27-23 @ 00:02)  HR: 71 (09-27-23 @ 11:24)  BP: 139/74 (09-27-23 @ 11:24)  RR: 18 (09-27-23 @ 11:24)  SpO2: 90% (09-27-23 @ 11:24)    I and O's:    09-26 @ 07:01  -  09-27 @ 07:00  --------------------------------------------------------  IN: 960 mL / OUT: 1250 mL / NET: -290 mL    PHYSICAL EXAM:  Constitutional: NAD, alert  HEENT: NCAT, (+)DMM  Neck: Supple, No JVD  Respiratory: (+)b/l rhonchi  Cardiovascular: RRR s1s2, no m/r/g  Gastrointestinal: BS+, soft, NT/ND  Extremities: No peripheral edema b/l  Neurological: reduced generalized strength  Back: no CVAT b/l  Skin: No rashes, no nevi    LABS:                        12.7   8.98  )-----------( 321      ( 27 Sep 2023 06:14 )             39.7     09-27    139  |  104  |  33<H>  ----------------------------<  207<H>  4.3   |  24  |  1.46<H>    Ca    9.1      27 Sep 2023 06:14  Phos  3.2     09-27  Mg     2.0     09-27    Urine Studies:  Urinalysis Basic - ( 27 Sep 2023 06:14 )    Color: x / Appearance: x / SG: x / pH: x  Gluc: 207 mg/dL / Ketone: x  / Bili: x / Urobili: x   Blood: x / Protein: x / Nitrite: x   Leuk Esterase: x / RBC: x / WBC x   Sq Epi: x / Non Sq Epi: x / Bacteria: x    RADIOLOGY & ADDITIONAL STUDIES:    rad< from: MR Angio Neck No Cont (09.26.23 @ 22:03) >  IMPRESSION:    Image degradation by motion artifact.    MRI BRAIN  1. No evidence of acute infarct, hemorrhage or mass effect.  2. Multiple subcentimeter chronic infarcts, described in detail above.  3. Bihemispheric altered white matter signal; a nonspecific finding which   statistically reflects chronic microvascular ischemic change.  4. Additional findings described in detail above.    MRA BRAIN  1. No significant change.  2. No evidence of large vessel occlusion, definitive aneurysm or vascular   malformation.  3. Multifocal intracranial stenosis, as described in detail above.   Bilateral V4 segments not imaged on this exam.  4. Additional findings above.    MRA NECK  1. No significant change.  2. Bilateral vertebral arteries grossly patent, with left-sided dominance.  3. No evidence of significant stenosis, occlusion or dissection bilateral   extracranial carotid arteries.    --- End of Report ---      < end of copied text >

## 2023-09-27 NOTE — PROGRESS NOTE ADULT - PROVIDER SPECIALTY LIST ADULT
Electrophysiology
Cardiology
Electrophysiology
Internal Medicine
Nephrology
Neurology
Neurology
Infectious Disease
Internal Medicine
Internal Medicine
Nephrology
Neurology
Infectious Disease
Infectious Disease
Internal Medicine
Internal Medicine
Cardiology
Endocrinology
Endocrinology
Pulmonology
Pulmonology
Cardiology
Cardiology
Endocrinology
Pulmonology
Endocrinology
Pulmonology
Pulmonology
Cardiology
Cardiology

## 2023-09-28 ENCOUNTER — TRANSCRIPTION ENCOUNTER (OUTPATIENT)
Age: 73
End: 2023-09-28

## 2023-10-02 ENCOUNTER — TRANSCRIPTION ENCOUNTER (OUTPATIENT)
Age: 73
End: 2023-10-02

## 2023-10-12 ENCOUNTER — APPOINTMENT (OUTPATIENT)
Dept: PULMONOLOGY | Facility: CLINIC | Age: 73
End: 2023-10-12
Payer: MEDICARE

## 2023-10-12 VITALS
BODY MASS INDEX: 29.32 KG/M2 | DIASTOLIC BLOOD PRESSURE: 88 MMHG | OXYGEN SATURATION: 95 % | HEIGHT: 67.5 IN | HEART RATE: 81 BPM | TEMPERATURE: 97.2 F | SYSTOLIC BLOOD PRESSURE: 136 MMHG | WEIGHT: 189 LBS

## 2023-10-12 DIAGNOSIS — J96.02 ACUTE RESPIRATORY FAILURE WITH HYPERCAPNIA: ICD-10-CM

## 2023-10-12 DIAGNOSIS — J15.69 PNEUMONIA. DUE TO OTHER GRAM-NEGATIVE BACTERIA: ICD-10-CM

## 2023-10-12 DIAGNOSIS — J96.91 RESPIRATORY FAILURE, UNSPECIFIED WITH HYPOXIA: ICD-10-CM

## 2023-10-12 DIAGNOSIS — J96.92 RESPIRATORY FAILURE, UNSPECIFIED WITH HYPERCAPNIA: ICD-10-CM

## 2023-10-12 PROCEDURE — 94729 DIFFUSING CAPACITY: CPT

## 2023-10-12 PROCEDURE — 94727 GAS DIL/WSHOT DETER LNG VOL: CPT

## 2023-10-12 PROCEDURE — 94060 EVALUATION OF WHEEZING: CPT

## 2023-10-12 PROCEDURE — 99205 OFFICE O/P NEW HI 60 MIN: CPT | Mod: 25

## 2023-10-13 PROBLEM — J96.92 RESPIRATORY FAILURE WITH HYPERCAPNIA: Status: ACTIVE | Noted: 2023-10-13

## 2023-10-13 PROBLEM — J96.91 HYPOXIC RESPIRATORY FAILURE: Status: ACTIVE | Noted: 2023-10-13

## 2023-10-13 PROBLEM — J96.02 ACUTE RESPIRATORY FAILURE WITH HYPERCAPNIA: Status: ACTIVE | Noted: 2023-10-13

## 2023-10-13 PROBLEM — J15.69: Status: ACTIVE | Noted: 2023-10-13

## 2023-10-13 RX ORDER — FUROSEMIDE 20 MG/1
20 TABLET ORAL
Refills: 0 | Status: COMPLETED | COMMUNITY
End: 2023-10-13

## 2023-10-13 RX ORDER — GLIPIZIDE 10 MG/1
10 TABLET, FILM COATED, EXTENDED RELEASE ORAL
Qty: 90 | Refills: 0 | Status: COMPLETED | COMMUNITY
Start: 2023-02-01 | End: 2023-10-13

## 2023-10-13 RX ORDER — APIXABAN 5 MG/1
5 TABLET, FILM COATED ORAL
Refills: 0 | Status: ACTIVE | COMMUNITY

## 2023-10-13 RX ORDER — ATORVASTATIN CALCIUM 80 MG/1
80 TABLET, FILM COATED ORAL
Refills: 0 | Status: COMPLETED | COMMUNITY
End: 2023-10-13

## 2023-10-13 RX ORDER — SPIRONOLACTONE AND HYDROCHLOROTHIAZIDE 25; 25 MG/1; MG/1
25-25 TABLET, FILM COATED ORAL
Qty: 30 | Refills: 0 | Status: COMPLETED | COMMUNITY
Start: 2023-06-01 | End: 2023-10-13

## 2023-10-13 NOTE — CONSULT NOTE ADULT - ATTENDING COMMENTS
Internal Medicine Resident Progress Note     Patient: Jennifer Alcazar Date: 10/13/2023   YOB: 1947 Admission Date: 10/8/2023   MRN: [id number] Attending: Don Sung MD     Chief Complaint   Patient presents with   • Weakness   • Shortness of Breath     Jennifer Alcazar is a 75 requires transfusions for chronic anemia felt to be secondary to slow diverticular bleeding. She noted her legs buckled beneath her and she almost fell, her friend propped her up. At baseline, she uses a walker to ambulate. She had poor appetite for the past several weeks for no particular reason. Denied melena or hematochezia. Endorsed some nausea, received Zofran which helped. Denied fevers. Last dialysis session prior to admission was 10/07/23.       EMERGENCY DEPARTMENT COURSE  Admission vitals temp 99, pulse 69, RR 18, /64, saturating well on room air. Na 131, glucose 211, BUN 43, 4.06, GFR 11. Troponin 14. WBC 5.8, HGB 4.9, Hct 16.1, PLT 84. Started transfusion with 2U RBCs and admitted for further treatment.      Patient was admitted to the Medicine service for additional work-up and management. Nephrology was consulted, patient resumed TTS hemodialysis schedule while inpatient. On 10/10/23 AM, patient had an episode of melena and HGB noted to be 6.6, patient received 1U pRBCs. Patient given additional 1u pRBCs on 10/13/23. Patient mentioned concerns of worsening weakness, PT was re-involved on 10/13/23.    SUBJECTIVE  - No acute events overnight  - Patient continues to express concerns regarding weakness but states she feels better this AM, does not have PCW but has been trying to find one  - Denies any additional episodes of melena  does complain of darker stool consistent with melena however she is also on PTA Venofer. In the setting of melena and slow diverticular bleed, continue to monitor CBC and transfuse as necessary. PT evaluated patient 10/09/23, she appears to be near her baseline and has [facility]. PT re-involved on 10/13/23 for patient concerns of increasing weakness. Spoke to PCP on 10/11/23, patient had initially been scheduled for outpatient transfusions with the [facility], however was readmitted prior to first transfusion. PCP states that outpatient transfusions will be challenging from a logistical perspective. She will at least need a PRN standing order for transfusions at the [facility] as well as weekly blood test. The PCP office would be able to do outpatient blood draws for her labs. Medicine team spoke to patient's PCP and Nephrology team on 10/12/23. Per Nephrology, patient can have CBC drawn on Tuesdays and Thursdays after dialysis with results faxed to PCP. Will attempt to establish for outpatient transfusions at the [facility].  - Transfuse RBCs to maintain HGB >7  - Hold ASA, platelets 92 as of 10/13/23  - Per PT, patient will require [facility] upon discharge  - Continue to monitor for any overt signs of worsening bleeding  - Monitor daily CBC    #ESRD on dialysis TThS, compliant  #Hyponatremia, resolved  #HFpEF (EF 71%)  Patient on TTS dialysis schedule. She receives iron infusions at dialysis. Patient has baseline lower extremity swelling.  - Nephrology consulted   - Continue HD TTS   - Continue same meds   - Epogen with HD   - Low PO4 diet, continue binders   - Check BMP & CBC in AM  - Continue PTA Bumex  - Monitor BMP daily  - Continue PTA calcitriol  - Continue PTA sevelamer carbonate  - Renal diet, low PO4    #Poor appetite, improving  #Constipation, improving  Patient's appetite noted to be poor upon admission, thought to be due to dialysis versus  Espinoza.  I have  72F Hx HTN, T2DM, CVAs x 2 (2017 and 2021), BPH p/w ED Generalized Weakness, SOB and WOB started on AFMO2 escalated to BiPAP Support with CXR c/w Lt Retrocardiac Infiltrates R/O Aspiration.   - Acute Hypoxic Hypercarbic Respiratory Failure on BiPAP Support   - No Impending Respiratory Failure, answered questions through BiPAP   - Sponte TV > 450-500cc on minimal setting RR=16, 10/5, FiO2 80% SpO2 100%   - Wean BiPAP as tolerated over 6-8 Hr   - Serial vBG showed improvement PaCO2 67 to 62   - Clear speech and joking with son at bedside   - No MICU Admission/Intervention indicated at this time     Patient seen and examined with ICU Resident/Fellow at bedside after lab data, medical records and radiology reports reviewed. I have read and agreeable in general with resident's Documented Note, Assessment and Management Plan which reflected my opinions from bedside round and discussion. reviewed and agree with his progress note.  Will plan on a 1 unit PRBC transfusion and aim for discharge tomorrow.  Significant coordination of care has been underway in trying to arrange for anticipated outpatient transfusion needs.  I really appreciate the effort put in by the Nephrologist, Hematology team, PCP and resident team.    Don Sung MD  10/13/2023    Please contact the cross cover pager ([place]: , [phone]. [facility]: , [phone]) for questions between 8PM to 7AM Monday through Friday and between 11AM to 7AM on the weekends. Thank you!   72F Hx HTN, T2DM, CVAs x 2 (2017 and 2021), HFrEF 59%, BPH p/w ED Generalized Weakness, SOB and WOB started on AFMO2 escalated to BiPAP Support with CXR c/w Lt Retrocardiac Infiltrates R/O Aspiration.   - Acute Hypoxic Hypercarbic Respiratory Failure on BiPAP Support   - No Impending Respiratory Failure, answered questions through BiPAP   - Sponte TV > 450-500cc on minimal setting RR=16, 10/5, FiO2 80% SpO2 100%   - Wean BiPAP as tolerated over 6-8 Hr   - Serial vBG showed improvement PaCO2 67 to 62   - Clear speech and joking with son at bedside   - No MICU Admission/Intervention indicated at this time     Patient seen and examined with ICU Resident/Fellow at bedside after lab data, medical records and radiology reports reviewed. I have read and agreeable in general with resident's Documented Note, Assessment and Management Plan which reflected my opinions from bedside round and discussion.

## 2023-10-17 ENCOUNTER — TRANSCRIPTION ENCOUNTER (OUTPATIENT)
Age: 73
End: 2023-10-17

## 2023-11-13 PROCEDURE — 94640 AIRWAY INHALATION TREATMENT: CPT

## 2023-11-13 PROCEDURE — 84132 ASSAY OF SERUM POTASSIUM: CPT

## 2023-11-13 PROCEDURE — 84100 ASSAY OF PHOSPHORUS: CPT

## 2023-11-13 PROCEDURE — 83880 ASSAY OF NATRIURETIC PEPTIDE: CPT

## 2023-11-13 PROCEDURE — 85014 HEMATOCRIT: CPT

## 2023-11-13 PROCEDURE — 87640 STAPH A DNA AMP PROBE: CPT

## 2023-11-13 PROCEDURE — 83605 ASSAY OF LACTIC ACID: CPT

## 2023-11-13 PROCEDURE — 85610 PROTHROMBIN TIME: CPT

## 2023-11-13 PROCEDURE — 84295 ASSAY OF SERUM SODIUM: CPT

## 2023-11-13 PROCEDURE — 82947 ASSAY GLUCOSE BLOOD QUANT: CPT

## 2023-11-13 PROCEDURE — 81001 URINALYSIS AUTO W/SCOPE: CPT

## 2023-11-13 PROCEDURE — 36415 COLL VENOUS BLD VENIPUNCTURE: CPT

## 2023-11-13 PROCEDURE — 83735 ASSAY OF MAGNESIUM: CPT

## 2023-11-13 PROCEDURE — 94660 CPAP INITIATION&MGMT: CPT

## 2023-11-13 PROCEDURE — 97116 GAIT TRAINING THERAPY: CPT

## 2023-11-13 PROCEDURE — 85027 COMPLETE CBC AUTOMATED: CPT

## 2023-11-13 PROCEDURE — 80048 BASIC METABOLIC PNL TOTAL CA: CPT

## 2023-11-13 PROCEDURE — 92612 ENDOSCOPY SWALLOW (FEES) VID: CPT

## 2023-11-13 PROCEDURE — 93308 TTE F-UP OR LMTD: CPT

## 2023-11-13 PROCEDURE — 87449 NOS EACH ORGANISM AG IA: CPT

## 2023-11-13 PROCEDURE — 71275 CT ANGIOGRAPHY CHEST: CPT | Mod: MA

## 2023-11-13 PROCEDURE — 85730 THROMBOPLASTIN TIME PARTIAL: CPT

## 2023-11-13 PROCEDURE — 70547 MR ANGIOGRAPHY NECK W/O DYE: CPT

## 2023-11-13 PROCEDURE — 93005 ELECTROCARDIOGRAM TRACING: CPT

## 2023-11-13 PROCEDURE — 84484 ASSAY OF TROPONIN QUANT: CPT

## 2023-11-13 PROCEDURE — 74230 X-RAY XM SWLNG FUNCJ C+: CPT

## 2023-11-13 PROCEDURE — C8929: CPT

## 2023-11-13 PROCEDURE — 84133 ASSAY OF URINE POTASSIUM: CPT

## 2023-11-13 PROCEDURE — 87086 URINE CULTURE/COLONY COUNT: CPT

## 2023-11-13 PROCEDURE — 84156 ASSAY OF PROTEIN URINE: CPT

## 2023-11-13 PROCEDURE — 85025 COMPLETE CBC W/AUTO DIFF WBC: CPT

## 2023-11-13 PROCEDURE — 82010 KETONE BODYS QUAN: CPT

## 2023-11-13 PROCEDURE — 70450 CT HEAD/BRAIN W/O DYE: CPT

## 2023-11-13 PROCEDURE — 97161 PT EVAL LOW COMPLEX 20 MIN: CPT

## 2023-11-13 PROCEDURE — 87184 SC STD DISK METHOD PER PLATE: CPT

## 2023-11-13 PROCEDURE — 87641 MR-STAPH DNA AMP PROBE: CPT

## 2023-11-13 PROCEDURE — 82436 ASSAY OF URINE CHLORIDE: CPT

## 2023-11-13 PROCEDURE — 82330 ASSAY OF CALCIUM: CPT

## 2023-11-13 PROCEDURE — 96375 TX/PRO/DX INJ NEW DRUG ADDON: CPT

## 2023-11-13 PROCEDURE — 71045 X-RAY EXAM CHEST 1 VIEW: CPT

## 2023-11-13 PROCEDURE — 93321 DOPPLER ECHO F-UP/LMTD STD: CPT

## 2023-11-13 PROCEDURE — 82435 ASSAY OF BLOOD CHLORIDE: CPT

## 2023-11-13 PROCEDURE — 82962 GLUCOSE BLOOD TEST: CPT

## 2023-11-13 PROCEDURE — 70551 MRI BRAIN STEM W/O DYE: CPT

## 2023-11-13 PROCEDURE — 70544 MR ANGIOGRAPHY HEAD W/O DYE: CPT

## 2023-11-13 PROCEDURE — 87040 BLOOD CULTURE FOR BACTERIA: CPT

## 2023-11-13 PROCEDURE — 87186 SC STD MICRODIL/AGAR DIL: CPT

## 2023-11-13 PROCEDURE — 84300 ASSAY OF URINE SODIUM: CPT

## 2023-11-13 PROCEDURE — 80053 COMPREHEN METABOLIC PANEL: CPT

## 2023-11-13 PROCEDURE — 85018 HEMOGLOBIN: CPT

## 2023-11-13 PROCEDURE — 0225U NFCT DS DNA&RNA 21 SARSCOV2: CPT

## 2023-11-13 PROCEDURE — 97110 THERAPEUTIC EXERCISES: CPT

## 2023-11-13 PROCEDURE — 87070 CULTURE OTHR SPECIMN AEROBIC: CPT

## 2023-11-13 PROCEDURE — 92611 MOTION FLUOROSCOPY/SWALLOW: CPT

## 2023-11-13 PROCEDURE — 87077 CULTURE AEROBIC IDENTIFY: CPT

## 2023-11-13 PROCEDURE — 82803 BLOOD GASES ANY COMBINATION: CPT

## 2023-11-13 PROCEDURE — C8924: CPT

## 2023-11-13 PROCEDURE — 76770 US EXAM ABDO BACK WALL COMP: CPT

## 2023-11-13 PROCEDURE — 92526 ORAL FUNCTION THERAPY: CPT

## 2023-11-13 PROCEDURE — 99291 CRITICAL CARE FIRST HOUR: CPT

## 2023-11-13 PROCEDURE — 83036 HEMOGLOBIN GLYCOSYLATED A1C: CPT

## 2023-11-13 PROCEDURE — 87899 AGENT NOS ASSAY W/OPTIC: CPT

## 2023-11-13 PROCEDURE — 82570 ASSAY OF URINE CREATININE: CPT

## 2023-11-13 PROCEDURE — 92610 EVALUATE SWALLOWING FUNCTION: CPT

## 2023-11-13 PROCEDURE — 96374 THER/PROPH/DIAG INJ IV PUSH: CPT

## 2023-12-07 ENCOUNTER — TRANSCRIPTION ENCOUNTER (OUTPATIENT)
Age: 73
End: 2023-12-07

## 2023-12-07 ENCOUNTER — INPATIENT (INPATIENT)
Facility: HOSPITAL | Age: 73
LOS: 2 days | Discharge: HOME | DRG: 57 | End: 2023-12-10
Attending: PSYCHIATRY & NEUROLOGY | Admitting: PSYCHIATRY & NEUROLOGY
Payer: MEDICARE

## 2023-12-07 VITALS — WEIGHT: 160.94 LBS

## 2023-12-07 DIAGNOSIS — R29.898 OTHER SYMPTOMS AND SIGNS INVOLVING THE MUSCULOSKELETAL SYSTEM: ICD-10-CM

## 2023-12-07 DIAGNOSIS — Z98.890 OTHER SPECIFIED POSTPROCEDURAL STATES: Chronic | ICD-10-CM

## 2023-12-07 LAB
ALBUMIN SERPL ELPH-MCNC: 3.6 G/DL — SIGNIFICANT CHANGE UP (ref 3.3–5)
ALBUMIN SERPL ELPH-MCNC: 3.6 G/DL — SIGNIFICANT CHANGE UP (ref 3.3–5)
ALP SERPL-CCNC: 61 U/L — SIGNIFICANT CHANGE UP (ref 40–120)
ALP SERPL-CCNC: 61 U/L — SIGNIFICANT CHANGE UP (ref 40–120)
ALT FLD-CCNC: 12 U/L — SIGNIFICANT CHANGE UP (ref 10–45)
ALT FLD-CCNC: 12 U/L — SIGNIFICANT CHANGE UP (ref 10–45)
ANION GAP SERPL CALC-SCNC: 5 MMOL/L — SIGNIFICANT CHANGE UP (ref 5–17)
ANION GAP SERPL CALC-SCNC: 5 MMOL/L — SIGNIFICANT CHANGE UP (ref 5–17)
ANION GAP SERPL CALC-SCNC: 8 MMOL/L — SIGNIFICANT CHANGE UP (ref 5–17)
ANION GAP SERPL CALC-SCNC: 8 MMOL/L — SIGNIFICANT CHANGE UP (ref 5–17)
APTT BLD: 45.8 SEC — HIGH (ref 24.5–35.6)
APTT BLD: 45.8 SEC — HIGH (ref 24.5–35.6)
AST SERPL-CCNC: 15 U/L — SIGNIFICANT CHANGE UP (ref 10–40)
AST SERPL-CCNC: 15 U/L — SIGNIFICANT CHANGE UP (ref 10–40)
BASOPHILS # BLD AUTO: 0.03 K/UL — SIGNIFICANT CHANGE UP (ref 0–0.2)
BASOPHILS # BLD AUTO: 0.03 K/UL — SIGNIFICANT CHANGE UP (ref 0–0.2)
BASOPHILS NFR BLD AUTO: 0.5 % — SIGNIFICANT CHANGE UP (ref 0–2)
BASOPHILS NFR BLD AUTO: 0.5 % — SIGNIFICANT CHANGE UP (ref 0–2)
BILIRUB SERPL-MCNC: 0.3 MG/DL — SIGNIFICANT CHANGE UP (ref 0.2–1.2)
BILIRUB SERPL-MCNC: 0.3 MG/DL — SIGNIFICANT CHANGE UP (ref 0.2–1.2)
BUN SERPL-MCNC: 19 MG/DL — SIGNIFICANT CHANGE UP (ref 7–23)
CALCIUM SERPL-MCNC: 9.2 MG/DL — SIGNIFICANT CHANGE UP (ref 8.4–10.5)
CALCIUM SERPL-MCNC: 9.2 MG/DL — SIGNIFICANT CHANGE UP (ref 8.4–10.5)
CALCIUM SERPL-MCNC: 9.6 MG/DL — SIGNIFICANT CHANGE UP (ref 8.4–10.5)
CALCIUM SERPL-MCNC: 9.6 MG/DL — SIGNIFICANT CHANGE UP (ref 8.4–10.5)
CHLORIDE SERPL-SCNC: 106 MMOL/L — SIGNIFICANT CHANGE UP (ref 96–108)
CHLORIDE SERPL-SCNC: 106 MMOL/L — SIGNIFICANT CHANGE UP (ref 96–108)
CHLORIDE SERPL-SCNC: 107 MMOL/L — SIGNIFICANT CHANGE UP (ref 96–108)
CHLORIDE SERPL-SCNC: 107 MMOL/L — SIGNIFICANT CHANGE UP (ref 96–108)
CO2 SERPL-SCNC: 26 MMOL/L — SIGNIFICANT CHANGE UP (ref 22–31)
CO2 SERPL-SCNC: 26 MMOL/L — SIGNIFICANT CHANGE UP (ref 22–31)
CO2 SERPL-SCNC: 29 MMOL/L — SIGNIFICANT CHANGE UP (ref 22–31)
CO2 SERPL-SCNC: 29 MMOL/L — SIGNIFICANT CHANGE UP (ref 22–31)
CREAT SERPL-MCNC: 1.72 MG/DL — HIGH (ref 0.5–1.3)
CREAT SERPL-MCNC: 1.72 MG/DL — HIGH (ref 0.5–1.3)
CREAT SERPL-MCNC: 1.87 MG/DL — HIGH (ref 0.5–1.3)
CREAT SERPL-MCNC: 1.87 MG/DL — HIGH (ref 0.5–1.3)
EGFR: 38 ML/MIN/1.73M2 — LOW
EGFR: 38 ML/MIN/1.73M2 — LOW
EGFR: 41 ML/MIN/1.73M2 — LOW
EGFR: 41 ML/MIN/1.73M2 — LOW
EOSINOPHIL # BLD AUTO: 0.19 K/UL — SIGNIFICANT CHANGE UP (ref 0–0.5)
EOSINOPHIL # BLD AUTO: 0.19 K/UL — SIGNIFICANT CHANGE UP (ref 0–0.5)
EOSINOPHIL NFR BLD AUTO: 2.9 % — SIGNIFICANT CHANGE UP (ref 0–6)
EOSINOPHIL NFR BLD AUTO: 2.9 % — SIGNIFICANT CHANGE UP (ref 0–6)
GLUCOSE SERPL-MCNC: 101 MG/DL — HIGH (ref 70–99)
GLUCOSE SERPL-MCNC: 101 MG/DL — HIGH (ref 70–99)
GLUCOSE SERPL-MCNC: 86 MG/DL — SIGNIFICANT CHANGE UP (ref 70–99)
GLUCOSE SERPL-MCNC: 86 MG/DL — SIGNIFICANT CHANGE UP (ref 70–99)
HCT VFR BLD CALC: 42.8 % — SIGNIFICANT CHANGE UP (ref 39–50)
HCT VFR BLD CALC: 42.8 % — SIGNIFICANT CHANGE UP (ref 39–50)
HGB BLD-MCNC: 13.5 G/DL — SIGNIFICANT CHANGE UP (ref 13–17)
HGB BLD-MCNC: 13.5 G/DL — SIGNIFICANT CHANGE UP (ref 13–17)
IMM GRANULOCYTES NFR BLD AUTO: 0.3 % — SIGNIFICANT CHANGE UP (ref 0–0.9)
IMM GRANULOCYTES NFR BLD AUTO: 0.3 % — SIGNIFICANT CHANGE UP (ref 0–0.9)
INR BLD: 1.92 RATIO — HIGH (ref 0.85–1.18)
INR BLD: 1.92 RATIO — HIGH (ref 0.85–1.18)
LYMPHOCYTES # BLD AUTO: 1.13 K/UL — SIGNIFICANT CHANGE UP (ref 1–3.3)
LYMPHOCYTES # BLD AUTO: 1.13 K/UL — SIGNIFICANT CHANGE UP (ref 1–3.3)
LYMPHOCYTES # BLD AUTO: 17.2 % — SIGNIFICANT CHANGE UP (ref 13–44)
LYMPHOCYTES # BLD AUTO: 17.2 % — SIGNIFICANT CHANGE UP (ref 13–44)
MCHC RBC-ENTMCNC: 29.2 PG — SIGNIFICANT CHANGE UP (ref 27–34)
MCHC RBC-ENTMCNC: 29.2 PG — SIGNIFICANT CHANGE UP (ref 27–34)
MCHC RBC-ENTMCNC: 31.5 GM/DL — LOW (ref 32–36)
MCHC RBC-ENTMCNC: 31.5 GM/DL — LOW (ref 32–36)
MCV RBC AUTO: 92.4 FL — SIGNIFICANT CHANGE UP (ref 80–100)
MCV RBC AUTO: 92.4 FL — SIGNIFICANT CHANGE UP (ref 80–100)
MONOCYTES # BLD AUTO: 0.68 K/UL — SIGNIFICANT CHANGE UP (ref 0–0.9)
MONOCYTES # BLD AUTO: 0.68 K/UL — SIGNIFICANT CHANGE UP (ref 0–0.9)
MONOCYTES NFR BLD AUTO: 10.4 % — SIGNIFICANT CHANGE UP (ref 2–14)
MONOCYTES NFR BLD AUTO: 10.4 % — SIGNIFICANT CHANGE UP (ref 2–14)
NEUTROPHILS # BLD AUTO: 4.51 K/UL — SIGNIFICANT CHANGE UP (ref 1.8–7.4)
NEUTROPHILS # BLD AUTO: 4.51 K/UL — SIGNIFICANT CHANGE UP (ref 1.8–7.4)
NEUTROPHILS NFR BLD AUTO: 68.7 % — SIGNIFICANT CHANGE UP (ref 43–77)
NEUTROPHILS NFR BLD AUTO: 68.7 % — SIGNIFICANT CHANGE UP (ref 43–77)
NRBC # BLD: 0 /100 WBCS — SIGNIFICANT CHANGE UP (ref 0–0)
NRBC # BLD: 0 /100 WBCS — SIGNIFICANT CHANGE UP (ref 0–0)
PLATELET # BLD AUTO: 203 K/UL — SIGNIFICANT CHANGE UP (ref 150–400)
PLATELET # BLD AUTO: 203 K/UL — SIGNIFICANT CHANGE UP (ref 150–400)
POTASSIUM SERPL-MCNC: 5 MMOL/L — SIGNIFICANT CHANGE UP (ref 3.5–5.3)
POTASSIUM SERPL-SCNC: 5 MMOL/L — SIGNIFICANT CHANGE UP (ref 3.5–5.3)
PROT SERPL-MCNC: 6.2 G/DL — SIGNIFICANT CHANGE UP (ref 6–8.3)
PROT SERPL-MCNC: 6.2 G/DL — SIGNIFICANT CHANGE UP (ref 6–8.3)
PROTHROM AB SERPL-ACNC: 20.7 SEC — HIGH (ref 9.5–13)
PROTHROM AB SERPL-ACNC: 20.7 SEC — HIGH (ref 9.5–13)
RBC # BLD: 4.63 M/UL — SIGNIFICANT CHANGE UP (ref 4.2–5.8)
RBC # BLD: 4.63 M/UL — SIGNIFICANT CHANGE UP (ref 4.2–5.8)
RBC # FLD: 13.2 % — SIGNIFICANT CHANGE UP (ref 10.3–14.5)
RBC # FLD: 13.2 % — SIGNIFICANT CHANGE UP (ref 10.3–14.5)
SODIUM SERPL-SCNC: 140 MMOL/L — SIGNIFICANT CHANGE UP (ref 135–145)
SODIUM SERPL-SCNC: 140 MMOL/L — SIGNIFICANT CHANGE UP (ref 135–145)
SODIUM SERPL-SCNC: 141 MMOL/L — SIGNIFICANT CHANGE UP (ref 135–145)
SODIUM SERPL-SCNC: 141 MMOL/L — SIGNIFICANT CHANGE UP (ref 135–145)
TROPONIN T, HIGH SENSITIVITY RESULT: 60 NG/L — HIGH (ref 0–51)
TROPONIN T, HIGH SENSITIVITY RESULT: 60 NG/L — HIGH (ref 0–51)
WBC # BLD: 6.56 K/UL — SIGNIFICANT CHANGE UP (ref 3.8–10.5)
WBC # BLD: 6.56 K/UL — SIGNIFICANT CHANGE UP (ref 3.8–10.5)
WBC # FLD AUTO: 6.56 K/UL — SIGNIFICANT CHANGE UP (ref 3.8–10.5)
WBC # FLD AUTO: 6.56 K/UL — SIGNIFICANT CHANGE UP (ref 3.8–10.5)

## 2023-12-07 PROCEDURE — 99222 1ST HOSP IP/OBS MODERATE 55: CPT | Mod: GC

## 2023-12-07 PROCEDURE — 93010 ELECTROCARDIOGRAM REPORT: CPT

## 2023-12-07 PROCEDURE — 70496 CT ANGIOGRAPHY HEAD: CPT | Mod: 26,MA

## 2023-12-07 PROCEDURE — 70551 MRI BRAIN STEM W/O DYE: CPT | Mod: 26

## 2023-12-07 PROCEDURE — 0042T: CPT | Mod: MA

## 2023-12-07 PROCEDURE — 70498 CT ANGIOGRAPHY NECK: CPT | Mod: 26,MA

## 2023-12-07 PROCEDURE — 70450 CT HEAD/BRAIN W/O DYE: CPT | Mod: 26,MA,XU

## 2023-12-07 PROCEDURE — 99285 EMERGENCY DEPT VISIT HI MDM: CPT | Mod: GC

## 2023-12-07 RX ORDER — ATORVASTATIN CALCIUM 80 MG/1
80 TABLET, FILM COATED ORAL AT BEDTIME
Refills: 0 | Status: DISCONTINUED | OUTPATIENT
Start: 2023-12-07 | End: 2023-12-10

## 2023-12-07 RX ORDER — SODIUM CHLORIDE 9 MG/ML
1000 INJECTION, SOLUTION INTRAVENOUS
Refills: 0 | Status: DISCONTINUED | OUTPATIENT
Start: 2023-12-07 | End: 2023-12-10

## 2023-12-07 RX ORDER — CHOLECALCIFEROL (VITAMIN D3) 125 MCG
2000 CAPSULE ORAL DAILY
Refills: 0 | Status: DISCONTINUED | OUTPATIENT
Start: 2023-12-07 | End: 2023-12-10

## 2023-12-07 RX ORDER — HEPARIN SODIUM 5000 [USP'U]/ML
5000 INJECTION INTRAVENOUS; SUBCUTANEOUS EVERY 8 HOURS
Refills: 0 | Status: DISCONTINUED | OUTPATIENT
Start: 2023-12-07 | End: 2023-12-08

## 2023-12-07 RX ORDER — HYDRALAZINE HCL 50 MG
5 TABLET ORAL EVERY 6 HOURS
Refills: 0 | Status: DISCONTINUED | OUTPATIENT
Start: 2023-12-07 | End: 2023-12-10

## 2023-12-07 RX ORDER — PANTOPRAZOLE SODIUM 20 MG/1
40 TABLET, DELAYED RELEASE ORAL
Refills: 0 | Status: DISCONTINUED | OUTPATIENT
Start: 2023-12-07 | End: 2023-12-10

## 2023-12-07 RX ORDER — METOCLOPRAMIDE HCL 10 MG
10 TABLET ORAL
Refills: 0 | Status: DISCONTINUED | OUTPATIENT
Start: 2023-12-07 | End: 2023-12-10

## 2023-12-07 RX ORDER — TIRZEPATIDE 15 MG/.5ML
10 INJECTION, SOLUTION SUBCUTANEOUS
Refills: 0 | DISCHARGE

## 2023-12-07 RX ORDER — DEXTROSE 50 % IN WATER 50 %
25 SYRINGE (ML) INTRAVENOUS ONCE
Refills: 0 | Status: DISCONTINUED | OUTPATIENT
Start: 2023-12-07 | End: 2023-12-10

## 2023-12-07 RX ORDER — SODIUM CHLORIDE 9 MG/ML
1000 INJECTION INTRAMUSCULAR; INTRAVENOUS; SUBCUTANEOUS
Refills: 0 | Status: DISCONTINUED | OUTPATIENT
Start: 2023-12-07 | End: 2023-12-10

## 2023-12-07 RX ORDER — DEXTROSE 50 % IN WATER 50 %
12.5 SYRINGE (ML) INTRAVENOUS ONCE
Refills: 0 | Status: DISCONTINUED | OUTPATIENT
Start: 2023-12-07 | End: 2023-12-10

## 2023-12-07 RX ORDER — GLUCAGON INJECTION, SOLUTION 0.5 MG/.1ML
1 INJECTION, SOLUTION SUBCUTANEOUS ONCE
Refills: 0 | Status: DISCONTINUED | OUTPATIENT
Start: 2023-12-07 | End: 2023-12-10

## 2023-12-07 RX ORDER — OMEPRAZOLE 10 MG/1
1 CAPSULE, DELAYED RELEASE ORAL
Refills: 0 | DISCHARGE

## 2023-12-07 RX ORDER — DEXTROSE 50 % IN WATER 50 %
15 SYRINGE (ML) INTRAVENOUS ONCE
Refills: 0 | Status: DISCONTINUED | OUTPATIENT
Start: 2023-12-07 | End: 2023-12-10

## 2023-12-07 RX ORDER — LABETALOL HCL 100 MG
10 TABLET ORAL ONCE
Refills: 0 | Status: COMPLETED | OUTPATIENT
Start: 2023-12-07 | End: 2023-12-07

## 2023-12-07 RX ORDER — METOPROLOL TARTRATE 50 MG
25 TABLET ORAL DAILY
Refills: 0 | Status: DISCONTINUED | OUTPATIENT
Start: 2023-12-07 | End: 2023-12-08

## 2023-12-07 RX ORDER — CLOPIDOGREL BISULFATE 75 MG/1
1 TABLET, FILM COATED ORAL
Refills: 0 | DISCHARGE

## 2023-12-07 RX ORDER — ASPIRIN/CALCIUM CARB/MAGNESIUM 324 MG
81 TABLET ORAL DAILY
Refills: 0 | Status: DISCONTINUED | OUTPATIENT
Start: 2023-12-07 | End: 2023-12-08

## 2023-12-07 RX ADMIN — SODIUM CHLORIDE 50 MILLILITER(S): 9 INJECTION INTRAMUSCULAR; INTRAVENOUS; SUBCUTANEOUS at 21:06

## 2023-12-07 RX ADMIN — Medication 81 MILLIGRAM(S): at 14:40

## 2023-12-07 RX ADMIN — Medication 10 MILLIGRAM(S): at 18:59

## 2023-12-07 RX ADMIN — Medication 5 MILLIGRAM(S): at 21:06

## 2023-12-07 RX ADMIN — ATORVASTATIN CALCIUM 80 MILLIGRAM(S): 80 TABLET, FILM COATED ORAL at 21:20

## 2023-12-07 RX ADMIN — SODIUM CHLORIDE 50 MILLILITER(S): 9 INJECTION INTRAMUSCULAR; INTRAVENOUS; SUBCUTANEOUS at 18:40

## 2023-12-07 RX ADMIN — HEPARIN SODIUM 5000 UNIT(S): 5000 INJECTION INTRAVENOUS; SUBCUTANEOUS at 21:05

## 2023-12-07 RX ADMIN — Medication 10 MILLIGRAM(S): at 13:02

## 2023-12-07 NOTE — ED ADULT NURSE NOTE - CHIEF COMPLAINT QUOTE
L side weakness since 8am  code stroke called at 1055 L side weakness since 8am  code stroke called at [phone]

## 2023-12-07 NOTE — SPEECH LANGUAGE PATHOLOGY EVALUATION - SLP CONVERSATIONAL SPEECH
Pt did not initiate conversation; limited responses with cueing to respond frequently provided; primarily produced short phrases.

## 2023-12-07 NOTE — ED PROVIDER NOTE - CLINICAL SUMMARY MEDICAL DECISION MAKING FREE TEXT BOX
72F Hx HTN, T2DM, CVAs x 2 (2017 and 2021), HFrEF 59%, BPH, frequent UTIs c/o 1 hour history of left-sided facial droop and left sided weakness. Pt admits to nausea and NBNB vomiting, otherwise asymptomatic. Denies fevers, chills, dizziness, chest pain, SOB, abdominal pain, dysuria, hematuria. Physical/neuro exam consistent w/ CVA. Will activate CODE STROKE and reassess w/ imaging and neuro consultation. Pt takes eliquis for AFIB, but did not take it today.

## 2023-12-07 NOTE — CHART NOTE - NSCHARTNOTEFT_GEN_A_CORE
Neurology    Notified in late afternoon by nursing staff in the ED that patient, daughter Julita (), spouse Elba (), wanted the patient to leave the hospital.   Immediately called the patient's daughter over her phone to discuss them wanting to leave the hospital right after just being admitted to stroke service. She stated that the patient and the family were concerned that the workup would not be completed overnight, and the patient would not get immediate imaging.  They wanted to get outpatient MRI and remainder follow-up with her primary care doctor, who during the daytime had conversation with stroke fellow, also endorsed that the patient receive inpatient stroke workup. Expressed to daughter over phone that we did not have a current imaged infarct, burden size, mechanism of possible acute stroke.  Therefore we would not be able to accurately predict and manage immediate stroke risks. Thus if he left the hospital he would be at risk for significant morbidity and mortality in the acute setting not limited to further / additional ischemic strokes, new large vessel occlusion, irreversible paralysis, death. Being at home would delay any emergent care/ therapies that could be offered. Discussed further with stroke fellow. Went down to see the patient and the family members in the ED shortly after.  Expressed these concerns again. 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  Thus after much deliberation came to a mutual agreement with patient, daughter and spouse that the patient would stay at least overnight, we would attempt to get MRI overnight, and possibly re-evaluate discharge or leaving AMA tomorrow by them (if MRI is not done overnight). They were satisfied with this compromise. I discussed with MRI tech and they stated its tentatively to be performed late evening 12/8/23 barring no delays. Also advised to family that discharge on 12/8 is not guaranteed depending on clinical course, MRI findings, outcomes, workup recommended. They also understand possibility that MRI may not happen overnight. They are understanding and aware and will, if needed, re-evaluate leaving AMA tomorrow based on their observance. They expressed content with us trying to come to a middle ground as they understand we are attempting to do the right thing for Mr Rubi.

## 2023-12-07 NOTE — DISCHARGE NOTE PROVIDER - CARE PROVIDERS DIRECT ADDRESSES
,DirectAddress_Unknown ,[email or web address] ,[email or web address]

## 2023-12-07 NOTE — ED ADULT NURSE REASSESSMENT NOTE - NS ED NURSE REASSESS COMMENT FT1
family wants to leave and have mri performed on out pt basis; called neuro [phone]; gave number for daughter [phone] Julita family wants to leave and have mri performed on out pt basis; called neuro [phone]; gave number for daughter [phone] Julita

## 2023-12-07 NOTE — H&P ADULT - ASSESSMENT
Mr. Rubi is a 72 year-old  man with a PMH of HTN, HLD, DM2, HFpEF (EF 50% on TTE 9/26/23), atrial fibrillation (on eliquis 5mg BID), prior strokes in 2017 and 2021 (unknown if residual deficits), BIBEMS to the ED from home on 12/7/23 with c/o left sided weakness, for which a stroke code was activated. VS on presentation significant for /114. On evaluation patient was awake, oriented and following commands, but intermittently distractible. Exam notable for mild left hemiparesis and hypoesthesia, mild dysarthria, and possible L visual field impairment vs neglect. Labs notable for Cr 1.87, INR 1.92.   CTH: no acute intracranial pathology  CTA Head: distal R ICA stenosis  CTA Neck: patent cervical vasculature  CTP: 41ml penumbra in R MCA territory, no core.    LKN 11:30AM on 12/7.   NIHSS: 6 (mild L droop, LUE/LLE drift, mild L hypoesthesia mild dysarthria, L visual field impairment)  Baseline mRS: 2  Not a tenecteplase candidate due to INR >1.7 with use of oral AC.   Not a candidate for thrombectomy due to no LVO on imaging.     Impression: Left hemiparesis and hypoesthesia due to right brain dysfunction, likely secondary to an acute ischemic stroke of uncertain mechanism at this time given incomplete workup. However possibly intracranial athero given CTA findings.       Plan:  Imaging/Labs  [] MRI brain w/o contrast  [] TTE   [] HbA1C and Lipid Panel.    Meds  [] Aspirin 81MG PO daily for now (ASA 300MG MO daily if patient fails dysphagia screen)  [] Atorvastatin 80MG QHS, titrate to LDL<70  [] DVT prophylaxis - Lovenox 40MG SC daily    Other  [] Telemonitoring; Neurochecks and vital signs Q2H  [] Permissive HTN up to 220/120 mmHg for first 48-72 hours after the symptom onset followed by gradual normotension  [] BG goal <180, avoid hypoglycemia  [] NPO until clears dysphagia screen, otherwise swallow evaluation  [] Fall, aspiration precautions  [] PT/OT/ST evaluations  [] Stroke education provided       Seen and discussed with stroke fellow, Dr. Felix.   Mr. Rubi is a 72 year-old  man with a PMH of HTN, HLD, DM2, HFpEF (EF 50% on TTE 9/26/23), atrial fibrillation (on eliquis 5mg BID), prior strokes in 2017 and 2021 (unknown if residual deficits), BIBEMS to the ED from home on 12/7/23 with c/o left sided weakness, for which a stroke code was activated. VS on presentation significant for /114. On evaluation patient was awake, oriented and following commands, but intermittently distractible. Exam notable for mild left hemiparesis and hypoesthesia, mild dysarthria, and possible L visual field impairment vs neglect. Labs notable for Cr 1.87, INR 1.92.   CTH: no acute intracranial pathology  CTA Head: distal R ICA stenosis  CTA Neck: patent cervical vasculature  CTP: 41ml penumbra in R MCA territory, no core.    LKN 11:30AM on 12/7.   NIHSS: 6 (mild L droop, LUE/LLE drift, mild L hypoesthesia mild dysarthria, L visual field impairment)  Baseline mRS: 2  Not a tenecteplase candidate due to INR >1.7 with use of oral AC.   Not a candidate for thrombectomy due to no LVO on imaging.     Impression: Left hemiparesis and hypoesthesia due to right brain dysfunction, likely secondary to an acute ischemic stroke of uncertain mechanism at this time given incomplete workup. However possibly intracranial athero given CTA findings.       Plan:  Imaging/Labs  [] MRI brain w/o contrast  [] TTE   [] HbA1C and Lipid Panel.    Meds  [] Aspirin 81MG PO daily for now (ASA 300MG AL daily if patient fails dysphagia screen)  [] Atorvastatin 80MG QHS, titrate to LDL<70  [] DVT prophylaxis - Lovenox 40MG SC daily    Other  [] Telemonitoring; Neurochecks and vital signs Q2H  [] Permissive HTN up to 220/120 mmHg for first 48-72 hours after the symptom onset followed by gradual normotension  [] BG goal <180, avoid hypoglycemia  [] NPO until clears dysphagia screen, otherwise swallow evaluation  [] Fall, aspiration precautions  [] PT/OT/ST evaluations  [] Stroke education provided       Seen and discussed with stroke fellow, Dr. Felix.   Mr. Rubi is a 72 year-old  man with a PMH of HTN, HLD, DM2, HFpEF (EF 50% on TTE 9/26/23), atrial fibrillation (on eliquis 5mg BID), prior strokes in 2017 and 2021 (unknown if residual deficits), BIBEMS to the ED from home on 12/7/23 with c/o left sided weakness, for which a stroke code was activated. VS on presentation significant for /114. On evaluation patient was awake, oriented and following commands, but intermittently distractible. Exam notable for mild left hemiparesis and hypoesthesia, mild dysarthria, and possible L visual field impairment vs neglect. Labs notable for Cr 1.87, INR 1.92.   CTH: no acute intracranial pathology  CTA Head: distal R ICA stenosis  CTA Neck: patent cervical vasculature  CTP: 41ml penumbra in R MCA territory, no core.    LKN 11:30AM on 12/7.   NIHSS: 6 (mild L droop, LUE/LLE drift, mild L hypoesthesia mild dysarthria, L visual field impairment)  Baseline mRS: 2  Not a tenecteplase candidate due to INR >1.7 with use of oral AC.   Not a candidate for thrombectomy due to no LVO on imaging.       Impression: Left hemiparesis, L hypoesthesia and L visual field cut vs neglect due to right brain dysfunction, likely secondary to an acute ischemic stroke. Mechanism uncertain mechanism at this time given incomplete workup, but likely intracranial atherosclerosis  given CTA head findings vs less likely cardioembolic.       Plan:  Imaging/Labs  [] MRI brain w/o contrast  [] TTE   [] HbA1C and Lipid Panel.    Meds  [] Aspirin 81MG PO daily for now (ASA 300MG ND daily if patient fails dysphagia screen)  [] Atorvastatin 80MG QHS, titrate to LDL<70  [] DVT prophylaxis    Other  [] Telemonitoring; Neurochecks and vital signs Q2H  [] Permissive HTN up to 220/120 mmHg for first 48-72 hours after the symptom onset followed by gradual normotension  [] BG goal <180, avoid hypoglycemia  [] NPO until clears dysphagia screen, otherwise swallow evaluation  [] Fall, aspiration precautions  [] PT/OT/ST evaluations  [] Stroke education provided       Seen and discussed with stroke fellow, Dr. Felix.   Mr. Rubi is a 72 year-old  man with a PMH of HTN, HLD, DM2, HFpEF (EF 50% on TTE 9/26/23), atrial fibrillation (on eliquis 5mg BID), prior strokes in 2017 and 2021 (unknown if residual deficits), BIBEMS to the ED from home on 12/7/23 with c/o left sided weakness, for which a stroke code was activated. VS on presentation significant for /114. On evaluation patient was awake, oriented and following commands, but intermittently distractible. Exam notable for mild left hemiparesis and hypoesthesia, mild dysarthria, and possible L visual field impairment vs neglect. Labs notable for Cr 1.87, INR 1.92.   CTH: no acute intracranial pathology  CTA Head: distal R ICA stenosis  CTA Neck: patent cervical vasculature  CTP: 41ml penumbra in R MCA territory, no core.    LKN 11:30AM on 12/7.   NIHSS: 6 (mild L droop, LUE/LLE drift, mild L hypoesthesia mild dysarthria, L visual field impairment)  Baseline mRS: 2  Not a tenecteplase candidate due to INR >1.7 with use of oral AC.   Not a candidate for thrombectomy due to no LVO on imaging.       Impression: Left hemiparesis, L hypoesthesia and L visual field cut vs neglect due to right brain dysfunction, likely secondary to an acute ischemic stroke. Mechanism uncertain mechanism at this time given incomplete workup, but likely intracranial atherosclerosis  given CTA head findings vs less likely cardioembolic.       Plan:  Imaging/Labs  [] MRI brain w/o contrast  [] TTE   [] HbA1C and Lipid Panel.    Meds  [] Aspirin 81MG PO daily for now (ASA 300MG [place] daily if patient fails dysphagia screen)  [] Atorvastatin 80MG QHS, titrate to LDL<70  [] DVT prophylaxis    Other  [] Telemonitoring; Neurochecks and vital signs Q2H  [] Permissive HTN up to 220/120 mmHg for first 48-72 hours after the symptom onset followed by gradual normotension  [] BG goal <180, avoid hypoglycemia  [] NPO until clears dysphagia screen, otherwise swallow evaluation  [] Fall, aspiration precautions  [] PT/OT/ST evaluations  [] Stroke education provided       Seen and discussed with stroke fellow, Dr. Felix.   Mr. Rubi is a 72 year-old  man with a PMH of HTN, HLD, NIDDM2, HFpEF (EF 50% on TTE 9/26/23), atrial fibrillation (on eliquis 5mg BID), prior strokes in 2017 and 2021 (unknown if residual deficits), BIBEMS to the ED from home on 12/7/23 with c/o left sided weakness, for which a stroke code was activated. VS on presentation significant for /114. On evaluation patient was awake, oriented and following commands, but intermittently distractible. Exam notable for mild left hemiparesis and hypoesthesia, mild dysarthria, and possible L visual field impairment vs neglect. Labs notable for Cr 1.87, INR 1.92.   CTH w/o: no acute intracranial pathology  CTA Head w/: distal R ICA stenosis  CTA Neck w/: patent cervical vasculature  CTP: 41ml penumbra in R MCA territory, no core.    LKN 11:30AM on 12/7.   NIHSS: 6 (mild L droop, LUE/LLE drift, mild L hypoesthesia mild dysarthria, L visual field impairment)  Baseline mRS: 2  Not a tenecteplase candidate due to INR >1.7 with use of oral AC.   Not a candidate for thrombectomy due to no LVO on imaging.       Impression: Left hemiparesis, L hypoesthesia and L visual field cut vs neglect due to right brain dysfunction, likely secondary to an acute ischemic stroke. Mechanism uncertain mechanism at this time given incomplete workup, but likely intracranial atherosclerosis  given CTA head findings vs less likely cardioembolic.       Plan:  Imaging/Labs  [] MRI brain w/o contrast  [] TTE   [] HbA1C and Lipid Panel.    Meds  [] Aspirin 81MG PO daily for now (ASA 300MG [place] daily if patient fails dysphagia screen)  [] Discussed with attending, hold home eliquis for now  [] Atorvastatin 80MG QHS, titrate to LDL<70  [] DVT prophylaxis    Other  [] Telemonitoring; Neurochecks and vital signs Q2H  [] Permissive HTN up to 220/120 mmHg for first 48-72 hours after the symptom onset followed by gradual normotension  [] BG goal <180, avoid hypoglycemia  [] NPO until clears dysphagia screen, otherwise swallow evaluation  [] Fall, aspiration precautions  [] PT/OT/ST evaluations  [] Stroke education provided       Seen and discussed with stroke fellow, Dr. Felix.   Mr. Rubi is a 72 year-old  man with a PMH of HTN, HLD, NIDDM2, HFpEF (EF 50% on TTE 9/26/23), atrial fibrillation (on eliquis 5mg BID), prior strokes in 2017 and 2021 (unknown if residual deficits), BIBEMS to the ED from home on 12/7/23 with c/o left sided weakness, for which a stroke code was activated. VS on presentation significant for /114. On evaluation patient was awake, oriented and following commands, but intermittently distractible. Exam notable for mild left hemiparesis and hypoesthesia, mild dysarthria, and possible L visual field impairment vs neglect. Labs notable for Cr 1.87, INR 1.92.   CTH w/o: no acute intracranial pathology  CTA Head w/: distal R ICA stenosis  CTA Neck w/: patent cervical vasculature  CTP: 41ml penumbra in R MCA territory, no core.    LKN 11:30AM on 12/7.   NIHSS: 6 (mild L droop, LUE/LLE drift, mild L hypoesthesia mild dysarthria, L visual field impairment)  Baseline mRS: 2  Not a tenecteplase candidate due to INR >1.7 with use of oral AC.   Not a candidate for thrombectomy due to no LVO on imaging.       Impression: Left hemiparesis, L hypoesthesia and L visual field cut vs neglect due to right brain dysfunction, likely secondary to an acute ischemic stroke. Mechanism uncertain mechanism at this time given incomplete workup, but likely intracranial atherosclerosis  given CTA head findings vs less likely cardioembolic.       Plan:  Imaging/Labs  [] MRI brain w/o contrast  [] TTE   [] HbA1C and Lipid Panel.    Meds  [] Aspirin 81MG PO daily for now (ASA 300MG [place] daily if patient fails dysphagia screen)  [] Discussed with attending, hold home eliquis for now  [] Atorvastatin 80MG QHS, titrate to LDL<70  [] DVT prophylaxis    Other  [] Telemonitoring; Neurochecks and vital signs Q2H  [] Permissive HTN up to 220/120 mmHg for first 48-72 hours after the symptom onset followed by gradual normotension  [] BG goal <180, avoid hypoglycemia  [] NPO until clears dysphagia screen, otherwise swallow evaluation  [] Fall, aspiration precautions  [] PT/OT/ST evaluations  [] Stroke education provided       Seen and discussed with stroke fellow, Dr. Felix.

## 2023-12-07 NOTE — ED PROVIDER NOTE - ATTENDING CONTRIBUTION TO CARE
Attending MD Ley: I personally have seen and examined this patient.  Resident note reviewed and agree on plan of care and except where noted.  See below for details.     STROKE CODE  Seen in CT, neuro team at bedside  LAST KNOWN WELL: 11:30am    72M with PMH/PSH including HTN, DM2, CVAs (2017, 2021), HFrEF 59%, BPH, frequent UTIs presents to the ED with one hour of L sided weakness.  Reports associated nausea, nonbloody, nonbilious emesis.  EMS reports L facial droop.  Reports wife also reports frequent UTIs.  Denies chest pain, shortness of breath, abdominal pain, nausea, vomiting, diarrhea, urinary complaints. Denies fevers.    NIHSS 4 (visual hemianopia, minor facial asymmetry, LUE drift, LLE drift)    TO BE COMPLETED Attending MD Ley: I personally have seen and examined this patient.  Resident note reviewed and agree on plan of care and except where noted.  See below for details.     STROKE CODE  Seen in CT, neuro team at bedside  LAST KNOWN WELL: 11:30am    72M with PMH/PSH including HTN, DM2, CVAs (2017, 2021), HFrEF 59%, BPH, frequent UTIs presents to the ED with one hour of L sided weakness.  Reports associated nausea, nonbloody, nonbilious emesis.  EMS reports L facial droop.  Reports wife also reports frequent UTIs.  Denies chest pain, shortness of breath, abdominal pain, nausea, vomiting, diarrhea, urinary complaints. Denies fevers.    NIHSS 4 (visual hemianopia, minor facial asymmetry, LUE drift, LLE drift)    Exam:   General: NAD  HENT: head NCAT, airway patent  Eyes: anicteric, no conjunctival injection   Lungs: lungs CTAB with good inspiratory effort, no wheezing, no rhonchi, no rales  Cardiac: +S1S2, no obvious m/r/g  GI: abdomen soft with +BS, NT, ND  : no CVAT  MSK: ranging neck and extremities freely  Neuro: moving all extremities spontaneously, visual hemianopia, minor facial asymmetry, LUE drift, LLE drift  Psych: normal mood and affect     A/P: 72M with L sided weakness, concern for ICH, CODE STROKE, neuro at bedside, labs sent, CTH/CTAHN performed, will await results and neuro recs

## 2023-12-07 NOTE — ED ADULT NURSE NOTE - OBJECTIVE STATEMENT
72 y.o M A&Ox3 with PMH of HTN, T2DM, a fib on eliquis, CVAs x 2 (2017 and 2021), HFrEF 59%, BPH, frequent UTIs presents to the ED via EMS from home c/o left-sided facial droop and left sided weakness which started at 1130 AM 1 hr PTA. Last known well is 1130 today. . Pt. hypertensive on arrival sys 200s. Slurred speech noted and L sided weakness. Pt. also endorses slight numbness/tingling to L side compared to Right. Pt. able to follow commands without difficulty. Code stroke initiated and pt. brought immediately to CT scan with neuro at bedside. As per EMS, pt. did not take dose of Eliquis today. Pt. also endorses episode of nausea and vomiting. Denies fevers, chills, dizziness, chest pain, SOB, abdominal pain, dysuria, hematuria. IV access obtained. Pt. placed on CM. Safety and comfort provided.

## 2023-12-07 NOTE — ED ADULT NURSE NOTE - NSFALLHARMRISKINTERV_ED_ALL_ED
Assistance OOB with selected safe patient handling equipment if applicable/Assistance with ambulation/Communicate risk of Fall with Harm to all staff, patient, and family/Monitor gait and stability/Provide visual cue: red socks, yellow wristband, yellow gown, etc/Reinforce activity limits and safety measures with patient and family/Bed in lowest position, wheels locked, appropriate side rails in place/Call bell, personal items and telephone in reach/Instruct patient to call for assistance before getting out of bed/chair/stretcher/Non-slip footwear applied when patient is off stretcher/[place] to call system/Physically safe environment - no spills, clutter or unnecessary equipment/Purposeful Proactive Rounding/Room/bathroom lighting operational, light cord in reach Assistance OOB with selected safe patient handling equipment if applicable/Assistance with ambulation/Communicate risk of Fall with Harm to all staff, patient, and family/Monitor gait and stability/Provide visual cue: red socks, yellow wristband, yellow gown, etc/Reinforce activity limits and safety measures with patient and family/Bed in lowest position, wheels locked, appropriate side rails in place/Call bell, personal items and telephone in reach/Instruct patient to call for assistance before getting out of bed/chair/stretcher/Non-slip footwear applied when patient is off stretcher/[place] to call system/Physically safe environment - no spills, clutter or unnecessary equipment/Purposeful Proactive Rounding/Room/bathroom lighting operational, light cord in reach

## 2023-12-07 NOTE — ED ADULT TRIAGE NOTE - CHIEF COMPLAINT QUOTE
L side weakness since 8am  code stroke called at [phone] L side weakness since 8am  code stroke called at [phone]

## 2023-12-07 NOTE — DISCHARGE NOTE PROVIDER - NSDCFUSCHEDAPPT_GEN_ALL_CORE_FT
Libman, Richard B  [facility] Physician [facility]  NEUROLOGY [address] [facility]  Scheduled Appointment: 12/18/2023     Libman, Richard B  [facility] Physician [facility]  NEUROLOGY [address] [facility]  Scheduled Appointment: 12/18/2023

## 2023-12-07 NOTE — ED ADULT NURSE REASSESSMENT NOTE - NS ED NURSE REASSESS COMMENT FT1
Pt cleaned, repositioned for comfort. Maintained on cont. cardiac monitor and pulse ox for 2L NC. Awaiting bed assignment. Neuro at bedside speaking with family because they want to take pt home. No acute distress.

## 2023-12-07 NOTE — PHYSICAL THERAPY INITIAL EVALUATION ADULT - ADDITIONAL COMMENTS
as per pt and spouse at b/s, resides in a  with spouse, +4 stairs to enter, one flight up to bedroom, PTA, pt amb with rolling walker for the past 6 weeks, required some assist for ADls.

## 2023-12-07 NOTE — H&P ADULT - NSHPLABSRESULTS_GEN_ALL_CORE
LABS:     CBC                       13.5   6.56  )-----------( 203      ( 07 Dec 2023 12:36 )             42.8     Chem 12-07    140  |  106  |  19  ----------------------------<  101<H>  5.0   |  29  |  1.87<H>    Ca    9.6      07 Dec 2023 12:36    TPro  6.2  /  Alb  3.6  /  TBili  0.3  /  DBili  x   /  AST  15  /  ALT  12  /  AlkPhos  61  12-07    Coags PT/INR - ( 07 Dec 2023 12:53 )   PT: 20.7 sec;   INR: 1.92 ratio     PTT - ( 07 Dec 2023 12:53 )  PTT:45.8 sec    LFTs LIVER FUNCTIONS - ( 07 Dec 2023 12:36 )  Alb: 3.6 g/dL / Pro: 6.2 g/dL / ALK PHOS: 61 U/L / ALT: 12 U/L / AST: 15 U/L / GGT: x           U/A Urinalysis Basic - ( 07 Dec 2023 12:36 )  Color: x / Appearance: x / SG: x / pH: x  Gluc: 101 mg/dL / Ketone: x  / Bili: x / Urobili: x   Blood: x / Protein: x / Nitrite: x   Leuk Esterase: x / RBC: x / WBC x   Sq Epi: x / Non Sq Epi: x / Bacteria: x      RADIOLOGY:  CT Head w/o IV Cont: Mild volume loss, microvascular disease, no acute hemorrhage or midline   shift.    CT Perfusion: Tmax rater then 6 of 41 mL which appears predominantly in the right MCA territory though no definite stenosis or occlusion in this distribution is appreciated. No core infarct is seen    CT Angio Neck: Patent, ECAs, ICAs, no  hemodynamically significant stenosis at  ICA origins by NASCET criteria. Bilateral vertebral arteries are patent with the right vertebral terminating at the level of the PICA    CT Angio Head w/ IV Cont:   *** ADDENDUM # 1 ***  There does appear to be significant distal right ICA stenosis appreciated best seen on the sagittal reconstructed images (7:43) as well as on the axial images for: 448-460) at the level of the skull base entering into the cavernous segment. More distally in the cavernous and to the level of the ICA terminus more normal contour is restored.    IMPRESSION: There is evidence of distal right ICA stenosis appreciated at the level of the skull base/proximal cavernous segment with more normal caliber at the level of the ICA terminus region.

## 2023-12-07 NOTE — DISCHARGE NOTE PROVIDER - NSDCCPCAREPLAN_GEN_ALL_CORE_FT
PRINCIPAL DISCHARGE DIAGNOSIS  Diagnosis: Stroke  Assessment and Plan of Treatment: Please follow up with neurologist immediately. The office will call you to schedule an appointment, if you do not hear from them please call [phone]. Continue taking medications as prescribed. If you were prescribed a statin for your cholesterol please make sure you have your liver enzymes checked with your primary care physician. Monitor your blood pressure. Reduce fat, cholesterol and salt in your diet. Increase intake of fruits and vegetables. Limit alcohol to minimum and do not smoke. You may be at risk for falling, make changes to your home to help you walk easier. Keep up to date on vaccinations.  If you experience any symptoms of facial drooping, slurred speech, arm or leg weakness, severe headache, vision changes or any worsening symptoms, notify provider immediately and return to ER.     PRINCIPAL DISCHARGE DIAGNOSIS  Diagnosis: Stroke  Assessment and Plan of Treatment: Please follow up with neurologist immediately. The office will call you to schedule an appointment, if you do not hear from them please call [phone]. Continue taking medications as prescribed. If you were prescribed a statin for your cholesterol please make sure you have your liver enzymes checked with your primary care physician. Monitor your blood pressure. Reduce fat, cholesterol and salt in your diet. Increase intake of fruits and vegetables. Limit alcohol to minimum and do not smoke. You may be at risk for falling, make changes to your home to help you walk easier. Keep up to date on vaccinations.  If you experience any symptoms of facial drooping, slurred speech, arm or leg weakness, severe headache, vision changes or any worsening symptoms, notify provider immediately and return to ER.

## 2023-12-07 NOTE — ED PROVIDER NOTE - PHYSICAL EXAMINATION
GENERAL: NAD  HEENT:  Atraumatic  CHEST/LUNG: Chest rise equal bilaterally  HEART: Regular rate and rhythm  ABDOMEN: Soft, Nontender, Nondistended  EXTREMITIES:  Extremities warm  PSYCH: A&Ox3  SKIN: No obvious rashes or lesions  MSK: No cervical spine TTP, able to range neck to the left and right  NEUROLOGY: strength and sensation intact in all extremities, LUE and LLE drift noted.

## 2023-12-07 NOTE — DISCHARGE NOTE PROVIDER - CARE PROVIDER_API CALL
Kait Vega  Neurology  [address] [place] [address]  Phone: [phone]  Fax: [phone]  Follow Up Time: 2 weeks   Kait Vega  Neurology  [address] [place] [address]  Phone: [phone]  Fax: [phone]  Follow Up Time: 2 weeks   Libman, Richard Benjamin  Neurology  [address] [facility] [address]  [place] [address]  Phone: [phone]  Fax: [phone]  Follow Up Time: 1 week   Libman, Richard Benjamin  Neurology  [address] [facility] [address]  [place] [address]  Phone: [phone]  Fax: [phone]  Follow Up Time: 1 week

## 2023-12-07 NOTE — SPEECH LANGUAGE PATHOLOGY EVALUATION - COMMENTS
Fluctuating mentation and environmental stimuli may be contributing factor to linguistic errors; of note, exam performed as Pt in ED.  Pt also appeared fatigued during exam. n/a Pt known to this dept from previous admission; Per MBS on 9/23/23 recommended: Regular diet, thin liquids 1) Liquids via single, small sips - no straws 2) Intermittent throat clear throughout meals 3) Repeat swallow. FEES 9/22/23 recommended: Purees with tspn of moderately thickened liquids with chin tuck posture and teaspoon amounts only; allow for repeat swallows; allow for swallow between intakes; crush medication (when feasible); maintain upright posture during/after eating for 30 mins; position upright (90 degrees); small sips/bites; CHIN TUCK; TEASPOON AMOUNTS      12/7/2023 CT Brain: IMPRESSION: There is evidence of distal right ICA stenosis appreciated at the level of the skull base/proximal cavernous segment with more normal caliber at the level of the ICA terminus region. Simple paragraph auditory comprehension 5/5; more complex task not completed due to Pt fatigue; Repetition of verbal questions and simple statements intermittently provided. At times Pt lacked response upon initial cue Pt awake, alert in ED with Nsg at b/s upon encounter providing PO meds; Nsg reports Pt passed swallow screen this am, although Pt with h/o dysphagia upon previous admission. Pt served as respondent and lacked communicative intent; +paucity of verbal output with decreased attention to speaker and right gaze preference. D/W Nsg and Neuro; consider swallowing evaluation given Pt h/o dysphagia. Pt awake, alert in ED with Nsg at b/s upon encounter providing PO meds; Nsg reports Pt passed swallow screen this am-documentation not found in [place]; of note, Pt with h/o dysphagia upon previous admission. Pt served as respondent and lacked communicative intent; +paucity of verbal output with decreased attention to speaker and right gaze preference. Simple paragraph auditory comprehension 5/5; more complex task not completed due to Pt fatigue; Repetition of verbal questions and simple statements intermittently provided. At times Pt lacked response upon initial cueing. Pt awake, alert in ED with Nsg at b/s upon encounter providing PO meds; Nsg reports Pt passed swallow screen this am-documentation not found in [place]; of note, Pt with h/o dysphagia upon previous admission. Pt served as respondent and lacked communicative intent; +paucity of verbal output with decreased attention to speaker and right gaze preference.

## 2023-12-07 NOTE — DISCHARGE NOTE PROVIDER - NSDCMRMEDTOKEN_GEN_ALL_CORE_FT
apixaban 5 mg oral tablet: 1 tab(s) orally every 12 hours  atorvastatin 80 mg oral tablet: 1 tab(s) orally once a day  cholecalciferol 25 mcg (1000 intl units) oral tablet: 2 tab(s) orally once a day  escitalopram 20 mg oral tablet: 1 tab(s) orally once a day  fosinopril 40 mg oral tablet: 1 tab(s) orally once a day  glipiZIDE 10 mg oral tablet, extended release: 1 tab(s) orally once a day  Jardiance 25 mg oral tablet: 1 tab(s) orally once a day  metFORMIN 1000 mg oral tablet: 1 tab(s) orally once a day  metoprolol succinate 100 mg oral tablet, extended release: 1 tab(s) orally once a day  omeprazole 40 mg oral delayed release capsule: 1 cap(s) orally once a day  Reglan 10 mg oral tablet: 1 tab(s) orally 3 times a day (before meals)  Welchol 625 mg oral tablet: 1 tab(s) orally 4 times a day after meals   apixaban 5 mg oral tablet: 1 tab(s) orally every 12 hours  atorvastatin 80 mg oral tablet: 1 tab(s) orally once a day  cholecalciferol 25 mcg (1000 intl units) oral tablet: 2 tab(s) orally once a day  escitalopram 20 mg oral tablet: 1 tab(s) orally once a day  fosinopril 40 mg oral tablet: 1 tab(s) orally once a day  glipiZIDE 10 mg oral tablet, extended release: 1 tab(s) orally once a day  Jardiance 25 mg oral tablet: 1 tab(s) orally once a day  metFORMIN 1000 mg oral tablet: 1 tab(s) orally once a day  metoprolol succinate 100 mg oral tablet, extended release: 1 tab(s) orally once a day  occupational therapy: bed mobility training; gait training; transfer training; stairs:  omeprazole 40 mg oral delayed release capsule: 1 cap(s) orally once a day  physical therapy: bed mobility training; gait training; transfer training; stairs:  Reglan 10 mg oral tablet: 1 tab(s) orally 3 times a day (before meals)  Welchol 625 mg oral tablet: 1 tab(s) orally 4 times a day after meals

## 2023-12-07 NOTE — DISCHARGE NOTE PROVIDER - HOSPITAL COURSE
Mr. Rubi is a 72 year-old  man with a PMH of HTN, HLD, NIDDM2, HFpEF (EF 50% on TTE 9/26/23), atrial fibrillation (on eliquis 5mg BID), prior strokes in 2017 and 2021 (unknown if residual deficits), BIBEMS to the ED from home on 12/7/23 with c/o left sided weakness, for which a stroke code was activated. VS on presentation significant for /114. On evaluation patient was awake, oriented and following commands, but intermittently distractible. Exam notable for mild left hemiparesis and hypoesthesia, mild dysarthria, and possible L visual field impairment vs neglect. Labs notable for Cr 1.87, INR 1.92.   CTH w/o: no acute intracranial pathology  CTA Head w/: distal R ICA stenosis  CTA Neck w/: patent cervical vasculature  CTP: 41ml penumbra in R MCA territory, no core.    LKN 11:30AM on 12/7.   NIHSS: 6 (mild L droop, LUE/LLE drift, mild L hypoesthesia mild dysarthria, L visual field impairment)  Baseline mRS: 2  Not a tenecteplase candidate due to INR >1.7 with use of oral AC.   Not a candidate for thrombectomy due to no LVO on imaging.     HEAD CT AND RAPID PERFUSION: HEAD CT:  Mild volume loss, microvascular disease, no acute hemorrhage or midline   shift.  CT PERFUSION demonstrated: Tmax rater then 6 of 41 mL which appears   predominantly in the right MCA territory though no definite stenosis or   occlusion in this distribution is appreciated. No core infarct is seen    CTA COW:  Patent intracranial circulation with evidence of significant   stenosis involving the proximal basilar artery on the basis of   atherosclerotic disease    CTA NECK: Patent, ECAs, ICAs, no  hemodynamically significant stenosis at    ICA origins by NASCET criteria.  Bilateral vertebral arteries are patent with the right vertebral   terminating at the level of the PICA.    Patient and daughter wanted to AMA despite risk of leaving hospital prior to MRI and close monitoring. It was explained the patient is at risk for worsening neurological deficits and possibly death. Daughter verbalized understanding and wants to follow up outpatient.    Mr. Rubi is a 72 year-old  man with a PMH of HTN, HLD, NIDDM2, HFpEF (EF 50% on TTE 9/26/23), atrial fibrillation (on eliquis 5mg BID), prior strokes in 2017 and 2021 (unknown if residual deficits), BIBEMS to the ED from home on 12/7/23 with c/o left sided weakness, for which a stroke code was activated. VS on presentation significant for /114. On evaluation patient was awake, oriented and following commands, but intermittently distractible. Exam notable for mild left hemiparesis and hypoesthesia, mild dysarthria, and possible L visual field impairment vs neglect. Labs notable for Cr 1.87, INR 1.92.   CTH w/o: no acute intracranial pathology  CTA Head w/: distal R ICA stenosis  CTA Neck w/: patent cervical vasculature  CTP: 41ml penumbra in R MCA territory, no core.    LKN 11:30AM on 12/7.   NIHSS: 6 (mild L droop, LUE/LLE drift, mild L hypoesthesia mild dysarthria, L visual field impairment)  Baseline mRS: 2  Not a tenecteplase candidate due to INR >1.7 with use of oral AC.   Not a candidate for thrombectomy due to no LVO on imaging.     HEAD CT AND RAPID PERFUSION: HEAD CT:  Mild volume loss, microvascular disease, no acute hemorrhage or midline   shift.  CT PERFUSION demonstrated: Tmax rater then 6 of 41 mL which appears   predominantly in the right MCA territory though no definite stenosis or   occlusion in this distribution is appreciated. No core infarct is seen    CTA COW:  Patent intracranial circulation with evidence of significant   stenosis involving the proximal basilar artery on the basis of   atherosclerotic disease    CTA NECK: Patent, ECAs, ICAs, no  hemodynamically significant stenosis at    ICA origins by NASCET criteria.  Bilateral vertebral arteries are patent with the right vertebral   terminating at the level of the PICA.    Patient and daughter wanted to AMA despite risk of leaving hospital prior to MRI and close monitoring. It was explained the patient is at risk for worsening neurological deficits, bleeding, headache, recurrent stroke and death. Daughter and patient verbalized understanding and wants to follow up outpatient.    Mr. Rubi is a 72 year-old  man with a PMH of HTN, HLD, NIDDM2, HFpEF (EF 50% on TTE 9/26/23), atrial fibrillation (on eliquis 5mg BID), prior strokes in 2017 and 2021 (unknown if residual deficits), BIBEMS to the ED from home on 12/7/23 with c/o left sided weakness, for which a stroke code was activated. VS on presentation significant for /114. On evaluation patient was awake, oriented and following commands, but intermittently distractible. Exam notable for mild left hemiparesis and hypoesthesia, mild dysarthria, and possible L visual field impairment vs neglect. Labs notable for Cr 1.87, INR 1.92.   CTH w/o: no acute intracranial pathology  CTA Head w/: distal R ICA stenosis  CTA Neck w/: patent cervical vasculature  CTP: 41ml penumbra in R MCA territory, no core.    LKN 11:30AM on 12/7.   NIHSS: 6 (mild L droop, LUE/LLE drift, mild L hypoesthesia mild dysarthria, L visual field impairment)  Baseline mRS: 2  Not a tenecteplase candidate due to INR >1.7 with use of oral AC.   Not a candidate for thrombectomy due to no LVO on imaging.     HEAD CT AND RAPID PERFUSION: HEAD CT:  Mild volume loss, microvascular disease, no acute hemorrhage or midline   shift.  CT PERFUSION demonstrated: Tmax rater then 6 of 41 mL which appears   predominantly in the right MCA territory though no definite stenosis or   occlusion in this distribution is appreciated. No core infarct is seen    CTA COW:  Patent intracranial circulation with evidence of significant   stenosis involving the proximal basilar artery on the basis of   atherosclerotic disease    CTA NECK: Patent, ECAs, ICAs, no  hemodynamically significant stenosis at    ICA origins by NASCET criteria.  Bilateral vertebral arteries are patent with the right vertebral   terminating at the level of the PICA.   Mr. Rubi is a 72 year-old  man with a PMH of HTN, HLD, NIDDM2, HFpEF (EF 50% on TTE 9/26/23), atrial fibrillation (on eliquis 5mg BID), prior strokes in 2017 and 2021 (unknown if residual deficits), BIBEMS to the ED from home on 12/7/23 with c/o left sided weakness, for which a stroke code was activated. VS on presentation significant for /114. On evaluation patient was awake, oriented and following commands, but intermittently distractible. Exam notable for mild left hemiparesis and hypoesthesia, mild dysarthria, and possible L visual field impairment vs neglect. Labs notable for Cr 1.87, INR 1.92.   CTH w/o: no acute intracranial pathology  CTA Head w/: distal R ICA stenosis  CTA Neck w/: patent cervical vasculature  CTP: 41ml penumbra in R MCA territory, no core.    LKN 11:30AM on 12/7.   NIHSS: 6 (mild L droop, LUE/LLE drift, mild L hypoesthesia mild dysarthria, L visual field impairment)  Baseline mRS: 2  Not a tenecteplase candidate due to INR >1.7 with use of oral AC.   Not a candidate for thrombectomy due to no LVO on imaging.     HEAD CT AND RAPID PERFUSION: HEAD CT:  Mild volume loss, microvascular disease, no acute hemorrhage or midline   shift.  CT PERFUSION demonstrated: Tmax rater then 6 of 41 mL which appears   predominantly in the right MCA territory though no definite stenosis or   occlusion in this distribution is appreciated. No core infarct is seen    CTA COW:  Patent intracranial circulation with evidence of significant   stenosis involving the proximal basilar artery on the basis of   atherosclerotic disease    CTA NECK: Patent, ECAs, ICAs, no  hemodynamically significant stenosis at    ICA origins by NASCET criteria.  Bilateral vertebral arteries are patent with the right vertebral   terminating at the level of the PICA.    MRI HEAD:No acute infarction. Stable exam since 9/26/2023.    Cardiology: BP elevated throughout admission, goal -180 due to right carotid stenosis.    Impression: transient left hemiparesis likley due to a TIA, symptomatic right carotid stenosis.           Mr. Rubi is a 72 year-old  man with a PMH of HTN, HLD, NIDDM2, HFpEF (EF 50% on TTE 9/26/23), atrial fibrillation (on eliquis 5mg BID), prior strokes in 2017 and 2021 (unknown if residual deficits), BIBEMS to the ED from home on 12/7/23 with c/o left sided weakness, for which a stroke code was activated. VS on presentation significant for /114. On evaluation patient was awake, oriented and following commands, but intermittently distractible. Exam notable for mild left hemiparesis and hypoesthesia, mild dysarthria, and possible L visual field impairment vs neglect. Labs notable for Cr 1.87, INR 1.92.   CTH w/o: no acute intracranial pathology  CTA Head w/: distal R ICA stenosis  CTA Neck w/: patent cervical vasculature  CTP: 41ml penumbra in R MCA territory, no core.    LKN 11:30AM on 12/7.   NIHSS: 6 (mild L droop, LUE/LLE drift, mild L hypoesthesia mild dysarthria, L visual field impairment)  Baseline mRS: 2  Not a tenecteplase candidate due to INR >1.7 with use of oral AC.   Not a candidate for thrombectomy due to no LVO on imaging.     HEAD CT AND RAPID PERFUSION: HEAD CT:  Mild volume loss, microvascular disease, no acute hemorrhage or midline   shift.  CT PERFUSION demonstrated: Tmax rater then 6 of 41 mL which appears   predominantly in the right MCA territory though no definite stenosis or   occlusion in this distribution is appreciated. No core infarct is seen    CTA COW:  Patent intracranial circulation with evidence of significant   stenosis involving the proximal basilar artery on the basis of   atherosclerotic disease    CTA NECK: Patent, ECAs, ICAs, no  hemodynamically significant stenosis at    ICA origins by NASCET criteria.  Bilateral vertebral arteries are patent with the right vertebral   terminating at the level of the PICA.    MRI HEAD:No acute infarction. Stable exam since 9/26/2023.    Cardiology: BP elevated throughout admission, goal -180 due to right carotid stenosis.    Impression: transient left hemiparesis likely due to a TIA, symptomatic right carotid stenosis.           Mr. Rubi is a 72 year-old  man with a PMH of HTN, HLD, NIDDM2, HFpEF (EF 50% on TTE 9/26/23), atrial fibrillation (on eliquis 5mg BID), prior strokes in 2017 and 2021 (unknown if residual deficits), BIBEMS to the ED from home on 12/7/23 with c/o left sided weakness, for which a stroke code was activated. VS on presentation significant for /114. On evaluation patient was awake, oriented and following commands, but intermittently distractible. Exam notable for mild left hemiparesis and hypoesthesia, mild dysarthria, and possible L visual field impairment vs neglect. Labs notable for Cr 1.87, INR 1.92.     Imaging:    CTH w/o: no acute intracranial pathology  CTA Head w/: distal R ICA stenosis  CTA Neck w/: patent cervical vasculature  CTP: 41ml penumbra in R MCA territory, no core.    LKN 11:30AM on 12/7.   NIHSS: 6 (mild L droop, LUE/LLE drift, mild L hypoesthesia mild dysarthria, L visual field impairment)  Baseline mRS: 2  Not a tenecteplase candidate due to INR >1.7 with use of oral AC.   Not a candidate for thrombectomy due to no LVO on imaging.     HEAD CT AND RAPID PERFUSION: HEAD CT:  Mild volume loss, microvascular disease, no acute hemorrhage or midline   shift.  CT PERFUSION demonstrated: Tmax rater then 6 of 41 mL which appears   predominantly in the right MCA territory though no definite stenosis or   occlusion in this distribution is appreciated. No core infarct is seen    CTA COW:  Patent intracranial circulation with evidence of significant   stenosis involving the proximal basilar artery on the basis of   atherosclerotic disease    CTA NECK: Patent, ECAs, ICAs, no  hemodynamically significant stenosis at    ICA origins by NASCET criteria.  Bilateral vertebral arteries are patent with the right vertebral   terminating at the level of the PICA.    MRI HEAD 12/07:No acute infarction. Stable exam since 9/26/2023.    MRI brain 12/08: Right vertebral artery ends at the PICA. Moderate distal left V4 segment stenosis and mild stenosis of the proximal basilar artery.   Mild stenosis of the precavernous right internal carotid artery.    Cardiology: BP elevated throughout admission, pr restarted on home meds, and instructed to f/u with cardiologist for BP management     Impression: transient left hemiparesis likely due to  ischemic infarct recrudescence     Routine EEP ordered to r/o seizure activity due to change in mental status     UA Negative     PT/OT eval with recommendations for home PT/OT    Pt medically cleared for d/c home, workup is completed

## 2023-12-07 NOTE — SPEECH LANGUAGE PATHOLOGY EVALUATION - SLP DIAGNOSIS
Pt is a 72 year old male with h/o dysphagia upon previous admission, admitted with CVA who presents with dysarthria in addition to some cognitive linguistic deficits given paucity of verbal output, decreased verbal fluency, decreased sustained attention, decreased flexible thinking and reduced reasoning skills. Pt fatigued as exam continued and SLP to f/u at a later date to further probe skills. Fluctuating mentation may be a contributing factor to linguistic deficits with impaired executive function noted.  Pt oriented x 3.

## 2023-12-07 NOTE — PHYSICAL THERAPY INITIAL EVALUATION ADULT - PERTINENT HX OF CURRENT PROBLEM, REHAB EVAL
72 y.o M A&Ox3 with PMH of HTN, T2DM, a fib on eliquis, CVAs x 2 (2017 and 2021), HFrEF 59%, BPH, frequent UTIs presents to the ED via EMS from home c/o left-sided facial droop and left sided weakness which started at 1130 AM 1 hr PTA. Last known well is 1130 today. . Pt. hypertensive on arrival sys 200s. Slurred speech noted and L sided weakness. Pt. also endorses slight numbness/tingling to L side compared to Right. Pt. able to follow commands without difficulty. Code stroke initiated and pt. brought immediately to CT scan with neuro at bedside. As per EMS, pt. did not take dose of Eliquis today. Pt. also endorses episode of nausea and vomiting. Denies fevers, chills, dizziness, chest pain, SOB, abdominal pain, dysuria, hematuria. s/p code stroke on 12/7.

## 2023-12-07 NOTE — H&P ADULT - HISTORY OF PRESENT ILLNESS
Mr. Rubi is a 72 year-old  man with a PMH of HTN, HLD, DM2, HFpEF (EF 50% on TTE 9/26/23), atrial fibrillation (on eliquis 5mg BID), prior strokes in 2017 and 2021 (unknown if residual deficits), BIBEMS to the ED from home on 12/7/23 with c/o left sided weakness, for which a stroke code was activated. LKN reported to be 11:30AM on 12/7. Patient woke up in his usual state of health in the AM. He states that while eating breakfast, he experienced left sided weakness. There was associated nausea and NBNB emesis as well. He reports compliance with his home eliquis and states his last dose was the night prior to presentation. He has a remote smoking history (quit 30 years prior) and does not report alcohol use.       NIHSS: 6 (mild L droop, LUE/LLE drift, mild L hypoesthia, mild dysarthria, L visual field impairment)  Baseline mRS: 2  Not a tenecteplase candidate due to INR >1.7 with use of oral AC.   Not a candidate for thrombectomy due to no LVO on imaging.    Mr. Rubi is a 72 year-old  man with a PMH of HTN, HLD, NIDDM2, HFpEF (EF 50% on TTE 9/26/23), atrial fibrillation (on eliquis 5mg BID), prior strokes in 2017 and 2021 (unknown if residual deficits), BIBEMS to the ED from home on 12/7/23 with c/o left sided weakness, for which a stroke code was activated. LKN reported to be 11:30AM on 12/7. Patient woke up in his usual state of health in the AM. He states that while eating breakfast, he experienced left sided weakness. There was associated nausea and NBNB emesis as well. He reports compliance with his home eliquis and states his last dose was the night prior to presentation. He has a remote smoking history (quit 30 years prior) and does not report alcohol use.       NIHSS: 6 (mild L droop, LUE/LLE drift, mild L hypoesthia, mild dysarthria, L visual field impairment)  Baseline mRS: 2  Not a tenecteplase candidate due to INR >1.7 with use of oral AC.   Not a candidate for thrombectomy due to no LVO on imaging.

## 2023-12-07 NOTE — STROKE CODE NOTE - NS SC SS NIH ATYP SYMPTOMS_GEN_A_CORE
None
normal (ped)...
O-Z Flap Text: The defect edges were debeveled with a #15 scalpel blade.  Given the location of the defect, shape of the defect and the proximity to free margins an O-Z flap was deemed most appropriate.  Using a sterile surgical marker, an appropriate transposition flap was drawn incorporating the defect and placing the expected incisions within the relaxed skin tension lines where possible. The area thus outlined was incised deep to adipose tissue with a #15 scalpel blade.  The skin margins were undermined to an appropriate distance in all directions utilizing iris scissors.

## 2023-12-07 NOTE — ED PROVIDER NOTE - PROGRESS NOTE DETAILS
Douglsa ROSE: Discussed w/ neurology fellow, who states that pt is not a TNK candidate since INR is elevated. Will admit to stroke unit. Douglas ROSE: Discussed w/ neurology fellow, who states that pt is not a TNK candidate since INR is elevated. Will admit to stroke unit.

## 2023-12-07 NOTE — DISCHARGE NOTE PROVIDER - PROVIDER TOKENS
PROVIDER:[TOKEN:[[id number]],FOLLOWUP:[2 weeks]] PROVIDER:[TOKEN:[[id number]],FOLLOWUP:[2 weeks]] PROVIDER:[TOKEN:[[id number]],FOLLOWUP:[1 week]]

## 2023-12-07 NOTE — ED ADULT NURSE REASSESSMENT NOTE - NS ED NURSE REASSESS COMMENT FT1
received report from SASHA Hernandez @ [id number]. pt medicated by day shift RN at 1859 per orders d/t BP of 214/108. bed assignment placed and transporter arrives at bedside to take pt to assigned bed. per Charge RN, sent pt to unit now and call to give report to inpatient RN. just prior to transport, BP rechecked and is 207/97, vitals otherwise stable and pt mentating well. report given to inpatient RN by day shift SASHA Hernandez, inpatient RN made aware of current BP. received report from SASHA Hernandez @ [id number]. pt medicated by day shift RN at 1859 per orders d/t BP of 214/108. bed assignment placed and transporter arrives at bedside to take pt to assigned bed. per Charge RN, sent pt to unit now and call to give report to inpatient RN. just prior to transport, BP rechecked and is 207/97, vitals otherwise stable and pt mentating well. report given to inpatient RN by day shift SASHA Hernandez, inpatient RN made aware of current BP.

## 2023-12-07 NOTE — ED PROVIDER NOTE - OBJECTIVE STATEMENT
72F Hx HTN, T2DM, CVAs x 2 (2017 and 2021), HFrEF 59%, BPH, frequent UTIs c/o 1 hour history of left-sided facial droop and left sided weakness. Pt admits to nausea and NBNB vomiting, otherwise asymptomatic. Denies fevers, chills, dizziness, chest pain, SOB, abdominal pain, dysuria, hematuria. 72F Hx HTN, T2DM, CVAs x 2 (2017 and 2021), HFrEF 59%, BPH, frequent UTIs c/o 1 hour history of left-sided facial droop and left sided weakness. Pt admits to nausea and NBNB vomiting, otherwise asymptomatic. Denies fevers, chills, dizziness, chest pain, SOB, abdominal pain, dysuria, hematuria.    LAST KNOWN NORMAL: 11:30 am EST ( 1 hour prior to ED arrival)

## 2023-12-07 NOTE — H&P ADULT - NSHPPHYSICALEXAM_GEN_ALL_CORE
Vital Signs Last 24 Hrs  T(C): 36.8 (07 Dec 2023 13:00), Max: 36.8 (07 Dec 2023 13:00)  T(F): 98.2 (07 Dec 2023 13:00), Max: 98.2 (07 Dec 2023 13:00)  HR: 75 (07 Dec 2023 13:45) (71 - 78)  BP: 174/111 (07 Dec 2023 13:45) (174/111 - 210/115)  RR: 21 (07 Dec 2023 13:45) (17 - 21)  SpO2: 100% (07 Dec 2023 13:45) (91% - 100%)    Parameters below as of 07 Dec 2023 13:45  Patient On (Oxygen Delivery Method): nasal cannula  O2 Flow (L/min): 2    General:  Constitutional: appears stated age, in no apparent distress including pain.  Head: Normocephalic & atraumatic.  ENT: Patent ear canals, intact TM, mucus membranes moist & pink, neck supple, no lymphadenopathy.   Respiratory: Breathing comfortably on room air.  Extremities: No cyanosis, clubbing, or edema.  Skin: No rashes, bruising, or discoloration.    Neurological (>12):  MS: Eyes open, awake, alert and oriented to person, place, time, and situation. Follows most commands.    Speech/Language: Mildly slurred. Repetition and naming intact..     CNs: PERRL (3 -> 2mm OU). Possible L visual field impairment. EOMI OU, no nystagmus. V1-3 intact to LT but report of decreased PP on L V1-3.  Well developed masseter muscles b/l. Mild L facial droop . Full eye closure strength b/l. Hearing grossly intact to conversation. Symmetric palate elevation in midline, no uvula deviation. Shoulder shrug intact b/l. Tongue midline on protrusion.    Motor: RUE & RLE antigravity without drift. LUE & LLE antigravity with drift, but without hitting the bed.    Sensation: Decreased to LT/PP in LUE/LLE (50%).     Coordination: No dysmetria to FTN.    Gait: Not assessed due to clinical condition.

## 2023-12-07 NOTE — ED ADULT NURSE NOTE - NS ED NOTE ABUSE SUSPICION NEGLECT YN
ANTICOAGULATION FOLLOW-UP CLINIC VISIT    Patient Name:  Kate Chacon  Date:  8/3/2020  Contact Type:  Face to Face    SUBJECTIVE:  Patient Findings         Clinical Outcomes     Negatives:   Major bleeding event, Thromboembolic event, Anticoagulation-related hospital admission, Anticoagulation-related ED visit, Anticoagulation-related fatality           OBJECTIVE    Recent labs: (last 7 days)     08/03/20   INR 3.0*       ASSESSMENT / PLAN  INR assessment THER    Recheck INR In: 2 WEEKS    INR Location Clinic      Anticoagulation Summary  As of 8/3/2020    INR goal:   2.0-3.0   TTR:   50.1 % (1 y)   INR used for dosing:   3.0 (8/3/2020)   Warfarin maintenance plan:   2.5 mg (5 mg x 0.5) every Wed; 5 mg (5 mg x 1) all other days   Full warfarin instructions:   2.5 mg every Wed; 5 mg all other days   Weekly warfarin total:   32.5 mg   No change documented:   Sara Rodriguez RN   Plan last modified:   Radha Burns RN (7/13/2020)   Next INR check:   8/17/2020   Priority:   High   Target end date:   Indefinite    Indications    Paroxysmal atrial fibrillation (H) [I48.0]  Anticoagulation goal of INR 2 to 3 [Z51.81  Z79.01]             Anticoagulation Episode Summary     INR check location:       Preferred lab:       Send INR reminders to:   [facility]    Comments:         Anticoagulation Care Providers     Provider Role Specialty Phone number    Asher Campos MD [facility] [phone]            See the Encounter Report to view Anticoagulation Flowsheet and Dosing Calendar (Go to Encounters tab in chart review, and find the Anticoagulation Therapy Visit)        Sara Rodriguez RN                 
No

## 2023-12-07 NOTE — H&P ADULT - ATTENDING COMMENTS
I saw and examined the patient on AM stroke rounds on 12/8.  This is a late attestation.    .   Admitted overnight for L sided weakness, and L visual deficits, on exam in ED had NIHSS of 6.     Symptoms improved after about 2 hours per patient.     On exam:   GEN: NAD  NEURO:   Awake, alert, oriented to month, year, hospital, normal naming, repetition, and fluency, has intact insight, and follows cross body commands.    Pupils 3-2mm, symmetric, full Vf's, normal EOM, no nystagmus, no facial weakness, no dysarthria, tongue midline.   MOTOR: normal bulk and tone.  No drift.  5/5 bilateral , deltoids, biceps, and triceps.  Hip flexors 4/5 symmetric, knee extensors 5/5.   SENSORY intact symmetrically to light touch.  No extinction.   COORDINATIOn: normal FNF    MRI brain images reviewed:  no diffusion restriction.  Multiple areas of FLAIR hyperintensity, some with central hypointensity, throughout the white matter and deep nuclei.    CTP images reviewed: perfusion deficit in the R REEMA/MCA and MCA/PCA watershed areas.   CTA H&N images reviewed; stenosis, possibly high grade in the R distal petrous segment of ICA.     AP: 72 year old man with afib on eliquis, CHF, HTN, HLD, DM, prior tobacco use, chronic strokes, baseline MRS 4 (uses a walker), presenting with L hemiparesis and neglect, lasting about 2 hours, now resolved.  on exam this AM, no lateralizing neuro findings.  Imaging does not show an acute ischemic stroke, but there area watershed areas of diminished perfusion.  The R ICA is stenotic at the petrous segment, compared to prior vessel imaging, may be progressed.   Overall, suspect possible hypoperfusion of R hemisphere in setting of carotid stenosis, may have improved with elevated BP.    Will obtain an MR Nova to assess the R carotid, and possible need for endovascular revascularization.    Continue on eliquis 5mg BId for now for stroke prevention in setting of afib.

## 2023-12-08 ENCOUNTER — TRANSCRIPTION ENCOUNTER (OUTPATIENT)
Age: 73
End: 2023-12-08

## 2023-12-08 DIAGNOSIS — I48.19 OTHER PERSISTENT ATRIAL FIBRILLATION: ICD-10-CM

## 2023-12-08 DIAGNOSIS — E11.9 TYPE 2 DIABETES MELLITUS WITHOUT COMPLICATIONS: ICD-10-CM

## 2023-12-08 DIAGNOSIS — I63.9 CEREBRAL INFARCTION, UNSPECIFIED: ICD-10-CM

## 2023-12-08 DIAGNOSIS — I10 ESSENTIAL (PRIMARY) HYPERTENSION: ICD-10-CM

## 2023-12-08 LAB
A1C WITH ESTIMATED AVERAGE GLUCOSE RESULT: 5.8 % — HIGH (ref 4–5.6)
A1C WITH ESTIMATED AVERAGE GLUCOSE RESULT: 5.8 % — HIGH (ref 4–5.6)
ANION GAP SERPL CALC-SCNC: 11 MMOL/L — SIGNIFICANT CHANGE UP (ref 5–17)
ANION GAP SERPL CALC-SCNC: 11 MMOL/L — SIGNIFICANT CHANGE UP (ref 5–17)
BUN SERPL-MCNC: 16 MG/DL — SIGNIFICANT CHANGE UP (ref 7–23)
BUN SERPL-MCNC: 16 MG/DL — SIGNIFICANT CHANGE UP (ref 7–23)
CALCIUM SERPL-MCNC: 9.9 MG/DL — SIGNIFICANT CHANGE UP (ref 8.4–10.5)
CALCIUM SERPL-MCNC: 9.9 MG/DL — SIGNIFICANT CHANGE UP (ref 8.4–10.5)
CHLORIDE SERPL-SCNC: 105 MMOL/L — SIGNIFICANT CHANGE UP (ref 96–108)
CHLORIDE SERPL-SCNC: 105 MMOL/L — SIGNIFICANT CHANGE UP (ref 96–108)
CHOLEST SERPL-MCNC: 163 MG/DL — SIGNIFICANT CHANGE UP
CHOLEST SERPL-MCNC: 163 MG/DL — SIGNIFICANT CHANGE UP
CO2 SERPL-SCNC: 27 MMOL/L — SIGNIFICANT CHANGE UP (ref 22–31)
CO2 SERPL-SCNC: 27 MMOL/L — SIGNIFICANT CHANGE UP (ref 22–31)
CREAT SERPL-MCNC: 1.64 MG/DL — HIGH (ref 0.5–1.3)
CREAT SERPL-MCNC: 1.64 MG/DL — HIGH (ref 0.5–1.3)
EGFR: 44 ML/MIN/1.73M2 — LOW
EGFR: 44 ML/MIN/1.73M2 — LOW
ESTIMATED AVERAGE GLUCOSE: 120 MG/DL — HIGH (ref 68–114)
ESTIMATED AVERAGE GLUCOSE: 120 MG/DL — HIGH (ref 68–114)
GLUCOSE BLDC GLUCOMTR-MCNC: 183 MG/DL — HIGH (ref 70–99)
GLUCOSE BLDC GLUCOMTR-MCNC: 183 MG/DL — HIGH (ref 70–99)
GLUCOSE BLDC GLUCOMTR-MCNC: 228 MG/DL — HIGH (ref 70–99)
GLUCOSE BLDC GLUCOMTR-MCNC: 228 MG/DL — HIGH (ref 70–99)
GLUCOSE SERPL-MCNC: 60 MG/DL — LOW (ref 70–99)
GLUCOSE SERPL-MCNC: 60 MG/DL — LOW (ref 70–99)
HCT VFR BLD CALC: 43.9 % — SIGNIFICANT CHANGE UP (ref 39–50)
HCT VFR BLD CALC: 43.9 % — SIGNIFICANT CHANGE UP (ref 39–50)
HDLC SERPL-MCNC: 66 MG/DL — SIGNIFICANT CHANGE UP
HDLC SERPL-MCNC: 66 MG/DL — SIGNIFICANT CHANGE UP
HGB BLD-MCNC: 14.2 G/DL — SIGNIFICANT CHANGE UP (ref 13–17)
HGB BLD-MCNC: 14.2 G/DL — SIGNIFICANT CHANGE UP (ref 13–17)
LIPID PNL WITH DIRECT LDL SERPL: 79 MG/DL — SIGNIFICANT CHANGE UP
LIPID PNL WITH DIRECT LDL SERPL: 79 MG/DL — SIGNIFICANT CHANGE UP
MCHC RBC-ENTMCNC: 29.3 PG — SIGNIFICANT CHANGE UP (ref 27–34)
MCHC RBC-ENTMCNC: 29.3 PG — SIGNIFICANT CHANGE UP (ref 27–34)
MCHC RBC-ENTMCNC: 32.3 GM/DL — SIGNIFICANT CHANGE UP (ref 32–36)
MCHC RBC-ENTMCNC: 32.3 GM/DL — SIGNIFICANT CHANGE UP (ref 32–36)
MCV RBC AUTO: 90.5 FL — SIGNIFICANT CHANGE UP (ref 80–100)
MCV RBC AUTO: 90.5 FL — SIGNIFICANT CHANGE UP (ref 80–100)
NON HDL CHOLESTEROL: 98 MG/DL — SIGNIFICANT CHANGE UP
NON HDL CHOLESTEROL: 98 MG/DL — SIGNIFICANT CHANGE UP
NRBC # BLD: 0 /100 WBCS — SIGNIFICANT CHANGE UP (ref 0–0)
NRBC # BLD: 0 /100 WBCS — SIGNIFICANT CHANGE UP (ref 0–0)
NT-PROBNP SERPL-SCNC: HIGH PG/ML (ref 0–300)
NT-PROBNP SERPL-SCNC: HIGH PG/ML (ref 0–300)
PLATELET # BLD AUTO: 192 K/UL — SIGNIFICANT CHANGE UP (ref 150–400)
PLATELET # BLD AUTO: 192 K/UL — SIGNIFICANT CHANGE UP (ref 150–400)
POTASSIUM SERPL-MCNC: 4.5 MMOL/L — SIGNIFICANT CHANGE UP (ref 3.5–5.3)
POTASSIUM SERPL-MCNC: 4.5 MMOL/L — SIGNIFICANT CHANGE UP (ref 3.5–5.3)
POTASSIUM SERPL-SCNC: 4.5 MMOL/L — SIGNIFICANT CHANGE UP (ref 3.5–5.3)
POTASSIUM SERPL-SCNC: 4.5 MMOL/L — SIGNIFICANT CHANGE UP (ref 3.5–5.3)
RBC # BLD: 4.85 M/UL — SIGNIFICANT CHANGE UP (ref 4.2–5.8)
RBC # BLD: 4.85 M/UL — SIGNIFICANT CHANGE UP (ref 4.2–5.8)
RBC # FLD: 13.2 % — SIGNIFICANT CHANGE UP (ref 10.3–14.5)
RBC # FLD: 13.2 % — SIGNIFICANT CHANGE UP (ref 10.3–14.5)
SODIUM SERPL-SCNC: 143 MMOL/L — SIGNIFICANT CHANGE UP (ref 135–145)
SODIUM SERPL-SCNC: 143 MMOL/L — SIGNIFICANT CHANGE UP (ref 135–145)
TRIGL SERPL-MCNC: 102 MG/DL — SIGNIFICANT CHANGE UP
TRIGL SERPL-MCNC: 102 MG/DL — SIGNIFICANT CHANGE UP
WBC # BLD: 6.85 K/UL — SIGNIFICANT CHANGE UP (ref 3.8–10.5)
WBC # BLD: 6.85 K/UL — SIGNIFICANT CHANGE UP (ref 3.8–10.5)
WBC # FLD AUTO: 6.85 K/UL — SIGNIFICANT CHANGE UP (ref 3.8–10.5)
WBC # FLD AUTO: 6.85 K/UL — SIGNIFICANT CHANGE UP (ref 3.8–10.5)

## 2023-12-08 PROCEDURE — 99222 1ST HOSP IP/OBS MODERATE 55: CPT

## 2023-12-08 PROCEDURE — 93306 TTE W/DOPPLER COMPLETE: CPT | Mod: 26

## 2023-12-08 PROCEDURE — 70544 MR ANGIOGRAPHY HEAD W/O DYE: CPT | Mod: 26

## 2023-12-08 RX ORDER — METOPROLOL TARTRATE 50 MG
75 TABLET ORAL ONCE
Refills: 0 | Status: COMPLETED | OUTPATIENT
Start: 2023-12-08 | End: 2023-12-08

## 2023-12-08 RX ORDER — APIXABAN 2.5 MG/1
5 TABLET, FILM COATED ORAL EVERY 12 HOURS
Refills: 0 | Status: DISCONTINUED | OUTPATIENT
Start: 2023-12-08 | End: 2023-12-10

## 2023-12-08 RX ORDER — METOPROLOL TARTRATE 50 MG
100 TABLET ORAL DAILY
Refills: 0 | Status: DISCONTINUED | OUTPATIENT
Start: 2023-12-09 | End: 2023-12-10

## 2023-12-08 RX ORDER — LISINOPRIL 2.5 MG/1
40 TABLET ORAL DAILY
Refills: 0 | Status: DISCONTINUED | OUTPATIENT
Start: 2023-12-08 | End: 2023-12-10

## 2023-12-08 RX ADMIN — HEPARIN SODIUM 5000 UNIT(S): 5000 INJECTION INTRAVENOUS; SUBCUTANEOUS at 06:08

## 2023-12-08 RX ADMIN — LISINOPRIL 40 MILLIGRAM(S): 2.5 TABLET ORAL at 07:27

## 2023-12-08 RX ADMIN — Medication 75 MILLIGRAM(S): at 07:27

## 2023-12-08 RX ADMIN — ATORVASTATIN CALCIUM 80 MILLIGRAM(S): 80 TABLET, FILM COATED ORAL at 21:25

## 2023-12-08 RX ADMIN — PANTOPRAZOLE SODIUM 40 MILLIGRAM(S): 20 TABLET, DELAYED RELEASE ORAL at 06:08

## 2023-12-08 RX ADMIN — Medication 2000 UNIT(S): at 12:27

## 2023-12-08 RX ADMIN — APIXABAN 5 MILLIGRAM(S): 2.5 TABLET, FILM COATED ORAL at 18:10

## 2023-12-08 RX ADMIN — Medication 25 MILLIGRAM(S): at 06:08

## 2023-12-08 RX ADMIN — APIXABAN 5 MILLIGRAM(S): 2.5 TABLET, FILM COATED ORAL at 08:29

## 2023-12-08 NOTE — DISCHARGE NOTE NURSING/CASE MANAGEMENT/SOCIAL WORK - PATIENT PORTAL LINK FT
You can access the FollowMyHealth Patient Portal offered by [facility] by registering at the following website: [email or web address]. By joining "[employer]’s FollowMyHealth portal, you will also be able to view your health information using other applications (apps) compatible with our system. You can access the FollowMyHealth Patient Portal offered by [facility] by registering at the following website: [email or web address]. By joining [employer]’s FollowMyHealth portal, you will also be able to view your health information using other applications (apps) compatible with our system.

## 2023-12-08 NOTE — OCCUPATIONAL THERAPY INITIAL EVALUATION ADULT - NSOTDISCHREC_GEN_A_CORE
for ADLs and safety assessment in home environment, supervision/assist for functional activities from wife and HHA/Home OT

## 2023-12-08 NOTE — CONSULT NOTE ADULT - SUBJECTIVE AND OBJECTIVE BOX
CHIEF COMPLAINT:  Follows with Dr. Conner  HISTORY OF PRESENT ILLNESS: 72 year-old  man with a PMH of HTN, HLD, NIDDM2, HFpEF (EF 50% on TTE 9/26/23), atrial fibrillation (on eliquis 5mg BID), prior strokes in 2017 and 2021 (unknown if residual deficits), BIBEMS to the ED from home on 12/7/23 with c/o left sided weakness, for which a stroke code was activated. LKN reported to be 11:30AM on 12/7. Patient woke up in his usual state of health in the AM. He states that while eating breakfast, he experienced left sided weakness. There was associated nausea and NBNB emesis as well. He reports compliance with his home eliquis and states his last dose was the night prior to presentation. He has a remote smoking history (quit 30 years prior) and does not report alcohol use.   NIHSS: 6 (mild L droop, LUE/LLE drift, mild L hypoesthia, mild dysarthria, L visual field impairment)  Baseline mRS: 2  Not a tenecteplase candidate due to INR >1.7 with use of oral AC.   Not a candidate for thrombectomy due to no LVO on imaging.   Has not been fully compliant with Eliquis. Hes missed a couple of doses      PAST MEDICAL & SURGICAL HISTORY:  Diabetes Mellitus Type II      HTN (Hypertension)      Hypercholesterolemia      BPH with elevated PSA      Stroke  2017, 10/2021:  with right diplobia resolving in a week      S/P colonoscopy              MEDICATIONS:  apixaban 5 milliGRAM(s) Oral every 12 hours  hydrALAZINE Injectable 5 milliGRAM(s) IV Push every 6 hours PRN  lisinopril 40 milliGRAM(s) Oral daily          metoclopramide 10 milliGRAM(s) Oral three times a day before meals PRN  pantoprazole    Tablet 40 milliGRAM(s) Oral before breakfast    atorvastatin 80 milliGRAM(s) Oral at bedtime  dextrose 50% Injectable 25 Gram(s) IV Push once  dextrose 50% Injectable 12.5 Gram(s) IV Push once  dextrose 50% Injectable 25 Gram(s) IV Push once  dextrose Oral Gel 15 Gram(s) Oral once PRN  glucagon  Injectable 1 milliGRAM(s) IntraMuscular once    cholecalciferol 2000 Unit(s) Oral daily  dextrose 5%. 1000 milliLiter(s) IV Continuous <Continuous>  dextrose 5%. 1000 milliLiter(s) IV Continuous <Continuous>  sodium chloride 0.9%. 1000 milliLiter(s) IV Continuous <Continuous>      FAMILY HISTORY:  No pertinent family history in first degree relatives        SOCIAL HISTORY:    [ ] Non-smoker  [ ] Smoker  [ ] Alcohol    Allergies    No Known Allergies    Intolerances    	    REVIEW OF SYSTEMS:  CONSTITUTIONAL: No fever, weight loss, or fatigue  EYES: No eye pain, visual disturbances, or discharge  ENMT:  No difficulty hearing, tinnitus, vertigo; No sinus or throat pain  NECK: No pain or stiffness  RESPIRATORY: No cough, wheezing, chills or hemoptysis; No Shortness of Breath  CARDIOVASCULAR: No chest pain, palpitations, passing out, dizziness, or leg swelling  GASTROINTESTINAL: No abdominal or epigastric pain. No nausea, vomiting, or hematemesis; No diarrhea or constipation. No melena or hematochezia.  GENITOURINARY: No dysuria, frequency, hematuria, or incontinence  NEUROLOGICAL: No headaches, memory loss, loss of strength, numbness, or tremors  SKIN: No itching, burning, rashes, or lesions   LYMPH Nodes: No enlarged glands  ENDOCRINE: No heat or cold intolerance; No hair loss  MUSCULOSKELETAL: No joint pain or swelling; No muscle, back, or extremity pain  PSYCHIATRIC: No depression, anxiety, mood swings, or difficulty sleeping  HEME/LYMPH: No easy bruising, or bleeding gums  ALLERY AND IMMUNOLOGIC: No hives or eczema	    [ ] All others negative	  [ ] Unable to obtain    PHYSICAL EXAM:  T(C): 37.1 (12-08-23 @ 08:38), Max: 37.1 (12-08-23 @ 08:38)  HR: 78 (12-08-23 @ 08:38) (71 - 93)  BP: 172/76 (12-08-23 @ 08:38) (172/76 - 231/120)  RR: 18 (12-08-23 @ 08:38) (17 - 21)  SpO2: 98% (12-08-23 @ 08:38) (91% - 100%)  Wt(kg): --  I&O's Summary      Appearance: Normal	  HEENT:   Normal oral mucosa, PERRL, EOMI	  Lymphatic: No lymphadenopathy  Cardiovascular: Normal S1 S2, No JVD, No murmurs, No edema  Respiratory: Lungs clear to auscultation	  Psychiatry: A & O x 3, Mood & affect appropriate  Gastrointestinal:  Soft, Non-tender, + BS	  Skin: No rashes, No ecchymoses, No cyanosis	  Neurological (>12):  MS: Eyes open, awake, alert and oriented to person, place, time, and situation. Follows most commands.    Speech/Language: Mildly slurred. Repetition and naming intact..     CNs: PERRL (3 -> 2mm OU). Possible L visual field impairment. EOMI OU, no nystagmus. V1-3 intact to LT but report of decreased PP on L V1-3.  Well developed masseter muscles b/l. Mild L facial droop . Full eye closure strength b/l. Hearing grossly intact to conversation. Symmetric palate elevation in midline, no uvula deviation. Shoulder shrug intact b/l. Tongue midline on protrusion.    Motor: RUE & RLE antigravity without drift. LUE & LLE antigravity with drift, but without hitting the bed.    Sensation: Decreased to LT/PP in LUE/LLE (50%).     Coordination: No dysmetria to FTN.    Gait: Not assessed due to clinical condition.  Extremities: Normal range of motion, No clubbing, cyanosis or edema  Vascular: Peripheral pulses palpable 2+ bilaterally    TELEMETRY: 	    ECG:  	  RADIOLOGY:  < from: CT Angio Neck Stroke Protocol w/ IV Cont (12.07.23 @ 12:55) >    ACC: [id number] EXAM:  CT BRAIN PERFUSION MAPS STROKE   ORDERED BY:   EDOUARD BLAKE     ACC: [id number] EXAM:  CT ANGIO BRAIN STROKE PROTC IC   ORDERED BY:   EDOUARD BLAKE     ACC: [id number] EXAM:  CT BRAIN STROKE PROTOCOL   ORDERED BY: EDOUARD BLAKE     ACC: [id number] EXAM:  CT ANGIO NECK STROKE PROTCL IC   ORDERED BY:   EDOUARD BLAKE     *** ADDENDUM # 1 ***    There does appear to be significant distal right ICA stenosis appreciated   best seen on the sagittal reconstructed images (7:43) as well as on the   axial images for: 448-460) at the level of the skull base entering into   the cavernous segment. More distally in the cavernous and to the level of   the ICA terminus more normal contour is restored.      IMPRESSION: There is evidence of distal right ICA stenosis appreciated at   the level of the skull base/proximal cavernous segment with more normal   caliber at the level of the ICA terminus region.    Findings discussed with stroke fellow with read back 1:40 PM on 12/7/2023    --- Endof Report ---    *** END OF ADDENDUM # 1 ***      PROCEDURE DATE:  12/07/2023          INTERPRETATION:  HEAD CT, CT PERFUSION, CTA OF THE [place] OF GUIDRY AND   NECK:    INDICATIONS:    TECHNIQUE:    HEAD CT:    Serial axial images were obtained fromthe skull base to the vertex   without the use of intravenous contrast. RAPID artificial intelligence   was used for perfusion analysis and for preliminary evaluation of   intracranial hemorrhage.    CTA NECK AND [place] OF GUIDRY:    After the intravenous power injection of non-ionic contrast material   using 70 cc of Omnipaque 300 with 30 cc discarded, serial thin sections   were obtained through the intracranial circulation on a multislice CT   scanner.  Images were reformatted using a dedicated 3D software package   and viewed on a dedicated workstation in multiple planes.        COMPARISON EXAMINATION: 9/26/2023    FINDINGS:    VENTRICLES AND SULCI: Age-appropriate involutional changes involving the   periventricular and subcortical whitematter.  INTRA-AXIAL: Foci of lucency in the bilateral corona radiata and centrum   semiovale ovale white matter as seen previously left greater than right..   Left thalamic lacunar infarct which is old. Older appearing right   thalamic capsular infarct suggested as well..  EXTRA-AXIAL: No extra-axial fluid collection is present.  INTRACRANIAL HEMORRHAGE: No evidence of intracranial hemorrhage    VISUALIZED SINUSES: No air-fluid levels are identified.  VISUALIZED MASTOIDS:  Clear  CALVARIUM:  Intact  MISCELLANEOUS:  None.      CT RAPID PERFUSION: 2 attempts at perfusion 40 cc each using Omnipaque 300    INFARCT CORE: 0 mL    TISSUE AT RISK: 41 mL    MISMATCH RATIO: Infinite          CTA [place] OF GUIDRY:    ANTERIOR CIRCULATION    ICA  CAVERNOUS, SUPRACLINOID, BIFURCATION SEGMENTS: Patent without flow   limiting stenosis.    ANTERIOR CEREBRAL ARTERIES: Bilateral A1, anterior communicating and A2   anterior cerebral arteries are unremarkable in course and caliber without   flow limiting stenosis,    MIDDLE CEREBRAL ARTERIES: Patent bilateral M1, M2, and distal MCA   branches without flow limiting stenosis.    POSTERIOR CIRCULATION:    VERTEBRAL ARTERIES: The right V4 segment is not well visualized. The   right vertebral does appear to terminate in the PICA.    BASILAR ARTERY: Significant atherosclerotic narrowing of the proximal   basilar artery. The more distal basilar is more normal in appearance.   Both superior cerebellar cyst and PCAs are well seen. The P-comm is well   visualized.  POSTERIOR CEREBRAL ARTERIES: Patent without flow limiting stenosis.    CTA NECK:    GREAT VESSELS: Visualized segments are patent, no flow limiting stenosis.    COMMON CAROTID ARTERIES:  RIGHT: Patent without flow limiting stenosis  LEFT: Patent without flow limiting stenosis    CAROTID BULBS:  RIGHT: Patent without flow limiting stenosis  LEFT: Patent without flow limiting stenosis    INTERNAL CAROTID ARTERIES:  RIGHT: Patent no evidence for any hemodynamically significant stenosis at   the ICA origin by NASCET criteria.  LEFT: Patent no evidence for any hemodynamically significant stenosis at   the ICA origin by NASCET criteria.    VERTEBRAL ARTERIES:    RIGHT: Patent no evidence for any flow limiting stenosis.  LEFT: Patent no evidence for any flow limiting stenosis.      SOFT TISSUES: Unremarkable    BONES: Bridging anterior osteophytes are seen    IMPRESSION:      HEAD CT AND RAPID PERFUSION: HEAD CT:    Mild volume loss, microvascular disease, no acute hemorrhage or midline   shift.    CT PERFUSION demonstrated:Tmax rater then 6 of 41 mL which appears   predominantly in the right MCA territory though no definite stenosis or   occlusion in this distribution is appreciated. No core infarct is seen      If symptoms persist consider follow up head CT or MRI, MRA  if no   contraindication.    CTA COW:  Patent intracranial circulation with evidence of significant   stenosis involving the proximal basilar artery on the basis of   atherosclerotic disease    CTA NECK: Patent, ECAs, ICAs, no  hemodynamically significant stenosis at    ICA origins by NASCET criteria.  Bilateral vertebral arteries are patent with the right vertebral   terminating at the level of the PICA    Dr. Granados discussed these findings with REZA Quinn on 12/7/2023 12:48   PM with read back.    --- End of Report ---    ***Please see the addendum at the top of this report. It may contain   additional important information or changes.****        IFEOMA GRANADOS MD; Attending Radiologist  This document has been electronically signed. Dec  7 2023  1:14PM    < end of copied text >  < from: MR Head No Cont (12.07.23 @ 19:05) >    ACC: [id number] EXAM:  MR BRAIN   ORDERED BY: BRANDEE QUINN     PROCEDURE DATE:  12/07/2023          INTERPRETATION:  PROCEDURE: MRI Brain without contrast    INDICATION: Left hemiparesis    TECHNIQUE: Multiplanar multi sequential MR images the brain were obtained   without IV contrast.    COMPARISON: MRI brain 9/26/2023    FINDINGS: The MRI examination of the brain demonstrates the ventricles,   cisternal spaces, and cortical sulci to be appropriate for the patient's   stated age. There is no midline shift or extra-axial collection.    Again demonstrated are patchy foci of T2/FLAIR hyperintense signal within   the periventricular subcortical white matter consistent with chronic   microvascular ischemic changes. Again demonstrated is FLAIR hyperintense   signal within the medial right thalamus likely from chronic infarctions   stable from prior exam.. The diffusion-weighted images demonstrate no   evidence of recent ischemic injury. The susceptibility weighted images   demonstrate no parenchymal blood products. There are preserved vascular   flow-voids.    The visualized paranasal sinuses are free of mucosal disease. Trace   opacification of the right mastoid air cells..    IMPRESSION:    No acute infarction. Stable exam since 9/26/2023.    --- End of Report ---      < end of copied text >    OTHER: 	  	  LABS:	 	    CARDIAC MARKERS:                                  14.2   6.85  )-----------( 192      ( 08 Dec 2023 04:58 )             43.9     12-08    143  |  105  |  16  ----------------------------<  60<L>  4.5   |  27  |  1.64<H>    Ca    9.9      08 Dec 2023 04:56    TPro  6.2  /  Alb  3.6  /  TBili  0.3  /  DBili  x   /  AST  15  /  ALT  12  /  AlkPhos  61  12-07    proBNP:   Lipid Profile:   HgA1c:   TSH:            CHIEF COMPLAINT:  Follows with Dr. Conner  HISTORY OF PRESENT ILLNESS: 72 year-old  man with a PMH of HTN, HLD, NIDDM2, HFpEF (EF 50% on TTE 9/26/23), atrial fibrillation (on eliquis 5mg BID), prior strokes in 2017 and 2021 (unknown if residual deficits), BIBEMS to the ED from home on 12/7/23 with c/o left sided weakness, for which a stroke code was activated. LKN reported to be 11:30AM on 12/7. Patient woke up in his usual state of health in the AM. He states that while eating breakfast, he experienced left sided weakness. There was associated nausea and NBNB emesis as well. He reports compliance with his home eliquis and states his last dose was the night prior to presentation. He has a remote smoking history (quit 30 years prior) and does not report alcohol use.   NIHSS: 6 (mild L droop, LUE/LLE drift, mild L hypoesthia, mild dysarthria, L visual field impairment)  Baseline mRS: 2  Not a tenecteplase candidate due to INR >1.7 with use of oral AC.   Not a candidate for thrombectomy due to no LVO on imaging.   Has not been fully compliant with Eliquis. Hes missed a couple of doses      PAST MEDICAL & SURGICAL HISTORY:  Diabetes Mellitus Type II      HTN (Hypertension)      Hypercholesterolemia      BPH with elevated PSA      Stroke  2017, 10/2021:  with right diplobia resolving in a week      S/P colonoscopy              MEDICATIONS:  apixaban 5 milliGRAM(s) Oral every 12 hours  hydrALAZINE Injectable 5 milliGRAM(s) IV Push every 6 hours PRN  lisinopril 40 milliGRAM(s) Oral daily          metoclopramide 10 milliGRAM(s) Oral three times a day before meals PRN  pantoprazole    Tablet 40 milliGRAM(s) Oral before breakfast    atorvastatin 80 milliGRAM(s) Oral at bedtime  dextrose 50% Injectable 25 Gram(s) IV Push once  dextrose 50% Injectable 12.5 Gram(s) IV Push once  dextrose 50% Injectable 25 Gram(s) IV Push once  dextrose Oral Gel 15 Gram(s) Oral once PRN  glucagon  Injectable 1 milliGRAM(s) IntraMuscular once    cholecalciferol 2000 Unit(s) Oral daily  dextrose 5%. 1000 milliLiter(s) IV Continuous <Continuous>  dextrose 5%. 1000 milliLiter(s) IV Continuous <Continuous>  sodium chloride 0.9%. 1000 milliLiter(s) IV Continuous <Continuous>      FAMILY HISTORY:  No pertinent family history in first degree relatives        SOCIAL HISTORY:    [ ] Non-smoker  [ ] Smoker  [ ] Alcohol    Allergies    No Known Allergies    Intolerances    	    REVIEW OF SYSTEMS:  CONSTITUTIONAL: No fever, weight loss, or fatigue  EYES: No eye pain, visual disturbances, or discharge  ENMT:  No difficulty hearing, tinnitus, vertigo; No sinus or throat pain  NECK: No pain or stiffness  RESPIRATORY: No cough, wheezing, chills or hemoptysis; No Shortness of Breath  CARDIOVASCULAR: No chest pain, palpitations, passing out, dizziness, or leg swelling  GASTROINTESTINAL: No abdominal or epigastric pain. No nausea, vomiting, or hematemesis; No diarrhea or constipation. No melena or hematochezia.  GENITOURINARY: No dysuria, frequency, hematuria, or incontinence  NEUROLOGICAL: No headaches, memory loss, loss of strength, numbness, or tremors  SKIN: No itching, burning, rashes, or lesions   LYMPH Nodes: No enlarged glands  ENDOCRINE: No heat or cold intolerance; No hair loss  MUSCULOSKELETAL: No joint pain or swelling; No muscle, back, or extremity pain  PSYCHIATRIC: No depression, anxiety, mood swings, or difficulty sleeping  HEME/LYMPH: No easy bruising, or bleeding gums  ALLERY AND IMMUNOLOGIC: No hives or eczema	    [ ] All others negative	  [ ] Unable to obtain    PHYSICAL EXAM:  T(C): 37.1 (12-08-23 @ 08:38), Max: 37.1 (12-08-23 @ 08:38)  HR: 78 (12-08-23 @ 08:38) (71 - 93)  BP: 172/76 (12-08-23 @ 08:38) (172/76 - 231/120)  RR: 18 (12-08-23 @ 08:38) (17 - 21)  SpO2: 98% (12-08-23 @ 08:38) (91% - 100%)  Wt(kg): --  I&O's Summary      Appearance: Normal	  HEENT:   Normal oral mucosa, PERRL, EOMI	  Lymphatic: No lymphadenopathy  Cardiovascular: Normal S1 S2, No JVD, No murmurs, No edema  Respiratory: Lungs clear to auscultation	  Psychiatry: A & O x 3, Mood & affect appropriate  Gastrointestinal:  Soft, Non-tender, + BS	  Skin: No rashes, No ecchymoses, No cyanosis	  Neurological (>12):  MS: Eyes open, awake, alert and oriented to person, place, time, and situation. Follows most commands.    Speech/Language: Mildly slurred. Repetition and naming intact..     CNs: PERRL (3 -> 2mm OU). Possible L visual field impairment. EOMI OU, no nystagmus. V1-3 intact to LT but report of decreased PP on L V1-3.  Well developed masseter muscles b/l. Mild L facial droop . Full eye closure strength b/l. Hearing grossly intact to conversation. Symmetric palate elevation in midline, no uvula deviation. Shoulder shrug intact b/l. Tongue midline on protrusion.    Motor: RUE & RLE antigravity without drift. LUE & LLE antigravity with drift, but without hitting the bed.    Sensation: Decreased to LT/PP in LUE/LLE (50%).     Coordination: No dysmetria to FTN.    Gait: Not assessed due to clinical condition.  Extremities: Normal range of motion, No clubbing, cyanosis or edema  Vascular: Peripheral pulses palpable 2+ bilaterally    TELEMETRY: 	    ECG:  	  RADIOLOGY:  < from: CT Angio Neck Stroke Protocol w/ IV Cont (12.07.23 @ 12:55) >    ACC: [id number] EXAM:  CT BRAIN PERFUSION MAPS STROKE   ORDERED BY:   EDOUARD BLAKE     ACC: [id number] EXAM:  CT ANGIO BRAIN STROKE PROTC IC   ORDERED BY:   EDOUARD BLAKE     ACC: [id number] EXAM:  CT BRAIN STROKE PROTOCOL   ORDERED BY: EDOUARD BLAKE     ACC: [id number] EXAM:  CT ANGIO NECK STROKE PROTCL IC   ORDERED BY:   EDOUARD BLAKE     *** ADDENDUM # 1 ***    There does appear to be significant distal right ICA stenosis appreciated   best seen on the sagittal reconstructed images (7:43) as well as on the   axial images for: 448-460) at the level of the skull base entering into   the cavernous segment. More distally in the cavernous and to the level of   the ICA terminus more normal contour is restored.      IMPRESSION: There is evidence of distal right ICA stenosis appreciated at   the level of the skull base/proximal cavernous segment with more normal   caliber at the level of the ICA terminus region.    Findings discussed with stroke fellow with read back 1:40 PM on 12/7/2023    --- Endof Report ---    *** END OF ADDENDUM # 1 ***      PROCEDURE DATE:  12/07/2023          INTERPRETATION:  HEAD CT, CT PERFUSION, CTA OF THE [place] OF GUIDRY AND   NECK:    INDICATIONS:    TECHNIQUE:    HEAD CT:    Serial axial images were obtained fromthe skull base to the vertex   without the use of intravenous contrast. RAPID artificial intelligence   was used for perfusion analysis and for preliminary evaluation of   intracranial hemorrhage.    CTA NECK AND [place] OF GUIDRY:    After the intravenous power injection of non-ionic contrast material   using 70 cc of Omnipaque 300 with 30 cc discarded, serial thin sections   were obtained through the intracranial circulation on a multislice CT   scanner.  Images were reformatted using a dedicated 3D software package   and viewed on a dedicated workstation in multiple planes.        COMPARISON EXAMINATION: 9/26/2023    FINDINGS:    VENTRICLES AND SULCI: Age-appropriate involutional changes involving the   periventricular and subcortical whitematter.  INTRA-AXIAL: Foci of lucency in the bilateral corona radiata and centrum   semiovale ovale white matter as seen previously left greater than right..   Left thalamic lacunar infarct which is old. Older appearing right   thalamic capsular infarct suggested as well..  EXTRA-AXIAL: No extra-axial fluid collection is present.  INTRACRANIAL HEMORRHAGE: No evidence of intracranial hemorrhage    VISUALIZED SINUSES: No air-fluid levels are identified.  VISUALIZED MASTOIDS:  Clear  CALVARIUM:  Intact  MISCELLANEOUS:  None.      CT RAPID PERFUSION: 2 attempts at perfusion 40 cc each using Omnipaque 300    INFARCT CORE: 0 mL    TISSUE AT RISK: 41 mL    MISMATCH RATIO: Infinite          CTA [place] OF GUIDRY:    ANTERIOR CIRCULATION    ICA  CAVERNOUS, SUPRACLINOID, BIFURCATION SEGMENTS: Patent without flow   limiting stenosis.    ANTERIOR CEREBRAL ARTERIES: Bilateral A1, anterior communicating and A2   anterior cerebral arteries are unremarkable in course and caliber without   flow limiting stenosis,    MIDDLE CEREBRAL ARTERIES: Patent bilateral M1, M2, and distal MCA   branches without flow limiting stenosis.    POSTERIOR CIRCULATION:    VERTEBRAL ARTERIES: The right V4 segment is not well visualized. The   right vertebral does appear to terminate in the PICA.    BASILAR ARTERY: Significant atherosclerotic narrowing of the proximal   basilar artery. The more distal basilar is more normal in appearance.   Both superior cerebellar cyst and PCAs are well seen. The P-comm is well   visualized.  POSTERIOR CEREBRAL ARTERIES: Patent without flow limiting stenosis.    CTA NECK:    GREAT VESSELS: Visualized segments are patent, no flow limiting stenosis.    COMMON CAROTID ARTERIES:  RIGHT: Patent without flow limiting stenosis  LEFT: Patent without flow limiting stenosis    CAROTID BULBS:  RIGHT: Patent without flow limiting stenosis  LEFT: Patent without flow limiting stenosis    INTERNAL CAROTID ARTERIES:  RIGHT: Patent no evidence for any hemodynamically significant stenosis at   the ICA origin by NASCET criteria.  LEFT: Patent no evidence for any hemodynamically significant stenosis at   the ICA origin by NASCET criteria.    VERTEBRAL ARTERIES:    RIGHT: Patent no evidence for any flow limiting stenosis.  LEFT: Patent no evidence for any flow limiting stenosis.      SOFT TISSUES: Unremarkable    BONES: Bridging anterior osteophytes are seen    IMPRESSION:      HEAD CT AND RAPID PERFUSION: HEAD CT:    Mild volume loss, microvascular disease, no acute hemorrhage or midline   shift.    CT PERFUSION demonstrated:Tmax rater then 6 of 41 mL which appears   predominantly in the right MCA territory though no definite stenosis or   occlusion in this distribution is appreciated. No core infarct is seen      If symptoms persist consider follow up head CT or MRI, MRA  if no   contraindication.    CTA COW:  Patent intracranial circulation with evidence of significant   stenosis involving the proximal basilar artery on the basis of   atherosclerotic disease    CTA NECK: Patent, ECAs, ICAs, no  hemodynamically significant stenosis at    ICA origins by NASCET criteria.  Bilateral vertebral arteries are patent with the right vertebral   terminating at the level of the PICA    Dr. Granados discussed these findings with REZA Quinn on 12/7/2023 12:48   PM with read back.    --- End of Report ---    ***Please see the addendum at the top of this report. It may contain   additional important information or changes.****        IFEOMA GRANADOS MD; Attending Radiologist  This document has been electronically signed. Dec  7 2023  1:14PM    < end of copied text >  < from: MR Head No Cont (12.07.23 @ 19:05) >    ACC: [id number] EXAM:  MR BRAIN   ORDERED BY: BRANDEE QUINN     PROCEDURE DATE:  12/07/2023          INTERPRETATION:  PROCEDURE: MRI Brain without contrast    INDICATION: Left hemiparesis    TECHNIQUE: Multiplanar multi sequential MR images the brain were obtained   without IV contrast.    COMPARISON: MRI brain 9/26/2023    FINDINGS: The MRI examination of the brain demonstrates the ventricles,   cisternal spaces, and cortical sulci to be appropriate for the patient's   stated age. There is no midline shift or extra-axial collection.    Again demonstrated are patchy foci of T2/FLAIR hyperintense signal within   the periventricular subcortical white matter consistent with chronic   microvascular ischemic changes. Again demonstrated is FLAIR hyperintense   signal within the medial right thalamus likely from chronic infarctions   stable from prior exam.. The diffusion-weighted images demonstrate no   evidence of recent ischemic injury. The susceptibility weighted images   demonstrate no parenchymal blood products. There are preserved vascular   flow-voids.    The visualized paranasal sinuses are free of mucosal disease. Trace   opacification of the right mastoid air cells..    IMPRESSION:    No acute infarction. Stable exam since 9/26/2023.    --- End of Report ---      < end of copied text >    OTHER: 	  	  LABS:	 	    CARDIAC MARKERS:                                  14.2   6.85  )-----------( 192      ( 08 Dec 2023 04:58 )             43.9     12-08    143  |  105  |  16  ----------------------------<  60<L>  4.5   |  27  |  1.64<H>    Ca    9.9      08 Dec 2023 04:56    TPro  6.2  /  Alb  3.6  /  TBili  0.3  /  DBili  x   /  AST  15  /  ALT  12  /  AlkPhos  61  12-07    proBNP:   Lipid Profile:   HgA1c:   TSH:

## 2023-12-08 NOTE — PATIENT PROFILE ADULT - FALL HARM RISK - FACTORS NURSING JUDGEMENT
Gen: WD/WN, NAD, non-toxic  HENT: NCAT, MMM, Oropharynx unremarkable, uvula midline  Cardiac: RRR +S1S2.   Resp: Speaking in full sentences. No evidence of respiratory distress. No wheezes, rales or rhonchi  Skin: Warm, dry, no rashes or lesions  Neuro: Awake, alert & oriented x 3. No focal deficits, ambulatory with steady gait
Yes
Routine observation

## 2023-12-08 NOTE — OCCUPATIONAL THERAPY INITIAL EVALUATION ADULT - PERTINENT HX OF CURRENT PROBLEM, REHAB EVAL
72 year-old  man with a PMH of HTN, HLD, NIDDM2, HFpEF (EF 50% on TTE 9/26/23), atrial fibrillation (on eliquis 5mg BID), prior strokes in 2017 and 2021 (unknown if residual deficits), BIBEMS to the ED from home on 12/7/23 with c/o left sided weakness, for which a stroke code was activated. LKN reported to be 11:30AM on 12/7. Patient woke up in his usual state of health in the AM. He states that while eating breakfast, he experienced left sided weakness. There was associated nausea and NBNB emesis as well. He reports compliance with his home eliquis and states his last dose was the night prior to presentation. He has a remote smoking history (quit 30 years prior) and does not report alcohol use.       CT Head: Mild volume loss, microvascular disease, no acute hemorrhage or midline shift  CT PERFUSION: Tmax rater then 6 of 41 mL which appears predominantly in the right MCA territory though no definite stenosis or occlusion in this distribution is appreciated. No core infarct is seenIf symptoms persist consider follow up head CT or MRI, MRA  if no contraindication.  MRI Head: (-)

## 2023-12-08 NOTE — CONSULT NOTE ADULT - ASSESSMENT
72 year-old  man with a PMH of HTN, HLD, NIDDM2, HFpEF (EF 50% on TTE 9/26/23), atrial fibrillation (on eliquis 5mg BID), prior strokes in 2017 and 2021 (unknown if residual deficits), BIBEMS to the ED from home on 12/7/23 with c/o left sided weakness, for which a stroke code was activated. LKN reported to be 11:30AM on 12/7. Patient woke up in his usual state of health in the AM. He states that while eating breakfast, he experienced left sided weakness. There was associated nausea and NBNB emesis as well. He reports compliance with his home eliquis and states his last dose was the night prior to presentation. He has a remote smoking history (quit 30 years prior) and does not report alcohol use.   NIHSS: 6 (mild L droop, LUE/LLE drift, mild L hypoesthia, mild dysarthria, L visual field impairment)  Baseline mRS: 2  Not a tenecteplase candidate due to INR >1.7 with use of oral AC.   Not a candidate for thrombectomy due to no LVO on imaging.   Has not been fully compliant with Eliquis. Hes missed a couple of doses

## 2023-12-08 NOTE — OCCUPATIONAL THERAPY INITIAL EVALUATION ADULT - LIVES WITH, PROFILE
Pt lives with wife in private home with 4 steps to enter, +flight to bedroom, 1st floor setup available if needed as per wife, walk in shower with grab bars and shower chair, raised toilet seat. As per wife pt has been ambulating with RW ~6 weeks, has home OT/PT and HHA for past 10 days

## 2023-12-08 NOTE — DISCHARGE NOTE NURSING/CASE MANAGEMENT/SOCIAL WORK - NSDCPEFALRISK_GEN_ALL_CORE
For information on Fall & Injury Prevention, visit: [email or web address] OR  [email or web address] OR  https://www.cdc.gov/steadi/patient.html For information on Fall & Injury Prevention, visit: [email or web address] OR  [email or web address] OR  https://www.cdc.gov/steadi/patient.html

## 2023-12-08 NOTE — CONSULT NOTE ADULT - SUBJECTIVE AND OBJECTIVE BOX
Patient seen, examined, and interviewed by me, case discussed with patient and wife, PMD,chart reviewed,   full note to follow     Dr. HIEN Bledsoe ([phone])     Patient seen, examined, and interviewed by me, case discussed with patient and wife, PMD,chart reviewed,   full note to follow     Dr. HIEN Bledsoe ([phone])       ---___---___---___---___---___---___ ---___---___---___---___---___---___---___---___---                  M E D I C A L   A T T E N D I N G    C O N S U L T A T I O N   N O T E  ---___---___---___---___---___---___ ---___---___---___---___---___---___---___---___---                                      ( Note written / Date of service 23 ( This is certified to be the same as "ENTERED" date above ( for billing purposes)))    - Patient seen and examined by me earlier today.   ++CHIEF COMPLAINT:   Patient is a 72y old  Male who presents with a chief complaint of possible CVA , atherosclerotic disease (08 Dec 2023 13:18)    ++  HPI:  Mr. Rubi is a 72 year-old man with a PMH of HTN, HLD, NIDDM2, HFpEF (EF 50% on TTE 23), atrial fibrillation (on eliquis 5mg BID), prior strokes in  and  (unknown if residual deficits), BIBEMS to the ED from home on 23 with c/o left sided weakness, for which a stroke code was activated.   Patient woke up in his usual state of health in the morning and while eating breakfast, he experienced left sided weakness. There was associated nausea and NBNB emesis as well.   He reported compliance with his home eliquis and states his last dose was the night prior to presentation. He has a remote smoking history (quit 30 years prior) and does not report alcohol use.     patient admitted to neuro service for further management and optimization     ---___---___---___---___---___---  PAST MEDICAL & SURGICAL HISTORY:    Diabetes Mellitus Type II  HTN (Hypertension)  Hypercholesterolemia  BPH with elevated PSA  Stroke 2017, 10/2021:  with right diplobia resolving in a week  S/P colonoscopy    ---___---___---___---___---___---   FAMILY HISTORY:    No pertinent family history in first degree relatives    ---___---___---___---___---___---  SOCIAL HISTORY:  Alcohol: None reported  Smoking: past smoker     ---___---___---___---___---___---  ALLERGIES:     No Known Allergies    ---___---___---___---___---___---  MEDICATIONS:  MEDICATIONS  (STANDING):  apixaban 5 milliGRAM(s) Oral every 12 hours  atorvastatin 80 milliGRAM(s) Oral at bedtime  cholecalciferol 2000 Unit(s) Oral daily  dextrose 5%. 1000 milliLiter(s) (100 mL/Hr) IV Continuous <Continuous>  dextrose 5%. 1000 milliLiter(s) (50 mL/Hr) IV Continuous <Continuous>  dextrose 50% Injectable 25 Gram(s) IV Push once  dextrose 50% Injectable 12.5 Gram(s) IV Push once  dextrose 50% Injectable 25 Gram(s) IV Push once  glucagon  Injectable 1 milliGRAM(s) IntraMuscular once  lisinopril 40 milliGRAM(s) Oral daily  pantoprazole    Tablet 40 milliGRAM(s) Oral before breakfast  sodium chloride 0.9%. 1000 milliLiter(s) (50 mL/Hr) IV Continuous <Continuous>    MEDICATIONS  (PRN):  dextrose Oral Gel 15 Gram(s) Oral once PRN Blood Glucose LESS THAN 70 milliGRAM(s)/deciliter  hydrALAZINE Injectable 5 milliGRAM(s) IV Push every 6 hours PRN for SBP > 200  metoclopramide 10 milliGRAM(s) Oral three times a day before meals PRN nausea  __________  Active diet:  Diet, Pureed:   DASH/TLC Sodium & Cholesterol Restricted (DASH)  Low Sodium  Kosher  ___________________    HOME MEDICATIONS:  atorvastatin 80 mg oral tablet  cholecalciferol 25 mcg (1000 intl units) oral tablet  escitalopram 20 mg oral tablet  fosinopril 40 mg oral tablet  glipiZIDE 10 mg oral tablet, extended release  Jardiance 25 mg oral tablet  metFORMIN 1000 mg oral tablet  metoprolol succinate 100 mg oral tablet, extended release  omeprazole 40 mg oral delayed release capsule  Reglan 10 mg oral tablet  Welchol 625 mg oral tablet    ---___---___---___---___---___---  REVIEW OF SYSTEM:    GEN: no fever, no chills, no pain  RESP: no SOB, no cough, no sputum  CVS: no chest pain, no palpitations, no edema  GI: no abdominal pain, no nausea, no vomiting  : no dysuria, no frequency, no hematuria  Neuro: no headache, no dizziness  PSYCH: no anxiety, no depression  Derm : no itching, no rash    ---___---___---___---___---___---  VITAL SIGNS:  T(C): 36.7 (23 @ 12:32), Max: 37.1 (23 @ 08:38)  HR: 63 (23 @ 12:32) (63 - 93)  BP: 164/84 (23 @ 12:32) (164/84 - 231/120)  RR: 18 (23 @ 12:32) (18 - 20)  SpO2: 95% (23 @ 12:32) (91% - 100%)     ---___---___---___---___---___---  PHYSICAL EXAM:    GEN: A&O X 3 , NAD , comfortable, pleasant, calm in bed   HEENT: NCAT, PERRL, MMM, hearing intact  Neck: supple , no JVD  CVS: S1S2 , regular , No M/R/G appreciated  PULM: CTA B/L,  no W/R/R appreciated  ABD.: soft. non tender, non distended,  bowel sounds present  Extrem: intact pulses , no edema   Derm: No rash , no ecchymoses  PSYCH : normal mood,  no delusion not anxious  neuro exam deferred to primary team      ---___---___---___---___---___---            LAB AND IMAGIN.2   6.85  )-----------( 192      ( 08 Dec 2023 04:58 )             43.9            143  |  105  |  16  ----------------------------<  60<L>  4.5   |  27  |  1.64<H>    Ca    9.9      08 Dec 2023 04:56    TPro  6.2  /  Alb  3.6  /  TBili  0.3  /  DBili  x   /  AST  15  /  ALT  12  /  AlkPhos  61      PT/INR - ( 07 Dec 2023 12:53 )   PT: 20.7 sec;   INR: 1.92 ratio    PTT - ( 07 Dec 2023 12:53 )  PTT:45.8 sec               Lipid profile:  (23)     Total: 163     LDL  : (p)     HDL  :66     TG   :102     HgA1C:   (23)          (23)      5.8,   (23)          (23)      7.3    < from: MR Head No Cont (23 @ 19:05) >  IMPRESSION:  No acute infarction. Stable exam since 2023.  < end of copied text >    < from: CT Angio Brain Stroke Protocol  w/ IV Cont (23 @ 12:56) >  IMPRESSION:  HEAD CT AND RAPID PERFUSION: HEAD CT:  Mild volume loss, microvascular disease, no acute hemorrhage or midline  shift.  CT PERFUSION demonstrated:Tmax rater then 6 of 41 mL which appears   predominantly in the right MCA territory though no definite stenosis or   occlusion in this distribution is appreciated. No core infarct is seen  If symptoms persist consider follow up head CT or MRI, MRA  if no contraindication.  CTA COW:  Patent intracranial circulation with evidence of significant   stenosis involving the proximal basilar artery on the basis of atherosclerotic disease  CTA NECK: Patent, ECAs, ICAs, no  hemodynamically significant stenosis at  ICA origins by NASCET criteria.  Bilateral vertebral arteries are patent with the right vertebral terminating at the level of the PICA  *** ADDENDUM # 1 ***  There does appear to be significant distal right ICA stenosis appreciated   best seen on the sagittal reconstructed images (7:43) as well as on the   axial images for: 448-460) at the level of the skull base entering into   the cavernous segment. More distally in the cavernous and to the level of   the ICA terminus more normal contour is restored.  IMPRESSION: There is evidence of distal right ICA stenosis appreciated at   the level of the skull base/proximal cavernous segment with more normal   caliber at the level of the ICA terminus region.  < end of copied text >     ---___---___---___---___---___---___ ---___---___---___---___---                         A S S E S S M E N T   A N D   P L A N :      Dysarthria due to TIA >> seems improved / back to rayshawn  weakness  atherosclerotic disease  JORGE L  history of atrial fibrillation  Diabetes  HTN   HLD  BPH  + history of CVA    ========>>    appreciated neuro management   medical optimization for atherosclerotic diease..    ? need for any intravascular intervention ?    PT on board   DM stable   BP poorly controlled >> resume home medications and adust as needed   OOB to chair as able  Continue Current medications otherwise and monitor.  supportive care   -GI/DVT Prophylaxis per protocol.    --------------------------------------------  Case discussed with patient, wife, PMD   Education given on findings and plan of care.  ___________________________  Will follow with you.  Thank you,  HIEN Bledsoe D.O.  Pager: [phone]       ---___---___---___---___---___---___ ---___---___---___---___---___---___---___---___---                  M E D I C A L   A T T E N D I N G    C O N S U L T A T I O N   N O T E  ---___---___---___---___---___---___ ---___---___---___---___---___---___---___---___---                                      ( Note written / Date of service 23 ( This is certified to be the same as "ENTERED" date above ( for billing purposes)))    - Patient seen and examined by me earlier today.   ++CHIEF COMPLAINT:   Patient is a 72y old  Male who presents with a chief complaint of possible CVA , atherosclerotic disease (08 Dec 2023 13:18)    ++  HPI:  Mr. Rubi is a 72 year-old man with a PMH of HTN, HLD, NIDDM2, HFpEF (EF 50% on TTE 23), atrial fibrillation (on eliquis 5mg BID), prior strokes in  and  (unknown if residual deficits), BIBEMS to the ED from home on 23 with c/o left sided weakness, for which a stroke code was activated.   Patient woke up in his usual state of health in the morning and while eating breakfast, he experienced left sided weakness. There was associated nausea and NBNB emesis as well.   He reported compliance with his home eliquis and states his last dose was the night prior to presentation. He has a remote smoking history (quit 30 years prior) and does not report alcohol use.     patient admitted to neuro service for further management and optimization     ---___---___---___---___---___---  PAST MEDICAL & SURGICAL HISTORY:    Diabetes Mellitus Type II  HTN (Hypertension)  Hypercholesterolemia  BPH with elevated PSA  Stroke 2017, 10/2021:  with right diplobia resolving in a week  S/P colonoscopy    ---___---___---___---___---___---   FAMILY HISTORY:    No pertinent family history in first degree relatives    ---___---___---___---___---___---  SOCIAL HISTORY:  Alcohol: None reported  Smoking: past smoker     ---___---___---___---___---___---  ALLERGIES:     No Known Allergies    ---___---___---___---___---___---  MEDICATIONS:  MEDICATIONS  (STANDING):  apixaban 5 milliGRAM(s) Oral every 12 hours  atorvastatin 80 milliGRAM(s) Oral at bedtime  cholecalciferol 2000 Unit(s) Oral daily  dextrose 5%. 1000 milliLiter(s) (100 mL/Hr) IV Continuous <Continuous>  dextrose 5%. 1000 milliLiter(s) (50 mL/Hr) IV Continuous <Continuous>  dextrose 50% Injectable 25 Gram(s) IV Push once  dextrose 50% Injectable 12.5 Gram(s) IV Push once  dextrose 50% Injectable 25 Gram(s) IV Push once  glucagon  Injectable 1 milliGRAM(s) IntraMuscular once  lisinopril 40 milliGRAM(s) Oral daily  pantoprazole    Tablet 40 milliGRAM(s) Oral before breakfast  sodium chloride 0.9%. 1000 milliLiter(s) (50 mL/Hr) IV Continuous <Continuous>    MEDICATIONS  (PRN):  dextrose Oral Gel 15 Gram(s) Oral once PRN Blood Glucose LESS THAN 70 milliGRAM(s)/deciliter  hydrALAZINE Injectable 5 milliGRAM(s) IV Push every 6 hours PRN for SBP > 200  metoclopramide 10 milliGRAM(s) Oral three times a day before meals PRN nausea  __________  Active diet:  Diet, Pureed:   DASH/TLC Sodium & Cholesterol Restricted (DASH)  Low Sodium  Kosher  ___________________    HOME MEDICATIONS:  atorvastatin 80 mg oral tablet  cholecalciferol 25 mcg (1000 intl units) oral tablet  escitalopram 20 mg oral tablet  fosinopril 40 mg oral tablet  glipiZIDE 10 mg oral tablet, extended release  Jardiance 25 mg oral tablet  metFORMIN 1000 mg oral tablet  metoprolol succinate 100 mg oral tablet, extended release  omeprazole 40 mg oral delayed release capsule  Reglan 10 mg oral tablet  Welchol 625 mg oral tablet    ---___---___---___---___---___---  REVIEW OF SYSTEM:    GEN: no fever, no chills, no pain  RESP: no SOB, no cough, no sputum  CVS: no chest pain, no palpitations, no edema  GI: no abdominal pain, no nausea, no vomiting  : no dysuria, no frequency, no hematuria  Neuro: no headache, no dizziness  PSYCH: no anxiety, no depression  Derm : no itching, no rash    ---___---___---___---___---___---  VITAL SIGNS:  T(C): 36.7 (23 @ 12:32), Max: 37.1 (23 @ 08:38)  HR: 63 (23 @ 12:32) (63 - 93)  BP: 164/84 (23 @ 12:32) (164/84 - 231/120)  RR: 18 (23 @ 12:32) (18 - 20)  SpO2: 95% (23 @ 12:32) (91% - 100%)     ---___---___---___---___---___---  PHYSICAL EXAM:    GEN: A&O X 3 , NAD , comfortable, pleasant, calm in bed   HEENT: NCAT, PERRL, MMM, hearing intact  Neck: supple , no JVD  CVS: S1S2 , regular , No M/R/G appreciated  PULM: CTA B/L,  no W/R/R appreciated  ABD.: soft. non tender, non distended,  bowel sounds present  Extrem: intact pulses , no edema   Derm: No rash , no ecchymoses  PSYCH : normal mood,  no delusion not anxious  neuro exam deferred to primary team      ---___---___---___---___---___---            LAB AND IMAGIN.2   6.85  )-----------( 192      ( 08 Dec 2023 04:58 )             43.9            143  |  105  |  16  ----------------------------<  60<L>  4.5   |  27  |  1.64<H>    Ca    9.9      08 Dec 2023 04:56    TPro  6.2  /  Alb  3.6  /  TBili  0.3  /  DBili  x   /  AST  15  /  ALT  12  /  AlkPhos  61      PT/INR - ( 07 Dec 2023 12:53 )   PT: 20.7 sec;   INR: 1.92 ratio    PTT - ( 07 Dec 2023 12:53 )  PTT:45.8 sec               Lipid profile:  (23)     Total: 163     LDL  : (p)     HDL  :66     TG   :102     HgA1C:   (23)          (23)      5.8,   (23)          (23)      7.3    < from: MR Head No Cont (23 @ 19:05) >  IMPRESSION:  No acute infarction. Stable exam since 2023.  < end of copied text >    < from: CT Angio Brain Stroke Protocol  w/ IV Cont (23 @ 12:56) >  IMPRESSION:  HEAD CT AND RAPID PERFUSION: HEAD CT:  Mild volume loss, microvascular disease, no acute hemorrhage or midline  shift.  CT PERFUSION demonstrated:Tmax rater then 6 of 41 mL which appears   predominantly in the right MCA territory though no definite stenosis or   occlusion in this distribution is appreciated. No core infarct is seen  If symptoms persist consider follow up head CT or MRI, MRA  if no contraindication.  CTA COW:  Patent intracranial circulation with evidence of significant   stenosis involving the proximal basilar artery on the basis of atherosclerotic disease  CTA NECK: Patent, ECAs, ICAs, no  hemodynamically significant stenosis at  ICA origins by NASCET criteria.  Bilateral vertebral arteries are patent with the right vertebral terminating at the level of the PICA  *** ADDENDUM # 1 ***  There does appear to be significant distal right ICA stenosis appreciated   best seen on the sagittal reconstructed images (7:43) as well as on the   axial images for: 448-460) at the level of the skull base entering into   the cavernous segment. More distally in the cavernous and to the level of   the ICA terminus more normal contour is restored.  IMPRESSION: There is evidence of distal right ICA stenosis appreciated at   the level of the skull base/proximal cavernous segment with more normal   caliber at the level of the ICA terminus region.  < end of copied text >     ---___---___---___---___---___---___ ---___---___---___---___---                         A S S E S S M E N T   A N D   P L A N :      Dysarthria due to TIA >> seems improved / back to rayshawn  weakness  atherosclerotic disease  JORGE L  history of atrial fibrillation  Diabetes  HTN   HLD  BPH  + history of CVA    ========>>    appreciated neuro management   medical optimization for atherosclerotic diease..    ? need for any intravascular intervention ?    PT on board   DM stable   BP poorly controlled >> resume home medications and adust as needed   OOB to chair as able  Continue Current medications otherwise and monitor.  supportive care   -GI/DVT Prophylaxis per protocol.    --------------------------------------------  Case discussed with patient, wife, PMD   Education given on findings and plan of care.  ___________________________  Will follow with you.  Thank you,  HIEN Bledsoe D.O.  Pager: [phone]

## 2023-12-08 NOTE — CONSULT NOTE ADULT - SUBJECTIVE AND OBJECTIVE BOX
Patient is a 72y old  Male who presents with a chief complaint of     Admission HPI:  Mr. Rubi is a 72 year-old  man with a PMH of HTN, HLD, NIDDM2, HFpEF (EF 50% on TTE 9/26/23), atrial fibrillation (on eliquis 5mg BID), prior strokes in 2017 and 2021 (unknown if residual deficits), BIBEMS to the ED from home on 12/7/23 with c/o left sided weakness, for which a stroke code was activated. LKN reported to be 11:30AM on 12/7. Patient woke up in his usual state of health in the AM. He states that while eating breakfast, he experienced left sided weakness. There was associated nausea and NBNB emesis as well. He reports compliance with his home eliquis and states his last dose was the night prior to presentation. He has a remote smoking history (quit 30 years prior) and does not report alcohol use.       NIHSS: 6 (mild L droop, LUE/LLE drift, mild L hypoesthia, mild dysarthria, L visual field impairment)  Baseline mRS: 2  Not a tenecteplase candidate due to INR >1.7 with use of oral AC.   Not a candidate for thrombectomy due to no LVO on imaging.    (07 Dec 2023 14:06)    Interval History:  Patient doing better.  Feels near his baseline.  Most recent imaging:  CT Angio Brain Stroke Protocol  w/ IV Cont (12.07.23 @ 12:56) >  IMPRESSION: There is evidence of distal right ICA stenosis appreciated at   the level of the skull base/proximal cavernous segment with more normal   caliber at the level of the ICA terminus region.     MR Head No Cont (12.07.23 @ 19:05) >  No acute infarction. Stable exam since 9/26/2023.    REVIEW OF SYSTEMS:+ improved weakness,  No chest pain, shortness of breath, nausea, vomiting or diarhea; other ROS neg     PAST MEDICAL & SURGICAL HISTORY  Diabetes Mellitus Type II    HTN (Hypertension)    Hypercholesterolemia    BPH with elevated PSA    Stroke    No significant past surgical history    S/P colonoscopy    FUNCTIONAL HISTORY:   Lives w family in home w stairs  PTA Independent    FAMILY HISTORY   No pertinent family history in first degree relatives    MEDICATIONS   apixaban 5 milliGRAM(s) Oral every 12 hours  atorvastatin 80 milliGRAM(s) Oral at bedtime  cholecalciferol 2000 Unit(s) Oral daily  dextrose 5%. 1000 milliLiter(s) IV Continuous <Continuous>  dextrose 5%. 1000 milliLiter(s) IV Continuous <Continuous>  dextrose 50% Injectable 25 Gram(s) IV Push once  dextrose 50% Injectable 12.5 Gram(s) IV Push once  dextrose 50% Injectable 25 Gram(s) IV Push once  dextrose Oral Gel 15 Gram(s) Oral once PRN  glucagon  Injectable 1 milliGRAM(s) IntraMuscular once  hydrALAZINE Injectable 5 milliGRAM(s) IV Push every 6 hours PRN  lisinopril 40 milliGRAM(s) Oral daily  metoclopramide 10 milliGRAM(s) Oral three times a day before meals PRN  pantoprazole    Tablet 40 milliGRAM(s) Oral before breakfast  sodium chloride 0.9%. 1000 milliLiter(s) IV Continuous <Continuous>    ALLERGIES  No Known Allergies    VITALS  T(C): 36.7 (12-08-23 @ 12:32), Max: 37.1 (12-08-23 @ 08:38)  HR: 63 (12-08-23 @ 12:32) (63 - 93)  BP: 164/84 (12-08-23 @ 12:32) (164/84 - 231/120)  RR: 18 (12-08-23 @ 12:32) (17 - 21)  SpO2: 95% (12-08-23 @ 12:32) (91% - 100%)  Wt(kg): --    PHYSICAL EXAM  Constitutional - NAD, Comfortable  HEENT - NCAT, EOMI  Neck - Supple  Chest - No distress, no use of accessory muscles for respiration  Cardiovascular -Well perfused  Abdomen - BS+, Soft, NTND  Extremities - No C/C/E, No calf tenderness   Neurologic Exam -                 AAO x 3  Speech intelligible  Motor non-focal 4+/5 bl UE and LEs  No clonus  Sensation intact     Psychiatric - Mood stable, Affect WNL    RECENT LABS/IMAGING  CBC Full  -  ( 08 Dec 2023 04:58 )  WBC Count : 6.85 K/uL  RBC Count : 4.85 M/uL  Hemoglobin : 14.2 g/dL  Hematocrit : 43.9 %  Platelet Count - Automated : 192 K/uL  Mean Cell Volume : 90.5 fl  Mean Cell Hemoglobin : 29.3 pg  Mean Cell Hemoglobin Concentration : 32.3 gm/dL  Auto Neutrophil # : x  Auto Lymphocyte # : x  Auto Monocyte # : x  Auto Eosinophil # : x  Auto Basophil # : x  Auto Neutrophil % : x  Auto Lymphocyte % : x  Auto Monocyte % : x  Auto Eosinophil % : x  Auto Basophil % : x    12-08    143  |  105  |  16  ----------------------------<  60<L>  4.5   |  27  |  1.64<H>    Ca    9.9      08 Dec 2023 04:56    TPro  6.2  /  Alb  3.6  /  TBili  0.3  /  DBili  x   /  AST  15  /  ALT  12  /  AlkPhos  61  12-07    Urinalysis Basic - ( 08 Dec 2023 04:56 )    Color: x / Appearance: x / SG: x / pH: x  Gluc: 60 mg/dL / Ketone: x  / Bili: x / Urobili: x   Blood: x / Protein: x / Nitrite: x   Leuk Esterase: x / RBC: x / WBC x   Sq Epi: x / Non Sq Epi: x / Bacteria: x    Impression:  73 yo with improvement of L sided weakness- no CVA noted on imaging.    Plan:  PT- ROM, Bed Mob, Transfers, Amb w AD and bracing as needed  OT- ADLs, bracing  SLP- Dysphagia eval and treat  Prec- Falls, Cardiac  DVT Prophylaxis- Apixaban  Skin- Turn q2 h  Dispo- Home w home therapies.  Patient is a 72y old  Male who presents with a chief complaint of     Admission HPI:  Mr. Rubi is a 72 year-old  man with a PMH of HTN, HLD, NIDDM2, HFpEF (EF 50% on TTE 9/26/23), atrial fibrillation (on eliquis 5mg BID), prior strokes in 2017 and 2021 (unknown if residual deficits), BIBEMS to the ED from home on 12/7/23 with c/o left sided weakness, for which a stroke code was activated. LKN reported to be 11:30AM on 12/7. Patient woke up in his usual state of health in the AM. He states that while eating breakfast, he experienced left sided weakness. There was associated nausea and NBNB emesis as well. He reports compliance with his home eliquis and states his last dose was the night prior to presentation. He has a remote smoking history (quit 30 years prior) and does not report alcohol use.       NIHSS: 6 (mild L droop, LUE/LLE drift, mild L hypoesthia, mild dysarthria, L visual field impairment)  Baseline mRS: 2  Not a tenecteplase candidate due to INR >1.7 with use of oral AC.   Not a candidate for thrombectomy due to no LVO on imaging.    (07 Dec 2023 14:06)    Interval History:  Patient doing better.  Feels near his baseline.  Most recent imaging:  CT Angio Brain Stroke Protocol  w/ IV Cont (12.07.23 @ 12:56) >  IMPRESSION: There is evidence of distal right ICA stenosis appreciated at   the level of the skull base/proximal cavernous segment with more normal   caliber at the level of the ICA terminus region.     MR Head No Cont (12.07.23 @ 19:05) >  No acute infarction. Stable exam since 9/26/2023.    REVIEW OF SYSTEMS:+ improved weakness,  No chest pain, shortness of breath, nausea, vomiting or diarhea; other ROS neg     PAST MEDICAL & SURGICAL HISTORY  Diabetes Mellitus Type II    HTN (Hypertension)    Hypercholesterolemia    BPH with elevated PSA    Stroke    No significant past surgical history    S/P colonoscopy    FUNCTIONAL HISTORY:   Lives w family in home w stairs  PTA Independent    FAMILY HISTORY   No pertinent family history in first degree relatives    MEDICATIONS   apixaban 5 milliGRAM(s) Oral every 12 hours  atorvastatin 80 milliGRAM(s) Oral at bedtime  cholecalciferol 2000 Unit(s) Oral daily  dextrose 5%. 1000 milliLiter(s) IV Continuous <Continuous>  dextrose 5%. 1000 milliLiter(s) IV Continuous <Continuous>  dextrose 50% Injectable 25 Gram(s) IV Push once  dextrose 50% Injectable 12.5 Gram(s) IV Push once  dextrose 50% Injectable 25 Gram(s) IV Push once  dextrose Oral Gel 15 Gram(s) Oral once PRN  glucagon  Injectable 1 milliGRAM(s) IntraMuscular once  hydrALAZINE Injectable 5 milliGRAM(s) IV Push every 6 hours PRN  lisinopril 40 milliGRAM(s) Oral daily  metoclopramide 10 milliGRAM(s) Oral three times a day before meals PRN  pantoprazole    Tablet 40 milliGRAM(s) Oral before breakfast  sodium chloride 0.9%. 1000 milliLiter(s) IV Continuous <Continuous>    ALLERGIES  No Known Allergies    VITALS  T(C): 36.7 (12-08-23 @ 12:32), Max: 37.1 (12-08-23 @ 08:38)  HR: 63 (12-08-23 @ 12:32) (63 - 93)  BP: 164/84 (12-08-23 @ 12:32) (164/84 - 231/120)  RR: 18 (12-08-23 @ 12:32) (17 - 21)  SpO2: 95% (12-08-23 @ 12:32) (91% - 100%)  Wt(kg): --    PHYSICAL EXAM  Constitutional - NAD, Comfortable  HEENT - NCAT, EOMI  Neck - Supple  Chest - No distress, no use of accessory muscles for respiration  Cardiovascular -Well perfused  Abdomen - BS+, Soft, NTND  Extremities - No C/C/E, No calf tenderness   Neurologic Exam -                 AAO x 3  Speech intelligible  Motor non-focal 4+/5 bl UE and LEs  No clonus  Sensation intact     Psychiatric - Mood stable, Affect WNL    RECENT LABS/IMAGING  CBC Full  -  ( 08 Dec 2023 04:58 )  WBC Count : 6.85 K/uL  RBC Count : 4.85 M/uL  Hemoglobin : 14.2 g/dL  Hematocrit : 43.9 %  Platelet Count - Automated : 192 K/uL  Mean Cell Volume : 90.5 fl  Mean Cell Hemoglobin : 29.3 pg  Mean Cell Hemoglobin Concentration : 32.3 gm/dL  Auto Neutrophil # : x  Auto Lymphocyte # : x  Auto Monocyte # : x  Auto Eosinophil # : x  Auto Basophil # : x  Auto Neutrophil % : x  Auto Lymphocyte % : x  Auto Monocyte % : x  Auto Eosinophil % : x  Auto Basophil % : x    12-08    143  |  105  |  16  ----------------------------<  60<L>  4.5   |  27  |  1.64<H>    Ca    9.9      08 Dec 2023 04:56    TPro  6.2  /  Alb  3.6  /  TBili  0.3  /  DBili  x   /  AST  15  /  ALT  12  /  AlkPhos  61  12-07    Urinalysis Basic - ( 08 Dec 2023 04:56 )    Color: x / Appearance: x / SG: x / pH: x  Gluc: 60 mg/dL / Ketone: x  / Bili: x / Urobili: x   Blood: x / Protein: x / Nitrite: x   Leuk Esterase: x / RBC: x / WBC x   Sq Epi: x / Non Sq Epi: x / Bacteria: x    Impression:  71 yo with improvement of L sided weakness- no CVA noted on imaging.    Plan:  PT- ROM, Bed Mob, Transfers, Amb w AD and bracing as needed  OT- ADLs, bracing  SLP- Dysphagia eval and treat  Prec- Falls, Cardiac  DVT Prophylaxis- Apixaban  Skin- Turn q2 h  Dispo- Home w home therapies.

## 2023-12-08 NOTE — DISCHARGE NOTE NURSING/CASE MANAGEMENT/SOCIAL WORK - NSSCNAMETXT_GEN_ALL_CORE
P/T recommendation for Home P/T and O/T [facility]  [facility]

## 2023-12-08 NOTE — PATIENT PROFILE ADULT - FALL HARM RISK - HARM RISK INTERVENTIONS
Communicate Risk of Fall with Harm to all staff/Reinforce activity limits and safety measures with patient and family/Tailored Fall Risk Interventions/Visual Cue: Yellow wristband and red socks/Bed in lowest position, wheels locked, appropriate side rails in place/Call bell, personal items and telephone in reach/Instruct patient to call for assistance before getting out of bed or chair/Non-slip footwear when patient is out of bed/[place] to call system/Physically safe environment - no spills, clutter or unnecessary equipment/Purposeful Proactive Rounding/Room/bathroom lighting operational, light cord in reach Communicate Risk of Fall with Harm to all staff/Reinforce activity limits and safety measures with patient and family/Tailored Fall Risk Interventions/Visual Cue: Yellow wristband and red socks/Bed in lowest position, wheels locked, appropriate side rails in place/Call bell, personal items and telephone in reach/Instruct patient to call for assistance before getting out of bed or chair/Non-slip footwear when patient is out of bed/[place] to call system/Physically safe environment - no spills, clutter or unnecessary equipment/Purposeful Proactive Rounding/Room/bathroom lighting operational, light cord in reach

## 2023-12-09 ENCOUNTER — NON-APPOINTMENT (OUTPATIENT)
Age: 73
End: 2023-12-09

## 2023-12-09 LAB
APPEARANCE UR: CLEAR — SIGNIFICANT CHANGE UP
APPEARANCE UR: CLEAR — SIGNIFICANT CHANGE UP
BACTERIA # UR AUTO: NEGATIVE /HPF — SIGNIFICANT CHANGE UP
BACTERIA # UR AUTO: NEGATIVE /HPF — SIGNIFICANT CHANGE UP
BILIRUB UR-MCNC: NEGATIVE — SIGNIFICANT CHANGE UP
BILIRUB UR-MCNC: NEGATIVE — SIGNIFICANT CHANGE UP
CAST: 0 /LPF — SIGNIFICANT CHANGE UP (ref 0–4)
CAST: 0 /LPF — SIGNIFICANT CHANGE UP (ref 0–4)
COLOR SPEC: YELLOW — SIGNIFICANT CHANGE UP
COLOR SPEC: YELLOW — SIGNIFICANT CHANGE UP
DIFF PNL FLD: ABNORMAL
DIFF PNL FLD: ABNORMAL
GLUCOSE BLDC GLUCOMTR-MCNC: 127 MG/DL — HIGH (ref 70–99)
GLUCOSE BLDC GLUCOMTR-MCNC: 127 MG/DL — HIGH (ref 70–99)
GLUCOSE BLDC GLUCOMTR-MCNC: 141 MG/DL — HIGH (ref 70–99)
GLUCOSE BLDC GLUCOMTR-MCNC: 141 MG/DL — HIGH (ref 70–99)
GLUCOSE BLDC GLUCOMTR-MCNC: 177 MG/DL — HIGH (ref 70–99)
GLUCOSE BLDC GLUCOMTR-MCNC: 177 MG/DL — HIGH (ref 70–99)
GLUCOSE BLDC GLUCOMTR-MCNC: 298 MG/DL — HIGH (ref 70–99)
GLUCOSE BLDC GLUCOMTR-MCNC: 298 MG/DL — HIGH (ref 70–99)
GLUCOSE UR QL: >=1000 MG/DL
GLUCOSE UR QL: >=1000 MG/DL
KETONES UR-MCNC: NEGATIVE MG/DL — SIGNIFICANT CHANGE UP
KETONES UR-MCNC: NEGATIVE MG/DL — SIGNIFICANT CHANGE UP
LEUKOCYTE ESTERASE UR-ACNC: NEGATIVE — SIGNIFICANT CHANGE UP
LEUKOCYTE ESTERASE UR-ACNC: NEGATIVE — SIGNIFICANT CHANGE UP
NITRITE UR-MCNC: NEGATIVE — SIGNIFICANT CHANGE UP
NITRITE UR-MCNC: NEGATIVE — SIGNIFICANT CHANGE UP
PH UR: 8 — SIGNIFICANT CHANGE UP (ref 5–8)
PH UR: 8 — SIGNIFICANT CHANGE UP (ref 5–8)
PROT UR-MCNC: 300 MG/DL
PROT UR-MCNC: 300 MG/DL
RBC CASTS # UR COMP ASSIST: 3 /HPF — SIGNIFICANT CHANGE UP (ref 0–4)
RBC CASTS # UR COMP ASSIST: 3 /HPF — SIGNIFICANT CHANGE UP (ref 0–4)
SP GR SPEC: 1.02 — SIGNIFICANT CHANGE UP (ref 1–1.03)
SP GR SPEC: 1.02 — SIGNIFICANT CHANGE UP (ref 1–1.03)
SQUAMOUS # UR AUTO: 0 /HPF — SIGNIFICANT CHANGE UP (ref 0–5)
SQUAMOUS # UR AUTO: 0 /HPF — SIGNIFICANT CHANGE UP (ref 0–5)
UROBILINOGEN FLD QL: 0.2 MG/DL — SIGNIFICANT CHANGE UP (ref 0.2–1)
UROBILINOGEN FLD QL: 0.2 MG/DL — SIGNIFICANT CHANGE UP (ref 0.2–1)
WBC UR QL: 3 /HPF — SIGNIFICANT CHANGE UP (ref 0–5)
WBC UR QL: 3 /HPF — SIGNIFICANT CHANGE UP (ref 0–5)

## 2023-12-09 PROCEDURE — 95718 EEG PHYS/QHP 2-12 HR W/VEEG: CPT

## 2023-12-09 PROCEDURE — 99233 SBSQ HOSP IP/OBS HIGH 50: CPT | Mod: FS

## 2023-12-09 RX ORDER — INSULIN LISPRO 100/ML
VIAL (ML) SUBCUTANEOUS
Refills: 0 | Status: DISCONTINUED | OUTPATIENT
Start: 2023-12-09 | End: 2023-12-10

## 2023-12-09 RX ORDER — LABETALOL HCL 100 MG
10 TABLET ORAL ONCE
Refills: 0 | Status: COMPLETED | OUTPATIENT
Start: 2023-12-09 | End: 2023-12-09

## 2023-12-09 RX ADMIN — Medication 5 MILLIGRAM(S): at 04:31

## 2023-12-09 RX ADMIN — PANTOPRAZOLE SODIUM 40 MILLIGRAM(S): 20 TABLET, DELAYED RELEASE ORAL at 05:01

## 2023-12-09 RX ADMIN — Medication 5 MILLIGRAM(S): at 13:01

## 2023-12-09 RX ADMIN — Medication 100 MILLIGRAM(S): at 05:00

## 2023-12-09 RX ADMIN — APIXABAN 5 MILLIGRAM(S): 2.5 TABLET, FILM COATED ORAL at 17:10

## 2023-12-09 RX ADMIN — APIXABAN 5 MILLIGRAM(S): 2.5 TABLET, FILM COATED ORAL at 05:01

## 2023-12-09 RX ADMIN — Medication 10 MILLIGRAM(S): at 06:21

## 2023-12-09 RX ADMIN — Medication 2000 UNIT(S): at 13:04

## 2023-12-09 RX ADMIN — ATORVASTATIN CALCIUM 80 MILLIGRAM(S): 80 TABLET, FILM COATED ORAL at 21:15

## 2023-12-09 RX ADMIN — Medication 3: at 17:12

## 2023-12-09 RX ADMIN — LISINOPRIL 40 MILLIGRAM(S): 2.5 TABLET ORAL at 05:00

## 2023-12-09 NOTE — PROGRESS NOTE ADULT - SUBJECTIVE AND OBJECTIVE BOX
Subjective: Patient seen and examined. No new events except as noted.     REVIEW OF SYSTEMS:    CONSTITUTIONAL: No weakness, fevers or chills  EYES/ENT: No visual changes;  No vertigo or throat pain   NECK: No pain or stiffness  RESPIRATORY: No cough, wheezing, hemoptysis; No shortness of breath  CARDIOVASCULAR: No chest pain or palpitations  GASTROINTESTINAL: No abdominal or epigastric pain. No nausea, vomiting, or hematemesis; No diarrhea or constipation. No melena or hematochezia.  GENITOURINARY: No dysuria, frequency or hematuria  NEUROLOGICAL: No numbness or weakness  SKIN: No itching, burning, rashes, or lesions   All other review of systems is negative unless indicated above.    MEDICATIONS:  MEDICATIONS  (STANDING):  apixaban 5 milliGRAM(s) Oral every 12 hours  atorvastatin 80 milliGRAM(s) Oral at bedtime  cholecalciferol 2000 Unit(s) Oral daily  dextrose 5%. 1000 milliLiter(s) (100 mL/Hr) IV Continuous <Continuous>  dextrose 5%. 1000 milliLiter(s) (50 mL/Hr) IV Continuous <Continuous>  dextrose 50% Injectable 25 Gram(s) IV Push once  dextrose 50% Injectable 12.5 Gram(s) IV Push once  dextrose 50% Injectable 25 Gram(s) IV Push once  glucagon  Injectable 1 milliGRAM(s) IntraMuscular once  insulin lispro (ADMELOG) corrective regimen sliding scale   SubCutaneous three times a day before meals  lisinopril 40 milliGRAM(s) Oral daily  metoprolol succinate  milliGRAM(s) Oral daily  pantoprazole    Tablet 40 milliGRAM(s) Oral before breakfast  sodium chloride 0.9%. 1000 milliLiter(s) (50 mL/Hr) IV Continuous <Continuous>      PHYSICAL EXAM:  T(C): 36.5 (12-09-23 @ 17:51), Max: 36.9 (12-09-23 @ 08:41)  HR: 83 (12-09-23 @ 17:51) (68 - 83)  BP: 131/80 (12-09-23 @ 17:51) (131/80 - 199/96)  RR: 18 (12-09-23 @ 17:51) (18 - 18)  SpO2: 97% (12-09-23 @ 17:51) (94% - 97%)  Wt(kg): --  I&O's Summary    09 Dec 2023 07:01  -  09 Dec 2023 19:50  --------------------------------------------------------  IN: 120 mL / OUT: 500 mL / NET: -380 mL          Appearance: Normal	  HEENT:   Normal oral mucosa, PERRL, EOMI	  Lymphatic: No lymphadenopathy  Cardiovascular: Normal S1 S2, No JVD, No murmurs, No edema  Respiratory: Lungs clear to auscultation	  Psychiatry: A & O x 3, Mood & affect appropriate  Gastrointestinal:  Soft, Non-tender, + BS	  Skin: No rashes, No ecchymoses, No cyanosis	  Neurological (>12):  MS: Eyes open, awake, alert and oriented to person, place, time, and situation. Follows most commands.    Speech/Language: Mildly slurred. Repetition and naming intact..     CNs: PERRL (3 -> 2mm OU). Possible L visual field impairment. EOMI OU, no nystagmus. V1-3 intact to LT but report of decreased PP on L V1-3.  Well developed masseter muscles b/l. Mild L facial droop . Full eye closure strength b/l. Hearing grossly intact to conversation. Symmetric palate elevation in midline, no uvula deviation. Shoulder shrug intact b/l. Tongue midline on protrusion.    Motor: RUE & RLE antigravity without drift. LUE & LLE antigravity with drift, but without hitting the bed.    Sensation: Decreased to LT/PP in LUE/LLE (50%).     Coordination: No dysmetria to FTN.    Gait: Not assessed due to clinical condition.  Extremities: Normal range of motion, No clubbing, cyanosis or edema  Vascular: Peripheral pulses palpable 2+ bilaterally        LABS:    CARDIAC MARKERS:                                14.2   6.85  )-----------( 192      ( 08 Dec 2023 04:58 )             43.9     12-08    143  |  105  |  16  ----------------------------<  60<L>  4.5   |  27  |  1.64<H>    Ca    9.9      08 Dec 2023 04:56            TELEMETRY: SR	    ECG:  	  RADIOLOGY: < from: MR Angio Head No Cont (12.08.23 @ 14:42) >    INTERPRETATION:  Clinical indication: Intracranial atherosclerosis.        3-D axial noncontrast MRA were performed on the cervical and intracranial   vessels, respectively. Intravascular flow quantification was performed   using gated 2D phase contrast MR, imaged perpendicular to the vessel   axis.  Images were post processed NOVA software and a NOVA flow study   report is available.    Comparison is made with the prior MRA of 9/26/2023 and the CTA of   12/7/2023.    The right vertebral artery ends at the PICA. There is moderate distal   left V4 segment stenosis and mild stenosis of the proximal basilar   artery. There is mild stenosis of the precavernous right internal carotid   artery.    STORM 153, RMCA 113, RACA 78, RACA2 60    LICA 163, LMCA 97, LACA 38, LACA2 69    RVA 22, LVA 89, , RPCA 53, LPCA 89    IMPRESSION: Right vertebral artery ends at the PICA. Moderate distal left   V4 segment stenosis and mild stenosis of the proximal basilar artery.   Mild stenosis of the precavernous right internal carotid artery.   Noninvasive flow MR angiography as above.    --- End of Report ---    < end of copied text >    DIAGNOSTIC TESTING:  [ ] Echocardiogram: < from: TTE W or WO Ultrasound Enhancing Agent (12.08.23 @ 09:11) >    TRANSTHORACIC ECHOCARDIOGRAM REPORT  ________________________________________________________________________________                                      _______       Pt. Name:       FRANKO SPANN   Study Date:    12/8/2023  MRN:            [id number]   YOB: 1950  Accession #:    [id number]    Age:           72 years  Account#:       [id number] Gender:        M  Heart Rate:     75 bpm       Height:        69.00 in (175.26 cm)  Rhythm:         sinus rhythm Weight:        175.00 lb (79.38 kg)  Blood Pressure: 192/92 mmHg  BSA/BMI:       1.95 m² / 25.84 kg/m²  ________________________________________________________________________________________  Referring Physician:    JOSELYN ALVAREZ  Interpreting Physician: Madhav Up M.D.  Primary Sonographer:    Marisol Lou RDCS    CPT:                ECHO TTE WITH CON COMP W DOPP - .m;DEFINITY ECHO                      CONTRAST PER ML - .m;DEFINITY ECHO CONTRAST PER ML                      WASTED - .m  Indication(s):Cerebral infarction, unspecified - I63.9  Procedure:          Transthoracic echocardiogram with 2-D, M-mode and complete                      spectral and color flow Doppler.  Ordering Location:  [facility]  Admission Status:   Inpatient  Contrast Injection: Verbal consent was obtained for injection of Ultrasonic                      Enhancing Agent following a discussion of risks and                      benefits.                      Endocardial visualization enhanced with 3 ml of Definity              Ultrasound enhancing agent (Lot#:[id number] Discarded Dose:7ml).  UEA Reaction:       Patient had no adverse reaction after injection of                      Ultrasound Enhancing Agent.  Study Information:  Image quality for this study is fair.    _______________________________________________________________________________________     CONCLUSIONS:      1. Left ventricular systolic function is mildly decreased with an ejection fraction of 45 % by Mckeon's method of disks.   2. There is mild (grade 1) left ventricular diastolic dysfunction.   3. Normal right ventricular cavity size, wall thickness, and systolic function.   4. Normal atria.   5. Small pericardial effusion noted adjacent to the right ventricle and small pericardial effusion noted adjacent to the lateral left ventricle with no evidence of hemodynamic compromise.   6. Thickened pericardium.   7. The inferior vena cava is normal in size (normal <2.1cm) with normal inspiratory collapse (normal >50%) consistent with normal right atrial pressure (~3, range 0-5mmHg).   8. Compared to the transthoracic echocardiogram performed on 9/26/2023 there has been an interval decline in LV systolic function.    ________________________________________________________________________________________    < end of copied text >    [ ]  Catheterization:  [ ] Stress Test:    OTHER: 	           Subjective: Patient seen and examined. No new events except as noted.     REVIEW OF SYSTEMS:    CONSTITUTIONAL: No weakness, fevers or chills  EYES/ENT: No visual changes;  No vertigo or throat pain   NECK: No pain or stiffness  RESPIRATORY: No cough, wheezing, hemoptysis; No shortness of breath  CARDIOVASCULAR: No chest pain or palpitations  GASTROINTESTINAL: No abdominal or epigastric pain. No nausea, vomiting, or hematemesis; No diarrhea or constipation. No melena or hematochezia.  GENITOURINARY: No dysuria, frequency or hematuria  NEUROLOGICAL: No numbness or weakness  SKIN: No itching, burning, rashes, or lesions   All other review of systems is negative unless indicated above.    MEDICATIONS:  MEDICATIONS  (STANDING):  apixaban 5 milliGRAM(s) Oral every 12 hours  atorvastatin 80 milliGRAM(s) Oral at bedtime  cholecalciferol 2000 Unit(s) Oral daily  dextrose 5%. 1000 milliLiter(s) (100 mL/Hr) IV Continuous <Continuous>  dextrose 5%. 1000 milliLiter(s) (50 mL/Hr) IV Continuous <Continuous>  dextrose 50% Injectable 25 Gram(s) IV Push once  dextrose 50% Injectable 12.5 Gram(s) IV Push once  dextrose 50% Injectable 25 Gram(s) IV Push once  glucagon  Injectable 1 milliGRAM(s) IntraMuscular once  insulin lispro (ADMELOG) corrective regimen sliding scale   SubCutaneous three times a day before meals  lisinopril 40 milliGRAM(s) Oral daily  metoprolol succinate  milliGRAM(s) Oral daily  pantoprazole    Tablet 40 milliGRAM(s) Oral before breakfast  sodium chloride 0.9%. 1000 milliLiter(s) (50 mL/Hr) IV Continuous <Continuous>      PHYSICAL EXAM:  T(C): 36.5 (12-09-23 @ 17:51), Max: 36.9 (12-09-23 @ 08:41)  HR: 83 (12-09-23 @ 17:51) (68 - 83)  BP: 131/80 (12-09-23 @ 17:51) (131/80 - 199/96)  RR: 18 (12-09-23 @ 17:51) (18 - 18)  SpO2: 97% (12-09-23 @ 17:51) (94% - 97%)  Wt(kg): --  I&O's Summary    09 Dec 2023 07:01  -  09 Dec 2023 19:50  --------------------------------------------------------  IN: 120 mL / OUT: 500 mL / NET: -380 mL          Appearance: Normal	  HEENT:   Normal oral mucosa, PERRL, EOMI	  Lymphatic: No lymphadenopathy  Cardiovascular: Normal S1 S2, No JVD, No murmurs, No edema  Respiratory: Lungs clear to auscultation	  Psychiatry: A & O x 3, Mood & affect appropriate  Gastrointestinal:  Soft, Non-tender, + BS	  Skin: No rashes, No ecchymoses, No cyanosis	  Neurological (>12):  MS: Eyes open, awake, alert and oriented to person, place, time, and situation. Follows most commands.    Speech/Language: Mildly slurred. Repetition and naming intact..     CNs: PERRL (3 -> 2mm OU). Possible L visual field impairment. EOMI OU, no nystagmus. V1-3 intact to LT but report of decreased PP on L V1-3.  Well developed masseter muscles b/l. Mild L facial droop . Full eye closure strength b/l. Hearing grossly intact to conversation. Symmetric palate elevation in midline, no uvula deviation. Shoulder shrug intact b/l. Tongue midline on protrusion.    Motor: RUE & RLE antigravity without drift. LUE & LLE antigravity with drift, but without hitting the bed.    Sensation: Decreased to LT/PP in LUE/LLE (50%).     Coordination: No dysmetria to FTN.    Gait: Not assessed due to clinical condition.  Extremities: Normal range of motion, No clubbing, cyanosis or edema  Vascular: Peripheral pulses palpable 2+ bilaterally        LABS:    CARDIAC MARKERS:                                14.2   6.85  )-----------( 192      ( 08 Dec 2023 04:58 )             43.9     12-08    143  |  105  |  16  ----------------------------<  60<L>  4.5   |  27  |  1.64<H>    Ca    9.9      08 Dec 2023 04:56            TELEMETRY: SR	    ECG:  	  RADIOLOGY: < from: MR Angio Head No Cont (12.08.23 @ 14:42) >    INTERPRETATION:  Clinical indication: Intracranial atherosclerosis.        3-D axial noncontrast MRA were performed on the cervical and intracranial   vessels, respectively. Intravascular flow quantification was performed   using gated 2D phase contrast MR, imaged perpendicular to the vessel   axis.  Images were post processed NOVA software and a NOVA flow study   report is available.    Comparison is made with the prior MRA of 9/26/2023 and the CTA of   12/7/2023.    The right vertebral artery ends at the PICA. There is moderate distal   left V4 segment stenosis and mild stenosis of the proximal basilar   artery. There is mild stenosis of the precavernous right internal carotid   artery.    STORM 153, RMCA 113, RACA 78, RACA2 60    LICA 163, LMCA 97, LACA 38, LACA2 69    RVA 22, LVA 89, , RPCA 53, LPCA 89    IMPRESSION: Right vertebral artery ends at the PICA. Moderate distal left   V4 segment stenosis and mild stenosis of the proximal basilar artery.   Mild stenosis of the precavernous right internal carotid artery.   Noninvasive flow MR angiography as above.    --- End of Report ---    < end of copied text >    DIAGNOSTIC TESTING:  [ ] Echocardiogram: < from: TTE W or WO Ultrasound Enhancing Agent (12.08.23 @ 09:11) >    TRANSTHORACIC ECHOCARDIOGRAM REPORT  ________________________________________________________________________________                                      _______       Pt. Name:       FRANKO SPANN   Study Date:    12/8/2023  MRN:            [id number]   YOB: 1950  Accession #:    [id number]    Age:           72 years  Account#:       [id number] Gender:        M  Heart Rate:     75 bpm       Height:        69.00 in (175.26 cm)  Rhythm:         sinus rhythm Weight:        175.00 lb (79.38 kg)  Blood Pressure: 192/92 mmHg  BSA/BMI:       1.95 m² / 25.84 kg/m²  ________________________________________________________________________________________  Referring Physician:    JOSELYN ALVAREZ  Interpreting Physician: Madhav Up M.D.  Primary Sonographer:    Marisol Lou RDCS    CPT:                ECHO TTE WITH CON COMP W DOPP - .m;DEFINITY ECHO                      CONTRAST PER ML - .m;DEFINITY ECHO CONTRAST PER ML                      WASTED - .m  Indication(s):Cerebral infarction, unspecified - I63.9  Procedure:          Transthoracic echocardiogram with 2-D, M-mode and complete                      spectral and color flow Doppler.  Ordering Location:  [facility]  Admission Status:   Inpatient  Contrast Injection: Verbal consent was obtained for injection of Ultrasonic                      Enhancing Agent following a discussion of risks and                      benefits.                      Endocardial visualization enhanced with 3 ml of Definity              Ultrasound enhancing agent (Lot#:[id number] Discarded Dose:7ml).  UEA Reaction:       Patient had no adverse reaction after injection of                      Ultrasound Enhancing Agent.  Study Information:  Image quality for this study is fair.    _______________________________________________________________________________________     CONCLUSIONS:      1. Left ventricular systolic function is mildly decreased with an ejection fraction of 45 % by Mckeon's method of disks.   2. There is mild (grade 1) left ventricular diastolic dysfunction.   3. Normal right ventricular cavity size, wall thickness, and systolic function.   4. Normal atria.   5. Small pericardial effusion noted adjacent to the right ventricle and small pericardial effusion noted adjacent to the lateral left ventricle with no evidence of hemodynamic compromise.   6. Thickened pericardium.   7. The inferior vena cava is normal in size (normal <2.1cm) with normal inspiratory collapse (normal >50%) consistent with normal right atrial pressure (~3, range 0-5mmHg).   8. Compared to the transthoracic echocardiogram performed on 9/26/2023 there has been an interval decline in LV systolic function.    ________________________________________________________________________________________    < end of copied text >    [ ]  Catheterization:  [ ] Stress Test:    OTHER:

## 2023-12-09 NOTE — CHART NOTE - NSCHARTNOTEFT_GEN_A_CORE
EEG preliminary read (not final) on the initial recording hour(s) = x 2    No seizures recorded.  Mild slowing noted, nonspecific.    Final report to follow tomorrow morning after completion of study.    [facility] EEG Reading Room Ph#: [phone]  Epilepsy Answering Service after 5PM and before 8:30AM: Ph#: [phone] EEG preliminary read (not final) on the initial recording hour(s) = x 2    No seizures recorded.  Mild slowing noted, nonspecific.    Final report to follow tomorrow morning after completion of study.    [facility] EEG Reading Room Ph#: [phone]  Epilepsy Answering Service after 5PM and before 8:30AM: Ph#: [phone]

## 2023-12-09 NOTE — PROGRESS NOTE ADULT - SUBJECTIVE AND OBJECTIVE BOX
THE PATIENT WAS SEEN WITH THE HOUSESTAFF AND STROKE TEAM DURING MORNING NEUROLOGY ROUNDS.     HPI: Mr. Rubi is a 72 year-old LH man with a PMH of HTN, HLD, NIDDM2, HFpEF (EF 50% on TTE 9/26/23), atrial fibrillation (on eliquis 5mg BID), prior strokes in 2017 and 2021 (unknown if residual deficits), BIBEMS to the ED from home on 12/7/23 with c/o left sided weakness, for which a stroke code was activated. LKN reported to be 11:30AM on 12/7. Patient woke up in his usual state of health in the AM. He states that while eating breakfast, he experienced left sided weakness. There was associated nausea and NBNB emesis as well. He reports compliance with his home eliquis and states his last dose was the night prior to presentation. He has a remote smoking history (quit 30 years prior) and does not report alcohol use.  NIHSS: 6 (mild L droop, LUE/LLE drift, mild L hypoesthia, mild dysarthria, L visual field impairment). Not a tenecteplase candidate due to INR >1.7 with use of oral AC. Not a candidate for thrombectomy due to no LVO on imaging.  (07 Dec 2023 14:06)  12/8 MRI negative. MR NOVA done.  Patient was started back on eliquis.    SUBJECTIVE: No events overnight.  No new neurologic complaints, ROS reported negative unless otherwise noted.      apixaban 5 milliGRAM(s) Oral every 12 hours  atorvastatin 80 milliGRAM(s) Oral at bedtime  cholecalciferol 2000 Unit(s) Oral daily  dextrose 5%. 1000 milliLiter(s) IV Continuous <Continuous>  dextrose 5%. 1000 milliLiter(s) IV Continuous <Continuous>  dextrose 50% Injectable 25 Gram(s) IV Push once  dextrose 50% Injectable 12.5 Gram(s) IV Push once  dextrose 50% Injectable 25 Gram(s) IV Push once  dextrose Oral Gel 15 Gram(s) Oral once PRN  glucagon  Injectable 1 milliGRAM(s) IntraMuscular once  hydrALAZINE Injectable 5 milliGRAM(s) IV Push every 6 hours PRN  lisinopril 40 milliGRAM(s) Oral daily  metoclopramide 10 milliGRAM(s) Oral three times a day before meals PRN  metoprolol succinate  milliGRAM(s) Oral daily  pantoprazole    Tablet 40 milliGRAM(s) Oral before breakfast  sodium chloride 0.9%. 1000 milliLiter(s) IV Continuous <Continuous>      PHYSICAL EXAM:   Vital Signs Last 24 Hrs  T(C): 36.9 (09 Dec 2023 08:41), Max: 36.9 (09 Dec 2023 08:41)  T(F): 98.4 (09 Dec 2023 08:41), Max: 98.4 (09 Dec 2023 08:41)  HR: 75 (09 Dec 2023 08:41) (63 - 75)  BP: 181/83 (09 Dec 2023 08:41) (132/78 - 199/96)  BP(mean): --  RR: 18 (09 Dec 2023 08:41) (18 - 18)  SpO2: 94% (09 Dec 2023 08:41) (94% - 97%)    Parameters below as of 09 Dec 2023 08:41  Patient On (Oxygen Delivery Method): room air        General: No acute distress  HEENT: EOM intact, visual fields full  Abdomen: Soft, nontender, nondistended   Extremities: No edema    NEUROLOGICAL EXAM:  Physical Exam: Vital Signs Last 24 Hrs  T(C): 36.8 (07 Dec 2023 13:00), Max: 36.8 (07 Dec 2023 13:00)  T(F): 98.2 (07 Dec 2023 13:00), Max: 98.2 (07 Dec 2023 13:00)  HR: 75 (07 Dec 2023 13:45) (71 - 78)  BP: 174/111 (07 Dec 2023 13:45) (174/111 - 210/115)  RR: 21 (07 Dec 2023 13:45) (17 - 21)  SpO2: 100% (07 Dec 2023 13:45) (91% - 100%)    Parameters below as of 07 Dec 2023 13:45  Patient On (Oxygen Delivery Method): nasal cannula  O2 Flow (L/min): 2    General:  Neurological (>12):  MS: Eyes open, awake, alert and oriented to person, place, time, and situation. Follows most commands.    Speech/Language: Mildly slurred. Repetition and naming intact..     CNs: PERRL (3 -> 2mm OU). Possible L visual field impairment. EOMI OU, no nystagmus. V1-3 intact to LT but report of decreased PP on L V1-3.  Well developed masseter muscles b/l. Mild L facial droop . Full eye closure strength b/l. Hearing grossly intact to conversation. Symmetric palate elevation in midline, no uvula deviation. Shoulder shrug intact b/l. Tongue midline on protrusion.    Motor: RUE & RLE antigravity without drift. LUE & LLE antigravity with drift, but without hitting the bed.    Sensation: Decreased to LT/PP in LUE/LLE (50%).     Coordination: No dysmetria to FTN.    Gait:      LABS:                        14.2   6.85  )-----------( 192      ( 08 Dec 2023 04:58 )             43.9    12-08    143  |  105  |  16  ----------------------------<  60<L>  4.5   |  27  |  1.64<H>    Ca    9.9      08 Dec 2023 04:56    TPro  6.2  /  Alb  3.6  /  TBili  0.3  /  DBili  x   /  AST  15  /  ALT  12  /  AlkPhos  61  12-07  PT/INR - ( 07 Dec 2023 12:53 )   PT: 20.7 sec;   INR: 1.92 ratio         PTT - ( 07 Dec 2023 12:53 )  PTT:45.8 sec      IMAGING: Reviewed by team.       ACC: [id number] EXAM: MR ANGIO BRAIN ORDERED BY: JL DEVI    PROCEDURE DATE: 12/08/2023        INTERPRETATION: Clinical indication: Intracranial atherosclerosis.        3-D axial noncontrast MRA were performed on the cervical and intracranial vessels, respectively. Intravascular flow quantification was performed using gated 2D phase contrast MR, imaged perpendicular to the vessel axis. Images were post processed NOVA software and a NOVA flow study report is available.    Comparison is made with the prior MRA of 9/26/2023 and the CTA of 12/7/2023.    The right vertebral artery ends at the PICA. There is moderate distal left V4 segment stenosis and mild stenosis of the proximal basilar artery. There is mild stenosis of the precavernous right internal carotid artery.    STORM 153, RMCA 113, RACA 78, RACA2 60    LICA 163, LMCA 97, LACA 38, LACA2 69    RVA 22, LVA 89, , RPCA 53, LPCA 89    IMPRESSION: Right vertebral artery ends at the PICA. Moderate distal left V4 segment stenosis and mild stenosis of the proximal basilar artery. Mild stenosis of the precavernous right internal carotid artery. Noninvasive flow MR angiography as above.    --- End of Report ---           THE PATIENT WAS SEEN WITH THE HOUSESTAFF AND STROKE TEAM DURING MORNING NEUROLOGY ROUNDS.     HPI: Mr. Rubi is a 72 year-old LH man with a PMH of HTN, HLD, NIDDM2, HFpEF (EF 50% on TTE 9/26/23), atrial fibrillation (on eliquis 5mg BID), prior strokes in 2017 and 2021 (unknown if residual deficits), BIBEMS to the ED from home on 12/7/23 with c/o left sided weakness, for which a stroke code was activated. LKN reported to be 11:30AM on 12/7. Patient woke up in his usual state of health in the AM. He states that while eating breakfast, he experienced left sided weakness. There was associated nausea and NBNB emesis as well. He reports compliance with his home eliquis and states his last dose was the night prior to presentation. He has a remote smoking history (quit 30 years prior) and does not report alcohol use.  NIHSS: 6 (mild L droop, LUE/LLE drift, mild L hypoesthia, mild dysarthria, L visual field impairment). Not a tenecteplase candidate due to INR >1.7 with use of oral AC. Not a candidate for thrombectomy due to no LVO on imaging.  (07 Dec 2023 14:06)  12/8 MRI negative. MR NOVA done.  Patient was started back on eliquis.    SUBJECTIVE: No events overnight.  No new neurologic complaints, ROS reported negative unless otherwise noted.      apixaban 5 milliGRAM(s) Oral every 12 hours  atorvastatin 80 milliGRAM(s) Oral at bedtime  cholecalciferol 2000 Unit(s) Oral daily  dextrose 5%. 1000 milliLiter(s) IV Continuous <Continuous>  dextrose 5%. 1000 milliLiter(s) IV Continuous <Continuous>  dextrose 50% Injectable 25 Gram(s) IV Push once  dextrose 50% Injectable 12.5 Gram(s) IV Push once  dextrose 50% Injectable 25 Gram(s) IV Push once  dextrose Oral Gel 15 Gram(s) Oral once PRN  glucagon  Injectable 1 milliGRAM(s) IntraMuscular once  hydrALAZINE Injectable 5 milliGRAM(s) IV Push every 6 hours PRN  lisinopril 40 milliGRAM(s) Oral daily  metoclopramide 10 milliGRAM(s) Oral three times a day before meals PRN  metoprolol succinate  milliGRAM(s) Oral daily  pantoprazole    Tablet 40 milliGRAM(s) Oral before breakfast  sodium chloride 0.9%. 1000 milliLiter(s) IV Continuous <Continuous>      PHYSICAL EXAM:   Vital Signs Last 24 Hrs  T(C): 36.9 (09 Dec 2023 08:41), Max: 36.9 (09 Dec 2023 08:41)  T(F): 98.4 (09 Dec 2023 08:41), Max: 98.4 (09 Dec 2023 08:41)  HR: 75 (09 Dec 2023 08:41) (63 - 75)  BP: 181/83 (09 Dec 2023 08:41) (132/78 - 199/96)  BP(mean): --  RR: 18 (09 Dec 2023 08:41) (18 - 18)  SpO2: 94% (09 Dec 2023 08:41) (94% - 97%)    Parameters below as of 09 Dec 2023 08:41  Patient On (Oxygen Delivery Method): room air        General: No acute distress  HEENT: EOM intact, visual fields full  Abdomen: Soft, nontender, nondistended   Extremities: No edema    NEUROLOGICAL EXAM:  Physical Exam: Vital Signs Last 24 Hrs  T(C): 36.8 (07 Dec 2023 13:00), Max: 36.8 (07 Dec 2023 13:00)  T(F): 98.2 (07 Dec 2023 13:00), Max: 98.2 (07 Dec 2023 13:00)  HR: 75 (07 Dec 2023 13:45) (71 - 78)  BP: 174/111 (07 Dec 2023 13:45) (174/111 - 210/115)  RR: 21 (07 Dec 2023 13:45) (17 - 21)  SpO2: 100% (07 Dec 2023 13:45) (91% - 100%)    Parameters below as of 07 Dec 2023 13:45  Patient On (Oxygen Delivery Method): nasal cannula  O2 Flow (L/min): 2    General:  Neurological (>12):  MS: Eyes open, awake, alert and oriented to person, place, time, and situation. Follows most commands.    Speech/Language: Mildly slurred. Repetition and naming intact..     CNs: PERRL (3 -> 2mm OU). Possible L visual field impairment. EOMI OU, no nystagmus. V1-3 intact to LT but report of decreased PP on L V1-3.  Well developed masseter muscles b/l. Mild L facial droop . Full eye closure strength b/l. Hearing grossly intact to conversation. Symmetric palate elevation in midline, no uvula deviation. Shoulder shrug intact b/l. Tongue midline on protrusion.    Motor: RUE & RLE antigravity without drift. LUE & LLE antigravity with drift, but without hitting the bed.    Sensation: Decreased to LT/PP in LUE/LLE (50%).     Coordination: No dysmetria to FTN.    Gait:      LABS:                        14.2   6.85  )-----------( 192      ( 08 Dec 2023 04:58 )             43.9    12-08    143  |  105  |  16  ----------------------------<  60<L>  4.5   |  27  |  1.64<H>    Ca    9.9      08 Dec 2023 04:56    TPro  6.2  /  Alb  3.6  /  TBili  0.3  /  DBili  x   /  AST  15  /  ALT  12  /  AlkPhos  61  12-07  PT/INR - ( 07 Dec 2023 12:53 )   PT: 20.7 sec;   INR: 1.92 ratio         PTT - ( 07 Dec 2023 12:53 )  PTT:45.8 sec      IMAGING: Reviewed by team.       ACC: [id number] EXAM: MR ANGIO BRAIN ORDERED BY: JL DEVI    PROCEDURE DATE: 12/08/2023        INTERPRETATION: Clinical indication: Intracranial atherosclerosis.        3-D axial noncontrast MRA were performed on the cervical and intracranial vessels, respectively. Intravascular flow quantification was performed using gated 2D phase contrast MR, imaged perpendicular to the vessel axis. Images were post processed NOVA software and a NOVA flow study report is available.    Comparison is made with the prior MRA of 9/26/2023 and the CTA of 12/7/2023.    The right vertebral artery ends at the PICA. There is moderate distal left V4 segment stenosis and mild stenosis of the proximal basilar artery. There is mild stenosis of the precavernous right internal carotid artery.    STORM 153, RMCA 113, RACA 78, RACA2 60    LICA 163, LMCA 97, LACA 38, LACA2 69    RVA 22, LVA 89, , RPCA 53, LPCA 89    IMPRESSION: Right vertebral artery ends at the PICA. Moderate distal left V4 segment stenosis and mild stenosis of the proximal basilar artery. Mild stenosis of the precavernous right internal carotid artery. Noninvasive flow MR angiography as above.    --- End of Report ---           THE PATIENT WAS SEEN WITH THE HOUSESTAFF AND STROKE TEAM DURING MORNING NEUROLOGY ROUNDS.     HPI: 72 year-old  man with a PMH of HTN, HLD, NIDDM2, HFpEF (EF 50% on TTE 9/26/23), atrial fibrillation (on Eliquis 5mg BID), prior strokes in 2017 and 2021 (unknown if residual deficits), BIBEMS to the ED from home on 12/7/23 with c/o left sided weakness, for which a stroke code was activated. LKN reported to be 11:30AM on 12/7. Patient woke up in his usual state of health in the AM 12/07/23. He states that while eating breakfast, he experienced left sided weakness. There was associated nausea and NBNB emesis as well. He reports compliance with his home Eliquis and states his last dose was the night prior to presentation. He has a remote smoking history (quit 30 years prior) and does not report alcohol use.  NIHSS: 6 (mild L droop, LUE/LLE drift, mild L hypoesthia, mild dysarthria, L visual field impairment). Not a tenecteplase candidate due to INR >1.7 with use of oral AC. Not a candidate for thrombectomy due to no LVO on imaging.   12/8 MRI negative. MR NOVA done.  Patient was started back on eliquis.    SUBJECTIVE: No events overnight.  No new neurologic complaints, ROS reported negative unless otherwise noted.      apixaban 5 milliGRAM(s) Oral every 12 hours  atorvastatin 80 milliGRAM(s) Oral at bedtime  cholecalciferol 2000 Unit(s) Oral daily  dextrose 5%. 1000 milliLiter(s) IV Continuous <Continuous>  dextrose 5%. 1000 milliLiter(s) IV Continuous <Continuous>  dextrose 50% Injectable 25 Gram(s) IV Push once  dextrose 50% Injectable 12.5 Gram(s) IV Push once  dextrose 50% Injectable 25 Gram(s) IV Push once  dextrose Oral Gel 15 Gram(s) Oral once PRN  glucagon  Injectable 1 milliGRAM(s) IntraMuscular once  hydrALAZINE Injectable 5 milliGRAM(s) IV Push every 6 hours PRN  lisinopril 40 milliGRAM(s) Oral daily  metoclopramide 10 milliGRAM(s) Oral three times a day before meals PRN  metoprolol succinate  milliGRAM(s) Oral daily  pantoprazole    Tablet 40 milliGRAM(s) Oral before breakfast  sodium chloride 0.9%. 1000 milliLiter(s) IV Continuous <Continuous>      PHYSICAL EXAM:   Vital Signs Last 24 Hrs  T(C): 36.9 (09 Dec 2023 08:41), Max: 36.9 (09 Dec 2023 08:41)  T(F): 98.4 (09 Dec 2023 08:41), Max: 98.4 (09 Dec 2023 08:41)  HR: 75 (09 Dec 2023 08:41) (63 - 75)  BP: 181/83 (09 Dec 2023 08:41) (132/78 - 199/96)  BP(mean): --  RR: 18 (09 Dec 2023 08:41) (18 - 18)  SpO2: 94% (09 Dec 2023 08:41) (94% - 97%)    Parameters below as of 09 Dec 2023 08:41  Patient On (Oxygen Delivery Method): room air        General: No acute distress  HEENT: EOM intact, visual fields full  Abdomen: Soft, nontender, nondistended   Extremities: No edema    NEUROLOGICAL EXAM:  Mental status: Awake, alert, oriented x3, no aphasia, no neglect, normal memory   Cranial Nerves: No facial asymmetry, no nystagmus, subtle dysarthria, no dysphagia, tongue midline, shoulder shrug intact bilaterally.  Motor exam: Normal tone, no drift, RUE 5/5, RLE 5/5, LUE 5/5, LLE 5/5, normal fine finger movements.  Sensation: Intact to light touch   Coordination/ Gait: No dysmetria, CHRIS intact and symmetric bilaterally, on bedrest       LABS:                        14.2   6.85  )-----------( 192      ( 08 Dec 2023 04:58 )             43.9    12-08    143  |  105  |  16  ----------------------------<  60<L>  4.5   |  27  |  1.64<H>    Ca    9.9      08 Dec 2023 04:56    TPro  6.2  /  Alb  3.6  /  TBili  0.3  /  DBili  x   /  AST  15  /  ALT  12  /  AlkPhos  61  12-07  PT/INR - ( 07 Dec 2023 12:53 )   PT: 20.7 sec;   INR: 1.92 ratio         PTT - ( 07 Dec 2023 12:53 )  PTT:45.8 sec      IMAGING: Reviewed by team.       ACC: [id number] EXAM: MR ANGIO BRAIN ORDERED BY: JL DEVI    PROCEDURE DATE: 12/08/2023        INTERPRETATION: Clinical indication: Intracranial atherosclerosis.        3-D axial noncontrast MRA were performed on the cervical and intracranial vessels, respectively. Intravascular flow quantification was performed using gated 2D phase contrast MR, imaged perpendicular to the vessel axis. Images were post processed NOVA software and a NOVA flow study report is available.    Comparison is made with the prior MRA of 9/26/2023 and the CTA of 12/7/2023.    The right vertebral artery ends at the PICA. There is moderate distal left V4 segment stenosis and mild stenosis of the proximal basilar artery. There is mild stenosis of the precavernous right internal carotid artery.    STORM 153, RMCA 113, RACA 78, RACA2 60    LICA 163, LMCA 97, LACA 38, LACA2 69    RVA 22, LVA 89, , RPCA 53, LPCA 89    IMPRESSION: Right vertebral artery ends at the PICA. Moderate distal left V4 segment stenosis and mild stenosis of the proximal basilar artery. Mild stenosis of the precavernous right internal carotid artery. Noninvasive flow MR angiography as above.    --- End of Report ---           THE PATIENT WAS SEEN WITH THE HOUSESTAFF AND STROKE TEAM DURING MORNING NEUROLOGY ROUNDS.     HPI: 72 year-old  man with a PMH of HTN, HLD, NIDDM2, HFpEF (EF 50% on TTE 9/26/23), atrial fibrillation (on Eliquis 5mg BID), prior strokes in 2017 and 2021 (unknown if residual deficits), BIBEMS to the ED from home on 12/7/23 with c/o left sided weakness, for which a stroke code was activated. LKN reported to be 11:30AM on 12/7. Patient woke up in his usual state of health in the AM 12/07/23. He states that while eating breakfast, he experienced left sided weakness. There was associated nausea and NBNB emesis as well. He reports compliance with his home Eliquis and states his last dose was the night prior to presentation. He has a remote smoking history (quit 30 years prior) and does not report alcohol use.  NIHSS: 6 (mild L droop, LUE/LLE drift, mild L hypoesthia, mild dysarthria, L visual field impairment). Not a tenecteplase candidate due to INR >1.7 with use of oral AC. Not a candidate for thrombectomy due to no LVO on imaging.   12/8 MRI negative. MR NOVA done.  Patient was started back on eliquis.    SUBJECTIVE: No events overnight.  No new neurologic complaints, ROS reported negative unless otherwise noted.      apixaban 5 milliGRAM(s) Oral every 12 hours  atorvastatin 80 milliGRAM(s) Oral at bedtime  cholecalciferol 2000 Unit(s) Oral daily  dextrose 5%. 1000 milliLiter(s) IV Continuous <Continuous>  dextrose 5%. 1000 milliLiter(s) IV Continuous <Continuous>  dextrose 50% Injectable 25 Gram(s) IV Push once  dextrose 50% Injectable 12.5 Gram(s) IV Push once  dextrose 50% Injectable 25 Gram(s) IV Push once  dextrose Oral Gel 15 Gram(s) Oral once PRN  glucagon  Injectable 1 milliGRAM(s) IntraMuscular once  hydrALAZINE Injectable 5 milliGRAM(s) IV Push every 6 hours PRN  lisinopril 40 milliGRAM(s) Oral daily  metoclopramide 10 milliGRAM(s) Oral three times a day before meals PRN  metoprolol succinate  milliGRAM(s) Oral daily  pantoprazole    Tablet 40 milliGRAM(s) Oral before breakfast  sodium chloride 0.9%. 1000 milliLiter(s) IV Continuous <Continuous>      PHYSICAL EXAM:   Vital Signs Last 24 Hrs  T(C): 36.9 (09 Dec 2023 08:41), Max: 36.9 (09 Dec 2023 08:41)  T(F): 98.4 (09 Dec 2023 08:41), Max: 98.4 (09 Dec 2023 08:41)  HR: 75 (09 Dec 2023 08:41) (63 - 75)  BP: 181/83 (09 Dec 2023 08:41) (132/78 - 199/96)  BP(mean): --  RR: 18 (09 Dec 2023 08:41) (18 - 18)  SpO2: 94% (09 Dec 2023 08:41) (94% - 97%)    Parameters below as of 09 Dec 2023 08:41  Patient On (Oxygen Delivery Method): room air        General: No acute distress  HEENT: EOM intact, visual fields full  Abdomen: Soft, nontender, nondistended   Extremities: No edema    NEUROLOGICAL EXAM:  Mental status: Awake, alert, oriented x3, no aphasia, no neglect, normal memory   Cranial Nerves: No facial asymmetry, no nystagmus, subtle dysarthria, no dysphagia, tongue midline, shoulder shrug intact bilaterally.  Motor exam: Normal tone, no drift, RUE 5/5, RLE 5/5, LUE 5/5, LLE 5/5, normal fine finger movements.  Sensation: Intact to light touch   Coordination/ Gait: No dysmetria, CHRIS intact and symmetric bilaterally, on bedrest       LABS:                        14.2   6.85  )-----------( 192      ( 08 Dec 2023 04:58 )             43.9    12-08    143  |  105  |  16  ----------------------------<  60<L>  4.5   |  27  |  1.64<H>    Ca    9.9      08 Dec 2023 04:56    TPro  6.2  /  Alb  3.6  /  TBili  0.3  /  DBili  x   /  AST  15  /  ALT  12  /  AlkPhos  61  12-07  PT/INR - ( 07 Dec 2023 12:53 )   PT: 20.7 sec;   INR: 1.92 ratio         PTT - ( 07 Dec 2023 12:53 )  PTT:45.8 sec      IMAGING: Reviewed by team.       ACC: [id number] EXAM: MR ANGIO BRAIN ORDERED BY: JL DEVI    PROCEDURE DATE: 12/08/2023        INTERPRETATION: Clinical indication: Intracranial atherosclerosis.        3-D axial noncontrast MRA were performed on the cervical and intracranial vessels, respectively. Intravascular flow quantification was performed using gated 2D phase contrast MR, imaged perpendicular to the vessel axis. Images were post processed NOVA software and a NOVA flow study report is available.    Comparison is made with the prior MRA of 9/26/2023 and the CTA of 12/7/2023.    The right vertebral artery ends at the PICA. There is moderate distal left V4 segment stenosis and mild stenosis of the proximal basilar artery. There is mild stenosis of the precavernous right internal carotid artery.    STORM 153, RMCA 113, RACA 78, RACA2 60    LICA 163, LMCA 97, LACA 38, LACA2 69    RVA 22, LVA 89, , RPCA 53, LPCA 89    IMPRESSION: Right vertebral artery ends at the PICA. Moderate distal left V4 segment stenosis and mild stenosis of the proximal basilar artery. Mild stenosis of the precavernous right internal carotid artery. Noninvasive flow MR angiography as above.    --- End of Report ---           THE PATIENT WAS SEEN WITH THE HOUSESTAFF AND STROKE TEAM DURING MORNING NEUROLOGY ROUNDS.     HPI: 72 year-old  man with a PMH of HTN, HLD, NIDDM2, HFpEF (EF 50% on TTE 9/26/23), atrial fibrillation (on Eliquis 5mg BID), prior strokes in 2017 and 2021 (unknown if residual deficits), BIBEMS to the ED from home on 12/7/23 with c/o left sided weakness, for which a stroke code was activated. LKN reported to be 11:30AM on 12/7. Patient woke up in his usual state of health in the AM 12/07/23. He states that while eating breakfast, he experienced left sided weakness. There was associated nausea and NBNB emesis as well. He reports compliance with his home Eliquis and states his last dose was the night prior to presentation. He has a remote smoking history (quit 30 years prior) and does not report alcohol use.  NIHSS: 6 (mild L droop, LUE/LLE drift, mild L hypoesthia, mild dysarthria, L visual field impairment). Not a tenecteplase candidate due to INR >1.7 with use of oral AC. Not a candidate for thrombectomy due to no LVO on imaging.   12/8 MRI negative. MR NOVA done.  Patient was started back on eliquis.    SUBJECTIVE: No events overnight.  No new neurologic complaints, ROS reported negative unless otherwise noted.      apixaban 5 milliGRAM(s) Oral every 12 hours  atorvastatin 80 milliGRAM(s) Oral at bedtime  cholecalciferol 2000 Unit(s) Oral daily  dextrose 5%. 1000 milliLiter(s) IV Continuous <Continuous>  dextrose 5%. 1000 milliLiter(s) IV Continuous <Continuous>  dextrose 50% Injectable 25 Gram(s) IV Push once  dextrose 50% Injectable 12.5 Gram(s) IV Push once  dextrose 50% Injectable 25 Gram(s) IV Push once  dextrose Oral Gel 15 Gram(s) Oral once PRN  glucagon  Injectable 1 milliGRAM(s) IntraMuscular once  hydrALAZINE Injectable 5 milliGRAM(s) IV Push every 6 hours PRN  lisinopril 40 milliGRAM(s) Oral daily  metoclopramide 10 milliGRAM(s) Oral three times a day before meals PRN  metoprolol succinate  milliGRAM(s) Oral daily  pantoprazole    Tablet 40 milliGRAM(s) Oral before breakfast  sodium chloride 0.9%. 1000 milliLiter(s) IV Continuous <Continuous>      PHYSICAL EXAM:   Vital Signs Last 24 Hrs  T(C): 36.9 (09 Dec 2023 08:41), Max: 36.9 (09 Dec 2023 08:41)  T(F): 98.4 (09 Dec 2023 08:41), Max: 98.4 (09 Dec 2023 08:41)  HR: 75 (09 Dec 2023 08:41) (63 - 75)  BP: 181/83 (09 Dec 2023 08:41) (132/78 - 199/96)  BP(mean): --  RR: 18 (09 Dec 2023 08:41) (18 - 18)  SpO2: 94% (09 Dec 2023 08:41) (94% - 97%)    Parameters below as of 09 Dec 2023 08:41  Patient On (Oxygen Delivery Method): room air        General: No acute distress  HEENT: EOM intact, visual fields full  Abdomen: Soft, nontender, nondistended   Extremities: No edema    NEUROLOGICAL EXAM:  Mental status: Eyes open, awake, alert, oriented x3, fluent speech, no neglect, able to follow commands   Cranial Nerves: No facial asymmetry, no nystagmus, subtle dysarthria,  Motor exam: Normal tone, no drift, RUE 5/5, RLE 5/5, Left hemiparesis LUE 5/5, LLE 4+/5.  Sensation: Intact to light touch   Coordination/ Gait: No dysmetria       LABS:                        14.2   6.85  )-----------( 192      ( 08 Dec 2023 04:58 )             43.9    12-08    143  |  105  |  16  ----------------------------<  60<L>  4.5   |  27  |  1.64<H>    Ca    9.9      08 Dec 2023 04:56    TPro  6.2  /  Alb  3.6  /  TBili  0.3  /  DBili  x   /  AST  15  /  ALT  12  /  AlkPhos  61  12-07  PT/INR - ( 07 Dec 2023 12:53 )   PT: 20.7 sec;   INR: 1.92 ratio         PTT - ( 07 Dec 2023 12:53 )  PTT:45.8 sec      IMAGING: Reviewed by team.       MRA head 12/08: Right vertebral artery ends at the PICA. Moderate distal left V4 segment stenosis and mild stenosis of the proximal basilar artery.   Mild stenosis of the precavernous right internal carotid artery.     Brain MRI 12/07: No acute infarction. Stable exam since 9/26/2023.    HEAD CT AND RAPID PERFUSION: HEAD CT 12/07/23: Mild volume loss, microvascular disease, no acute hemorrhage or midline shift.    CT PERFUSION demonstrated:Tmax rater then 6 of 41 mL which appears predominantly in the right MCA territory though no definite stenosis or   occlusion in this distribution is appreciated. No core infarct is seen    CTA COW:  Patent intracranial circulation with evidence of significant stenosis involving the proximal basilar artery on the basis of atherosclerotic disease    CTA NECK: Patent, ECAs, ICAs, no  hemodynamically significant stenosis at  ICA origins by NASCET criteria. Bilateral vertebral arteries are patent with the right vertebral terminating at the level of the PICA

## 2023-12-09 NOTE — PROGRESS NOTE ADULT - NS ATTEND AMEND GEN_ALL_CORE FT
I reviewed available diagnostic studies, and reviewed images personally. I agree with resident's history, exam, orders placed, and plan of care. Medical issues needing to be addressed include: evaluation for cerebral ishemia, R carotid stenosis, HTN, HLD, DM2, HFpEF, Afib on Eliquis 5mg BID, hx of stroke in the past. Presented with L side weakness and confusion. MRI completed, (-) for infarction. Per wife, pt was a bit confused and little weak when he got the MRI, but now appears baseline on rounds. She would like to be discharged tomorrow morning, and any additional testing or work up to be completed outpatient. Pt is Dr. Libman's pt outpatient. Carotid evaluation outpatient, obtain EEG if possible today. Possible recrudescence, given continued symptoms with negative MRI also, f up metabolic/infectious labs, check UA also.

## 2023-12-09 NOTE — PROGRESS NOTE ADULT - ASSESSMENT
ASSESSMENT: This is a 72yman with afib, on eliquis, CHF, HTN, HLD, DM, prior smoking hx, hx chronic strokes, presenting with L HP and neglect, which lasted 2 hours then resolved, imaging without infarct but with ischemia in borderzone areas. R ICA stenosis at petrous segment. possible hypoperfusion of R hemisphere in setting of carotid stenosis, may have improved with elevated BP.      NEURO: Continue close monitoring for neurologic deterioration, avoid hypotension, titrate statin to LDL goal less than 70, MRI Brain w/o, Physical therapy/OT/Speech eval/treatment.     ANTITHROMBOTIC THERAPY:  Patient already on AC for afib    PULMONARY: protecting airway, saturating well     CARDIOVASCULAR: TTE LV EF 45 %, mild (grade 1) LV diastolic dysfunction, Normal atria.  cardiac monitoring                              SBP goal: gradual normotension, avoid hypotension    GASTROINTESTINAL: Ulcer prophylaxis, dysphagia screen       Diet: pureed    RENAL: BUN/Cr stable, good urine output      Na Goal: Greater than 135     Hooper: no    HEMATOLOGY: H/H stable, Platelets normal      DVT ppx: Heparin s.c [] LMWH []   Patient already on AC for afib     ID: afebrile, no leukocytosis     OTHER:     DISPOSITION: Rehab or home depending on PT eval once stable and workup is complete    CORE MEASURES:        Admission NIHSS: 6     TPA: [] YES [x] NO      LDL/HDL: 79/66     Depression Screen:      Statin Therapy: 80     Dysphagia Screen: [x] PASS [] FAIL     Smoking [] YES [] NO [x] prior hx     Afib [x] YES [] NO     Stroke Education [x] YES [] NO    Obtain screening ultrasound in patients who meet 1 or more of the following criteria as patient is high risk for DVT/PE upon admission:   [] History of DVT/PE  [] Hypercoagulable states (Factor V Leiden, Cancer, OCP, etc. )  [] Prolonged immobility (hemiplegia/hemiparesis/post operative or any other extended immobilization)   [] Transferred from outside facility (Rehab or Long term care)  [] Age </= to 50    Case and plan final once attending attestation. ASSESSMENT: This is a 72yman with afib, on eliquis, CHF, HTN, HLD, DM, prior smoking hx, hx chronic strokes, presenting with L HP and neglect, which lasted 2 hours then resolved, imaging without infarct but with ischemia in borderzone areas. R ICA stenosis at petrous segment. possible hypoperfusion of R hemisphere in setting of carotid stenosis, may have improved with elevated BP.      NEURO: Continue close monitoring for neurologic deterioration, avoid hypotension, titrate statin to LDL goal less than 70, MRI Brain w/o, Physical therapy/OT/Speech eval/treatment.     ANTITHROMBOTIC THERAPY: Eliquis for afib    PULMONARY: protecting airway, saturating well     CARDIOVASCULAR: TTE LV EF 45 %, mild (grade 1) LV diastolic dysfunction, Normal atria.  cardiac monitoring                              SBP goal: gradual normotension, avoid hypotension    GASTROINTESTINAL: Ulcer prophylaxis, dysphagia screen passed, tolerating well      Diet: pureed    RENAL: BUN/Cr stable, good urine output      Na Goal: Greater than 135     Hooper: no    HEMATOLOGY: H/H stable, Platelets normal      DVT ppx: Heparin s.c [] LMWH []   Patient already on AC for afib     ID: afebrile, no leukocytosis     OTHER:     DISPOSITION: Rehab or home depending on PT eval once stable and workup is complete    CORE MEASURES:        Admission NIHSS: 6     TPA: [] YES [x] NO      LDL/HDL: 79/66     Depression Screen:      Statin Therapy: 80     Dysphagia Screen: [x] PASS [] FAIL     Smoking [] YES [] NO [x] prior hx     Afib [x] YES [] NO     Stroke Education [x] YES [] NO    Obtain screening ultrasound in patients who meet 1 or more of the following criteria as patient is high risk for DVT/PE upon admission:   [] History of DVT/PE  [] Hypercoagulable states (Factor V Leiden, Cancer, OCP, etc. )  [] Prolonged immobility (hemiplegia/hemiparesis/post operative or any other extended immobilization)   [] Transferred from outside facility (Rehab or Long term care)  [] Age </= to 50    Case and plan final once attending attestation. ASSESSMENT: 71 y/o man with afib, on Eliquis, CHF, HTN, HLD, DM, prior smoking hx, hx chronic strokes, presenting with L HP and neglect, which lasted 2 hours then resolved, Brain MRI shows No acute infarction    Impression: Transient left hemiparesis likely due to  ischemic infarct recrudescence     NEURO: Neuro exam improved since admission, continue close monitoring for neurologic deterioration, Routine EEG ordered to r/o post ictal state, radial normotension, Pt on high dose statin, titrate statin to LDL goal less than 70, MRI Brain, MR head/neck results a above, Physical therapy/OT eval with recommendations for home services.     ANTITHROMBOTIC THERAPY: Eliquis 5mg BID for afib    PULMONARY: protecting airway, saturating well     CARDIOVASCULAR: TTE LV EF 45 %, mild (grade 1) LV diastolic dysfunction, Normal atria, cardiac monitoring no events, conitnue home cardiac meds, DR Guillen (cardio) consult appreciated                              SBP goal: gradual normotension, avoid hypotension    GASTROINTESTINAL: Ulcer prophylaxis, dysphagia screen passed, tolerating well      Diet: pureed    RENAL: BUN/Cr stable, good urine output      Na Goal: Greater than 135     Hooper: no    HEMATOLOGY: H/H stable, Platelets normal      DVT ppx: no need as Patient already on AC for afib     ID: afebrile, no leukocytosis, UA: Negative     OTHER: Plan/goals of care discussed with pt and pt's wife at bedside    DISPOSITION: D/C home with home PT/OT once stable and workup is complete    CORE MEASURES:        Admission NIHSS: 6     TPA: [] YES [x] NO      LDL/HDL: 79/66     Depression Screen:      Statin Therapy: 80     Dysphagia Screen: [x] PASS [] FAIL     Smoking [] YES [] NO [x] Former smoker     Afib [x] YES [] NO     Stroke Education [x] YES [] NO    Obtain screening ultrasound in patients who meet 1 or more of the following criteria as patient is high risk for DVT/PE upon admission:   [] History of DVT/PE  [] Hypercoagulable states (Factor V Leiden, Cancer, OCP, etc. )  [] Prolonged immobility (hemiplegia/hemiparesis/post operative or any other extended immobilization)   [] Transferred from outside facility (Rehab or Long term care)  [] Age </= to 50    Case and plan final once attending attestation.

## 2023-12-10 VITALS
DIASTOLIC BLOOD PRESSURE: 77 MMHG | RESPIRATION RATE: 18 BRPM | HEART RATE: 62 BPM | TEMPERATURE: 98 F | OXYGEN SATURATION: 98 % | SYSTOLIC BLOOD PRESSURE: 138 MMHG

## 2023-12-10 LAB
GLUCOSE BLDC GLUCOMTR-MCNC: 132 MG/DL — HIGH (ref 70–99)
GLUCOSE BLDC GLUCOMTR-MCNC: 132 MG/DL — HIGH (ref 70–99)

## 2023-12-10 RX ADMIN — LISINOPRIL 40 MILLIGRAM(S): 2.5 TABLET ORAL at 05:14

## 2023-12-10 RX ADMIN — APIXABAN 5 MILLIGRAM(S): 2.5 TABLET, FILM COATED ORAL at 05:14

## 2023-12-10 RX ADMIN — PANTOPRAZOLE SODIUM 40 MILLIGRAM(S): 20 TABLET, DELAYED RELEASE ORAL at 05:15

## 2023-12-10 RX ADMIN — Medication 100 MILLIGRAM(S): at 05:16

## 2023-12-10 NOTE — PROGRESS NOTE ADULT - PROBLEM SELECTOR PLAN 1
Transient left hemiparesis likely due to  ischemic infarct recrudescence   Orders per Stroke team
Transient left hemiparesis likely due to  ischemic infarct recrudescence   Orders per Stroke team

## 2023-12-10 NOTE — EEG REPORT - NS EEG TEXT BOX
FRANKO SPANN [id number]     Study Date: 		12-9-23 12:12-16:24  Duration in hours:  x 3 hr 57 min    --------------------------------------------------------------------------------------------------  History:  CC/ HPI Patient is a 72y old  Male who presents with a chief complaint of possible CVA , atherosclerotic disease (08 Dec 2023 13:18)    MEDICATIONS  (STANDING):  apixaban 5 milliGRAM(s) Oral every 12 hours  atorvastatin 80 milliGRAM(s) Oral at bedtime  cholecalciferol 2000 Unit(s) Oral daily  dextrose 5%. 1000 milliLiter(s) (100 mL/Hr) IV Continuous <Continuous>  dextrose 5%. 1000 milliLiter(s) (50 mL/Hr) IV Continuous <Continuous>  dextrose 50% Injectable 25 Gram(s) IV Push once  dextrose 50% Injectable 12.5 Gram(s) IV Push once  dextrose 50% Injectable 25 Gram(s) IV Push once  glucagon  Injectable 1 milliGRAM(s) IntraMuscular once  insulin lispro (ADMELOG) corrective regimen sliding scale   SubCutaneous three times a day before meals  lisinopril 40 milliGRAM(s) Oral daily  metoprolol succinate  milliGRAM(s) Oral daily  pantoprazole    Tablet 40 milliGRAM(s) Oral before breakfast  sodium chloride 0.9%. 1000 milliLiter(s) (50 mL/Hr) IV Continuous <Continuous>    --------------------------------------------------------------------------------------------------  Study Interpretation:    [Abbreviation Key:  PDR=alpha rhythm/posterior dominant rhythm. A-P=anterior posterior.  Amplitude: ‘very low’:<20; ‘low’:20-49; ‘medium’:; ‘high’:>150uV.  Persistence for periodic/rhythmic patterns (% of epoch) ‘rare’:<1%; ‘occasional’:1-10%; ‘frequent’:10-50%; ‘abundant’:50-90%; ‘continuous’:>90%.  Persistence for sporadic discharges: ‘rare’:<1/hr; ‘occasional’:1/min-1/hr; ‘frequent’:>1/min; ‘abundant’:>1/10 sec.  RPP=rhythmic and periodic patterns; GRDA=generalized rhythmic delta activity; FIRDA=frontal intermittent GRDA; LRDA=lateralized rhythmic delta activity; TIRDA=temporal intermittent rhythmic delta activity;  LPD=PLED=lateralized periodic discharges; GPD=generalized periodic discharges; BIPDs =bilateral independent periodic discharges; Mf=multifocal; SIRPDs=stimulus induced rhythmic, periodic, or ictal appearing discharges; BIRDs=brief potentially ictal rhythmic discharges >4 Hz, lasting .5-10s; PFA (paroxysmal bursts >13 Hz or =8 Hz <10s).  Modifiers: +F=with fast component; +S=with spike component; +R=with rhythmic component.  S-B=burst suppression pattern.  Max=maximal. N1-drowsy; N2-stage II sleep; N3-slow wave sleep. SSS/BETS=small sharp spikes/benign epileptiform transients of sleep. HV=hyperventilation; PS=photic stimulation]    FINDINGS:      Background:  Continuity: continuous  Symmetry: symmetric  PDR: 7 Hz activity, with amplitude to 40 uV, that attenuated to eye opening.    Reactivity: present  Voltage: normal (between 20-150uV)  Anterior Posterior Gradient: present  Other background findings: none  Breach: absent    Background Slowing:  Generalized slowing: intermittent irregular delta/theta activity was present.  Focal slowing: none was present.    State Changes:   -Drowsiness noted with increased slowing, attenuation of fast activity.  -N2 sleep transients were not recorded.    Sporadic Epileptiform Discharges:    None    Rhythmic and Periodic Patterns (RPPs):  None     Electrographic and Electroclinical seizures:  None    Other Clinical Events:  None    Activation Procedures:   -Hyperventilation was not performed.    -Photic stimulation was not performed.     Artifacts:  Intermittent myogenic and movement artifacts were noted.    ECG:  The heart rate on single channel ECG was predominantly 60-70 BPM.    EEG Classification / Summary:  Abnormal EEG study  Slow PDR and intermittent generalized background slowing    -----------------------------------------------------------------------------------------------------    Clinical Impression:  Mild diffuse/multi-focal cerebral dysfunction, not specific as to etiology.  There were no epileptiform abnormalities recorded.    This is a preliminary report pending attending review and attestation.    Elsa Leblanc MD  Fellow, [facility]      -------------------------------------------------------------------------------------------------------  [facility] EEG Reading Room Ph#: [phone]  Epilepsy Answering Service after 5PM and before 8:30AM: Ph#: [phone]   FRANKO SPANN [id number]     Study Date: 		12-9-23 12:12-16:24  Duration in hours:  x 3 hr 57 min    --------------------------------------------------------------------------------------------------  History:  CC/ HPI Patient is a 72y old  Male who presents with a chief complaint of possible CVA , atherosclerotic disease (08 Dec 2023 13:18)    MEDICATIONS  (STANDING):  apixaban 5 milliGRAM(s) Oral every 12 hours  atorvastatin 80 milliGRAM(s) Oral at bedtime  cholecalciferol 2000 Unit(s) Oral daily  dextrose 5%. 1000 milliLiter(s) (100 mL/Hr) IV Continuous <Continuous>  dextrose 5%. 1000 milliLiter(s) (50 mL/Hr) IV Continuous <Continuous>  dextrose 50% Injectable 25 Gram(s) IV Push once  dextrose 50% Injectable 12.5 Gram(s) IV Push once  dextrose 50% Injectable 25 Gram(s) IV Push once  glucagon  Injectable 1 milliGRAM(s) IntraMuscular once  insulin lispro (ADMELOG) corrective regimen sliding scale   SubCutaneous three times a day before meals  lisinopril 40 milliGRAM(s) Oral daily  metoprolol succinate  milliGRAM(s) Oral daily  pantoprazole    Tablet 40 milliGRAM(s) Oral before breakfast  sodium chloride 0.9%. 1000 milliLiter(s) (50 mL/Hr) IV Continuous <Continuous>    --------------------------------------------------------------------------------------------------  Study Interpretation:    [Abbreviation Key:  PDR=alpha rhythm/posterior dominant rhythm. A-P=anterior posterior.  Amplitude: ‘very low’:<20; ‘low’:20-49; ‘medium’:; ‘high’:>150uV.  Persistence for periodic/rhythmic patterns (% of epoch) ‘rare’:<1%; ‘occasional’:1-10%; ‘frequent’:10-50%; ‘abundant’:50-90%; ‘continuous’:>90%.  Persistence for sporadic discharges: ‘rare’:<1/hr; ‘occasional’:1/min-1/hr; ‘frequent’:>1/min; ‘abundant’:>1/10 sec.  RPP=rhythmic and periodic patterns; GRDA=generalized rhythmic delta activity; FIRDA=frontal intermittent GRDA; LRDA=lateralized rhythmic delta activity; TIRDA=temporal intermittent rhythmic delta activity;  LPD=PLED=lateralized periodic discharges; GPD=generalized periodic discharges; BIPDs =bilateral independent periodic discharges; Mf=multifocal; SIRPDs=stimulus induced rhythmic, periodic, or ictal appearing discharges; BIRDs=brief potentially ictal rhythmic discharges >4 Hz, lasting .5-10s; PFA (paroxysmal bursts >13 Hz or =8 Hz <10s).  Modifiers: +F=with fast component; +S=with spike component; +R=with rhythmic component.  S-B=burst suppression pattern.  Max=maximal. N1-drowsy; N2-stage II sleep; N3-slow wave sleep. SSS/BETS=small sharp spikes/benign epileptiform transients of sleep. HV=hyperventilation; PS=photic stimulation]    FINDINGS:      Background:  Continuity: continuous  Symmetry: symmetric  PDR: 7 Hz activity, with amplitude to 40 uV, that attenuated to eye opening.    Reactivity: present  Voltage: normal (between 20-150uV)  Anterior Posterior Gradient: present  Other background findings: none  Breach: absent    Background Slowing:  Generalized slowing: intermittent irregular delta/theta activity was present.  Focal slowing: none was present.    State Changes:   -Drowsiness noted with increased slowing, attenuation of fast activity.  -N2 sleep transients were not recorded.    Sporadic Epileptiform Discharges:    None    Rhythmic and Periodic Patterns (RPPs):  None     Electrographic and Electroclinical seizures:  None    Other Clinical Events:  None    Activation Procedures:   -Hyperventilation was not performed.    -Photic stimulation was not performed.     Artifacts:  Intermittent myogenic and movement artifacts were noted.    ECG:  The heart rate on single channel ECG was predominantly 60-70 BPM.    EEG Classification / Summary:  Abnormal EEG study  Slow PDR and intermittent generalized background slowing    -----------------------------------------------------------------------------------------------------    Clinical Impression:  Mild diffuse/multi-focal cerebral dysfunction, not specific as to etiology.  There were no epileptiform abnormalities recorded.    This is a preliminary report pending attending review and attestation.    Elsa Leblanc MD  Fellow, [facility]      -------------------------------------------------------------------------------------------------------  [facility] EEG Reading Room Ph#: [phone]  Epilepsy Answering Service after 5PM and before 8:30AM: Ph#: [phone]   FRANKO SPANN [id number]     Study Date: 		12-9-23 12:12-16:24  Duration in hours:  x 3 hr 57 min    --------------------------------------------------------------------------------------------------  History:  CC/ HPI Patient is a 72y old  Male who presents with a chief complaint of possible CVA , atherosclerotic disease (08 Dec 2023 13:18)    MEDICATIONS  (STANDING):  apixaban 5 milliGRAM(s) Oral every 12 hours  atorvastatin 80 milliGRAM(s) Oral at bedtime  cholecalciferol 2000 Unit(s) Oral daily  dextrose 5%. 1000 milliLiter(s) (100 mL/Hr) IV Continuous <Continuous>  dextrose 5%. 1000 milliLiter(s) (50 mL/Hr) IV Continuous <Continuous>  dextrose 50% Injectable 25 Gram(s) IV Push once  dextrose 50% Injectable 12.5 Gram(s) IV Push once  dextrose 50% Injectable 25 Gram(s) IV Push once  glucagon  Injectable 1 milliGRAM(s) IntraMuscular once  insulin lispro (ADMELOG) corrective regimen sliding scale   SubCutaneous three times a day before meals  lisinopril 40 milliGRAM(s) Oral daily  metoprolol succinate  milliGRAM(s) Oral daily  pantoprazole    Tablet 40 milliGRAM(s) Oral before breakfast  sodium chloride 0.9%. 1000 milliLiter(s) (50 mL/Hr) IV Continuous <Continuous>    --------------------------------------------------------------------------------------------------  Study Interpretation:    [Abbreviation Key:  PDR=alpha rhythm/posterior dominant rhythm. A-P=anterior posterior.  Amplitude: ‘very low’:<20; ‘low’:20-49; ‘medium’:; ‘high’:>150uV.  Persistence for periodic/rhythmic patterns (% of epoch) ‘rare’:<1%; ‘occasional’:1-10%; ‘frequent’:10-50%; ‘abundant’:50-90%; ‘continuous’:>90%.  Persistence for sporadic discharges: ‘rare’:<1/hr; ‘occasional’:1/min-1/hr; ‘frequent’:>1/min; ‘abundant’:>1/10 sec.  RPP=rhythmic and periodic patterns; GRDA=generalized rhythmic delta activity; FIRDA=frontal intermittent GRDA; LRDA=lateralized rhythmic delta activity; TIRDA=temporal intermittent rhythmic delta activity;  LPD=PLED=lateralized periodic discharges; GPD=generalized periodic discharges; BIPDs =bilateral independent periodic discharges; Mf=multifocal; SIRPDs=stimulus induced rhythmic, periodic, or ictal appearing discharges; BIRDs=brief potentially ictal rhythmic discharges >4 Hz, lasting .5-10s; PFA (paroxysmal bursts >13 Hz or =8 Hz <10s).  Modifiers: +F=with fast component; +S=with spike component; +R=with rhythmic component.  S-B=burst suppression pattern.  Max=maximal. N1-drowsy; N2-stage II sleep; N3-slow wave sleep. SSS/BETS=small sharp spikes/benign epileptiform transients of sleep. HV=hyperventilation; PS=photic stimulation]    FINDINGS:      Background:  Continuity: continuous  Symmetry: symmetric  PDR: 7 Hz activity, with amplitude to 40 uV, that attenuated to eye opening.    Reactivity: present  Voltage: normal (between 20-150uV)  Anterior Posterior Gradient: present  Other background findings: none  Breach: absent    Background Slowing:  Generalized slowing: intermittent irregular delta/theta activity was present.  Focal slowing: none was present.    State Changes:   -Drowsiness noted with increased slowing, attenuation of fast activity.  -N2 sleep transients were not recorded.    Sporadic Epileptiform Discharges:    None    Rhythmic and Periodic Patterns (RPPs):  None     Electrographic and Electroclinical seizures:  None    Other Clinical Events:  None    Activation Procedures:   -Hyperventilation was not performed.    -Photic stimulation was not performed.     Artifacts:  Intermittent myogenic and movement artifacts were noted.    ECG:  The heart rate on single channel ECG was predominantly 60-70 BPM.    EEG Classification / Summary:  Abnormal EEG study  Slow PDR and intermittent generalized background slowing    -----------------------------------------------------------------------------------------------------    Clinical Impression:  Mild diffuse/multi-focal cerebral dysfunction, not specific as to etiology.  There were no epileptiform abnormalities recorded.        Elsa Leblanc MD  Fellow, [facility]      -------------------------------------------------------------------------------------------------------  [facility] EEG Reading Room Ph#: [phone]  Epilepsy Answering Service after 5PM and before 8:30AM: Ph#: [phone]   FRANKO SPANN [id number]     Study Date: 		12-9-23 12:12-16:24  Duration in hours:  x 3 hr 57 min    --------------------------------------------------------------------------------------------------  History:  CC/ HPI Patient is a 72y old  Male who presents with a chief complaint of possible CVA , atherosclerotic disease (08 Dec 2023 13:18)    MEDICATIONS  (STANDING):  apixaban 5 milliGRAM(s) Oral every 12 hours  atorvastatin 80 milliGRAM(s) Oral at bedtime  cholecalciferol 2000 Unit(s) Oral daily  dextrose 5%. 1000 milliLiter(s) (100 mL/Hr) IV Continuous <Continuous>  dextrose 5%. 1000 milliLiter(s) (50 mL/Hr) IV Continuous <Continuous>  dextrose 50% Injectable 25 Gram(s) IV Push once  dextrose 50% Injectable 12.5 Gram(s) IV Push once  dextrose 50% Injectable 25 Gram(s) IV Push once  glucagon  Injectable 1 milliGRAM(s) IntraMuscular once  insulin lispro (ADMELOG) corrective regimen sliding scale   SubCutaneous three times a day before meals  lisinopril 40 milliGRAM(s) Oral daily  metoprolol succinate  milliGRAM(s) Oral daily  pantoprazole    Tablet 40 milliGRAM(s) Oral before breakfast  sodium chloride 0.9%. 1000 milliLiter(s) (50 mL/Hr) IV Continuous <Continuous>    --------------------------------------------------------------------------------------------------  Study Interpretation:    [Abbreviation Key:  PDR=alpha rhythm/posterior dominant rhythm. A-P=anterior posterior.  Amplitude: ‘very low’:<20; ‘low’:20-49; ‘medium’:; ‘high’:>150uV.  Persistence for periodic/rhythmic patterns (% of epoch) ‘rare’:<1%; ‘occasional’:1-10%; ‘frequent’:10-50%; ‘abundant’:50-90%; ‘continuous’:>90%.  Persistence for sporadic discharges: ‘rare’:<1/hr; ‘occasional’:1/min-1/hr; ‘frequent’:>1/min; ‘abundant’:>1/10 sec.  RPP=rhythmic and periodic patterns; GRDA=generalized rhythmic delta activity; FIRDA=frontal intermittent GRDA; LRDA=lateralized rhythmic delta activity; TIRDA=temporal intermittent rhythmic delta activity;  LPD=PLED=lateralized periodic discharges; GPD=generalized periodic discharges; BIPDs =bilateral independent periodic discharges; Mf=multifocal; SIRPDs=stimulus induced rhythmic, periodic, or ictal appearing discharges; BIRDs=brief potentially ictal rhythmic discharges >4 Hz, lasting .5-10s; PFA (paroxysmal bursts >13 Hz or =8 Hz <10s).  Modifiers: +F=with fast component; +S=with spike component; +R=with rhythmic component.  S-B=burst suppression pattern.  Max=maximal. N1-drowsy; N2-stage II sleep; N3-slow wave sleep. SSS/BETS=small sharp spikes/benign epileptiform transients of sleep. HV=hyperventilation; PS=photic stimulation]    FINDINGS:      Background:  Continuity: continuous  Symmetry: symmetric  PDR: 7 Hz activity, with amplitude to 40 uV, that attenuated to eye opening.    Reactivity: present  Voltage: normal (between 20-150uV)  Anterior Posterior Gradient: present  Other background findings: none  Breach: absent    Background Slowing:  Generalized slowing: intermittent irregular delta/theta activity was present.  Focal slowing: none was present.    State Changes:   -Drowsiness noted with increased slowing, attenuation of fast activity.  -N2 sleep transients were not recorded.    Sporadic Epileptiform Discharges:    None    Rhythmic and Periodic Patterns (RPPs):  None     Electrographic and Electroclinical seizures:  None    Other Clinical Events:  None    Activation Procedures:   -Hyperventilation was not performed.    -Photic stimulation was not performed.     Artifacts:  Intermittent myogenic and movement artifacts were noted.    ECG:  The heart rate on single channel ECG was predominantly 60-70 BPM.    EEG Classification / Summary:  Abnormal EEG study  Slow PDR and intermittent generalized background slowing    -----------------------------------------------------------------------------------------------------    Clinical Impression:  Mild diffuse/multi-focal cerebral dysfunction, not specific as to etiology.  There were no epileptiform abnormalities recorded.        Elsa Leblanc MD  Fellow, [facility]      -------------------------------------------------------------------------------------------------------  [facility] EEG Reading Room Ph#: [phone]  Epilepsy Answering Service after 5PM and before 8:30AM: Ph#: [phone]

## 2023-12-10 NOTE — PROGRESS NOTE ADULT - PROBLEM SELECTOR PLAN 2
On eliquis   new LV systolic function decline   clinically appears compensated. no CHF at present time   thickened pericardium with small pericardial effusion. No chest pain at present time   outpt follow up with Dr. Conner
On eliquis   new LV systolic function decline   clinically appears compensated. no CHF at present time   thickened pericardium with small pericardial effusion. No chest pain at present time   outpt follow up with Dr. Conner

## 2023-12-10 NOTE — PROGRESS NOTE ADULT - SUBJECTIVE AND OBJECTIVE BOX
Subjective: Patient seen and examined. No new events except as noted.   feels ok   sitting in chair at bedside     REVIEW OF SYSTEMS:    CONSTITUTIONAL: + weakness, fevers or chills  EYES/ENT: No visual changes;  No vertigo or throat pain   NECK: No pain or stiffness  RESPIRATORY: No cough, wheezing, hemoptysis; No shortness of breath  CARDIOVASCULAR: No chest pain or palpitations  GASTROINTESTINAL: No abdominal or epigastric pain. No nausea, vomiting, or hematemesis; No diarrhea or constipation. No melena or hematochezia.  GENITOURINARY: No dysuria, frequency or hematuria  NEUROLOGICAL: No numbness or weakness  SKIN: No itching, burning, rashes, or lesions   All other review of systems is negative unless indicated above.    MEDICATIONS:  MEDICATIONS  (STANDING):  apixaban 5 milliGRAM(s) Oral every 12 hours  atorvastatin 80 milliGRAM(s) Oral at bedtime  cholecalciferol 2000 Unit(s) Oral daily  dextrose 5%. 1000 milliLiter(s) (100 mL/Hr) IV Continuous <Continuous>  dextrose 5%. 1000 milliLiter(s) (50 mL/Hr) IV Continuous <Continuous>  dextrose 50% Injectable 25 Gram(s) IV Push once  dextrose 50% Injectable 12.5 Gram(s) IV Push once  dextrose 50% Injectable 25 Gram(s) IV Push once  glucagon  Injectable 1 milliGRAM(s) IntraMuscular once  insulin lispro (ADMELOG) corrective regimen sliding scale   SubCutaneous three times a day before meals  lisinopril 40 milliGRAM(s) Oral daily  metoprolol succinate  milliGRAM(s) Oral daily  pantoprazole    Tablet 40 milliGRAM(s) Oral before breakfast  sodium chloride 0.9%. 1000 milliLiter(s) (50 mL/Hr) IV Continuous <Continuous>      PHYSICAL EXAM:  T(C): 36.6 (12-10-23 @ 08:47), Max: 36.9 (12-09-23 @ 21:28)  HR: 62 (12-10-23 @ 08:47) (62 - 83)  BP: 138/77 (12-10-23 @ 08:47) (128/67 - 177/95)  RR: 18 (12-10-23 @ 08:47) (18 - 18)  SpO2: 98% (12-10-23 @ 08:47) (95% - 98%)  Wt(kg): --  I&O's Summary    09 Dec 2023 07:01  -  10 Dec 2023 07:00  --------------------------------------------------------  IN: 120 mL / OUT: 500 mL / NET: -380 mL          Appearance: Normal	  HEENT:   Normal oral mucosa, PERRL, EOMI	  Lymphatic: No lymphadenopathy  Cardiovascular: Normal S1 S2, No JVD, No murmurs, No edema  Respiratory: Lungs clear to auscultation	  Psychiatry: A & O x 3, Mood & affect appropriate  Gastrointestinal:  Soft, Non-tender, + BS	  Skin: No rashes, No ecchymoses, No cyanosis	  Neurological (>12):  MS: Eyes open, awake, alert and oriented to person, place, time, and situation. Follows most commands.    Speech/Language: Mildly slurred. Repetition and naming intact..     CNs: PERRL (3 -> 2mm OU). Possible L visual field impairment. EOMI OU, no nystagmus. V1-3 intact to LT but report of decreased PP on L V1-3.  Well developed masseter muscles b/l. Mild L facial droop . Full eye closure strength b/l. Hearing grossly intact to conversation. Symmetric palate elevation in midline, no uvula deviation. Shoulder shrug intact b/l. Tongue midline on protrusion.    Motor: RUE & RLE antigravity without drift. LUE & LLE antigravity with drift, but without hitting the bed.    Sensation: Decreased to LT/PP in LUE/LLE (50%).     Coordination: No dysmetria to FTN.    Gait: Not assessed due to clinical condition.  Extremities: Normal range of motion, No clubbing, cyanosis or edema  Vascular: Peripheral pulses palpable 2+ bilaterally      LABS:    CARDIAC MARKERS:              TELEMETRY: 	SR    ECG:  	  RADIOLOGY:   DIAGNOSTIC TESTING:  [ ] Echocardiogram:  [ ]  Catheterization:  [ ] Stress Test:    OTHER:

## 2023-12-13 PROCEDURE — 96374 THER/PROPH/DIAG INJ IV PUSH: CPT

## 2023-12-13 PROCEDURE — 70498 CT ANGIOGRAPHY NECK: CPT | Mod: MA

## 2023-12-13 PROCEDURE — 82803 BLOOD GASES ANY COMBINATION: CPT

## 2023-12-13 PROCEDURE — 80053 COMPREHEN METABOLIC PANEL: CPT

## 2023-12-13 PROCEDURE — 84295 ASSAY OF SERUM SODIUM: CPT

## 2023-12-13 PROCEDURE — 70544 MR ANGIOGRAPHY HEAD W/O DYE: CPT

## 2023-12-13 PROCEDURE — 95700 EEG CONT REC W/VID EEG TECH: CPT

## 2023-12-13 PROCEDURE — 85610 PROTHROMBIN TIME: CPT

## 2023-12-13 PROCEDURE — 93005 ELECTROCARDIOGRAM TRACING: CPT

## 2023-12-13 PROCEDURE — 85025 COMPLETE CBC W/AUTO DIFF WBC: CPT

## 2023-12-13 PROCEDURE — 70551 MRI BRAIN STEM W/O DYE: CPT | Mod: MH

## 2023-12-13 PROCEDURE — 92523 SPEECH SOUND LANG COMPREHEN: CPT

## 2023-12-13 PROCEDURE — C8929: CPT

## 2023-12-13 PROCEDURE — 82947 ASSAY GLUCOSE BLOOD QUANT: CPT

## 2023-12-13 PROCEDURE — 85014 HEMATOCRIT: CPT

## 2023-12-13 PROCEDURE — 97162 PT EVAL MOD COMPLEX 30 MIN: CPT

## 2023-12-13 PROCEDURE — 0042T: CPT | Mod: MA

## 2023-12-13 PROCEDURE — 84132 ASSAY OF SERUM POTASSIUM: CPT

## 2023-12-13 PROCEDURE — 84484 ASSAY OF TROPONIN QUANT: CPT

## 2023-12-13 PROCEDURE — 82962 GLUCOSE BLOOD TEST: CPT

## 2023-12-13 PROCEDURE — 70450 CT HEAD/BRAIN W/O DYE: CPT | Mod: MA

## 2023-12-13 PROCEDURE — 82435 ASSAY OF BLOOD CHLORIDE: CPT

## 2023-12-13 PROCEDURE — 97165 OT EVAL LOW COMPLEX 30 MIN: CPT

## 2023-12-13 PROCEDURE — 85027 COMPLETE CBC AUTOMATED: CPT

## 2023-12-13 PROCEDURE — 95711 VEEG 2-12 HR UNMONITORED: CPT

## 2023-12-13 PROCEDURE — 96375 TX/PRO/DX INJ NEW DRUG ADDON: CPT

## 2023-12-13 PROCEDURE — 85018 HEMOGLOBIN: CPT

## 2023-12-13 PROCEDURE — 80061 LIPID PANEL: CPT

## 2023-12-13 PROCEDURE — 82330 ASSAY OF CALCIUM: CPT

## 2023-12-13 PROCEDURE — 85730 THROMBOPLASTIN TIME PARTIAL: CPT

## 2023-12-13 PROCEDURE — 83605 ASSAY OF LACTIC ACID: CPT

## 2023-12-13 PROCEDURE — 81001 URINALYSIS AUTO W/SCOPE: CPT

## 2023-12-13 PROCEDURE — 80048 BASIC METABOLIC PNL TOTAL CA: CPT

## 2023-12-13 PROCEDURE — 70496 CT ANGIOGRAPHY HEAD: CPT | Mod: MA

## 2023-12-13 PROCEDURE — 36415 COLL VENOUS BLD VENIPUNCTURE: CPT

## 2023-12-13 PROCEDURE — 83036 HEMOGLOBIN GLYCOSYLATED A1C: CPT

## 2023-12-13 PROCEDURE — 99285 EMERGENCY DEPT VISIT HI MDM: CPT

## 2023-12-13 PROCEDURE — 83880 ASSAY OF NATRIURETIC PEPTIDE: CPT

## 2023-12-18 ENCOUNTER — APPOINTMENT (OUTPATIENT)
Dept: NEUROLOGY | Facility: CLINIC | Age: 73
End: 2023-12-18
Payer: MEDICARE

## 2023-12-18 VITALS
BODY MASS INDEX: 29.32 KG/M2 | HEART RATE: 60 BPM | SYSTOLIC BLOOD PRESSURE: 174 MMHG | DIASTOLIC BLOOD PRESSURE: 86 MMHG | WEIGHT: 189 LBS | HEIGHT: 67.5 IN

## 2023-12-18 DIAGNOSIS — I63.9 CEREBRAL INFARCTION, UNSPECIFIED: ICD-10-CM

## 2023-12-18 PROCEDURE — 99215 OFFICE O/P EST HI 40 MIN: CPT

## 2023-12-18 RX ORDER — METFORMIN HYDROCHLORIDE 1000 MG/1
1000 TABLET, FILM COATED, EXTENDED RELEASE ORAL
Refills: 0 | Status: ACTIVE | COMMUNITY

## 2023-12-18 RX ORDER — COLESEVELAM HYDROCHLORIDE 625 MG/1
625 TABLET, COATED ORAL
Refills: 0 | Status: ACTIVE | COMMUNITY

## 2023-12-18 RX ORDER — ATORVASTATIN CALCIUM 80 MG/1
80 TABLET, FILM COATED ORAL
Refills: 0 | Status: ACTIVE | COMMUNITY

## 2023-12-18 RX ORDER — ATORVASTATIN CALCIUM 80 MG/1
TABLET, FILM COATED ORAL
Refills: 0 | Status: DISCONTINUED | COMMUNITY
End: 2023-12-18

## 2023-12-18 RX ORDER — ESCITALOPRAM OXALATE 20 MG/1
20 TABLET ORAL
Refills: 0 | Status: ACTIVE | COMMUNITY

## 2023-12-18 RX ORDER — OMEPRAZOLE 40 MG/1
40 CAPSULE, DELAYED RELEASE ORAL
Refills: 0 | Status: ACTIVE | COMMUNITY

## 2023-12-18 RX ORDER — METOCLOPRAMIDE 10 MG/1
10 TABLET ORAL
Refills: 0 | Status: ACTIVE | COMMUNITY

## 2023-12-18 RX ORDER — INSULIN GLARGINE 100 [IU]/ML
100 INJECTION, SOLUTION SUBCUTANEOUS
Qty: 15 | Refills: 0 | Status: DISCONTINUED | COMMUNITY
Start: 2023-05-08 | End: 2023-12-18

## 2023-12-18 RX ORDER — TIRZEPATIDE 5 MG/.5ML
5 INJECTION, SOLUTION SUBCUTANEOUS
Qty: 2 | Refills: 0 | Status: DISCONTINUED | COMMUNITY
Start: 2023-05-05 | End: 2023-12-18

## 2023-12-18 RX ORDER — CLOPIDOGREL BISULFATE 75 MG/1
75 TABLET, FILM COATED ORAL
Qty: 90 | Refills: 0 | Status: DISCONTINUED | COMMUNITY
Start: 2021-10-20 | End: 2023-12-18

## 2023-12-18 NOTE — DISCUSSION/SUMMARY
[FreeTextEntry1] : His neurologic exam shows mild hypnesthesia to temperature on the right side of his face and right leg, consistent with his previous lateral medullary infarct, but also temperature sensation loss on his left arm which is not readily explained; a stocking distribution deficit to temperature on both legs, consistent with diabetic polyneuropathy; loss of manual dexterity in his left hand which may be related to previous stroke or peripheral nerve entrapment; mild or mild-moderate postural instability, probably related to previous stroke and also possibly multifactorial.  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 His MRA NOVA in November 2021, confirmed severe intracranial atherosclerosis. He is neurologically stable and has not had a recurrent stroke since 2021 and intracranial flow is in the low-normal range. Since he is doing well clinically, there is likely no benefir to a cerebral angiogram at this time. This can be reconsidered if he develops recurrent infarction.   He is taking Plavix for secondary stroke prevention.  He should benefit from aggressive vascular risk factor control: Target LDL <70, control BP which was elevated today in the office, control diabetes, eat healthfully, and exercise as tolerated.  He endorses daytime fatigue, raising concern for obstructive sleep apnea. I have requested a home sleep study.  Carotid and transcranial Dopplers should be assessed; can be done with you or in our office.  12/18/23 - Overall he is grossly neurologically stable. - In 12/2023, he presented to [facility] with transient left hemiparesis, which was thought to be due to recrudescence of prior stroke deficits, as there was no evidence of recurrent infarction on imaging. MRA NOVA actually demonstrated improved blood flow in the posterior circulation, compared to the study in 2021; indicating that regardless of the nature of this episode, the hemodynamic effects of his intracranial atherosclerosis did not worsen.  - He is taking Apixaban 5mg bid for atrial fibrillation and secondary stroke prevention. - He should benefit from aggressive vascular risk factor control: Target LDL <70, control BP which was elevated today in the office, control diabetes, eat healthfully, and exercise as tolerated. - He continues to endorse daytime fatigue, raising concern for obstructive sleep apnea. I have again requested a home sleep study. - He should benefit from regular physical therapy.  He can follow up in 3 months with Dopplers. I hope he remains free of further serious trouble.

## 2023-12-18 NOTE — PHYSICAL EXAM
[General Appearance - Alert] : alert [General Appearance - In No Acute Distress] : in no acute distress [Oriented To Time, Place, And Person] : oriented to person, place, and time [Impaired Insight] : insight and judgment were intact [Affect] : the affect was normal [Sclera] : the sclera and conjunctiva were normal [Full Visual Field] : full visual field [Outer Ear] : the ears and nose were normal in appearance [Examination Of The Oral Cavity] : the lips and gums were normal [Neck Cervical Mass (___cm)] : no neck mass was observed [Neck Appearance] : the appearance of the neck was normal [Auscultation Breath Sounds / Voice Sounds] : lungs were clear to auscultation bilaterally [Heart Sounds] : normal S1 and S2 [Heart Rate And Rhythm] : heart rate was normal and rhythm regular [Heart Sounds Gallop] : no gallops [Murmurs] : no murmurs [Heart Sounds Pericardial Friction Rub] : no pericardial rub [Arterial Pulses Carotid] : carotid pulses were normal with no bruits [Veins - Varicosity Changes] : there were no varicosital changes [Nail Clubbing] : no clubbing  or cyanosis of the fingernails [Musculoskeletal - Swelling] : no joint swelling seen [Skin Color & Pigmentation] : normal skin color and pigmentation [Motor Tone] : muscle strength and tone were normal [Skin Turgor] : normal skin turgor [] : no rash [FreeTextEntry1] : Generally looked well. Mental status exam: Alert, oriented x3, speech fluent/prosodic without paraphasias; comprehension intact; memory and fund of knowledge intact. On cranial nerve exam, cranial nerves II through XII was intact. On motor exam tone was normal. There was no drift. Fine finger movements are mildly clumsy on the left. Right iliopsoas was 4/5 w effort related giveway; otherwise power was normal throughout. Mild action tremor bilaterally. Reflexes were 1+ in the arms, trace at the patella and absent at the Achilles, and plantar reflexes were silent on the right and downgoing on the left. Coordination at the arms was normal. Gait was moderately unsteady, he turned in 4 steps while holding on, unable to walk in tandem. Romberg test was negative. Decreased sensation to temperature of the right  arm, and right leg.  Possible stocking distribution deficit to temperature  6 inches below the knees.

## 2023-12-18 NOTE — HISTORY OF PRESENT ILLNESS
[FreeTextEntry1] : He is a 72 year old left handed gentleman.  Summary from Dr. 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   Imaging:  - MRI Brain 2017 To my eye, there is an acute infarct in the left lateral medulla.  - MRI Brain 2021 To my eye, there was no acute infarct and there was no obvious pontine infarct. The study was suboptimal.  - CTA Head and Neck: To my eye, there is moderate multifocal moderate to severe stenosis in the proximal basilar artery.  Moderate bilateral cavernous carotid artery calcifications causing severe stenosis on the left and mild to moderate stenosis on the right. Early termination of the right vertebral artery. There also appears to be left vertebral artery stenosis, though not officially commented on in the report. - MR NOVA 11/1/21: Bilateral cavernous internal carotid artery stenosis. Distal left vertebral artery stenosis(dominant vertebral artery).Mid basilar stenosis. Good intracranial flow using noninvasive flow MR angiography.  6/14/23 He presents today with his wife. He presented to the hospital in October 2022 with dysarthria and was found to have a UTI. Repeat imaging did not reveal a new infarct. He has not had a stroke since 2021. He was scheduled to have a cerebral angiogram in 2021, but had to cancel it due to "high blood pressure" and has not rescheduled it.  12/18/23 He presents to the office today with his wife and daughter. On 12/07/23, he developed left hemiparesis which lasted 2 hours and presented to [facility]. His workup did not demonstrate evidence of recurrent infarction and his presentation was thought to be due to recrudescence of previous stroke symptoms.  MRI Brain 12/7/23: To my eye, there is no acute infarction. Stable exam since 9/26/2023. MR NOVA 12/8/23: Right vertebral artery ends at the PICA. Moderate distal left V4 segment stenosis and "mild" (although to my eye moderate) stenosis of the proximal basilar artery. Mild stenosis of the precavernous right internal carotid artery.    PCP Dr. Derrell York

## 2023-12-28 ENCOUNTER — APPOINTMENT (OUTPATIENT)
Dept: NEUROLOGY | Facility: CLINIC | Age: 73
End: 2023-12-28

## 2023-12-28 ENCOUNTER — INPATIENT (INPATIENT)
Facility: HOSPITAL | Age: 73
LOS: 6 days | Discharge: HOME | DRG: 177 | End: 2024-01-04
Attending: INTERNAL MEDICINE | Admitting: INTERNAL MEDICINE
Payer: MEDICARE

## 2023-12-28 VITALS
SYSTOLIC BLOOD PRESSURE: 187 MMHG | TEMPERATURE: 99 F | DIASTOLIC BLOOD PRESSURE: 84 MMHG | OXYGEN SATURATION: 70 % | HEART RATE: 87 BPM | RESPIRATION RATE: 25 BRPM

## 2023-12-28 DIAGNOSIS — I50.20 UNSPECIFIED SYSTOLIC (CONGESTIVE) HEART FAILURE: ICD-10-CM

## 2023-12-28 DIAGNOSIS — E11.9 TYPE 2 DIABETES MELLITUS WITHOUT COMPLICATIONS: ICD-10-CM

## 2023-12-28 DIAGNOSIS — N18.30 CHRONIC KIDNEY DISEASE, STAGE 3 UNSPECIFIED: ICD-10-CM

## 2023-12-28 DIAGNOSIS — J18.9 PNEUMONIA, UNSPECIFIED ORGANISM: ICD-10-CM

## 2023-12-28 DIAGNOSIS — E78.5 HYPERLIPIDEMIA, UNSPECIFIED: ICD-10-CM

## 2023-12-28 DIAGNOSIS — J96.01 ACUTE RESPIRATORY FAILURE WITH HYPOXIA: ICD-10-CM

## 2023-12-28 DIAGNOSIS — I10 ESSENTIAL (PRIMARY) HYPERTENSION: ICD-10-CM

## 2023-12-28 DIAGNOSIS — Z98.890 OTHER SPECIFIED POSTPROCEDURAL STATES: Chronic | ICD-10-CM

## 2023-12-28 LAB
ALBUMIN SERPL ELPH-MCNC: 3.7 G/DL — SIGNIFICANT CHANGE UP (ref 3.3–5)
ALBUMIN SERPL ELPH-MCNC: 3.7 G/DL — SIGNIFICANT CHANGE UP (ref 3.3–5)
ALP SERPL-CCNC: 95 U/L — SIGNIFICANT CHANGE UP (ref 40–120)
ALP SERPL-CCNC: 95 U/L — SIGNIFICANT CHANGE UP (ref 40–120)
ALT FLD-CCNC: 21 U/L — SIGNIFICANT CHANGE UP (ref 10–45)
ALT FLD-CCNC: 21 U/L — SIGNIFICANT CHANGE UP (ref 10–45)
ANION GAP SERPL CALC-SCNC: 12 MMOL/L — SIGNIFICANT CHANGE UP (ref 5–17)
ANION GAP SERPL CALC-SCNC: 12 MMOL/L — SIGNIFICANT CHANGE UP (ref 5–17)
APTT BLD: 36.3 SEC — HIGH (ref 24.5–35.6)
APTT BLD: 36.3 SEC — HIGH (ref 24.5–35.6)
AST SERPL-CCNC: 23 U/L — SIGNIFICANT CHANGE UP (ref 10–40)
AST SERPL-CCNC: 23 U/L — SIGNIFICANT CHANGE UP (ref 10–40)
B-OH-BUTYR SERPL-SCNC: 0.8 MMOL/L — HIGH
B-OH-BUTYR SERPL-SCNC: 0.8 MMOL/L — HIGH
BASE EXCESS BLDV CALC-SCNC: -0.1 MMOL/L — SIGNIFICANT CHANGE UP (ref -2–3)
BASE EXCESS BLDV CALC-SCNC: -0.1 MMOL/L — SIGNIFICANT CHANGE UP (ref -2–3)
BASOPHILS # BLD AUTO: 0.05 K/UL — SIGNIFICANT CHANGE UP (ref 0–0.2)
BASOPHILS # BLD AUTO: 0.05 K/UL — SIGNIFICANT CHANGE UP (ref 0–0.2)
BASOPHILS NFR BLD AUTO: 0.5 % — SIGNIFICANT CHANGE UP (ref 0–2)
BASOPHILS NFR BLD AUTO: 0.5 % — SIGNIFICANT CHANGE UP (ref 0–2)
BILIRUB SERPL-MCNC: 0.4 MG/DL — SIGNIFICANT CHANGE UP (ref 0.2–1.2)
BILIRUB SERPL-MCNC: 0.4 MG/DL — SIGNIFICANT CHANGE UP (ref 0.2–1.2)
BUN SERPL-MCNC: 28 MG/DL — HIGH (ref 7–23)
BUN SERPL-MCNC: 28 MG/DL — HIGH (ref 7–23)
CA-I SERPL-SCNC: 1.26 MMOL/L — SIGNIFICANT CHANGE UP (ref 1.15–1.33)
CA-I SERPL-SCNC: 1.26 MMOL/L — SIGNIFICANT CHANGE UP (ref 1.15–1.33)
CALCIUM SERPL-MCNC: 9.6 MG/DL — SIGNIFICANT CHANGE UP (ref 8.4–10.5)
CALCIUM SERPL-MCNC: 9.6 MG/DL — SIGNIFICANT CHANGE UP (ref 8.4–10.5)
CHLORIDE BLDV-SCNC: 104 MMOL/L — SIGNIFICANT CHANGE UP (ref 96–108)
CHLORIDE BLDV-SCNC: 104 MMOL/L — SIGNIFICANT CHANGE UP (ref 96–108)
CHLORIDE SERPL-SCNC: 105 MMOL/L — SIGNIFICANT CHANGE UP (ref 96–108)
CHLORIDE SERPL-SCNC: 105 MMOL/L — SIGNIFICANT CHANGE UP (ref 96–108)
CO2 BLDV-SCNC: 30 MMOL/L — HIGH (ref 22–26)
CO2 BLDV-SCNC: 30 MMOL/L — HIGH (ref 22–26)
CO2 SERPL-SCNC: 26 MMOL/L — SIGNIFICANT CHANGE UP (ref 22–31)
CO2 SERPL-SCNC: 26 MMOL/L — SIGNIFICANT CHANGE UP (ref 22–31)
CREAT SERPL-MCNC: 1.82 MG/DL — HIGH (ref 0.5–1.3)
CREAT SERPL-MCNC: 1.82 MG/DL — HIGH (ref 0.5–1.3)
EGFR: 38 ML/MIN/1.73M2 — LOW
EGFR: 38 ML/MIN/1.73M2 — LOW
EOSINOPHIL # BLD AUTO: 0.04 K/UL — SIGNIFICANT CHANGE UP (ref 0–0.5)
EOSINOPHIL # BLD AUTO: 0.04 K/UL — SIGNIFICANT CHANGE UP (ref 0–0.5)
EOSINOPHIL NFR BLD AUTO: 0.4 % — SIGNIFICANT CHANGE UP (ref 0–6)
EOSINOPHIL NFR BLD AUTO: 0.4 % — SIGNIFICANT CHANGE UP (ref 0–6)
GAS PNL BLDV: 139 MMOL/L — SIGNIFICANT CHANGE UP (ref 136–145)
GAS PNL BLDV: 139 MMOL/L — SIGNIFICANT CHANGE UP (ref 136–145)
GAS PNL BLDV: SIGNIFICANT CHANGE UP
GAS PNL BLDV: SIGNIFICANT CHANGE UP
GLUCOSE BLDV-MCNC: 233 MG/DL — HIGH (ref 70–99)
GLUCOSE BLDV-MCNC: 233 MG/DL — HIGH (ref 70–99)
GLUCOSE SERPL-MCNC: 229 MG/DL — HIGH (ref 70–99)
GLUCOSE SERPL-MCNC: 229 MG/DL — HIGH (ref 70–99)
HCO3 BLDV-SCNC: 28 MMOL/L — SIGNIFICANT CHANGE UP (ref 22–29)
HCO3 BLDV-SCNC: 28 MMOL/L — SIGNIFICANT CHANGE UP (ref 22–29)
HCT VFR BLD CALC: 37 % — LOW (ref 39–50)
HCT VFR BLD CALC: 37 % — LOW (ref 39–50)
HCT VFR BLDA CALC: 38 % — LOW (ref 39–51)
HCT VFR BLDA CALC: 38 % — LOW (ref 39–51)
HGB BLD CALC-MCNC: 12.6 G/DL — SIGNIFICANT CHANGE UP (ref 12.6–17.4)
HGB BLD CALC-MCNC: 12.6 G/DL — SIGNIFICANT CHANGE UP (ref 12.6–17.4)
HGB BLD-MCNC: 11.7 G/DL — LOW (ref 13–17)
HGB BLD-MCNC: 11.7 G/DL — LOW (ref 13–17)
IMM GRANULOCYTES NFR BLD AUTO: 0.4 % — SIGNIFICANT CHANGE UP (ref 0–0.9)
IMM GRANULOCYTES NFR BLD AUTO: 0.4 % — SIGNIFICANT CHANGE UP (ref 0–0.9)
INR BLD: 1.61 RATIO — HIGH (ref 0.85–1.18)
INR BLD: 1.61 RATIO — HIGH (ref 0.85–1.18)
LACTATE BLDV-MCNC: 2.3 MMOL/L — HIGH (ref 0.5–2)
LACTATE BLDV-MCNC: 2.3 MMOL/L — HIGH (ref 0.5–2)
LYMPHOCYTES # BLD AUTO: 0.39 K/UL — LOW (ref 1–3.3)
LYMPHOCYTES # BLD AUTO: 0.39 K/UL — LOW (ref 1–3.3)
LYMPHOCYTES # BLD AUTO: 4.2 % — LOW (ref 13–44)
LYMPHOCYTES # BLD AUTO: 4.2 % — LOW (ref 13–44)
MAGNESIUM SERPL-MCNC: 1.9 MG/DL — SIGNIFICANT CHANGE UP (ref 1.6–2.6)
MAGNESIUM SERPL-MCNC: 1.9 MG/DL — SIGNIFICANT CHANGE UP (ref 1.6–2.6)
MCHC RBC-ENTMCNC: 29.7 PG — SIGNIFICANT CHANGE UP (ref 27–34)
MCHC RBC-ENTMCNC: 29.7 PG — SIGNIFICANT CHANGE UP (ref 27–34)
MCHC RBC-ENTMCNC: 31.6 GM/DL — LOW (ref 32–36)
MCHC RBC-ENTMCNC: 31.6 GM/DL — LOW (ref 32–36)
MCV RBC AUTO: 93.9 FL — SIGNIFICANT CHANGE UP (ref 80–100)
MCV RBC AUTO: 93.9 FL — SIGNIFICANT CHANGE UP (ref 80–100)
MONOCYTES # BLD AUTO: 0.56 K/UL — SIGNIFICANT CHANGE UP (ref 0–0.9)
MONOCYTES # BLD AUTO: 0.56 K/UL — SIGNIFICANT CHANGE UP (ref 0–0.9)
MONOCYTES NFR BLD AUTO: 6 % — SIGNIFICANT CHANGE UP (ref 2–14)
MONOCYTES NFR BLD AUTO: 6 % — SIGNIFICANT CHANGE UP (ref 2–14)
NEUTROPHILS # BLD AUTO: 8.24 K/UL — HIGH (ref 1.8–7.4)
NEUTROPHILS # BLD AUTO: 8.24 K/UL — HIGH (ref 1.8–7.4)
NEUTROPHILS NFR BLD AUTO: 88.5 % — HIGH (ref 43–77)
NEUTROPHILS NFR BLD AUTO: 88.5 % — HIGH (ref 43–77)
NRBC # BLD: 0 /100 WBCS — SIGNIFICANT CHANGE UP (ref 0–0)
NRBC # BLD: 0 /100 WBCS — SIGNIFICANT CHANGE UP (ref 0–0)
NT-PROBNP SERPL-SCNC: HIGH PG/ML (ref 0–300)
NT-PROBNP SERPL-SCNC: HIGH PG/ML (ref 0–300)
PCO2 BLDV: 65 MMHG — HIGH (ref 42–55)
PCO2 BLDV: 65 MMHG — HIGH (ref 42–55)
PH BLDV: 7.25 — LOW (ref 7.32–7.43)
PH BLDV: 7.25 — LOW (ref 7.32–7.43)
PHOSPHATE SERPL-MCNC: 3.5 MG/DL — SIGNIFICANT CHANGE UP (ref 2.5–4.5)
PHOSPHATE SERPL-MCNC: 3.5 MG/DL — SIGNIFICANT CHANGE UP (ref 2.5–4.5)
PLATELET # BLD AUTO: 264 K/UL — SIGNIFICANT CHANGE UP (ref 150–400)
PLATELET # BLD AUTO: 264 K/UL — SIGNIFICANT CHANGE UP (ref 150–400)
PO2 BLDV: 24 MMHG — LOW (ref 25–45)
PO2 BLDV: 24 MMHG — LOW (ref 25–45)
POTASSIUM BLDV-SCNC: 4.8 MMOL/L — SIGNIFICANT CHANGE UP (ref 3.5–5.1)
POTASSIUM BLDV-SCNC: 4.8 MMOL/L — SIGNIFICANT CHANGE UP (ref 3.5–5.1)
POTASSIUM SERPL-MCNC: 4.8 MMOL/L — SIGNIFICANT CHANGE UP (ref 3.5–5.3)
POTASSIUM SERPL-MCNC: 4.8 MMOL/L — SIGNIFICANT CHANGE UP (ref 3.5–5.3)
POTASSIUM SERPL-SCNC: 4.8 MMOL/L — SIGNIFICANT CHANGE UP (ref 3.5–5.3)
POTASSIUM SERPL-SCNC: 4.8 MMOL/L — SIGNIFICANT CHANGE UP (ref 3.5–5.3)
PROCALCITONIN SERPL-MCNC: 0.06 NG/ML — SIGNIFICANT CHANGE UP (ref 0.02–0.1)
PROCALCITONIN SERPL-MCNC: 0.06 NG/ML — SIGNIFICANT CHANGE UP (ref 0.02–0.1)
PROT SERPL-MCNC: 6.9 G/DL — SIGNIFICANT CHANGE UP (ref 6–8.3)
PROT SERPL-MCNC: 6.9 G/DL — SIGNIFICANT CHANGE UP (ref 6–8.3)
PROTHROM AB SERPL-ACNC: 17.5 SEC — HIGH (ref 9.5–13)
PROTHROM AB SERPL-ACNC: 17.5 SEC — HIGH (ref 9.5–13)
RAPID RVP RESULT: SIGNIFICANT CHANGE UP
RAPID RVP RESULT: SIGNIFICANT CHANGE UP
RBC # BLD: 3.94 M/UL — LOW (ref 4.2–5.8)
RBC # BLD: 3.94 M/UL — LOW (ref 4.2–5.8)
RBC # FLD: 12.9 % — SIGNIFICANT CHANGE UP (ref 10.3–14.5)
RBC # FLD: 12.9 % — SIGNIFICANT CHANGE UP (ref 10.3–14.5)
SAO2 % BLDV: 27.1 % — LOW (ref 67–88)
SAO2 % BLDV: 27.1 % — LOW (ref 67–88)
SARS-COV-2 RNA SPEC QL NAA+PROBE: SIGNIFICANT CHANGE UP
SARS-COV-2 RNA SPEC QL NAA+PROBE: SIGNIFICANT CHANGE UP
SODIUM SERPL-SCNC: 143 MMOL/L — SIGNIFICANT CHANGE UP (ref 135–145)
SODIUM SERPL-SCNC: 143 MMOL/L — SIGNIFICANT CHANGE UP (ref 135–145)
TROPONIN T, HIGH SENSITIVITY RESULT: 77 NG/L — HIGH (ref 0–51)
TROPONIN T, HIGH SENSITIVITY RESULT: 77 NG/L — HIGH (ref 0–51)
WBC # BLD: 9.32 K/UL — SIGNIFICANT CHANGE UP (ref 3.8–10.5)
WBC # BLD: 9.32 K/UL — SIGNIFICANT CHANGE UP (ref 3.8–10.5)
WBC # FLD AUTO: 9.32 K/UL — SIGNIFICANT CHANGE UP (ref 3.8–10.5)
WBC # FLD AUTO: 9.32 K/UL — SIGNIFICANT CHANGE UP (ref 3.8–10.5)

## 2023-12-28 PROCEDURE — 99222 1ST HOSP IP/OBS MODERATE 55: CPT | Mod: GC

## 2023-12-28 PROCEDURE — 71045 X-RAY EXAM CHEST 1 VIEW: CPT | Mod: 26

## 2023-12-28 PROCEDURE — 99291 CRITICAL CARE FIRST HOUR: CPT

## 2023-12-28 PROCEDURE — 71250 CT THORAX DX C-: CPT | Mod: 26

## 2023-12-28 RX ORDER — LISINOPRIL 2.5 MG/1
40 TABLET ORAL DAILY
Refills: 0 | Status: DISCONTINUED | OUTPATIENT
Start: 2023-12-28 | End: 2024-01-04

## 2023-12-28 RX ORDER — ATORVASTATIN CALCIUM 80 MG/1
80 TABLET, FILM COATED ORAL AT BEDTIME
Refills: 0 | Status: DISCONTINUED | OUTPATIENT
Start: 2023-12-28 | End: 2024-01-04

## 2023-12-28 RX ORDER — METOPROLOL TARTRATE 50 MG
1 TABLET ORAL
Refills: 0 | DISCHARGE

## 2023-12-28 RX ORDER — METOCLOPRAMIDE HCL 10 MG
1 TABLET ORAL
Refills: 0 | DISCHARGE

## 2023-12-28 RX ORDER — FOSINOPRIL SODIUM 10 MG/1
1 TABLET ORAL
Refills: 0 | DISCHARGE

## 2023-12-28 RX ORDER — ESCITALOPRAM OXALATE 10 MG/1
20 TABLET, FILM COATED ORAL DAILY
Refills: 0 | Status: DISCONTINUED | OUTPATIENT
Start: 2023-12-28 | End: 2024-01-04

## 2023-12-28 RX ORDER — APIXABAN 2.5 MG/1
5 TABLET, FILM COATED ORAL EVERY 12 HOURS
Refills: 0 | Status: DISCONTINUED | OUTPATIENT
Start: 2023-12-28 | End: 2024-01-04

## 2023-12-28 RX ORDER — HYDRALAZINE HCL 50 MG
10 TABLET ORAL ONCE
Refills: 0 | Status: COMPLETED | OUTPATIENT
Start: 2023-12-28 | End: 2023-12-28

## 2023-12-28 RX ORDER — PIPERACILLIN AND TAZOBACTAM 4; .5 G/20ML; G/20ML
3.38 INJECTION, POWDER, LYOPHILIZED, FOR SOLUTION INTRAVENOUS ONCE
Refills: 0 | Status: COMPLETED | OUTPATIENT
Start: 2023-12-28 | End: 2023-12-28

## 2023-12-28 RX ORDER — PIPERACILLIN AND TAZOBACTAM 4; .5 G/20ML; G/20ML
3.38 INJECTION, POWDER, LYOPHILIZED, FOR SOLUTION INTRAVENOUS EVERY 8 HOURS
Refills: 0 | Status: DISCONTINUED | OUTPATIENT
Start: 2023-12-28 | End: 2024-01-04

## 2023-12-28 RX ORDER — PANTOPRAZOLE SODIUM 20 MG/1
40 TABLET, DELAYED RELEASE ORAL ONCE
Refills: 0 | Status: COMPLETED | OUTPATIENT
Start: 2023-12-28 | End: 2023-12-28

## 2023-12-28 RX ORDER — ATORVASTATIN CALCIUM 80 MG/1
1 TABLET, FILM COATED ORAL
Refills: 0 | DISCHARGE

## 2023-12-28 RX ORDER — OMEPRAZOLE 10 MG/1
1 CAPSULE, DELAYED RELEASE ORAL
Refills: 0 | DISCHARGE

## 2023-12-28 RX ORDER — FUROSEMIDE 40 MG
80 TABLET ORAL DAILY
Refills: 0 | Status: DISCONTINUED | OUTPATIENT
Start: 2023-12-29 | End: 2023-12-30

## 2023-12-28 RX ORDER — METOPROLOL TARTRATE 50 MG
100 TABLET ORAL DAILY
Refills: 0 | Status: DISCONTINUED | OUTPATIENT
Start: 2023-12-28 | End: 2024-01-04

## 2023-12-28 RX ORDER — FUROSEMIDE 40 MG
80 TABLET ORAL ONCE
Refills: 0 | Status: COMPLETED | OUTPATIENT
Start: 2023-12-28 | End: 2023-12-28

## 2023-12-28 RX ORDER — PANTOPRAZOLE SODIUM 20 MG/1
40 TABLET, DELAYED RELEASE ORAL
Refills: 0 | Status: DISCONTINUED | OUTPATIENT
Start: 2023-12-29 | End: 2024-01-04

## 2023-12-28 RX ORDER — VANCOMYCIN HCL 1 G
1000 VIAL (EA) INTRAVENOUS ONCE
Refills: 0 | Status: COMPLETED | OUTPATIENT
Start: 2023-12-28 | End: 2023-12-28

## 2023-12-28 RX ORDER — EMPAGLIFLOZIN 10 MG/1
1 TABLET, FILM COATED ORAL
Refills: 0 | DISCHARGE

## 2023-12-28 RX ORDER — ESCITALOPRAM OXALATE 10 MG/1
1 TABLET, FILM COATED ORAL
Refills: 0 | DISCHARGE

## 2023-12-28 RX ORDER — CHOLECALCIFEROL (VITAMIN D3) 125 MCG
2 CAPSULE ORAL
Refills: 0 | DISCHARGE

## 2023-12-28 RX ORDER — PANTOPRAZOLE SODIUM 20 MG/1
40 TABLET, DELAYED RELEASE ORAL
Refills: 0 | Status: DISCONTINUED | OUTPATIENT
Start: 2023-12-28 | End: 2023-12-28

## 2023-12-28 RX ORDER — METOCLOPRAMIDE HCL 10 MG
10 TABLET ORAL
Refills: 0 | Status: DISCONTINUED | OUTPATIENT
Start: 2023-12-28 | End: 2024-01-04

## 2023-12-28 RX ORDER — IPRATROPIUM/ALBUTEROL SULFATE 18-103MCG
3 AEROSOL WITH ADAPTER (GRAM) INHALATION EVERY 6 HOURS
Refills: 0 | Status: DISCONTINUED | OUTPATIENT
Start: 2023-12-28 | End: 2024-01-04

## 2023-12-28 RX ORDER — METFORMIN HYDROCHLORIDE 850 MG/1
1 TABLET ORAL
Refills: 0 | DISCHARGE

## 2023-12-28 RX ORDER — NITROGLYCERIN 6.5 MG
100 CAPSULE, EXTENDED RELEASE ORAL
Qty: 50 | Refills: 0 | Status: DISCONTINUED | OUTPATIENT
Start: 2023-12-28 | End: 2023-12-28

## 2023-12-28 RX ORDER — COLESEVELAM HYDROCHLORIDE 625 MG/1
1 TABLET, FILM COATED ORAL
Refills: 0 | DISCHARGE

## 2023-12-28 RX ADMIN — Medication 1.25 MILLIGRAM(S): at 11:57

## 2023-12-28 RX ADMIN — Medication 10 MILLIGRAM(S): at 17:29

## 2023-12-28 RX ADMIN — PANTOPRAZOLE SODIUM 40 MILLIGRAM(S): 20 TABLET, DELAYED RELEASE ORAL at 22:49

## 2023-12-28 RX ADMIN — Medication 10 MILLIGRAM(S): at 18:29

## 2023-12-28 RX ADMIN — Medication 1000 MILLIGRAM(S): at 11:50

## 2023-12-28 RX ADMIN — LISINOPRIL 40 MILLIGRAM(S): 2.5 TABLET ORAL at 18:19

## 2023-12-28 RX ADMIN — PIPERACILLIN AND TAZOBACTAM 200 GRAM(S): 4; .5 INJECTION, POWDER, LYOPHILIZED, FOR SOLUTION INTRAVENOUS at 07:31

## 2023-12-28 RX ADMIN — ESCITALOPRAM OXALATE 20 MILLIGRAM(S): 10 TABLET, FILM COATED ORAL at 18:20

## 2023-12-28 RX ADMIN — PIPERACILLIN AND TAZOBACTAM 3.38 GRAM(S): 4; .5 INJECTION, POWDER, LYOPHILIZED, FOR SOLUTION INTRAVENOUS at 09:26

## 2023-12-28 RX ADMIN — Medication 100 MILLIGRAM(S): at 18:27

## 2023-12-28 RX ADMIN — Medication 30 MICROGRAM(S)/MIN: at 08:11

## 2023-12-28 RX ADMIN — Medication 3 MILLILITER(S): at 18:20

## 2023-12-28 RX ADMIN — Medication 100 MICROGRAM(S)/MIN: at 11:50

## 2023-12-28 RX ADMIN — ATORVASTATIN CALCIUM 80 MILLIGRAM(S): 80 TABLET, FILM COATED ORAL at 22:04

## 2023-12-28 RX ADMIN — PIPERACILLIN AND TAZOBACTAM 25 GRAM(S): 4; .5 INJECTION, POWDER, LYOPHILIZED, FOR SOLUTION INTRAVENOUS at 22:04

## 2023-12-28 RX ADMIN — Medication 10 MILLIGRAM(S): at 13:00

## 2023-12-28 RX ADMIN — APIXABAN 5 MILLIGRAM(S): 2.5 TABLET, FILM COATED ORAL at 18:19

## 2023-12-28 RX ADMIN — Medication 250 MILLIGRAM(S): at 08:11

## 2023-12-28 RX ADMIN — Medication 80 MILLIGRAM(S): at 07:30

## 2023-12-28 NOTE — H&P ADULT - PROBLEM SELECTOR PLAN 2
on IV furosemide  recent echocardiogram noted  strict I/O  daily wts  continue to follow cardiology recommendations

## 2023-12-28 NOTE — ED ADULT TRIAGE NOTE - PAIN: PRESENCE, MLM
Addended by: Odilon Clinton on: 6/10/2019 12:29 PM     Modules accepted: Bhavana
denies pain/discomfort (Rating = 0)

## 2023-12-28 NOTE — H&P ADULT - PROBLEM SELECTOR PLAN 5
HbA1C  finger sticks acceptable  no oral meds  diabetic diet  continue finger sticks with short acting insulin sliding scale at baseline  will continue to monitor on aggressive diuresis

## 2023-12-28 NOTE — ED PROVIDER NOTE - CARE PLAN
Principal Discharge DX:	Pneumonia   1 Principal Discharge DX:	Acute exacerbation of CHF (congestive heart failure)

## 2023-12-28 NOTE — PATIENT PROFILE ADULT - NSPROGENSOURCEINFO_GEN_A_NUR
Chief complaint: Right upper quadrant abdominal pain     Patient is a 33 y.o. male referred by El Douglas MD for evaluation of right upper quadrant abdominal pain.  Patient reports after eating pizza or greasy food he has sudden onset of sharp right upper quadrant abdominal pain radiating into his back.  Patient reports he has nausea but no vomiting.  Patient denies fever or chills, jaundice, milan colored stools or dark urine.  Eating seems to make it worse.  It is intermittent.  Time is about the family makes it better.  Patient had not had this previously.     Past Medical History:   Diagnosis Date   • Abdominal pain      History reviewed. No pertinent surgical history.  Family History   Family history unknown: Yes     Social History     Tobacco Use   • Smoking status: Never Smoker   • Smokeless tobacco: Never Used   Substance Use Topics   • Alcohol use: Yes     Alcohol/week: 1.8 oz     Types: 3 Cans of beer per week     Drinks per session: 3 or 4     Comment: per week   • Drug use: Not on file     Allergies   Allergen Reactions   • Penicillins Hives       Current Outpatient Medications:   •  acetaminophen (TYLENOL) 500 MG tablet, Take 500 mg by mouth Every 6 (Six) Hours As Needed for Mild Pain ., Disp: , Rfl:   •  fexofenadine-pseudoephedrine (ALLEGRA-D)  MG per 12 hr tablet, Take 1 tablet by mouth 2 (Two) Times a Day., Disp: , Rfl:   •  fluticasone (FLONASE) 50 MCG/ACT nasal spray, 2 sprays into the nostril(s) as directed by provider Daily., Disp: , Rfl:   •  PROAIR  (90 Base) MCG/ACT inhaler, , Disp: , Rfl:   •  promethazine (PHENERGAN) 6.25 MG/5ML syrup, , Disp: , Rfl:     Review of Systems   HENT: Positive for congestion.    Respiratory: Positive for cough and stridor.    Gastrointestinal: Positive for abdominal distention and abdominal pain.   Musculoskeletal: Positive for back pain and joint swelling.   Neurological: Positive for headaches.   All other systems reviewed and are  negative.      There were no vitals filed for this visit.    Physical Exam  General/physcological:   Alert and oriented x3, in no acute distress  HEENT: Normal cephalic, atraumatic, PERRLA, EOMI, sclera anicteric, moist mucous membranes, neck is supple, no JVD, no carotid bruits, no thyromegaly no adenopathy  Respiratory: CTA and percussion  CVA: RRR, normal S1-S2, no murmurs, no gallops or rubs  GI: Positive BS, soft, nondistended, nontender, no rebound, no guarding, no hernias, no organomegaly and no masses  Musculoskeletal: Full range of motion, no clubbing, no cyanosis or edema  Neurovascular: Grossly intact  Debilities: none  Emotional behavior: appropriate     Patient does not use tobacco products currently.   I have personally reviewed the ultrasound that revealed small stones versus polyps  Assessment:  Biliary colic with small stones versus polyps  Plan:  I have recommended that the patient undergo a laparoscopic cholecystectomy and intraoperative cholangiogram.  I have discussed this procedure in detail with patient and given him a patient teaching pamphlet.  I have discussed the risks, benefits and alternatives.  I have discussed the risk of anesthesia, bleeding, infection, bowel injuries and common bile duct injuries.  Patient understands these risks, benefits and alternatives and wishes to proceed.  I have him scheduled at his earliest convenience.    Payal Garnica MD  General, Minimally Invasive and Endoscopic Surgery  [facility]      [address] [facility] [address] [facility] [address] [facility] [address]    [address]  [address] [address]    P: [phone]  F: [phone]    Cc:  El Douglas MD                   patient

## 2023-12-28 NOTE — CONSULT NOTE ADULT - SUBJECTIVE AND OBJECTIVE BOX
12-28-23 @ 16:20    Patient is a 73y old  Male who presents with a chief complaint of shortness of breath (28 Dec 2023 14:04)      HPI:  73-year-old male with a past medical history of hypertension, hyperlipidemia, diabetes presenting difficulty breathing. Had generalized weakness yesterday. Patient woke up in the middle the night with shortness of breath and pressed life alert button. Patient brought in by EMS with low oxygen saturation And increased work of breathing. Currently states feels better though still on BiPAP. wife at bedside provided most of the collateral history    (28 Dec 2023 14:04)     heis alert and awake nitin s on bipap at this time:  he responds to simple questions:  he used to smoke 3 PPD for many years:   currently he is awake and alert and responsive to questions on bipap   ?FOLLOWING PRESENT  [ x] Hx of PE/DVT, [ denies] Hx COPD, [x ] Hx of Asthma, [? ] Hx of Hospitalization, [x ]  Hx of BiPAP/CPAP use, [x ] Hx of CONSTANTINO    Allergies    No Known Allergies    Intolerances        PAST MEDICAL & SURGICAL HISTORY:  Diabetes Mellitus Type II      HTN (Hypertension)      Hypercholesterolemia      BPH with elevated PSA      Stroke  2017, 10/2021:  with right diplobia resolving in a week      S/P colonoscopy          FAMILY HISTORY:  No pertinent family history in first degree relatives        Social History: [ex smoker:  3 ppd x 40 yrs  ] TOBACCO                  [ x ] ETOH                                 [ x ] IVDA/DRUGS    REVIEW OF SYSTEMS      General:	x    Skin/Breast:x  	  Ophthalmologic:  	x  ENMT:	x    Respiratory and Thorax:  sob,  cough   	  Cardiovascular:	x    Gastrointestinal:	    Genitourinary:	x    Musculoskeletal:	 mild edema    Neurological:	x    Psychiatric:	x    Hematology/Lymphatics:	x    Endocrine:	x    Allergic/Immunologic:	x    MEDICATIONS  (STANDING):  apixaban 5 milliGRAM(s) Oral every 12 hours  atorvastatin 80 milliGRAM(s) Oral at bedtime  escitalopram 20 milliGRAM(s) Oral daily  lisinopril 40 milliGRAM(s) Oral daily  metoclopramide 10 milliGRAM(s) Oral three times a day before meals  metoprolol succinate  milliGRAM(s) Oral daily  pantoprazole    Tablet 40 milliGRAM(s) Oral before breakfast  piperacillin/tazobactam IVPB.. 3.375 Gram(s) IV Intermittent every 8 hours    MEDICATIONS  (PRN):       Vital Signs Last 24 Hrs  T(C): 36.9 (28 Dec 2023 12:02), Max: 37.2 (28 Dec 2023 06:06)  T(F): 98.5 (28 Dec 2023 12:02), Max: 99 (28 Dec 2023 06:06)  HR: 73 (28 Dec 2023 15:44) (63 - 87)  BP: 174/92 (28 Dec 2023 12:46) (155/72 - 187/84)  BP(mean): --  RR: 20 (28 Dec 2023 15:38) (14 - 25)  SpO2: 100% (28 Dec 2023 15:44) (70% - 100%)    Parameters below as of 28 Dec 2023 15:38  Patient On (Oxygen Delivery Method): nasal cannula  O2 Flow (L/min): 6  Orthostatic VS          I&O's Summary      Physical Exam:   GENERAL: NAD, well-groomed, well-developed  HEENT: HAZEL/   Atraumatic, Normocephalic  ENMT: No tonsillar erythema, exudates, or enlargement; Moist mucous membranes, Good dentition, No lesions  NECK: Supple, No JVD, Normal thyroid  CHEST/LUNG: Clear to auscultation bilaterally- no wheezing  CVS: Regular rate and rhythm; No murmurs, rubs, or gallops  GI: : Soft, Nontender, Nondistended; Bowel sounds present  NERVOUS SYSTEM:  Alert & awake on bipap and responding to questions  EXTREMITIES:  2+ Peripheral Pulses, No clubbing, cyanosis, or edema  LYMPH: No lymphadenopathy noted  SKIN: No rashes or lesions  ENDOCRINOLOGY: No Thyromegaly  PSYCH: calm     Labs:  -0.1<65<4>>24<<7.255>>-0.1<<3><<4><<5<<249>>SARS-CoV-2: NotDetec (28 Dec 2023 07:10)  SARS-CoV-2: NotDetec (20 Sep 2023 13:20)                              11.7   9.32  )-----------( 264      ( 28 Dec 2023 07:10 )             37.0     12-28    143  |  105  |  28<H>  ----------------------------<  229<H>  4.8   |  26  |  1.82<H>    Ca    9.6      28 Dec 2023 07:10  Phos  3.5     12-28  Mg     1.9     12-28    TPro  6.9  /  Alb  3.7  /  TBili  0.4  /  DBili  x   /  AST  23  /  ALT  21  /  AlkPhos  95  12-28    CAPILLARY BLOOD GLUCOSE        LIVER FUNCTIONS - ( 28 Dec 2023 07:10 )  Alb: 3.7 g/dL / Pro: 6.9 g/dL / ALK PHOS: 95 U/L / ALT: 21 U/L / AST: 23 U/L / GGT: x           PT/INR - ( 28 Dec 2023 07:10 )   PT: 17.5 sec;   INR: 1.61 ratio         PTT - ( 28 Dec 2023 07:10 )  PTT:36.3 sec  Urinalysis Basic - ( 28 Dec 2023 07:10 )    Color: x / Appearance: x / SG: x / pH: x  Gluc: 229 mg/dL / Ketone: x  / Bili: x / Urobili: x   Blood: x / Protein: x / Nitrite: x   Leuk Esterase: x / RBC: x / WBC x   Sq Epi: x / Non Sq Epi: x / Bacteria: x      D DImer  Procalcitonin, Serum: 0.06 ng/mL (12-28 @ 07:10)      Studies  Chest X-RAY  CT SCAN Chest   CT Abdomen  Venous Dopplers: LE:   Others      rad< from: Xray Chest 1 View- PORTABLE-Urgent (12.28.23 @ 07:02) >    ACC: [id number] EXAM:  XR CHEST PORTABLE URGENT 1V   ORDERED BY:  EDWIN WEAVER     PROCEDURE DATE:  12/28/2023          INTERPRETATION:  EXAMINATION: XR CHEST URGENT    CLINICAL INDICATION: Chest Pain    TECHNIQUE: Single frontal, portable view of the chest was obtained.    COMPARISON: Chest x-ray 9/23/2023    FINDINGS:  Pulmonary: Patchy lower lung infiltrates, left more than right. No   pleural effusion or pneumothorax.  Heart And Mediastinum: Heart size is within normal limits.  Skeleton: There is no acute osseus abnormality.    IMPRESSION:  Patchy infiltrates.    Further information may be obtained from the patient's subsequent CT of   the chest which was pending at the time of this dictation.    --- End of Report ---           SAMY BEAN MD; Resident Radiologist  This document has been electronically signed.  YOKASTA ARAUZ MD; Attending Interventional Radiologist  This document has been electronically signed. Dec 28 2023  9:08AM    < end of copied text >  < from: MR Angio Head No Cont (12.08.23 @ 14:42) >      INTERPRETATION:  Clinical indication: Intracranial atherosclerosis.        3-D axial noncontrast MRA were performed on the cervical and intracranial   vessels, respectively. Intravascular flow quantification was performed   using gated 2D phase contrast MR, imaged perpendicular to the vessel   axis.  Images were post processed NOVA software and a NOVA flow study   report is available.    Comparison is made with the prior MRA of 9/26/2023 and the CTA of   12/7/2023.    The right vertebral artery ends at the PICA. There is moderate distal   left V4 segment stenosis and mild stenosis of the proximal basilar   artery. There is mild stenosis of the precavernous right internal carotid   artery.    STORM 153, RMCA 113, RACA 78, RACA2 60    LICA 163, LMCA 97, LACA 38, LACA2 69    RVA 22, LVA 89, , RPCA 53, LPCA 89    IMPRESSION: Right vertebral artery ends at the PICA. Moderate distal left   V4 segment stenosis and mild stenosis of the proximal basilar artery.   Mild stenosis of the precavernous right internal carotid artery.   Noninvasive flow MR angiography as above.    --- End of Report ---            ELENO MAHER MD; Attending Radiologist  This document has been electronically signed. Dec  8 2023  3:59PM    < end of copied text >          < from: TTE W or WO Ultrasound Enhancing Agent (12.08.23 @ 09:11) >      1. Left ventricular systolic function is mildly decreased with an ejection fraction of 45 % by Mckeon's method of disks.   2. There is mild (grade 1) left ventricular diastolic dysfunction.   3. Normal right ventricular cavity size, wall thickness, and systolic function.   4. Normal atria.   5. Small pericardial effusion noted adjacent to the right ventricle and small pericardial effusion noted adjacent to the lateral left ventricle with no evidence of hemodynamic compromise.   6. Thickened pericardium.   7. The inferior vena cava is normal in size (normal <2.1cm) with normal inspiratory collapse (normal >50%) consistent with normal right atrial pressure (~3, range 0-5mmHg).   8. Compared to the transthoracic echocardiogram performed on 9/26/2023 there has been an interval decline in LV systolic function.    ____________________________    < end of copied text >         12-28-23 @ 16:20    Patient is a 73y old  Male who presents with a chief complaint of shortness of breath (28 Dec 2023 14:04)      HPI:  73-year-old male with a past medical history of hypertension, hyperlipidemia, diabetes presenting difficulty breathing. Had generalized weakness yesterday. Patient woke up in the middle the night with shortness of breath and pressed life alert button. Patient brought in by EMS with low oxygen saturation And increased work of breathing. Currently states feels better though still on BiPAP. wife at bedside provided most of the collateral history    (28 Dec 2023 14:04)     heis alert and awake nitin s on bipap at this time:  he responds to simple questions:  he used to smoke 3 PPD for many years:   currently he is awake and alert and responsive to questions on bipap   ?FOLLOWING PRESENT  [ x] Hx of PE/DVT, [ denies] Hx COPD, [x ] Hx of Asthma, [? ] Hx of Hospitalization, [x ]  Hx of BiPAP/CPAP use, [x ] Hx of CONSTANTINO    Allergies    No Known Allergies    Intolerances        PAST MEDICAL & SURGICAL HISTORY:  Diabetes Mellitus Type II      HTN (Hypertension)      Hypercholesterolemia      BPH with elevated PSA      Stroke  2017, 10/2021:  with right diplobia resolving in a week      S/P colonoscopy          FAMILY HISTORY:  No pertinent family history in first degree relatives        Social History: [ex smoker:  3 ppd x 40 yrs  ] TOBACCO                  [ x ] ETOH                                 [ x ] IVDA/DRUGS    REVIEW OF SYSTEMS      General:	x    Skin/Breast:x  	  Ophthalmologic:  	x  ENMT:	x    Respiratory and Thorax:  sob,  cough   	  Cardiovascular:	x    Gastrointestinal:	    Genitourinary:	x    Musculoskeletal:	 mild edema    Neurological:	x    Psychiatric:	x    Hematology/Lymphatics:	x    Endocrine:	x    Allergic/Immunologic:	x    MEDICATIONS  (STANDING):  apixaban 5 milliGRAM(s) Oral every 12 hours  atorvastatin 80 milliGRAM(s) Oral at bedtime  escitalopram 20 milliGRAM(s) Oral daily  lisinopril 40 milliGRAM(s) Oral daily  metoclopramide 10 milliGRAM(s) Oral three times a day before meals  metoprolol succinate  milliGRAM(s) Oral daily  pantoprazole    Tablet 40 milliGRAM(s) Oral before breakfast  piperacillin/tazobactam IVPB.. 3.375 Gram(s) IV Intermittent every 8 hours    MEDICATIONS  (PRN):       Vital Signs Last 24 Hrs  T(C): 36.9 (28 Dec 2023 12:02), Max: 37.2 (28 Dec 2023 06:06)  T(F): 98.5 (28 Dec 2023 12:02), Max: 99 (28 Dec 2023 06:06)  HR: 73 (28 Dec 2023 15:44) (63 - 87)  BP: 174/92 (28 Dec 2023 12:46) (155/72 - 187/84)  BP(mean): --  RR: 20 (28 Dec 2023 15:38) (14 - 25)  SpO2: 100% (28 Dec 2023 15:44) (70% - 100%)    Parameters below as of 28 Dec 2023 15:38  Patient On (Oxygen Delivery Method): nasal cannula  O2 Flow (L/min): 6  Orthostatic VS          I&O's Summary      Physical Exam:   GENERAL: NAD, well-groomed, well-developed  HEENT: HAZEL/   Atraumatic, Normocephalic  ENMT: No tonsillar erythema, exudates, or enlargement; Moist mucous membranes, Good dentition, No lesions  NECK: Supple, No JVD, Normal thyroid  CHEST/LUNG: Clear to auscultation bilaterally- no wheezing  CVS: Regular rate and rhythm; No murmurs, rubs, or gallops  GI: : Soft, Nontender, Nondistended; Bowel sounds present  NERVOUS SYSTEM:  Alert & awake on bipap and responding to questions  EXTREMITIES:  2+ Peripheral Pulses, No clubbing, cyanosis, or edema  LYMPH: No lymphadenopathy noted  SKIN: No rashes or lesions  ENDOCRINOLOGY: No Thyromegaly  PSYCH: calm     Labs:  -0.1<65<4>>24<<7.255>>-0.1<<3><<4><<5<<249>>SARS-CoV-2: NotDetec (28 Dec 2023 07:10)  SARS-CoV-2: NotDetec (20 Sep 2023 13:20)                              11.7   9.32  )-----------( 264      ( 28 Dec 2023 07:10 )             37.0     12-28    143  |  105  |  28<H>  ----------------------------<  229<H>  4.8   |  26  |  1.82<H>    Ca    9.6      28 Dec 2023 07:10  Phos  3.5     12-28  Mg     1.9     12-28    TPro  6.9  /  Alb  3.7  /  TBili  0.4  /  DBili  x   /  AST  23  /  ALT  21  /  AlkPhos  95  12-28    CAPILLARY BLOOD GLUCOSE        LIVER FUNCTIONS - ( 28 Dec 2023 07:10 )  Alb: 3.7 g/dL / Pro: 6.9 g/dL / ALK PHOS: 95 U/L / ALT: 21 U/L / AST: 23 U/L / GGT: x           PT/INR - ( 28 Dec 2023 07:10 )   PT: 17.5 sec;   INR: 1.61 ratio         PTT - ( 28 Dec 2023 07:10 )  PTT:36.3 sec  Urinalysis Basic - ( 28 Dec 2023 07:10 )    Color: x / Appearance: x / SG: x / pH: x  Gluc: 229 mg/dL / Ketone: x  / Bili: x / Urobili: x   Blood: x / Protein: x / Nitrite: x   Leuk Esterase: x / RBC: x / WBC x   Sq Epi: x / Non Sq Epi: x / Bacteria: x      D DImer  Procalcitonin, Serum: 0.06 ng/mL (12-28 @ 07:10)      Studies  Chest X-RAY  CT SCAN Chest   CT Abdomen  Venous Dopplers: LE:   Others      rad< from: Xray Chest 1 View- PORTABLE-Urgent (12.28.23 @ 07:02) >    ACC: [id number] EXAM:  XR CHEST PORTABLE URGENT 1V   ORDERED BY:  EDWIN WEAVER     PROCEDURE DATE:  12/28/2023          INTERPRETATION:  EXAMINATION: XR CHEST URGENT    CLINICAL INDICATION: Chest Pain    TECHNIQUE: Single frontal, portable view of the chest was obtained.    COMPARISON: Chest x-ray 9/23/2023    FINDINGS:  Pulmonary: Patchy lower lung infiltrates, left more than right. No   pleural effusion or pneumothorax.  Heart And Mediastinum: Heart size is within normal limits.  Skeleton: There is no acute osseus abnormality.    IMPRESSION:  Patchy infiltrates.    Further information may be obtained from the patient's subsequent CT of   the chest which was pending at the time of this dictation.    --- End of Report ---           SAMY BEAN MD; Resident Radiologist  This document has been electronically signed.  YOKASTA ARAUZ MD; Attending Interventional Radiologist  This document has been electronically signed. Dec 28 2023  9:08AM    < end of copied text >  < from: MR Angio Head No Cont (12.08.23 @ 14:42) >      INTERPRETATION:  Clinical indication: Intracranial atherosclerosis.        3-D axial noncontrast MRA were performed on the cervical and intracranial   vessels, respectively. Intravascular flow quantification was performed   using gated 2D phase contrast MR, imaged perpendicular to the vessel   axis.  Images were post processed NOVA software and a NOVA flow study   report is available.    Comparison is made with the prior MRA of 9/26/2023 and the CTA of   12/7/2023.    The right vertebral artery ends at the PICA. There is moderate distal   left V4 segment stenosis and mild stenosis of the proximal basilar   artery. There is mild stenosis of the precavernous right internal carotid   artery.    STORM 153, RMCA 113, RACA 78, RACA2 60    LICA 163, LMCA 97, LACA 38, LACA2 69    RVA 22, LVA 89, , RPCA 53, LPCA 89    IMPRESSION: Right vertebral artery ends at the PICA. Moderate distal left   V4 segment stenosis and mild stenosis of the proximal basilar artery.   Mild stenosis of the precavernous right internal carotid artery.   Noninvasive flow MR angiography as above.    --- End of Report ---            ELENO MAHER MD; Attending Radiologist  This document has been electronically signed. Dec  8 2023  3:59PM    < end of copied text >          < from: TTE W or WO Ultrasound Enhancing Agent (12.08.23 @ 09:11) >      1. Left ventricular systolic function is mildly decreased with an ejection fraction of 45 % by Mckeon's method of disks.   2. There is mild (grade 1) left ventricular diastolic dysfunction.   3. Normal right ventricular cavity size, wall thickness, and systolic function.   4. Normal atria.   5. Small pericardial effusion noted adjacent to the right ventricle and small pericardial effusion noted adjacent to the lateral left ventricle with no evidence of hemodynamic compromise.   6. Thickened pericardium.   7. The inferior vena cava is normal in size (normal <2.1cm) with normal inspiratory collapse (normal >50%) consistent with normal right atrial pressure (~3, range 0-5mmHg).   8. Compared to the transthoracic echocardiogram performed on 9/26/2023 there has been an interval decline in LV systolic function.    ____________________________    < end of copied text >

## 2023-12-28 NOTE — CONSULT NOTE ADULT - TIME BILLING
agree with above  73-year-old male with a past medical history of hypertension, hyperlipidemia, diabetes presenting difficulty breathing.     #SOB  -RVP negative   -CXR with patchy infiltrates c/f pna & pulm edema  -F/u CT chest  -Abx per med  -Overloaded on exam  -sp 80mg iv lasix  -Would continue with 80mg iv lasix qd  -Wean bipap as able   -CICU consult noted    #HFrEF  -HST w/ mild elevation -- likely demand + ckd -- would trend to peak  -No CP on exam  -Overloaded on exam  -IV lasix as above  -Strict I/Os, Daily Weight  - + b/l le edema - would check le dopplers b/l   -Recent echo with mild lv dysfxn ef 45%, mild diastolic dysfxn, small pericardial effusion    #pAfib  -Resume eliquis & metoprolol when weaned from bipap    #HTN  -BP elevated  -Cont with IV hydralazine until weaned from bipap  -Can also consider adding IV labetalol if needed   -Resume metoprolol when weaned from bipap    #Dysphagia  -Per wife pt with difficulty swallowing  -Speech & swallow eval    #CKD  -Trend cr while on diuresis  -Renal f/u

## 2023-12-28 NOTE — ED PROVIDER NOTE - CLINICAL SUMMARY MEDICAL DECISION MAKING FREE TEXT BOX
73-year-old male with a past medical history of hypertension, hyperlipidemia, diabetes presenting with difficulty breathing, hypertension, lower extremity pain and edema. Concern for possible CHF exacerbation. Labs, BNP, troponin, sepsis workup.

## 2023-12-28 NOTE — H&P ADULT - NSHPREVIEWOFSYSTEMS_GEN_ALL_CORE
Gen: no loss of wt no loss of appetite  ENT: no dizziness no hearing loss  Ophth: no blurring of vision no loss of vision  Resp: No cough no sputum production  CVS: see above HPI   GI: no nausea, vomiting or diarrhea   : no dysuria, hematuria  Endo: no polyuria no excessive sweating  Neuro: no weakness no paresthesias  Heme: No petechiae no easy bruising  Msk: No joint pain no swelling  Skin: No rash no itching

## 2023-12-28 NOTE — ED ADULT TRIAGE NOTE - INTERNATIONAL TRAVEL
, 37 4/7 weeks, repeat C/S, presented with complaints of contractions X2 hours now. Denies any leaking or bleeding. States fetal movement positive. Admitted to triage 1 for evaluation. Urine obtained. External monitors applied. Abdomen soft, non-tender to palpation. SVE Ft external/ closed internal/60/-2  Dr Ruiz notified of the above and will evaluate patient  
Dr Ruiz at bedside to evaluate. Repeat SVE unchanged. Patient states contractions have spaced out/ become less frequent since arrival. Discharge orders received.  
No

## 2023-12-28 NOTE — ED ADULT NURSE NOTE - OBJECTIVE STATEMENT
73 year old male, A&OX4, PMH of CVA, BPH, HLD, HTN, DM2 BIB EMS for shortness of breath. On arrival, patient found to be hypertensive with SPO2 in the 70s. Placed on NRB 15 L with improvement to the 90s. Patient tachypneic with increased work of breathing. Placed on bipap at this time. Denies CP, HA, n/v/d, abdominal pain, difficulty urinating, fever and chills. Peripheral pulses strong and equal bilaterally. IV placed and labs drawn. Safety and comfort measures maintained.

## 2023-12-28 NOTE — CONSULT NOTE ADULT - ASSESSMENT
73M  PMHx HTN, HLD, T2DM, HFrEF 45% in December 2023, AF on Eliquis, prior CVA who presented to ED for acute shortness of breath found to be hypertensive and hypoxic in flash pulmonary edema. Improving on BiPAP, Nitro gtt, and diuresis. CICU consulted for evaluation of flash pulmonary edema.    Recommendations:  - Given rapid improvement in symptoms, vitals, and stability, recommend weaning off of Nitro gtt. Can give IV hydralazine or IV labetalol to facilitate weaning off.  - Patient would benefit from further diuresis. Edematous on exam with elevated proBNP and chest X-ray with pulmonary edema. patient's wife reports he is not on a diuretic, would clarify.  - Monitor intake and output to ensure adequate diuresis  - Recommend cardiology to see patient while admitted.   - Patient is not a candidate for the CICU at this time if able to be weaned off NTG. Please reconsult as needed. Discussed with Dr. Brice.    Darci Gaines MD  Cardiology Fellow  All Cardiology service information can be found 24/7 on [email or web address], password: [email or web address]     ***Note not finalized until co-signed by attending***

## 2023-12-28 NOTE — CONSULT NOTE ADULT - SUBJECTIVE AND OBJECTIVE BOX
CARDIOLOGY CONSULT - Dr. Ga         HPI:  73-year-old male with a past medical history of hypertension, hyperlipidemia, diabetes presenting difficulty breathing. Had generalized weakness yesterday. Patient woke up in the middle the night with shortness of breath and pressed life alert button. Patient brought in by EMS with low oxygen saturation And increased work of breathing.      PAST MEDICAL & SURGICAL HISTORY:  Diabetes Mellitus Type II      HTN (Hypertension)      Hypercholesterolemia      BPH with elevated PSA      Stroke  2017, 10/2021:  with right diplobia resolving in a week      S/P colonoscopy      PREVIOUS DIAGNOSTIC TESTING:    [x] Echocardiogram:  < from: TTE W or WO Ultrasound Enhancing Agent (12.08.23 @ 09:11) >  CONCLUSIONS:      1. Left ventricular systolic function is mildly decreased with an ejection fraction of 45 % by Mckeon's method of disks.   2. There is mild (grade 1) left ventricular diastolic dysfunction.   3. Normal right ventricular cavity size, wall thickness, and systolic function.   4. Normal atria.   5. Small pericardial effusion noted adjacent to the right ventricle and small pericardial effusion noted adjacent to the lateral left ventricle with no evidence of hemodynamic compromise.   6. Thickened pericardium.   7. The inferior vena cava is normal in size (normal <2.1cm) with normal inspiratory collapse (normal >50%) consistent with normal right atrial pressure (~3, range 0-5mmHg).   8. Compared to the transthoracic echocardiogram performed on 9/26/2023 there has been an interval decline in LV systolic function.    < end of copied text >    [ ]  Catheterization:  [ ] Stress Test:  	    MEDICATIONS:  Home Medications:  atorvastatin 80 mg oral tablet: 1 tab(s) orally once a day (28 Dec 2023 09:33)  cholecalciferol 25 mcg (1000 intl units) oral tablet: 2 tab(s) orally once a day (28 Dec 2023 09:33)  escitalopram 20 mg oral tablet: 1 tab(s) orally once a day (28 Dec 2023 09:33)  fosinopril 40 mg oral tablet: 1 tab(s) orally once a day (28 Dec 2023 09:33)  glipiZIDE 10 mg oral tablet, extended release: 1 tab(s) orally once a day (28 Dec 2023 09:33)  Jardiance 25 mg oral tablet: 1 tab(s) orally once a day (28 Dec 2023 09:33)  metFORMIN 1000 mg oral tablet: 1 tab(s) orally once a day (28 Dec 2023 09:33)  metoprolol succinate 100 mg oral tablet, extended release: 1 tab(s) orally once a day (28 Dec 2023 09:33)  omeprazole 40 mg oral delayed release capsule: 1 cap(s) orally once a day (28 Dec 2023 09:33)  Reglan 10 mg oral tablet: 1 tab(s) orally 3 times a day (before meals) (28 Dec 2023 09:33)      MEDICATIONS  (STANDING):  hydrALAZINE Injectable 10 milliGRAM(s) IV Push once      FAMILY HISTORY:      SOCIAL HISTORY:    [x] Non-smoker  [ ] Smoker  [ ] Alcohol    Allergies    No Known Allergies    Intolerances    	    REVIEW OF SYSTEMS:  CONSTITUTIONAL: No fever, weight loss, or fatigue  EYES: No eye pain, visual disturbances, or discharge  ENMT:  No difficulty hearing, tinnitus, vertigo; No sinus or throat pain  NECK: No pain or stiffness  RESPIRATORY: No cough, wheezing, chills or hemoptysis; No Shortness of Breath  CARDIOVASCULAR: No chest pain, palpitations, passing out, dizziness, or leg swelling  GASTROINTESTINAL: No abdominal or epigastric pain. No nausea, vomiting, or hematemesis; No diarrhea or constipation. No melena or hematochezia.  GENITOURINARY: No dysuria, frequency, hematuria, or incontinence  NEUROLOGICAL: No headaches, memory loss, loss of strength, numbness, or tremors  SKIN: No itching, burning, rashes, or lesions   	    [x] All others negative	  [ ] Unable to obtain    PHYSICAL EXAM:  T(C): 36.9 (12-28-23 @ 12:02), Max: 37.2 (12-28-23 @ 06:06)  HR: 63 (12-28-23 @ 12:02) (63 - 87)  BP: 185/89 (12-28-23 @ 12:02) (155/72 - 187/84)  RR: 14 (12-28-23 @ 12:02) (14 - 25)  SpO2: 100% (12-28-23 @ 12:02) (70% - 100%)  Wt(kg): --  I&O's Summary      Appearance: Ill appearing, +bipap	  Psychiatry: A & O x 3, Mood & affect appropriate  HEENT:   Normal oral mucosa, PERRL, EOMI	  Lymphatic: No lymphadenopathy  Cardiovascular: Normal S1 S2,RRR, No JVD, No murmurs  Respiratory: Scattered rhonchi b/l   Gastrointestinal:  Soft, Non-tender, + BS	  Skin: No rashes, No ecchymoses, No cyanosis	  Neurologic: Non-focal  Extremities: Normal range of motion, No clubbing, cyanosis . +B/l le edema   Vascular: Peripheral pulses palpable 2+ bilaterally    TELEMETRY: 	    ECG: NSR 92bpm with PVCs 	  RADIOLOGY:  < from: Xray Chest 1 View- PORTABLE-Urgent (12.28.23 @ 07:02) >    IMPRESSION:  Patchy infiltrates.    Further information may be obtained from the patient's subsequent CT of   the chest which was pending at the time of this dictation.    --- End of Report ---    < end of copied text >      OTHER: 	  	  LABS:	 	    CARDIAC MARKERS:  Troponin T, High Sensitivity Result: 77 ng/L (12-28 @ 07:10)                                  11.7   9.32  )-----------( 264      ( 28 Dec 2023 07:10 )             37.0     12-28    143  |  105  |  28<H>  ----------------------------<  229<H>  4.8   |  26  |  1.82<H>    Ca    9.6      28 Dec 2023 07:10  Phos  3.5     12-28  Mg     1.9     12-28    TPro  6.9  /  Alb  3.7  /  TBili  0.4  /  DBili  x   /  AST  23  /  ALT  21  /  AlkPhos  95  12-28    PT/INR - ( 28 Dec 2023 07:10 )   PT: 17.5 sec;   INR: 1.61 ratio         PTT - ( 28 Dec 2023 07:10 )  PTT:36.3 sec  proBNP:   Lipid Profile:   HgA1c:   TSH:

## 2023-12-28 NOTE — ED PROVIDER NOTE - PHYSICAL EXAMINATION
General: Appears short of breath  Mentation: AAO x 3  psych: mood appropriate  HEENT: airway patent, conjunctivae clear bilaterally  Resp: Increased work of breathing, crackles in all lung fields  Cardio: RRR, no m/r/g  GI: soft/nondistended/nontender  Neuro: sensation and motor function grossly intact  Skin: no cyanosis, no jaundice  MSK: normal movement of all extremities  Lymph/Vasc: 4+ LE pitting edema

## 2023-12-28 NOTE — H&P ADULT - ASSESSMENT
73-year-old male with a past medical history of hypertension, hyperlipidemia, diabetes presenting difficulty breathing clinical presentation consistent with acute on chronic systolic heart failure

## 2023-12-28 NOTE — ED PROVIDER NOTE - OBJECTIVE STATEMENT
73-year-old male with a past medical history of hypertension, hyperlipidemia, diabetes presenting difficulty breathing. Had generalized weakness yesterday. Patient woke up in the middle the night with shortness of breath and pressed life alert button. Patient brought in by EMS with low oxygen saturation And increased work of breathing.

## 2023-12-28 NOTE — H&P ADULT - HISTORY OF PRESENT ILLNESS
73-year-old male with a past medical history of hypertension, hyperlipidemia, diabetes presenting difficulty breathing. Had generalized weakness yesterday. Patient woke up in the middle the night with shortness of breath and pressed life alert button. Patient brought in by EMS with low oxygen saturation And increased work of breathing. Currently states feels better though still on BiPAP. wife at bedside provided most of the collateral history

## 2023-12-28 NOTE — H&P ADULT - NSHPLABSRESULTS_GEN_ALL_CORE
noted on [place], including lab results and radiology studies noted on [place], including lab results and radiology studies

## 2023-12-28 NOTE — H&P ADULT - NSHPADDITIONALINFOADULT_GEN_ALL_CORE
discussed with wife at bedside in detail, expresses understanding of treatment plans.    76 minutes spent on total encounter. The necessity of the time spent during the encounter on this date of service was due to:     detailed physical exam, obtaining and reviewing history, physical examination, explaining the diagnosis, prognosis and treatment plan with the patient/family/caregiver. I also have spent the time ordering studies and testing, interpreting results, medicine reconciliation, and documentation as above

## 2023-12-28 NOTE — H&P ADULT - NSHPPHYSICALEXAM_GEN_ALL_CORE
PHYSICAL EXAM: vital signs noted on [place]  on BiPAP  HEENT: HAZEL EOMI  Neck: Supple, no JVD, no thyromegaly  Lungs: no wheeze, no crackles  CVS: S1 S2 ejection systolic murmur +   Abdomen: no tenderness, no organomegaly, BS present  Neuro: Alert no focal weakness, no sensory abnormalities  Skin: warm, dry superficial small ulcers left and right shin  one small ulcer on right great toe  Ext: no cyanosis or clubbing, 2 + edema  Msk: no joint swelling or deformities

## 2023-12-28 NOTE — ED ADULT NURSE NOTE - NSFALLRISKINTERV_ED_ALL_ED
Assistance OOB with selected safe patient handling equipment if applicable/Communicate fall risk and risk factors to all staff, patient, and family/Provide visual cue: yellow wristband, yellow gown, etc/Reinforce activity limits and safety measures with patient and family/Call bell, personal items and telephone in reach/Instruct patient to call for assistance before getting out of bed/chair/stretcher/Non-slip footwear applied when patient is off stretcher/[place] to call system/Physically safe environment - no spills, clutter or unnecessary equipment/Purposeful Proactive Rounding/Room/bathroom lighting operational, light cord in reach Assistance OOB with selected safe patient handling equipment if applicable/Communicate fall risk and risk factors to all staff, patient, and family/Provide visual cue: yellow wristband, yellow gown, etc/Reinforce activity limits and safety measures with patient and family/Call bell, personal items and telephone in reach/Instruct patient to call for assistance before getting out of bed/chair/stretcher/Non-slip footwear applied when patient is off stretcher/[place] to call system/Physically safe environment - no spills, clutter or unnecessary equipment/Purposeful Proactive Rounding/Room/bathroom lighting operational, light cord in reach

## 2023-12-28 NOTE — CONSULT NOTE ADULT - ATTENDING COMMENTS
73 M w/ HFrEF, afib, CVA p/w HTN crisis c/b pulmonary edema responding well to diuretics and BiPAP.    -Pt's respiratory status appears significantly improved after diuresis and BiPAP. NTG was rapidly weaned after 1 dose of IV enlapril, although BP elevating again with SBP>170. Would administer hydralazine 10mg IV x 1 for now and if BP improved attempt to wean BiPAP to initiate oral anti-hypertensives.   -c/w lasix IV 80mg BID  -Check TTE  -Defer ICU admission at this time, please call back if clinically worsens     Marlon Brice MD

## 2023-12-28 NOTE — ED ADULT NURSE NOTE - NS ED NURSE RECORD ANOTHER VITAL SIGN
Patient: Siena Rea    Procedure Summary     Date: 03/09/21 Room / Location:  [facility] OR 09 /  [facility]    Anesthesia Start: 1110 Anesthesia Stop: 1149    Procedure: LEFT KNEE PARTIAL MEDIAL MENISCECTOMY (Left Knee) Diagnosis:       Acute medial meniscus tear of left knee, initial encounter      (S83.242A)    Surgeons: Eduar Hancock MD Provider: Mena Hernandez CRNA    Anesthesia Type: general ASA Status: 3          Anesthesia Type: general    Vitals  Vitals Value Taken Time   /57 03/09/21 1241   Temp 98 °F (36.7 °C) 03/09/21 1241   Pulse 63 03/09/21 1241   Resp 16 03/09/21 1241   SpO2 98 % 03/09/21 1241   Vitals shown include unvalidated device data.        Post Anesthesia Care and Evaluation    Patient location during evaluation: PACU  Patient participation: complete - patient participated  Level of consciousness: awake and alert  Pain management: adequate  Airway patency: patent  Anesthetic complications: No anesthetic complications    Cardiovascular status: acceptable  Respiratory status: acceptable  Hydration status: acceptable    Comments: Blood pressure 166/81, pulse 77, temperature 98 °F (36.7 °C), temperature source Temporal, resp. rate 18, SpO2 94 %, not currently breastfeeding.    Pt discharged from PACU based on sukumar score >8       Yes

## 2023-12-28 NOTE — PATIENT PROFILE ADULT - FALL HARM RISK - HARM RISK INTERVENTIONS
Assistance with ambulation/Assistance OOB with selected safe patient handling equipment/Communicate Risk of Fall with Harm to all staff/Discuss with provider need for PT consult/Monitor gait and stability/Provide patient with walking aids - walker, cane, crutches/Reinforce activity limits and safety measures with patient and family/Tailored Fall Risk Interventions/Visual Cue: Yellow wristband and red socks/Bed in lowest position, wheels locked, appropriate side rails in place/Call bell, personal items and telephone in reach/Instruct patient to call for assistance before getting out of bed or chair/Non-slip footwear when patient is out of bed/[place] to call system/Physically safe environment - no spills, clutter or unnecessary equipment/Purposeful Proactive Rounding/Room/bathroom lighting operational, light cord in reach Assistance with ambulation/Assistance OOB with selected safe patient handling equipment/Communicate Risk of Fall with Harm to all staff/Discuss with provider need for PT consult/Monitor gait and stability/Provide patient with walking aids - walker, cane, crutches/Reinforce activity limits and safety measures with patient and family/Tailored Fall Risk Interventions/Visual Cue: Yellow wristband and red socks/Bed in lowest position, wheels locked, appropriate side rails in place/Call bell, personal items and telephone in reach/Instruct patient to call for assistance before getting out of bed or chair/Non-slip footwear when patient is out of bed/[place] to call system/Physically safe environment - no spills, clutter or unnecessary equipment/Purposeful Proactive Rounding/Room/bathroom lighting operational, light cord in reach

## 2023-12-28 NOTE — H&P ADULT - PROBLEM SELECTOR PLAN 1
on BiPAP  will try to wean now that treatment with lasix and antibiotics done  continue to monitor closely  if unsuccessful can use Hi Darshan nasal cannula   pulmonary help requested

## 2023-12-28 NOTE — CONSULT NOTE ADULT - ASSESSMENT
A/p  73-year-old male with a past medical history of hypertension, hyperlipidemia, diabetes presenting difficulty breathing.     #SOB  -RVP negative   -CXR with patchy infiltrates c/f pna & pulm edema  -F/u CT chest  -Abx per med  -Overloaded on exam  -sp 80mg iv lasix  -Would continue with 80mg iv lasix qd  -Wean bipap as able   -CICU consult noted    #HFrEF  -HST w/ mild elevation -- likely demand + ckd -- would trend to peak  -No CP on exam  -Overloaded on exam  -IV lasix as above  -Strict I/Os, Daily Weight  - + b/l le edema - would check le dopplers b/l   -Recent echo with mild lv dysfxn ef 45%, mild diastolic dysfxn, small pericardial effusion    #pAfib  -Resume eliquis & metoprolol when weaned from bipap    #HTN  -BP elevated  -Cont with IV hydralazine until weaned from bipap  -Can also consider adding IV labetalol if needed   -Resume metoprolol when weaned from bipap    #Dysphagia  -Per wife pt with difficulty swallowing  -Speech & swallow eval     A/p  73-year-old male with a past medical history of hypertension, hyperlipidemia, diabetes presenting difficulty breathing.     #SOB  -RVP negative   -CXR with patchy infiltrates c/f pna & pulm edema  -F/u CT chest  -Abx per med  -Overloaded on exam  -sp 80mg iv lasix  -Would continue with 80mg iv lasix qd  -Wean bipap as able   -CICU consult noted    #HFrEF  -HST w/ mild elevation -- likely demand + ckd -- would trend to peak  -No CP on exam  -Overloaded on exam  -IV lasix as above  -Strict I/Os, Daily Weight  - + b/l le edema - would check le dopplers b/l   -Recent echo with mild lv dysfxn ef 45%, mild diastolic dysfxn, small pericardial effusion    #pAfib  -Resume eliquis & metoprolol when weaned from bipap    #HTN  -BP elevated  -Cont with IV hydralazine until weaned from bipap  -Can also consider adding IV labetalol if needed   -Resume metoprolol when weaned from bipap    #Dysphagia  -Per wife pt with difficulty swallowing  -Speech & swallow eval    #CKD  -Trend cr while on diuresis  -Renal f/u

## 2023-12-28 NOTE — H&P ADULT - PROBLEM SELECTOR PLAN 6
continue statin HbA1C  finger sticks acceptable  no oral meds  diabetic diet  continue finger sticks with short acting insulin sliding scale

## 2023-12-28 NOTE — CONSULT NOTE ADULT - ASSESSMENT
73-year-old male with a past medical history of hypertension, hyperlipidemia, diabetes presenting difficulty breathing. Had generalized weakness yesterday. Patient woke up in the middle the night with shortness of breath and pressed life alert button. Patient brought in by EMS with low oxygen saturation And increased work of breathing. Currently states feels better though still on BiPAP. wife at bedside provided most of the collateral history    (28 Dec 2023 14:04)     heis alert and awake nitin s on bipap at this time:  he responds to simple questions:  he used to smoke 3 PPD for many years:   currently he is awake and alert and responsive to questions on bipap       Resp failure:  requiring bipap:   HTN  DM:  HLD    Resp failure:  requiring bipap:   -to me he seems to be in heart failure   -his ct chest showed bill effusions and s ome opVC's:  however there isno official report yet:  he used to a heavy smoker:  start BD:   -she should be on Lasix  his last echo  few weeks ago showed decline from previous echo ; -needs cardiac to see:   - he is alert and awake and can try to take off bipap and and put on nasla cannula:  in addition:  must use bipap atnight ti me:   HTN  - blood pressure is high : cont to optimize his anti hypertensives   DM:  -monitor blood glucose   HLD  -per PMD:     called acp MANY TIMES

## 2023-12-28 NOTE — CONSULT NOTE ADULT - SUBJECTIVE AND OBJECTIVE BOX
Patient is a 73y old  Male who presents with a chief complaint of     Initial HPI on admission:  73M with PMHx HTn, HLD, T2DM, HFrEF 45% in December 2023, AF on Eliquis, prior CVA who presented to ED for acute shortness of breath. He was recently admitted for stroke-like symptoms, determined to be likely ischemic infarct recrudescence and discharged on 12/10. He was at home and had been feeling tired and lethargic, when he became very weak and tired. He was then brought to bed by his wife, and later complained of severe shortness of breath. He presented to the ED hypertensive to 190/80s with SpO2 70%. He was placed on BiPAP and started nitroglycerin gtt, and given 80mg IV Lasix. BP decreased to 140s/70s and SpO2 100% on BiPAP 12/5, FiO2 50%. He reported feeling better after this.     CICU was consulted for evaluation of hypertensive emergency with flash pulmonary edema. Patient denied any current or recent chest pain on my evaluation.      PAST MEDICAL & SURGICAL HISTORY:  Diabetes Mellitus Type II      HTN (Hypertension)      Hypercholesterolemia      BPH with elevated PSA      Stroke  2017, 10/2021:  with right diplobia resolving in a week      S/P colonoscopy        Allergies    No Known Allergies    Intolerances      FAMILY HISTORY:      Medications:  enalaprilat Injectable 1.25 milliGRAM(s) IV Push Once      vent settings      Vital Signs Last 24 Hrs  T(C): 37.2 (28 Dec 2023 06:06), Max: 37.2 (28 Dec 2023 06:06)  T(F): 99 (28 Dec 2023 06:06), Max: 99 (28 Dec 2023 06:06)  HR: 73 (28 Dec 2023 09:11) (67 - 87)  BP: 155/72 (28 Dec 2023 09:23) (155/72 - 187/84)  BP(mean): --  RR: 14 (28 Dec 2023 07:15) (14 - 25)  SpO2: 98% (28 Dec 2023 09:11) (70% - 100%)    Parameters below as of 28 Dec 2023 07:15  Patient On (Oxygen Delivery Method): BiPAP/CPAP    O2 Concentration (%): 50            LABS:                        11.7   9.32  )-----------( 264      ( 28 Dec 2023 07:10 )             37.0     12-28    143  |  105  |  28<H>  ----------------------------<  229<H>  4.8   |  26  |  1.82<H>    Ca    9.6      28 Dec 2023 07:10  Phos  3.5     12-28  Mg     1.9     12-28    TPro  6.9  /  Alb  3.7  /  TBili  0.4  /  DBili  x   /  AST  23  /  ALT  21  /  AlkPhos  95  12-28          CAPILLARY BLOOD GLUCOSE        PT/INR - ( 28 Dec 2023 07:10 )   PT: 17.5 sec;   INR: 1.61 ratio         PTT - ( 28 Dec 2023 07:10 )  PTT:36.3 sec  Urinalysis Basic - ( 28 Dec 2023 07:10 )    Color: x / Appearance: x / SG: x / pH: x  Gluc: 229 mg/dL / Ketone: x  / Bili: x / Urobili: x   Blood: x / Protein: x / Nitrite: x   Leuk Esterase: x / RBC: x / WBC x   Sq Epi: x / Non Sq Epi: x / Bacteria: x      CULTURES:        PHYSICAL EXAM:   Vital Signs Last 24 Hrs  T(C): 37.2 (28 Dec 2023 06:06), Max: 37.2 (28 Dec 2023 06:06)  T(F): 99 (28 Dec 2023 06:06), Max: 99 (28 Dec 2023 06:06)  HR: 73 (28 Dec 2023 09:11) (67 - 87)  BP: 155/72 (28 Dec 2023 09:23) (155/72 - 187/84)  BP(mean): --  RR: 14 (28 Dec 2023 07:15) (14 - 25)  SpO2: 98% (28 Dec 2023 09:11) (70% - 100%)    Parameters below as of 28 Dec 2023 07:15  Patient On (Oxygen Delivery Method): BiPAP/CPAP    O2 Concentration (%): 50    CONSTITUTIONAL: Comfortable appearing on BiPAP. Lethargic but rousable   EYES: No conjunctival or scleral injection, non-icteric; PERRLA and symmetric  RESPIRATORY: Breathing comfortabl on BiPAPy; lungs CTA without wheeze/rhonchi/rales  CARDIOVASCULAR: +S1S2, RRR, no M/G/R; pedal pulses full and symmetric; 2+ Bilateral lower extremity edema  SKIN: No rashes or ulcers noted; no subcutaneous nodules or induration palpable      RADIOLOGY :   Imaging reviewed     < from: Xray Chest 1 View- PORTABLE-Urgent (12.28.23 @ 07:02) >  IMPRESSION:  Patchy infiltrates.    Further information may be obtained from the patient's subsequent CT of   the chest which was pending at the time of this dictation.    < end of copied text >

## 2023-12-28 NOTE — ED PROVIDER NOTE - PROGRESS NOTE DETAILS
bienvenido - pt signed ou to me multi lobe pna and chf exac - pt reeval rales bl - pitting edema - started on ng drip ,ccu made aware - mild renal dysfxn elev trop will trend at 3hr, bnp 14K ph7.25 glucose 225 but no agap - likley resp acidosis pco2 65 -- mild lactic acidosis - Linda PGY3: patient reassessed. patient continues to do well on bipap. BP now 140/90. Nitro gtt off. Patient rejected by CCU. Will admit to medicine.

## 2023-12-29 LAB
ALBUMIN SERPL ELPH-MCNC: 3.4 G/DL — SIGNIFICANT CHANGE UP (ref 3.3–5)
ALBUMIN SERPL ELPH-MCNC: 3.4 G/DL — SIGNIFICANT CHANGE UP (ref 3.3–5)
ALP SERPL-CCNC: 77 U/L — SIGNIFICANT CHANGE UP (ref 40–120)
ALP SERPL-CCNC: 77 U/L — SIGNIFICANT CHANGE UP (ref 40–120)
ALT FLD-CCNC: 14 U/L — SIGNIFICANT CHANGE UP (ref 10–45)
ALT FLD-CCNC: 14 U/L — SIGNIFICANT CHANGE UP (ref 10–45)
ANION GAP SERPL CALC-SCNC: 11 MMOL/L — SIGNIFICANT CHANGE UP (ref 5–17)
ANION GAP SERPL CALC-SCNC: 11 MMOL/L — SIGNIFICANT CHANGE UP (ref 5–17)
AST SERPL-CCNC: 14 U/L — SIGNIFICANT CHANGE UP (ref 10–40)
AST SERPL-CCNC: 14 U/L — SIGNIFICANT CHANGE UP (ref 10–40)
BASE EXCESS BLDA CALC-SCNC: 6.6 MMOL/L — HIGH (ref -2–3)
BASE EXCESS BLDA CALC-SCNC: 6.6 MMOL/L — HIGH (ref -2–3)
BILIRUB SERPL-MCNC: 0.4 MG/DL — SIGNIFICANT CHANGE UP (ref 0.2–1.2)
BILIRUB SERPL-MCNC: 0.4 MG/DL — SIGNIFICANT CHANGE UP (ref 0.2–1.2)
BUN SERPL-MCNC: 25 MG/DL — HIGH (ref 7–23)
BUN SERPL-MCNC: 25 MG/DL — HIGH (ref 7–23)
CALCIUM SERPL-MCNC: 9.8 MG/DL — SIGNIFICANT CHANGE UP (ref 8.4–10.5)
CALCIUM SERPL-MCNC: 9.8 MG/DL — SIGNIFICANT CHANGE UP (ref 8.4–10.5)
CHLORIDE SERPL-SCNC: 103 MMOL/L — SIGNIFICANT CHANGE UP (ref 96–108)
CHLORIDE SERPL-SCNC: 103 MMOL/L — SIGNIFICANT CHANGE UP (ref 96–108)
CO2 BLDA-SCNC: 33 MMOL/L — HIGH (ref 19–24)
CO2 BLDA-SCNC: 33 MMOL/L — HIGH (ref 19–24)
CO2 SERPL-SCNC: 29 MMOL/L — SIGNIFICANT CHANGE UP (ref 22–31)
CO2 SERPL-SCNC: 29 MMOL/L — SIGNIFICANT CHANGE UP (ref 22–31)
CREAT SERPL-MCNC: 1.78 MG/DL — HIGH (ref 0.5–1.3)
CREAT SERPL-MCNC: 1.78 MG/DL — HIGH (ref 0.5–1.3)
EGFR: 40 ML/MIN/1.73M2 — LOW
EGFR: 40 ML/MIN/1.73M2 — LOW
GLUCOSE SERPL-MCNC: 196 MG/DL — HIGH (ref 70–99)
GLUCOSE SERPL-MCNC: 196 MG/DL — HIGH (ref 70–99)
HCO3 BLDA-SCNC: 32 MMOL/L — HIGH (ref 21–28)
HCO3 BLDA-SCNC: 32 MMOL/L — HIGH (ref 21–28)
HCT VFR BLD CALC: 39.6 % — SIGNIFICANT CHANGE UP (ref 39–50)
HCT VFR BLD CALC: 39.6 % — SIGNIFICANT CHANGE UP (ref 39–50)
HGB BLD-MCNC: 12.4 G/DL — LOW (ref 13–17)
HGB BLD-MCNC: 12.4 G/DL — LOW (ref 13–17)
HOROWITZ INDEX BLDA+IHG-RTO: 44 — SIGNIFICANT CHANGE UP
HOROWITZ INDEX BLDA+IHG-RTO: 44 — SIGNIFICANT CHANGE UP
LACTATE SERPL-SCNC: 2.3 MMOL/L — HIGH (ref 0.5–2)
LACTATE SERPL-SCNC: 2.3 MMOL/L — HIGH (ref 0.5–2)
MCHC RBC-ENTMCNC: 28.4 PG — SIGNIFICANT CHANGE UP (ref 27–34)
MCHC RBC-ENTMCNC: 28.4 PG — SIGNIFICANT CHANGE UP (ref 27–34)
MCHC RBC-ENTMCNC: 31.3 GM/DL — LOW (ref 32–36)
MCHC RBC-ENTMCNC: 31.3 GM/DL — LOW (ref 32–36)
MCV RBC AUTO: 90.8 FL — SIGNIFICANT CHANGE UP (ref 80–100)
MCV RBC AUTO: 90.8 FL — SIGNIFICANT CHANGE UP (ref 80–100)
MRSA PCR RESULT.: SIGNIFICANT CHANGE UP
MRSA PCR RESULT.: SIGNIFICANT CHANGE UP
NRBC # BLD: 0 /100 WBCS — SIGNIFICANT CHANGE UP (ref 0–0)
NRBC # BLD: 0 /100 WBCS — SIGNIFICANT CHANGE UP (ref 0–0)
PCO2 BLDA: 47 MMHG — SIGNIFICANT CHANGE UP (ref 35–48)
PCO2 BLDA: 47 MMHG — SIGNIFICANT CHANGE UP (ref 35–48)
PH BLDA: 7.44 — SIGNIFICANT CHANGE UP (ref 7.35–7.45)
PH BLDA: 7.44 — SIGNIFICANT CHANGE UP (ref 7.35–7.45)
PLATELET # BLD AUTO: 290 K/UL — SIGNIFICANT CHANGE UP (ref 150–400)
PLATELET # BLD AUTO: 290 K/UL — SIGNIFICANT CHANGE UP (ref 150–400)
PO2 BLDA: 65 MMHG — LOW (ref 83–108)
PO2 BLDA: 65 MMHG — LOW (ref 83–108)
POTASSIUM SERPL-MCNC: 4.1 MMOL/L — SIGNIFICANT CHANGE UP (ref 3.5–5.3)
POTASSIUM SERPL-MCNC: 4.1 MMOL/L — SIGNIFICANT CHANGE UP (ref 3.5–5.3)
POTASSIUM SERPL-SCNC: 4.1 MMOL/L — SIGNIFICANT CHANGE UP (ref 3.5–5.3)
POTASSIUM SERPL-SCNC: 4.1 MMOL/L — SIGNIFICANT CHANGE UP (ref 3.5–5.3)
PROT SERPL-MCNC: 6.6 G/DL — SIGNIFICANT CHANGE UP (ref 6–8.3)
PROT SERPL-MCNC: 6.6 G/DL — SIGNIFICANT CHANGE UP (ref 6–8.3)
RBC # BLD: 4.36 M/UL — SIGNIFICANT CHANGE UP (ref 4.2–5.8)
RBC # BLD: 4.36 M/UL — SIGNIFICANT CHANGE UP (ref 4.2–5.8)
RBC # FLD: 13 % — SIGNIFICANT CHANGE UP (ref 10.3–14.5)
RBC # FLD: 13 % — SIGNIFICANT CHANGE UP (ref 10.3–14.5)
S AUREUS DNA NOSE QL NAA+PROBE: SIGNIFICANT CHANGE UP
S AUREUS DNA NOSE QL NAA+PROBE: SIGNIFICANT CHANGE UP
SAO2 % BLDA: 95.2 % — SIGNIFICANT CHANGE UP (ref 94–98)
SAO2 % BLDA: 95.2 % — SIGNIFICANT CHANGE UP (ref 94–98)
SODIUM SERPL-SCNC: 143 MMOL/L — SIGNIFICANT CHANGE UP (ref 135–145)
SODIUM SERPL-SCNC: 143 MMOL/L — SIGNIFICANT CHANGE UP (ref 135–145)
WBC # BLD: 7.88 K/UL — SIGNIFICANT CHANGE UP (ref 3.8–10.5)
WBC # BLD: 7.88 K/UL — SIGNIFICANT CHANGE UP (ref 3.8–10.5)
WBC # FLD AUTO: 7.88 K/UL — SIGNIFICANT CHANGE UP (ref 3.8–10.5)
WBC # FLD AUTO: 7.88 K/UL — SIGNIFICANT CHANGE UP (ref 3.8–10.5)

## 2023-12-29 PROCEDURE — 93970 EXTREMITY STUDY: CPT | Mod: 26

## 2023-12-29 RX ORDER — CHLORHEXIDINE GLUCONATE 213 G/1000ML
1 SOLUTION TOPICAL DAILY
Refills: 0 | Status: DISCONTINUED | OUTPATIENT
Start: 2023-12-29 | End: 2024-01-04

## 2023-12-29 RX ADMIN — PIPERACILLIN AND TAZOBACTAM 25 GRAM(S): 4; .5 INJECTION, POWDER, LYOPHILIZED, FOR SOLUTION INTRAVENOUS at 14:05

## 2023-12-29 RX ADMIN — APIXABAN 5 MILLIGRAM(S): 2.5 TABLET, FILM COATED ORAL at 17:50

## 2023-12-29 RX ADMIN — LISINOPRIL 40 MILLIGRAM(S): 2.5 TABLET ORAL at 06:17

## 2023-12-29 RX ADMIN — PIPERACILLIN AND TAZOBACTAM 25 GRAM(S): 4; .5 INJECTION, POWDER, LYOPHILIZED, FOR SOLUTION INTRAVENOUS at 06:16

## 2023-12-29 RX ADMIN — Medication 1200 MILLIGRAM(S): at 17:50

## 2023-12-29 RX ADMIN — Medication 3 MILLILITER(S): at 00:03

## 2023-12-29 RX ADMIN — ATORVASTATIN CALCIUM 80 MILLIGRAM(S): 80 TABLET, FILM COATED ORAL at 22:40

## 2023-12-29 RX ADMIN — Medication 3 MILLILITER(S): at 22:40

## 2023-12-29 RX ADMIN — ESCITALOPRAM OXALATE 20 MILLIGRAM(S): 10 TABLET, FILM COATED ORAL at 12:13

## 2023-12-29 RX ADMIN — APIXABAN 5 MILLIGRAM(S): 2.5 TABLET, FILM COATED ORAL at 06:18

## 2023-12-29 RX ADMIN — CHLORHEXIDINE GLUCONATE 1 APPLICATION(S): 213 SOLUTION TOPICAL at 12:12

## 2023-12-29 RX ADMIN — Medication 100 MILLIGRAM(S): at 06:17

## 2023-12-29 RX ADMIN — Medication 3 MILLILITER(S): at 17:50

## 2023-12-29 RX ADMIN — Medication 3 MILLILITER(S): at 12:12

## 2023-12-29 RX ADMIN — Medication 80 MILLIGRAM(S): at 06:18

## 2023-12-29 RX ADMIN — Medication 10 MILLIGRAM(S): at 06:17

## 2023-12-29 RX ADMIN — Medication 3 MILLILITER(S): at 06:17

## 2023-12-29 RX ADMIN — Medication 10 MILLIGRAM(S): at 17:50

## 2023-12-29 RX ADMIN — PANTOPRAZOLE SODIUM 40 MILLIGRAM(S): 20 TABLET, DELAYED RELEASE ORAL at 06:17

## 2023-12-29 RX ADMIN — Medication 10 MILLIGRAM(S): at 12:13

## 2023-12-29 RX ADMIN — PIPERACILLIN AND TAZOBACTAM 25 GRAM(S): 4; .5 INJECTION, POWDER, LYOPHILIZED, FOR SOLUTION INTRAVENOUS at 22:40

## 2023-12-29 NOTE — PROGRESS NOTE ADULT - SUBJECTIVE AND OBJECTIVE BOX
Date of Service: 12-29-23 @ 13:28    Patient is a 73y old  Male who presents with a chief complaint of shortness of breath (29 Dec 2023 12:00)      Any change in ROS:   States he did not get much sleep overnight  Denies CP, SOB  O2 sats 97% 2LNC    MEDICATIONS  (STANDING):  albuterol/ipratropium for Nebulization 3 milliLiter(s) Nebulizer every 6 hours  apixaban 5 milliGRAM(s) Oral every 12 hours  atorvastatin 80 milliGRAM(s) Oral at bedtime  chlorhexidine 2% Cloths 1 Application(s) Topical daily  escitalopram 20 milliGRAM(s) Oral daily  furosemide   Injectable 80 milliGRAM(s) IV Push daily  lisinopril 40 milliGRAM(s) Oral daily  metoclopramide 10 milliGRAM(s) Oral three times a day before meals  metoprolol succinate  milliGRAM(s) Oral daily  pantoprazole    Tablet 40 milliGRAM(s) Oral before breakfast  piperacillin/tazobactam IVPB.. 3.375 Gram(s) IV Intermittent every 8 hours      Vital Signs Last 24 Hrs  T(C): 36.9 (29 Dec 2023 12:15), Max: 37.6 (28 Dec 2023 23:43)  T(F): 98.5 (29 Dec 2023 12:15), Max: 99.6 (28 Dec 2023 23:43)  HR: 70 (29 Dec 2023 12:15) (70 - 87)  BP: 178/90 (29 Dec 2023 12:15) (170/77 - 201/82)  BP(mean): --  RR: 18 (29 Dec 2023 12:15) (18 - 20)  SpO2: 97% (29 Dec 2023 12:15) (95% - 100%)    Parameters below as of 29 Dec 2023 12:15  Patient On (Oxygen Delivery Method): nasal cannula  O2 Flow (L/min): 3      I&O's Summary    28 Dec 2023 07:01  -  29 Dec 2023 07:00  --------------------------------------------------------  IN: 0 mL / OUT: 650 mL / NET: -650 mL    29 Dec 2023 07:01  -  29 Dec 2023 13:28  --------------------------------------------------------  IN: 0 mL / OUT: 800 mL / NET: -800 mL          Physical Exam:   GENERAL: NAD  HEENT: HAZEL  ENMT: No tonsillar erythema, exudates, or enlargement  NECK: Supple, No JVD  CHEST/LUNG: few scattered rhonchi   CVS: Regular rate and rhythm  GI: : Soft, Nontender, Nondistended  NERVOUS SYSTEM: A&O x3  EXTREMITIES:  2+ Peripheral Pulses, No clubbing, cyanosis, or edema  SKIN: No rashes or lesions  PSYCH: Appropriate    Labs:  ABG - ( 29 Dec 2023 05:46 )  pH, Arterial: 7.44  pH, Blood: x     /  pCO2: 47    /  pO2: 65    / HCO3: 32    / Base Excess: 6.6   /  SaO2: 95.2            28                            12.4   7.88  )-----------( 290      ( 29 Dec 2023 07:21 )             39.6                         11.7   9.32  )-----------( 264      ( 28 Dec 2023 07:10 )             37.0     12-29    143  |  103  |  25<H>  ----------------------------<  196<H>  4.1   |  29  |  1.78<H>  12-28    143  |  105  |  28<H>  ----------------------------<  229<H>  4.8   |  26  |  1.82<H>    Ca    9.8      29 Dec 2023 07:12  Ca    9.6      28 Dec 2023 07:10  Phos  3.5     12-28  Mg     1.9     12-28    TPro  6.6  /  Alb  3.4  /  TBili  0.4  /  DBili  x   /  AST  14  /  ALT  14  /  AlkPhos  77  12-29  TPro  6.9  /  Alb  3.7  /  TBili  0.4  /  DBili  x   /  AST  23  /  ALT  21  /  AlkPhos  95  12-28    LIVER FUNCTIONS - ( 29 Dec 2023 07:12 )  Alb: 3.4 g/dL / Pro: 6.6 g/dL / ALK PHOS: 77 U/L / ALT: 14 U/L / AST: 14 U/L / GGT: x           PT/INR - ( 28 Dec 2023 07:10 )   PT: 17.5 sec;   INR: 1.61 ratio         PTT - ( 28 Dec 2023 07:10 )  PTT:36.3 sec  Urinalysis Basic - ( 29 Dec 2023 07:12 )    Color: x / Appearance: x / SG: x / pH: x  Gluc: 196 mg/dL / Ketone: x  / Bili: x / Urobili: x   Blood: x / Protein: x / Nitrite: x   Leuk Esterase: x / RBC: x / WBC x   Sq Epi: x / Non Sq Epi: x / Bacteria: x      Procalcitonin, Serum: 0.06 ng/mL (12-28 @ 07:10)  Lactate, Blood: 2.3 mmol/L (12-29 @ 07:10)      Studies  c< from: CT Chest No Cont (12.28.23 @ 16:28) >  FINDINGS:    AIRWAYS, LUNGS and PLEURA: Tracheobronchial secretions extensive in the   left lower lobe. Small to moderate bilateral pleural effusions with   partial atelectasis both lower lobes, greater on the left. A few patchy   and clustered opacities within the aerated left lower and upper lobes.    MEDIASTINUM AND JAJA:Mildly enlarged mediastinal lymph nodes.    HEART AND VESSELS: Heart is enlarged No pericardial effusion. Coronary   artery and aortic atherosclerosis. Aortic valve calcification.    VISUALIZED UPPER ABDOMEN: Within normal limits.    CHEST WALL AND BONES: Degenerative changes thoracic spine.    IMPRESSION:    Tracheobronchial secretions and moderate bilateral pleural effusions with   partial atelectasis both lower lobes with or without superimposed left   lung infectious/inflammatory process.    --- End of Report ---      < end of copied text >  < from: TTE W or WO Ultrasound Enhancing Agent (12.08.23 @ 09:11) >    _______________________________________________________________________________________     CONCLUSIONS:      1. Left ventricular systolic function is mildly decreased with an ejection fraction of 45 % by Mckeon's method of disks.   2. There is mild (grade 1) left ventricular diastolic dysfunction.   3. Normal right ventricular cavity size, wall thickness, and systolic function.   4. Normal atria.   5. Small pericardial effusion noted adjacent to the right ventricle and small pericardial effusion noted adjacent to the lateral left ventricle with no evidence of hemodynamic compromise.   6. Thickened pericardium.   7. The inferior vena cava is normal in size (normal <2.1cm) with normal inspiratory collapse (normal >50%) consistent with normal right atrial pressure (~3, range 0-5mmHg).   8. Compared to the transthoracic echocardiogram performed on 9/26/2023 there has been an interval decline in LV systolic function.      < end of copied text >

## 2023-12-29 NOTE — PROGRESS NOTE ADULT - SUBJECTIVE AND OBJECTIVE BOX
Patient is a 73y old  Male who presents with a chief complaint of shortness of breath (29 Dec 2023 13:28)      DATE OF SERVICE: 12-29-23 @ 15:18    SUBJECTIVE / OVERNIGHT EVENTS: overnight events noted    ROS:  Resp: No cough no sputum production  CVS: No chest pain no palpitations no orthopnea  GI: no N/V/D  "I feel fine'         MEDICATIONS  (STANDING):  albuterol/ipratropium for Nebulization 3 milliLiter(s) Nebulizer every 6 hours  apixaban 5 milliGRAM(s) Oral every 12 hours  atorvastatin 80 milliGRAM(s) Oral at bedtime  chlorhexidine 2% Cloths 1 Application(s) Topical daily  escitalopram 20 milliGRAM(s) Oral daily  furosemide   Injectable 80 milliGRAM(s) IV Push daily  guaiFENesin ER 1200 milliGRAM(s) Oral every 12 hours  lisinopril 40 milliGRAM(s) Oral daily  metoclopramide 10 milliGRAM(s) Oral three times a day before meals  metoprolol succinate  milliGRAM(s) Oral daily  pantoprazole    Tablet 40 milliGRAM(s) Oral before breakfast  piperacillin/tazobactam IVPB.. 3.375 Gram(s) IV Intermittent every 8 hours    MEDICATIONS  (PRN):        CAPILLARY BLOOD GLUCOSE        I&O's Summary    28 Dec 2023 07:01  -  29 Dec 2023 07:00  --------------------------------------------------------  IN: 0 mL / OUT: 650 mL / NET: -650 mL    29 Dec 2023 07:01  -  29 Dec 2023 15:18  --------------------------------------------------------  IN: 0 mL / OUT: 800 mL / NET: -800 mL        Vital Signs Last 24 Hrs  T(C): 36.9 (29 Dec 2023 12:15), Max: 37.6 (28 Dec 2023 23:43)  T(F): 98.5 (29 Dec 2023 12:15), Max: 99.6 (28 Dec 2023 23:43)  HR: 70 (29 Dec 2023 12:15) (70 - 87)  BP: 178/90 (29 Dec 2023 12:15) (170/77 - 201/82)  BP(mean): --  RR: 18 (29 Dec 2023 12:15) (18 - 20)  SpO2: 97% (29 Dec 2023 12:15) (95% - 100%)    PHYSICAL EXAM:  NECK: Supple, No JVD  CHEST/LUNG: improved air entry  HEART: S1 S2; systolic murmur +   ABDOMEN: Soft, Nontender,  EXTREMITIES:  improved edema  NEUROLOGY: alert  non-focal  SKIN: No rashes or lesions    LABS:                        12.4   7.88  )-----------( 290      ( 29 Dec 2023 07:21 )             39.6     12-29    143  |  103  |  25<H>  ----------------------------<  196<H>  4.1   |  29  |  1.78<H>    Ca    9.8      29 Dec 2023 07:12  Phos  3.5     12-28  Mg     1.9     12-28    TPro  6.6  /  Alb  3.4  /  TBili  0.4  /  DBili  x   /  AST  14  /  ALT  14  /  AlkPhos  77  12-29    PT/INR - ( 28 Dec 2023 07:10 )   PT: 17.5 sec;   INR: 1.61 ratio         PTT - ( 28 Dec 2023 07:10 )  PTT:36.3 sec      Urinalysis Basic - ( 29 Dec 2023 07:12 )    Color: x / Appearance: x / SG: x / pH: x  Gluc: 196 mg/dL / Ketone: x  / Bili: x / Urobili: x   Blood: x / Protein: x / Nitrite: x   Leuk Esterase: x / RBC: x / WBC x   Sq Epi: x / Non Sq Epi: x / Bacteria: x          All consultant(s) notes reviewed and care discussed with other providers        Contact Number, Dr Nieto [phone] Patient is a 73y old  Male who presents with a chief complaint of shortness of breath (29 Dec 2023 13:28)      DATE OF SERVICE: 12-29-23 @ 15:18    SUBJECTIVE / OVERNIGHT EVENTS: overnight events noted    ROS:  Resp: No cough no sputum production  CVS: No chest pain no palpitations no orthopnea  GI: no N/V/D  "I feel fine'         MEDICATIONS  (STANDING):  albuterol/ipratropium for Nebulization 3 milliLiter(s) Nebulizer every 6 hours  apixaban 5 milliGRAM(s) Oral every 12 hours  atorvastatin 80 milliGRAM(s) Oral at bedtime  chlorhexidine 2% Cloths 1 Application(s) Topical daily  escitalopram 20 milliGRAM(s) Oral daily  furosemide   Injectable 80 milliGRAM(s) IV Push daily  guaiFENesin ER 1200 milliGRAM(s) Oral every 12 hours  lisinopril 40 milliGRAM(s) Oral daily  metoclopramide 10 milliGRAM(s) Oral three times a day before meals  metoprolol succinate  milliGRAM(s) Oral daily  pantoprazole    Tablet 40 milliGRAM(s) Oral before breakfast  piperacillin/tazobactam IVPB.. 3.375 Gram(s) IV Intermittent every 8 hours    MEDICATIONS  (PRN):        CAPILLARY BLOOD GLUCOSE        I&O's Summary    28 Dec 2023 07:01  -  29 Dec 2023 07:00  --------------------------------------------------------  IN: 0 mL / OUT: 650 mL / NET: -650 mL    29 Dec 2023 07:01  -  29 Dec 2023 15:18  --------------------------------------------------------  IN: 0 mL / OUT: 800 mL / NET: -800 mL        Vital Signs Last 24 Hrs  T(C): 36.9 (29 Dec 2023 12:15), Max: 37.6 (28 Dec 2023 23:43)  T(F): 98.5 (29 Dec 2023 12:15), Max: 99.6 (28 Dec 2023 23:43)  HR: 70 (29 Dec 2023 12:15) (70 - 87)  BP: 178/90 (29 Dec 2023 12:15) (170/77 - 201/82)  BP(mean): --  RR: 18 (29 Dec 2023 12:15) (18 - 20)  SpO2: 97% (29 Dec 2023 12:15) (95% - 100%)    PHYSICAL EXAM:  NECK: Supple, No JVD  CHEST/LUNG: improved air entry  HEART: S1 S2; systolic murmur +   ABDOMEN: Soft, Nontender,  EXTREMITIES:  improved edema  NEUROLOGY: alert  non-focal  SKIN: No rashes or lesions    LABS:                        12.4   7.88  )-----------( 290      ( 29 Dec 2023 07:21 )             39.6     12-29    143  |  103  |  25<H>  ----------------------------<  196<H>  4.1   |  29  |  1.78<H>    Ca    9.8      29 Dec 2023 07:12  Phos  3.5     12-28  Mg     1.9     12-28    TPro  6.6  /  Alb  3.4  /  TBili  0.4  /  DBili  x   /  AST  14  /  ALT  14  /  AlkPhos  77  12-29    PT/INR - ( 28 Dec 2023 07:10 )   PT: 17.5 sec;   INR: 1.61 ratio         PTT - ( 28 Dec 2023 07:10 )  PTT:36.3 sec      Urinalysis Basic - ( 29 Dec 2023 07:12 )    Color: x / Appearance: x / SG: x / pH: x  Gluc: 196 mg/dL / Ketone: x  / Bili: x / Urobili: x   Blood: x / Protein: x / Nitrite: x   Leuk Esterase: x / RBC: x / WBC x   Sq Epi: x / Non Sq Epi: x / Bacteria: x          All consultant(s) notes reviewed and care discussed with other providers        Contact Number, Dr Nieto [phone]

## 2023-12-29 NOTE — PROGRESS NOTE ADULT - ASSESSMENT
A/p  73-year-old male with a past medical history of hypertension, hyperlipidemia, diabetes presenting difficulty breathing.     #SOB  -RVP negative   -CXR with patchy infiltrates c/f pna & pulm edema  -F/u CT chest  -Abx per med  -Overloaded on exam  -sp 80mg iv lasix  -Would continue with 80mg iv lasix qd  -Wean bipap as able   -CICU consult noted    #HFrEF  -HST w/ mild elevation -- likely demand + ckd -- would trend to peak  -No CP on exam  -Overloaded on exam  -IV lasix as above  -Strict I/Os, Daily Weight  - + b/l le edema - would check le dopplers b/l   -Recent echo with mild lv dysfxn ef 45%, mild diastolic dysfxn, small pericardial effusion    #pAfib  -Resume eliquis & metoprolol when weaned from bipap    #HTN  -BP elevated  -Cont with IV hydralazine until weaned from bipap  -Can also consider adding IV labetalol if needed   -Resume metoprolol when weaned from bipap    #Dysphagia  -Per wife pt with difficulty swallowing  -Speech & swallow eval    #CKD  -Trend cr while on diuresis  -Renal f/u

## 2023-12-29 NOTE — PROGRESS NOTE ADULT - SUBJECTIVE AND OBJECTIVE BOX
Subjective: Patient seen and examined. No new events except as noted.     REVIEW OF SYSTEMS:    CONSTITUTIONAL: No weakness, fevers or chills  EYES/ENT: No visual changes;  No vertigo or throat pain   NECK: No pain or stiffness  RESPIRATORY: No cough, wheezing, hemoptysis; No shortness of breath  CARDIOVASCULAR: No chest pain or palpitations  GASTROINTESTINAL: No abdominal or epigastric pain. No nausea, vomiting, or hematemesis; No diarrhea or constipation. No melena or hematochezia.  GENITOURINARY: No dysuria, frequency or hematuria  NEUROLOGICAL: No numbness or weakness  SKIN: No itching, burning, rashes, or lesions   All other review of systems is negative unless indicated above.    MEDICATIONS:  MEDICATIONS  (STANDING):  albuterol/ipratropium for Nebulization 3 milliLiter(s) Nebulizer every 6 hours  apixaban 5 milliGRAM(s) Oral every 12 hours  atorvastatin 80 milliGRAM(s) Oral at bedtime  chlorhexidine 2% Cloths 1 Application(s) Topical daily  escitalopram 20 milliGRAM(s) Oral daily  furosemide   Injectable 80 milliGRAM(s) IV Push daily  lisinopril 40 milliGRAM(s) Oral daily  metoclopramide 10 milliGRAM(s) Oral three times a day before meals  metoprolol succinate  milliGRAM(s) Oral daily  pantoprazole    Tablet 40 milliGRAM(s) Oral before breakfast  piperacillin/tazobactam IVPB.. 3.375 Gram(s) IV Intermittent every 8 hours      PHYSICAL EXAM:  T(C): 37.1 (12-29-23 @ 05:09), Max: 37.6 (12-28-23 @ 23:43)  HR: 71 (12-29-23 @ 05:09) (63 - 87)  BP: 174/73 (12-29-23 @ 05:09) (170/77 - 201/82)  RR: 18 (12-29-23 @ 05:09) (14 - 20)  SpO2: 95% (12-29-23 @ 05:09) (95% - 100%)  Wt(kg): --  I&O's Summary    28 Dec 2023 07:01  -  29 Dec 2023 07:00  --------------------------------------------------------  IN: 0 mL / OUT: 650 mL / NET: -650 mL    29 Dec 2023 07:01  -  29 Dec 2023 12:00  --------------------------------------------------------  IN: 0 mL / OUT: 800 mL / NET: -800 mL      Height (cm): 175.3 (12-28 @ 23:43)  Weight (kg): 84.6 (12-28 @ 23:43)  BMI (kg/m2): 27.5 (12-28 @ 23:43)  BSA (m2): 2.01 (12-28 @ 23:43)    Appearance: NAD  HEENT:   Normal oral mucosa, PERRL, EOMI	  Lymphatic: No lymphadenopathy , no edema  Cardiovascular: Normal S1 S2, No JVD, No murmurs , Peripheral pulses palpable 2+ bilaterally  Respiratory: Decreased bs   Gastrointestinal:  Soft, Non-tender, + BS	  Skin: No rashes, No ecchymoses, No cyanosis, warm to touch  Musculoskeletal: Normal range of motion, normal strength  Psychiatry:  Mood & affect appropriate  Ext: No edema      LABS:    CARDIAC MARKERS:                                12.4   7.88  )-----------( 290      ( 29 Dec 2023 07:21 )             39.6     12-29    143  |  103  |  25<H>  ----------------------------<  196<H>  4.1   |< from: CT Chest No Cont (12.28.23 @ 16:28) >    ACC: [id number] EXAM:  CT CHEST   ORDERED BY: ABELARDO DOMINGO     PROCEDURE DATE:  12/28/2023          INTERPRETATION:  CLINICAL INFORMATION: Cough. Evaluate for pneumonia.    COMPARISON: CT chest 9/20/2023.    CONTRAST/COMPLICATIONS:  IV Contrast: NONE  Oral Contrast: NONE  Complications: None reported at time of study completion    PROCEDURE:  CT of the Chest was performed.  Sagittal and coronal reformats were performed.      FINDINGS:    AIRWAYS, LUNGS and PLEURA: Tracheobronchial secretions extensive in the   left lower lobe. Small to moderate bilateral pleural effusions with   partial atelectasis both lower lobes, greater on the left. A few patchy   and clustered opacities within the aerated left lower and upper lobes.    MEDIASTINUM AND JAJA:Mildly enlarged mediastinal lymph nodes.    HEART AND VESSELS: Heart is enlarged No pericardial effusion. Coronary   artery and aortic atherosclerosis. Aortic valve calcification.    VISUALIZED UPPER ABDOMEN: Within normal limits.    CHEST WALL AND BONES: Degenerative changes thoracic spine.    IMPRESSION:    Tracheobronchial secretions and moderate bilateral pleural effusions with   partial atelectasis both lower lobes with or without superimposed left   lung infectious/inflammatory process.    --- End of Report ---        < end of copied text >    29  |  1.78<H>    Ca    9.8      29 Dec 2023 07:12  Phos  3.5     12-28  Mg     1.9     12-28    TPro  6.6  /  Alb  3.4  /  TBili  0.4  /  DBili  x   /  AST  14  /  ALT  14  /  AlkPhos  77  12-29    proBNP:   Lipid Profile:   HgA1c:   TSH:             TELEMETRY: 	    ECG:  	  RADIOLOGY:   DIAGNOSTIC TESTING:  [ ] Echocardiogram:  [ ]  Catheterization:  [ ] Stress Test:    OTHER: 	           Subjective: Patient seen and examined. No new events except as noted.     REVIEW OF SYSTEMS:    CONSTITUTIONAL: No weakness, fevers or chills  EYES/ENT: No visual changes;  No vertigo or throat pain   NECK: No pain or stiffness  RESPIRATORY: No cough, wheezing, hemoptysis; No shortness of breath  CARDIOVASCULAR: No chest pain or palpitations  GASTROINTESTINAL: No abdominal or epigastric pain. No nausea, vomiting, or hematemesis; No diarrhea or constipation. No melena or hematochezia.  GENITOURINARY: No dysuria, frequency or hematuria  NEUROLOGICAL: No numbness or weakness  SKIN: No itching, burning, rashes, or lesions   All other review of systems is negative unless indicated above.    MEDICATIONS:  MEDICATIONS  (STANDING):  albuterol/ipratropium for Nebulization 3 milliLiter(s) Nebulizer every 6 hours  apixaban 5 milliGRAM(s) Oral every 12 hours  atorvastatin 80 milliGRAM(s) Oral at bedtime  chlorhexidine 2% Cloths 1 Application(s) Topical daily  escitalopram 20 milliGRAM(s) Oral daily  furosemide   Injectable 80 milliGRAM(s) IV Push daily  lisinopril 40 milliGRAM(s) Oral daily  metoclopramide 10 milliGRAM(s) Oral three times a day before meals  metoprolol succinate  milliGRAM(s) Oral daily  pantoprazole    Tablet 40 milliGRAM(s) Oral before breakfast  piperacillin/tazobactam IVPB.. 3.375 Gram(s) IV Intermittent every 8 hours      PHYSICAL EXAM:  T(C): 37.1 (12-29-23 @ 05:09), Max: 37.6 (12-28-23 @ 23:43)  HR: 71 (12-29-23 @ 05:09) (63 - 87)  BP: 174/73 (12-29-23 @ 05:09) (170/77 - 201/82)  RR: 18 (12-29-23 @ 05:09) (14 - 20)  SpO2: 95% (12-29-23 @ 05:09) (95% - 100%)  Wt(kg): --  I&O's Summary    28 Dec 2023 07:01  -  29 Dec 2023 07:00  --------------------------------------------------------  IN: 0 mL / OUT: 650 mL / NET: -650 mL    29 Dec 2023 07:01  -  29 Dec 2023 12:00  --------------------------------------------------------  IN: 0 mL / OUT: 800 mL / NET: -800 mL      Height (cm): 175.3 (12-28 @ 23:43)  Weight (kg): 84.6 (12-28 @ 23:43)  BMI (kg/m2): 27.5 (12-28 @ 23:43)  BSA (m2): 2.01 (12-28 @ 23:43)    Appearance: NAD  HEENT:   Normal oral mucosa, PERRL, EOMI	  Lymphatic: No lymphadenopathy , no edema  Cardiovascular: Normal S1 S2, No JVD, No murmurs , Peripheral pulses palpable 2+ bilaterally  Respiratory: Decreased bs   Gastrointestinal:  Soft, Non-tender, + BS	  Skin: No rashes, No ecchymoses, No cyanosis, warm to touch  Musculoskeletal: Normal range of motion, normal strength  Psychiatry:  Mood & affect appropriate  Ext: No edema      LABS:    CARDIAC MARKERS:                                12.4   7.88  )-----------( 290      ( 29 Dec 2023 07:21 )             39.6     12-29    143  |  103  |  25<H>  ----------------------------<  196<H>  4.1   |< from: CT Chest No Cont (12.28.23 @ 16:28) >    ACC: [id number] EXAM:  CT CHEST   ORDERED BY: ABELARDO DOMINGO     PROCEDURE DATE:  12/28/2023          INTERPRETATION:  CLINICAL INFORMATION: Cough. Evaluate for pneumonia.    COMPARISON: CT chest 9/20/2023.    CONTRAST/COMPLICATIONS:  IV Contrast: NONE  Oral Contrast: NONE  Complications: None reported at time of study completion    PROCEDURE:  CT of the Chest was performed.  Sagittal and coronal reformats were performed.      FINDINGS:    AIRWAYS, LUNGS and PLEURA: Tracheobronchial secretions extensive in the   left lower lobe. Small to moderate bilateral pleural effusions with   partial atelectasis both lower lobes, greater on the left. A few patchy   and clustered opacities within the aerated left lower and upper lobes.    MEDIASTINUM AND JAJA:Mildly enlarged mediastinal lymph nodes.    HEART AND VESSELS: Heart is enlarged No pericardial effusion. Coronary   artery and aortic atherosclerosis. Aortic valve calcification.    VISUALIZED UPPER ABDOMEN: Within normal limits.    CHEST WALL AND BONES: Degenerative changes thoracic spine.    IMPRESSION:    Tracheobronchial secretions and moderate bilateral pleural effusions with   partial atelectasis both lower lobes with or without superimposed left   lung infectious/inflammatory process.    --- End of Report ---        < end of copied text >    29  |  1.78<H>    Ca    9.8      29 Dec 2023 07:12  Phos  3.5     12-28  Mg     1.9     12-28    TPro  6.6  /  Alb  3.4  /  TBili  0.4  /  DBili  x   /  AST  14  /  ALT  14  /  AlkPhos  77  12-29    proBNP:   Lipid Profile:   HgA1c:   TSH:             TELEMETRY: 	    ECG:  	  RADIOLOGY:   DIAGNOSTIC TESTING:  [ ] Echocardiogram:  [ ]  Catheterization:  [ ] Stress Test:    OTHER:

## 2023-12-29 NOTE — PROGRESS NOTE ADULT - ASSESSMENT
73-year-old male with a past medical history of hypertension, hyperlipidemia, diabetes presenting difficulty breathing. Had generalized weakness yesterday. Patient woke up in the middle the night with shortness of breath and pressed life alert button. Patient brought in by EMS with low oxygen saturation And increased work of breathing, placed on Bipap in the ED. Pulmonary called to consult for SOB.      Resp failure:  requiring bipap:   HTN  DM:  HLD    Resp failure:  requiring bipap:   -CT chest with small to moderate b/l effusions/atelectasis, patchy opacities likely 2nd to pulm edema +/- underlying PNA. +Tracheobronchial secretions  -ProBNP elevated  -RVP negative  -Pt now off bipap, breathing comfortably on 2LNC. Keep sats >90%, wean O2 as tolerated  -Keep O>I, diuresis as tolerated  -Cards f/u  -If any change in resp status suggest resume Bipap 12/5  -Continue ABX for now. Check procalcitonin  -Start Mucinex 1200 mg PO BID x5 days, Duoneb q6h.     HTN  -Optimize antihypertensives     DM  -monitor blood glucose     HLD  -per PMD     73-year-old male with a past medical history of hypertension, hyperlipidemia, diabetes presenting difficulty breathing. Had generalized weakness yesterday. Patient woke up in the middle the night with shortness of breath and pressed life alert button. Patient brought in by EMS with low oxygen saturation And increased work of breathing, placed on Bipap in the ED. Pulmonary called to consult for SOB.      Resp failure:  requiring bipap:   HTN  DM:  HLD    Resp failure:  requiring bipap:   -CT chest with small to moderate b/l effusions/atelectasis, patchy opacities likely 2nd to pulm edema +/- underlying PNA. +Tracheobronchial secretions  -ProBNP elevated  -RVP negative  -Pt now off bipap, breathing comfortably on 2LNC. Keep sats >90%, wean O2 as tolerated  -Keep O>I, diuresis as tolerated  -Cards f/u  -If any change in resp status suggest resume Bipap 12/5  -Continue ABX , favor short course. Procalcitonin normal  -Start Mucinex 1200 mg PO BID x5 days  -Continue Duoneb q6h    HTN  -Optimize antihypertensives     DM  -monitor blood glucose     HLD  -per PMD

## 2023-12-30 LAB
ALBUMIN SERPL ELPH-MCNC: 3.2 G/DL — LOW (ref 3.3–5)
ALBUMIN SERPL ELPH-MCNC: 3.2 G/DL — LOW (ref 3.3–5)
ALP SERPL-CCNC: 64 U/L — SIGNIFICANT CHANGE UP (ref 40–120)
ALP SERPL-CCNC: 64 U/L — SIGNIFICANT CHANGE UP (ref 40–120)
ALT FLD-CCNC: 10 U/L — SIGNIFICANT CHANGE UP (ref 10–45)
ALT FLD-CCNC: 10 U/L — SIGNIFICANT CHANGE UP (ref 10–45)
ANION GAP SERPL CALC-SCNC: 11 MMOL/L — SIGNIFICANT CHANGE UP (ref 5–17)
ANION GAP SERPL CALC-SCNC: 11 MMOL/L — SIGNIFICANT CHANGE UP (ref 5–17)
AST SERPL-CCNC: 12 U/L — SIGNIFICANT CHANGE UP (ref 10–40)
AST SERPL-CCNC: 12 U/L — SIGNIFICANT CHANGE UP (ref 10–40)
BILIRUB SERPL-MCNC: 0.4 MG/DL — SIGNIFICANT CHANGE UP (ref 0.2–1.2)
BILIRUB SERPL-MCNC: 0.4 MG/DL — SIGNIFICANT CHANGE UP (ref 0.2–1.2)
BUN SERPL-MCNC: 28 MG/DL — HIGH (ref 7–23)
BUN SERPL-MCNC: 28 MG/DL — HIGH (ref 7–23)
CALCIUM SERPL-MCNC: 9.7 MG/DL — SIGNIFICANT CHANGE UP (ref 8.4–10.5)
CALCIUM SERPL-MCNC: 9.7 MG/DL — SIGNIFICANT CHANGE UP (ref 8.4–10.5)
CHLORIDE SERPL-SCNC: 100 MMOL/L — SIGNIFICANT CHANGE UP (ref 96–108)
CHLORIDE SERPL-SCNC: 100 MMOL/L — SIGNIFICANT CHANGE UP (ref 96–108)
CO2 SERPL-SCNC: 30 MMOL/L — SIGNIFICANT CHANGE UP (ref 22–31)
CO2 SERPL-SCNC: 30 MMOL/L — SIGNIFICANT CHANGE UP (ref 22–31)
CREAT SERPL-MCNC: 2.15 MG/DL — HIGH (ref 0.5–1.3)
CREAT SERPL-MCNC: 2.15 MG/DL — HIGH (ref 0.5–1.3)
EGFR: 32 ML/MIN/1.73M2 — LOW
EGFR: 32 ML/MIN/1.73M2 — LOW
GLUCOSE BLDC GLUCOMTR-MCNC: 189 MG/DL — HIGH (ref 70–99)
GLUCOSE BLDC GLUCOMTR-MCNC: 189 MG/DL — HIGH (ref 70–99)
GLUCOSE BLDC GLUCOMTR-MCNC: 305 MG/DL — HIGH (ref 70–99)
GLUCOSE BLDC GLUCOMTR-MCNC: 305 MG/DL — HIGH (ref 70–99)
GLUCOSE BLDC GLUCOMTR-MCNC: 340 MG/DL — HIGH (ref 70–99)
GLUCOSE BLDC GLUCOMTR-MCNC: 340 MG/DL — HIGH (ref 70–99)
GLUCOSE SERPL-MCNC: 177 MG/DL — HIGH (ref 70–99)
GLUCOSE SERPL-MCNC: 177 MG/DL — HIGH (ref 70–99)
LACTATE SERPL-SCNC: 0.8 MMOL/L — SIGNIFICANT CHANGE UP (ref 0.5–2)
LACTATE SERPL-SCNC: 0.8 MMOL/L — SIGNIFICANT CHANGE UP (ref 0.5–2)
POTASSIUM SERPL-MCNC: 3.7 MMOL/L — SIGNIFICANT CHANGE UP (ref 3.5–5.3)
POTASSIUM SERPL-MCNC: 3.7 MMOL/L — SIGNIFICANT CHANGE UP (ref 3.5–5.3)
POTASSIUM SERPL-SCNC: 3.7 MMOL/L — SIGNIFICANT CHANGE UP (ref 3.5–5.3)
POTASSIUM SERPL-SCNC: 3.7 MMOL/L — SIGNIFICANT CHANGE UP (ref 3.5–5.3)
PROT SERPL-MCNC: 6.2 G/DL — SIGNIFICANT CHANGE UP (ref 6–8.3)
PROT SERPL-MCNC: 6.2 G/DL — SIGNIFICANT CHANGE UP (ref 6–8.3)
SODIUM SERPL-SCNC: 141 MMOL/L — SIGNIFICANT CHANGE UP (ref 135–145)
SODIUM SERPL-SCNC: 141 MMOL/L — SIGNIFICANT CHANGE UP (ref 135–145)
TROPONIN T, HIGH SENSITIVITY RESULT: 119 NG/L — HIGH (ref 0–51)
TROPONIN T, HIGH SENSITIVITY RESULT: 119 NG/L — HIGH (ref 0–51)

## 2023-12-30 RX ORDER — DEXTROSE 50 % IN WATER 50 %
25 SYRINGE (ML) INTRAVENOUS ONCE
Refills: 0 | Status: DISCONTINUED | OUTPATIENT
Start: 2023-12-30 | End: 2024-01-04

## 2023-12-30 RX ORDER — DEXTROSE 50 % IN WATER 50 %
12.5 SYRINGE (ML) INTRAVENOUS ONCE
Refills: 0 | Status: DISCONTINUED | OUTPATIENT
Start: 2023-12-30 | End: 2024-01-04

## 2023-12-30 RX ORDER — GLUCAGON INJECTION, SOLUTION 0.5 MG/.1ML
1 INJECTION, SOLUTION SUBCUTANEOUS ONCE
Refills: 0 | Status: DISCONTINUED | OUTPATIENT
Start: 2023-12-30 | End: 2024-01-04

## 2023-12-30 RX ORDER — SODIUM CHLORIDE 9 MG/ML
1000 INJECTION, SOLUTION INTRAVENOUS
Refills: 0 | Status: DISCONTINUED | OUTPATIENT
Start: 2023-12-30 | End: 2024-01-04

## 2023-12-30 RX ORDER — INSULIN LISPRO 100/ML
VIAL (ML) SUBCUTANEOUS
Refills: 0 | Status: DISCONTINUED | OUTPATIENT
Start: 2023-12-30 | End: 2024-01-04

## 2023-12-30 RX ORDER — INSULIN GLARGINE 100 [IU]/ML
10 INJECTION, SOLUTION SUBCUTANEOUS AT BEDTIME
Refills: 0 | Status: DISCONTINUED | OUTPATIENT
Start: 2023-12-30 | End: 2024-01-04

## 2023-12-30 RX ORDER — DEXTROSE 50 % IN WATER 50 %
15 SYRINGE (ML) INTRAVENOUS ONCE
Refills: 0 | Status: DISCONTINUED | OUTPATIENT
Start: 2023-12-30 | End: 2024-01-04

## 2023-12-30 RX ORDER — INSULIN LISPRO 100/ML
VIAL (ML) SUBCUTANEOUS AT BEDTIME
Refills: 0 | Status: DISCONTINUED | OUTPATIENT
Start: 2023-12-30 | End: 2024-01-04

## 2023-12-30 RX ADMIN — APIXABAN 5 MILLIGRAM(S): 2.5 TABLET, FILM COATED ORAL at 05:23

## 2023-12-30 RX ADMIN — Medication 80 MILLIGRAM(S): at 05:24

## 2023-12-30 RX ADMIN — PANTOPRAZOLE SODIUM 40 MILLIGRAM(S): 20 TABLET, DELAYED RELEASE ORAL at 05:23

## 2023-12-30 RX ADMIN — ESCITALOPRAM OXALATE 20 MILLIGRAM(S): 10 TABLET, FILM COATED ORAL at 11:45

## 2023-12-30 RX ADMIN — Medication 1200 MILLIGRAM(S): at 17:27

## 2023-12-30 RX ADMIN — Medication 10 MILLIGRAM(S): at 05:23

## 2023-12-30 RX ADMIN — Medication 10 MILLIGRAM(S): at 11:45

## 2023-12-30 RX ADMIN — Medication 8: at 18:07

## 2023-12-30 RX ADMIN — Medication 3 MILLILITER(S): at 05:24

## 2023-12-30 RX ADMIN — Medication 100 MILLIGRAM(S): at 05:22

## 2023-12-30 RX ADMIN — INSULIN GLARGINE 10 UNIT(S): 100 INJECTION, SOLUTION SUBCUTANEOUS at 23:31

## 2023-12-30 RX ADMIN — Medication 3 MILLILITER(S): at 13:30

## 2023-12-30 RX ADMIN — PIPERACILLIN AND TAZOBACTAM 25 GRAM(S): 4; .5 INJECTION, POWDER, LYOPHILIZED, FOR SOLUTION INTRAVENOUS at 23:30

## 2023-12-30 RX ADMIN — Medication 10 MILLIGRAM(S): at 17:27

## 2023-12-30 RX ADMIN — PIPERACILLIN AND TAZOBACTAM 25 GRAM(S): 4; .5 INJECTION, POWDER, LYOPHILIZED, FOR SOLUTION INTRAVENOUS at 14:59

## 2023-12-30 RX ADMIN — LISINOPRIL 40 MILLIGRAM(S): 2.5 TABLET ORAL at 05:23

## 2023-12-30 RX ADMIN — Medication 3 MILLILITER(S): at 11:45

## 2023-12-30 RX ADMIN — CHLORHEXIDINE GLUCONATE 1 APPLICATION(S): 213 SOLUTION TOPICAL at 11:44

## 2023-12-30 RX ADMIN — APIXABAN 5 MILLIGRAM(S): 2.5 TABLET, FILM COATED ORAL at 17:27

## 2023-12-30 RX ADMIN — Medication 3 MILLILITER(S): at 17:26

## 2023-12-30 RX ADMIN — PIPERACILLIN AND TAZOBACTAM 25 GRAM(S): 4; .5 INJECTION, POWDER, LYOPHILIZED, FOR SOLUTION INTRAVENOUS at 05:21

## 2023-12-30 RX ADMIN — Medication 3 MILLILITER(S): at 23:33

## 2023-12-30 RX ADMIN — ATORVASTATIN CALCIUM 80 MILLIGRAM(S): 80 TABLET, FILM COATED ORAL at 23:31

## 2023-12-30 RX ADMIN — Medication 1200 MILLIGRAM(S): at 05:23

## 2023-12-30 NOTE — PHYSICAL THERAPY INITIAL EVALUATION ADULT - STRENGTHENING, PT EVAL
Goal: Patient will increase in strength by 1/2 grade to improve in ADLs and ambulation in 3 weeks. Patient/Caregiver provided printed discharge information.

## 2023-12-30 NOTE — PHYSICAL THERAPY INITIAL EVALUATION ADULT - FUNCTIONAL LIMITATIONS, PT EVAL
My signature below certifies that the above stated patient is homebound and upon completion of the Face-To-Face encounter, has the need for intermittent skilled nursing, physical therapy and/or speech or occupational therapy services in their home for their current diagnosis as outlined in their initial plan of care. These services will continue to be monitored by myself or another physician.
self-care/home management/community/leisure

## 2023-12-30 NOTE — PROGRESS NOTE ADULT - SUBJECTIVE AND OBJECTIVE BOX
Subjective: Patient seen and examined. No new events except as noted.     REVIEW OF SYSTEMS:    CONSTITUTIONAL: +weakness, fevers or chills  EYES/ENT: No visual changes;  No vertigo or throat pain   NECK: No pain or stiffness  RESPIRATORY: No cough, wheezing, hemoptysis; No shortness of breath  CARDIOVASCULAR: No chest pain or palpitations  GASTROINTESTINAL: No abdominal or epigastric pain. No nausea, vomiting, or hematemesis; No diarrhea or constipation. No melena or hematochezia.  GENITOURINARY: No dysuria, frequency or hematuria  NEUROLOGICAL: No numbness or weakness  SKIN: No itching, burning, rashes, or lesions   All other review of systems is negative unless indicated above.    MEDICATIONS:  MEDICATIONS  (STANDING):  albuterol/ipratropium for Nebulization 3 milliLiter(s) Nebulizer every 6 hours  apixaban 5 milliGRAM(s) Oral every 12 hours  atorvastatin 80 milliGRAM(s) Oral at bedtime  chlorhexidine 2% Cloths 1 Application(s) Topical daily  dextrose 5%. 1000 milliLiter(s) (50 mL/Hr) IV Continuous <Continuous>  dextrose 5%. 1000 milliLiter(s) (100 mL/Hr) IV Continuous <Continuous>  dextrose 50% Injectable 25 Gram(s) IV Push once  dextrose 50% Injectable 25 Gram(s) IV Push once  dextrose 50% Injectable 12.5 Gram(s) IV Push once  escitalopram 20 milliGRAM(s) Oral daily  glucagon  Injectable 1 milliGRAM(s) IntraMuscular once  guaiFENesin ER 1200 milliGRAM(s) Oral every 12 hours  insulin glargine Injectable (LANTUS) 10 Unit(s) SubCutaneous at bedtime  insulin lispro (ADMELOG) corrective regimen sliding scale   SubCutaneous three times a day before meals  insulin lispro (ADMELOG) corrective regimen sliding scale   SubCutaneous at bedtime  lisinopril 40 milliGRAM(s) Oral daily  metoclopramide 10 milliGRAM(s) Oral three times a day before meals  metoprolol succinate  milliGRAM(s) Oral daily  pantoprazole    Tablet 40 milliGRAM(s) Oral before breakfast  piperacillin/tazobactam IVPB.. 3.375 Gram(s) IV Intermittent every 8 hours      PHYSICAL EXAM:  T(C): 37.1 (12-30-23 @ 11:13), Max: 37.7 (12-29-23 @ 19:56)  HR: 78 (12-30-23 @ 12:15) (64 - 78)  BP: 146/72 (12-30-23 @ 12:15) (126/71 - 175/76)  RR: 18 (12-30-23 @ 12:15) (18 - 18)  SpO2: 81% (12-30-23 @ 12:15) (81% - 95%)  Wt(kg): --  I&O's Summary    29 Dec 2023 07:01  -  30 Dec 2023 07:00  --------------------------------------------------------  IN: 640 mL / OUT: 1650 mL / NET: -1010 mL    30 Dec 2023 07:01  -  30 Dec 2023 19:47  --------------------------------------------------------  IN: 75 mL / OUT: 0 mL / NET: 75 mL          Appearance: NAD  HEENT:   Normal oral mucosa, PERRL, EOMI	  Lymphatic: No lymphadenopathy\  Cardiovascular: Normal S1 S2, No JVD, No murmurs , Peripheral pulses palpable 2+ bilaterally  Respiratory: Decreased bs   Gastrointestinal:  Soft, Non-tender, + BS	  Skin: No rashes, No ecchymoses, No cyanosis, warm to touch  Musculoskeletal: Normal range of motion, normal strength  Psychiatry:  Mood & affect appropriate  Ext: N+ edema          LABS:    CARDIAC MARKERS:                                12.4   7.88  )-----------( 290      ( 29 Dec 2023 07:21 )             39.6     12-30    141  |  100  |  28<H>  ----------------------------<  177<H>  3.7   |  30  |  2.15<H>    Ca    9.7      30 Dec 2023 06:37    TPro  6.2  /  Alb  3.2<L>  /  TBili  0.4  /  DBili  x   /  AST  12  /  ALT  10  /  AlkPhos  64  12-30    proBNP:   Lipid Profile:   HgA1c:   TSH:             TELEMETRY: 	    ECG:  	  RADIOLOGY:   DIAGNOSTIC TESTING:  [ ] Echocardiogram:  < from: TTE W or WO Ultrasound Enhancing Agent (12.08.23 @ 09:11) >  TRANSTHORACIC ECHOCARDIOGRAM REPORT  ________________________________________________________________________________                                      _______       Pt. Name:       FRANKO SPANN   Study Date:    12/8/2023  MRN:            [id number]   YOB: 1950  Accession #:    [id number]    Age:           72 years  Account#:       [id number] Gender:        M  Heart Rate:     75 bpm       Height:        69.00 in (175.26 cm)  Rhythm:         sinus rhythm Weight:        175.00 lb (79.38 kg)  Blood Pressure: 192/92 mmHg  BSA/BMI:       1.95 m² / 25.84 kg/m²  ________________________________________________________________________________________  Referring Physician:    JOSELYN ALVAREZ  Interpreting Physician: Madhav Up M.D.  Primary Sonographer:    Marisol Lou [facility]    CPT:                ECHO TTE WITH CON COMP W DOPP - .m;DEFINITY ECHO                      CONTRAST PER ML - .m;DEFINITY ECHO CONTRAST PER ML                      WASTED - .m  Indication(s):Cerebral infarction, unspecified - I63.9  Procedure:          Transthoracic echocardiogram with 2-D, M-mode and complete                      spectral and color flow Doppler.  Ordering Location:  [facility]  Admission Status:   Inpatient  Contrast Injection: Verbal consent was obtained for injection of Ultrasonic                      Enhancing Agent following a discussion of risks and                      benefits.                      Endocardial visualization enhanced with 3 ml of Definity              Ultrasound enhancing agent (Lot#:[id number] Discarded Dose:7ml).  UEA Reaction:       Patient had no adverse reaction after injection of                      Ultrasound Enhancing Agent.  Study Information:  Image quality for this study is fair.    _______________________________________________________________________________________     CONCLUSIONS:      1. Left ventricular systolic function is mildly decreased with an ejection fraction of 45 % by Mckeon's method of disks.   2. There is mild (grade 1) left ventricular diastolic dysfunction.   3. Normal right ventricular cavity size, wall thickness, and systolic function.   4. Normal atria.   5. Small pericardial effusion noted adjacent to the right ventricle and small pericardial effusion noted adjacent to the lateral left ventricle with no evidence of hemodynamic compromise.   6. Thickened pericardium.   7. The inferior vena cava is normal in size (normal <2.1cm) with normal inspiratory collapse (normal >50%) consistent with normal right atrial pressure (~3, range 0-5mmHg).   8. Compared to the transthoracic echocardiogram performed on 9/26/2023 there has been an interval decline in LV systolic function.      < end of copied text >    [ ]  Catheterization:  [ ] Stress Test:    OTHER: 	           Subjective: Patient seen and examined. No new events except as noted.     REVIEW OF SYSTEMS:    CONSTITUTIONAL: +weakness, fevers or chills  EYES/ENT: No visual changes;  No vertigo or throat pain   NECK: No pain or stiffness  RESPIRATORY: No cough, wheezing, hemoptysis; No shortness of breath  CARDIOVASCULAR: No chest pain or palpitations  GASTROINTESTINAL: No abdominal or epigastric pain. No nausea, vomiting, or hematemesis; No diarrhea or constipation. No melena or hematochezia.  GENITOURINARY: No dysuria, frequency or hematuria  NEUROLOGICAL: No numbness or weakness  SKIN: No itching, burning, rashes, or lesions   All other review of systems is negative unless indicated above.    MEDICATIONS:  MEDICATIONS  (STANDING):  albuterol/ipratropium for Nebulization 3 milliLiter(s) Nebulizer every 6 hours  apixaban 5 milliGRAM(s) Oral every 12 hours  atorvastatin 80 milliGRAM(s) Oral at bedtime  chlorhexidine 2% Cloths 1 Application(s) Topical daily  dextrose 5%. 1000 milliLiter(s) (50 mL/Hr) IV Continuous <Continuous>  dextrose 5%. 1000 milliLiter(s) (100 mL/Hr) IV Continuous <Continuous>  dextrose 50% Injectable 25 Gram(s) IV Push once  dextrose 50% Injectable 25 Gram(s) IV Push once  dextrose 50% Injectable 12.5 Gram(s) IV Push once  escitalopram 20 milliGRAM(s) Oral daily  glucagon  Injectable 1 milliGRAM(s) IntraMuscular once  guaiFENesin ER 1200 milliGRAM(s) Oral every 12 hours  insulin glargine Injectable (LANTUS) 10 Unit(s) SubCutaneous at bedtime  insulin lispro (ADMELOG) corrective regimen sliding scale   SubCutaneous three times a day before meals  insulin lispro (ADMELOG) corrective regimen sliding scale   SubCutaneous at bedtime  lisinopril 40 milliGRAM(s) Oral daily  metoclopramide 10 milliGRAM(s) Oral three times a day before meals  metoprolol succinate  milliGRAM(s) Oral daily  pantoprazole    Tablet 40 milliGRAM(s) Oral before breakfast  piperacillin/tazobactam IVPB.. 3.375 Gram(s) IV Intermittent every 8 hours      PHYSICAL EXAM:  T(C): 37.1 (12-30-23 @ 11:13), Max: 37.7 (12-29-23 @ 19:56)  HR: 78 (12-30-23 @ 12:15) (64 - 78)  BP: 146/72 (12-30-23 @ 12:15) (126/71 - 175/76)  RR: 18 (12-30-23 @ 12:15) (18 - 18)  SpO2: 81% (12-30-23 @ 12:15) (81% - 95%)  Wt(kg): --  I&O's Summary    29 Dec 2023 07:01  -  30 Dec 2023 07:00  --------------------------------------------------------  IN: 640 mL / OUT: 1650 mL / NET: -1010 mL    30 Dec 2023 07:01  -  30 Dec 2023 19:47  --------------------------------------------------------  IN: 75 mL / OUT: 0 mL / NET: 75 mL          Appearance: NAD  HEENT:   Normal oral mucosa, PERRL, EOMI	  Lymphatic: No lymphadenopathy\  Cardiovascular: Normal S1 S2, No JVD, No murmurs , Peripheral pulses palpable 2+ bilaterally  Respiratory: Decreased bs   Gastrointestinal:  Soft, Non-tender, + BS	  Skin: No rashes, No ecchymoses, No cyanosis, warm to touch  Musculoskeletal: Normal range of motion, normal strength  Psychiatry:  Mood & affect appropriate  Ext: N+ edema          LABS:    CARDIAC MARKERS:                                12.4   7.88  )-----------( 290      ( 29 Dec 2023 07:21 )             39.6     12-30    141  |  100  |  28<H>  ----------------------------<  177<H>  3.7   |  30  |  2.15<H>    Ca    9.7      30 Dec 2023 06:37    TPro  6.2  /  Alb  3.2<L>  /  TBili  0.4  /  DBili  x   /  AST  12  /  ALT  10  /  AlkPhos  64  12-30    proBNP:   Lipid Profile:   HgA1c:   TSH:             TELEMETRY: 	    ECG:  	  RADIOLOGY:   DIAGNOSTIC TESTING:  [ ] Echocardiogram:  < from: TTE W or WO Ultrasound Enhancing Agent (12.08.23 @ 09:11) >  TRANSTHORACIC ECHOCARDIOGRAM REPORT  ________________________________________________________________________________                                      _______       Pt. Name:       FRANKO SPANN   Study Date:    12/8/2023  MRN:            [id number]   YOB: 1950  Accession #:    [id number]    Age:           72 years  Account#:       [id number] Gender:        M  Heart Rate:     75 bpm       Height:        69.00 in (175.26 cm)  Rhythm:         sinus rhythm Weight:        175.00 lb (79.38 kg)  Blood Pressure: 192/92 mmHg  BSA/BMI:       1.95 m² / 25.84 kg/m²  ________________________________________________________________________________________  Referring Physician:    JOSELYN ALVAREZ  Interpreting Physician: Madhav Up M.D.  Primary Sonographer:    Marisol Lou [facility]    CPT:                ECHO TTE WITH CON COMP W DOPP - .m;DEFINITY ECHO                      CONTRAST PER ML - .m;DEFINITY ECHO CONTRAST PER ML                      WASTED - .m  Indication(s):Cerebral infarction, unspecified - I63.9  Procedure:          Transthoracic echocardiogram with 2-D, M-mode and complete                      spectral and color flow Doppler.  Ordering Location:  [facility]  Admission Status:   Inpatient  Contrast Injection: Verbal consent was obtained for injection of Ultrasonic                      Enhancing Agent following a discussion of risks and                      benefits.                      Endocardial visualization enhanced with 3 ml of Definity              Ultrasound enhancing agent (Lot#:[id number] Discarded Dose:7ml).  UEA Reaction:       Patient had no adverse reaction after injection of                      Ultrasound Enhancing Agent.  Study Information:  Image quality for this study is fair.    _______________________________________________________________________________________     CONCLUSIONS:      1. Left ventricular systolic function is mildly decreased with an ejection fraction of 45 % by Mckeon's method of disks.   2. There is mild (grade 1) left ventricular diastolic dysfunction.   3. Normal right ventricular cavity size, wall thickness, and systolic function.   4. Normal atria.   5. Small pericardial effusion noted adjacent to the right ventricle and small pericardial effusion noted adjacent to the lateral left ventricle with no evidence of hemodynamic compromise.   6. Thickened pericardium.   7. The inferior vena cava is normal in size (normal <2.1cm) with normal inspiratory collapse (normal >50%) consistent with normal right atrial pressure (~3, range 0-5mmHg).   8. Compared to the transthoracic echocardiogram performed on 9/26/2023 there has been an interval decline in LV systolic function.      < end of copied text >    [ ]  Catheterization:  [ ] Stress Test:    OTHER:

## 2023-12-30 NOTE — PHYSICAL THERAPY INITIAL EVALUATION ADULT - NSPTDISCHREC_GEN_A_CORE
however wife wants home. IF home, will require assist with ALL functional mobility, transport/assist into house 2/2 unsafe navigating steps at this time, and HomePT services. DME: transport wheelchair for community ambulation./[facility]acute [facility]

## 2023-12-30 NOTE — PHYSICAL THERAPY INITIAL EVALUATION ADULT - GENERAL OBSERVATIONS, REHAB EVAL
Pt rec'd seated in chair, in NAD, +IVL, +NC 4L, wife at bedside. Agreeable to PT. RN cleared pt to be seen.

## 2023-12-30 NOTE — PROGRESS NOTE ADULT - ASSESSMENT
A/p  73-year-old male with a past medical history of hypertension, hyperlipidemia, diabetes presenting difficulty breathing.     #SOB  -RVP negative   -CXR with patchy infiltrates c/f pna & pulm edema  -Abx per med  -Overloaded on exam  -Off diuretics for BEAR  -CICU consult noted    #HFrEF  -HST w/ mild elevation -- likely demand + ckd  -No CP on exam  -off lasix for now due to BEAR    -Strict I/Os, Daily Weight  - + b/l le edema - would check le dopplers b/l   -Recent echo with mild lv dysfxn ef 45%, mild diastolic dysfxn, small pericardial effusion    #pAfib  -Resume eliquis & metoprolol when weaned from bipap    #HTN  -BP elevated  -Cont with IV hydralazine until weaned from bipap  -Can also consider adding IV labetalol if needed   -Resume metoprolol when weaned from bipap    #Dysphagia  -Per wife pt with difficulty swallowing  -Speech & swallow eval    #CKD  -Trend cr while on diuresis  -Renal f/u

## 2023-12-30 NOTE — PROGRESS NOTE ADULT - SUBJECTIVE AND OBJECTIVE BOX
Patient is a 73y old  Male who presents with a chief complaint of shortness of breath (29 Dec 2023 15:18)      DATE OF SERVICE: 12-30-23 @ 14:27    SUBJECTIVE / OVERNIGHT EVENTS: overnight events noted    ROS:  Resp: No cough no sputum production  CVS: No chest pain no palpitations no orthopnea  GI: no N/V/D          MEDICATIONS  (STANDING):  albuterol/ipratropium for Nebulization 3 milliLiter(s) Nebulizer every 6 hours  apixaban 5 milliGRAM(s) Oral every 12 hours  atorvastatin 80 milliGRAM(s) Oral at bedtime  chlorhexidine 2% Cloths 1 Application(s) Topical daily  escitalopram 20 milliGRAM(s) Oral daily  guaiFENesin ER 1200 milliGRAM(s) Oral every 12 hours  lisinopril 40 milliGRAM(s) Oral daily  metoclopramide 10 milliGRAM(s) Oral three times a day before meals  metoprolol succinate  milliGRAM(s) Oral daily  pantoprazole    Tablet 40 milliGRAM(s) Oral before breakfast  piperacillin/tazobactam IVPB.. 3.375 Gram(s) IV Intermittent every 8 hours    MEDICATIONS  (PRN):        CAPILLARY BLOOD GLUCOSE      POCT Blood Glucose.: 305 mg/dL (30 Dec 2023 11:55)    I&O's Summary    29 Dec 2023 07:01  -  30 Dec 2023 07:00  --------------------------------------------------------  IN: 640 mL / OUT: 1650 mL / NET: -1010 mL        Vital Signs Last 24 Hrs  T(C): 37.1 (30 Dec 2023 11:13), Max: 37.7 (29 Dec 2023 19:56)  T(F): 98.8 (30 Dec 2023 11:13), Max: 99.8 (29 Dec 2023 19:56)  HR: 78 (30 Dec 2023 12:15) (64 - 78)  BP: 146/72 (30 Dec 2023 12:15) (126/71 - 175/76)  BP(mean): --  RR: 18 (30 Dec 2023 12:15) (18 - 18)  SpO2: 81% (30 Dec 2023 12:15) (81% - 95%)    PHYSICAL EXAM:  CHEST/LUNG: clear no stridor  HEART: S1 S2; systolic murmur +   ABDOMEN: Soft, Nontender,  EXTREMITIES:  improved edema  NEUROLOGY: alert  non-focal  SKIN: No rashes or lesions    LABS:                        12.4   7.88  )-----------( 290      ( 29 Dec 2023 07:21 )             39.6     12-30    141  |  100  |  28<H>  ----------------------------<  177<H>  3.7   |  30  |  2.15<H>    Ca    9.7      30 Dec 2023 06:37    TPro  6.2  /  Alb  3.2<L>  /  TBili  0.4  /  DBili  x   /  AST  12  /  ALT  10  /  AlkPhos  64  12-30          Urinalysis Basic - ( 30 Dec 2023 06:37 )    Color: x / Appearance: x / SG: x / pH: x  Gluc: 177 mg/dL / Ketone: x  / Bili: x / Urobili: x   Blood: x / Protein: x / Nitrite: x   Leuk Esterase: x / RBC: x / WBC x   Sq Epi: x / Non Sq Epi: x / Bacteria: x          All consultant(s) notes reviewed and care discussed with other providers        Contact Number, Dr Nieto [phone] Patient is a 73y old  Male who presents with a chief complaint of shortness of breath (29 Dec 2023 15:18)      DATE OF SERVICE: 12-30-23 @ 14:27    SUBJECTIVE / OVERNIGHT EVENTS: overnight events noted    ROS:  Resp: No cough no sputum production  CVS: No chest pain no palpitations no orthopnea  GI: no N/V/D          MEDICATIONS  (STANDING):  albuterol/ipratropium for Nebulization 3 milliLiter(s) Nebulizer every 6 hours  apixaban 5 milliGRAM(s) Oral every 12 hours  atorvastatin 80 milliGRAM(s) Oral at bedtime  chlorhexidine 2% Cloths 1 Application(s) Topical daily  escitalopram 20 milliGRAM(s) Oral daily  guaiFENesin ER 1200 milliGRAM(s) Oral every 12 hours  lisinopril 40 milliGRAM(s) Oral daily  metoclopramide 10 milliGRAM(s) Oral three times a day before meals  metoprolol succinate  milliGRAM(s) Oral daily  pantoprazole    Tablet 40 milliGRAM(s) Oral before breakfast  piperacillin/tazobactam IVPB.. 3.375 Gram(s) IV Intermittent every 8 hours    MEDICATIONS  (PRN):        CAPILLARY BLOOD GLUCOSE      POCT Blood Glucose.: 305 mg/dL (30 Dec 2023 11:55)    I&O's Summary    29 Dec 2023 07:01  -  30 Dec 2023 07:00  --------------------------------------------------------  IN: 640 mL / OUT: 1650 mL / NET: -1010 mL        Vital Signs Last 24 Hrs  T(C): 37.1 (30 Dec 2023 11:13), Max: 37.7 (29 Dec 2023 19:56)  T(F): 98.8 (30 Dec 2023 11:13), Max: 99.8 (29 Dec 2023 19:56)  HR: 78 (30 Dec 2023 12:15) (64 - 78)  BP: 146/72 (30 Dec 2023 12:15) (126/71 - 175/76)  BP(mean): --  RR: 18 (30 Dec 2023 12:15) (18 - 18)  SpO2: 81% (30 Dec 2023 12:15) (81% - 95%)    PHYSICAL EXAM:  CHEST/LUNG: clear no stridor  HEART: S1 S2; systolic murmur +   ABDOMEN: Soft, Nontender,  EXTREMITIES:  improved edema  NEUROLOGY: alert  non-focal  SKIN: No rashes or lesions    LABS:                        12.4   7.88  )-----------( 290      ( 29 Dec 2023 07:21 )             39.6     12-30    141  |  100  |  28<H>  ----------------------------<  177<H>  3.7   |  30  |  2.15<H>    Ca    9.7      30 Dec 2023 06:37    TPro  6.2  /  Alb  3.2<L>  /  TBili  0.4  /  DBili  x   /  AST  12  /  ALT  10  /  AlkPhos  64  12-30          Urinalysis Basic - ( 30 Dec 2023 06:37 )    Color: x / Appearance: x / SG: x / pH: x  Gluc: 177 mg/dL / Ketone: x  / Bili: x / Urobili: x   Blood: x / Protein: x / Nitrite: x   Leuk Esterase: x / RBC: x / WBC x   Sq Epi: x / Non Sq Epi: x / Bacteria: x          All consultant(s) notes reviewed and care discussed with other providers        Contact Number, Dr Nieto [phone]

## 2023-12-30 NOTE — PHYSICAL THERAPY INITIAL EVALUATION ADULT - ADDITIONAL COMMENTS
Pt lives in a house with wife. Has 3 stairs to enter (+HR) and 1 flight of stairs inside (+HR). Prior to last hospitalization, pt reports being independent with functional mobility with RW. Since last hospitalization, pt req some assist with functional mobility/ADLs. Was getting HomePT services. Owns RW, cane, shower chair, commode.

## 2023-12-30 NOTE — PHYSICAL THERAPY INITIAL EVALUATION ADULT - PERTINENT HX OF CURRENT PROBLEM, REHAB EVAL
73-year-old male with a past medical history of hypertension, hyperlipidemia, diabetes presenting difficulty breathing. Had generalized weakness yesterday. Patient woke up in the middle the night with shortness of breath and pressed life alert button. Patient brought in by EMS with low oxygen saturation And increased work of breathing. Currently states feels better though still on BiPAP. wife at bedside provided most of the collateral history. Clinical presentation consistent with acute on chronic systolic heart failure.  [facility] BLE Doppler (12/29): No evidence of deep venous thrombosis in either lower extremity.  CT Chest (12/28): Tracheobronchial secretions and moderate bilateral pleural effusions with partial atelectasis both lower lobes with or without superimposed left lung infectious/inflammatory process.

## 2023-12-31 LAB
ALBUMIN SERPL ELPH-MCNC: 3 G/DL — LOW (ref 3.3–5)
ALBUMIN SERPL ELPH-MCNC: 3 G/DL — LOW (ref 3.3–5)
ALP SERPL-CCNC: 56 U/L — SIGNIFICANT CHANGE UP (ref 40–120)
ALP SERPL-CCNC: 56 U/L — SIGNIFICANT CHANGE UP (ref 40–120)
ALT FLD-CCNC: 9 U/L — LOW (ref 10–45)
ALT FLD-CCNC: 9 U/L — LOW (ref 10–45)
ANION GAP SERPL CALC-SCNC: 12 MMOL/L — SIGNIFICANT CHANGE UP (ref 5–17)
ANION GAP SERPL CALC-SCNC: 12 MMOL/L — SIGNIFICANT CHANGE UP (ref 5–17)
AST SERPL-CCNC: 12 U/L — SIGNIFICANT CHANGE UP (ref 10–40)
AST SERPL-CCNC: 12 U/L — SIGNIFICANT CHANGE UP (ref 10–40)
BILIRUB SERPL-MCNC: 0.3 MG/DL — SIGNIFICANT CHANGE UP (ref 0.2–1.2)
BILIRUB SERPL-MCNC: 0.3 MG/DL — SIGNIFICANT CHANGE UP (ref 0.2–1.2)
BUN SERPL-MCNC: 38 MG/DL — HIGH (ref 7–23)
BUN SERPL-MCNC: 38 MG/DL — HIGH (ref 7–23)
CALCIUM SERPL-MCNC: 9.4 MG/DL — SIGNIFICANT CHANGE UP (ref 8.4–10.5)
CALCIUM SERPL-MCNC: 9.4 MG/DL — SIGNIFICANT CHANGE UP (ref 8.4–10.5)
CHLORIDE SERPL-SCNC: 99 MMOL/L — SIGNIFICANT CHANGE UP (ref 96–108)
CHLORIDE SERPL-SCNC: 99 MMOL/L — SIGNIFICANT CHANGE UP (ref 96–108)
CO2 SERPL-SCNC: 30 MMOL/L — SIGNIFICANT CHANGE UP (ref 22–31)
CO2 SERPL-SCNC: 30 MMOL/L — SIGNIFICANT CHANGE UP (ref 22–31)
CREAT SERPL-MCNC: 2.61 MG/DL — HIGH (ref 0.5–1.3)
CREAT SERPL-MCNC: 2.61 MG/DL — HIGH (ref 0.5–1.3)
EGFR: 25 ML/MIN/1.73M2 — LOW
EGFR: 25 ML/MIN/1.73M2 — LOW
GLUCOSE BLDC GLUCOMTR-MCNC: 154 MG/DL — HIGH (ref 70–99)
GLUCOSE BLDC GLUCOMTR-MCNC: 154 MG/DL — HIGH (ref 70–99)
GLUCOSE BLDC GLUCOMTR-MCNC: 183 MG/DL — HIGH (ref 70–99)
GLUCOSE BLDC GLUCOMTR-MCNC: 183 MG/DL — HIGH (ref 70–99)
GLUCOSE BLDC GLUCOMTR-MCNC: 194 MG/DL — HIGH (ref 70–99)
GLUCOSE BLDC GLUCOMTR-MCNC: 194 MG/DL — HIGH (ref 70–99)
GLUCOSE BLDC GLUCOMTR-MCNC: 298 MG/DL — HIGH (ref 70–99)
GLUCOSE BLDC GLUCOMTR-MCNC: 298 MG/DL — HIGH (ref 70–99)
GLUCOSE SERPL-MCNC: 155 MG/DL — HIGH (ref 70–99)
GLUCOSE SERPL-MCNC: 155 MG/DL — HIGH (ref 70–99)
POTASSIUM SERPL-MCNC: 3.9 MMOL/L — SIGNIFICANT CHANGE UP (ref 3.5–5.3)
POTASSIUM SERPL-MCNC: 3.9 MMOL/L — SIGNIFICANT CHANGE UP (ref 3.5–5.3)
POTASSIUM SERPL-SCNC: 3.9 MMOL/L — SIGNIFICANT CHANGE UP (ref 3.5–5.3)
POTASSIUM SERPL-SCNC: 3.9 MMOL/L — SIGNIFICANT CHANGE UP (ref 3.5–5.3)
PROT SERPL-MCNC: 5.9 G/DL — LOW (ref 6–8.3)
PROT SERPL-MCNC: 5.9 G/DL — LOW (ref 6–8.3)
SODIUM SERPL-SCNC: 141 MMOL/L — SIGNIFICANT CHANGE UP (ref 135–145)
SODIUM SERPL-SCNC: 141 MMOL/L — SIGNIFICANT CHANGE UP (ref 135–145)

## 2023-12-31 RX ADMIN — Medication 3 MILLILITER(S): at 06:20

## 2023-12-31 RX ADMIN — Medication 3 MILLILITER(S): at 12:03

## 2023-12-31 RX ADMIN — Medication 2: at 17:23

## 2023-12-31 RX ADMIN — Medication 100 MILLIGRAM(S): at 06:21

## 2023-12-31 RX ADMIN — Medication 1200 MILLIGRAM(S): at 17:23

## 2023-12-31 RX ADMIN — PIPERACILLIN AND TAZOBACTAM 25 GRAM(S): 4; .5 INJECTION, POWDER, LYOPHILIZED, FOR SOLUTION INTRAVENOUS at 22:27

## 2023-12-31 RX ADMIN — LISINOPRIL 40 MILLIGRAM(S): 2.5 TABLET ORAL at 06:39

## 2023-12-31 RX ADMIN — Medication 1200 MILLIGRAM(S): at 06:20

## 2023-12-31 RX ADMIN — Medication 10 MILLIGRAM(S): at 17:23

## 2023-12-31 RX ADMIN — CHLORHEXIDINE GLUCONATE 1 APPLICATION(S): 213 SOLUTION TOPICAL at 12:03

## 2023-12-31 RX ADMIN — Medication 10 MILLIGRAM(S): at 12:03

## 2023-12-31 RX ADMIN — Medication 10 MILLIGRAM(S): at 06:21

## 2023-12-31 RX ADMIN — ATORVASTATIN CALCIUM 80 MILLIGRAM(S): 80 TABLET, FILM COATED ORAL at 22:27

## 2023-12-31 RX ADMIN — INSULIN GLARGINE 10 UNIT(S): 100 INJECTION, SOLUTION SUBCUTANEOUS at 22:26

## 2023-12-31 RX ADMIN — PIPERACILLIN AND TAZOBACTAM 25 GRAM(S): 4; .5 INJECTION, POWDER, LYOPHILIZED, FOR SOLUTION INTRAVENOUS at 06:19

## 2023-12-31 RX ADMIN — PANTOPRAZOLE SODIUM 40 MILLIGRAM(S): 20 TABLET, DELAYED RELEASE ORAL at 06:21

## 2023-12-31 RX ADMIN — Medication 2: at 08:27

## 2023-12-31 RX ADMIN — PIPERACILLIN AND TAZOBACTAM 25 GRAM(S): 4; .5 INJECTION, POWDER, LYOPHILIZED, FOR SOLUTION INTRAVENOUS at 14:16

## 2023-12-31 RX ADMIN — ESCITALOPRAM OXALATE 20 MILLIGRAM(S): 10 TABLET, FILM COATED ORAL at 12:03

## 2023-12-31 RX ADMIN — Medication 6: at 12:09

## 2023-12-31 RX ADMIN — APIXABAN 5 MILLIGRAM(S): 2.5 TABLET, FILM COATED ORAL at 06:21

## 2023-12-31 RX ADMIN — Medication 3 MILLILITER(S): at 17:23

## 2023-12-31 RX ADMIN — APIXABAN 5 MILLIGRAM(S): 2.5 TABLET, FILM COATED ORAL at 17:23

## 2023-12-31 NOTE — PROGRESS NOTE ADULT - SUBJECTIVE AND OBJECTIVE BOX
Date of Service: 12-31-23 @ 14:58    Patient is a 73y old  Male who presents with a chief complaint of shortness of breath (31 Dec 2023 12:04)      Any change in ROS:  on 4 L of oxygen      MEDICATIONS  (STANDING):  albuterol/ipratropium for Nebulization 3 milliLiter(s) Nebulizer every 6 hours  apixaban 5 milliGRAM(s) Oral every 12 hours  atorvastatin 80 milliGRAM(s) Oral at bedtime  chlorhexidine 2% Cloths 1 Application(s) Topical daily  dextrose 5%. 1000 milliLiter(s) (50 mL/Hr) IV Continuous <Continuous>  dextrose 5%. 1000 milliLiter(s) (100 mL/Hr) IV Continuous <Continuous>  dextrose 50% Injectable 25 Gram(s) IV Push once  dextrose 50% Injectable 25 Gram(s) IV Push once  dextrose 50% Injectable 12.5 Gram(s) IV Push once  escitalopram 20 milliGRAM(s) Oral daily  glucagon  Injectable 1 milliGRAM(s) IntraMuscular once  guaiFENesin ER 1200 milliGRAM(s) Oral every 12 hours  insulin glargine Injectable (LANTUS) 10 Unit(s) SubCutaneous at bedtime  insulin lispro (ADMELOG) corrective regimen sliding scale   SubCutaneous three times a day before meals  insulin lispro (ADMELOG) corrective regimen sliding scale   SubCutaneous at bedtime  lisinopril 40 milliGRAM(s) Oral daily  metoclopramide 10 milliGRAM(s) Oral three times a day before meals  metoprolol succinate  milliGRAM(s) Oral daily  pantoprazole    Tablet 40 milliGRAM(s) Oral before breakfast  piperacillin/tazobactam IVPB.. 3.375 Gram(s) IV Intermittent every 8 hours    MEDICATIONS  (PRN):  dextrose Oral Gel 15 Gram(s) Oral once PRN Blood Glucose LESS THAN 70 milliGRAM(s)/deciliter    Vital Signs Last 24 Hrs  T(C): 36.7 (31 Dec 2023 12:26), Max: 36.7 (31 Dec 2023 12:26)  T(F): 98.1 (31 Dec 2023 12:26), Max: 98.1 (31 Dec 2023 12:26)  HR: 61 (31 Dec 2023 14:28) (57 - 80)  BP: 138/73 (31 Dec 2023 12:26) (112/65 - 163/75)  BP(mean): --  RR: 18 (31 Dec 2023 12:26) (18 - 18)  SpO2: 91% (31 Dec 2023 14:28) (91% - 100%)    Parameters below as of 31 Dec 2023 12:26  Patient On (Oxygen Delivery Method): room air        I&O's Summary    30 Dec 2023 07:01  -  31 Dec 2023 07:00  --------------------------------------------------------  IN: 75 mL / OUT: 1000 mL / NET: -925 mL          Physical Exam:   GENERAL: NAD, well-groomed, well-developed  HEENT: HAZEL/   Atraumatic, Normocephalic  ENMT: No tonsillar erythema, exudates, or enlargement; Moist mucous membranes, Good dentition, No lesions  NECK: Supple, No JVD, Normal thyroid  CHEST/LUNG: Clear to auscultaion, ; No rales, rhonchi, wheezing, or rubs  CVS: Regular rate and rhythm; No murmurs, rubs, or gallops  GI: : Soft, Nontender, Nondistended; Bowel sounds present  NERVOUS SYSTEM:  Alert & awake and responds good  EXTREMITIES:  - ++edema  LYMPH: No lymphadenopathy noted  SKIN: No rashes or lesions  ENDOCRINOLOGY: No Thyromegaly  PSYCH: Appropriate    Labs:  28                            12.4   7.88  )-----------( 290      ( 29 Dec 2023 07:21 )             39.6                         11.7   9.32  )-----------( 264      ( 28 Dec 2023 07:10 )             37.0     12-31    141  |  99  |  38<H>  ----------------------------<  155<H>  3.9   |  30  |  2.61<H>  12-30    141  |  100  |  28<H>  ----------------------------<  177<H>  3.7   |  30  |  2.15<H>  12-29    143  |  103  |  25<H>  ----------------------------<  196<H>  4.1   |  29  |  1.78<H>  12-28    143  |  105  |  28<H>  ----------------------------<  229<H>  4.8   |  26  |  1.82<H>    Ca    9.4      31 Dec 2023 07:31  Ca    9.7      30 Dec 2023 06:37    TPro  5.9<L>  /  Alb  3.0<L>  /  TBili  0.3  /  DBili  x   /  AST  12  /  ALT  9<L>  /  AlkPhos  56  12-31  TPro  6.2  /  Alb  3.2<L>  /  TBili  0.4  /  DBili  x   /  AST  12  /  ALT  10  /  AlkPhos  64  12-30  TPro  6.6  /  Alb  3.4  /  TBili  0.4  /  DBili  x   /  AST  14  /  ALT  14  /  AlkPhos  77  12-29  TPro  6.9  /  Alb  3.7  /  TBili  0.4  /  DBili  x   /  AST  23  /  ALT  21  /  AlkPhos  95  12-28    CAPILLARY BLOOD GLUCOSE      POCT Blood Glucose.: 298 mg/dL (31 Dec 2023 12:04)  POCT Blood Glucose.: 194 mg/dL (31 Dec 2023 08:13)  POCT Blood Glucose.: 189 mg/dL (30 Dec 2023 23:29)  POCT Blood Glucose.: 340 mg/dL (30 Dec 2023 17:26)      LIVER FUNCTIONS - ( 31 Dec 2023 07:31 )  Alb: 3.0 g/dL / Pro: 5.9 g/dL / ALK PHOS: 56 U/L / ALT: 9 U/L / AST: 12 U/L / GGT: x             Urinalysis Basic - ( 31 Dec 2023 07:31 )    Color: x / Appearance: x / SG: x / pH: x  Gluc: 155 mg/dL / Ketone: x  / Bili: x / Urobili: x   Blood: x / Protein: x / Nitrite: x   Leuk Esterase: x / RBC: x / WBC x   Sq Epi: x / Non Sq Epi: x / Bacteria: x      Procalcitonin, Serum: 0.06 ng/mL (12-28 @ 07:10)  Lactate, Blood: 0.8 mmol/L (12-30 @ 06:37)  Lactate, Blood: 2.3 mmol/L (12-29 @ 07:10)        RECENT CULTURES:  12-28 @ 06:20 .Blood Blood-Peripheral                No growth at 72 Hours          RESPIRATORY CULTURES:          Studies  Chest X-RAY  CT SCAN Chest   Venous Dopplers: LE:   CT Abdomen  Others          ct< from: CT Chest No Cont (12.28.23 @ 16:28) >    HEART AND VESSELS: Heart is enlarged No pericardial effusion. Coronary   artery and aortic atherosclerosis. Aortic valve calcification.    VISUALIZED UPPER ABDOMEN: Within normal limits.    CHEST WALL AND BONES: Degenerative changes thoracic spine.    IMPRESSION:    Tracheobronchial secretions and moderate bilateral pleural effusions with   partial atelectasis both lower lobes with or without superimposed left   lung infectious/inflammatory process.    --- End of Report ---          HELGA BELL MD; Resident Radiologist  This document has been electronically signed.  EDI MARCELINO MD; Attending Radiologist  This document has been electronically signed. Dec 28 2023  5:15PM    < end of copied text >  < from: TTE W or WO Ultrasound Enhancing Agent (12.08.23 @ 09:11) >  _______________________________________________________________________________________     CONCLUSIONS:      1. Left ventricular systolic function is mildly decreased with an ejection fraction of 45 % by Mckeon's method of disks.   2. There is mild (grade 1) left ventricular diastolic dysfunction.   3. Normal right ventricular cavity size, wall thickness, and systolic function.   4. Normal atria.   5. Small pericardial effusion noted adjacent to the right ventricle and small pericardial effusion noted adjacent to the lateral left ventricle with no evidence of hemodynamic compromise.   6. Thickened pericardium.   7. The inferior vena cava is normal in size (normal <2.1cm) with normal inspiratory collapse (normal >50%) consistent with normal right atrial pressure (~3, range 0-5mmHg).   8. Compared to the transthoracic echocardiogram performed on 9/26/2023 there has been an interval decline in LV systolic function.    __________________________    < end of copied text >

## 2023-12-31 NOTE — PROGRESS NOTE ADULT - SUBJECTIVE AND OBJECTIVE BOX
Patient is a 73y old  Male who presents with a chief complaint of shortness of breath (31 Dec 2023 08:41)      DATE OF SERVICE: 12-31-23 @ 12:04    SUBJECTIVE / OVERNIGHT EVENTS: overnight events noted    ROS:  Resp: No cough no sputum production  CVS: No chest pain no palpitations no orthopnea  GI: no N/V/D  "I feel fine'         MEDICATIONS  (STANDING):  albuterol/ipratropium for Nebulization 3 milliLiter(s) Nebulizer every 6 hours  apixaban 5 milliGRAM(s) Oral every 12 hours  atorvastatin 80 milliGRAM(s) Oral at bedtime  chlorhexidine 2% Cloths 1 Application(s) Topical daily  dextrose 5%. 1000 milliLiter(s) (50 mL/Hr) IV Continuous <Continuous>  dextrose 5%. 1000 milliLiter(s) (100 mL/Hr) IV Continuous <Continuous>  dextrose 50% Injectable 25 Gram(s) IV Push once  dextrose 50% Injectable 12.5 Gram(s) IV Push once  dextrose 50% Injectable 25 Gram(s) IV Push once  escitalopram 20 milliGRAM(s) Oral daily  glucagon  Injectable 1 milliGRAM(s) IntraMuscular once  guaiFENesin ER 1200 milliGRAM(s) Oral every 12 hours  insulin glargine Injectable (LANTUS) 10 Unit(s) SubCutaneous at bedtime  insulin lispro (ADMELOG) corrective regimen sliding scale   SubCutaneous three times a day before meals  insulin lispro (ADMELOG) corrective regimen sliding scale   SubCutaneous at bedtime  lisinopril 40 milliGRAM(s) Oral daily  metoclopramide 10 milliGRAM(s) Oral three times a day before meals  metoprolol succinate  milliGRAM(s) Oral daily  pantoprazole    Tablet 40 milliGRAM(s) Oral before breakfast  piperacillin/tazobactam IVPB.. 3.375 Gram(s) IV Intermittent every 8 hours    MEDICATIONS  (PRN):  dextrose Oral Gel 15 Gram(s) Oral once PRN Blood Glucose LESS THAN 70 milliGRAM(s)/deciliter        CAPILLARY BLOOD GLUCOSE      POCT Blood Glucose.: 194 mg/dL (31 Dec 2023 08:13)  POCT Blood Glucose.: 189 mg/dL (30 Dec 2023 23:29)  POCT Blood Glucose.: 340 mg/dL (30 Dec 2023 17:26)    I&O's Summary    30 Dec 2023 07:01  -  31 Dec 2023 07:00  --------------------------------------------------------  IN: 75 mL / OUT: 1000 mL / NET: -925 mL        Vital Signs Last 24 Hrs  T(C): 36.3 (31 Dec 2023 05:26), Max: 36.6 (30 Dec 2023 21:52)  T(F): 97.4 (31 Dec 2023 05:26), Max: 97.8 (30 Dec 2023 21:52)  HR: 65 (31 Dec 2023 09:34) (58 - 80)  BP: 163/75 (31 Dec 2023 05:26) (112/65 - 163/75)  BP(mean): --  RR: 18 (31 Dec 2023 05:26) (18 - 18)  SpO2: 97% (31 Dec 2023 09:34) (81% - 100%)    PHYSICAL EXAM:  CHEST/LUNG: clear  HEART: S1 S2; systolic murmur +   ABDOMEN: Soft, Nontender,  EXTREMITIES:  resolved edema  NEUROLOGY: alert  non-focal  SKIN: No rashes or lesions    LABS:    12-31    141  |  99  |  38<H>  ----------------------------<  155<H>  3.9   |  30  |  2.61<H>    Ca    9.4      31 Dec 2023 07:31    TPro  5.9<L>  /  Alb  3.0<L>  /  TBili  0.3  /  DBili  x   /  AST  12  /  ALT  9<L>  /  AlkPhos  56  12-31          Urinalysis Basic - ( 31 Dec 2023 07:31 )    Color: x / Appearance: x / SG: x / pH: x  Gluc: 155 mg/dL / Ketone: x  / Bili: x / Urobili: x   Blood: x / Protein: x / Nitrite: x   Leuk Esterase: x / RBC: x / WBC x   Sq Epi: x / Non Sq Epi: x / Bacteria: x          All consultant(s) notes reviewed and care discussed with other providers        Contact Number, Dr Nieto [phone] Patient is a 73y old  Male who presents with a chief complaint of shortness of breath (31 Dec 2023 08:41)      DATE OF SERVICE: 12-31-23 @ 12:04    SUBJECTIVE / OVERNIGHT EVENTS: overnight events noted    ROS:  Resp: No cough no sputum production  CVS: No chest pain no palpitations no orthopnea  GI: no N/V/D  "I feel fine'         MEDICATIONS  (STANDING):  albuterol/ipratropium for Nebulization 3 milliLiter(s) Nebulizer every 6 hours  apixaban 5 milliGRAM(s) Oral every 12 hours  atorvastatin 80 milliGRAM(s) Oral at bedtime  chlorhexidine 2% Cloths 1 Application(s) Topical daily  dextrose 5%. 1000 milliLiter(s) (50 mL/Hr) IV Continuous <Continuous>  dextrose 5%. 1000 milliLiter(s) (100 mL/Hr) IV Continuous <Continuous>  dextrose 50% Injectable 25 Gram(s) IV Push once  dextrose 50% Injectable 12.5 Gram(s) IV Push once  dextrose 50% Injectable 25 Gram(s) IV Push once  escitalopram 20 milliGRAM(s) Oral daily  glucagon  Injectable 1 milliGRAM(s) IntraMuscular once  guaiFENesin ER 1200 milliGRAM(s) Oral every 12 hours  insulin glargine Injectable (LANTUS) 10 Unit(s) SubCutaneous at bedtime  insulin lispro (ADMELOG) corrective regimen sliding scale   SubCutaneous three times a day before meals  insulin lispro (ADMELOG) corrective regimen sliding scale   SubCutaneous at bedtime  lisinopril 40 milliGRAM(s) Oral daily  metoclopramide 10 milliGRAM(s) Oral three times a day before meals  metoprolol succinate  milliGRAM(s) Oral daily  pantoprazole    Tablet 40 milliGRAM(s) Oral before breakfast  piperacillin/tazobactam IVPB.. 3.375 Gram(s) IV Intermittent every 8 hours    MEDICATIONS  (PRN):  dextrose Oral Gel 15 Gram(s) Oral once PRN Blood Glucose LESS THAN 70 milliGRAM(s)/deciliter        CAPILLARY BLOOD GLUCOSE      POCT Blood Glucose.: 194 mg/dL (31 Dec 2023 08:13)  POCT Blood Glucose.: 189 mg/dL (30 Dec 2023 23:29)  POCT Blood Glucose.: 340 mg/dL (30 Dec 2023 17:26)    I&O's Summary    30 Dec 2023 07:01  -  31 Dec 2023 07:00  --------------------------------------------------------  IN: 75 mL / OUT: 1000 mL / NET: -925 mL        Vital Signs Last 24 Hrs  T(C): 36.3 (31 Dec 2023 05:26), Max: 36.6 (30 Dec 2023 21:52)  T(F): 97.4 (31 Dec 2023 05:26), Max: 97.8 (30 Dec 2023 21:52)  HR: 65 (31 Dec 2023 09:34) (58 - 80)  BP: 163/75 (31 Dec 2023 05:26) (112/65 - 163/75)  BP(mean): --  RR: 18 (31 Dec 2023 05:26) (18 - 18)  SpO2: 97% (31 Dec 2023 09:34) (81% - 100%)    PHYSICAL EXAM:  CHEST/LUNG: clear  HEART: S1 S2; systolic murmur +   ABDOMEN: Soft, Nontender,  EXTREMITIES:  resolved edema  NEUROLOGY: alert  non-focal  SKIN: No rashes or lesions    LABS:    12-31    141  |  99  |  38<H>  ----------------------------<  155<H>  3.9   |  30  |  2.61<H>    Ca    9.4      31 Dec 2023 07:31    TPro  5.9<L>  /  Alb  3.0<L>  /  TBili  0.3  /  DBili  x   /  AST  12  /  ALT  9<L>  /  AlkPhos  56  12-31          Urinalysis Basic - ( 31 Dec 2023 07:31 )    Color: x / Appearance: x / SG: x / pH: x  Gluc: 155 mg/dL / Ketone: x  / Bili: x / Urobili: x   Blood: x / Protein: x / Nitrite: x   Leuk Esterase: x / RBC: x / WBC x   Sq Epi: x / Non Sq Epi: x / Bacteria: x          All consultant(s) notes reviewed and care discussed with other providers        Contact Number, Dr Nieto [phone]

## 2023-12-31 NOTE — CHART NOTE - NSCHARTNOTEFT_GEN_A_CORE
Hypoxia  Pt desaturating during sleep to mid 80's per RN, however when awaken his O2 sat increases to ~91  Briefly, Patient is a 73 year old Man with hx of HTN, HLD, DM. presented for difficulty breathing, initially placed on bipap in ED but was dc'd prior to transitioning to the admit floor.     A/P  Pt admitted for Acute respiratory failure with hypoxia and hypercapnia transitioned to NC 6l NC however has multiple hypoxic episodes (mid to high 80's) during sleep but recovers when awaken.     - placed on cpap for tonight with no hypoxic episodes since  - pulm on board, will likely f/u with pt in am  continue remaining plan of care    Vital Signs Last 24 Hrs  T(C): 36.6 (30 Dec 2023 21:52), Max: 37.3 (30 Dec 2023 04:39)  T(F): 97.8 (30 Dec 2023 21:52), Max: 99.1 (30 Dec 2023 04:39)  HR: 68 (31 Dec 2023 00:30) (68 - 80)  BP: 112/65 (30 Dec 2023 21:52) (112/65 - 175/76)  RR: 18 (30 Dec 2023 21:52) (18 - 18)  SpO2: (31 Dec 2023 00:30) (81% - 96%)    Parameters below as of 30 Dec 2023 21:52  Patient On (Oxygen Delivery Method): nasal cannula  O2 Flow (L/min): 4

## 2023-12-31 NOTE — PROGRESS NOTE ADULT - ASSESSMENT
A/p  73-year-old male with a past medical history of hypertension, hyperlipidemia, diabetes presenting difficulty breathing.     #SOB  -RVP negative   -CXR with patchy infiltrates c/f pna & pulm edema  -Abx per med  -Off diuretics for BEAR      #HFrEF  -HST w/ mild elevation -- likely demand + ckd  -No CP on exam  -off lasix for now due to BEAR    -Strict I/Os, Daily Weight  - + b/l le edema - would check le dopplers b/l   -Recent echo with mild lv dysfxn ef 45%, mild diastolic dysfxn, small pericardial effusion    #pAfib  -Resume eliquis & metoprolol when weaned from bipap    #HTN  -BP elevated  -Cont with IV hydralazine until weaned from bipap  -Can also consider adding IV labetalol if needed   -Resume metoprolol when weaned from bipap    #Dysphagia  -Per wife pt with difficulty swallowing  -Speech & swallow eval    #CKD  -Trend cr while on diuresis  -Renal f/u     A/p  73-year-old male with a past medical history of hypertension, hyperlipidemia, diabetes presenting difficulty breathing.     #SOB  -RVP negative   -CXR with patchy infiltrates c/f pna & pulm edema  -Abx per med  -Off diuretics for BEAR      #HFrEF  -HST w/ mild elevation -- likely demand + ckd  -No CP on exam  -off lasix for now due to BEAR    -Strict I/Os, Daily Weight  - + b/l le edema -DVT ruled out   -Recent echo with mild lv dysfxn ef 45%, mild diastolic dysfxn, small pericardial effusion    #pAfib  -Resume eliquis & metoprolol when weaned from bipap    #HTN  -BP elevated  -Cont with IV hydralazine until weaned from bipap  -Can also consider adding IV labetalol if needed   -Resume metoprolol when weaned from bipap    #Dysphagia  -Per wife pt with difficulty swallowing  -Speech & swallow eval    #CKD  -Trend cr while on diuresis  -Renal f/u

## 2023-12-31 NOTE — PROGRESS NOTE ADULT - SUBJECTIVE AND OBJECTIVE BOX
Subjective: Patient seen and examined. No new events except as noted.   desaturating during sleep to mid 80's    REVIEW OF SYSTEMS:    CONSTITUTIONAL: + weakness, fevers or chills  EYES/ENT: No visual changes;  No vertigo or throat pain   NECK: No pain or stiffness  RESPIRATORY: No cough, wheezing, hemoptysis; + shortness of breath  CARDIOVASCULAR: No chest pain or palpitations  GASTROINTESTINAL: No abdominal or epigastric pain. No nausea, vomiting, or hematemesis; No diarrhea or constipation. No melena or hematochezia.  GENITOURINARY: No dysuria, frequency or hematuria  NEUROLOGICAL: No numbness or weakness  SKIN: No itching, burning, rashes, or lesions   All other review of systems is negative unless indicated above.    MEDICATIONS:  MEDICATIONS  (STANDING):  albuterol/ipratropium for Nebulization 3 milliLiter(s) Nebulizer every 6 hours  apixaban 5 milliGRAM(s) Oral every 12 hours  atorvastatin 80 milliGRAM(s) Oral at bedtime  chlorhexidine 2% Cloths 1 Application(s) Topical daily  dextrose 5%. 1000 milliLiter(s) (50 mL/Hr) IV Continuous <Continuous>  dextrose 5%. 1000 milliLiter(s) (100 mL/Hr) IV Continuous <Continuous>  dextrose 50% Injectable 25 Gram(s) IV Push once  dextrose 50% Injectable 12.5 Gram(s) IV Push once  dextrose 50% Injectable 25 Gram(s) IV Push once  escitalopram 20 milliGRAM(s) Oral daily  glucagon  Injectable 1 milliGRAM(s) IntraMuscular once  guaiFENesin ER 1200 milliGRAM(s) Oral every 12 hours  insulin glargine Injectable (LANTUS) 10 Unit(s) SubCutaneous at bedtime  insulin lispro (ADMELOG) corrective regimen sliding scale   SubCutaneous three times a day before meals  insulin lispro (ADMELOG) corrective regimen sliding scale   SubCutaneous at bedtime  lisinopril 40 milliGRAM(s) Oral daily  metoclopramide 10 milliGRAM(s) Oral three times a day before meals  metoprolol succinate  milliGRAM(s) Oral daily  pantoprazole    Tablet 40 milliGRAM(s) Oral before breakfast  piperacillin/tazobactam IVPB.. 3.375 Gram(s) IV Intermittent every 8 hours      PHYSICAL EXAM:  T(C): 36.3 (12-31-23 @ 05:26), Max: 37.1 (12-30-23 @ 11:13)  HR: 58 (12-31-23 @ 06:48) (58 - 80)  BP: 163/75 (12-31-23 @ 05:26) (112/65 - 163/75)  RR: 18 (12-31-23 @ 05:26) (18 - 18)  SpO2: 100% (12-31-23 @ 06:48) (81% - 100%)  Wt(kg): --  I&O's Summary    30 Dec 2023 07:01  -  31 Dec 2023 07:00  --------------------------------------------------------  IN: 75 mL / OUT: 1000 mL / NET: -925 mL          Appearance: NAD CPAP 	  HEENT:   dry oral mucosa, PERRL, EOMI	  Lymphatic: No lymphadenopathy  Cardiovascular: Normal S1 S2, No JVD, No murmurs , Peripheral pulses palpable 2+ bilaterally  Respiratory: decreased bs 	  Gastrointestinal:  Soft, Non-tender, + BS	  Skin: No rashes, No ecchymoses, No cyanosis, warm to touch  Musculoskeletal: Normal range of motion, normal strength  Psychiatry:   sleepy   Ext: + edema      LABS:    CARDIAC MARKERS:            12-31    141  |  99  |  38<H>  ----------------------------<  155<H>  3.9   |  30  |  2.61<H>    Ca    9.4      31 Dec 2023 07:31    TPro  5.9<L>  /  Alb  3.0<L>  /  TBili  0.3  /  DBili  x   /  AST  12  /  ALT  9<L>  /  AlkPhos  56  12-31            TELEMETRY: 	    ECG:  	  RADIOLOGY:   DIAGNOSTIC TESTING:  [ ] Echocardiogram:  [ ]  Catheterization:  [ ] Stress Test:    OTHER: 	           Subjective: Patient seen and examined. No new events except as noted.   desaturating during sleep to mid 80's    REVIEW OF SYSTEMS:    CONSTITUTIONAL: + weakness, fevers or chills  EYES/ENT: No visual changes;  No vertigo or throat pain   NECK: No pain or stiffness  RESPIRATORY: No cough, wheezing, hemoptysis; + shortness of breath  CARDIOVASCULAR: No chest pain or palpitations  GASTROINTESTINAL: No abdominal or epigastric pain. No nausea, vomiting, or hematemesis; No diarrhea or constipation. No melena or hematochezia.  GENITOURINARY: No dysuria, frequency or hematuria  NEUROLOGICAL: No numbness or weakness  SKIN: No itching, burning, rashes, or lesions   All other review of systems is negative unless indicated above.    MEDICATIONS:  MEDICATIONS  (STANDING):  albuterol/ipratropium for Nebulization 3 milliLiter(s) Nebulizer every 6 hours  apixaban 5 milliGRAM(s) Oral every 12 hours  atorvastatin 80 milliGRAM(s) Oral at bedtime  chlorhexidine 2% Cloths 1 Application(s) Topical daily  dextrose 5%. 1000 milliLiter(s) (50 mL/Hr) IV Continuous <Continuous>  dextrose 5%. 1000 milliLiter(s) (100 mL/Hr) IV Continuous <Continuous>  dextrose 50% Injectable 25 Gram(s) IV Push once  dextrose 50% Injectable 12.5 Gram(s) IV Push once  dextrose 50% Injectable 25 Gram(s) IV Push once  escitalopram 20 milliGRAM(s) Oral daily  glucagon  Injectable 1 milliGRAM(s) IntraMuscular once  guaiFENesin ER 1200 milliGRAM(s) Oral every 12 hours  insulin glargine Injectable (LANTUS) 10 Unit(s) SubCutaneous at bedtime  insulin lispro (ADMELOG) corrective regimen sliding scale   SubCutaneous three times a day before meals  insulin lispro (ADMELOG) corrective regimen sliding scale   SubCutaneous at bedtime  lisinopril 40 milliGRAM(s) Oral daily  metoclopramide 10 milliGRAM(s) Oral three times a day before meals  metoprolol succinate  milliGRAM(s) Oral daily  pantoprazole    Tablet 40 milliGRAM(s) Oral before breakfast  piperacillin/tazobactam IVPB.. 3.375 Gram(s) IV Intermittent every 8 hours      PHYSICAL EXAM:  T(C): 36.3 (12-31-23 @ 05:26), Max: 37.1 (12-30-23 @ 11:13)  HR: 58 (12-31-23 @ 06:48) (58 - 80)  BP: 163/75 (12-31-23 @ 05:26) (112/65 - 163/75)  RR: 18 (12-31-23 @ 05:26) (18 - 18)  SpO2: 100% (12-31-23 @ 06:48) (81% - 100%)  Wt(kg): --  I&O's Summary    30 Dec 2023 07:01  -  31 Dec 2023 07:00  --------------------------------------------------------  IN: 75 mL / OUT: 1000 mL / NET: -925 mL          Appearance: NAD CPAP 	  HEENT:   dry oral mucosa, PERRL, EOMI	  Lymphatic: No lymphadenopathy  Cardiovascular: Normal S1 S2, No JVD, No murmurs , Peripheral pulses palpable 2+ bilaterally  Respiratory: decreased bs 	  Gastrointestinal:  Soft, Non-tender, + BS	  Skin: No rashes, No ecchymoses, No cyanosis, warm to touch  Musculoskeletal: Normal range of motion, normal strength  Psychiatry:   sleepy   Ext: + edema      LABS:    CARDIAC MARKERS:            12-31    141  |  99  |  38<H>  ----------------------------<  155<H>  3.9   |  30  |  2.61<H>    Ca    9.4      31 Dec 2023 07:31    TPro  5.9<L>  /  Alb  3.0<L>  /  TBili  0.3  /  DBili  x   /  AST  12  /  ALT  9<L>  /  AlkPhos  56  12-31            TELEMETRY: 	    ECG:  	  RADIOLOGY: < from: [facility] Duplex Lower Ext Vein Scan, Bilarturo (12.29.23 @ 09:51) >  ACC: [id number] EXAM:  DUPLEX SCAN EXT VEINS LOWER BI   ORDERED BY: KI AMTIAS     PROCEDURE DATE:  12/29/2023          INTERPRETATION:  CLINICAL INFORMATION: Shortness of breath, bilateral leg   swelling.    COMPARISON: Bilateral lower extremity venous duplex study dated   10/16/2022.    TECHNIQUE: Duplex sonography of the BILATERAL LOWER extremity veins with   color and spectral Doppler, with and without compression.    FINDINGS:    RIGHT:  Normal compressibility of the RIGHT common femoral, femoral and popliteal   veins.  Doppler examination shows normal spontaneous and phasic flow.  No RIGHT calf vein thrombosis is detected.    LEFT:  Normal compressibility of the LEFT common femoral, femoral and popliteal   veins.  Doppler examination shows normal spontaneous and phasic flow.  No LEFT calf vein thrombosis is detected.    IMPRESSION:  No evidence of deep venous thrombosis in either lower extremity.    < end of copied text >    DIAGNOSTIC TESTING:  [ ] Echocardiogram:  [ ]  Catheterization:  [ ] Stress Test:    OTHER: 	           Subjective: Patient seen and examined. No new events except as noted.   desaturating during sleep to mid 80's    REVIEW OF SYSTEMS:    CONSTITUTIONAL: + weakness, fevers or chills  EYES/ENT: No visual changes;  No vertigo or throat pain   NECK: No pain or stiffness  RESPIRATORY: No cough, wheezing, hemoptysis; + shortness of breath  CARDIOVASCULAR: No chest pain or palpitations  GASTROINTESTINAL: No abdominal or epigastric pain. No nausea, vomiting, or hematemesis; No diarrhea or constipation. No melena or hematochezia.  GENITOURINARY: No dysuria, frequency or hematuria  NEUROLOGICAL: No numbness or weakness  SKIN: No itching, burning, rashes, or lesions   All other review of systems is negative unless indicated above.    MEDICATIONS:  MEDICATIONS  (STANDING):  albuterol/ipratropium for Nebulization 3 milliLiter(s) Nebulizer every 6 hours  apixaban 5 milliGRAM(s) Oral every 12 hours  atorvastatin 80 milliGRAM(s) Oral at bedtime  chlorhexidine 2% Cloths 1 Application(s) Topical daily  dextrose 5%. 1000 milliLiter(s) (50 mL/Hr) IV Continuous <Continuous>  dextrose 5%. 1000 milliLiter(s) (100 mL/Hr) IV Continuous <Continuous>  dextrose 50% Injectable 25 Gram(s) IV Push once  dextrose 50% Injectable 12.5 Gram(s) IV Push once  dextrose 50% Injectable 25 Gram(s) IV Push once  escitalopram 20 milliGRAM(s) Oral daily  glucagon  Injectable 1 milliGRAM(s) IntraMuscular once  guaiFENesin ER 1200 milliGRAM(s) Oral every 12 hours  insulin glargine Injectable (LANTUS) 10 Unit(s) SubCutaneous at bedtime  insulin lispro (ADMELOG) corrective regimen sliding scale   SubCutaneous three times a day before meals  insulin lispro (ADMELOG) corrective regimen sliding scale   SubCutaneous at bedtime  lisinopril 40 milliGRAM(s) Oral daily  metoclopramide 10 milliGRAM(s) Oral three times a day before meals  metoprolol succinate  milliGRAM(s) Oral daily  pantoprazole    Tablet 40 milliGRAM(s) Oral before breakfast  piperacillin/tazobactam IVPB.. 3.375 Gram(s) IV Intermittent every 8 hours      PHYSICAL EXAM:  T(C): 36.3 (12-31-23 @ 05:26), Max: 37.1 (12-30-23 @ 11:13)  HR: 58 (12-31-23 @ 06:48) (58 - 80)  BP: 163/75 (12-31-23 @ 05:26) (112/65 - 163/75)  RR: 18 (12-31-23 @ 05:26) (18 - 18)  SpO2: 100% (12-31-23 @ 06:48) (81% - 100%)  Wt(kg): --  I&O's Summary    30 Dec 2023 07:01  -  31 Dec 2023 07:00  --------------------------------------------------------  IN: 75 mL / OUT: 1000 mL / NET: -925 mL          Appearance: NAD CPAP 	  HEENT:   dry oral mucosa, PERRL, EOMI	  Lymphatic: No lymphadenopathy  Cardiovascular: Normal S1 S2, No JVD, No murmurs , Peripheral pulses palpable 2+ bilaterally  Respiratory: decreased bs 	  Gastrointestinal:  Soft, Non-tender, + BS	  Skin: No rashes, No ecchymoses, No cyanosis, warm to touch  Musculoskeletal: Normal range of motion, normal strength  Psychiatry:   sleepy   Ext: + edema      LABS:    CARDIAC MARKERS:            12-31    141  |  99  |  38<H>  ----------------------------<  155<H>  3.9   |  30  |  2.61<H>    Ca    9.4      31 Dec 2023 07:31    TPro  5.9<L>  /  Alb  3.0<L>  /  TBili  0.3  /  DBili  x   /  AST  12  /  ALT  9<L>  /  AlkPhos  56  12-31            TELEMETRY: 	    ECG:  	  RADIOLOGY: < from: [facility] Duplex Lower Ext Vein Scan, Bilarturo (12.29.23 @ 09:51) >  ACC: [id number] EXAM:  DUPLEX SCAN EXT VEINS LOWER BI   ORDERED BY: KI MATIAS     PROCEDURE DATE:  12/29/2023          INTERPRETATION:  CLINICAL INFORMATION: Shortness of breath, bilateral leg   swelling.    COMPARISON: Bilateral lower extremity venous duplex study dated   10/16/2022.    TECHNIQUE: Duplex sonography of the BILATERAL LOWER extremity veins with   color and spectral Doppler, with and without compression.    FINDINGS:    RIGHT:  Normal compressibility of the RIGHT common femoral, femoral and popliteal   veins.  Doppler examination shows normal spontaneous and phasic flow.  No RIGHT calf vein thrombosis is detected.    LEFT:  Normal compressibility of the LEFT common femoral, femoral and popliteal   veins.  Doppler examination shows normal spontaneous and phasic flow.  No LEFT calf vein thrombosis is detected.    IMPRESSION:  No evidence of deep venous thrombosis in either lower extremity.    < end of copied text >    DIAGNOSTIC TESTING:  [ ] Echocardiogram:  [ ]  Catheterization:  [ ] Stress Test:    OTHER:

## 2023-12-31 NOTE — PROGRESS NOTE ADULT - ASSESSMENT
73-year-old male with a past medical history of hypertension, hyperlipidemia, diabetes presenting difficulty breathing. Had generalized weakness yesterday. Patient woke up in the middle the night with shortness of breath and pressed life alert button. Patient brought in by EMS with low oxygen saturation And increased work of breathing, placed on Bipap in the ED. Pulmonary called to consult for SOB.      Resp failure:  requiring bipap:   HTN  DM:  HLD    Resp failure:  requiring bipap:   -CT chest with small to moderate b/l effusions/atelectasis, patchy opacities likely 2nd to pulm edema +/- underlying PNA. +Tracheobronchial secretions  -ProBNP elevated  -RVP negative  -Pt now off bipap, breathing comfortably on 2LNC. Keep sats >90%, wean O2 as tolerated  -Keep O>I, diuresis as tolerated  -Cards f/u  -If any change in resp status suggest resume Bipap 12/5  -Continue ABX , favor short course. Procalcitonin normal  -Start Mucinex 1200 mg PO BID x5 days  -Continue Duoneb q6h  12/31:  -last night cpap had to be used as he desaturated : he likely has sleep apnea:  needs to come out of bed to chair and needs pt ot  /: on zosyn till 4th of January  cont diuretics as he is fluid overloaded:     HTN  -Optimize antihypertensives   1231: blood pressure is controlled    DM  -monitor blood glucose     HLD  -per PMD    dw acp

## 2024-01-01 LAB
ANION GAP SERPL CALC-SCNC: 11 MMOL/L — SIGNIFICANT CHANGE UP (ref 5–17)
ANION GAP SERPL CALC-SCNC: 11 MMOL/L — SIGNIFICANT CHANGE UP (ref 5–17)
BUN SERPL-MCNC: 40 MG/DL — HIGH (ref 7–23)
BUN SERPL-MCNC: 40 MG/DL — HIGH (ref 7–23)
CALCIUM SERPL-MCNC: 9.5 MG/DL — SIGNIFICANT CHANGE UP (ref 8.4–10.5)
CALCIUM SERPL-MCNC: 9.5 MG/DL — SIGNIFICANT CHANGE UP (ref 8.4–10.5)
CHLORIDE SERPL-SCNC: 99 MMOL/L — SIGNIFICANT CHANGE UP (ref 96–108)
CHLORIDE SERPL-SCNC: 99 MMOL/L — SIGNIFICANT CHANGE UP (ref 96–108)
CO2 SERPL-SCNC: 29 MMOL/L — SIGNIFICANT CHANGE UP (ref 22–31)
CO2 SERPL-SCNC: 29 MMOL/L — SIGNIFICANT CHANGE UP (ref 22–31)
CREAT SERPL-MCNC: 2.64 MG/DL — HIGH (ref 0.5–1.3)
CREAT SERPL-MCNC: 2.64 MG/DL — HIGH (ref 0.5–1.3)
EGFR: 25 ML/MIN/1.73M2 — LOW
EGFR: 25 ML/MIN/1.73M2 — LOW
GLUCOSE BLDC GLUCOMTR-MCNC: 138 MG/DL — HIGH (ref 70–99)
GLUCOSE BLDC GLUCOMTR-MCNC: 138 MG/DL — HIGH (ref 70–99)
GLUCOSE BLDC GLUCOMTR-MCNC: 151 MG/DL — HIGH (ref 70–99)
GLUCOSE BLDC GLUCOMTR-MCNC: 151 MG/DL — HIGH (ref 70–99)
GLUCOSE BLDC GLUCOMTR-MCNC: 193 MG/DL — HIGH (ref 70–99)
GLUCOSE BLDC GLUCOMTR-MCNC: 193 MG/DL — HIGH (ref 70–99)
GLUCOSE BLDC GLUCOMTR-MCNC: 279 MG/DL — HIGH (ref 70–99)
GLUCOSE BLDC GLUCOMTR-MCNC: 279 MG/DL — HIGH (ref 70–99)
GLUCOSE SERPL-MCNC: 140 MG/DL — HIGH (ref 70–99)
GLUCOSE SERPL-MCNC: 140 MG/DL — HIGH (ref 70–99)
HCT VFR BLD CALC: 37.1 % — LOW (ref 39–50)
HCT VFR BLD CALC: 37.1 % — LOW (ref 39–50)
HGB BLD-MCNC: 11.5 G/DL — LOW (ref 13–17)
HGB BLD-MCNC: 11.5 G/DL — LOW (ref 13–17)
LEGIONELLA AG UR QL: NEGATIVE — SIGNIFICANT CHANGE UP
LEGIONELLA AG UR QL: NEGATIVE — SIGNIFICANT CHANGE UP
MCHC RBC-ENTMCNC: 28.9 PG — SIGNIFICANT CHANGE UP (ref 27–34)
MCHC RBC-ENTMCNC: 28.9 PG — SIGNIFICANT CHANGE UP (ref 27–34)
MCHC RBC-ENTMCNC: 31 GM/DL — LOW (ref 32–36)
MCHC RBC-ENTMCNC: 31 GM/DL — LOW (ref 32–36)
MCV RBC AUTO: 93.2 FL — SIGNIFICANT CHANGE UP (ref 80–100)
MCV RBC AUTO: 93.2 FL — SIGNIFICANT CHANGE UP (ref 80–100)
NRBC # BLD: 0 /100 WBCS — SIGNIFICANT CHANGE UP (ref 0–0)
NRBC # BLD: 0 /100 WBCS — SIGNIFICANT CHANGE UP (ref 0–0)
PLATELET # BLD AUTO: 252 K/UL — SIGNIFICANT CHANGE UP (ref 150–400)
PLATELET # BLD AUTO: 252 K/UL — SIGNIFICANT CHANGE UP (ref 150–400)
POTASSIUM SERPL-MCNC: 4.3 MMOL/L — SIGNIFICANT CHANGE UP (ref 3.5–5.3)
POTASSIUM SERPL-MCNC: 4.3 MMOL/L — SIGNIFICANT CHANGE UP (ref 3.5–5.3)
POTASSIUM SERPL-SCNC: 4.3 MMOL/L — SIGNIFICANT CHANGE UP (ref 3.5–5.3)
POTASSIUM SERPL-SCNC: 4.3 MMOL/L — SIGNIFICANT CHANGE UP (ref 3.5–5.3)
RBC # BLD: 3.98 M/UL — LOW (ref 4.2–5.8)
RBC # BLD: 3.98 M/UL — LOW (ref 4.2–5.8)
RBC # FLD: 12.5 % — SIGNIFICANT CHANGE UP (ref 10.3–14.5)
RBC # FLD: 12.5 % — SIGNIFICANT CHANGE UP (ref 10.3–14.5)
SODIUM SERPL-SCNC: 139 MMOL/L — SIGNIFICANT CHANGE UP (ref 135–145)
SODIUM SERPL-SCNC: 139 MMOL/L — SIGNIFICANT CHANGE UP (ref 135–145)
WBC # BLD: 6.37 K/UL — SIGNIFICANT CHANGE UP (ref 3.8–10.5)
WBC # BLD: 6.37 K/UL — SIGNIFICANT CHANGE UP (ref 3.8–10.5)
WBC # FLD AUTO: 6.37 K/UL — SIGNIFICANT CHANGE UP (ref 3.8–10.5)
WBC # FLD AUTO: 6.37 K/UL — SIGNIFICANT CHANGE UP (ref 3.8–10.5)

## 2024-01-01 RX ADMIN — CHLORHEXIDINE GLUCONATE 1 APPLICATION(S): 213 SOLUTION TOPICAL at 12:31

## 2024-01-01 RX ADMIN — Medication 3 MILLILITER(S): at 06:24

## 2024-01-01 RX ADMIN — Medication 2: at 08:40

## 2024-01-01 RX ADMIN — Medication 3 MILLILITER(S): at 12:30

## 2024-01-01 RX ADMIN — ATORVASTATIN CALCIUM 80 MILLIGRAM(S): 80 TABLET, FILM COATED ORAL at 23:18

## 2024-01-01 RX ADMIN — PANTOPRAZOLE SODIUM 40 MILLIGRAM(S): 20 TABLET, DELAYED RELEASE ORAL at 06:24

## 2024-01-01 RX ADMIN — PIPERACILLIN AND TAZOBACTAM 25 GRAM(S): 4; .5 INJECTION, POWDER, LYOPHILIZED, FOR SOLUTION INTRAVENOUS at 23:13

## 2024-01-01 RX ADMIN — APIXABAN 5 MILLIGRAM(S): 2.5 TABLET, FILM COATED ORAL at 06:24

## 2024-01-01 RX ADMIN — Medication 6: at 12:32

## 2024-01-01 RX ADMIN — Medication 10 MILLIGRAM(S): at 06:24

## 2024-01-01 RX ADMIN — PIPERACILLIN AND TAZOBACTAM 25 GRAM(S): 4; .5 INJECTION, POWDER, LYOPHILIZED, FOR SOLUTION INTRAVENOUS at 13:37

## 2024-01-01 RX ADMIN — Medication 10 MILLIGRAM(S): at 12:31

## 2024-01-01 RX ADMIN — Medication 1200 MILLIGRAM(S): at 17:19

## 2024-01-01 RX ADMIN — INSULIN GLARGINE 10 UNIT(S): 100 INJECTION, SOLUTION SUBCUTANEOUS at 23:13

## 2024-01-01 RX ADMIN — Medication 2: at 17:23

## 2024-01-01 RX ADMIN — LISINOPRIL 40 MILLIGRAM(S): 2.5 TABLET ORAL at 06:24

## 2024-01-01 RX ADMIN — Medication 1200 MILLIGRAM(S): at 06:24

## 2024-01-01 RX ADMIN — Medication 100 MILLIGRAM(S): at 06:24

## 2024-01-01 RX ADMIN — Medication 3 MILLILITER(S): at 23:19

## 2024-01-01 RX ADMIN — Medication 10 MILLIGRAM(S): at 17:18

## 2024-01-01 RX ADMIN — ESCITALOPRAM OXALATE 20 MILLIGRAM(S): 10 TABLET, FILM COATED ORAL at 12:30

## 2024-01-01 RX ADMIN — PIPERACILLIN AND TAZOBACTAM 25 GRAM(S): 4; .5 INJECTION, POWDER, LYOPHILIZED, FOR SOLUTION INTRAVENOUS at 06:23

## 2024-01-01 RX ADMIN — APIXABAN 5 MILLIGRAM(S): 2.5 TABLET, FILM COATED ORAL at 17:18

## 2024-01-01 RX ADMIN — Medication 3 MILLILITER(S): at 17:18

## 2024-01-01 NOTE — PROGRESS NOTE ADULT - SUBJECTIVE AND OBJECTIVE BOX
Subjective: Patient seen and examined. No new events except as noted.   feels better   on NC   REVIEW OF SYSTEMS:    CONSTITUTIONAL: + weakness, fevers or chills  EYES/ENT: No visual changes;  No vertigo or throat pain   NECK: No pain or stiffness  RESPIRATORY: No cough, wheezing, hemoptysis; No shortness of breath  CARDIOVASCULAR: No chest pain or palpitations  GASTROINTESTINAL: No abdominal or epigastric pain. No nausea, vomiting, or hematemesis; No diarrhea or constipation. No melena or hematochezia.  GENITOURINARY: No dysuria, frequency or hematuria  NEUROLOGICAL: No numbness or weakness  SKIN: No itching, burning, rashes, or lesions   All other review of systems is negative unless indicated above.    MEDICATIONS:  MEDICATIONS  (STANDING):  albuterol/ipratropium for Nebulization 3 milliLiter(s) Nebulizer every 6 hours  apixaban 5 milliGRAM(s) Oral every 12 hours  atorvastatin 80 milliGRAM(s) Oral at bedtime  chlorhexidine 2% Cloths 1 Application(s) Topical daily  dextrose 5%. 1000 milliLiter(s) (50 mL/Hr) IV Continuous <Continuous>  dextrose 5%. 1000 milliLiter(s) (100 mL/Hr) IV Continuous <Continuous>  dextrose 50% Injectable 25 Gram(s) IV Push once  dextrose 50% Injectable 25 Gram(s) IV Push once  dextrose 50% Injectable 12.5 Gram(s) IV Push once  escitalopram 20 milliGRAM(s) Oral daily  glucagon  Injectable 1 milliGRAM(s) IntraMuscular once  guaiFENesin ER 1200 milliGRAM(s) Oral every 12 hours  insulin glargine Injectable (LANTUS) 10 Unit(s) SubCutaneous at bedtime  insulin lispro (ADMELOG) corrective regimen sliding scale   SubCutaneous three times a day before meals  insulin lispro (ADMELOG) corrective regimen sliding scale   SubCutaneous at bedtime  lisinopril 40 milliGRAM(s) Oral daily  metoclopramide 10 milliGRAM(s) Oral three times a day before meals  metoprolol succinate  milliGRAM(s) Oral daily  pantoprazole    Tablet 40 milliGRAM(s) Oral before breakfast  piperacillin/tazobactam IVPB.. 3.375 Gram(s) IV Intermittent every 8 hours      PHYSICAL EXAM:  T(C): 36.6 (01-01-24 @ 10:27), Max: 36.7 (12-31-23 @ 12:26)  HR: 64 (01-01-24 @ 10:27) (57 - 65)  BP: 154/76 (01-01-24 @ 10:27) (138/73 - 162/75)  RR: 19 (01-01-24 @ 10:27) (18 - 19)  SpO2: 95% (01-01-24 @ 10:27) (91% - 100%)  Wt(kg): --  I&O's Summary    31 Dec 2023 07:01  -  01 Jan 2024 07:00  --------------------------------------------------------  IN: 0 mL / OUT: 600 mL / NET: -600 mL    01 Jan 2024 07:01  -  01 Jan 2024 11:49  --------------------------------------------------------  IN: 120 mL / OUT: 0 mL / NET: 120 mL          Appearance: NAD NC   HEENT:   dry oral mucosa, PERRL, EOMI	  Lymphatic: No lymphadenopathy  Cardiovascular: Normal S1 S2, No JVD, No murmurs , Peripheral pulses palpable 2+ bilaterally  Respiratory: decreased bs 	  Gastrointestinal:  Soft, Non-tender, + BS	  Skin: No rashes, No ecchymoses, No cyanosis, warm to touch  Musculoskeletal: Normal range of motion, normal strength  Psychiatry:   sleepy   Ext: + edema          LABS:    CARDIAC MARKERS:                                11.5   6.37  )-----------( 252      ( 01 Jan 2024 07:33 )             37.1     01-01    139  |  99  |  40<H>  ----------------------------<  140<H>  4.3   |  29  |  2.64<H>    Ca    9.5      01 Jan 2024 07:33    TPro  5.9<L>  /  Alb  3.0<L>  /  TBili  0.3  /  DBili  x   /  AST  12  /  ALT  9<L>  /  AlkPhos  56  12-31    proBNP:   Lipid Profile:   HgA1c:   TSH:             TELEMETRY: 	    ECG:  	  RADIOLOGY:   DIAGNOSTIC TESTING:  [ ] Echocardiogram:  [ ]  Catheterization:  [ ] Stress Test:    OTHER:

## 2024-01-01 NOTE — PROGRESS NOTE ADULT - SUBJECTIVE AND OBJECTIVE BOX
Date of Service: 01-01-24 @ 14:20    Patient is a 73y old  Male who presents with a chief complaint of shortness of breath (01 Jan 2024 11:49)      Any change in ROS: seems OK: alert and awake : on 2 L of oxygen : not n any rsp distress     MEDICATIONS  (STANDING):  albuterol/ipratropium for Nebulization 3 milliLiter(s) Nebulizer every 6 hours  apixaban 5 milliGRAM(s) Oral every 12 hours  atorvastatin 80 milliGRAM(s) Oral at bedtime  chlorhexidine 2% Cloths 1 Application(s) Topical daily  dextrose 5%. 1000 milliLiter(s) (100 mL/Hr) IV Continuous <Continuous>  dextrose 5%. 1000 milliLiter(s) (50 mL/Hr) IV Continuous <Continuous>  dextrose 50% Injectable 25 Gram(s) IV Push once  dextrose 50% Injectable 25 Gram(s) IV Push once  dextrose 50% Injectable 12.5 Gram(s) IV Push once  escitalopram 20 milliGRAM(s) Oral daily  glucagon  Injectable 1 milliGRAM(s) IntraMuscular once  guaiFENesin ER 1200 milliGRAM(s) Oral every 12 hours  insulin glargine Injectable (LANTUS) 10 Unit(s) SubCutaneous at bedtime  insulin lispro (ADMELOG) corrective regimen sliding scale   SubCutaneous three times a day before meals  insulin lispro (ADMELOG) corrective regimen sliding scale   SubCutaneous at bedtime  lisinopril 40 milliGRAM(s) Oral daily  metoclopramide 10 milliGRAM(s) Oral three times a day before meals  metoprolol succinate  milliGRAM(s) Oral daily  pantoprazole    Tablet 40 milliGRAM(s) Oral before breakfast  piperacillin/tazobactam IVPB.. 3.375 Gram(s) IV Intermittent every 8 hours    MEDICATIONS  (PRN):  dextrose Oral Gel 15 Gram(s) Oral once PRN Blood Glucose LESS THAN 70 milliGRAM(s)/deciliter    Vital Signs Last 24 Hrs  T(C): 36.6 (01 Jan 2024 10:27), Max: 36.6 (01 Jan 2024 10:27)  T(F): 97.8 (01 Jan 2024 10:27), Max: 97.8 (01 Jan 2024 10:27)  HR: 64 (01 Jan 2024 10:27) (58 - 65)  BP: 154/76 (01 Jan 2024 10:27) (154/76 - 162/75)  BP(mean): --  RR: 19 (01 Jan 2024 10:27) (18 - 19)  SpO2: 95% (01 Jan 2024 10:27) (91% - 100%)    Parameters below as of 01 Jan 2024 10:27  Patient On (Oxygen Delivery Method): room air        I&O's Summary    31 Dec 2023 07:01  -  01 Jan 2024 07:00  --------------------------------------------------------  IN: 0 mL / OUT: 600 mL / NET: -600 mL    01 Jan 2024 07:01  -  01 Jan 2024 14:20  --------------------------------------------------------  IN: 120 mL / OUT: 0 mL / NET: 120 mL          Physical Exam:   GENERAL: NAD, well-groomed, well-developed  HEENT: HAZEL/   Atraumatic, Normocephalic  ENMT: No tonsillar erythema, exudates, or enlargement; Moist mucous membranes, Good dentition, No lesions  NECK: Supple, No JVD, Normal thyroid  CHEST/LUNG: Clear to auscultaion- not wheezing  CVS: Regular rate and rhythm; No murmurs, rubs, or gallops  GI: : Soft, Nontender, Nondistended; Bowel sounds present  NERVOUS SYSTEM:  Alert & Oriented X3  EXTREMITIES:  Mild  edema  LYMPH: No lymphadenopathy noted  SKIN: No rashes or lesions  ENDOCRINOLOGY: No Thyromegaly  PSYCH: Appropriate    Labs:  28                            11.5   6.37  )-----------( 252      ( 01 Jan 2024 07:33 )             37.1                         12.4   7.88  )-----------( 290      ( 29 Dec 2023 07:21 )             39.6     01-01    139  |  99  |  40<H>  ----------------------------<  140<H>  4.3   |  29  |  2.64<H>  12-31    141  |  99  |  38<H>  ----------------------------<  155<H>  3.9   |  30  |  2.61<H>  12-30    141  |  100  |  28<H>  ----------------------------<  177<H>  3.7   |  30  |  2.15<H>  12-29    143  |  103  |  25<H>  ----------------------------<  196<H>  4.1   |  29  |  1.78<H>    Ca    9.5      01 Jan 2024 07:33  Ca    9.4      31 Dec 2023 07:31    TPro  5.9<L>  /  Alb  3.0<L>  /  TBili  0.3  /  DBili  x   /  AST  12  /  ALT  9<L>  /  AlkPhos  56  12-31  TPro  6.2  /  Alb  3.2<L>  /  TBili  0.4  /  DBili  x   /  AST  12  /  ALT  10  /  AlkPhos  64  12-30  TPro  6.6  /  Alb  3.4  /  TBili  0.4  /  DBili  x   /  AST  14  /  ALT  14  /  AlkPhos  77  12-29    CAPILLARY BLOOD GLUCOSE      POCT Blood Glucose.: 279 mg/dL (01 Jan 2024 12:11)  POCT Blood Glucose.: 193 mg/dL (01 Jan 2024 08:05)  POCT Blood Glucose.: 183 mg/dL (31 Dec 2023 22:15)  POCT Blood Glucose.: 154 mg/dL (31 Dec 2023 17:15)      LIVER FUNCTIONS - ( 31 Dec 2023 07:31 )  Alb: 3.0 g/dL / Pro: 5.9 g/dL / ALK PHOS: 56 U/L / ALT: 9 U/L / AST: 12 U/L / GGT: x             Urinalysis Basic - ( 01 Jan 2024 07:33 )    Color: x / Appearance: x / SG: x / pH: x  Gluc: 140 mg/dL / Ketone: x  / Bili: x / Urobili: x   Blood: x / Protein: x / Nitrite: x   Leuk Esterase: x / RBC: x / WBC x   Sq Epi: x / Non Sq Epi: x / Bacteria: x      Lactate, Blood: 0.8 mmol/L (12-30 @ 06:37)  Lactate, Blood: 2.3 mmol/L (12-29 @ 07:10)        RECENT CULTURES:  12-28 @ 06:20 .Blood Blood-Peripheral     r< from: [facility] Duplex Lower Ext Vein Scan, Bilat (12.29.23 @ 09:51) >  HT:  Normal compressibility of the RIGHT common femoral, femoral and popliteal   veins.  Doppler examination shows normal spontaneous and phasic flow.  No RIGHT calf vein thrombosis is detected.    LEFT:  Normal compressibility of the LEFT common femoral, femoral and popliteal   veins.  Doppler examination shows normal spontaneous and phasic flow.  No LEFT calf vein thrombosis is detected.    IMPRESSION:  No evidence of deep venous thrombosis in either lower extremity.            --- End of Report ---            NICK MICHELLE MD; Attending Radiologist  This document has been electronically signed. Dec 29 2023 10:40AM    < end of copied text >  < from: CT Chest No Cont (12.28.23 @ 16:28) >  tered opacities within the aerated left lower and upper lobes.    MEDIASTINUM AND JAJA:Mildly enlarged mediastinal lymph nodes.    HEART AND VESSELS: Heart is enlarged No pericardial effusion. Coronary   artery and aortic atherosclerosis. Aortic valve calcification.    VISUALIZED UPPER ABDOMEN: Within normal limits.    CHEST WALL AND BONES: Degenerative changes thoracic spine.    IMPRESSION:    Tracheobronchial secretions and moderate bilateral pleural effusions with   partial atelectasis both lower lobes with or without superimposed left   lung infectious/inflammatory process.    --- End of Report ---          HELGA BELL MD; Resident Radiologist  This document has been electronically signed.  EDI MARCELINO MD; Attending Radiologist  This document has been electronically signed. Dec 28 2023  5:15PM    < end of copied text >             No growth at 4 days          RESPIRATORY CULTURES:          Studies  Chest X-RAY  CT SCAN Chest   Venous Dopplers: LE:   CT Abdomen  Others

## 2024-01-01 NOTE — PROGRESS NOTE ADULT - SUBJECTIVE AND OBJECTIVE BOX
Patient is a 73y old  Male who presents with a chief complaint of shortness of breath (01 Jan 2024 14:20)      DATE OF SERVICE: 01-01-24 @ 14:24    SUBJECTIVE / OVERNIGHT EVENTS: overnight events noted    ROS:  Resp: No cough no sputum production  CVS: No chest pain no palpitations no orthopnea  GI: no N/V/D  "I feel fine, doctor"        MEDICATIONS  (STANDING):  albuterol/ipratropium for Nebulization 3 milliLiter(s) Nebulizer every 6 hours  apixaban 5 milliGRAM(s) Oral every 12 hours  atorvastatin 80 milliGRAM(s) Oral at bedtime  chlorhexidine 2% Cloths 1 Application(s) Topical daily  dextrose 5%. 1000 milliLiter(s) (100 mL/Hr) IV Continuous <Continuous>  dextrose 5%. 1000 milliLiter(s) (50 mL/Hr) IV Continuous <Continuous>  dextrose 50% Injectable 25 Gram(s) IV Push once  dextrose 50% Injectable 25 Gram(s) IV Push once  dextrose 50% Injectable 12.5 Gram(s) IV Push once  escitalopram 20 milliGRAM(s) Oral daily  glucagon  Injectable 1 milliGRAM(s) IntraMuscular once  guaiFENesin ER 1200 milliGRAM(s) Oral every 12 hours  insulin glargine Injectable (LANTUS) 10 Unit(s) SubCutaneous at bedtime  insulin lispro (ADMELOG) corrective regimen sliding scale   SubCutaneous three times a day before meals  insulin lispro (ADMELOG) corrective regimen sliding scale   SubCutaneous at bedtime  lisinopril 40 milliGRAM(s) Oral daily  metoclopramide 10 milliGRAM(s) Oral three times a day before meals  metoprolol succinate  milliGRAM(s) Oral daily  pantoprazole    Tablet 40 milliGRAM(s) Oral before breakfast  piperacillin/tazobactam IVPB.. 3.375 Gram(s) IV Intermittent every 8 hours    MEDICATIONS  (PRN):  dextrose Oral Gel 15 Gram(s) Oral once PRN Blood Glucose LESS THAN 70 milliGRAM(s)/deciliter        CAPILLARY BLOOD GLUCOSE      POCT Blood Glucose.: 279 mg/dL (01 Jan 2024 12:11)  POCT Blood Glucose.: 193 mg/dL (01 Jan 2024 08:05)  POCT Blood Glucose.: 183 mg/dL (31 Dec 2023 22:15)  POCT Blood Glucose.: 154 mg/dL (31 Dec 2023 17:15)    I&O's Summary    31 Dec 2023 07:01  -  01 Jan 2024 07:00  --------------------------------------------------------  IN: 0 mL / OUT: 600 mL / NET: -600 mL    01 Jan 2024 07:01  -  01 Jan 2024 14:24  --------------------------------------------------------  IN: 120 mL / OUT: 600 mL / NET: -480 mL        Vital Signs Last 24 Hrs  T(C): 36.6 (01 Jan 2024 10:27), Max: 36.6 (01 Jan 2024 10:27)  T(F): 97.8 (01 Jan 2024 10:27), Max: 97.8 (01 Jan 2024 10:27)  HR: 64 (01 Jan 2024 14:22) (58 - 65)  BP: 154/76 (01 Jan 2024 10:27) (154/76 - 162/75)  BP(mean): --  RR: 19 (01 Jan 2024 10:27) (18 - 19)  SpO2: 96% (01 Jan 2024 14:22) (91% - 100%)      PHYSICAL EXAM:  CHEST/LUNG: clear  HEART: S1 S2; systolic murmur +   ABDOMEN: Soft, Nontender,  EXTREMITIES:  resolved edema  NEUROLOGY: alert  non-focal  SKIN: No rashes or lesions    LABS:                        11.5   6.37  )-----------( 252      ( 01 Jan 2024 07:33 )             37.1     01-01    139  |  99  |  40<H>  ----------------------------<  140<H>  4.3   |  29  |  2.64<H>    Ca    9.5      01 Jan 2024 07:33    TPro  5.9<L>  /  Alb  3.0<L>  /  TBili  0.3  /  DBili  x   /  AST  12  /  ALT  9<L>  /  AlkPhos  56  12-31          Urinalysis Basic - ( 01 Jan 2024 07:33 )    Color: x / Appearance: x / SG: x / pH: x  Gluc: 140 mg/dL / Ketone: x  / Bili: x / Urobili: x   Blood: x / Protein: x / Nitrite: x   Leuk Esterase: x / RBC: x / WBC x   Sq Epi: x / Non Sq Epi: x / Bacteria: x          All consultant(s) notes reviewed and care discussed with other providers        Contact Number, Dr Nieto [phone] Patient is a 73y old  Male who presents with a chief complaint of shortness of breath (01 Jan 2024 14:20)      DATE OF SERVICE: 01-01-24 @ 14:24    SUBJECTIVE / OVERNIGHT EVENTS: overnight events noted    ROS:  Resp: No cough no sputum production  CVS: No chest pain no palpitations no orthopnea  GI: no N/V/D  "I feel fine, doctor"        MEDICATIONS  (STANDING):  albuterol/ipratropium for Nebulization 3 milliLiter(s) Nebulizer every 6 hours  apixaban 5 milliGRAM(s) Oral every 12 hours  atorvastatin 80 milliGRAM(s) Oral at bedtime  chlorhexidine 2% Cloths 1 Application(s) Topical daily  dextrose 5%. 1000 milliLiter(s) (100 mL/Hr) IV Continuous <Continuous>  dextrose 5%. 1000 milliLiter(s) (50 mL/Hr) IV Continuous <Continuous>  dextrose 50% Injectable 25 Gram(s) IV Push once  dextrose 50% Injectable 25 Gram(s) IV Push once  dextrose 50% Injectable 12.5 Gram(s) IV Push once  escitalopram 20 milliGRAM(s) Oral daily  glucagon  Injectable 1 milliGRAM(s) IntraMuscular once  guaiFENesin ER 1200 milliGRAM(s) Oral every 12 hours  insulin glargine Injectable (LANTUS) 10 Unit(s) SubCutaneous at bedtime  insulin lispro (ADMELOG) corrective regimen sliding scale   SubCutaneous three times a day before meals  insulin lispro (ADMELOG) corrective regimen sliding scale   SubCutaneous at bedtime  lisinopril 40 milliGRAM(s) Oral daily  metoclopramide 10 milliGRAM(s) Oral three times a day before meals  metoprolol succinate  milliGRAM(s) Oral daily  pantoprazole    Tablet 40 milliGRAM(s) Oral before breakfast  piperacillin/tazobactam IVPB.. 3.375 Gram(s) IV Intermittent every 8 hours    MEDICATIONS  (PRN):  dextrose Oral Gel 15 Gram(s) Oral once PRN Blood Glucose LESS THAN 70 milliGRAM(s)/deciliter        CAPILLARY BLOOD GLUCOSE      POCT Blood Glucose.: 279 mg/dL (01 Jan 2024 12:11)  POCT Blood Glucose.: 193 mg/dL (01 Jan 2024 08:05)  POCT Blood Glucose.: 183 mg/dL (31 Dec 2023 22:15)  POCT Blood Glucose.: 154 mg/dL (31 Dec 2023 17:15)    I&O's Summary    31 Dec 2023 07:01  -  01 Jan 2024 07:00  --------------------------------------------------------  IN: 0 mL / OUT: 600 mL / NET: -600 mL    01 Jan 2024 07:01  -  01 Jan 2024 14:24  --------------------------------------------------------  IN: 120 mL / OUT: 600 mL / NET: -480 mL        Vital Signs Last 24 Hrs  T(C): 36.6 (01 Jan 2024 10:27), Max: 36.6 (01 Jan 2024 10:27)  T(F): 97.8 (01 Jan 2024 10:27), Max: 97.8 (01 Jan 2024 10:27)  HR: 64 (01 Jan 2024 14:22) (58 - 65)  BP: 154/76 (01 Jan 2024 10:27) (154/76 - 162/75)  BP(mean): --  RR: 19 (01 Jan 2024 10:27) (18 - 19)  SpO2: 96% (01 Jan 2024 14:22) (91% - 100%)      PHYSICAL EXAM:  CHEST/LUNG: clear  HEART: S1 S2; systolic murmur +   ABDOMEN: Soft, Nontender,  EXTREMITIES:  resolved edema  NEUROLOGY: alert  non-focal  SKIN: No rashes or lesions    LABS:                        11.5   6.37  )-----------( 252      ( 01 Jan 2024 07:33 )             37.1     01-01    139  |  99  |  40<H>  ----------------------------<  140<H>  4.3   |  29  |  2.64<H>    Ca    9.5      01 Jan 2024 07:33    TPro  5.9<L>  /  Alb  3.0<L>  /  TBili  0.3  /  DBili  x   /  AST  12  /  ALT  9<L>  /  AlkPhos  56  12-31          Urinalysis Basic - ( 01 Jan 2024 07:33 )    Color: x / Appearance: x / SG: x / pH: x  Gluc: 140 mg/dL / Ketone: x  / Bili: x / Urobili: x   Blood: x / Protein: x / Nitrite: x   Leuk Esterase: x / RBC: x / WBC x   Sq Epi: x / Non Sq Epi: x / Bacteria: x          All consultant(s) notes reviewed and care discussed with other providers        Contact Number, Dr Nieto [phone]

## 2024-01-01 NOTE — PROGRESS NOTE ADULT - ASSESSMENT
A/p  73-year-old male with a past medical history of hypertension, hyperlipidemia, diabetes presenting difficulty breathing.     #SOB  -RVP negative   -CXR with patchy infiltrates c/f pna & pulm edema  -Abx per med  -Off diuretics for BEAR      #HFrEF  -HST w/ mild elevation -- likely demand + ckd  -No CP on exam  -off lasix for now due to BEAR    -Strict I/Os, Daily Weight  - + b/l le edema -DVT ruled out   -Recent echo with mild lv dysfxn ef 45%, mild diastolic dysfxn, small pericardial effusion    #pAfib  -Resume eliquis & metoprolol when weaned from bipap    #HTN  -BP elevated  -Cont with IV hydralazine until weaned from bipap  -Can also consider adding IV labetalol if needed   -Resume metoprolol when weaned from bipap    #Dysphagia  -Per wife pt with difficulty swallowing  -Speech & swallow eval    #CKD  -Trend cr while on diuresis  -Renal f/u

## 2024-01-01 NOTE — PROGRESS NOTE ADULT - ASSESSMENT
73-year-old male with a past medical history of hypertension, hyperlipidemia, diabetes presenting difficulty breathing. Had generalized weakness yesterday. Patient woke up in the middle the night with shortness of breath and pressed life alert button. Patient brought in by EMS with low oxygen saturation And increased work of breathing, placed on Bipap in the ED. Pulmonary called to consult for SOB.      Resp failure:  requiring bipap:   HTN  DM:  HLD    Resp failure:  requiring bipap:   -CT chest with small to moderate b/l effusions/atelectasis, patchy opacities likely 2nd to pulm edema +/- underlying PNA. +Tracheobronchial secretions  -ProBNP elevated  -RVP negative  -Pt now off bipap, breathing comfortably on 2LNC. Keep sats >90%, wean O2 as tolerated  -Keep O>I, diuresis as tolerated  -Cards f/u  -If any change in resp status suggest resume Bipap 12/5  -Continue ABX , favor short course. Procalcitonin normal  -Start Mucinex 1200 mg PO BID x5 days  -Continue Duoneb q6h  12/31:  -last night cpap had to be used as he desaturated : he likely has sleep apnea:  needs to come out of bed to chair and needs pt ot  /: on zosyn till 4th of January  cont diuretics as he is fluid overloaded:   1/1: requiring less oxygen today   when i saw in am:  he says he feels better:  he is fully alert and awake: : cont antibtiocs    HTN  -Optimize antihypertensives   1231: blood pressure is controlled  1/1: controlled    DM  -monitor blood glucose     HLD  -per PMD    dw acp

## 2024-01-02 LAB
ANION GAP SERPL CALC-SCNC: 8 MMOL/L — SIGNIFICANT CHANGE UP (ref 5–17)
ANION GAP SERPL CALC-SCNC: 8 MMOL/L — SIGNIFICANT CHANGE UP (ref 5–17)
BUN SERPL-MCNC: 41 MG/DL — HIGH (ref 7–23)
BUN SERPL-MCNC: 41 MG/DL — HIGH (ref 7–23)
CALCIUM SERPL-MCNC: 9 MG/DL — SIGNIFICANT CHANGE UP (ref 8.4–10.5)
CALCIUM SERPL-MCNC: 9 MG/DL — SIGNIFICANT CHANGE UP (ref 8.4–10.5)
CHLORIDE SERPL-SCNC: 101 MMOL/L — SIGNIFICANT CHANGE UP (ref 96–108)
CHLORIDE SERPL-SCNC: 101 MMOL/L — SIGNIFICANT CHANGE UP (ref 96–108)
CO2 SERPL-SCNC: 32 MMOL/L — HIGH (ref 22–31)
CO2 SERPL-SCNC: 32 MMOL/L — HIGH (ref 22–31)
CREAT SERPL-MCNC: 2.39 MG/DL — HIGH (ref 0.5–1.3)
CREAT SERPL-MCNC: 2.39 MG/DL — HIGH (ref 0.5–1.3)
CULTURE RESULTS: SIGNIFICANT CHANGE UP
EGFR: 28 ML/MIN/1.73M2 — LOW
EGFR: 28 ML/MIN/1.73M2 — LOW
GLUCOSE BLDC GLUCOMTR-MCNC: 107 MG/DL — HIGH (ref 70–99)
GLUCOSE BLDC GLUCOMTR-MCNC: 107 MG/DL — HIGH (ref 70–99)
GLUCOSE BLDC GLUCOMTR-MCNC: 117 MG/DL — HIGH (ref 70–99)
GLUCOSE BLDC GLUCOMTR-MCNC: 117 MG/DL — HIGH (ref 70–99)
GLUCOSE BLDC GLUCOMTR-MCNC: 208 MG/DL — HIGH (ref 70–99)
GLUCOSE BLDC GLUCOMTR-MCNC: 208 MG/DL — HIGH (ref 70–99)
GLUCOSE BLDC GLUCOMTR-MCNC: 290 MG/DL — HIGH (ref 70–99)
GLUCOSE BLDC GLUCOMTR-MCNC: 290 MG/DL — HIGH (ref 70–99)
GLUCOSE SERPL-MCNC: 106 MG/DL — HIGH (ref 70–99)
GLUCOSE SERPL-MCNC: 106 MG/DL — HIGH (ref 70–99)
POTASSIUM SERPL-MCNC: 4.1 MMOL/L — SIGNIFICANT CHANGE UP (ref 3.5–5.3)
POTASSIUM SERPL-MCNC: 4.1 MMOL/L — SIGNIFICANT CHANGE UP (ref 3.5–5.3)
POTASSIUM SERPL-SCNC: 4.1 MMOL/L — SIGNIFICANT CHANGE UP (ref 3.5–5.3)
POTASSIUM SERPL-SCNC: 4.1 MMOL/L — SIGNIFICANT CHANGE UP (ref 3.5–5.3)
SODIUM SERPL-SCNC: 141 MMOL/L — SIGNIFICANT CHANGE UP (ref 135–145)
SODIUM SERPL-SCNC: 141 MMOL/L — SIGNIFICANT CHANGE UP (ref 135–145)
SPECIMEN SOURCE: SIGNIFICANT CHANGE UP

## 2024-01-02 RX ADMIN — APIXABAN 5 MILLIGRAM(S): 2.5 TABLET, FILM COATED ORAL at 17:26

## 2024-01-02 RX ADMIN — ESCITALOPRAM OXALATE 20 MILLIGRAM(S): 10 TABLET, FILM COATED ORAL at 12:18

## 2024-01-02 RX ADMIN — Medication 10 MILLIGRAM(S): at 17:26

## 2024-01-02 RX ADMIN — Medication 100 MILLIGRAM(S): at 05:50

## 2024-01-02 RX ADMIN — PIPERACILLIN AND TAZOBACTAM 25 GRAM(S): 4; .5 INJECTION, POWDER, LYOPHILIZED, FOR SOLUTION INTRAVENOUS at 05:51

## 2024-01-02 RX ADMIN — LISINOPRIL 40 MILLIGRAM(S): 2.5 TABLET ORAL at 05:50

## 2024-01-02 RX ADMIN — PIPERACILLIN AND TAZOBACTAM 25 GRAM(S): 4; .5 INJECTION, POWDER, LYOPHILIZED, FOR SOLUTION INTRAVENOUS at 13:11

## 2024-01-02 RX ADMIN — Medication 10 MILLIGRAM(S): at 12:18

## 2024-01-02 RX ADMIN — PANTOPRAZOLE SODIUM 40 MILLIGRAM(S): 20 TABLET, DELAYED RELEASE ORAL at 05:50

## 2024-01-02 RX ADMIN — CHLORHEXIDINE GLUCONATE 1 APPLICATION(S): 213 SOLUTION TOPICAL at 12:25

## 2024-01-02 RX ADMIN — ATORVASTATIN CALCIUM 80 MILLIGRAM(S): 80 TABLET, FILM COATED ORAL at 21:47

## 2024-01-02 RX ADMIN — Medication 1200 MILLIGRAM(S): at 17:26

## 2024-01-02 RX ADMIN — Medication 3 MILLILITER(S): at 17:26

## 2024-01-02 RX ADMIN — APIXABAN 5 MILLIGRAM(S): 2.5 TABLET, FILM COATED ORAL at 05:51

## 2024-01-02 RX ADMIN — Medication 10 MILLIGRAM(S): at 05:50

## 2024-01-02 RX ADMIN — INSULIN GLARGINE 10 UNIT(S): 100 INJECTION, SOLUTION SUBCUTANEOUS at 21:46

## 2024-01-02 RX ADMIN — Medication 3 MILLILITER(S): at 12:18

## 2024-01-02 RX ADMIN — Medication 3 MILLILITER(S): at 05:51

## 2024-01-02 RX ADMIN — Medication 1200 MILLIGRAM(S): at 05:50

## 2024-01-02 RX ADMIN — PIPERACILLIN AND TAZOBACTAM 25 GRAM(S): 4; .5 INJECTION, POWDER, LYOPHILIZED, FOR SOLUTION INTRAVENOUS at 21:46

## 2024-01-02 RX ADMIN — Medication 6: at 17:42

## 2024-01-02 NOTE — SWALLOW BEDSIDE ASSESSMENT ADULT - COMMENTS
Continued history:         Speech & Swallow ; 12/7 - speech and language evaluation completed.    PACS 9/26/23; indicated no aspiration, penetration present. No report in SCM. Continued history:   -CXR with patchy infiltrates c/f pna & pulm edema  12/31/23: Event note: "Pt admitted for Acute respiratory failure with hypoxia and hypercapnia transitioned to NC 6l NC however has multiple hypoxic episodes (mid to high 80's) during sleep but recovers when awaken."    Speech & Swallow ; KNOWN TO DEPT.   -9/2023 for clinical bedside swallow evaluations with rec for objective testing  -9/22/2023-FEES, rec for puree and moderately thick liquids via tsp   -9/26/2023-MBS, regular solids, thin liquids " + Calcifications of the laryngeal structures  + ?CP bar vs other at level of C5/C6. Trace retention of material (across trials) noted above  + Cough throughout PO trials, similar to baseline cough, not consistently correlated to episodes of laryngeal penetration" 1) Liquids via single, small sips - no straws 2) Intermittent throat clear throughout meals 3) Repeat swallow. "Pt presents with an oropharyngeal dysphagia, with swallow function improved from prior exam. There is adequate mastication and oral transfer of all textures. Premature spillage is seen with thin liquids. Laryngeal vestibule closure is reduced, resulting in laryngeal penetration of solids and thin liquids. Material is retrieved during the swallow or during subsequent swallows. Depth/ amount of laryngeal penetration for thin liquids is reduced with use of small, single cup sips. No aspiration observed across exam. Moderate pharyngeal residue with purees and solids is reduced with repeat swallow."  -12/7/23- speech and language evaluation completed. see report.     Family reporting, during this evaluation, pt with increase in dysphagia from prior admission with frequent coughing and concern for aspiration. Pt with limited ability to adhere to recommendations for single, small sips per family as typically will take multiple large sips. Observed straw in bottle at bedside. Additionally, reports that SLP was initiated at home for dysphagia and requesting repeat MBS.

## 2024-01-02 NOTE — PROGRESS NOTE ADULT - ASSESSMENT
73-year-old male with a past medical history of hypertension, hyperlipidemia, diabetes presenting difficulty breathing. Had generalized weakness yesterday. Patient woke up in the middle the night with shortness of breath and pressed life alert button. Patient brought in by EMS with low oxygen saturation And increased work of breathing, placed on Bipap in the ED. Pulmonary called to consult for SOB.      Resp failure:  requiring bipap:   HTN  DM:  HLD    Resp failure:  requiring bipap:   -CT chest with small to moderate b/l effusions/atelectasis, patchy opacities likely 2nd to pulm edema +/- underlying PNA. +Tracheobronchial secretions  -ProBNP elevated  -RVP negative  -Pt now off bipap, breathing comfortably on 2LNC. Keep sats >90%, wean O2 as tolerated  -Keep O>I, diuresis as tolerated  -Cards f/u  -If any change in resp status suggest resume Bipap 12/5  -Continue ABX , favor short course. Procalcitonin normal  -Start Mucinex 1200 mg PO BID x5 days  -Continue Duoneb q6h  12/31:  -last night cpap had to be used as he desaturated : he likely has sleep apnea:  needs to come out of bed to chair and needs pt ot  /: on zosyn till 4th of January  cont diuretics as he is fluid overloaded:   1/1: requiring less oxygen today   when i saw in am:  he says he feels better:  he is fully alert and awake: : cont antibiotics  1/2: seems OK: desats on room air:  would need home oxygen     HTN  -Optimize antihypertensives   1231: blood pressure is controlled  1/1: controlled  1/2: controlled    DM  -monitor blood glucose     HLD  -per PMD    dw acp

## 2024-01-02 NOTE — CHART NOTE - NSCHARTNOTEFT_GEN_A_CORE
Patient  will require a transport wheelchair due to acute exacerbation of CHF. Patient has a mobility limitation that significantly impairs the pts ability to participate in one or more MRADLs such as toileting, eating, dressing, and bathing in customary locations in the home. The patient's mobility limitation cannot be sufficiently resolved by the use of an appropriately fitted can or walker. The patient is unable to ambulate with a walker. Use of a transport wheelchair will significantly improve the beneficiary's ability to participate in MRADLs and the beneficiary will use it on a regular basis in the home. The beneficiary is able and willing to use the wheelchair in the home. The beneficiary has a caregiver who is available, willing, and able to provide assistance with the wheelchair. The patient is not able to self propel a standard or lightweight wheelchair.    Kacey Pascal PA-C  Dept of Medicine

## 2024-01-02 NOTE — PROGRESS NOTE ADULT - SUBJECTIVE AND OBJECTIVE BOX
Date of Service: 01-02-24 @ 16:56    Patient is a 73y old  Male who presents with a chief complaint of shortness of breath (02 Jan 2024 15:38)      Any change in ROS: seems OK: sitting in chair:     MEDICATIONS  (STANDING):  albuterol/ipratropium for Nebulization 3 milliLiter(s) Nebulizer every 6 hours  apixaban 5 milliGRAM(s) Oral every 12 hours  atorvastatin 80 milliGRAM(s) Oral at bedtime  chlorhexidine 2% Cloths 1 Application(s) Topical daily  dextrose 5%. 1000 milliLiter(s) (50 mL/Hr) IV Continuous <Continuous>  dextrose 5%. 1000 milliLiter(s) (100 mL/Hr) IV Continuous <Continuous>  dextrose 50% Injectable 25 Gram(s) IV Push once  dextrose 50% Injectable 25 Gram(s) IV Push once  dextrose 50% Injectable 12.5 Gram(s) IV Push once  escitalopram 20 milliGRAM(s) Oral daily  glucagon  Injectable 1 milliGRAM(s) IntraMuscular once  guaiFENesin ER 1200 milliGRAM(s) Oral every 12 hours  insulin glargine Injectable (LANTUS) 10 Unit(s) SubCutaneous at bedtime  insulin lispro (ADMELOG) corrective regimen sliding scale   SubCutaneous three times a day before meals  insulin lispro (ADMELOG) corrective regimen sliding scale   SubCutaneous at bedtime  lisinopril 40 milliGRAM(s) Oral daily  metoclopramide 10 milliGRAM(s) Oral three times a day before meals  metoprolol succinate  milliGRAM(s) Oral daily  pantoprazole    Tablet 40 milliGRAM(s) Oral before breakfast  piperacillin/tazobactam IVPB.. 3.375 Gram(s) IV Intermittent every 8 hours    MEDICATIONS  (PRN):  dextrose Oral Gel 15 Gram(s) Oral once PRN Blood Glucose LESS THAN 70 milliGRAM(s)/deciliter    Vital Signs Last 24 Hrs  T(C): 36.5 (02 Jan 2024 11:34), Max: 36.5 (02 Jan 2024 11:34)  T(F): 97.7 (02 Jan 2024 11:34), Max: 97.7 (02 Jan 2024 11:34)  HR: 55 (02 Jan 2024 11:34) (55 - 71)  BP: 162/76 (02 Jan 2024 11:34) (156/74 - 176/75)  BP(mean): --  RR: 18 (02 Jan 2024 11:34) (18 - 18)  SpO2: 92% (02 Jan 2024 11:34) (92% - 99%)    Parameters below as of 02 Jan 2024 11:34  Patient On (Oxygen Delivery Method): room air        I&O's Summary    01 Jan 2024 07:01  -  02 Jan 2024 07:00  --------------------------------------------------------  IN: 120 mL / OUT: 600 mL / NET: -480 mL    02 Jan 2024 07:01  -  02 Jan 2024 16:56  --------------------------------------------------------  IN: 140 mL / OUT: 201 mL / NET: -61 mL          Physical Exam:   GENERAL: NAD, well-groomed, well-developed  HEENT: HAZEL/   Atraumatic, Normocephalic  ENMT: No tonsillar erythema, exudates, or enlargement; Moist mucous membranes, Good dentition, No lesions  NECK: Supple, No JVD, Normal thyroid  CHEST/LUNG: Clear to auscultaio-  CVS: Regular rate and rhythm; No murmurs, rubs, or gallops  GI: : Soft, Nontender, Nondistended; Bowel sounds present  NERVOUS SYSTEM:  Alert & awake  EXTREMITIES: Mild  edema  LYMPH: No lymphadenopathy noted  SKIN: No rashes or lesions  ENDOCRINOLOGY: No Thyromegaly  PSYCH: Appropriate    Labs:  28                            11.5   6.37  )-----------( 252      ( 01 Jan 2024 07:33 )             37.1     01-02    141  |  101  |  41<H>  ----------------------------<  106<H>  4.1   |  32<H>  |  2.39<H>  01-01    139  |  99  |  40<H>  ----------------------------<  140<H>  4.3   |  29  |  2.64<H>  12-31    141  |  99  |  38<H>  ----------------------------<  155<H>  3.9   |  30  |  2.61<H>  12-30    141  |  100  |  28<H>  ----------------------------<  177<H>  3.7   |  30  |  2.15<H>    Ca    9.0      02 Jan 2024 06:51  Ca    9.5      01 Jan 2024 07:33    TPro  5.9<L>  /  Alb  3.0<L>  /  TBili  0.3  /  DBili  x   /  AST  12  /  ALT  9<L>  /  AlkPhos  56  12-31  TPro  6.2  /  Alb  3.2<L>  /  TBili  0.4  /  DBili  x   /  AST  12  /  ALT  10  /  AlkPhos  64  12-30    CAPILLARY BLOOD GLUCOSE      POCT Blood Glucose.: 290 mg/dL (02 Jan 2024 16:47)  POCT Blood Glucose.: 117 mg/dL (02 Jan 2024 12:17)  POCT Blood Glucose.: 107 mg/dL (02 Jan 2024 08:02)  POCT Blood Glucose.: 138 mg/dL (01 Jan 2024 22:54)  POCT Blood Glucose.: 151 mg/dL (01 Jan 2024 17:21)          Urinalysis Basic - ( 02 Jan 2024 06:51 )    Color: x / Appearance: x / SG: x / pH: x  Gluc: 106 mg/dL / Ketone: x  / Bili: x / Urobili: x   Blood: x / Protein: x / Nitrite: x   Leuk Esterase: x / RBC: x / WBC x   Sq Epi: x / Non Sq Epi: x / Bacteria: x      Lactate, Blood: 0.8 mmol/L (12-30 @ 06:37)        RECENT CULTURES:  12-28 @ 06:20 .Blood Blood-Peripheral            rad< from: [facility] Duplex Lower Ext Vein Scan, Bilat (12.29.23 @ 09:51) >    LEFT:  Normal compressibility of the LEFT common femoral, femoral and popliteal   veins.  Doppler examination shows normal spontaneous and phasic flow.  No LEFT calf vein thrombosis is detected.    IMPRESSION:  No evidence of deep venous thrombosis in either lower extremity.            --- End of Report ---            NICK MICHELLE MD; Attending Radiologist  This document has been electronically signed. Dec 29 2023 10:40AM    < end of copied text >  < from: CT Chest No Cont (12.28.23 @ 16:28) >  partial atelectasis both lower lobes, greater on the left. A few patchy   and clustered opacities within the aerated left lower and upper lobes.    MEDIASTINUM AND JAJA:Mildly enlarged mediastinal lymph nodes.    HEART AND VESSELS: Heart is enlarged No pericardial effusion. Coronary   artery and aortic atherosclerosis. Aortic valve calcification.    VISUALIZED UPPER ABDOMEN: Within normal limits.    CHEST WALL AND BONES: Degenerative changes thoracic spine.    IMPRESSION:    Tracheobronchial secretions and moderate bilateral pleural effusions with   partial atelectasis both lower lobes with or without superimposed left   lung infectious/inflammatory process.    --- End of Report ---          HELGA BELL MD; Resident Radiologist  This document has been electronically signed.  EDI MARCELINO MD; Attending Radiologist  This document has been electronically signed. Dec 28 2023  5:15PM    < end of copied text >      No growth at 5 days          RESPIRATORY CULTURES:        rad< from: CT Chest No Cont (12.28.23 @ 16:28) >      < end of copied text >    Studies  Chest X-RAY  CT SCAN Chest   Venous Dopplers: LE:   CT Abdomen  Others

## 2024-01-02 NOTE — SWALLOW BEDSIDE ASSESSMENT ADULT - ADDITIONAL RECOMMENDATIONS
GOAL- Pt to tolerate recommended textures during course w/ no s/sx of aspiration Pt/family/caregiver will demonstrate understanding and carryover of dysphagia management (safe swallow guidelines, dysphagia diet). [id number]

## 2024-01-02 NOTE — SWALLOW BEDSIDE ASSESSMENT ADULT - NS SPL SWALLOW CLINIC TRIAL FT
audible deglutition with thin liquids   no overt s/sx of aspiration    can not r/o at bedside   given family concern for aspiration and current admission rec for objective testing to guide SLP intervention and OP care.

## 2024-01-02 NOTE — CHART NOTE - NSCHARTNOTEFT_GEN_A_CORE
Patient will require a semi electric hospital bed due to acute exacerbation of CHF. Patient requires the head of the bed to be elevated more than 30 degrees. Pillows and wedges are not effective. Patient requires positioning of the body in ways not feasible with an ordinary bed. Patient requires frequent repositioning in order to alleviate pain. The member can independently affect the adjustment by operating the control. Patient will require a semi electric hospital bed due to acute exacerbation of CHF. Patient requires the head of the bed to be elevated more than 30 degrees. Pillows and wedges are not effective. Patient requires positioning of the body in ways not feasible with an ordinary bed. Patient requires frequent repositioning in order to alleviate pain. The member can independently affect the adjustment by operating the control.    Kacey Pascal PA-C  Dept of Medicine Patient will require a semi electric hospital bed due to acute exacerbation of CHF. Patient requires the head of the bed to be elevated more than 30 degrees. Pillows and wedges are not effective. Patient requires positioning of the body in ways not feasible with an ordinary bed. Patient requires frequent repositioning in order to alleviate pain. Pt requires Semi electric hospital bed with gel overlay: the beneficiary has limited mobility ie beneficiary cannot independently make changes in body position significant enough to alleviate pressure.    Kacey Pascal PA-C  Dept of Medicine

## 2024-01-02 NOTE — PROGRESS NOTE ADULT - SUBJECTIVE AND OBJECTIVE BOX
Subjective: Patient seen and examined. No new events except as noted.   remains on NC oxygen   feels ok   wants to go home     REVIEW OF SYSTEMS:    CONSTITUTIONAL: + weakness, fevers or chills  EYES/ENT: No visual changes;  No vertigo or throat pain   NECK: No pain or stiffness  RESPIRATORY: No cough, wheezing, hemoptysis; No shortness of breath  CARDIOVASCULAR: No chest pain or palpitations  GASTROINTESTINAL: No abdominal or epigastric pain. No nausea, vomiting, or hematemesis; No diarrhea or constipation. No melena or hematochezia.  GENITOURINARY: No dysuria, frequency or hematuria  NEUROLOGICAL: No numbness or weakness  SKIN: No itching, burning, rashes, or lesions   All other review of systems is negative unless indicated above.    MEDICATIONS:  MEDICATIONS  (STANDING):  albuterol/ipratropium for Nebulization 3 milliLiter(s) Nebulizer every 6 hours  apixaban 5 milliGRAM(s) Oral every 12 hours  atorvastatin 80 milliGRAM(s) Oral at bedtime  chlorhexidine 2% Cloths 1 Application(s) Topical daily  dextrose 5%. 1000 milliLiter(s) (50 mL/Hr) IV Continuous <Continuous>  dextrose 5%. 1000 milliLiter(s) (100 mL/Hr) IV Continuous <Continuous>  dextrose 50% Injectable 25 Gram(s) IV Push once  dextrose 50% Injectable 12.5 Gram(s) IV Push once  dextrose 50% Injectable 25 Gram(s) IV Push once  escitalopram 20 milliGRAM(s) Oral daily  glucagon  Injectable 1 milliGRAM(s) IntraMuscular once  guaiFENesin ER 1200 milliGRAM(s) Oral every 12 hours  insulin glargine Injectable (LANTUS) 10 Unit(s) SubCutaneous at bedtime  insulin lispro (ADMELOG) corrective regimen sliding scale   SubCutaneous three times a day before meals  insulin lispro (ADMELOG) corrective regimen sliding scale   SubCutaneous at bedtime  lisinopril 40 milliGRAM(s) Oral daily  metoclopramide 10 milliGRAM(s) Oral three times a day before meals  metoprolol succinate  milliGRAM(s) Oral daily  pantoprazole    Tablet 40 milliGRAM(s) Oral before breakfast  piperacillin/tazobactam IVPB.. 3.375 Gram(s) IV Intermittent every 8 hours      PHYSICAL EXAM:  T(C): 36.3 (01-02-24 @ 05:15), Max: 36.6 (01-01-24 @ 10:27)  HR: 65 (01-02-24 @ 06:15) (61 - 71)  BP: 156/74 (01-02-24 @ 05:15) (154/76 - 176/75)  RR: 18 (01-02-24 @ 05:15) (18 - 19)  SpO2: 95% (01-02-24 @ 06:15) (93% - 97%)  Wt(kg): --  I&O's Summary    01 Jan 2024 07:01  -  02 Jan 2024 07:00  --------------------------------------------------------  IN: 120 mL / OUT: 600 mL / NET: -480 mL          Appearance: Normal	  HEENT:   Normal oral mucosa, PERRL, EOMI	  Lymphatic: No lymphadenopathy , no edema  Cardiovascular: Normal S1 S2, No JVD, No murmurs , Peripheral pulses palpable 2+ bilaterally  Respiratory: decreased bs   Gastrointestinal:  Soft, Non-tender, + BS	  Skin: No rashes, No ecchymoses, No cyanosis, warm to touch  Musculoskeletal: Normal range of motion, normal strength  Psychiatry:  Mood & affect appropriate  Ext: No edema      LABS:    CARDIAC MARKERS:                                11.5   6.37  )-----------( 252      ( 01 Jan 2024 07:33 )             37.1     01-02    141  |  101  |  41<H>  ----------------------------<  106<H>  4.1   |  32<H>  |  2.39<H>    Ca    9.0      02 Jan 2024 06:51      proBNP:   Lipid Profile:   HgA1c:   TSH:             TELEMETRY: 	    ECG:  	  RADIOLOGY:   DIAGNOSTIC TESTING:  [ ] Echocardiogram:  [ ]  Catheterization:  [ ] Stress Test:    OTHER:

## 2024-01-02 NOTE — SWALLOW BEDSIDE ASSESSMENT ADULT - SLP GENERAL OBSERVATIONS
Encountered OOB in ashley-chair. NC+. NAD. Family bedside. Pt L-handed. Following discourse and one step commands.

## 2024-01-02 NOTE — PROGRESS NOTE ADULT - SUBJECTIVE AND OBJECTIVE BOX
Patient is a 73y old  Male who presents with a chief complaint of shortness of breath (02 Jan 2024 09:52)      DATE OF SERVICE: 01-02-24 @ 15:39    SUBJECTIVE / OVERNIGHT EVENTS: overnight events noted    ROS:  Resp: No cough no sputum production  CVS: No chest pain no palpitations no orthopnea  GI: no N/V/D        MEDICATIONS  (STANDING):  albuterol/ipratropium for Nebulization 3 milliLiter(s) Nebulizer every 6 hours  apixaban 5 milliGRAM(s) Oral every 12 hours  atorvastatin 80 milliGRAM(s) Oral at bedtime  chlorhexidine 2% Cloths 1 Application(s) Topical daily  dextrose 5%. 1000 milliLiter(s) (100 mL/Hr) IV Continuous <Continuous>  dextrose 5%. 1000 milliLiter(s) (50 mL/Hr) IV Continuous <Continuous>  dextrose 50% Injectable 12.5 Gram(s) IV Push once  dextrose 50% Injectable 25 Gram(s) IV Push once  dextrose 50% Injectable 25 Gram(s) IV Push once  escitalopram 20 milliGRAM(s) Oral daily  glucagon  Injectable 1 milliGRAM(s) IntraMuscular once  guaiFENesin ER 1200 milliGRAM(s) Oral every 12 hours  insulin glargine Injectable (LANTUS) 10 Unit(s) SubCutaneous at bedtime  insulin lispro (ADMELOG) corrective regimen sliding scale   SubCutaneous three times a day before meals  insulin lispro (ADMELOG) corrective regimen sliding scale   SubCutaneous at bedtime  lisinopril 40 milliGRAM(s) Oral daily  metoclopramide 10 milliGRAM(s) Oral three times a day before meals  metoprolol succinate  milliGRAM(s) Oral daily  pantoprazole    Tablet 40 milliGRAM(s) Oral before breakfast  piperacillin/tazobactam IVPB.. 3.375 Gram(s) IV Intermittent every 8 hours    MEDICATIONS  (PRN):  dextrose Oral Gel 15 Gram(s) Oral once PRN Blood Glucose LESS THAN 70 milliGRAM(s)/deciliter        CAPILLARY BLOOD GLUCOSE      POCT Blood Glucose.: 117 mg/dL (02 Jan 2024 12:17)  POCT Blood Glucose.: 107 mg/dL (02 Jan 2024 08:02)  POCT Blood Glucose.: 138 mg/dL (01 Jan 2024 22:54)  POCT Blood Glucose.: 151 mg/dL (01 Jan 2024 17:21)    I&O's Summary    01 Jan 2024 07:01  -  02 Jan 2024 07:00  --------------------------------------------------------  IN: 120 mL / OUT: 600 mL / NET: -480 mL    02 Jan 2024 07:01  -  02 Jan 2024 15:39  --------------------------------------------------------  IN: 140 mL / OUT: 201 mL / NET: -61 mL        Vital Signs Last 24 Hrs  T(C): 36.5 (02 Jan 2024 11:34), Max: 36.5 (02 Jan 2024 11:34)  T(F): 97.7 (02 Jan 2024 11:34), Max: 97.7 (02 Jan 2024 11:34)  HR: 55 (02 Jan 2024 11:34) (55 - 71)  BP: 162/76 (02 Jan 2024 11:34) (156/74 - 176/75)  BP(mean): --  RR: 18 (02 Jan 2024 11:34) (18 - 18)  SpO2: 92% (02 Jan 2024 11:34) (92% - 99%)      PHYSICAL EXAM:  CHEST/LUNG: clear  HEART: S1 S2; systolic murmur +   ABDOMEN: Soft, Nontender,  EXTREMITIES:  resolved edema  NEUROLOGY: alert  non-focal  SKIN: No rashes or lesions    LABS:                        11.5   6.37  )-----------( 252      ( 01 Jan 2024 07:33 )             37.1     01-02    141  |  101  |  41<H>  ----------------------------<  106<H>  4.1   |  32<H>  |  2.39<H>    Ca    9.0      02 Jan 2024 06:51            Urinalysis Basic - ( 02 Jan 2024 06:51 )    Color: x / Appearance: x / SG: x / pH: x  Gluc: 106 mg/dL / Ketone: x  / Bili: x / Urobili: x   Blood: x / Protein: x / Nitrite: x   Leuk Esterase: x / RBC: x / WBC x   Sq Epi: x / Non Sq Epi: x / Bacteria: x          All consultant(s) notes reviewed and care discussed with other providers        Contact Number, Dr Nieto [phone] Patient is a 73y old  Male who presents with a chief complaint of shortness of breath (02 Jan 2024 09:52)      DATE OF SERVICE: 01-02-24 @ 15:39    SUBJECTIVE / OVERNIGHT EVENTS: overnight events noted    ROS:  Resp: No cough no sputum production  CVS: No chest pain no palpitations no orthopnea  GI: no N/V/D        MEDICATIONS  (STANDING):  albuterol/ipratropium for Nebulization 3 milliLiter(s) Nebulizer every 6 hours  apixaban 5 milliGRAM(s) Oral every 12 hours  atorvastatin 80 milliGRAM(s) Oral at bedtime  chlorhexidine 2% Cloths 1 Application(s) Topical daily  dextrose 5%. 1000 milliLiter(s) (100 mL/Hr) IV Continuous <Continuous>  dextrose 5%. 1000 milliLiter(s) (50 mL/Hr) IV Continuous <Continuous>  dextrose 50% Injectable 12.5 Gram(s) IV Push once  dextrose 50% Injectable 25 Gram(s) IV Push once  dextrose 50% Injectable 25 Gram(s) IV Push once  escitalopram 20 milliGRAM(s) Oral daily  glucagon  Injectable 1 milliGRAM(s) IntraMuscular once  guaiFENesin ER 1200 milliGRAM(s) Oral every 12 hours  insulin glargine Injectable (LANTUS) 10 Unit(s) SubCutaneous at bedtime  insulin lispro (ADMELOG) corrective regimen sliding scale   SubCutaneous three times a day before meals  insulin lispro (ADMELOG) corrective regimen sliding scale   SubCutaneous at bedtime  lisinopril 40 milliGRAM(s) Oral daily  metoclopramide 10 milliGRAM(s) Oral three times a day before meals  metoprolol succinate  milliGRAM(s) Oral daily  pantoprazole    Tablet 40 milliGRAM(s) Oral before breakfast  piperacillin/tazobactam IVPB.. 3.375 Gram(s) IV Intermittent every 8 hours    MEDICATIONS  (PRN):  dextrose Oral Gel 15 Gram(s) Oral once PRN Blood Glucose LESS THAN 70 milliGRAM(s)/deciliter        CAPILLARY BLOOD GLUCOSE      POCT Blood Glucose.: 117 mg/dL (02 Jan 2024 12:17)  POCT Blood Glucose.: 107 mg/dL (02 Jan 2024 08:02)  POCT Blood Glucose.: 138 mg/dL (01 Jan 2024 22:54)  POCT Blood Glucose.: 151 mg/dL (01 Jan 2024 17:21)    I&O's Summary    01 Jan 2024 07:01  -  02 Jan 2024 07:00  --------------------------------------------------------  IN: 120 mL / OUT: 600 mL / NET: -480 mL    02 Jan 2024 07:01  -  02 Jan 2024 15:39  --------------------------------------------------------  IN: 140 mL / OUT: 201 mL / NET: -61 mL        Vital Signs Last 24 Hrs  T(C): 36.5 (02 Jan 2024 11:34), Max: 36.5 (02 Jan 2024 11:34)  T(F): 97.7 (02 Jan 2024 11:34), Max: 97.7 (02 Jan 2024 11:34)  HR: 55 (02 Jan 2024 11:34) (55 - 71)  BP: 162/76 (02 Jan 2024 11:34) (156/74 - 176/75)  BP(mean): --  RR: 18 (02 Jan 2024 11:34) (18 - 18)  SpO2: 92% (02 Jan 2024 11:34) (92% - 99%)      PHYSICAL EXAM:  CHEST/LUNG: clear  HEART: S1 S2; systolic murmur +   ABDOMEN: Soft, Nontender,  EXTREMITIES:  resolved edema  NEUROLOGY: alert  non-focal  SKIN: No rashes or lesions    LABS:                        11.5   6.37  )-----------( 252      ( 01 Jan 2024 07:33 )             37.1     01-02    141  |  101  |  41<H>  ----------------------------<  106<H>  4.1   |  32<H>  |  2.39<H>    Ca    9.0      02 Jan 2024 06:51            Urinalysis Basic - ( 02 Jan 2024 06:51 )    Color: x / Appearance: x / SG: x / pH: x  Gluc: 106 mg/dL / Ketone: x  / Bili: x / Urobili: x   Blood: x / Protein: x / Nitrite: x   Leuk Esterase: x / RBC: x / WBC x   Sq Epi: x / Non Sq Epi: x / Bacteria: x          All consultant(s) notes reviewed and care discussed with other providers        Contact Number, Dr Nieto [phone]

## 2024-01-02 NOTE — SWALLOW BEDSIDE ASSESSMENT ADULT - SWALLOW EVAL: DIAGNOSIS
Pt p/w difficulty breathing clinical presentation consistent with acute on chronic systolic heart failure; CXR with patchy infiltrates c/f pna & pulm edema. Family concerned for aspiration. Known to SLP department from prior admission; history of dysphagia. P/w an oropharyngeal dysphagia with reduced control of bolus and suspected latency in the swallow trigger, no overt s/sx of aspiration however can not r/o silent aspiration at bedside. Given family concern recommendation for objective testing to guide poc and SLP intervention during course and at time of d/c.

## 2024-01-02 NOTE — SWALLOW BEDSIDE ASSESSMENT ADULT - H & P REVIEW
FRANKO SPANN is a 73-year-old male with a past medical history of hypertension, hyperlipidemia, diabetes presenting difficulty breathing. Had generalized weakness yesterday. Patient woke up in the middle the night with shortness of breath and pressed life alert button. Patient brought in by EMS with low oxygen saturation And increased work of breathing. Currently states feels better though still on BiPAP. wife at bedside provided most of the collateral history/yes FRANKO SPANN is a 73-year-old male with a past medical history of hypertension, hyperlipidemia, diabetes presenting difficulty breathing. Had generalized weakness yesterday. Patient woke up in the middle the night with shortness of breath and pressed life alert button. Patient brought in by EMS with low oxygen saturation And increased work of breathing./yes

## 2024-01-03 ENCOUNTER — TRANSCRIPTION ENCOUNTER (OUTPATIENT)
Age: 74
End: 2024-01-03

## 2024-01-03 LAB
ANION GAP SERPL CALC-SCNC: 9 MMOL/L — SIGNIFICANT CHANGE UP (ref 5–17)
ANION GAP SERPL CALC-SCNC: 9 MMOL/L — SIGNIFICANT CHANGE UP (ref 5–17)
BUN SERPL-MCNC: 43 MG/DL — HIGH (ref 7–23)
BUN SERPL-MCNC: 43 MG/DL — HIGH (ref 7–23)
CALCIUM SERPL-MCNC: 9.2 MG/DL — SIGNIFICANT CHANGE UP (ref 8.4–10.5)
CALCIUM SERPL-MCNC: 9.2 MG/DL — SIGNIFICANT CHANGE UP (ref 8.4–10.5)
CHLORIDE SERPL-SCNC: 101 MMOL/L — SIGNIFICANT CHANGE UP (ref 96–108)
CHLORIDE SERPL-SCNC: 101 MMOL/L — SIGNIFICANT CHANGE UP (ref 96–108)
CO2 SERPL-SCNC: 28 MMOL/L — SIGNIFICANT CHANGE UP (ref 22–31)
CO2 SERPL-SCNC: 28 MMOL/L — SIGNIFICANT CHANGE UP (ref 22–31)
CREAT SERPL-MCNC: 2.37 MG/DL — HIGH (ref 0.5–1.3)
CREAT SERPL-MCNC: 2.37 MG/DL — HIGH (ref 0.5–1.3)
EGFR: 28 ML/MIN/1.73M2 — LOW
EGFR: 28 ML/MIN/1.73M2 — LOW
GLUCOSE BLDC GLUCOMTR-MCNC: 124 MG/DL — HIGH (ref 70–99)
GLUCOSE BLDC GLUCOMTR-MCNC: 124 MG/DL — HIGH (ref 70–99)
GLUCOSE BLDC GLUCOMTR-MCNC: 177 MG/DL — HIGH (ref 70–99)
GLUCOSE BLDC GLUCOMTR-MCNC: 177 MG/DL — HIGH (ref 70–99)
GLUCOSE BLDC GLUCOMTR-MCNC: 198 MG/DL — HIGH (ref 70–99)
GLUCOSE BLDC GLUCOMTR-MCNC: 198 MG/DL — HIGH (ref 70–99)
GLUCOSE BLDC GLUCOMTR-MCNC: 274 MG/DL — HIGH (ref 70–99)
GLUCOSE BLDC GLUCOMTR-MCNC: 274 MG/DL — HIGH (ref 70–99)
GLUCOSE SERPL-MCNC: 97 MG/DL — SIGNIFICANT CHANGE UP (ref 70–99)
GLUCOSE SERPL-MCNC: 97 MG/DL — SIGNIFICANT CHANGE UP (ref 70–99)
HCT VFR BLD CALC: 36.2 % — LOW (ref 39–50)
HCT VFR BLD CALC: 36.2 % — LOW (ref 39–50)
HGB BLD-MCNC: 11.2 G/DL — LOW (ref 13–17)
HGB BLD-MCNC: 11.2 G/DL — LOW (ref 13–17)
MCHC RBC-ENTMCNC: 28.8 PG — SIGNIFICANT CHANGE UP (ref 27–34)
MCHC RBC-ENTMCNC: 28.8 PG — SIGNIFICANT CHANGE UP (ref 27–34)
MCHC RBC-ENTMCNC: 30.9 GM/DL — LOW (ref 32–36)
MCHC RBC-ENTMCNC: 30.9 GM/DL — LOW (ref 32–36)
MCV RBC AUTO: 93.1 FL — SIGNIFICANT CHANGE UP (ref 80–100)
MCV RBC AUTO: 93.1 FL — SIGNIFICANT CHANGE UP (ref 80–100)
NRBC # BLD: 0 /100 WBCS — SIGNIFICANT CHANGE UP (ref 0–0)
NRBC # BLD: 0 /100 WBCS — SIGNIFICANT CHANGE UP (ref 0–0)
PLATELET # BLD AUTO: 273 K/UL — SIGNIFICANT CHANGE UP (ref 150–400)
PLATELET # BLD AUTO: 273 K/UL — SIGNIFICANT CHANGE UP (ref 150–400)
POTASSIUM SERPL-MCNC: 4.4 MMOL/L — SIGNIFICANT CHANGE UP (ref 3.5–5.3)
POTASSIUM SERPL-MCNC: 4.4 MMOL/L — SIGNIFICANT CHANGE UP (ref 3.5–5.3)
POTASSIUM SERPL-SCNC: 4.4 MMOL/L — SIGNIFICANT CHANGE UP (ref 3.5–5.3)
POTASSIUM SERPL-SCNC: 4.4 MMOL/L — SIGNIFICANT CHANGE UP (ref 3.5–5.3)
RBC # BLD: 3.89 M/UL — LOW (ref 4.2–5.8)
RBC # BLD: 3.89 M/UL — LOW (ref 4.2–5.8)
RBC # FLD: 12.4 % — SIGNIFICANT CHANGE UP (ref 10.3–14.5)
RBC # FLD: 12.4 % — SIGNIFICANT CHANGE UP (ref 10.3–14.5)
SODIUM SERPL-SCNC: 138 MMOL/L — SIGNIFICANT CHANGE UP (ref 135–145)
SODIUM SERPL-SCNC: 138 MMOL/L — SIGNIFICANT CHANGE UP (ref 135–145)
WBC # BLD: 6.04 K/UL — SIGNIFICANT CHANGE UP (ref 3.8–10.5)
WBC # BLD: 6.04 K/UL — SIGNIFICANT CHANGE UP (ref 3.8–10.5)
WBC # FLD AUTO: 6.04 K/UL — SIGNIFICANT CHANGE UP (ref 3.8–10.5)
WBC # FLD AUTO: 6.04 K/UL — SIGNIFICANT CHANGE UP (ref 3.8–10.5)

## 2024-01-03 PROCEDURE — 74230 X-RAY XM SWLNG FUNCJ C+: CPT | Mod: 26

## 2024-01-03 RX ADMIN — ESCITALOPRAM OXALATE 20 MILLIGRAM(S): 10 TABLET, FILM COATED ORAL at 13:10

## 2024-01-03 RX ADMIN — Medication 10 MILLIGRAM(S): at 17:58

## 2024-01-03 RX ADMIN — Medication 3 MILLILITER(S): at 06:14

## 2024-01-03 RX ADMIN — CHLORHEXIDINE GLUCONATE 1 APPLICATION(S): 213 SOLUTION TOPICAL at 13:11

## 2024-01-03 RX ADMIN — APIXABAN 5 MILLIGRAM(S): 2.5 TABLET, FILM COATED ORAL at 17:58

## 2024-01-03 RX ADMIN — PANTOPRAZOLE SODIUM 40 MILLIGRAM(S): 20 TABLET, DELAYED RELEASE ORAL at 06:14

## 2024-01-03 RX ADMIN — Medication 2: at 13:10

## 2024-01-03 RX ADMIN — Medication 100 MILLIGRAM(S): at 06:14

## 2024-01-03 RX ADMIN — Medication 10 MILLIGRAM(S): at 13:09

## 2024-01-03 RX ADMIN — INSULIN GLARGINE 10 UNIT(S): 100 INJECTION, SOLUTION SUBCUTANEOUS at 21:59

## 2024-01-03 RX ADMIN — PIPERACILLIN AND TAZOBACTAM 25 GRAM(S): 4; .5 INJECTION, POWDER, LYOPHILIZED, FOR SOLUTION INTRAVENOUS at 13:20

## 2024-01-03 RX ADMIN — PIPERACILLIN AND TAZOBACTAM 25 GRAM(S): 4; .5 INJECTION, POWDER, LYOPHILIZED, FOR SOLUTION INTRAVENOUS at 21:59

## 2024-01-03 RX ADMIN — PIPERACILLIN AND TAZOBACTAM 25 GRAM(S): 4; .5 INJECTION, POWDER, LYOPHILIZED, FOR SOLUTION INTRAVENOUS at 06:14

## 2024-01-03 RX ADMIN — Medication 3 MILLILITER(S): at 13:09

## 2024-01-03 RX ADMIN — Medication 6: at 17:57

## 2024-01-03 RX ADMIN — LISINOPRIL 40 MILLIGRAM(S): 2.5 TABLET ORAL at 06:15

## 2024-01-03 RX ADMIN — APIXABAN 5 MILLIGRAM(S): 2.5 TABLET, FILM COATED ORAL at 06:16

## 2024-01-03 RX ADMIN — Medication 1200 MILLIGRAM(S): at 06:15

## 2024-01-03 RX ADMIN — ATORVASTATIN CALCIUM 80 MILLIGRAM(S): 80 TABLET, FILM COATED ORAL at 21:59

## 2024-01-03 RX ADMIN — Medication 3 MILLILITER(S): at 17:57

## 2024-01-03 RX ADMIN — Medication 10 MILLIGRAM(S): at 06:15

## 2024-01-03 NOTE — SWALLOW VFSS/MBS ASSESSMENT ADULT - H & P REVIEW
FRANKO SPANN is a 73-year-old male with a past medical history of hypertension, hyperlipidemia, diabetes presenting difficulty breathing. Had generalized weakness yesterday. Patient woke up in the middle the night with shortness of breath and pressed life alert button. Patient brought in by EMS with low oxygen saturation And increased work of breathing./yes

## 2024-01-03 NOTE — SWALLOW VFSS/MBS ASSESSMENT ADULT - ADDITIONAL INFORMATION
+ Calcifications of the laryngeal structures  + ?CP bar vs other at level of C5/C6. Trace retention of material (across trials) noted

## 2024-01-03 NOTE — SWALLOW VFSS/MBS ASSESSMENT ADULT - ROSENBEK'S PENETRATION ASPIRATION SCALE
(2) contrast enters airway, remains above the vocal cords, no residue remains (penetration) (8) contrast passes glottis, visible subglottic residue remains, absent patient response (aspiration) (1) no aspiration, contrast does not enter airway

## 2024-01-03 NOTE — SWALLOW VFSS/MBS ASSESSMENT ADULT - COMMENTS
Continued history:   -CXR with patchy infiltrates c/f pna & pulm edema  12/31/23: Event note: "Pt admitted for Acute respiratory failure with hypoxia and hypercapnia transitioned to NC 6l NC however has multiple hypoxic episodes (mid to high 80's) during sleep but recovers when awaken."    Speech & Swallow ; KNOWN TO DEPT.   -9/2023 for clinical bedside swallow evaluations with rec for objective testing  -9/22/2023-FEES, rec for puree and moderately thick liquids via tsp   -9/26/2023-MBS, regular solids, thin liquids " + Calcifications of the laryngeal structures  + ?CP bar vs other at level of C5/C6. Trace retention of material (across trials) noted above  + Cough throughout PO trials, similar to baseline cough, not consistently correlated to episodes of laryngeal penetration" 1) Liquids via single, small sips - no straws 2) Intermittent throat clear throughout meals 3) Repeat swallow. "Pt presents with an oropharyngeal dysphagia, with swallow function improved from prior exam. There is adequate mastication and oral transfer of all textures. Premature spillage is seen with thin liquids. Laryngeal vestibule closure is reduced, resulting in laryngeal penetration of solids and thin liquids. Material is retrieved during the swallow or during subsequent swallows. Depth/ amount of laryngeal penetration for thin liquids is reduced with use of small, single cup sips. No aspiration observed across exam. Moderate pharyngeal residue with purees and solids is reduced with repeat swallow."  -12/7/23- speech and language evaluation completed. see report.     Family reporting, during initial evaluation, pt with increase in dysphagia from prior admission with frequent coughing and concern for aspiration. Pt with limited ability to adhere to recommendations for single, small sips per family as typically will take multiple large sips. Observed straw in bottle at bedside. Additionally, reports that SLP was initiated at home for dysphagia and requesting repeat MBS.

## 2024-01-03 NOTE — PROGRESS NOTE ADULT - SUBJECTIVE AND OBJECTIVE BOX
Patient is a 73y old  Male who presents with a chief complaint of shortness of breath (03 Jan 2024)      DATE OF SERVICE: 01-03-24    SUBJECTIVE / OVERNIGHT EVENTS: overnight events noted    ROS:  Resp: No cough no sputum production  CVS: No chest pain no palpitations no orthopnea  GI: no N/V/D  "I want to go home"         MEDICATIONS  (STANDING):  albuterol/ipratropium for Nebulization 3 milliLiter(s) Nebulizer every 6 hours  amLODIPine   Tablet 5 milliGRAM(s) Oral daily  apixaban 5 milliGRAM(s) Oral every 12 hours  atorvastatin 80 milliGRAM(s) Oral at bedtime  chlorhexidine 2% Cloths 1 Application(s) Topical daily  dextrose 5%. 1000 milliLiter(s) (50 mL/Hr) IV Continuous <Continuous>  dextrose 5%. 1000 milliLiter(s) (100 mL/Hr) IV Continuous <Continuous>  dextrose 50% Injectable 25 Gram(s) IV Push once  dextrose 50% Injectable 25 Gram(s) IV Push once  dextrose 50% Injectable 12.5 Gram(s) IV Push once  escitalopram 20 milliGRAM(s) Oral daily  glucagon  Injectable 1 milliGRAM(s) IntraMuscular once  insulin glargine Injectable (LANTUS) 10 Unit(s) SubCutaneous at bedtime  insulin lispro (ADMELOG) corrective regimen sliding scale   SubCutaneous three times a day before meals  insulin lispro (ADMELOG) corrective regimen sliding scale   SubCutaneous at bedtime  lisinopril 40 milliGRAM(s) Oral daily  metoclopramide 10 milliGRAM(s) Oral three times a day before meals  metoprolol succinate  milliGRAM(s) Oral daily  pantoprazole    Tablet 40 milliGRAM(s) Oral before breakfast    MEDICATIONS  (PRN):  dextrose Oral Gel 15 Gram(s) Oral once PRN Blood Glucose LESS THAN 70 milliGRAM(s)/deciliter        Vital Signs Last 24 Hrs  T(C): 36.3 (03 Jan 2024 11:49), Max: 36.5 (03 Jan 2024 04:25)  T(F): 97.4 (03 Jan 2024 11:49), Max: 97.7 (03 Jan 2024 04:25)  HR: 96 (03 Jan 2024 11:49) (57 - 96)  BP: 168/75 (03 Jan 2024 11:49) (163/79 - 168/75)  BP(mean): --  RR: 18 (03 Jan 2024 11:49) (18 - 18)  SpO2: 95% (03 Jan 2024 11:49) (95% - 98%)    Parameters below as of 03 Jan 2024 04:25  Patient On (Oxygen Delivery Method): room air        I&O's Summary    02 Jan 2024 07:01  -  03 Jan 2024 07:00  --------------------------------------------------------  IN: 140 mL / OUT: 601 mL / NET: -461 mL    PHYSICAL EXAM:  CHEST/LUNG: clear  HEART: S1 S2; systolic murmur +   ABDOMEN: Soft, Nontender,  EXTREMITIES:  no edema  NEUROLOGY: alert  non-focal  SKIN: No rashes or lesions    LABS:                        11.2   6.04  )-----------( 273      ( 03 Jan 2024 07:12 )             36.2             All consultant(s) notes reviewed and care discussed with other providers        Contact Number, Dr Nieto [phone] Patient is a 73y old  Male who presents with a chief complaint of shortness of breath (03 Jan 2024)      DATE OF SERVICE: 01-03-24    SUBJECTIVE / OVERNIGHT EVENTS: overnight events noted    ROS:  Resp: No cough no sputum production  CVS: No chest pain no palpitations no orthopnea  GI: no N/V/D  "I want to go home"         MEDICATIONS  (STANDING):  albuterol/ipratropium for Nebulization 3 milliLiter(s) Nebulizer every 6 hours  amLODIPine   Tablet 5 milliGRAM(s) Oral daily  apixaban 5 milliGRAM(s) Oral every 12 hours  atorvastatin 80 milliGRAM(s) Oral at bedtime  chlorhexidine 2% Cloths 1 Application(s) Topical daily  dextrose 5%. 1000 milliLiter(s) (50 mL/Hr) IV Continuous <Continuous>  dextrose 5%. 1000 milliLiter(s) (100 mL/Hr) IV Continuous <Continuous>  dextrose 50% Injectable 25 Gram(s) IV Push once  dextrose 50% Injectable 25 Gram(s) IV Push once  dextrose 50% Injectable 12.5 Gram(s) IV Push once  escitalopram 20 milliGRAM(s) Oral daily  glucagon  Injectable 1 milliGRAM(s) IntraMuscular once  insulin glargine Injectable (LANTUS) 10 Unit(s) SubCutaneous at bedtime  insulin lispro (ADMELOG) corrective regimen sliding scale   SubCutaneous three times a day before meals  insulin lispro (ADMELOG) corrective regimen sliding scale   SubCutaneous at bedtime  lisinopril 40 milliGRAM(s) Oral daily  metoclopramide 10 milliGRAM(s) Oral three times a day before meals  metoprolol succinate  milliGRAM(s) Oral daily  pantoprazole    Tablet 40 milliGRAM(s) Oral before breakfast    MEDICATIONS  (PRN):  dextrose Oral Gel 15 Gram(s) Oral once PRN Blood Glucose LESS THAN 70 milliGRAM(s)/deciliter        Vital Signs Last 24 Hrs  T(C): 36.3 (03 Jan 2024 11:49), Max: 36.5 (03 Jan 2024 04:25)  T(F): 97.4 (03 Jan 2024 11:49), Max: 97.7 (03 Jan 2024 04:25)  HR: 96 (03 Jan 2024 11:49) (57 - 96)  BP: 168/75 (03 Jan 2024 11:49) (163/79 - 168/75)  BP(mean): --  RR: 18 (03 Jan 2024 11:49) (18 - 18)  SpO2: 95% (03 Jan 2024 11:49) (95% - 98%)    Parameters below as of 03 Jan 2024 04:25  Patient On (Oxygen Delivery Method): room air        I&O's Summary    02 Jan 2024 07:01  -  03 Jan 2024 07:00  --------------------------------------------------------  IN: 140 mL / OUT: 601 mL / NET: -461 mL    PHYSICAL EXAM:  CHEST/LUNG: clear  HEART: S1 S2; systolic murmur +   ABDOMEN: Soft, Nontender,  EXTREMITIES:  no edema  NEUROLOGY: alert  non-focal  SKIN: No rashes or lesions    LABS:                        11.2   6.04  )-----------( 273      ( 03 Jan 2024 07:12 )             36.2             All consultant(s) notes reviewed and care discussed with other providers        Contact Number, Dr Nieto [phone]

## 2024-01-03 NOTE — SWALLOW VFSS/MBS ASSESSMENT ADULT - PHARYNGEAL PHASE COMMENTS
Pharyngeal phase c/b latency in the swallow trigger to the level of the valleculae with inconsistent spillover to the pyriform sinuses w/ less viscous liquids. Base of tongue/BoT retraction and pharyngeal constriction were reduced and resulted in reduced pharyngeal bolus propulsion. Mild-moderate vallecular residue present with partial to full clearance with repeat dry swallow.  Hyolaryngeal elevation and excursion were reduced. Adequate epiglottic inversion. Adequate relaxation of UES. No penetration or aspiration were visualized. Pharyngeal phase c/b latency in the swallow trigger to the level of the valleculae with inconsistent spillover to the pyriform sinuses w/ less viscous liquids. Base of tongue/BoT retraction and pharyngeal constriction were reduced and resulted in reduced pharyngeal bolus propulsion. Mild-moderate vallecular residue present with partial to full clearance with repeat dry swallow.  Hyolaryngeal elevation and excursion were reduced. Adequate epiglottic inversion. Adequate relaxation of UES. Inconsistent penetration which appeared to fully clear; trace. Pharyngeal phase c/b latency in the swallow trigger to the level of the valleculae with notable spillover to the pyriform sinuses w/ less viscous liquids. Base of tongue/BoT retraction and pharyngeal constriction were reduced and resulted in reduced pharyngeal bolus propulsion. Mild vallecular residue present with partial to full clearance with repeat dry swallow.  Hyolaryngeal elevation and excursion were reduced. Adequate epiglottic inversion. Adequate relaxation of UES. Appreciated bolus navigation onto arytenoids and/or AE folds with subsequent airway invasion, latency in the cough response present with partial ejection from airway.

## 2024-01-03 NOTE — CONSULT NOTE ADULT - SUBJECTIVE AND OBJECTIVE BOX
HPI: Mr. Rubi is a 73 year-old man with history of multiple medical issues including hypertension, type 2 diabetes mellitus, and cerebrovascular disease, who presented on 12/28/23 to the [facility] ER with acute-onset shortness of breath waking him up from sleep. He was diagnosed with pneumonia and placed on IV Zosyn. Since admission, Mr. Rubi's creatinine has risen from 1.8mg/dL to as high as 2.6mg/dL; in light of the BEAR on CKD, a renal consultation was requested.    Mr. Rubi remains on Lisinopril 40mg po daily. He received Lasix 80mg iv daily from 12/28-12/29/23; he has not received diuretics since then. Meds from home include Fosinopril 40qd, Jardiance 25qd, and Metformin; he is not on diuretics at home.    PAST MEDICAL & SURGICAL HISTORY:  HTN  HLD  DM2  BPH  CVA - 2017, 2021    Allergies  No Known Allergies    SOCIAL HISTORY:  Denies ETOh,Smoking,     FAMILY HISTORY:  No pertinent family history in first degree relatives    REVIEW OF SYSTEMS:  CONSTITUTIONAL: No weakness, fevers or chills  EYES/ENT: No visual changes;  No vertigo or throat pain   NECK: No pain or stiffness  RESPIRATORY: No cough, wheezing, hemoptysis; (+) shortness of breath  CARDIOVASCULAR: No chest pain or palpitations  GASTROINTESTINAL: No abdominal or epigastric pain. No nausea, vomiting, or hematemesis; No diarrhea or constipation. No melena or hematochezia.  GENITOURINARY: No dysuria, frequency or hematuria  NEUROLOGICAL: No numbness or weakness  SKIN: No itching, burning, rashes, or lesions   All other review of systems is negative unless indicated above.    VITAL:  T(C): , Max: 36.5 (01-03-24 @ 04:25)  T(F): , Max: 97.7 (01-03-24 @ 04:25)  HR: 96 (01-03-24 @ 11:49)  BP: 168/75 (01-03-24 @ 11:49)  BP(mean): --  RR: 18 (01-03-24 @ 11:49)  SpO2: 95% (01-03-24 @ 11:49)  Wt(kg): --    PHYSICAL EXAM:  Constitutional: NAD, Alert  HEENT: NCAT, MMM  Neck: Supple, No JVD  Respiratory: CTA-b/l  Cardiovascular: RRR s1s2, no m/r/g  Gastrointestinal: BS+, soft, NT/ND  Extremities: No peripheral edema b/l  Neurological: no focal deficits; strength grossly intact  Back: no CVAT b/l  Skin: No rashes, no nevi    LABS:                        11.2   6.04  )-----------( 273      ( 03 Jan 2024 07:12 )             36.2     Na(138)/K(4.4)/Cl(101)/HCO3(28)/BUN(43)/Cr(2.37)Glu(97)/Ca(9.2)/Mg(--)/PO4(--)    01-03 @ 07:11  Na(141)/K(4.1)/Cl(101)/HCO3(32)/BUN(41)/Cr(2.39)Glu(106)/Ca(9.0)/Mg(--)/PO4(--)    01-02 @ 06:51  Na(139)/K(4.3)/Cl(99)/HCO3(29)/BUN(40)/Cr(2.64)Glu(140)/Ca(9.5)/Mg(--)/PO4(--)    01-01 @ 07:33    (12/28/23) - BUN/creatinine: 28/1.82  (12/8/23) - BUN/creatinine: 16/1.64; UA-300prot, 3rbc, 3wbc    IMAGING:  < from: CT Chest No Cont (12.28.23 @ 16:28) >  Tracheobronchial secretions and moderate bilateral pleural effusions with   partial atelectasis both lower lobes with or without superimposed left   lung infectious/inflammatory process.    < from: VA Duplex Lower Ext Vein Scan, Bilat (12.29.23 @ 09:51) >  No evidence of deep venous thrombosis in either lower extremity.    < from: TTE W or WO Ultrasound Enhancing Agent (12.08.23 @ 09:11) >   1. Left ventricular systolic function is mildly decreased with an ejection fraction of 45 % by Mckeon's method of disks.   2. There is mild (grade 1) left ventricular diastolic dysfunction.   3. Normal right ventricular cavity size, wall thickness, and systolic function.   4. Normal atria.   5. Small pericardial effusion noted adjacent to the right ventricle and small pericardial effusion noted adjacent to the lateral left ventricle with no evidence of hemodynamic compromise.   6. Thickened pericardium.   7. The inferior vena cava is normal in size (normal <2.1cm) with normal inspiratory collapse (normal >50%) consistent with normal right atrial pressure (~3, range 0-5mmHg).   8. Compared to the transthoracic echocardiogram performed on 9/26/2023 there has been an interval decline in LV systolic function.        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 Starting to improve, off diuretics  (3)PNA - improving, with Zosyn  (4)CV - mildly hypertensive, but acceptable for now    RECOMMEND:  (1)No IVF/No diuretics  (2)Lisnopril and Toprol as ordered  (3)D/C planning per primary team; renal f/u 1-4 weeks after discharge    Thank you for involving [facility] in this patient's care.    With warm regards,    Rohit Magallanes MD   [facility]  Office: [phone]  Cell: [phone]               HPI: Mr. Rubi is a 73 year-old man with history of multiple medical issues including hypertension, type 2 diabetes mellitus, and cerebrovascular disease, who presented on 12/28/23 to the [facility] ER with acute-onset shortness of breath waking him up from sleep. He was diagnosed with pneumonia and placed on IV Zosyn. Since admission, Mr. Rubi's creatinine has risen from 1.8mg/dL to as high as 2.6mg/dL; in light of the BEAR on CKD, a renal consultation was requested.    Mr. Rubi remains on Lisinopril 40mg po daily. He received Lasix 80mg iv daily from 12/28-12/29/23; he has not received diuretics since then. Meds from home include Fosinopril 40qd, Jardiance 25qd, and Metformin; he is not on diuretics at home.    PAST MEDICAL & SURGICAL HISTORY:  HTN  HLD  DM2  BPH  CVA - 2017, 2021    Allergies  No Known Allergies    SOCIAL HISTORY:  Denies ETOh,Smoking,     FAMILY HISTORY:  No pertinent family history in first degree relatives    REVIEW OF SYSTEMS:  CONSTITUTIONAL: No weakness, fevers or chills  EYES/ENT: No visual changes;  No vertigo or throat pain   NECK: No pain or stiffness  RESPIRATORY: No cough, wheezing, hemoptysis; (+) shortness of breath  CARDIOVASCULAR: No chest pain or palpitations  GASTROINTESTINAL: No abdominal or epigastric pain. No nausea, vomiting, or hematemesis; No diarrhea or constipation. No melena or hematochezia.  GENITOURINARY: No dysuria, frequency or hematuria  NEUROLOGICAL: No numbness or weakness  SKIN: No itching, burning, rashes, or lesions   All other review of systems is negative unless indicated above.    VITAL:  T(C): , Max: 36.5 (01-03-24 @ 04:25)  T(F): , Max: 97.7 (01-03-24 @ 04:25)  HR: 96 (01-03-24 @ 11:49)  BP: 168/75 (01-03-24 @ 11:49)  BP(mean): --  RR: 18 (01-03-24 @ 11:49)  SpO2: 95% (01-03-24 @ 11:49)  Wt(kg): --    PHYSICAL EXAM:  Constitutional: NAD, Alert  HEENT: NCAT, MMM  Neck: Supple, No JVD  Respiratory: CTA-b/l  Cardiovascular: RRR s1s2, no m/r/g  Gastrointestinal: BS+, soft, NT/ND  Extremities: No peripheral edema b/l  Neurological: no focal deficits; strength grossly intact  Back: no CVAT b/l  Skin: No rashes, no nevi    LABS:                        11.2   6.04  )-----------( 273      ( 03 Jan 2024 07:12 )             36.2     Na(138)/K(4.4)/Cl(101)/HCO3(28)/BUN(43)/Cr(2.37)Glu(97)/Ca(9.2)/Mg(--)/PO4(--)    01-03 @ 07:11  Na(141)/K(4.1)/Cl(101)/HCO3(32)/BUN(41)/Cr(2.39)Glu(106)/Ca(9.0)/Mg(--)/PO4(--)    01-02 @ 06:51  Na(139)/K(4.3)/Cl(99)/HCO3(29)/BUN(40)/Cr(2.64)Glu(140)/Ca(9.5)/Mg(--)/PO4(--)    01-01 @ 07:33    (12/28/23) - BUN/creatinine: 28/1.82  (12/8/23) - BUN/creatinine: 16/1.64; UA-300prot, 3rbc, 3wbc    IMAGING:  < from: CT Chest No Cont (12.28.23 @ 16:28) >  Tracheobronchial secretions and moderate bilateral pleural effusions with   partial atelectasis both lower lobes with or without superimposed left   lung infectious/inflammatory process.    < from: VA Duplex Lower Ext Vein Scan, Bilat (12.29.23 @ 09:51) >  No evidence of deep venous thrombosis in either lower extremity.    < from: TTE W or WO Ultrasound Enhancing Agent (12.08.23 @ 09:11) >   1. Left ventricular systolic function is mildly decreased with an ejection fraction of 45 % by Mckeon's method of disks.   2. There is mild (grade 1) left ventricular diastolic dysfunction.   3. Normal right ventricular cavity size, wall thickness, and systolic function.   4. Normal atria.   5. Small pericardial effusion noted adjacent to the right ventricle and small pericardial effusion noted adjacent to the lateral left ventricle with no evidence of hemodynamic compromise.   6. Thickened pericardium.   7. The inferior vena cava is normal in size (normal <2.1cm) with normal inspiratory collapse (normal >50%) consistent with normal right atrial pressure (~3, range 0-5mmHg).   8. Compared to the transthoracic echocardiogram performed on 9/26/2023 there has been an interval decline in LV systolic function.        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 Starting to improve, off diuretics  (3)PNA - improving, with Zosyn  (4)CV - mildly hypertensive, but acceptable for now    RECOMMEND:  (1)No IVF/No diuretics  (2)Lisnopril and Toprol as ordered  (3)D/C planning per primary team; renal f/u 1-4 weeks after discharge    Thank you for involving [facility] in this patient's care.    With warm regards,    Rohit Magallanes MD   [facility]  Office: [phone]  Cell: [phone]               HPI: Mr. Rubi is a 73 year-old man with history of multiple medical issues including hypertension, type 2 diabetes mellitus, and cerebrovascular disease, who presented on 12/28/23 to the [facility] ER with acute-onset shortness of breath waking him up from sleep. He was diagnosed with pneumonia and placed on IV Zosyn. Since admission, Mr. Rubi's creatinine has risen from 1.8mg/dL to as high as 2.6mg/dL; in light of the BERA on CKD, a renal consultation was requested.    Mr. Rubi remains on Lisinopril 40mg po daily. He received Lasix 80mg iv daily from 12/28-12/29/23; he has not received diuretics since then. Meds from home include Fosinopril 40qd, Jardiance 25qd, and Metformin; he is not on diuretics at home.    History provided by Mr. Rubi and his caretaker (bedside). They share that he has had a urinary catheter in place for the last few days. They share that his shortness of breath has improved significantly relative to admission. He does not follow with a nephrologist on the outside.      PAST MEDICAL & SURGICAL HISTORY:  HTN  HLD  DM2  BPH  CVA - 2017, 2021    Allergies  No Known Allergies    SOCIAL HISTORY:  Denies ETOh,Smoking,     FAMILY HISTORY:  No pertinent family history in first degree relatives    REVIEW OF SYSTEMS:  CONSTITUTIONAL: No weakness, fevers or chills  EYES/ENT: No visual changes;  No vertigo or throat pain   NECK: No pain or stiffness  RESPIRATORY: No cough, wheezing, hemoptysis; (+) shortness of breath  CARDIOVASCULAR: No chest pain or palpitations  GASTROINTESTINAL: No abdominal or epigastric pain. No nausea, vomiting, or hematemesis; No diarrhea or constipation. No melena or hematochezia.  GENITOURINARY: No dysuria, frequency or hematuria  NEUROLOGICAL: No numbness or weakness  SKIN: No itching, burning, rashes, or lesions   All other review of systems is negative unless indicated above.    VITAL:  T(C): , Max: 36.5 (01-03-24 @ 04:25)  T(F): , Max: 97.7 (01-03-24 @ 04:25)  HR: 96 (01-03-24 @ 11:49)  BP: 168/75 (01-03-24 @ 11:49)  BP(mean): --  RR: 18 (01-03-24 @ 11:49)  SpO2: 95% (01-03-24 @ 11:49)  Wt(kg): --    PHYSICAL EXAM:  Constitutional: NAD, Alert  HEENT: NCAT, DMM  Neck: Supple, No JVD  Respiratory: CTA-b/l  Cardiovascular: RRR s1s2, no m/r/g  Gastrointestinal: BS+, soft, NT/ND  : (+)church  Extremities: No peripheral edema b/l  Neurological: reduced generalized strength  Back: no CVAT b/l  Skin: No rashes, no nevi    LABS:                        11.2   6.04  )-----------( 273      ( 03 Jan 2024 07:12 )             36.2     Na(138)/K(4.4)/Cl(101)/HCO3(28)/BUN(43)/Cr(2.37)Glu(97)/Ca(9.2)/Mg(--)/PO4(--)    01-03 @ 07:11  Na(141)/K(4.1)/Cl(101)/HCO3(32)/BUN(41)/Cr(2.39)Glu(106)/Ca(9.0)/Mg(--)/PO4(--)    01-02 @ 06:51  Na(139)/K(4.3)/Cl(99)/HCO3(29)/BUN(40)/Cr(2.64)Glu(140)/Ca(9.5)/Mg(--)/PO4(--)    01-01 @ 07:33    (12/28/23) - BUN/creatinine: 28/1.82  (12/8/23) - BUN/creatinine: 16/1.64; UA-300prot, 3rbc, 3wbc    IMAGING:  < from: CT Chest No Cont (12.28.23 @ 16:28) >  Tracheobronchial secretions and moderate bilateral pleural effusions with   partial atelectasis both lower lobes with or without superimposed left   lung infectious/inflammatory process.    < from: VA Duplex Lower Ext Vein Scan, Bilat (12.29.23 @ 09:51) >  No evidence of deep venous thrombosis in either lower extremity.    < from: TTE W or WO Ultrasound Enhancing Agent (12.08.23 @ 09:11) >   1. Left ventricular systolic function is mildly decreased with an ejection fraction of 45 % by Mckeon's method of disks.   2. There is mild (grade 1) left ventricular diastolic dysfunction.   3. Normal right ventricular cavity size, wall thickness, and systolic function.   4. Normal atria.   5. Small pericardial effusion noted adjacent to the right ventricle and small pericardial effusion noted adjacent to the lateral left ventricle with no evidence of hemodynamic compromise.   6. Thickened pericardium.   7. The inferior vena cava is normal in size (normal <2.1cm) with normal inspiratory collapse (normal >50%) consistent with normal right atrial pressure (~3, range 0-5mmHg).   8. Compared to the transthoracic echocardiogram performed on 9/26/2023 there has been an interval decline in LV systolic function.        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 Starting to improve, off diuretics  (3)PNA - improving, with Zosyn  (4)CV - mildly hypertensive, but acceptable for now    RECOMMEND:  (1)No IVF/No diuretics  (2)Lisnopril and Toprol as ordered  (3)D/C planning per primary team; renal f/u 1-4 weeks after discharge    Thank you for involving [facility] in this patient's care.    With warm regards,    Rohit Magallanes MD   [facility]  Office: [phone]  Cell: [phone]               HPI: Mr. Rubi is a 73 year-old man with history of multiple medical issues including hypertension, type 2 diabetes mellitus, and cerebrovascular disease, who presented on 12/28/23 to the [facility] ER with acute-onset shortness of breath waking him up from sleep. He was diagnosed with pneumonia and placed on IV Zosyn. Since admission, Mr. Rubi's creatinine has risen from 1.8mg/dL to as high as 2.6mg/dL; in light of the BEAR on CKD, a renal consultation was requested.    Mr. Rubi remains on Lisinopril 40mg po daily. He received Lasix 80mg iv daily from 12/28-12/29/23; he has not received diuretics since then. Meds from home include Fosinopril 40qd, Jardiance 25qd, and Metformin; he is not on diuretics at home.    History provided by Mr. Rubi and his caretaker (bedside). They share that he has had a urinary catheter in place for the last few days. They share that his shortness of breath has improved significantly relative to admission. He does not follow with a nephrologist on the outside.      PAST MEDICAL & SURGICAL HISTORY:  HTN  HLD  DM2  BPH  CVA - 2017, 2021    Allergies  No Known Allergies    SOCIAL HISTORY:  Denies ETOh,Smoking,     FAMILY HISTORY:  No pertinent family history in first degree relatives    REVIEW OF SYSTEMS:  CONSTITUTIONAL: No weakness, fevers or chills  EYES/ENT: No visual changes;  No vertigo or throat pain   NECK: No pain or stiffness  RESPIRATORY: No cough, wheezing, hemoptysis; (+) shortness of breath  CARDIOVASCULAR: No chest pain or palpitations  GASTROINTESTINAL: No abdominal or epigastric pain. No nausea, vomiting, or hematemesis; No diarrhea or constipation. No melena or hematochezia.  GENITOURINARY: No dysuria, frequency or hematuria  NEUROLOGICAL: No numbness or weakness  SKIN: No itching, burning, rashes, or lesions   All other review of systems is negative unless indicated above.    VITAL:  T(C): , Max: 36.5 (01-03-24 @ 04:25)  T(F): , Max: 97.7 (01-03-24 @ 04:25)  HR: 96 (01-03-24 @ 11:49)  BP: 168/75 (01-03-24 @ 11:49)  BP(mean): --  RR: 18 (01-03-24 @ 11:49)  SpO2: 95% (01-03-24 @ 11:49)  Wt(kg): --    PHYSICAL EXAM:  Constitutional: NAD, Alert  HEENT: NCAT, DMM  Neck: Supple, No JVD  Respiratory: CTA-b/l  Cardiovascular: RRR s1s2, no m/r/g  Gastrointestinal: BS+, soft, NT/ND  : (+)church  Extremities: No peripheral edema b/l  Neurological: reduced generalized strength  Back: no CVAT b/l  Skin: No rashes, no nevi    LABS:                        11.2   6.04  )-----------( 273      ( 03 Jan 2024 07:12 )             36.2     Na(138)/K(4.4)/Cl(101)/HCO3(28)/BUN(43)/Cr(2.37)Glu(97)/Ca(9.2)/Mg(--)/PO4(--)    01-03 @ 07:11  Na(141)/K(4.1)/Cl(101)/HCO3(32)/BUN(41)/Cr(2.39)Glu(106)/Ca(9.0)/Mg(--)/PO4(--)    01-02 @ 06:51  Na(139)/K(4.3)/Cl(99)/HCO3(29)/BUN(40)/Cr(2.64)Glu(140)/Ca(9.5)/Mg(--)/PO4(--)    01-01 @ 07:33    (12/28/23) - BUN/creatinine: 28/1.82  (12/8/23) - BUN/creatinine: 16/1.64; UA-300prot, 3rbc, 3wbc    IMAGING:  < from: CT Chest No Cont (12.28.23 @ 16:28) >  Tracheobronchial secretions and moderate bilateral pleural effusions with   partial atelectasis both lower lobes with or without superimposed left   lung infectious/inflammatory process.    < from: VA Duplex Lower Ext Vein Scan, Bilat (12.29.23 @ 09:51) >  No evidence of deep venous thrombosis in either lower extremity.    < from: TTE W or WO Ultrasound Enhancing Agent (12.08.23 @ 09:11) >   1. Left ventricular systolic function is mildly decreased with an ejection fraction of 45 % by Mckeon's method of disks.   2. There is mild (grade 1) left ventricular diastolic dysfunction.   3. Normal right ventricular cavity size, wall thickness, and systolic function.   4. Normal atria.   5. Small pericardial effusion noted adjacent to the right ventricle and small pericardial effusion noted adjacent to the lateral left ventricle with no evidence of hemodynamic compromise.   6. Thickened pericardium.   7. The inferior vena cava is normal in size (normal <2.1cm) with normal inspiratory collapse (normal >50%) consistent with normal right atrial pressure (~3, range 0-5mmHg).   8. Compared to the transthoracic echocardiogram performed on 9/26/2023 there has been an interval decline in LV systolic function.        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 Starting to improve, off diuretics  (3)PNA - improving, with Zosyn  (4)CV - mildly hypertensive, but acceptable for now    RECOMMEND:  (1)No IVF/No diuretics  (2)Lisnopril and Toprol as ordered  (3)D/C planning per primary team; renal f/u 1-4 weeks after discharge    Thank you for involving [facility] in this patient's care.    With warm regards,    Rohit Magallanes MD   [facility]  Office: [phone]  Cell: [phone]

## 2024-01-03 NOTE — DISCHARGE NOTE NURSING/CASE MANAGEMENT/SOCIAL WORK - PATIENT PORTAL LINK FT
You can access the FollowMyHealth Patient Portal offered by [facility] by registering at the following website: [email or web address]. By joining [facility]’s FollowMyHealth portal, you will also be able to view your health information using other applications (apps) compatible with our system. You can access the FollowMyHealth Patient Portal offered by [facility] by registering at the following website: [email or web address]. By joining [facility]’s FollowMyHealth portal, you will also be able to view your health information using other applications (apps) compatible with our system.

## 2024-01-03 NOTE — SWALLOW VFSS/MBS ASSESSMENT ADULT - CONSISTENCIES ADMINISTERED
mildly thick liquids via cup with self presentation of cup no cues, thin cup with cue to small single sips mildly thick liquids via tsp and thin liquids via tsp puree-thin, moderately thick liquids via cup, mildly thick liquids via cup with cue for small, single sip/soft & bite-sized

## 2024-01-03 NOTE — PROGRESS NOTE ADULT - ASSESSMENT
73-year-old male with a past medical history of hypertension, hyperlipidemia, diabetes presenting difficulty breathing. Had generalized weakness yesterday. Patient woke up in the middle the night with shortness of breath and pressed life alert button. Patient brought in by EMS with low oxygen saturation And increased work of breathing, placed on Bipap in the ED. Pulmonary called to consult for SOB.      Resp failure:  requiring bipap:   HTN  DM:  HLD    Resp failure:  requiring bipap:   -CT chest with small to moderate b/l effusions/atelectasis, patchy opacities likely 2nd to pulm edema +/- underlying PNA. +Tracheobronchial secretions  -ProBNP elevated  -RVP negative  -Pt now off bipap, breathing comfortably on 2LNC. Keep sats >90%, wean O2 as tolerated  -Keep O>I, diuresis as tolerated  -Cards f/u  -If any change in resp status suggest resume Bipap 12/5  -Continue ABX , favor short course. Procalcitonin normal  -Start Mucinex 1200 mg PO BID x5 days  -Continue Duoneb q6h  12/31:  -last night cpap had to be used as he desaturated : he likely has sleep apnea:  needs to come out of bed to chair and needs pt ot  /: on zosyn till 4th of January  cont diuretics as he is fluid overloaded:   1/1: requiring less oxygen today   when i saw in am:  he says he feels better:  he is fully alert and awake: : cont antibiotics  1/2: seems OK: desats on room air:  would need home oxygen   1/3: on antibiotics t ill tomorrow:  no sob:  last ABG was pretty good:  has small to mod nil effusions which are likely secondary to cardio renal  disease:     HTN  -Optimize antihypertensives   1231: blood pressure is controlled  1/1: controlled  1/2: controlled  1/3: controlled    DM  -monitor blood glucose     HLD  -per PMD    CKD:   -renal to see     jessie

## 2024-01-03 NOTE — DISCHARGE NOTE NURSING/CASE MANAGEMENT/SOCIAL WORK - NSDCDMETYPESERV_GEN_ALL_CORE_FT
a semielectric hospital bed with gel overlay, transport wheelchair (for both equipments- your wife will be contacted to arrange for delivery to your home ) and HOME Oxygen (concentrator for delivery to your home) , [employer] (will be delivered at your bedside and you can use when you are ready for discharge home)

## 2024-01-03 NOTE — SWALLOW VFSS/MBS ASSESSMENT ADULT - ORAL PHASE COMMENTS
inconsistent tongue pumping throughout increase in reduced control with less viscous liquids and larger amounts/rate

## 2024-01-03 NOTE — SWALLOW VFSS/MBS ASSESSMENT ADULT - SLP GENERAL OBSERVATIONS
Encountered OOB in ashley-chair. NC+. NAD. Family bedside. Pt L-handed. Following discourse and one step commands. Encountered OOB in ahsley-chair. NC+. NAD. Family bedside. Pt L-handed. Following discourse and one step commands. NC+. NAD.  Pt L-handed. Following discourse and one step commands.

## 2024-01-03 NOTE — PROGRESS NOTE ADULT - SUBJECTIVE AND OBJECTIVE BOX
Date of Service: 01-03-24 @ 16:54    Patient is a 73y old  Male who presents with a chief complaint of shortness of breath (03 Jan 2024 15:51)      Any change in ROS: seems same to me: oxygenation reasonable    MEDICATIONS  (STANDING):  albuterol/ipratropium for Nebulization 3 milliLiter(s) Nebulizer every 6 hours  apixaban 5 milliGRAM(s) Oral every 12 hours  atorvastatin 80 milliGRAM(s) Oral at bedtime  chlorhexidine 2% Cloths 1 Application(s) Topical daily  dextrose 5%. 1000 milliLiter(s) (50 mL/Hr) IV Continuous <Continuous>  dextrose 5%. 1000 milliLiter(s) (100 mL/Hr) IV Continuous <Continuous>  dextrose 50% Injectable 25 Gram(s) IV Push once  dextrose 50% Injectable 25 Gram(s) IV Push once  dextrose 50% Injectable 12.5 Gram(s) IV Push once  escitalopram 20 milliGRAM(s) Oral daily  glucagon  Injectable 1 milliGRAM(s) IntraMuscular once  insulin glargine Injectable (LANTUS) 10 Unit(s) SubCutaneous at bedtime  insulin lispro (ADMELOG) corrective regimen sliding scale   SubCutaneous three times a day before meals  insulin lispro (ADMELOG) corrective regimen sliding scale   SubCutaneous at bedtime  lisinopril 40 milliGRAM(s) Oral daily  metoclopramide 10 milliGRAM(s) Oral three times a day before meals  metoprolol succinate  milliGRAM(s) Oral daily  pantoprazole    Tablet 40 milliGRAM(s) Oral before breakfast  piperacillin/tazobactam IVPB.. 3.375 Gram(s) IV Intermittent every 8 hours    MEDICATIONS  (PRN):  dextrose Oral Gel 15 Gram(s) Oral once PRN Blood Glucose LESS THAN 70 milliGRAM(s)/deciliter    Vital Signs Last 24 Hrs  T(C): 36.3 (03 Jan 2024 11:49), Max: 36.5 (03 Jan 2024 04:25)  T(F): 97.4 (03 Jan 2024 11:49), Max: 97.7 (03 Jan 2024 04:25)  HR: 96 (03 Jan 2024 11:49) (57 - 96)  BP: 168/75 (03 Jan 2024 11:49) (163/79 - 168/75)  BP(mean): --  RR: 18 (03 Jan 2024 11:49) (18 - 18)  SpO2: 95% (03 Jan 2024 11:49) (95% - 98%)    Parameters below as of 03 Jan 2024 04:25  Patient On (Oxygen Delivery Method): room air        I&O's Summary    02 Jan 2024 07:01  -  03 Jan 2024 07:00  --------------------------------------------------------  IN: 140 mL / OUT: 601 mL / NET: -461 mL          Physical Exam:   GENERAL: NAD, well-groomed, well-developed  HEENT: HAZEL/   Atraumatic, Normocephalic  ENMT: No tonsillar erythema, exudates, or enlargement; Moist mucous membranes, Good dentition, No lesions  NECK: Supple, No JVD, Normal thyroid  CHEST/LUNG: Clear to auscultaion- no wheezing  CVS: Regular rate and rhythm; No murmurs, rubs, or gallops  GI: : Soft, Nontender, Nondistended; Bowel sounds present  NERVOUS SYSTEM:  Alert & awake;   EXTREMITIES: Mild  edema  LYMPH: No lymphadenopathy noted  SKIN: No rashes or lesions  ENDOCRINOLOGY: No Thyromegaly  PSYCH: calm     Labs:  28                            11.2   6.04  )-----------( 273      ( 03 Jan 2024 07:12 )             36.2                         11.5   6.37  )-----------( 252      ( 01 Jan 2024 07:33 )             37.1     01-03    138  |  101  |  43<H>  ----------------------------<  97  4.4   |  28  |  2.37<H>  01-02    141  |  101  |  41<H>  ----------------------------<  106<H>  4.1   |  32<H>  |  2.39<H>  01-01    139  |  99  |  40<H>  ----------------------------<  140<H>  4.3   |  29  |  2.64<H>  12-31    141  |  99  |  38<H>  ----------------------------<  155<H>  3.9   |  30  |  2.61<H>    Ca    9.2      03 Jan 2024 07:11  Ca    9.0      02 Jan 2024 06:51    TPro  5.9<L>  /  Alb  3.0<L>  /  TBili  0.3  /  DBili  x   /  AST  12  /  ALT  9<L>  /  AlkPhos  56  12-31    CAPILLARY BLOOD GLUCOSE      POCT Blood Glucose.: 198 mg/dL (03 Jan 2024 12:33)  POCT Blood Glucose.: 124 mg/dL (03 Jan 2024 08:12)  POCT Blood Glucose.: 208 mg/dL (02 Jan 2024 21:11)          Urinalysis Basic - ( 03 Jan 2024 07:11 )    Color: x / Appearance: x / SG: x / pH: x  Gluc: 97 mg/dL / Ketone: x  / Bili: x / Urobili: x   Blood: x / Protein: x / Nitrite: x   Leuk Esterase: x / RBC: x / WBC x   Sq Epi: x / Non Sq Epi: x / Bacteria: x            RECENT CULTURES:  12-28 @ 06:20 .Blood Blood-Peripheral         rad< from: [facility] Duplex Lower Ext Vein Scan, Bilat (12.29.23 @ 09:51) >  color and spectral Doppler, with and without compression.    FINDINGS:    RIGHT:  Normal compressibility of the RIGHT common femoral, femoral and popliteal   veins.  Doppler examination shows normal spontaneous and phasic flow.  No RIGHT calf vein thrombosis is detected.    LEFT:  Normal compressibility of the LEFT common femoral, femoral and popliteal   veins.  Doppler examination shows normal spontaneous and phasic flow.  No LEFT calf vein thrombosis is detected.    IMPRESSION:  No evidence of deep venous thrombosis in either lower extremity.            --- End of Report ---    < end of copied text >  < from: CT Chest No Cont (12.28.23 @ 16:28) >  MEDIASTINUM AND JAJA:Mildly enlarged mediastinal lymph nodes.    HEART AND VESSELS: Heart is enlarged No pericardial effusion. Coronary   artery and aortic atherosclerosis. Aortic valve calcification.    VISUALIZED UPPER ABDOMEN: Within normal limits.    CHEST WALL AND BONES: Degenerative changes thoracic spine.    IMPRESSION:    Tracheobronchial secretions and moderate bilateral pleural effusions with   partial atelectasis both lower lobes with or without superimposed left   lung infectious/inflammatory process.    --- End of Report ---          HELGA BELL MD; Resident Radiologist  This document has been electronically signed.  EDI MARCELINO MD; Attending Radiologist  This document has been electronically signed. Dec 28 2023  5:15PM    < end of copied text >         No growth at 5 days          RESPIRATORY CULTURES:      < from: TTE W or WO Ultrasound Enhancing Agent (12.08.23 @ 09:11) >                      Ultrasound Enhancing Agent.  Study Information:  Image quality for this study is fair.    _______________________________________________________________________________________     CONCLUSIONS:      1. Left ventricular systolic function is mildly decreased with an ejection fraction of 45 % by Mckeon's method of disks.   2. There is mild (grade 1) left ventricular diastolic dysfunction.   3. Normal right ventricular cavity size, wall thickness, and systolic function.   4. Normal atria.   5. Small pericardial effusion noted adjacent to the right ventricle and small pericardial effusion noted adjacent to the lateral left ventricle with no evidence of hemodynamic compromise.   6. Thickened pericardium.   7. The inferior vena cava is normal in size (normal <2.1cm) with normal inspiratory collapse (normal >50%) consistent with normal right atrial pressure (~3, range 0-5mmHg).   8. Compared to the transthoracic echocardiogram performed on 9/26/2023 there has been an interval decline in LV systolic function.    < end of copied text >      Studies  Chest X-RAY  CT SCAN Chest   Venous Dopplers: LE:   CT Abdomen  Others

## 2024-01-03 NOTE — SWALLOW VFSS/MBS ASSESSMENT ADULT - SPECIFY REASON(S)
Order received/appreciated. Evaluation ordered to determine current swallow function and least restrictive diet. to r/o silent aspiration

## 2024-01-03 NOTE — PROGRESS NOTE ADULT - SUBJECTIVE AND OBJECTIVE BOX
Subjective: Patient seen and examined. No new events except as noted.   wants to go home     REVIEW OF SYSTEMS:    CONSTITUTIONAL: +weakness, fevers or chills  EYES/ENT: No visual changes;  No vertigo or throat pain   NECK: No pain or stiffness  RESPIRATORY: No cough, wheezing, hemoptysis; No shortness of breath  CARDIOVASCULAR: No chest pain or palpitations  GASTROINTESTINAL: No abdominal or epigastric pain. No nausea, vomiting, or hematemesis; No diarrhea or constipation. No melena or hematochezia.  GENITOURINARY: No dysuria, frequency or hematuria  NEUROLOGICAL: No numbness or weakness  SKIN: No itching, burning, rashes, or lesions   All other review of systems is negative unless indicated above.    MEDICATIONS:  MEDICATIONS  (STANDING):  albuterol/ipratropium for Nebulization 3 milliLiter(s) Nebulizer every 6 hours  apixaban 5 milliGRAM(s) Oral every 12 hours  atorvastatin 80 milliGRAM(s) Oral at bedtime  chlorhexidine 2% Cloths 1 Application(s) Topical daily  dextrose 5%. 1000 milliLiter(s) (100 mL/Hr) IV Continuous <Continuous>  dextrose 5%. 1000 milliLiter(s) (50 mL/Hr) IV Continuous <Continuous>  dextrose 50% Injectable 25 Gram(s) IV Push once  dextrose 50% Injectable 12.5 Gram(s) IV Push once  dextrose 50% Injectable 25 Gram(s) IV Push once  escitalopram 20 milliGRAM(s) Oral daily  glucagon  Injectable 1 milliGRAM(s) IntraMuscular once  insulin glargine Injectable (LANTUS) 10 Unit(s) SubCutaneous at bedtime  insulin lispro (ADMELOG) corrective regimen sliding scale   SubCutaneous three times a day before meals  insulin lispro (ADMELOG) corrective regimen sliding scale   SubCutaneous at bedtime  lisinopril 40 milliGRAM(s) Oral daily  metoclopramide 10 milliGRAM(s) Oral three times a day before meals  metoprolol succinate  milliGRAM(s) Oral daily  pantoprazole    Tablet 40 milliGRAM(s) Oral before breakfast  piperacillin/tazobactam IVPB.. 3.375 Gram(s) IV Intermittent every 8 hours      PHYSICAL EXAM:  T(C): 36.3 (01-03-24 @ 11:49), Max: 36.5 (01-03-24 @ 04:25)  HR: 96 (01-03-24 @ 11:49) (57 - 96)  BP: 168/75 (01-03-24 @ 11:49) (163/79 - 168/75)  RR: 18 (01-03-24 @ 11:49) (18 - 18)  SpO2: 95% (01-03-24 @ 11:49) (95% - 98%)  Wt(kg): --  I&O's Summary    02 Jan 2024 07:01  -  03 Jan 2024 07:00  --------------------------------------------------------  IN: 140 mL / OUT: 601 mL / NET: -461 mL          Appearance: NAD  HEENT:   Dry oral mucosa, PERRL, EOMI	  Lymphatic: No lymphadenopathy , no edema  Cardiovascular: Normal S1 S2, No JVD, No murmurs , Peripheral pulses palpable 2+ bilaterally  Respiratory: decreased bs  Gastrointestinal:  Soft, Non-tender, + BS	  Skin: No rashes, No ecchymoses, No cyanosis, warm to touch  Musculoskeletal: Normal range of motion, normal strength  Psychiatry:  Mood & affect appropriate  Ext: No edema      LABS:    CARDIAC MARKERS:                                11.2   6.04  )-----------( 273      ( 03 Jan 2024 07:12 )             36.2     01-03    138  |  101  |  43<H>  ----------------------------<  97  4.4   |  28  |  2.37<H>    Ca    9.2      03 Jan 2024 07:11      proBNP:   Lipid Profile:   HgA1c:   TSH:             TELEMETRY: 	    ECG:  	  RADIOLOGY:   DIAGNOSTIC TESTING:  [ ] Echocardiogram:  [ ]  Catheterization:  [ ] Stress Test:    OTHER:

## 2024-01-03 NOTE — SWALLOW VFSS/MBS ASSESSMENT ADULT - ORAL PHASE
Delayed oral transit time/Uncontrolled bolus / spillover in karrie-pharynx/Uncontrolled bolus / spillover in hypopharynx Uncontrolled bolus / spillover in karrie-pharynx/Uncontrolled bolus / spillover in hypopharynx Uncontrolled bolus / spillover in karire-pharynx/Uncontrolled bolus / spillover in hypopharynx

## 2024-01-03 NOTE — DISCHARGE NOTE NURSING/CASE MANAGEMENT/SOCIAL WORK - NSDCPEFALRISK_GEN_ALL_CORE
For information on Fall & Injury Prevention, visit: [email or web address] OR  [email or web address] OR  https://www.cdc.gov/steadi/patient.html For information on Fall & Injury Prevention, visit: [email or web address]/[email or web address] OR  [email or web address]/[email or web address] OR  https://www.cdc.gov/steadi/patient.html

## 2024-01-03 NOTE — SWALLOW VFSS/MBS ASSESSMENT ADULT - DIAGNOSTIC IMPRESSIONS
MrArt Greyson    Disorders: reduced lingual strength/ROM/Rate of motion, reduced BOT to posterior pharyngeal wall contact, delay in trigger of the swallow reflex, reduced hyo-laryngeal excursion, reduced laryngeal closure, reduced pharyngeal contractility, reduced supraglottic sensation, reduced subglottic sensation."   **Patient can not be advanced at the bedside. Mr. 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 May wish to pursue additional neurological work up vs other. There is prolonged however adequate mastication and oral transfer of all textures. Appearing to have lingual pumping motion to facilitate a-p transport. Premature spillage is seen with less viscous liquids. Laryngeal vestibule closure is reduced, resulting in laryngeal penetration of less viscous liquids. Material is retrieved during the swallow or during subsequent swallows with mildly thick liquids via tsp and thin liquids via tsp. It should be noted that depth/ amount of laryngeal penetration for less viscous liquids is reduced with use of small, single cup sips HOWEVER PT CAN NOT CONSISTENTLY FOLLOW / COMPLETE this command as was evidenced by this study which resulted in GROSS ASPIRATION of thin liquids via cup sips and mildly thick liquids via cup sips. Of note, pt with latent cough response, reduced supra/subglottic sensation apparent.     Disorders:  reduced BOT to posterior pharyngeal wall contact, delay in trigger of the swallow reflex, reduced hyo-laryngeal excursion, reduced laryngeal closure, reduced pharyngeal contractility, reduced supraglottic sensation, reduced subglottic sensation.    **Patient can not be advanced at the bedside. pt will require a repeat objective to advance solids and liquids.**

## 2024-01-03 NOTE — SWALLOW VFSS/MBS ASSESSMENT ADULT - RECOMMENDED CONSISTENCY
MILDLY THICK LIQUIDS TSP   NO CUP  NO STRAW   SOFT AND BITE SIZED SOLIDS    With SLP or educated caregiver would allow thin liquids via tsp in between meals, this is to be done in the home environment not during hospital course- following oral care only. MILDLY THICK LIQUIDS TSP   NO CUP  NO STRAW   SOFT AND BITE SIZED SOLIDS    **With SLP ONLY would allow thin liquids via tsp during therapy following oral care, not with other liquid or solids. This can be continued in the home environment with family/aide HOWEVER not during hospital course. Additional education to be provided to family/aide during course. **

## 2024-01-04 ENCOUNTER — TRANSCRIPTION ENCOUNTER (OUTPATIENT)
Age: 74
End: 2024-01-04

## 2024-01-04 VITALS
RESPIRATION RATE: 18 BRPM | TEMPERATURE: 98 F | DIASTOLIC BLOOD PRESSURE: 74 MMHG | SYSTOLIC BLOOD PRESSURE: 157 MMHG | HEART RATE: 72 BPM | OXYGEN SATURATION: 97 %

## 2024-01-04 LAB
ANION GAP SERPL CALC-SCNC: 8 MMOL/L — SIGNIFICANT CHANGE UP (ref 5–17)
ANION GAP SERPL CALC-SCNC: 8 MMOL/L — SIGNIFICANT CHANGE UP (ref 5–17)
BUN SERPL-MCNC: 45 MG/DL — HIGH (ref 7–23)
BUN SERPL-MCNC: 45 MG/DL — HIGH (ref 7–23)
CALCIUM SERPL-MCNC: 9.3 MG/DL — SIGNIFICANT CHANGE UP (ref 8.4–10.5)
CALCIUM SERPL-MCNC: 9.3 MG/DL — SIGNIFICANT CHANGE UP (ref 8.4–10.5)
CHLORIDE SERPL-SCNC: 100 MMOL/L — SIGNIFICANT CHANGE UP (ref 96–108)
CHLORIDE SERPL-SCNC: 100 MMOL/L — SIGNIFICANT CHANGE UP (ref 96–108)
CO2 SERPL-SCNC: 31 MMOL/L — SIGNIFICANT CHANGE UP (ref 22–31)
CO2 SERPL-SCNC: 31 MMOL/L — SIGNIFICANT CHANGE UP (ref 22–31)
CREAT SERPL-MCNC: 2.38 MG/DL — HIGH (ref 0.5–1.3)
CREAT SERPL-MCNC: 2.38 MG/DL — HIGH (ref 0.5–1.3)
EGFR: 28 ML/MIN/1.73M2 — LOW
EGFR: 28 ML/MIN/1.73M2 — LOW
GLUCOSE BLDC GLUCOMTR-MCNC: 128 MG/DL — HIGH (ref 70–99)
GLUCOSE BLDC GLUCOMTR-MCNC: 128 MG/DL — HIGH (ref 70–99)
GLUCOSE BLDC GLUCOMTR-MCNC: 138 MG/DL — HIGH (ref 70–99)
GLUCOSE BLDC GLUCOMTR-MCNC: 138 MG/DL — HIGH (ref 70–99)
GLUCOSE BLDC GLUCOMTR-MCNC: 162 MG/DL — HIGH (ref 70–99)
GLUCOSE BLDC GLUCOMTR-MCNC: 162 MG/DL — HIGH (ref 70–99)
GLUCOSE BLDC GLUCOMTR-MCNC: 186 MG/DL — HIGH (ref 70–99)
GLUCOSE BLDC GLUCOMTR-MCNC: 186 MG/DL — HIGH (ref 70–99)
GLUCOSE SERPL-MCNC: 133 MG/DL — HIGH (ref 70–99)
GLUCOSE SERPL-MCNC: 133 MG/DL — HIGH (ref 70–99)
POTASSIUM SERPL-MCNC: 4.1 MMOL/L — SIGNIFICANT CHANGE UP (ref 3.5–5.3)
POTASSIUM SERPL-MCNC: 4.1 MMOL/L — SIGNIFICANT CHANGE UP (ref 3.5–5.3)
POTASSIUM SERPL-SCNC: 4.1 MMOL/L — SIGNIFICANT CHANGE UP (ref 3.5–5.3)
POTASSIUM SERPL-SCNC: 4.1 MMOL/L — SIGNIFICANT CHANGE UP (ref 3.5–5.3)
SODIUM SERPL-SCNC: 139 MMOL/L — SIGNIFICANT CHANGE UP (ref 135–145)
SODIUM SERPL-SCNC: 139 MMOL/L — SIGNIFICANT CHANGE UP (ref 135–145)

## 2024-01-04 PROCEDURE — 85018 HEMOGLOBIN: CPT

## 2024-01-04 PROCEDURE — 93970 EXTREMITY STUDY: CPT

## 2024-01-04 PROCEDURE — 71250 CT THORAX DX C-: CPT | Mod: MA

## 2024-01-04 PROCEDURE — 97161 PT EVAL LOW COMPLEX 20 MIN: CPT

## 2024-01-04 PROCEDURE — 82947 ASSAY GLUCOSE BLOOD QUANT: CPT

## 2024-01-04 PROCEDURE — 85025 COMPLETE CBC W/AUTO DIFF WBC: CPT

## 2024-01-04 PROCEDURE — 84145 PROCALCITONIN (PCT): CPT

## 2024-01-04 PROCEDURE — 84100 ASSAY OF PHOSPHORUS: CPT

## 2024-01-04 PROCEDURE — 80053 COMPREHEN METABOLIC PANEL: CPT

## 2024-01-04 PROCEDURE — 82330 ASSAY OF CALCIUM: CPT

## 2024-01-04 PROCEDURE — 85730 THROMBOPLASTIN TIME PARTIAL: CPT

## 2024-01-04 PROCEDURE — 85027 COMPLETE CBC AUTOMATED: CPT

## 2024-01-04 PROCEDURE — 85610 PROTHROMBIN TIME: CPT

## 2024-01-04 PROCEDURE — 74230 X-RAY XM SWLNG FUNCJ C+: CPT

## 2024-01-04 PROCEDURE — 97530 THERAPEUTIC ACTIVITIES: CPT

## 2024-01-04 PROCEDURE — 36415 COLL VENOUS BLD VENIPUNCTURE: CPT

## 2024-01-04 PROCEDURE — 83880 ASSAY OF NATRIURETIC PEPTIDE: CPT

## 2024-01-04 PROCEDURE — 94660 CPAP INITIATION&MGMT: CPT

## 2024-01-04 PROCEDURE — 82803 BLOOD GASES ANY COMBINATION: CPT

## 2024-01-04 PROCEDURE — 96365 THER/PROPH/DIAG IV INF INIT: CPT

## 2024-01-04 PROCEDURE — 82435 ASSAY OF BLOOD CHLORIDE: CPT

## 2024-01-04 PROCEDURE — 87040 BLOOD CULTURE FOR BACTERIA: CPT

## 2024-01-04 PROCEDURE — 83605 ASSAY OF LACTIC ACID: CPT

## 2024-01-04 PROCEDURE — 84295 ASSAY OF SERUM SODIUM: CPT

## 2024-01-04 PROCEDURE — 80048 BASIC METABOLIC PNL TOTAL CA: CPT

## 2024-01-04 PROCEDURE — 36600 WITHDRAWAL OF ARTERIAL BLOOD: CPT

## 2024-01-04 PROCEDURE — 92611 MOTION FLUOROSCOPY/SWALLOW: CPT

## 2024-01-04 PROCEDURE — 94640 AIRWAY INHALATION TREATMENT: CPT

## 2024-01-04 PROCEDURE — 97116 GAIT TRAINING THERAPY: CPT

## 2024-01-04 PROCEDURE — 96375 TX/PRO/DX INJ NEW DRUG ADDON: CPT

## 2024-01-04 PROCEDURE — 96367 TX/PROPH/DG ADDL SEQ IV INF: CPT

## 2024-01-04 PROCEDURE — 82010 KETONE BODYS QUAN: CPT

## 2024-01-04 PROCEDURE — 92610 EVALUATE SWALLOWING FUNCTION: CPT

## 2024-01-04 PROCEDURE — 0225U NFCT DS DNA&RNA 21 SARSCOV2: CPT

## 2024-01-04 PROCEDURE — 71045 X-RAY EXAM CHEST 1 VIEW: CPT

## 2024-01-04 PROCEDURE — 83735 ASSAY OF MAGNESIUM: CPT

## 2024-01-04 PROCEDURE — 99285 EMERGENCY DEPT VISIT HI MDM: CPT

## 2024-01-04 PROCEDURE — 85014 HEMATOCRIT: CPT

## 2024-01-04 PROCEDURE — 82962 GLUCOSE BLOOD TEST: CPT

## 2024-01-04 PROCEDURE — 84484 ASSAY OF TROPONIN QUANT: CPT

## 2024-01-04 PROCEDURE — 87449 NOS EACH ORGANISM AG IA: CPT

## 2024-01-04 PROCEDURE — 87641 MR-STAPH DNA AMP PROBE: CPT

## 2024-01-04 PROCEDURE — 84132 ASSAY OF SERUM POTASSIUM: CPT

## 2024-01-04 PROCEDURE — 87640 STAPH A DNA AMP PROBE: CPT

## 2024-01-04 PROCEDURE — 96366 THER/PROPH/DIAG IV INF ADDON: CPT

## 2024-01-04 RX ORDER — HYDRALAZINE HCL 50 MG
5 TABLET ORAL ONCE
Refills: 0 | Status: COMPLETED | OUTPATIENT
Start: 2024-01-04 | End: 2024-01-04

## 2024-01-04 RX ORDER — HYDRALAZINE HCL 50 MG
10 TABLET ORAL ONCE
Refills: 0 | Status: COMPLETED | OUTPATIENT
Start: 2024-01-04 | End: 2024-01-04

## 2024-01-04 RX ORDER — FUROSEMIDE 40 MG
1 TABLET ORAL
Qty: 30 | Refills: 0
Start: 2024-01-04 | End: 2024-02-02

## 2024-01-04 RX ORDER — AMLODIPINE BESYLATE 2.5 MG/1
1 TABLET ORAL
Qty: 30 | Refills: 0
Start: 2024-01-04 | End: 2024-02-02

## 2024-01-04 RX ORDER — AMLODIPINE BESYLATE 2.5 MG/1
5 TABLET ORAL DAILY
Refills: 0 | Status: DISCONTINUED | OUTPATIENT
Start: 2024-01-04 | End: 2024-01-04

## 2024-01-04 RX ADMIN — PANTOPRAZOLE SODIUM 40 MILLIGRAM(S): 20 TABLET, DELAYED RELEASE ORAL at 06:54

## 2024-01-04 RX ADMIN — Medication 10 MILLIGRAM(S): at 06:54

## 2024-01-04 RX ADMIN — PIPERACILLIN AND TAZOBACTAM 25 GRAM(S): 4; .5 INJECTION, POWDER, LYOPHILIZED, FOR SOLUTION INTRAVENOUS at 06:54

## 2024-01-04 RX ADMIN — APIXABAN 5 MILLIGRAM(S): 2.5 TABLET, FILM COATED ORAL at 17:43

## 2024-01-04 RX ADMIN — Medication 2: at 13:03

## 2024-01-04 RX ADMIN — Medication 3 MILLILITER(S): at 06:50

## 2024-01-04 RX ADMIN — Medication 3 MILLILITER(S): at 17:44

## 2024-01-04 RX ADMIN — Medication 3 MILLILITER(S): at 01:12

## 2024-01-04 RX ADMIN — APIXABAN 5 MILLIGRAM(S): 2.5 TABLET, FILM COATED ORAL at 06:58

## 2024-01-04 RX ADMIN — Medication 100 MILLIGRAM(S): at 06:55

## 2024-01-04 RX ADMIN — Medication 3 MILLILITER(S): at 13:04

## 2024-01-04 RX ADMIN — Medication 10 MILLIGRAM(S): at 16:15

## 2024-01-04 RX ADMIN — AMLODIPINE BESYLATE 5 MILLIGRAM(S): 2.5 TABLET ORAL at 13:03

## 2024-01-04 RX ADMIN — Medication 5 MILLIGRAM(S): at 17:44

## 2024-01-04 RX ADMIN — ESCITALOPRAM OXALATE 20 MILLIGRAM(S): 10 TABLET, FILM COATED ORAL at 13:03

## 2024-01-04 RX ADMIN — LISINOPRIL 40 MILLIGRAM(S): 2.5 TABLET ORAL at 06:54

## 2024-01-04 RX ADMIN — Medication 10 MILLIGRAM(S): at 17:43

## 2024-01-04 RX ADMIN — Medication 2: at 17:44

## 2024-01-04 NOTE — PROVIDER CONTACT NOTE (OTHER) - SITUATION
Elevated /76
Pt hyper tensive, systolic in the 200's
Patient scheduled to be D/C'd home today but Blood pressure has been elevated. REZA Landis aware and ordered BP meds administered however BP remains high

## 2024-01-04 NOTE — DISCHARGE NOTE PROVIDER - NSDCMRMEDTOKEN_GEN_ALL_CORE_FT
apixaban 5 mg oral tablet: 1 tab(s) orally every 12 hours  atorvastatin 80 mg oral tablet: 1 tab(s) orally once a day  cholecalciferol 25 mcg (1000 intl units) oral tablet: 2 tab(s) orally once a day  escitalopram 20 mg oral tablet: 1 tab(s) orally once a day  fosinopril 40 mg oral tablet: 1 tab(s) orally once a day  glipiZIDE 10 mg oral tablet, extended release: 1 tab(s) orally once a day  Jardiance 25 mg oral tablet: 1 tab(s) orally once a day  metFORMIN 1000 mg oral tablet: 1 tab(s) orally once a day  metoprolol succinate 100 mg oral tablet, extended release: 1 tab(s) orally once a day  omeprazole 40 mg oral delayed release capsule: 1 cap(s) orally once a day  Reglan 10 mg oral tablet: 1 tab(s) orally 3 times a day (before meals)   apixaban 5 mg oral tablet: 1 tab(s) orally every 12 hours  atorvastatin 80 mg oral tablet: 1 tab(s) orally once a day  cholecalciferol 25 mcg (1000 intl units) oral tablet: 2 tab(s) orally once a day  escitalopram 20 mg oral tablet: 1 tab(s) orally once a day  fosinopril 40 mg oral tablet: 1 tab(s) orally once a day  glipiZIDE 10 mg oral tablet, extended release: 1 tab(s) orally once a day  Jardiance 25 mg oral tablet: 1 tab(s) orally once a day  metFORMIN 1000 mg oral tablet: 1 tab(s) orally once a day  metoprolol succinate 100 mg oral tablet, extended release: 1 tab(s) orally once a day  Norvasc 5 mg oral tablet: 1 tab(s) orally once a day  omeprazole 40 mg oral delayed release capsule: 1 cap(s) orally once a day  Reglan 10 mg oral tablet: 1 tab(s) orally 3 times a day (before meals)   apixaban 5 mg oral tablet: 1 tab(s) orally every 12 hours  atorvastatin 80 mg oral tablet: 1 tab(s) orally once a day  cholecalciferol 25 mcg (1000 intl units) oral tablet: 2 tab(s) orally once a day  escitalopram 20 mg oral tablet: 1 tab(s) orally once a day  fosinopril 40 mg oral tablet: 1 tab(s) orally once a day  glipiZIDE 10 mg oral tablet, extended release: 1 tab(s) orally once a day  Jardiance 25 mg oral tablet: 1 tab(s) orally once a day  Lasix 20 mg oral tablet: 1 tab(s) orally once a day Take first dose on Monday 1/8/24  metFORMIN 1000 mg oral tablet: 1 tab(s) orally once a day  metoprolol succinate 100 mg oral tablet, extended release: 1 tab(s) orally once a day  Norvasc 5 mg oral tablet: 1 tab(s) orally once a day  omeprazole 40 mg oral delayed release capsule: 1 cap(s) orally once a day  Reglan 10 mg oral tablet: 1 tab(s) orally 3 times a day (before meals)

## 2024-01-04 NOTE — PROGRESS NOTE ADULT - PROBLEM SELECTOR PLAN 4
continue home meds with hold parameters  uncontrolled on admission
continue home meds with hold parameters  uncontrolled on admission and still on occasion  start amlodipine 5 mg qd on discharge
continue home meds with hold parameters  uncontrolled on admission

## 2024-01-04 NOTE — DISCHARGE NOTE PROVIDER - PROVIDER TOKENS
PROVIDER:[TOKEN:[[id number]],FOLLOWUP:[1 week]],PROVIDER:[TOKEN:[[id number]],FOLLOWUP:[2 weeks]],PROVIDER:[TOKEN:[[id number]],FOLLOWUP:[1 week]],PROVIDER:[TOKEN:[[id number]],FOLLOWUP:[2 weeks]] PROVIDER:[TOKEN:[[id number]],FOLLOWUP:[1 week]],PROVIDER:[TOKEN:[[id number]],FOLLOWUP:[2 weeks]],PROVIDER:[TOKEN:[[id number]],FOLLOWUP:[1 week]],PROVIDER:[TOKEN:[[id number]],FOLLOWUP:[2 weeks]] PROVIDER:[TOKEN:[[id number]],FOLLOWUP:[1 week]],PROVIDER:[TOKEN:[[id number]],FOLLOWUP:[2 weeks]],PROVIDER:[TOKEN:[[id number]],FOLLOWUP:[1 week]],PROVIDER:[TOKEN:[[id number]],FOLLOWUP:[2 weeks]],PROVIDER:[TOKEN:[[id number]],FOLLOWUP:[1 week]] PROVIDER:[TOKEN:[[id number]],FOLLOWUP:[1 week]],PROVIDER:[TOKEN:[[id number]],FOLLOWUP:[2 weeks]],PROVIDER:[TOKEN:[[id number]],FOLLOWUP:[1 week]],PROVIDER:[TOKEN:[[id number]],FOLLOWUP:[2 weeks]],PROVIDER:[TOKEN:[[id number]],FOLLOWUP:[1 week]]

## 2024-01-04 NOTE — PROGRESS NOTE ADULT - PROBLEM SELECTOR PLAN 1
doing well  resolved   O2 sat 99% on nasal cannula   cleared for discharge
back on nasal cannula  patient will likely need home O2  O2 sat 99% on nasal cannula   continue Duoneb  Mucinex
back on 100% FM  patient seems to be voluntarily stop breathing for few seconds and try to clear his throat leading to hypoxia  the patient is wide awake and reading while having these episodes  continue Duoneb  we will switch to FM for now to keep O2 above 90%  discussed with patient's wife at bedside in detail
back on nasal cannula  patient will need home O2  desaturates to 79% on RA  O2 sat 99% on nasal cannula   continue Duoneb  Mucinex
now tolerating nasal cannula  pulmonary help appreciated  continue to monitor closely
back on nasal cannula  O2 sat 99%  continue Duoneb  Mucinex
patient will need home O2  arranging home O2  speech and swallow eval today   Mucinex

## 2024-01-04 NOTE — PROGRESS NOTE ADULT - SUBJECTIVE AND OBJECTIVE BOX
Patient is a 73y old  Male who presents with a chief complaint of shortness of breath (04 Jan 2024 11:09)      DATE OF SERVICE: 01-04-24 @ 16:16    SUBJECTIVE / OVERNIGHT EVENTS: overnight events noted    ROS:  Resp: No cough no sputum production  CVS: No chest pain no palpitations no orthopnea  GI: no N/V/D  "I want to go home"         MEDICATIONS  (STANDING):  albuterol/ipratropium for Nebulization 3 milliLiter(s) Nebulizer every 6 hours  amLODIPine   Tablet 5 milliGRAM(s) Oral daily  apixaban 5 milliGRAM(s) Oral every 12 hours  atorvastatin 80 milliGRAM(s) Oral at bedtime  chlorhexidine 2% Cloths 1 Application(s) Topical daily  dextrose 5%. 1000 milliLiter(s) (50 mL/Hr) IV Continuous <Continuous>  dextrose 5%. 1000 milliLiter(s) (100 mL/Hr) IV Continuous <Continuous>  dextrose 50% Injectable 25 Gram(s) IV Push once  dextrose 50% Injectable 25 Gram(s) IV Push once  dextrose 50% Injectable 12.5 Gram(s) IV Push once  escitalopram 20 milliGRAM(s) Oral daily  glucagon  Injectable 1 milliGRAM(s) IntraMuscular once  insulin glargine Injectable (LANTUS) 10 Unit(s) SubCutaneous at bedtime  insulin lispro (ADMELOG) corrective regimen sliding scale   SubCutaneous three times a day before meals  insulin lispro (ADMELOG) corrective regimen sliding scale   SubCutaneous at bedtime  lisinopril 40 milliGRAM(s) Oral daily  metoclopramide 10 milliGRAM(s) Oral three times a day before meals  metoprolol succinate  milliGRAM(s) Oral daily  pantoprazole    Tablet 40 milliGRAM(s) Oral before breakfast  piperacillin/tazobactam IVPB.. 3.375 Gram(s) IV Intermittent every 8 hours    MEDICATIONS  (PRN):  dextrose Oral Gel 15 Gram(s) Oral once PRN Blood Glucose LESS THAN 70 milliGRAM(s)/deciliter        CAPILLARY BLOOD GLUCOSE      POCT Blood Glucose.: 186 mg/dL (04 Jan 2024 12:13)  POCT Blood Glucose.: 128 mg/dL (04 Jan 2024 08:31)  POCT Blood Glucose.: 138 mg/dL (04 Jan 2024 07:49)  POCT Blood Glucose.: 177 mg/dL (03 Jan 2024 21:32)  POCT Blood Glucose.: 274 mg/dL (03 Jan 2024 17:08)    I&O's Summary    03 Jan 2024 07:01  -  04 Jan 2024 07:00  --------------------------------------------------------  IN: 940 mL / OUT: 1750 mL / NET: -810 mL        Vital Signs Last 24 Hrs  T(C): 36.8 (04 Jan 2024 11:46), Max: 37 (04 Jan 2024 05:25)  T(F): 98.2 (04 Jan 2024 11:46), Max: 98.6 (04 Jan 2024 05:25)  HR: 61 (04 Jan 2024 11:46) (60 - 67)  BP: 185/78 (04 Jan 2024 11:46) (156/77 - 188/68)  BP(mean): --  RR: 18 (04 Jan 2024 11:46) (18 - 18)  SpO2: 96% (04 Jan 2024 11:46) (92% - 96%)    PHYSICAL EXAM:  CHEST/LUNG: clear  HEART: S1 S2; systolic murmur +   ABDOMEN: Soft, Nontender,  EXTREMITIES:  no edema  NEUROLOGY: alert  non-focal  SKIN: No rashes or lesions    LABS:                        11.2   6.04  )-----------( 273      ( 03 Jan 2024 07:12 )             36.2     01-04    139  |  100  |  45<H>  ----------------------------<  133<H>  4.1   |  31  |  2.38<H>    Ca    9.3      04 Jan 2024 07:15            Urinalysis Basic - ( 04 Jan 2024 07:15 )    Color: x / Appearance: x / SG: x / pH: x  Gluc: 133 mg/dL / Ketone: x  / Bili: x / Urobili: x   Blood: x / Protein: x / Nitrite: x   Leuk Esterase: x / RBC: x / WBC x   Sq Epi: x / Non Sq Epi: x / Bacteria: x          All consultant(s) notes reviewed and care discussed with other providers        Contact Number, Dr Nieto [phone] Patient is a 73y old  Male who presents with a chief complaint of shortness of breath (04 Jan 2024 11:09)      DATE OF SERVICE: 01-04-24 @ 16:16    SUBJECTIVE / OVERNIGHT EVENTS: overnight events noted    ROS:  Resp: No cough no sputum production  CVS: No chest pain no palpitations no orthopnea  GI: no N/V/D  "I want to go home"         MEDICATIONS  (STANDING):  albuterol/ipratropium for Nebulization 3 milliLiter(s) Nebulizer every 6 hours  amLODIPine   Tablet 5 milliGRAM(s) Oral daily  apixaban 5 milliGRAM(s) Oral every 12 hours  atorvastatin 80 milliGRAM(s) Oral at bedtime  chlorhexidine 2% Cloths 1 Application(s) Topical daily  dextrose 5%. 1000 milliLiter(s) (50 mL/Hr) IV Continuous <Continuous>  dextrose 5%. 1000 milliLiter(s) (100 mL/Hr) IV Continuous <Continuous>  dextrose 50% Injectable 25 Gram(s) IV Push once  dextrose 50% Injectable 25 Gram(s) IV Push once  dextrose 50% Injectable 12.5 Gram(s) IV Push once  escitalopram 20 milliGRAM(s) Oral daily  glucagon  Injectable 1 milliGRAM(s) IntraMuscular once  insulin glargine Injectable (LANTUS) 10 Unit(s) SubCutaneous at bedtime  insulin lispro (ADMELOG) corrective regimen sliding scale   SubCutaneous three times a day before meals  insulin lispro (ADMELOG) corrective regimen sliding scale   SubCutaneous at bedtime  lisinopril 40 milliGRAM(s) Oral daily  metoclopramide 10 milliGRAM(s) Oral three times a day before meals  metoprolol succinate  milliGRAM(s) Oral daily  pantoprazole    Tablet 40 milliGRAM(s) Oral before breakfast  piperacillin/tazobactam IVPB.. 3.375 Gram(s) IV Intermittent every 8 hours    MEDICATIONS  (PRN):  dextrose Oral Gel 15 Gram(s) Oral once PRN Blood Glucose LESS THAN 70 milliGRAM(s)/deciliter        CAPILLARY BLOOD GLUCOSE      POCT Blood Glucose.: 186 mg/dL (04 Jan 2024 12:13)  POCT Blood Glucose.: 128 mg/dL (04 Jan 2024 08:31)  POCT Blood Glucose.: 138 mg/dL (04 Jan 2024 07:49)  POCT Blood Glucose.: 177 mg/dL (03 Jan 2024 21:32)  POCT Blood Glucose.: 274 mg/dL (03 Jan 2024 17:08)    I&O's Summary    03 Jan 2024 07:01  -  04 Jan 2024 07:00  --------------------------------------------------------  IN: 940 mL / OUT: 1750 mL / NET: -810 mL        Vital Signs Last 24 Hrs  T(C): 36.8 (04 Jan 2024 11:46), Max: 37 (04 Jan 2024 05:25)  T(F): 98.2 (04 Jan 2024 11:46), Max: 98.6 (04 Jan 2024 05:25)  HR: 61 (04 Jan 2024 11:46) (60 - 67)  BP: 185/78 (04 Jan 2024 11:46) (156/77 - 188/68)  BP(mean): --  RR: 18 (04 Jan 2024 11:46) (18 - 18)  SpO2: 96% (04 Jan 2024 11:46) (92% - 96%)    PHYSICAL EXAM:  CHEST/LUNG: clear  HEART: S1 S2; systolic murmur +   ABDOMEN: Soft, Nontender,  EXTREMITIES:  no edema  NEUROLOGY: alert  non-focal  SKIN: No rashes or lesions    LABS:                        11.2   6.04  )-----------( 273      ( 03 Jan 2024 07:12 )             36.2     01-04    139  |  100  |  45<H>  ----------------------------<  133<H>  4.1   |  31  |  2.38<H>    Ca    9.3      04 Jan 2024 07:15            Urinalysis Basic - ( 04 Jan 2024 07:15 )    Color: x / Appearance: x / SG: x / pH: x  Gluc: 133 mg/dL / Ketone: x  / Bili: x / Urobili: x   Blood: x / Protein: x / Nitrite: x   Leuk Esterase: x / RBC: x / WBC x   Sq Epi: x / Non Sq Epi: x / Bacteria: x          All consultant(s) notes reviewed and care discussed with other providers        Contact Number, Dr Nieto [phone]

## 2024-01-04 NOTE — PROGRESS NOTE ADULT - PROBLEM SELECTOR PROBLEM 3
PNA (pneumonia)

## 2024-01-04 NOTE — DISCHARGE NOTE PROVIDER - NSDCCPCAREPLAN_GEN_ALL_CORE_FT
PRINCIPAL DISCHARGE DIAGNOSIS  Diagnosis: Acute on chronic systolic congestive heart failure  Assessment and Plan of Treatment: Weigh yourself daily.  If you gain 3lbs in 3 days, or 5lbs in a week call your Health Care Provider.  Do not eat or drink foods containing more than 2000mg of salt (sodium) in your diet every day.  Call your Health Care Provider if you have any swelling or increased swelling in your feet, ankles, and/or stomach.  Take all of your medication as directed.  If you become dizzy call your Health Care Provider.        SECONDARY DISCHARGE DIAGNOSES  Diagnosis: Acute respiratory failure with hypoxia and hypercapnia  Assessment and Plan of Treatment: you developed respiratory failure from heart failure and pneumonia  you will require Home o2   follow up with cardiology and pulmonology    Diagnosis: PNA (pneumonia)  Assessment and Plan of Treatment: you completed antibiotics    Diagnosis: Stage 3 chronic kidney disease  Assessment and Plan of Treatment: Avoid taking (NSAIDs) - (ex: Ibuprofen, Advil, Celebrex, Naprosyn)  Avoid taking any nephrotoxic agents (can harm kidneys) - Intravenous contrast for diagnostic testing, combination cold medications.  Have all medications adjusted for your renal function by your Health Care Provider.  Blood pressure control is important.  Take all medication as prescribed.      Diagnosis: Dysphagia  Assessment and Plan of Treatment: continue midly thick liquids with soft and bite sized solids

## 2024-01-04 NOTE — PROGRESS NOTE ADULT - PROBLEM SELECTOR PLAN 5
creatinine bumped  likely secondary to diuresis  hold furosemide till plateaued  will continue to monitor on aggressive diuresis
creatinine plateaued  hold furosemide till plateaued
creatinine plateaued  hold furosemide till Monday   discussed with renal
at baseline  will continue to monitor on aggressive diuresis
creatinine plateaued  hold furosemide till plateaued
creatinine bumped  likely secondary to diuresis  hold furosemide till plateaued  will continue to monitor on aggressive diuresis
creatinine plateaued  hold furosemide till plateaued

## 2024-01-04 NOTE — PROGRESS NOTE ADULT - PROBLEM SELECTOR PLAN 3
CT chest noted  likely superimposed pneumonia   continue empiric piperacillin/tazobactam for now  likely short 5 or 7 day course
CT chest noted  likely superimposed pneumonia   continue empiric piperacillin/tazobactam for now
CT chest noted  likely superimposed pneumonia   discharge antibiotics
on CXR   CT chest noted  likely superimposed pneumonia   continue empiric piperacillin/tazobactam for now
CT chest noted  likely superimposed pneumonia   continue empiric piperacillin/tazobactam for now  likely 7 day course

## 2024-01-04 NOTE — CHART NOTE - NSCHARTNOTEFT_GEN_A_CORE
Pt's family anxious to take pt home today, BP is elevated 170s however is chronic. Discussed with attending Dr. Nieto and medically cleared to [place] today. Pt's family anxious to take pt home today, BP is elevated 170s however is chronic. Discussed with attending Dr. Nieto and medically cleared to [place] today.

## 2024-01-04 NOTE — PROGRESS NOTE ADULT - SUBJECTIVE AND OBJECTIVE BOX
Date of Service: 01-04-24 @ 19:17    Patient is a 73y old  Male who presents with a chief complaint of shortness of breath (04 Jan 2024 16:16)      Any change in ROS: doing  ok : no sob:  seems stable:  on 2 L of oxygen : wife at bedside:      MEDICATIONS  (STANDING):  albuterol/ipratropium for Nebulization 3 milliLiter(s) Nebulizer every 6 hours  amLODIPine   Tablet 5 milliGRAM(s) Oral daily  apixaban 5 milliGRAM(s) Oral every 12 hours  atorvastatin 80 milliGRAM(s) Oral at bedtime  chlorhexidine 2% Cloths 1 Application(s) Topical daily  dextrose 5%. 1000 milliLiter(s) (100 mL/Hr) IV Continuous <Continuous>  dextrose 5%. 1000 milliLiter(s) (50 mL/Hr) IV Continuous <Continuous>  dextrose 50% Injectable 25 Gram(s) IV Push once  dextrose 50% Injectable 25 Gram(s) IV Push once  dextrose 50% Injectable 12.5 Gram(s) IV Push once  escitalopram 20 milliGRAM(s) Oral daily  glucagon  Injectable 1 milliGRAM(s) IntraMuscular once  insulin glargine Injectable (LANTUS) 10 Unit(s) SubCutaneous at bedtime  insulin lispro (ADMELOG) corrective regimen sliding scale   SubCutaneous three times a day before meals  insulin lispro (ADMELOG) corrective regimen sliding scale   SubCutaneous at bedtime  lisinopril 40 milliGRAM(s) Oral daily  metoclopramide 10 milliGRAM(s) Oral three times a day before meals  metoprolol succinate  milliGRAM(s) Oral daily  pantoprazole    Tablet 40 milliGRAM(s) Oral before breakfast    MEDICATIONS  (PRN):  dextrose Oral Gel 15 Gram(s) Oral once PRN Blood Glucose LESS THAN 70 milliGRAM(s)/deciliter    Vital Signs Last 24 Hrs  T(C): 36.6 (04 Jan 2024 18:26), Max: 37 (04 Jan 2024 05:25)  T(F): 97.8 (04 Jan 2024 18:26), Max: 98.6 (04 Jan 2024 05:25)  HR: 72 (04 Jan 2024 18:26) (60 - 72)  BP: 157/74 (04 Jan 2024 18:26) (156/77 - 188/68)  BP(mean): --  RR: 18 (04 Jan 2024 18:26) (18 - 18)  SpO2: 97% (04 Jan 2024 18:26) (92% - 97%)    Parameters below as of 04 Jan 2024 18:26  Patient On (Oxygen Delivery Method): nasal cannula  O2 Flow (L/min): 2      I&O's Summary    03 Jan 2024 07:01  -  04 Jan 2024 07:00  --------------------------------------------------------  IN: 940 mL / OUT: 1750 mL / NET: -810 mL          Physical Exam:   GENERAL: NAD, well-groomed, well-developed  HEENT: HAZEL/   Atraumatic, Normocephalic  ENMT: No tonsillar erythema, exudates, or enlargement; Moist mucous membranes, Good dentition, No lesions  NECK: Supple, No JVD, Normal thyroid  CHEST/LUNG: Clear to auscultaion-  CVS: Regular rate and rhythm; No murmurs, rubs, or gallops  GI: : Soft, Nontender, Nondistended; Bowel sounds present  NERVOUS SYSTEM:  Alert & awake and resonding  EXTREMITIES: Mild edema  LYMPH: No lymphadenopathy noted  SKIN: No rashes or lesions  ENDOCRINOLOGY: No Thyromegaly  PSYCH: Appropriate    Labs:                              11.2   6.04  )-----------( 273      ( 03 Jan 2024 07:12 )             36.2                         11.5   6.37  )-----------( 252      ( 01 Jan 2024 07:33 )             37.1     01-04    139  |  100  |  45<H>  ----------------------------<  133<H>  4.1   |  31  |  2.38<H>  01-03    138  |  101  |  43<H>  ----------------------------<  97  4.4   |  28  |  2.37<H>  01-02    141  |  101  |  41<H>  ----------------------------<  106<H>  4.1   |  32<H>  |  2.39<H>  01-01    139  |  99  |  40<H>  ----------------------------<  140<H>  4.3   |  29  |  2.64<H>    Ca    9.3      04 Jan 2024 07:15  Ca    9.2      03 Jan 2024 07:11      CAPILLARY BLOOD GLUCOSE      POCT Blood Glucose.: 162 mg/dL (04 Jan 2024 17:19)  POCT Blood Glucose.: 186 mg/dL (04 Jan 2024 12:13)  POCT Blood Glucose.: 128 mg/dL (04 Jan 2024 08:31)  POCT Blood Glucose.: 138 mg/dL (04 Jan 2024 07:49)  POCT Blood Glucose.: 177 mg/dL (03 Jan 2024 21:32)          Urinalysis Basic - ( 04 Jan 2024 07:15 )    Color: x / Appearance: x / SG: x / pH: x  Gluc: 133 mg/dL / Ketone: x  / Bili: x / Urobili: x   Blood: x / Protein: x / Nitrite: x   Leuk Esterase: x / RBC: x / WBC x   Sq Epi: x / Non Sq Epi: x / Bacteria: x        rad< from: Xray Cinesophagram Swallow Function w/ Contrast (01.03.24 @ 16:03) >    ACC: [id number] EXAM:  XR SWAL FUNC NATALI VID CON STDY   ORDERED BY:  GUME BRISCOE     PROCEDURE DATE:  01/03/2024          INTERPRETATION:  CLINICAL INFORMATION: Dysphagia.    TECHNIQUE: A cine esophagogram was performed under fluoroscopy in   conjunction with speech pathology.    Time: 117.1 seconds    FINDINGS/  IMPRESSION:    There is evidence of penetration AND aspiration with the tested   consistencies.    For further information and recommendations, please refer to the speech   pathologist final report which is available for review in the electronic   medical record.    --- End of Report ---           POOJA CORNEJO MD; Resident Radiologist  This document has been electronically signed.  WILLIAM VALENTINE MD; Attending Radiologist  Thisdocument has been electronically signed. Jan 4 2024  5:44PM    < end of copied text >  < from: VA Duplex Lower Ext Vein Scan, Bilat (12.29.23 @ 09:51) >    ACC: [id number] EXAM:  DUPLEX SCAN EXT VEINS LOWER BI   ORDERED BY: KI MATIAS     PROCEDURE DATE:  12/29/2023          INTERPRETATION:  CLINICAL INFORMATION: Shortness of breath, bilateral leg   swelling.    COMPARISON: Bilateral lower extremity venous duplex study dated   10/16/2022.    TECHNIQUE: Duplex sonography of the BILATERAL LOWER extremity veins with   color and spectral Doppler, with and without compression.    FINDINGS:    RIGHT:  Normal compressibility of the RIGHT common femoral, femoral and popliteal   veins.  Doppler examination shows normal spontaneous and phasic flow.  No RIGHT calf vein thrombosis is detected.    LEFT:  Normal compressibility of the LEFT common femoral, femoral and popliteal   veins.  Doppler examination shows normal spontaneous and phasic flow.  No LEFT calf vein thrombosis is detected.    IMPRESSION:  No evidence of deep venous thrombosis in either lower extremity.            --- End of Report ---            NICK MICHELLE MD; Attending Radiologist  This document has been electronically signed. Dec 29 2023 10:40AM    < end of copied text >  < from: CT Chest No Cont (12.28.23 @ 16:28) >  CT of the Chest was performed.  Sagittal and coronal reformats were performed.      FINDINGS:    AIRWAYS, LUNGS and PLEURA: Tracheobronchial secretions extensive in the   left lower lobe. Small to moderate bilateral pleural effusions with   partial atelectasis both lower lobes, greater on the left. A few patchy   and clustered opacities within the aerated left lower and upper lobes.    MEDIASTINUM AND JAJA:Mildly enlarged mediastinal lymph nodes.    HEART AND VESSELS: Heart is enlarged No pericardial effusion. Coronary   artery and aortic atherosclerosis. Aortic valve calcification.    VISUALIZED UPPER ABDOMEN: Within normal limits.    CHEST WALL AND BONES: Degenerative changes thoracic spine.    IMPRESSION:    Tracheobronchial secretions and moderate bilateral pleural effusions with   partial atelectasis both lower lobes with or without superimposed left   lung infectious/inflammatory process.    --- End of Report ---          HELGA BELL MD; Resident Radiologist  This document has been electronically signed.  EDI MARCELINO MD; Attending Radiologist  This document has been electronically signed. Dec 28 2023  5:15PM    < end of copied text >      RECENT CULTURES:        RESPIRATORY CULTURES:          Studies  Chest X-RAY  CT SCAN Chest   Venous Dopplers: LE:   CT Abdomen  Others               Date of Service: 01-04-24 @ 19:17    Patient is a 73y old  Male who presents with a chief complaint of shortness of breath (04 Jan 2024 16:16)      Any change in ROS: doing  ok : no sob:  seems stable:  on 2 L of oxygen : wife at bedside:      MEDICATIONS  (STANDING):  albuterol/ipratropium for Nebulization 3 milliLiter(s) Nebulizer every 6 hours  amLODIPine   Tablet 5 milliGRAM(s) Oral daily  apixaban 5 milliGRAM(s) Oral every 12 hours  atorvastatin 80 milliGRAM(s) Oral at bedtime  chlorhexidine 2% Cloths 1 Application(s) Topical daily  dextrose 5%. 1000 milliLiter(s) (100 mL/Hr) IV Continuous <Continuous>  dextrose 5%. 1000 milliLiter(s) (50 mL/Hr) IV Continuous <Continuous>  dextrose 50% Injectable 25 Gram(s) IV Push once  dextrose 50% Injectable 25 Gram(s) IV Push once  dextrose 50% Injectable 12.5 Gram(s) IV Push once  escitalopram 20 milliGRAM(s) Oral daily  glucagon  Injectable 1 milliGRAM(s) IntraMuscular once  insulin glargine Injectable (LANTUS) 10 Unit(s) SubCutaneous at bedtime  insulin lispro (ADMELOG) corrective regimen sliding scale   SubCutaneous three times a day before meals  insulin lispro (ADMELOG) corrective regimen sliding scale   SubCutaneous at bedtime  lisinopril 40 milliGRAM(s) Oral daily  metoclopramide 10 milliGRAM(s) Oral three times a day before meals  metoprolol succinate  milliGRAM(s) Oral daily  pantoprazole    Tablet 40 milliGRAM(s) Oral before breakfast    MEDICATIONS  (PRN):  dextrose Oral Gel 15 Gram(s) Oral once PRN Blood Glucose LESS THAN 70 milliGRAM(s)/deciliter    Vital Signs Last 24 Hrs  T(C): 36.6 (04 Jan 2024 18:26), Max: 37 (04 Jan 2024 05:25)  T(F): 97.8 (04 Jan 2024 18:26), Max: 98.6 (04 Jan 2024 05:25)  HR: 72 (04 Jan 2024 18:26) (60 - 72)  BP: 157/74 (04 Jan 2024 18:26) (156/77 - 188/68)  BP(mean): --  RR: 18 (04 Jan 2024 18:26) (18 - 18)  SpO2: 97% (04 Jan 2024 18:26) (92% - 97%)    Parameters below as of 04 Jan 2024 18:26  Patient On (Oxygen Delivery Method): nasal cannula  O2 Flow (L/min): 2      I&O's Summary    03 Jan 2024 07:01  -  04 Jan 2024 07:00  --------------------------------------------------------  IN: 940 mL / OUT: 1750 mL / NET: -810 mL          Physical Exam:   GENERAL: NAD, well-groomed, well-developed  HEENT: HAZEL/   Atraumatic, Normocephalic  ENMT: No tonsillar erythema, exudates, or enlargement; Moist mucous membranes, Good dentition, No lesions  NECK: Supple, No JVD, Normal thyroid  CHEST/LUNG: Clear to auscultaion-  CVS: Regular rate and rhythm; No murmurs, rubs, or gallops  GI: : Soft, Nontender, Nondistended; Bowel sounds present  NERVOUS SYSTEM:  Alert & awake and resonding  EXTREMITIES: Mild edema  LYMPH: No lymphadenopathy noted  SKIN: No rashes or lesions  ENDOCRINOLOGY: No Thyromegaly  PSYCH: Appropriate    Labs:                              11.2   6.04  )-----------( 273      ( 03 Jan 2024 07:12 )             36.2                         11.5   6.37  )-----------( 252      ( 01 Jan 2024 07:33 )             37.1     01-04    139  |  100  |  45<H>  ----------------------------<  133<H>  4.1   |  31  |  2.38<H>  01-03    138  |  101  |  43<H>  ----------------------------<  97  4.4   |  28  |  2.37<H>  01-02    141  |  101  |  41<H>  ----------------------------<  106<H>  4.1   |  32<H>  |  2.39<H>  01-01    139  |  99  |  40<H>  ----------------------------<  140<H>  4.3   |  29  |  2.64<H>    Ca    9.3      04 Jan 2024 07:15  Ca    9.2      03 Jan 2024 07:11      CAPILLARY BLOOD GLUCOSE      POCT Blood Glucose.: 162 mg/dL (04 Jan 2024 17:19)  POCT Blood Glucose.: 186 mg/dL (04 Jan 2024 12:13)  POCT Blood Glucose.: 128 mg/dL (04 Jan 2024 08:31)  POCT Blood Glucose.: 138 mg/dL (04 Jan 2024 07:49)  POCT Blood Glucose.: 177 mg/dL (03 Jan 2024 21:32)          Urinalysis Basic - ( 04 Jan 2024 07:15 )    Color: x / Appearance: x / SG: x / pH: x  Gluc: 133 mg/dL / Ketone: x  / Bili: x / Urobili: x   Blood: x / Protein: x / Nitrite: x   Leuk Esterase: x / RBC: x / WBC x   Sq Epi: x / Non Sq Epi: x / Bacteria: x        rad< from: Xray Cinesophagram Swallow Function w/ Contrast (01.03.24 @ 16:03) >    ACC: [id number] EXAM:  XR SWAL FUNC NATALI VID CON STDY   ORDERED BY:  GUME BRISCOE     PROCEDURE DATE:  01/03/2024          INTERPRETATION:  CLINICAL INFORMATION: Dysphagia.    TECHNIQUE: A cine esophagogram was performed under fluoroscopy in   conjunction with speech pathology.    Time: 117.1 seconds    FINDINGS/  IMPRESSION:    There is evidence of penetration AND aspiration with the tested   consistencies.    For further information and recommendations, please refer to the speech   pathologist final report which is available for review in the electronic   medical record.    --- End of Report ---           POOJA CORNEJO MD; Resident Radiologist  This document has been electronically signed.  WILLIAM VALENTINE MD; Attending Radiologist  Thisdocument has been electronically signed. Jan 4 2024  5:44PM    < end of copied text >  < from: VA Duplex Lower Ext Vein Scan, Bilat (12.29.23 @ 09:51) >    ACC: [id number] EXAM:  DUPLEX SCAN EXT VEINS LOWER BI   ORDERED BY: KI MATIAS     PROCEDURE DATE:  12/29/2023          INTERPRETATION:  CLINICAL INFORMATION: Shortness of breath, bilateral leg   swelling.    COMPARISON: Bilateral lower extremity venous duplex study dated   10/16/2022.    TECHNIQUE: Duplex sonography of the BILATERAL LOWER extremity veins with   color and spectral Doppler, with and without compression.    FINDINGS:    RIGHT:  Normal compressibility of the RIGHT common femoral, femoral and popliteal   veins.  Doppler examination shows normal spontaneous and phasic flow.  No RIGHT calf vein thrombosis is detected.    LEFT:  Normal compressibility of the LEFT common femoral, femoral and popliteal   veins.  Doppler examination shows normal spontaneous and phasic flow.  No LEFT calf vein thrombosis is detected.    IMPRESSION:  No evidence of deep venous thrombosis in either lower extremity.            --- End of Report ---            NICK MICHELLE MD; Attending Radiologist  This document has been electronically signed. Dec 29 2023 10:40AM    < end of copied text >  < from: CT Chest No Cont (12.28.23 @ 16:28) >  CT of the Chest was performed.  Sagittal and coronal reformats were performed.      FINDINGS:    AIRWAYS, LUNGS and PLEURA: Tracheobronchial secretions extensive in the   left lower lobe. Small to moderate bilateral pleural effusions with   partial atelectasis both lower lobes, greater on the left. A few patchy   and clustered opacities within the aerated left lower and upper lobes.    MEDIASTINUM AND JAJA:Mildly enlarged mediastinal lymph nodes.    HEART AND VESSELS: Heart is enlarged No pericardial effusion. Coronary   artery and aortic atherosclerosis. Aortic valve calcification.    VISUALIZED UPPER ABDOMEN: Within normal limits.    CHEST WALL AND BONES: Degenerative changes thoracic spine.    IMPRESSION:    Tracheobronchial secretions and moderate bilateral pleural effusions with   partial atelectasis both lower lobes with or without superimposed left   lung infectious/inflammatory process.    --- End of Report ---          HELGA BELL MD; Resident Radiologist  This document has been electronically signed.  EDI MARCELINO MD; Attending Radiologist  This document has been electronically signed. Dec 28 2023  5:15PM    < end of copied text >      RECENT CULTURES:        RESPIRATORY CULTURES:          Studies  Chest X-RAY  CT SCAN Chest   Venous Dopplers: LE:   CT Abdomen  Others

## 2024-01-04 NOTE — DISCHARGE NOTE PROVIDER - NPI NUMBER (FOR SYSADMIN USE ONLY) :
[[id number]],[[id number]],[[id number]],[[id number]] [[id number]],[[id number]],[[id number]],[[id number]] [[id number]],[[id number]],[[id number]],[[id number]],[[id number]] [[id number]],[[id number]],[[id number]],[[id number]],[[id number]]

## 2024-01-04 NOTE — DISCHARGE NOTE PROVIDER - CARE PROVIDER_API CALL
Marco Antonio White.  [facility]  [address] [place] [address]  Phone: [phone]  Fax: [phone]  Follow Up Time: 1 week    Rohit Magallanes  Nephrology  [address] [facility] [address]  [place] [address]  Phone: [phone]  Fax: [phone]  Follow Up Time: 2 weeks    Gonzalez Bird  Cardiovascular Disease  [address] [facility], [address] [address]  Phone: [phone]  Fax: [phone]  Follow Up Time: 1 week    Abran Renae  Pulmonary Disease  [address] [place] [address]  Phone: [phone]  Fax: [phone]  Follow Up Time: 2 weeks   Marco Antonio White.  [facility]  [address] [place] [address]  Phone: [phone]  Fax: [phone]  Follow Up Time: 1 week    Rohit Magallanes  Nephrology  [address] [facility] [address]  [place] [address]  Phone: [phone]  Fax: [phone]  Follow Up Time: 2 weeks    Gonzalez Bird  Cardiovascular Disease  [address] [facility], [address] [address]  Phone: [phone]  Fax: [phone]  Follow Up Time: 1 week    Abran Renae  Pulmonary Disease  [address] [place] [address]  Phone: [phone]  Fax: [phone]  Follow Up Time: 2 weeks   [facility]  [address] [place] [address]  Phone: [phone]  Fax: [phone]  Follow Up Time: 1 week    Rohit Magallanes  Nephrology  [address] [address]  Phone: [phone]  Fax: [phone]  Follow Up Time: 2 weeks    Gonzalez Bird  Cardiovascular Disease  [address] [address]  Phone: [phone]  Fax: [phone]  Follow Up Time: 1 week    Abran Renae  Pulmonary Disease  [address] [place] [address]  Phone: [phone]  Fax: [phone]  Follow Up Time: 2 weeks    Tyrell Coker  Nephrology  [address] [address]  Phone: [phone]  Fax: [phone]  Follow Up Time: 1 week   [facility]  [address] [place] [address]  Phone: [phone]  Fax: [phone]  Follow Up Time: 1 week    Rohit Magallanes  Nephrology  [address] [address]  Phone: [phone]  Fax: [phone]  Follow Up Time: 2 weeks    Gonzalez Bird  Cardiovascular Disease  [address] [address]  Phone: [phone]  Fax: [phone]  Follow Up Time: 1 week    Abran Renae  Pulmonary Disease  [address] [place] [address]  Phone: [phone]  Fax: [phone]  Follow Up Time: 2 weeks    Tyrell Coker  Nephrology  [address] [address]  Phone: [phone]  Fax: [phone]  Follow Up Time: 1 week

## 2024-01-04 NOTE — PROVIDER CONTACT NOTE (OTHER) - ACTION/TREATMENT ORDERED:
Okay to discharge patient with elevated BP
NP ordered IVP BP medication and recommended giving PO meds shortly after.
Provider aware. Give AM standing zestril. No other interventions needed. Continue w/ plan of care.

## 2024-01-04 NOTE — PROVIDER CONTACT NOTE (OTHER) - ASSESSMENT
Elevated /76 electronically. Manual /70. Pt denies chest pain or SOB. All other VSS.
Pt Asymptomatic. all other vital signs stable.
in no acute distress, denies any pain, discomfort or SOB, Denied headache or blurry vision

## 2024-01-04 NOTE — DISCHARGE NOTE PROVIDER - NSDCFUADDAPPT_GEN_ALL_CORE_FT
APPTS ARE READY TO BE MADE: [x ] YES    Best Family or Patient Contact (if needed):    Additional Information about above appointments (if needed):    1:   2:   3:     Other comments or requests:    APPTS ARE READY TO BE MADE: [x ] YES    Best Family or Patient Contact (if needed):    Additional Information about above appointments (if needed):    1:   2:   3:     Other comments or requests:       Patient/Caregiver declined scheduling assistance.

## 2024-01-04 NOTE — DISCHARGE NOTE PROVIDER - CARE PROVIDERS DIRECT ADDRESSES
,DirectAddress_Unknown,[email or web address] ,[email or web address],[email or web address] ,DirectAddress_Unknown,[email or web address] ,DirectAddress_Unknown,[email or web address]

## 2024-01-04 NOTE — PROGRESS NOTE ADULT - ASSESSMENT
73-year-old male with a past medical history of hypertension, hyperlipidemia, diabetes presenting difficulty breathing. Had generalized weakness yesterday. Patient woke up in the middle the night with shortness of breath and pressed life alert button. Patient brought in by EMS with low oxygen saturation And increased work of breathing, placed on Bipap in the ED. Pulmonary called to consult for SOB.      Resp failure:  requiring bipap:   HTN  DM:  HLD    Resp failure:  requiring bipap:   -CT chest with small to moderate b/l effusions/atelectasis, patchy opacities likely 2nd to pulm edema +/- underlying PNA. +Tracheobronchial secretions  -ProBNP elevated  -RVP negative  -Pt now off bipap, breathing comfortably on 2LNC. Keep sats >90%, wean O2 as tolerated  -Keep O>I, diuresis as tolerated  -Cards f/u  -If any change in resp status suggest resume Bipap 12/5  -Continue ABX , favor short course. Procalcitonin normal  -Start Mucinex 1200 mg PO BID x5 days  -Continue Duoneb q6h  12/31:  -last night cpap had to be used as he desaturated : he likely has sleep apnea:  needs to come out of bed to chair and needs pt ot  /: on zosyn till 4th of January  cont diuretics as he is fluid overloaded:   1/1: requiring less oxygen today   when i saw in am:  he says he feels better:  he is fully alert and awake: : cont antibiotics  1/2: seems OK: desats on room air:  would need home oxygen   1/3: on antibiotics t ill tomorrow:  no sob:  last ABG was pretty good:  has small to mod nil effusions which are likely secondary to cardio renal  disease:   1/4: seems OK: for dc today  : wife at bedside    HTN  -Optimize antihypertensives   1231: blood pressure is controlled  1/1: controlled  1/2: controlled  1/3: controlled  1/4: stable    DM  -monitor blood glucose     HLD  -per PMD    CKD:   -per renal    dw acp

## 2024-01-04 NOTE — PROGRESS NOTE ADULT - NSPROGADDITIONALINFOA_GEN_ALL_CORE
discussed with wife over the phone
possible discharge next 1 - 2 days  left message for wife
discussed with covering ACP   follow up Dr Coker next week
speech and swallow eval noted  cineesophagogram tomorrow
discussed with patient's wife at bedside in detail  not stable for discharge

## 2024-01-04 NOTE — PROGRESS NOTE ADULT - SUBJECTIVE AND OBJECTIVE BOX
[facility] [phone]        Patient seen and examined in bed.          MEDICATIONS  (STANDING):  albuterol/ipratropium for Nebulization 3 milliLiter(s) Nebulizer every 6 hours  apixaban 5 milliGRAM(s) Oral every 12 hours  atorvastatin 80 milliGRAM(s) Oral at bedtime  chlorhexidine 2% Cloths 1 Application(s) Topical daily  dextrose 5%. 1000 milliLiter(s) (50 mL/Hr) IV Continuous <Continuous>  dextrose 5%. 1000 milliLiter(s) (100 mL/Hr) IV Continuous <Continuous>  dextrose 50% Injectable 25 Gram(s) IV Push once  dextrose 50% Injectable 12.5 Gram(s) IV Push once  dextrose 50% Injectable 25 Gram(s) IV Push once  escitalopram 20 milliGRAM(s) Oral daily  glucagon  Injectable 1 milliGRAM(s) IntraMuscular once  insulin glargine Injectable (LANTUS) 10 Unit(s) SubCutaneous at bedtime  insulin lispro (ADMELOG) corrective regimen sliding scale   SubCutaneous three times a day before meals  insulin lispro (ADMELOG) corrective regimen sliding scale   SubCutaneous at bedtime  lisinopril 40 milliGRAM(s) Oral daily  metoclopramide 10 milliGRAM(s) Oral three times a day before meals  metoprolol succinate  milliGRAM(s) Oral daily  pantoprazole    Tablet 40 milliGRAM(s) Oral before breakfast  piperacillin/tazobactam IVPB.. 3.375 Gram(s) IV Intermittent every 8 hours      VITAL:  T(C): , Max: 37 (01-04-24 @ 05:25)  T(F): , Max: 98.6 (01-04-24 @ 05:25)  HR: 60 (01-04-24 @ 05:25)  BP: 188/68 (01-04-24 @ 05:25)  BP(mean): --  RR: 18 (01-04-24 @ 05:25)  SpO2: 93% (01-04-24 @ 05:25)  Wt(kg): --    I and O's:    01-03 @ 07:01  -  01-04 @ 07:00  --------------------------------------------------------  IN: 940 mL / OUT: 1750 mL / NET: -810 mL          PHYSICAL EXAM:    Constitutional: NAD  Neck:  No JVD  Respiratory: CTAB/L  Cardiovascular: S1 and S2  Gastrointestinal: BS+, soft, NT/ND  Extremities: No peripheral edema  Neurological: A/O x 3, no focal deficits  Psychiatric: Normal mood, normal affect  : No Hooper  Skin: No rashes  Access: Not applicable    LABS:                        11.2   6.04  )-----------( 273      ( 03 Jan 2024 07:12 )             36.2     01-03    138  |  101  |  43<H>  ----------------------------<  97  4.4   |  28  |  2.37<H>    Ca    9.2      03 Jan 2024 07:11            Urine Studies:  Urinalysis Basic - ( 03 Jan 2024 07:11 )    Color: x / Appearance: x / SG: x / pH: x  Gluc: 97 mg/dL / Ketone: x  / Bili: x / Urobili: x   Blood: x / Protein: x / Nitrite: x   Leuk Esterase: x / RBC: x / WBC x   Sq Epi: x / Non Sq Epi: x / Bacteria: x            RADIOLOGY & ADDITIONAL STUDIES:    < from: TTE W or WO Ultrasound Enhancing Agent (12.08.23 @ 09:11) >    TRANSTHORACIC ECHOCARDIOGRAM REPORT  ________________________________________________________________________________                                      _______       Pt. Name:       FRANKO SPANN   Study Date:    12/8/2023  MRN:            [id number]   YOB: 1950  Accession #:    [id number]    Age:           72 years  Account#:       [id number] Gender:        M  Heart Rate:     75 bpm       Height:        69.00 in (175.26 cm)  Rhythm:         sinus rhythm Weight:        175.00 lb (79.38 kg)  Blood Pressure: 192/92 mmHg  BSA/BMI:       1.95 m² / 25.84 kg/m²  ________________________________________________________________________________________  Referring Physician:    JOSELYN ALVAREZ  Interpreting Physician: Madhav Up M.D.  Primary Sonographer:    Marisol Lou RDCS    CPT:                ECHO TTE WITH CON COMP W DOPP - .m;DEFINITY ECHO                      CONTRAST PER ML - .m;DEFINITY ECHO CONTRAST PER ML                      WASTED - .m  Indication(s):Cerebral infarction, unspecified - I63.9  Procedure:          Transthoracic echocardiogram with 2-D, M-mode and complete                      spectral and color flow Doppler.  Ordering Location:  [facility]  Admission Status:   Inpatient  Contrast Injection: Verbal consent was obtained for injection of Ultrasonic                      Enhancing Agent following a discussion of risks and                      benefits.                      Endocardial visualization enhanced with 3 ml of Definity              Ultrasound enhancing agent (Lot#:[id number] Discarded Dose:7ml).  UEA Reaction:       Patient had no adverse reaction after injection of                      Ultrasound Enhancing Agent.  Study Information:  Image quality for this study is fair.    _______________________________________________________________________________________     CONCLUSIONS:      1. Left ventricular systolic function is mildly decreased with an ejection fraction of 45 % by Mckeon's method of disks.   2. There is mild (grade 1) left ventricular diastolic dysfunction.   3. Normal right ventricular cavity size, wall thickness, and systolic function.   4. Normal atria.   5. Small pericardial effusion noted adjacent to the right ventricle and small pericardial effusion noted adjacent to the lateral left ventricle with no evidence of hemodynamic compromise.   6. Thickened pericardium.   7. The inferior vena cava is normal in size (normal <2.1cm) with normal inspiratory collapse (normal >50%) consistent with normal right atrial pressure (~3, range 0-5mmHg).   8. Compared to the transthoracic echocardiogram performed on 9/26/2023 there has been an interval decline in LV systolic function.    ________________________________________________________________________________________  FINDINGS:     Left Ventricle:  Abnormal (paradoxical) septal motion consistent with conduction delay. Left ventricular systolic function is mildly decreased with a calculated ejection fraction of 45 % by the Mckeon's biplane method of disks. There is mild (grade 1) left ventricular diastolic dysfunction. Moderate to severe left ventricular hypertrophy. There is increased LV mass and concentric hypertrophy. There is no evidence of a left ventricular thrombus.     Right Ventricle:  The right ventricular cavity is normal in size, normal wall thickness and normal systolic function. Tricuspid annular plane systolic excursion (TAPSE) is 2.6 cm (normal >=1.7 cm).     Aortic Valve:  The aortic valve appears trileaflet. There is mild calcification of the aortic valve leaflets. There is no evidence of aortic regurgitation.     Mitral Valve:  There is mild posterior calcification of the mitral valve annulus. There is no evidence of mitral regurgitation.     Pericardium:  The pericardium is thickened. There is a small pericardial effusion noted adjacent to the right ventricle and a small pericardial effusion noted adjacent to the lateral left ventricle with no evidence of hemodynamic compromise.     Systemic Veins:  The inferior vena cava is normal in size (normal <2.1cm) with normal inspiratory collapse (normal >50%) consistent with normal right atrial pressure (~3, range 0-5mmHg).  ____________________________________________________________________  QUANTITATIVE DATA:  Left Ventricle Measurements: (Indexed to BSA)     IVSd (2D):   1.5 cm  LVPWd (2D):  1.3 cm  LVIDd (2D):  4.8 cm  LVIDs (2D):  3.5 cm  LV Mass:     277 g  142.1 g/m²  LV Vol d, MOD A2C: 54.3 ml 27.82 ml/m²  LV Vol d, MOD A4C: 80.3 ml 41.14 ml/m²  LV Vol d, MOD BP:  67.6 ml 34.62 ml/m²  LV Vol s, MOD A2C: 26.0 ml 13.32 ml/m²  LV Vol s, MOD A4C: 48.9 ml 25.06 ml/m²  LV Vol s, MOD BP:  37.0 ml 18.97 ml/m²  LVOT SV MOD BP:    30.5 ml  LV EF% MOD BP:     45 %     MV E Vmax:    0.44 m/s  MV A Vmax:    0.78 m/s  MV E/A:       0.57  e' lateral:   3.16 cm/s  e' medial:    2.68 cm/s  E/e' lateral: 13.92  E/e' medial:  16.42  E/e' Average: 15.07  MV DT:        261 msec    Aorta Measurements: (normal range) (Indexed to BSA)     Sinuses of Valsalva: 3.50 cm (3.1 - 3.7 cm)  Ao Asc prox:         3.50 cm  Ao Arch:             2.4 cm       Left Atrium Measurements: (Indexed to BSA)  LA Diam 2D: 3.70 cm    Right Ventricle Measurements:     TAPSE:           2.6 cm  TV Cesia. S':      18.30 cm/s  RV Base (RVID1): 2.6 cm  RV Mid (RVID2):  2.0 cm       LVOT / RVOT/ Qp/Qs Data: (Indexed to BSA)  LVOT Diameter: 2.30 cm  LVOT Vmax:     0.93 m/s  LVOT VTI:      17.60 cm  LVOT SV:       73.1 ml  37.47 ml/m²    Mitral Valve Measurements:     MV E Vmax: 0.4 m/s  MV A Vmax: 0.8 m/s  MV E/A:    0.6       Tricuspid Valve Measurements:     RA Pressure: 3 mmHg    ________________________________________________________________________________________  Electronically signed on 12/8/2023 at 3:15:11 PM by Madhav Up M.D.         *** Final ***    < end of copied text >           [facility] [phone]        Patient seen and examined in bed.          MEDICATIONS  (STANDING):  albuterol/ipratropium for Nebulization 3 milliLiter(s) Nebulizer every 6 hours  apixaban 5 milliGRAM(s) Oral every 12 hours  atorvastatin 80 milliGRAM(s) Oral at bedtime  chlorhexidine 2% Cloths 1 Application(s) Topical daily  dextrose 5%. 1000 milliLiter(s) (50 mL/Hr) IV Continuous <Continuous>  dextrose 5%. 1000 milliLiter(s) (100 mL/Hr) IV Continuous <Continuous>  dextrose 50% Injectable 25 Gram(s) IV Push once  dextrose 50% Injectable 12.5 Gram(s) IV Push once  dextrose 50% Injectable 25 Gram(s) IV Push once  escitalopram 20 milliGRAM(s) Oral daily  glucagon  Injectable 1 milliGRAM(s) IntraMuscular once  insulin glargine Injectable (LANTUS) 10 Unit(s) SubCutaneous at bedtime  insulin lispro (ADMELOG) corrective regimen sliding scale   SubCutaneous three times a day before meals  insulin lispro (ADMELOG) corrective regimen sliding scale   SubCutaneous at bedtime  lisinopril 40 milliGRAM(s) Oral daily  metoclopramide 10 milliGRAM(s) Oral three times a day before meals  metoprolol succinate  milliGRAM(s) Oral daily  pantoprazole    Tablet 40 milliGRAM(s) Oral before breakfast  piperacillin/tazobactam IVPB.. 3.375 Gram(s) IV Intermittent every 8 hours      VITAL:  T(C): , Max: 37 (01-04-24 @ 05:25)  T(F): , Max: 98.6 (01-04-24 @ 05:25)  HR: 60 (01-04-24 @ 05:25)  BP: 188/68 (01-04-24 @ 05:25)  BP(mean): --  RR: 18 (01-04-24 @ 05:25)  SpO2: 93% (01-04-24 @ 05:25)  Wt(kg): --    I and O's:    01-03 @ 07:01  -  01-04 @ 07:00  --------------------------------------------------------  IN: 940 mL / OUT: 1750 mL / NET: -810 mL          PHYSICAL EXAM:    Constitutional: NAD  Neck:  No JVD  Respiratory: CTAB/L  Cardiovascular: S1 and S2  Gastrointestinal: BS+, soft, NT/ND  Extremities: No peripheral edema  Neurological: A/O x 3, no focal deficits  Psychiatric: Normal mood, normal affect  : No Hooper  Skin: No rashes  Access: Not applicable    LABS:                        11.2   6.04  )-----------( 273      ( 03 Jan 2024 07:12 )             36.2     01-03    138  |  101  |  43<H>  ----------------------------<  97  4.4   |  28  |  2.37<H>    Ca    9.2      03 Jan 2024 07:11            Urine Studies:  Urinalysis Basic - ( 03 Jan 2024 07:11 )    Color: x / Appearance: x / SG: x / pH: x  Gluc: 97 mg/dL / Ketone: x  / Bili: x / Urobili: x   Blood: x / Protein: x / Nitrite: x   Leuk Esterase: x / RBC: x / WBC x   Sq Epi: x / Non Sq Epi: x / Bacteria: x            RADIOLOGY & ADDITIONAL STUDIES:    < from: TTE W or WO Ultrasound Enhancing Agent (12.08.23 @ 09:11) >    TRANSTHORACIC ECHOCARDIOGRAM REPORT  ________________________________________________________________________________                                      _______       Pt. Name:       FRANKO SPANN   Study Date:    12/8/2023  MRN:            [id number]   YOB: 1950  Accession #:    [id number]    Age:           72 years  Account#:       [id number] Gender:        M  Heart Rate:     75 bpm       Height:        69.00 in (175.26 cm)  Rhythm:         sinus rhythm Weight:        175.00 lb (79.38 kg)  Blood Pressure: 192/92 mmHg  BSA/BMI:       1.95 m² / 25.84 kg/m²  ________________________________________________________________________________________  Referring Physician:    JOSELYN ALVAREZ  Interpreting Physician: Madhav Up M.D.  Primary Sonographer:    Marisol Lou RDCS    CPT:                ECHO TTE WITH CON COMP W DOPP - .m;DEFINITY ECHO                      CONTRAST PER ML - .m;DEFINITY ECHO CONTRAST PER ML                      WASTED - .m  Indication(s):Cerebral infarction, unspecified - I63.9  Procedure:          Transthoracic echocardiogram with 2-D, M-mode and complete                      spectral and color flow Doppler.  Ordering Location:  [facility]  Admission Status:   Inpatient  Contrast Injection: Verbal consent was obtained for injection of Ultrasonic                      Enhancing Agent following a discussion of risks and                      benefits.                      Endocardial visualization enhanced with 3 ml of Definity              Ultrasound enhancing agent (Lot#:[id number] Discarded Dose:7ml).  UEA Reaction:       Patient had no adverse reaction after injection of                      Ultrasound Enhancing Agent.  Study Information:  Image quality for this study is fair.    _______________________________________________________________________________________     CONCLUSIONS:      1. Left ventricular systolic function is mildly decreased with an ejection fraction of 45 % by Mckeon's method of disks.   2. There is mild (grade 1) left ventricular diastolic dysfunction.   3. Normal right ventricular cavity size, wall thickness, and systolic function.   4. Normal atria.   5. Small pericardial effusion noted adjacent to the right ventricle and small pericardial effusion noted adjacent to the lateral left ventricle with no evidence of hemodynamic compromise.   6. Thickened pericardium.   7. The inferior vena cava is normal in size (normal <2.1cm) with normal inspiratory collapse (normal >50%) consistent with normal right atrial pressure (~3, range 0-5mmHg).   8. Compared to the transthoracic echocardiogram performed on 9/26/2023 there has been an interval decline in LV systolic function.    ________________________________________________________________________________________  FINDINGS:     Left Ventricle:  Abnormal (paradoxical) septal motion consistent with conduction delay. Left ventricular systolic function is mildly decreased with a calculated ejection fraction of 45 % by the Mckeon's biplane method of disks. There is mild (grade 1) left ventricular diastolic dysfunction. Moderate to severe left ventricular hypertrophy. There is increased LV mass and concentric hypertrophy. There is no evidence of a left ventricular thrombus.     Right Ventricle:  The right ventricular cavity is normal in size, normal wall thickness and normal systolic function. Tricuspid annular plane systolic excursion (TAPSE) is 2.6 cm (normal >=1.7 cm).     Aortic Valve:  The aortic valve appears trileaflet. There is mild calcification of the aortic valve leaflets. There is no evidence of aortic regurgitation.     Mitral Valve:  There is mild posterior calcification of the mitral valve annulus. There is no evidence of mitral regurgitation.     Pericardium:  The pericardium is thickened. There is a small pericardial effusion noted adjacent to the right ventricle and a small pericardial effusion noted adjacent to the lateral left ventricle with no evidence of hemodynamic compromise.     Systemic Veins:  The inferior vena cava is normal in size (normal <2.1cm) with normal inspiratory collapse (normal >50%) consistent with normal right atrial pressure (~3, range 0-5mmHg).  ____________________________________________________________________  QUANTITATIVE DATA:  Left Ventricle Measurements: (Indexed to BSA)     IVSd (2D):   1.5 cm  LVPWd (2D):  1.3 cm  LVIDd (2D):  4.8 cm  LVIDs (2D):  3.5 cm  LV Mass:     277 g  142.1 g/m²  LV Vol d, MOD A2C: 54.3 ml 27.82 ml/m²  LV Vol d, MOD A4C: 80.3 ml 41.14 ml/m²  LV Vol d, MOD BP:  67.6 ml 34.62 ml/m²  LV Vol s, MOD A2C: 26.0 ml 13.32 ml/m²  LV Vol s, MOD A4C: 48.9 ml 25.06 ml/m²  LV Vol s, MOD BP:  37.0 ml 18.97 ml/m²  LVOT SV MOD BP:    30.5 ml  LV EF% MOD BP:     45 %     MV E Vmax:    0.44 m/s  MV A Vmax:    0.78 m/s  MV E/A:       0.57  e' lateral:   3.16 cm/s  e' medial:    2.68 cm/s  E/e' lateral: 13.92  E/e' medial:  16.42  E/e' Average: 15.07  MV DT:        261 msec    Aorta Measurements: (normal range) (Indexed to BSA)     Sinuses of Valsalva: 3.50 cm (3.1 - 3.7 cm)  Ao Asc prox:         3.50 cm  Ao Arch:             2.4 cm       Left Atrium Measurements: (Indexed to BSA)  LA Diam 2D: 3.70 cm    Right Ventricle Measurements:     TAPSE:           2.6 cm  TV Cesia. S':      18.30 cm/s  RV Base (RVID1): 2.6 cm  RV Mid (RVID2):  2.0 cm       LVOT / RVOT/ Qp/Qs Data: (Indexed to BSA)  LVOT Diameter: 2.30 cm  LVOT Vmax:     0.93 m/s  LVOT VTI:      17.60 cm  LVOT SV:       73.1 ml  37.47 ml/m²    Mitral Valve Measurements:     MV E Vmax: 0.4 m/s  MV A Vmax: 0.8 m/s  MV E/A:    0.6       Tricuspid Valve Measurements:     RA Pressure: 3 mmHg    ________________________________________________________________________________________  Electronically signed on 12/8/2023 at 3:15:11 PM by Madhav Up M.D.         *** Final ***    < end of copied text >           [facility] [phone]        Patient seen and examined in the chair.  He felt well         MEDICATIONS  (STANDING):  albuterol/ipratropium for Nebulization 3 milliLiter(s) Nebulizer every 6 hours  apixaban 5 milliGRAM(s) Oral every 12 hours  atorvastatin 80 milliGRAM(s) Oral at bedtime  chlorhexidine 2% Cloths 1 Application(s) Topical daily  dextrose 5%. 1000 milliLiter(s) (50 mL/Hr) IV Continuous <Continuous>  dextrose 5%. 1000 milliLiter(s) (100 mL/Hr) IV Continuous <Continuous>  dextrose 50% Injectable 25 Gram(s) IV Push once  dextrose 50% Injectable 12.5 Gram(s) IV Push once  dextrose 50% Injectable 25 Gram(s) IV Push once  escitalopram 20 milliGRAM(s) Oral daily  glucagon  Injectable 1 milliGRAM(s) IntraMuscular once  insulin glargine Injectable (LANTUS) 10 Unit(s) SubCutaneous at bedtime  insulin lispro (ADMELOG) corrective regimen sliding scale   SubCutaneous three times a day before meals  insulin lispro (ADMELOG) corrective regimen sliding scale   SubCutaneous at bedtime  lisinopril 40 milliGRAM(s) Oral daily  metoclopramide 10 milliGRAM(s) Oral three times a day before meals  metoprolol succinate  milliGRAM(s) Oral daily  pantoprazole    Tablet 40 milliGRAM(s) Oral before breakfast  piperacillin/tazobactam IVPB.. 3.375 Gram(s) IV Intermittent every 8 hours      VITAL:  T(C): , Max: 37 (01-04-24 @ 05:25)  T(F): , Max: 98.6 (01-04-24 @ 05:25)  HR: 60 (01-04-24 @ 05:25)  BP: 188/68 (01-04-24 @ 05:25)  BP(mean): --  RR: 18 (01-04-24 @ 05:25)  SpO2: 93% (01-04-24 @ 05:25)  Wt(kg): --    I and O's:    01-03 @ 07:01  -  01-04 @ 07:00  --------------------------------------------------------  IN: 940 mL / OUT: 1750 mL / NET: -810 mL          PHYSICAL EXAM:    Constitutional: NAD  Neck:  No JVD  Respiratory: CTAB/L  Cardiovascular: S1 and S2  Gastrointestinal: BS+, soft, NT/ND  Extremities: No peripheral edema  Neurological: A/O x 3, no focal deficits  Psychiatric: Normal mood, normal affect  : No Hooper  Skin: No rashes  Access: Not applicable    LABS:                        11.2   6.04  )-----------( 273      ( 03 Jan 2024 07:12 )             36.2     01-03    138  |  101  |  43<H>  ----------------------------<  97  4.4   |  28  |  2.37<H>    Ca    9.2      03 Jan 2024 07:11            Urine Studies:  Urinalysis Basic - ( 03 Jan 2024 07:11 )    Color: x / Appearance: x / SG: x / pH: x  Gluc: 97 mg/dL / Ketone: x  / Bili: x / Urobili: x   Blood: x / Protein: x / Nitrite: x   Leuk Esterase: x / RBC: x / WBC x   Sq Epi: x / Non Sq Epi: x / Bacteria: x            RADIOLOGY & ADDITIONAL STUDIES:    < from: TTE W or WO Ultrasound Enhancing Agent (12.08.23 @ 09:11) >    TRANSTHORACIC ECHOCARDIOGRAM REPORT  ________________________________________________________________________________                                      _______       Pt. Name:       FRANKO SPANN   Study Date:    12/8/2023  MRN:            [id number]   YOB: 1950  Accession #:    [id number]    Age:           72 years  Account#:       [id number] Gender:        M  Heart Rate:     75 bpm       Height:        69.00 in (175.26 cm)  Rhythm:         sinus rhythm Weight:        175.00 lb (79.38 kg)  Blood Pressure: 192/92 mmHg  BSA/BMI:       1.95 m² / 25.84 kg/m²  ________________________________________________________________________________________  Referring Physician:    JOSELYN ALVAREZ  Interpreting Physician: Madhav Up M.D.  Primary Sonographer:    Marisol Lou RDCS    CPT:                ECHO TTE WITH CON COMP W DOPP - .m;DEFINITY ECHO                      CONTRAST PER ML - .m;DEFINITY ECHO CONTRAST PER ML                      WASTED - .m  Indication(s):Cerebral infarction, unspecified - I63.9  Procedure:          Transthoracic echocardiogram with 2-D, M-mode and complete                      spectral and color flow Doppler.  Ordering Location:  [facility]  Admission Status:   Inpatient  Contrast Injection: Verbal consent was obtained for injection of Ultrasonic                      Enhancing Agent following a discussion of risks and                      benefits.                      Endocardial visualization enhanced with 3 ml of Definity              Ultrasound enhancing agent (Lot#:[id number] Discarded Dose:7ml).  UEA Reaction:       Patient had no adverse reaction after injection of                      Ultrasound Enhancing Agent.  Study Information:  Image quality for this study is fair.    _______________________________________________________________________________________     CONCLUSIONS:      1. Left ventricular systolic function is mildly decreased with an ejection fraction of 45 % by Mckeon's method of disks.   2. There is mild (grade 1) left ventricular diastolic dysfunction.   3. Normal right ventricular cavity size, wall thickness, and systolic function.   4. Normal atria.   5. Small pericardial effusion noted adjacent to the right ventricle and small pericardial effusion noted adjacent to the lateral left ventricle with no evidence of hemodynamic compromise.   6. Thickened pericardium.   7. The inferior vena cava is normal in size (normal <2.1cm) with normal inspiratory collapse (normal >50%) consistent with normal right atrial pressure (~3, range 0-5mmHg).   8. Compared to the transthoracic echocardiogram performed on 9/26/2023 there has been an interval decline in LV systolic function.    ________________________________________________________________________________________  FINDINGS:     Left Ventricle:  Abnormal (paradoxical) septal motion consistent with conduction delay. Left ventricular systolic function is mildly decreased with a calculated ejection fraction of 45 % by the Mckeon's biplane method of disks. There is mild (grade 1) left ventricular diastolic dysfunction. Moderate to severe left ventricular hypertrophy. There is increased LV mass and concentric hypertrophy. There is no evidence of a left ventricular thrombus.     Right Ventricle:  The right ventricular cavity is normal in size, normal wall thickness and normal systolic function. Tricuspid annular plane systolic excursion (TAPSE) is 2.6 cm (normal >=1.7 cm).     Aortic Valve:  The aortic valve appears trileaflet. There is mild calcification of the aortic valve leaflets. There is no evidence of aortic regurgitation.     Mitral Valve:  There is mild posterior calcification of the mitral valve annulus. There is no evidence of mitral regurgitation.     Pericardium:  The pericardium is thickened. There is a small pericardial effusion noted adjacent to the right ventricle and a small pericardial effusion noted adjacent to the lateral left ventricle with no evidence of hemodynamic compromise.     Systemic Veins:  The inferior vena cava is normal in size (normal <2.1cm) with normal inspiratory collapse (normal >50%) consistent with normal right atrial pressure (~3, range 0-5mmHg).  ____________________________________________________________________  QUANTITATIVE DATA:  Left Ventricle Measurements: (Indexed to BSA)     IVSd (2D):   1.5 cm  LVPWd (2D):  1.3 cm  LVIDd (2D):  4.8 cm  LVIDs (2D):  3.5 cm  LV Mass:     277 g  142.1 g/m²  LV Vol d, MOD A2C: 54.3 ml 27.82 ml/m²  LV Vol d, MOD A4C: 80.3 ml 41.14 ml/m²  LV Vol d, MOD BP:  67.6 ml 34.62 ml/m²  LV Vol s, MOD A2C: 26.0 ml 13.32 ml/m²  LV Vol s, MOD A4C: 48.9 ml 25.06 ml/m²  LV Vol s, MOD BP:  37.0 ml 18.97 ml/m²  LVOT SV MOD BP:    30.5 ml  LV EF% MOD BP:     45 %     MV E Vmax:    0.44 m/s  MV A Vmax:    0.78 m/s  MV E/A:       0.57  e' lateral:   3.16 cm/s  e' medial:    2.68 cm/s  E/e' lateral: 13.92  E/e' medial:  16.42  E/e' Average: 15.07  MV DT:        261 msec    Aorta Measurements: (normal range) (Indexed to BSA)     Sinuses of Valsalva: 3.50 cm (3.1 - 3.7 cm)  Ao Asc prox:         3.50 cm  Ao Arch:             2.4 cm       Left Atrium Measurements: (Indexed to BSA)  LA Diam 2D: 3.70 cm    Right Ventricle Measurements:     TAPSE:           2.6 cm  TV Cesia. S':      18.30 cm/s  RV Base (RVID1): 2.6 cm  RV Mid (RVID2):  2.0 cm       LVOT / RVOT/ Qp/Qs Data: (Indexed to BSA)  LVOT Diameter: 2.30 cm  LVOT Vmax:     0.93 m/s  LVOT VTI:      17.60 cm  LVOT SV:       73.1 ml  37.47 ml/m²    Mitral Valve Measurements:     MV E Vmax: 0.4 m/s  MV A Vmax: 0.8 m/s  MV E/A:    0.6       Tricuspid Valve Measurements:     RA Pressure: 3 mmHg    ________________________________________________________________________________________  Electronically signed on 12/8/2023 at 3:15:11 PM by Madhav Up M.D.         *** Final ***    < end of copied text >           [facility] [phone]        Patient seen and examined in the chair.  He felt well         MEDICATIONS  (STANDING):  albuterol/ipratropium for Nebulization 3 milliLiter(s) Nebulizer every 6 hours  apixaban 5 milliGRAM(s) Oral every 12 hours  atorvastatin 80 milliGRAM(s) Oral at bedtime  chlorhexidine 2% Cloths 1 Application(s) Topical daily  dextrose 5%. 1000 milliLiter(s) (50 mL/Hr) IV Continuous <Continuous>  dextrose 5%. 1000 milliLiter(s) (100 mL/Hr) IV Continuous <Continuous>  dextrose 50% Injectable 25 Gram(s) IV Push once  dextrose 50% Injectable 12.5 Gram(s) IV Push once  dextrose 50% Injectable 25 Gram(s) IV Push once  escitalopram 20 milliGRAM(s) Oral daily  glucagon  Injectable 1 milliGRAM(s) IntraMuscular once  insulin glargine Injectable (LANTUS) 10 Unit(s) SubCutaneous at bedtime  insulin lispro (ADMELOG) corrective regimen sliding scale   SubCutaneous three times a day before meals  insulin lispro (ADMELOG) corrective regimen sliding scale   SubCutaneous at bedtime  lisinopril 40 milliGRAM(s) Oral daily  metoclopramide 10 milliGRAM(s) Oral three times a day before meals  metoprolol succinate  milliGRAM(s) Oral daily  pantoprazole    Tablet 40 milliGRAM(s) Oral before breakfast  piperacillin/tazobactam IVPB.. 3.375 Gram(s) IV Intermittent every 8 hours      VITAL:  T(C): , Max: 37 (01-04-24 @ 05:25)  T(F): , Max: 98.6 (01-04-24 @ 05:25)  HR: 60 (01-04-24 @ 05:25)  BP: 188/68 (01-04-24 @ 05:25)  BP(mean): --  RR: 18 (01-04-24 @ 05:25)  SpO2: 93% (01-04-24 @ 05:25)  Wt(kg): --    I and O's:    01-03 @ 07:01  -  01-04 @ 07:00  --------------------------------------------------------  IN: 940 mL / OUT: 1750 mL / NET: -810 mL          PHYSICAL EXAM:    Constitutional: NAD  Neck:  No JVD  Respiratory: CTAB/L  Cardiovascular: S1 and S2  Gastrointestinal: BS+, soft, NT/ND  Extremities: No peripheral edema  Neurological: A/O x 3, no focal deficits  Psychiatric: Normal mood, normal affect  : No Hooper  Skin: No rashes  Access: Not applicable    LABS:                        11.2   6.04  )-----------( 273      ( 03 Jan 2024 07:12 )             36.2     01-03    138  |  101  |  43<H>  ----------------------------<  97  4.4   |  28  |  2.37<H>    Ca    9.2      03 Jan 2024 07:11            Urine Studies:  Urinalysis Basic - ( 03 Jan 2024 07:11 )    Color: x / Appearance: x / SG: x / pH: x  Gluc: 97 mg/dL / Ketone: x  / Bili: x / Urobili: x   Blood: x / Protein: x / Nitrite: x   Leuk Esterase: x / RBC: x / WBC x   Sq Epi: x / Non Sq Epi: x / Bacteria: x            RADIOLOGY & ADDITIONAL STUDIES:    < from: TTE W or WO Ultrasound Enhancing Agent (12.08.23 @ 09:11) >    TRANSTHORACIC ECHOCARDIOGRAM REPORT  ________________________________________________________________________________                                      _______       Pt. Name:       FRANKO SPANN   Study Date:    12/8/2023  MRN:            [id number]   YOB: 1950  Accession #:    [id number]    Age:           72 years  Account#:       [id number] Gender:        M  Heart Rate:     75 bpm       Height:        69.00 in (175.26 cm)  Rhythm:         sinus rhythm Weight:        175.00 lb (79.38 kg)  Blood Pressure: 192/92 mmHg  BSA/BMI:       1.95 m² / 25.84 kg/m²  ________________________________________________________________________________________  Referring Physician:    JOSELYN ALVAREZ  Interpreting Physician: Madhav Up M.D.  Primary Sonographer:    Marisol Lou RDCS    CPT:                ECHO TTE WITH CON COMP W DOPP - .m;DEFINITY ECHO                      CONTRAST PER ML - .m;DEFINITY ECHO CONTRAST PER ML                      WASTED - .m  Indication(s):Cerebral infarction, unspecified - I63.9  Procedure:          Transthoracic echocardiogram with 2-D, M-mode and complete                      spectral and color flow Doppler.  Ordering Location:  [facility]  Admission Status:   Inpatient  Contrast Injection: Verbal consent was obtained for injection of Ultrasonic                      Enhancing Agent following a discussion of risks and                      benefits.                      Endocardial visualization enhanced with 3 ml of Definity              Ultrasound enhancing agent (Lot#:[id number] Discarded Dose:7ml).  UEA Reaction:       Patient had no adverse reaction after injection of                      Ultrasound Enhancing Agent.  Study Information:  Image quality for this study is fair.    _______________________________________________________________________________________     CONCLUSIONS:      1. Left ventricular systolic function is mildly decreased with an ejection fraction of 45 % by Mckeon's method of disks.   2. There is mild (grade 1) left ventricular diastolic dysfunction.   3. Normal right ventricular cavity size, wall thickness, and systolic function.   4. Normal atria.   5. Small pericardial effusion noted adjacent to the right ventricle and small pericardial effusion noted adjacent to the lateral left ventricle with no evidence of hemodynamic compromise.   6. Thickened pericardium.   7. The inferior vena cava is normal in size (normal <2.1cm) with normal inspiratory collapse (normal >50%) consistent with normal right atrial pressure (~3, range 0-5mmHg).   8. Compared to the transthoracic echocardiogram performed on 9/26/2023 there has been an interval decline in LV systolic function.    ________________________________________________________________________________________  FINDINGS:     Left Ventricle:  Abnormal (paradoxical) septal motion consistent with conduction delay. Left ventricular systolic function is mildly decreased with a calculated ejection fraction of 45 % by the Mckeon's biplane method of disks. There is mild (grade 1) left ventricular diastolic dysfunction. Moderate to severe left ventricular hypertrophy. There is increased LV mass and concentric hypertrophy. There is no evidence of a left ventricular thrombus.     Right Ventricle:  The right ventricular cavity is normal in size, normal wall thickness and normal systolic function. Tricuspid annular plane systolic excursion (TAPSE) is 2.6 cm (normal >=1.7 cm).     Aortic Valve:  The aortic valve appears trileaflet. There is mild calcification of the aortic valve leaflets. There is no evidence of aortic regurgitation.     Mitral Valve:  There is mild posterior calcification of the mitral valve annulus. There is no evidence of mitral regurgitation.     Pericardium:  The pericardium is thickened. There is a small pericardial effusion noted adjacent to the right ventricle and a small pericardial effusion noted adjacent to the lateral left ventricle with no evidence of hemodynamic compromise.     Systemic Veins:  The inferior vena cava is normal in size (normal <2.1cm) with normal inspiratory collapse (normal >50%) consistent with normal right atrial pressure (~3, range 0-5mmHg).  ____________________________________________________________________  QUANTITATIVE DATA:  Left Ventricle Measurements: (Indexed to BSA)     IVSd (2D):   1.5 cm  LVPWd (2D):  1.3 cm  LVIDd (2D):  4.8 cm  LVIDs (2D):  3.5 cm  LV Mass:     277 g  142.1 g/m²  LV Vol d, MOD A2C: 54.3 ml 27.82 ml/m²  LV Vol d, MOD A4C: 80.3 ml 41.14 ml/m²  LV Vol d, MOD BP:  67.6 ml 34.62 ml/m²  LV Vol s, MOD A2C: 26.0 ml 13.32 ml/m²  LV Vol s, MOD A4C: 48.9 ml 25.06 ml/m²  LV Vol s, MOD BP:  37.0 ml 18.97 ml/m²  LVOT SV MOD BP:    30.5 ml  LV EF% MOD BP:     45 %     MV E Vmax:    0.44 m/s  MV A Vmax:    0.78 m/s  MV E/A:       0.57  e' lateral:   3.16 cm/s  e' medial:    2.68 cm/s  E/e' lateral: 13.92  E/e' medial:  16.42  E/e' Average: 15.07  MV DT:        261 msec    Aorta Measurements: (normal range) (Indexed to BSA)     Sinuses of Valsalva: 3.50 cm (3.1 - 3.7 cm)  Ao Asc prox:         3.50 cm  Ao Arch:             2.4 cm       Left Atrium Measurements: (Indexed to BSA)  LA Diam 2D: 3.70 cm    Right Ventricle Measurements:     TAPSE:           2.6 cm  TV Cesia. S':      18.30 cm/s  RV Base (RVID1): 2.6 cm  RV Mid (RVID2):  2.0 cm       LVOT / RVOT/ Qp/Qs Data: (Indexed to BSA)  LVOT Diameter: 2.30 cm  LVOT Vmax:     0.93 m/s  LVOT VTI:      17.60 cm  LVOT SV:       73.1 ml  37.47 ml/m²    Mitral Valve Measurements:     MV E Vmax: 0.4 m/s  MV A Vmax: 0.8 m/s  MV E/A:    0.6       Tricuspid Valve Measurements:     RA Pressure: 3 mmHg    ________________________________________________________________________________________  Electronically signed on 12/8/2023 at 3:15:11 PM by Madhav Up M.D.         *** Final ***    < end of copied text >

## 2024-01-04 NOTE — PROGRESS NOTE ADULT - PROBLEM SELECTOR PROBLEM 6
DM (diabetes mellitus)
CONST: No fever, chills or bodyaches  EYES: No pain, redness, drainage or visual changes.  ENT: No ear pain or discharge, nasal discharge or congestion. No sore throat  CARD: (+) chest pain. No palpitations  RESP: No SOB, cough, hemoptysis. No hx of asthma or COPD  GI: No abdominal pain, N/V/D  : No urinary symptoms  MS: No joint pain, back pain or extremity pain/injury  SKIN: No rashes  NEURO: No headache, dizziness, paresthesias or LOC
DM (diabetes mellitus)

## 2024-01-04 NOTE — PROGRESS NOTE ADULT - PROBLEM SELECTOR PLAN 7
continue statin

## 2024-01-04 NOTE — DISCHARGE NOTE PROVIDER - HOSPITAL COURSE
HPI:  73-year-old male with a past medical history of hypertension, hyperlipidemia, diabetes presenting difficulty breathing. Had generalized weakness yesterday. Patient woke up in the middle the night with shortness of breath and pressed life alert button. Patient brought in by EMS with low oxygen saturation And increased work of breathing. Currently states feels better though still on BiPAP. wife at bedside provided most of the collateral history    (28 Dec 2023 14:04)    Hospital Course:  CXR with patchy infiltrates c/f pna & pulm edema. CT chest with small to moderate b/l effusions/atelectasis, patchy opacities likely 2nd to pulm edema +/- underlying PNA. +Tracheobronchial secretions. ProBNP elevated.  clinical presentation consistent with acute on chronic systolic heart failure  and pneumonia.  HST w/ mild elevation -- likely demand No CP on exam. cardiology and pulmonary appreciated.  + b/l le edema -DVT ruled out . Diursed with IV lasix. Recent echo with mild lv dysfxn ef 45%, mild diastolic dysfxn, small pericardial effusion. Completed 7 days of zosyn for pneumonia.  Initially required BIPAP.  back on nasal cannula.   s/p MBS for dysphagia. Recommended MILDLY THICK LIQUIDS TSP , NO CUP, NO STRAW and SOFT AND BITE SIZED SOLIDS.  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 Lasix held. Creatinine plateaued. patient will need home O2. desaturates to 79% on RA.   Medically cleared for discharge Home with Home PT and Home o2 with cardiology, pulmonary, renal  follow up. Family refused DAGO  Important Medication Changes and Reason:    Active or Pending Issues Requiring Follow-up:  > cardiology follow up for heart failure  > pulmonary follow up for hypoxic respiratory failure  > REnal follow up for CKD  Advanced Directives:   [ x] Full code  [ ] DNR  [ ] Hospice    Discharge Diagnoses:  Acute respiratory failure with hypoxia and hypercapnia.   acute on chronic systolic chf  pneumonia  pafib  dysphagia  nell on ckd 3     HPI:  73-year-old male with a past medical history of hypertension, hyperlipidemia, diabetes presenting difficulty breathing. Had generalized weakness yesterday. Patient woke up in the middle the night with shortness of breath and pressed life alert button. Patient brought in by EMS with low oxygen saturation And increased work of breathing. Currently states feels better though still on BiPAP. wife at bedside provided most of the collateral history    (28 Dec 2023 14:04)    Hospital Course:  CXR with patchy infiltrates c/f pna & pulm edema. CT chest with small to moderate b/l effusions/atelectasis, patchy opacities likely 2nd to pulm edema +/- underlying PNA. +Tracheobronchial secretions. ProBNP elevated.  clinical presentation consistent with acute on chronic systolic heart failure  and pneumonia.  HST w/ mild elevation -- likely demand No CP on exam. cardiology and pulmonary appreciated.  + b/l le edema -DVT ruled out . Diursed with IV lasix. Recent echo with mild lv dysfxn ef 45%, mild diastolic dysfxn, small pericardial effusion. Completed 7 days of zosyn for pneumonia.  Initially required BIPAP.  back on nasal cannula.   s/p MBS for dysphagia. Recommended MILDLY THICK LIQUIDS TSP , NO CUP, NO STRAW and SOFT AND BITE SIZED SOLIDS.  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 Lasix held. Creatinine plateaued. patient will need home O2. desaturates to 79% on RA.   Medically cleared for discharge Home with Home PT and Home o2 with cardiology, pulmonary, renal  follow up. Family refused DAGO  Important Medication Changes and Reason:  norvasc 5 mg po daily (HTN)  Active or Pending Issues Requiring Follow-up:  > cardiology follow up for heart failure  > pulmonary follow up for hypoxic respiratory failure  > REnal follow up for CKD  Advanced Directives:   [ x] Full code  [ ] DNR  [ ] Hospice    Discharge Diagnoses:  Acute respiratory failure with hypoxia and hypercapnia.   acute on chronic systolic chf  pneumonia  pafib  dysphagia  nell on ckd 3     HPI:  73-year-old male with a past medical history of hypertension, hyperlipidemia, diabetes presenting difficulty breathing. Had generalized weakness yesterday. Patient woke up in the middle the night with shortness of breath and pressed life alert button. Patient brought in by EMS with low oxygen saturation And increased work of breathing. Currently states feels better though still on BiPAP. wife at bedside provided most of the collateral history    (28 Dec 2023 14:04)    Hospital Course:  CXR with patchy infiltrates c/f pna & pulm edema. CT chest with small to moderate b/l effusions/atelectasis, patchy opacities likely 2nd to pulm edema +/- underlying PNA. +Tracheobronchial secretions. ProBNP elevated.  clinical presentation consistent with acute on chronic systolic heart failure  and pneumonia.  HST w/ mild elevation -- likely demand No CP on exam. cardiology and pulmonary appreciated.  + b/l le edema -DVT ruled out . Diursed with IV lasix. Recent echo with mild lv dysfxn ef 45%, mild diastolic dysfxn, small pericardial effusion. Completed 7 days of zosyn for pneumonia.  Initially required BIPAP.  back on nasal cannula.   s/p MBS for dysphagia. Recommended MILDLY THICK LIQUIDS TSP , NO CUP, NO STRAW and SOFT AND BITE SIZED SOLIDS.  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 Lasix held. Creatinine plateaued. patient will need home O2. desaturates to 79% on RA.   Medically cleared for discharge Home with Home PT and Home o2 with cardiology, pulmonary, renal  follow up. Family refused DAGO  Important Medication Changes and Reason:  norvasc 5 mg po daily (HTN)  lasix 20 mg daily starting 1/8/24 (CHF)  Active or Pending Issues Requiring Follow-up:  > cardiology follow up for heart failure  > pulmonary follow up for hypoxic respiratory failure  > REnal follow up for CKD  Advanced Directives:   [ x] Full code  [ ] DNR  [ ] Hospice    Discharge Diagnoses:  Acute respiratory failure with hypoxia and hypercapnia.   acute on chronic systolic chf  pneumonia  pafib  dysphagia  nell on ckd 3

## 2024-01-04 NOTE — PROGRESS NOTE ADULT - PROBLEM SELECTOR PLAN 6
HbA1C 5.8 recently  finger sticks acceptable  no oral meds  diabetic diet  continue finger sticks with short acting insulin sliding scale

## 2024-01-04 NOTE — PROGRESS NOTE ADULT - SUBJECTIVE AND OBJECTIVE BOX
{\rtf1\ahihzo90311\ansi\qbasjlr0764\ftnbj\uc1\deff0  {\fonttbl{\f0 \fnil Segoe UI;}{\f1 \fnil \fcharset0 Segoe UI;}{\f2 \fnil Times New John;}}  {\colortbl ;\ugs505\sjjcz993\iwwr234 ;\red0\green0\blue0 ;\red0\green0\rwxq774 ;\red0\green0\blue0 ;}  {\stylesheet{\f0\fs20 Normal;}{\cs1 Default Paragraph Font;}{\cs2\f0\fs16 Line Number;}{\cs3\f2\fs24\ul\cf3 Hyperlink;}}  {\*\revtbl{Unknown;}}  \cmrsmi50886\rxjkjo27634\ljxdl8762\vfjjw1321\nwayo1062\rwjnh3706\wjllqft966\kegqzvr575\nogrowautofit\xpbzvj719\formshade\nofeaturethrottle1\dntblnsbdb\fet4\aendnotes\aftnnrlc\pgbrdrhead\pgbrdrfoot  \sectd\sdkpdf29504\lbxrzj57330\guttersxn0\tmlqgvvp2516\orhredpd5123\hbpmdrsi4376\hhgcjuar2971\mbvugnr412\zedocqd391\sbkpage\pgncont\pgndec  \plain\plain\f0\fs24\pard\plain\f0\fs24\plain\f0\fs20\jrcn8895\hich\f0\dbch\f0\loch\f0\fs20 Subjective: Patient seen and examined. No new events except as noted. \par  wants to go h ome \par  \par  REVIEW OF SYSTEMS:\par  \par  CONSTITUTIONAL: + weakness, fevers or chills\par  EYES/ENT: No visual changes;  No vertigo or throat pain \par  NECK: No pain or stiffness\par  RESPIRATORY: No cough, wheezing, hemoptysis; No shortness of breath\par  CARDIOVASCULAR: No chest pain or palpitations\par  GASTROINTESTINAL: No abdominal or epigastric pain. No nausea, vomiting, or hematemesis; No diarrhea or constipation. No melena or hematochezia.\par  GENITOURINARY: No dysuria, frequency or hematuria\par  NEUROLOGICAL: No numbness or weakness\par  SKIN: No itching, burning, rashes, or lesions \par  All other review of systems is negative unless indicated above.\par  \par  MEDICATIONS:\par  MEDICATIONS  (STANDING):\par  albuterol/ipratropium for Nebulization 3 milliLiter(s) Nebulizer every 6 hours\par  apixaban 5 milliGRAM(s) Oral every 12 hours\par  atorvastatin 80 milliGRAM(s) Oral at bedtime\par  chlorhexidine 2% Cloths 1 Application(s) Topical daily\par  dextrose 5%. 1000 milliLiter(s) (50 mL/Hr) IV Continuous <Continuous>\par  dextrose 5%. 1000 milliLiter(s) (100 mL/Hr) IV Continuous <Continuous>\par  dextrose 50% Injectable 25 Gram(s) IV Push once\par  dextrose 50% Injectable 12.5 Gram(s) IV Push once\par  dextrose 50% Injectable 25 Gram(s) IV Push once\par  escitalopram 20 milliGRAM(s) Oral daily\par  glucagon  Injectable 1 milliGRAM(s) IntraMuscular once\par  insulin glargine Injectable (LANTUS) 10 Unit(s) SubCutaneous at bedtime\par  insulin lispro (ADMELOG) corrective regimen sliding scale   SubCutaneous three times a day before meals\par  insulin lispro (ADMELOG) corrective regimen sliding scale   SubCutaneous at bedtime\par  lisinopril 40 milliGRAM(s) Oral daily\par  metoclopramide 10 milliGRAM(s) Oral three times a day before meals\par  metoprolol succinate  milliGRAM(s) Oral daily\par  pantoprazole    Tablet 40 milliGRAM(s) Oral before breakfast\par  piperacillin/tazobactam IVPB.. 3.375 Gram(s) IV Intermittent every 8 hours\par  \par  \par  PHYSICAL EXAM:\par  T(C): 37 (01-04-24 @ 05:25), Max: 37 (01-04-24 @ 05:25)\par  HR: 60 (01-04-24 @ 05:25) (60 - 96)\par  BP: 188/68 (01-04-24 @ 05:25) (156/77 - 188/68)\par  RR: 18 (01-04-24 @ 05:25) (18 - 18)\par  SpO2: 93% (01-04-24 @ 05:25) (92% - 98%)\par  Wt(kg): --\par  I&O's Summary\par  \par  03 Jan 2024 07:01  -  04 Jan 2024 07:00\par  --------------------------------------------------------\par  IN: 940 mL / OUT: 1750 mL / NET: -810 mL\par  \par  \par  \par  \par  Appearance: NAD\par  HEENT:   Dry oral mucosa, PERRL, EOMI\tab\par  Lymphatic: No lymphadenopathy , no edema\par  Cardiovascular: Normal S1 S2, No JVD, No murmurs , Peripheral pulses palpable 2+ bilaterally\par  Respiratory: decreased bs\par  Gastrointestinal:  Soft, Non-tender, + BS\tab\par  Skin: No rashes, No ecchymoses, No cyanosis, warm to touch\par  Musculoskeletal: Normal range of motion, normal strength\par  Psychiatry:  Mood & affect appropriate\par  Ext: No edema\par  \par  \par  \par  LABS:\par  \par  CARDIAC MARKERS:\par  \par  \par  \par  \par           \par             11.2 \par  6.04  )-----------( 273      ( 03 Jan 2024 07:12 )\par             36.2 \par  \par  01-04\par  \par  139  |  100  |  45<H>\par  ----------------------------<  133<H>\par  4.1   |  31  |  2.38<H>\par  \par  Ca    9.3      04 Jan 2024 07:15\par  \par  TELEMETRY: \tab   \par  ECG:  \tab\par  RADIOLOGY: \par  DIAGNOSTIC TESTING:\par  [ ] Echocardiogram:\par  [ ]  Catheterization:\par  [ ] Stress Test:  \par  OTHER: \tab\par  \par  \ql\plain\f0\fs24{\*\bkmkstart [id number]}{\*\bkmkend [id number]}\plain\f1\fs20\bbrb7532\hich\f1\dbch\f1\loch\f1\cf2\fs20\b\ul Swallow VFSS/MBS Assessment Adult:\plain\f0\fs20\zdyl4905\hich\f0\dbch\f0\loch\f0\fs20  \par  \plain\f1\fs20\enzw0167\hich\f1\dbch\f1\loch\f1\cf2\fs20\b\ul{\field{\*\fldinst HYPERLINK [id number] }{\fldrslt General Information:}}\plain\f0\fs20\zupg9641\hich\f0\dbch\f0\loch\f0\fs20\ql\par  \trowd\bvypgr49\pzaiirh52\trpaddfl3\mptgcjg43\trpaddfr3\trpaddt0\trpaddft3\trpaddb0\trpaddfb3\trleft0  \clvertalt\kyjmlm86\clpadft3\ydnrpv28\clpadfr3\clpadl0\clpadfl3\clpadb0\clpadfb3\xxkwy6278  \clvertalt\ofaxke12\clpadft3\krdgtr03\clpadfr3\clpadl0\clpadfl3\clpadb0\clpadfb3\oeabx3975  \pard\intbl\ssparaaux0\s0\fi-120\li120\ql\plain\f0\fs24{\*\bkmkstart [id number]}{\*\bkmkend ji31560909173}\plain\f0\fs20\kcmf7212\hich\f0\dbch\f0\loch\f0\fs20 \'b7 \plain\f1\fs20\mgay3356\hich\f1\dbch\f1\loch\f1\cf2\fs20\b Patient Profile Review\plain\f0\fs20\nbkv5141\hich\f0\dbch\f0\loch\f0\fs20\cell  \pard\intbl\ssparaaux0\s0\ql\plain\f0\fs24\plain\f0\fs20\rhxv8253\hich\f0\dbch\f0\loch\f0\fs20 yes\cell  \intbl\row  \pard\intbl\ssparaaux0\s0\fi-120\li120\ql\plain\f0\fs24{\*\bkmkstart [id number]}{\*\bkmkend [id number]}\plain\f0\fs20\sgyv4873\hich\f0\dbch\f0\loch\f0\fs20 \'b7 \plain\f1\fs20\cgpd3045\hich\f1\dbch\f1\loch\f1\cf2\fs20\b H & P Review\plain\f0\fs20\utlh6929\hich\f0\dbch\f0\loch\f0\fs20\cell  \pard\intbl\ssparaaux0\s0\ql\plain\f0\fs24\plain\f0\fs20\umll2016\hich\f0\dbch\f0\loch\f0\fs20 yes  FRANKO RUBI is a 73-year-old male with a past medical history of hypertension, hyperlipidemia, diabetes presenting difficulty breathing. Had generalized   weakness yesterday. Patient woke up in the middle the night with shortness of breath and pressed life alert button. Patient brought in by EMS with low oxygen saturation And increased work of breathing.\cell  \intbl\row  \pard\intbl\ssparaaux0\s0\fi-120\li120\ql\plain\f0\fs24{\*\bkmkstart [id number]}{\*\bkmkend [id number]}\plain\f0\fs20\zbyf6629\hich\f0\dbch\f0\loch\f0\fs20 \'b7 \plain\f1\fs20\fzqt6312\hich\f1\dbch\f1\loch\f1\cf2\fs20\b Specify reason(s)\plain\f0\fs20\ozte0791\hich\f0\dbch\f0\loch\f0\fs20\cell  \pard\intbl\ssparaaux0\s0\ql\plain\f0\fs24\plain\f0\fs20\nbcx4804\hich\f0\dbch\f0\loch\f0\fs20 to r/o silent aspiration\cell  \intbl\row  \pard\intbl\ssparaaux0\s0\ql\plain\f0\fs24\plain\f0\fs20\tlly6116\hich\f0\dbch\f0\loch\f0\fs20\cell  \pard\intbl\ssparaaux0\s0\ql\plain\f0\fs24\plain\f1\fs20\tksc9563\hich\f1\dbch\f1\loch\f1\cf2\fs20\strike\plain\f0\fs20\xicv3070\hich\f0\dbch\f0\loch\f0\fs20\cell  \intbl\row  \pard\intbl\ssparaaux0\s0\fi-120\li120\ql\plain\f0\fs24{\*\bkmkstart [id number]}{\*\bkmkend [id number]}\plain\f0\fs20\duwh3210\hich\f0\dbch\f0\loch\f0\fs20 \'b7 \plain\f1\fs20\wdlz1531\hich\f1\dbch\f1\loch\f1\cf2\fs20\b General Observations\plain\f0\fs20\fuvg2022\hich\f0\dbch\f0\loch\f0\fs20\cell  \pard\intbl\ssparaaux0\s0\ql\plain\f0\fs24\plain\f0\fs20\zkew7352\hich\f0\dbch\f0\loch\f0\fs20 NC+. NAD.  Pt L-handed. Following discourse and one step commands.\cell  \intbl\row  \pard\intbl\ssparaaux0\s0\ql\plain\f0\fs24\plain\f0\fs20\qrxg3724\hich\f0\dbch\f0\loch\f0\fs20\cell  \pard\intbl\ssparaaux0\s0\ql\plain\f0\fs24\plain\f1\fs20\yroh8037\hich\f1\dbch\f1\loch\f1\cf2\fs20\strike\plain\f0\fs20\fwpn3587\hich\f0\dbch\f0\loch\f0\fs20\cell  \intbl\row  \pard\intbl\ssparaaux0\s0\fi-120\li120\ql\plain\f0\fs24{\*\bkmkstart yq10315625105}{\*\bkmkend [id number]}\plain\f0\fs20\miht7464\hich\f0\dbch\f0\loch\f0\fs20 \'b7 \plain\f1\fs20\kxsq8233\hich\f1\dbch\f1\loch\f1\cf2\fs20\b Precautions/Limitations:   Hearing\plain\f0\fs20\vrtw1958\hich\f0\dbch\f0\loch\f0\fs20\cell  \pard\intbl\ssparaaux0\s0\ql\plain\f0\fs24\plain\f0\fs20\xitz1397\hich\f0\dbch\f0\loch\f0\fs20 adequate\cell  \intbl\row  \pard\intbl\ssparaaux0\s0\fi-120\li120\ql\plain\f0\fs24{\*\bkmkstart pz81033228226}{\*\bkmkend zv98616896194}\plain\f0\fs20\bvzq7687\hich\f0\dbch\f0\loch\f0\fs20 \'b7 \plain\f1\fs20\yfpn9128\hich\f1\dbch\f1\loch\f1\cf2\fs20\b Precautions/Limitations:   Vision\plain\f0\fs20\phle0882\hich\f0\dbch\f0\loch\f0\fs20\cell  \pard\intbl\ssparaaux0\s0\ql\plain\f0\fs24\plain\f0\fs20\kcbb2855\hich\f0\dbch\f0\loch\f0\fs20 adequate for  this exam\cell  \intbl\row  \trowd\blzifh92\lastrow\fgyebid91\trpaddfl3\lzbyokc37\trpaddfr3\trpaddt0\trpaddft3\trpaddb0\trpaddfb3\trleft0  \clvertalt\glpefc25\clpadft3\odcfeu72\clpadfr3\clpadl0\clpadfl3\clpadb0\clpadfb3\aumah4869  \clvertalt\fmhmmo18\clpadft3\esjcdo65\clpadfr3\clpadl0\clpadfl3\clpadb0\clpadfb3\agjne2459  \pard\intbl\ssparaaux0\s0\fi-120\li120\ql\plain\f0\fs24{\*\bkmkstart es51457509128}{\*\bkmkend oj47140534156}\plain\f0\fs20\jaue6688\hich\f0\dbch\f0\loch\f0\fs20 \'b7 \plain\f1\fs20\efcx6424\hich\f1\dbch\f1\loch\f1\cf2\fs20\b Comments\plain\f0\fs20\ccdz5092\hich\f0\dbch\f0\loch\f0\fs20\cell  \pard\intbl\ssparaaux0\s0\ql\plain\f0\fs24\plain\f0\fs20\khnh4290\hich\f0\dbch\f0\loch\f0\fs20 Continued history: \par  -CXR with patchy infiltrates c/f pna & pulm edema\par  12/31/23: Event note: "Pt admitted for Acute respiratory failure with hypoxia and hypercapnia transitioned to NC 6l NC however has multiple hypoxic episodes (mid to high 80's) during sleep but recovers when awaken."\par  \par  Speech & Swallow ; KNOWN TO DEPT. \par  -9/2023 for clinical bedside swallow evaluations with rec for objective testing\par  -9/22/2023-FEES, rec for puree and moderately thick liquids via tsp \par  -9/26/2023-MBS, regular solids, thin liquids " + Calcifications of the laryngeal structures\par  + ?CP bar vs other at level of C5/C6. Trace retention of material (across trials) noted above\par  + Cough throughout PO trials, similar to baseline cough, not consistently correlated to episodes of laryngeal penetration" 1) Liquids via single, small sips - no straws 2) Intermittent throat clear throughout meals 3) Repeat swallow. "Pt presents with   an oropharyngeal dysphagia, with swallow function improved from prior exam. There is adequate mastication and oral transfer of all textures. Premature spillage is seen with thin liquids. Laryngeal vestibule closure is reduced, resulting in laryngeal penetration   of solids and thin liquids. Material is retrieved during the swallow or during subsequent swallows. Depth/ amount of laryngeal penetration for thin liquids is reduced with use of small, single cup sips. No aspiration observed across exam. Moderate pharyngeal   residue with purees and solids is reduced with repeat swallow."\par  -12/7/23- speech and language evaluation completed. see report. \par  \par  Family reporting, during initial evaluation, pt with increase in dysphagia from prior admission with frequent coughing and concern for aspiration. Pt with limited ability to adhere to recommendations for single, small sips per family as typically will   take multiple large sips. Observed straw in bottle at bedside. Additionally, reports that SLP was initiated at home for dysphagia and requesting repeat MBS.\cell  \intbl\row  \pard\ssparaaux0\s0\ql\plain\f0\fs24\plain\f0\fs20\zzjv1191\hich\f0\dbch\f0\loch\f0\fs20\par  {\*\bkmkstart [id number]}{\*\bkmkend md87559848516}\plain\f1\fs20\wxye1538\hich\f1\dbch\f1\loch\f1\cf2\fs20\b\ul Past Medical History:\plain\f0\fs20\zhup0917\hich\f0\dbch\f0\loch\f0\fs20  \par  \fi-360\li720\plain\f0\fs24{\*\bkmkstart [id number]}{\*\bkmkend ox70504240180-89749527636107}\plain\f0\fs20\qvzu8254\hich\f0\dbch\f0\loch\f0\fs20 \'b7 \tab\plain\f1\fs20\cjds7344\hich\f1\dbch\f1\loch\f1\cf2\fs20\b Stroke\plain\f0\fs20\gbsa3672\hich\f0\dbch\f0\loch\f0\fs20   : Description: 2017, 10/2021:  with right diplobia resolving in a week, Entered Date: 01-Nov-2021 15:56, Entered By: Jun Montejo, Last Modified By: Jun Montejo\par  {\*\bkmkstart [id number]}{\*\bkmkend [id number]}\'b7 \tab\plain\f1\fs20\oyvk6916\hich\f1\dbch\f1\loch\f1\cf2\fs20\b BPH with elevated PSA\plain\f0\fs20\xhks9329\hich\f0\dbch\f0\loch\f0\fs20 : Entered Date: 01-Nov-2021   15:55, Entered By: Jun Montejo, Last Modified By: Jun Montejo\par  {\*\bkmkstart [id number]}{\*\bkmkend xx74253424922-4209880768040}\'b7 \tab\plain\f1\fs20\sfvo1993\hich\f1\dbch\f1\loch\f1\cf2\fs20\b Hypercholesterolemia\plain\f0\fs20\rlrb3725\hich\f0\dbch\f0\loch\f0\fs20 : Entered Date: 20-Jun-2011   07:23, Entered By: Renetta Robbins, Last Modified By: Renetta Robbins\par  {\*\bkmkstart [id number]}{\*\bkmkend wy48513449314-7873288606036}\'b7 \tab\plain\f1\fs20\bgpg6147\hich\f1\dbch\f1\loch\f1\cf2\fs20\b HTN (Hypertension)\plain\f0\fs20\abpx1868\hich\f0\dbch\f0\loch\f0\fs20 : Entered Date: 20-Jun-2011 07:23,   Entered By: Renetta Robbins, Last Modified By: Renetta Robbins\par  {\*\bkmkstart [id number]}{\*\bkmkend [id number]}\'b7 \tab\plain\f1\fs20\reiq0554\hich\f1\dbch\f1\loch\f1\cf2\fs20\b Diabetes Mellitus Type II\plain\f0\fs20\ipfv1066\hich\f0\dbch\f0\loch\f0\fs20 : Entered Date: 20-Jun-2011   07:23, Entered By: Renetta Robbins, Last Modified By: Renetta Robbins\par  \fi0\li0\plain\f0\fs24\plain\f0\fs20\wpov5262\hich\f0\dbch\f0\loch\f0\fs20\par  {\*\bkmkstart wb98902112940}{\*\bkmkend vz13698178910}\plain\f1\fs20\biec0181\hich\f1\dbch\f1\loch\f1\cf2\fs20\b\ul Past Surgical History:\plain\f0\fs20\ijhu6279\hich\f0\dbch\f0\loch\f0\fs20  \par  \fi-360\li720\plain\f0\fs24{\*\bkmkstart [id number]}{\*\bkmkend zr41642208609-35839888822624}\plain\f0\fs20\jmzg2385\hich\f0\dbch\f0\loch\f0\fs20 \'b7 \tab\plain\f1\fs20\djqb1861\hich\f1\dbch\f1\loch\f1\cf2\fs20\b S/P colonoscopy\plain\f0\fs20\fpeo4654\hich\f0\dbch\f0\loch\f0\fs20   : Entered By: Jun Montejo, Entered Date: 01-Nov-2021 16:12, Last Modified By: Jun Montejo\par  \fi0\li0\plain\f0\fs24\plain\f0\fs20\zhtj0200\hich\f0\dbch\f0\loch\f0\fs20\par  \plain\f1\fs20\voqc7060\hich\f1\dbch\f1\loch\f1\cf2\fs20\b\ul{\field{\*\fldinst HYPERLINK [id number] }{\fldrslt Preliminary Fluoroscopy:}}\plain\f0\fs20\kzqj7262\hich\f0\dbch\f0\loch\f0\fs20\ql\par  \trowd\lrnblm27\xgylneu12\trpaddfl3\omemcrk98\trpaddfr3\trpaddt0\trpaddft3\trpaddb0\trpaddfb3\trleft0  \clvertalt\conjdq37\clpadft3\wzeapc24\clpadfr3\clpadl0\clpadfl3\clpadb0\clpadfb3\eyobu8109  \clvertalt\csofpu97\clpadft3\fxzfid12\clpadfr3\clpadl0\clpadfl3\clpadb0\clpadfb3\cwrsx4195  \pard\intbl\ssparaaux0\s0\fi-120\li120\ql\plain\f0\fs24{\*\bkmkstart wa2871669788}{\*\bkmkend ju1828004439}\plain\f0\fs20\myot7923\hich\f0\dbch\f0\loch\f0\fs20 \'b7 \plain\f1\fs20\shjo2389\hich\f1\dbch\f1\loch\f1\cf2\fs20\b Baseline Swallow\plain\f0\fs20\cohp6877\hich\f0\dbch\f0\loch\f0\fs20\cell  \pard\intbl\ssparaaux0\s0\ql\plain\f0\fs24\plain\f0\fs20\ovkc6365\hich\f0\dbch\f0\loch\f0\fs20 Weak baseline swallow\cell  \intbl\row  \trowd\irazjc93\lastrow\kcdiycy24\trpaddfl3\mitqxxt43\trpaddfr3\trpaddt0\trpaddft3\trpaddb0\trpaddfb3\trleft0  \clvertalt\ngsgjw12\clpadft3\terfwk75\clpadfr3\clpadl0\clpadfl3\clpadb0\clpadfb3\njrrm9285  \clvertalt\gzgmmc78\clpadft3\zdswaq82\clpadfr3\clpadl0\clpadfl3\clpadb0\clpadfb3\emfig4204  \pard\intbl\ssparaaux0\s0\fi-120\li120\ql\plain\f0\fs24{\*\bkmkstart fz1523238079}{\*\bkmkend hb1674142774}\plain\f0\fs20\cxqo5722\hich\f0\dbch\f0\loch\f0\fs20 \'b7 \plain\f1\fs20\bbzj8921\hich\f1\dbch\f1\loch\f1\cf2\fs20\b Additional Information\plain\f0\fs20\eyhq0423\hich\f0\dbch\f0\loch\f0\fs20\cell  \pard\intbl\ssparaaux0\s0\ql\plain\f0\fs24\plain\f0\fs20\zvdp0291\hich\f0\dbch\f0\loch\f0\fs20 + Calcifications of the laryngeal structures\par  + ?CP bar vs other at level of C5/C6. Trace retention of material (across trials) noted\cell  \intbl\row  \pard\ssparaaux0\s0\ql\plain\f0\fs24\plain\f0\fs20\cyqa6790\hich\f0\dbch\f0\loch\f0\fs20\par  \plain\f1\fs20\qreh4556\hich\f1\dbch\f1\loch\f1\cf2\fs20\b\ul{\field{\*\fldinst HYPERLINK [id number] }{\fldrslt VFSS/MBS Eval: Trial 1}}\plain\f0\fs20\ynds2982\hich\f0\dbch\f0\loch\f0\fs20\ql\par  \trowd\lyuncd17\fgqfris05\trpaddfl3\nbyncyd42\trpaddfr3\trpaddt0\trpaddft3\trpaddb0\trpaddfb3\trleft0  \clvertalt\mnmtav12\clpadft3\xjbapr39\clpadfr3\clpadl0\clpadfl3\clpadb0\clpadfb3\jafon0202  \clvertalt\jgzsiy09\clpadft3\gobgpm72\clpadfr3\clpadl0\clpadfl3\clpadb0\clpadfb3\lhznh9849  \pard\intbl\ssparaaux0\s0\fi-120\li120\ql\plain\f0\fs24{\*\bkmkstart me8552092514}{\*\bkmkend nh2688105860}\plain\f0\fs20\sxlw0511\hich\f0\dbch\f0\loch\f0\fs20 \'b7 \plain\f1\fs20\mckn8347\hich\f1\dbch\f1\loch\f1\cf2\fs20\b Consistencies administered\plain\f0\fs20\opmz9725\hich\f0\dbch\f0\loch\f0\fs20\cell  \pard\intbl\ssparaaux0\s0\ql\plain\f0\fs24\plain\f0\fs20\yzdd0279\hich\f0\dbch\f0\loch\f0\fs20 soft & bite-sized; puree-thin, moderately thick liquids via cup, mildly thick liquids via cup with cue for small, single sip\cell  \intbl\row  \trowd\kelvbc60\lastrow\psuksau55\trpaddfl3\cgoxbim06\trpaddfr3\trpaddt0\trpaddft3\trpaddb0\trpaddfb3\trleft0  \clvertalt\rvuyed34\clpadft3\dgadse40\clpadfr3\clpadl0\clpadfl3\clpadb0\clpadfb3\hgttd2461  \clvertalt\oggklq89\clpadft3\olnbme57\clpadfr3\clpadl0\clpadfl3\clpadb0\clpadfb3\lpjjv6071  \pard\intbl\ssparaaux0\s0\fi-120\li120\ql\plain\f0\fs24{\*\bkmkstart [id number]}{\*\bkmkend qo9921497242}\plain\f0\fs20\pfir4336\hich\f0\dbch\f0\loch\f0\fs20 \'b7 \plain\f1\fs20\qfkr2227\hich\f1\dbch\f1\loch\f1\cf2\fs20\b Positioning\plain\f0\fs20\yyxi5060\hich\f0\dbch\f0\loch\f0\fs20\cell  \pard\intbl\ssparaaux0\s0\ql\plain\f0\fs24\plain\f0\fs20\mypr0154\hich\f0\dbch\f0\loch\f0\fs20 Lateral\cell  \intbl\row  \pard\ssparaaux0\s0\ql\plain\f0\fs24\plain\f0\fs20\diir1972\hich\f0\dbch\f0\loch\f0\fs20\par  \plain\f1\fs20\xtod0076\hich\f1\dbch\f1\loch\f1\cf2\fs20\b\ul{\field{\*\fldinst HYPERLINK [id number] }{\fldrslt Oral Prep Phase:}}\plain\f0\fs20\xodv8535\hich\f0\dbch\f0\loch\f0\fs20\ql\par  \trowd\wwuqqc75\lastrow\awaicpq78\trpaddfl3\mssqfeb83\trpaddfr3\trpaddt0\trpaddft3\trpaddb0\trpaddfb3\trleft0  \clvertalt\fqnioc74\clpadft3\idotiz87\clpadfr3\clpadl0\clpadfl3\clpadb0\clpadfb3\pmlsp3036  \clvertalt\ellsht87\clpadft3\youbxz94\clpadfr3\clpadl0\clpadfl3\clpadb0\clpadfb3\klvpt1292  \pard\intbl\ssparaaux0\s0\fi-120\li120\ql\plain\f0\fs24{\*\bkmkstart [id number]}{\*\bkmkend [id number]}\plain\f0\fs20\wvuj7709\hich\f0\dbch\f0\loch\f0\fs20 \'b7 \plain\f1\fs20\owcz6721\hich\f1\dbch\f1\loch\f1\cf2\fs20\b Oral Prep Comments\plain\f0\fs20\crqf7835\hich\f0\dbch\f0\loch\f0\fs20\cell  \pard\intbl\ssparaaux0\s0\ql\plain\f0\fs24\plain\f0\fs20\iuwj9428\hich\f0\dbch\f0\loch\f0\fs20 No anterolateral loss\par  prolonged, however functional mastication of solids\cell  \intbl\row  \pard\ssparaaux0\s0\ql\plain\f0\fs24\plain\f0\fs20\jajn0166\hich\f0\dbch\f0\loch\f0\fs20\par  \plain\f1\fs20\pzbs1274\hich\f1\dbch\f1\loch\f1\cf2\fs20\b\ul{\field{\*\fldinst HYPERLINK [id number] }{\fldrslt Oral Phase:}}\plain\f0\fs20\dwiq7210\hich\f0\dbch\f0\loch\f0\fs20\ql\par  \trowd\qccniu88\vgtlred75\trpaddfl3\fykjjgd30\trpaddfr3\trpaddt0\trpaddft3\trpaddb0\trpaddfb3\trleft0  \clvertalt\bjxngq13\clpadft3\wfrnrn22\clpadfr3\clpadl0\clpadfl3\clpadb0\clpadfb3\ibwou0108  \clvertalt\anfltm44\clpadft3\owjjbk16\clpadfr3\clpadl0\clpadfl3\clpadb0\clpadfb3\umxjl9127  \pard\intbl\ssparaaux0\s0\fi-120\li120\ql\plain\f0\fs24{\*\bkmkstart [id number]}{\*\bkmkend hd3673679725}\plain\f0\fs20\lova9073\hich\f0\dbch\f0\loch\f0\fs20 \'b7 \plain\f1\fs20\syxq6338\hich\f1\dbch\f1\loch\f1\cf2\fs20\b Oral Phase\plain\f0\fs20\fleg3766\hich\f0\dbch\f0\loch\f0\fs20\cell  \pard\intbl\ssparaaux0\s0\ql\plain\f0\fs24\plain\f0\fs20\npql1357\hich\f0\dbch\f0\loch\f0\fs20 Delayed oral transit time; Uncontrolled bolus / spillover in karrie-pharynx; Uncontrolled bolus / spillover in hypopharynx\cell  \intbl\row  \trowd\mrcdcw41\lastrow\moyzshk36\trpaddfl3\vjzwkcn43\trpaddfr3\trpaddt0\trpaddft3\trpaddb0\trpaddfb3\trleft0  \clvertalt\utyyji80\clpadft3\njtxih87\clpadfr3\clpadl0\clpadfl3\clpadb0\clpadfb3\cqgeb5798  \clvertalt\ulxidk63\clpadft3\ctgrms88\clpadfr3\clpadl0\clpadfl3\clpadb0\clpadfb3\udvvz4201  \pard\intbl\ssparaaux0\s0\fi-120\li120\ql\plain\f0\fs24{\*\bkmkstart [id number]}{\*\bkmkend [id number]}\plain\f0\fs20\nvdq5108\hich\f0\dbch\f0\loch\f0\fs20 \'b7 \plain\f1\fs20\heii3231\hich\f1\dbch\f1\loch\f1\cf2\fs20\b Oral Phase Comments\plain\f0\fs20\ecsi1089\hich\f0\dbch\f0\loch\f0\fs20\cell  \pard\intbl\ssparaaux0\s0\ql\plain\f0\fs24\plain\f0\fs20\mnmv4526\hich\f0\dbch\f0\loch\f0\fs20 increase in reduced control with less viscous liquids and larger amounts/rate\cell  \intbl\row  \pard\ssparaaux0\s0\ql\plain\f0\fs24\plain\f0\fs20\yscg8729\hich\f0\dbch\f0\loch\f0\fs20\par  \plain\f1\fs20\zloh4255\hich\f1\dbch\f1\loch\f1\cf2\fs20\b\ul{\field{\*\fldinst HYPERLINK [id number] }{\fldrslt Pharyngeal Phase:}}\plain\f0\fs20\exjd3230\hich\f0\dbch\f0\loch\f0\fs20\ql\par  \trowd\dxoikx10\nljmikq58\trpaddfl3\whqmbwr08\trpaddfr3\trpaddt0\trpaddft3\trpaddb0\trpaddfb3\trleft0  \clvertalt\budjud85\clpadft3\lijmas45\clpadfr3\clpadl0\clpadfl3\clpadb0\clpadfb3\zkomc7032  \clvertalt\oytblu13\clpadft3\adjwwe79\clpadfr3\clpadl0\clpadfl3\clpadb0\clpadfb3\dmpjh0567  \pard\intbl\ssparaaux0\s0\fi-120\li120\ql\plain\f0\fs24{\*\bkmkstart qk70279855952}{\*\bkmkend [id number]}\plain\f0\fs20\ctgl2895\hich\f0\dbch\f0\loch\f0\fs20 \'b7 \plain\f1\fs20\xxgl9033\hich\f1\dbch\f1\loch\f1\cf2\fs20\b Intact\plain\f0\fs20\utaz9110\hich\f0\dbch\f0\loch\f0\fs20\cell  \pard\intbl\ssparaaux0\s0\ql\plain\f0\fs24\plain\f0\fs20\hfvp7219\hich\f0\dbch\f0\loch\f0\fs20 No \cell  \intbl\row  \trowd\bldxbc04\lastrow\uydoihj25\trpaddfl3\pezsoea40\trpaddfr3\trpaddt0\trpaddft3\trpaddb0\trpaddfb3\trleft0  \clvertalt\voxmsq99\clpadft3\fnabbn76\clpadfr3\clpadl0\clpadfl3\clpadb0\clpadfb3\saqhh6381  \clvertalt\bztiny98\clpadft3\uvylvp67\clpadfr3\clpadl0\clpadfl3\clpadb0\clpadfb3\bqzjs8924  \pard\intbl\ssparaaux0\s0\fi-120\li120\ql\plain\f0\fs24{\*\bkmkstart kd06804504061}{\*\bkmkend [id number]}\plain\f0\fs20\oswx2711\hich\f0\dbch\f0\loch\f0\fs20 \'b7 \plain\f1\fs20\wyor0815\hich\f1\dbch\f1\loch\f1\cf2\fs20\b Pharyngeal Phase Comments\plain\f0\fs20\hhbl5294\hich\f0\dbch\f0\loch\f0\fs20\cell  \pard\intbl\ssparaaux0\s0\ql\plain\f0\fs24\plain\f0\fs20\rgpa9569\hich\f0\dbch\f0\loch\f0\fs20 Pharyngeal phase c/b latency in the swallow trigger to the level of the valleculae with inconsistent spillover to the pyriform sinuses w/ less viscous liquids.   Base of tongue/BoT retraction and pharyngeal constriction were reduced and resulted in reduced pharyngeal bolus propulsion. Mild-moderate vallecular residue present with partial to full clearance with repeat dry swallow.  Hyolaryngeal elevation and excursion   were reduced. Adequate epiglottic inversion. Adequate relaxation of UES. No penetration or aspiration were visualized.\cell  \intbl\row  \pard\ssparaaux0\s0\ql\plain\f0\fs24\plain\f0\fs20\pdnp3065\hich\f0\dbch\f0\loch\f0\fs20\par  \plain\f1\fs20\auwf7920\hich\f1\dbch\f1\loch\f1\cf2\fs20\b\ul{\field{\*\fldinst HYPERLINK [id number] }{\fldrslt Behavioral Modifications:}}\plain\f0\fs20\aevp4627\hich\f0\dbch\f0\loch\f0\fs20\ql\par  \trowd\sraljf58\lastrow\jqebzzk58\trpaddfl3\byqqxlp84\trpaddfr3\trpaddt0\trpaddft3\trpaddb0\trpaddfb3\trleft0  \clvertalt\mznvfn21\clpadft3\gsmzdd68\clpadfr3\clpadl0\clpadfl3\clpadb0\clpadfb3\jqzra8979  \clvertalt\kfgxdq59\clpadft3\yriznw97\clpadfr3\clpadl0\clpadfl3\clpadb0\clpadfb3\pwxnr6421  \pard\intbl\ssparaaux0\s0\fi-120\li120\ql\plain\f0\fs24{\*\bkmkstart [id number]}{\*\bkmkend bf1804552741}\plain\f0\fs20\bzxz8156\hich\f0\dbch\f0\loch\f0\fs20 \'b7 \plain\f1\fs20\btaj8933\hich\f1\dbch\f1\loch\f1\cf2\fs20\b Behavioral Modifications\plain\f0\fs20\keku5622\hich\f0\dbch\f0\loch\f0\fs20\cell  \pard\intbl\ssparaaux0\s0\ql\plain\f0\fs24\plain\f0\fs20\lpoh4620\hich\f0\dbch\f0\loch\f0\fs20 inconsistently able to follow command for single small sips with liquids\cell  \intbl\row  \pard\ssparaaux0\s0\ql\plain\f0\fs24\plain\f0\fs20\gloc0823\hich\f0\dbch\f0\loch\f0\fs20\par  \plain\f1\fs20\zpoe5983\hich\f1\dbch\f1\loch\f1\cf2\fs20\b\ul{\field{\*\fldinst HYPERLINK [id number] }{\fldrslt Rosenbek's Penetration Aspiration Scale:}}\plain\f0\fs20\pwne7558\hich\f0\dbch\f0\loch\f0\fs20\ql\par  \trowd\voqbhb37\lastrow\ifllupb69\trpaddfl3\ddbpuzw01\trpaddfr3\trpaddt0\trpaddft3\trpaddb0\trpaddfb3\trleft0  \clvertalt\awjqcc10\clpadft3\umlytm77\clpadfr3\clpadl0\clpadfl3\clpadb0\clpadfb3\apjok7150  \clvertalt\bmlqry13\clpadft3\qbyehd44\clpadfr3\clpadl0\clpadfl3\clpadb0\clpadfb3\nezkb1101  \pard\intbl\ssparaaux0\s0\fi-120\li120\ql\plain\f0\fs24{\*\bkmkstart [id number]}{\*\bkmkend lm2727021791}\plain\f0\fs20\qmak1185\hich\f0\dbch\f0\loch\f0\fs20 \'b7 \plain\f1\fs20\nmhy5350\hich\f1\dbch\f1\loch\f1\cf2\fs20\b Rosenbek's Penetration Aspiration   Scale\plain\f0\fs20\vwjl7281\hich\f0\dbch\f0\loch\f0\fs20\cell  \pard\intbl\ssparaaux0\s0\ql\plain\f0\fs24\plain\f0\fs20\dpur7438\hich\f0\dbch\f0\loch\f0\fs20 (1) no aspiration, contrast does not enter airway \cell  \intbl\row  \pard\ssparaaux0\s0\ql\plain\f0\fs24\plain\f0\fs20\xlbl0305\hich\f0\dbch\f0\loch\f0\fs20\par  \plain\f1\fs20\cawq7084\hich\f1\dbch\f1\loch\f1\cf2\fs20\b\ul{\field{\*\fldinst HYPERLINK [id number] }{\fldrslt VFSS/MBS Eval: Trial 2}}\plain\f0\fs20\jzwu4316\hich\f0\dbch\f0\loch\f0\fs20\ql\par  \trowd\rvzvap04\oyrrgxb52\trpaddfl3\jtqqgll88\trpaddfr3\trpaddt0\trpaddft3\trpaddb0\trpaddfb3\trleft0  \clvertalt\natugs94\clpadft3\qgwcgy09\clpadfr3\clpadl0\clpadfl3\clpadb0\clpadfb3\ihwrx7772  \clvertalt\nvenad38\clpadft3\uzgxhk66\clpadfr3\clpadl0\clpadfl3\clpadb0\clpadfb3\pqafg3240  \pard\intbl\ssparaaux0\s0\fi-120\li120\ql\plain\f0\fs24{\*\bkmkstart [id number]}{\*\bkmkend [id number]}\plain\f0\fs20\fnru6289\hich\f0\dbch\f0\loch\f0\fs20 \'b7 \plain\f1\fs20\lzrp2576\hich\f1\dbch\f1\loch\f1\cf2\fs20\b Consistencies administered\plain\f0\fs20\hgtx1702\hich\f0\dbch\f0\loch\f0\fs20\cell  \pard\intbl\ssparaaux0\s0\ql\plain\f0\fs24\plain\f0\fs20\nbdt4030\hich\f0\dbch\f0\loch\f0\fs20 mildly thick liquids via tsp and thin liquids via tsp\cell  \intbl\row  \trowd\pmcirf36\lastrow\kjygaeg73\trpaddfl3\lljqoib37\trpaddfr3\trpaddt0\trpaddft3\trpaddb0\trpaddfb3\trleft0  \clvertalt\xcthiq25\clpadft3\ahmoma05\clpadfr3\clpadl0\clpadfl3\clpadb0\clpadfb3\jkrxn5697  \clvertalt\kyasxj44\clpadft3\hpczdd89\clpadfr3\clpadl0\clpadfl3\clpadb0\clpadfb3\ednkd3395  \pard\intbl\ssparaaux0\s0\fi-120\li120\ql\plain\f0\fs24{\*\bkmkstart [id number]}{\*\bkmkend de8846701577}\plain\f0\fs20\khfn8696\hich\f0\dbch\f0\loch\f0\fs20 \'b7 \plain\f1\fs20\jxff0317\hich\f1\dbch\f1\loch\f1\cf2\fs20\b Positioning\plain\f0\fs20\tyzn7761\hich\f0\dbch\f0\loch\f0\fs20\cell  \pard\intbl\ssparaaux0\s0\ql\plain\f0\fs24\plain\f0\fs20\pzlu8005\hich\f0\dbch\f0\loch\f0\fs20 Lateral\cell  \intbl\row  \pard\ssparaaux0\s0\ql\plain\f0\fs24\plain\f0\fs20\lxgl8909\hich\f0\dbch\f0\loch\f0\fs20\par  \plain\f1\fs20\ewlr5880\hich\f1\dbch\f1\loch\f1\cf2\fs20\b\ul{\field{\*\fldinst HYPERLINK [id number] }{\fldrslt Oral Prep Phase:}}\plain\f0\fs20\lgus1251\hich\f0\dbch\f0\loch\f0\fs20\ql\par  \trowd\ukjnjn38\lastrow\sdfbckr59\trpaddfl3\txqkvhz14\trpaddfr3\trpaddt0\trpaddft3\trpaddb0\trpaddfb3\trleft0  \clvertalt\uyrcww08\clpadft3\twednh63\clpadfr3\clpadl0\clpadfl3\clpadb0\clpadfb3\hysaq2748  \clvertalt\dpunib63\clpadft3\hkhnup27\clpadfr3\clpadl0\clpadfl3\clpadb0\clpadfb3\zfdsw1959  \pard\intbl\ssparaaux0\s0\fi-120\li120\ql\plain\f0\fs24{\*\bkmkstart [id number]}{\*\bkmkend vq7316692651}\plain\f0\fs20\fwbk4023\hich\f0\dbch\f0\loch\f0\fs20 \'b7 \plain\f1\fs20\jfti0407\hich\f1\dbch\f1\loch\f1\cf2\fs20\b Oral Preparatory Phase\plain\f0\fs20\esqn8083\hich\f0\dbch\f0\loch\f0\fs20\cell  \pard\intbl\ssparaaux0\s0\ql\plain\f0\fs24\plain\f0\fs20\fhyx4589\hich\f0\dbch\f0\loch\f0\fs20 Functional\cell  \intbl\row  \pard\ssparaaux0\s0\ql\plain\f0\fs24\plain\f0\fs20\depp5711\hich\f0\dbch\f0\loch\f0\fs20\par  \plain\f1\fs20\hhmp3403\hich\f1\dbch\f1\loch\f1\cf2\fs20\b\ul{\field{\*\fldinst HYPERLINK [id number] }{\fldrslt Oral Phase:}}\plain\f0\fs20\rmyo1012\hich\f0\dbch\f0\loch\f0\fs20\ql\par  \trowd\vychmj52\dcxdiul94\trpaddfl3\kcsxmxx60\trpaddfr3\trpaddt0\trpaddft3\trpaddb0\trpaddfb3\trleft0  \clvertalt\rspfig98\clpadft3\otcpxq31\clpadfr3\clpadl0\clpadfl3\clpadb0\clpadfb3\hfclz0158  \clvertalt\tamluk55\clpadft3\baaqlc19\clpadfr3\clpadl0\clpadfl3\clpadb0\clpadfb3\xbybh8920  \pard\intbl\ssparaaux0\s0\fi-120\li120\ql\plain\f0\fs24{\*\bkmkstart nk3486388348}{\*\bkmkend [id number]}\plain\f0\fs20\yjjl2767\hich\f0\dbch\f0\loch\f0\fs20 \'b7 \plain\f1\fs20\ksxj4526\hich\f1\dbch\f1\loch\f1\cf2\fs20\b Oral Phase\plain\f0\fs20\qrma8980\hich\f0\dbch\f0\loch\f0\fs20\cell  \pard\intbl\ssparaaux0\s0\ql\plain\f0\fs24\plain\f0\fs20\cibr2441\hich\f0\dbch\f0\loch\f0\fs20 Delayed oral transit time; Uncontrolled bolus / spillover in karrie-pharynx; Uncontrolled bolus / spillover in hypopharynx\cell  \intbl\row  \trowd\tifyap50\lastrow\imxjrzm70\trpaddfl3\dmexezu18\trpaddfr3\trpaddt0\trpaddft3\trpaddb0\trpaddfb3\trleft0  \clvertalt\dgfayw99\clpadft3\gslzuh69\clpadfr3\clpadl0\clpadfl3\clpadb0\clpadfb3\fgtqw4612  \clvertalt\iejlee33\clpadft3\ehmlzn79\clpadfr3\clpadl0\clpadfl3\clpadb0\clpadfb3\bwndn0549  \pard\intbl\ssparaaux0\s0\fi-120\li120\ql\plain\f0\fs24{\*\bkmkstart [id number]}{\*\bkmkend yq11921529929}\plain\f0\fs20\ccaf8982\hich\f0\dbch\f0\loch\f0\fs20 \'b7 \plain\f1\fs20\gkub9893\hich\f1\dbch\f1\loch\f1\cf2\fs20\b Oral Phase Comments\plain\f0\fs20\wldn0551\hich\f0\dbch\f0\loch\f0\fs20\cell  \pard\intbl\ssparaaux0\s0\ql\plain\f0\fs24\plain\f0\fs20\xewd5536\hich\f0\dbch\f0\loch\f0\fs20 inconsistent tongue pumping throughout\cell  \intbl\row  \pard\ssparaaux0\s0\ql\plain\f0\fs24\plain\f0\fs20\mowz2384\hich\f0\dbch\f0\loch\f0\fs20\par  \plain\f1\fs20\ocke4680\hich\f1\dbch\f1\loch\f1\cf2\fs20\b\ul{\field{\*\fldinst HYPERLINK [id number] }{\fldrslt Pharyngeal Phase:}}\plain\f0\fs20\bxfz0829\hich\f0\dbch\f0\loch\f0\fs20\ql\par  \trowd\arpvji04\tntluoy81\trpaddfl3\plmafsd58\trpaddfr3\trpaddt0\trpaddft3\trpaddb0\trpaddfb3\trleft0  \clvertalt\iwohal16\clpadft3\ptpvax79\clpadfr3\clpadl0\clpadfl3\clpadb0\clpadfb3\nsciz9537  \clvertalt\okzffj74\clpadft3\xqxwsv94\clpadfr3\clpadl0\clpadfl3\clpadb0\clpadfb3\jvaoa1433  \pard\intbl\ssparaaux0\s0\fi-120\li120\ql\plain\f0\fs24{\*\bkmkstart [id number]}{\*\bkmkend ug11893663275}\plain\f0\fs20\fwkl1117\hich\f0\dbch\f0\loch\f0\fs20 \'b7 \plain\f1\fs20\ahay4848\hich\f1\dbch\f1\loch\f1\cf2\fs20\b Intact\plain\f0\fs20\wvvo9417\hich\f0\dbch\f0\loch\f0\fs20\cell  \pard\intbl\ssparaaux0\s0\ql\plain\f0\fs24\plain\f0\fs20\vfiy9611\hich\f0\dbch\f0\loch\f0\fs20 No \cell  \intbl\row  \trowd\kfmaqv08\lastrow\bqbwhkm51\trpaddfl3\rbqwhqw75\trpaddfr3\trpaddt0\trpaddft3\trpaddb0\trpaddfb3\trleft0  \clvertalt\swktuz37\clpadft3\iqyxev37\clpadfr3\clpadl0\clpadfl3\clpadb0\clpadfb3\ffsjx0053  \clvertalt\oscpzj78\clpadft3\wyqeas30\clpadfr3\clpadl0\clpadfl3\clpadb0\clpadfb3\xtdlg2846  \pard\intbl\ssparaaux0\s0\fi-120\li120\ql\plain\f0\fs24{\*\bkmkstart sh83892236381}{\*\bkmkend [id number]}\plain\f0\fs20\xuhb2627\hich\f0\dbch\f0\loch\f0\fs20 \'b7 \plain\f1\fs20\ebfr5771\hich\f1\dbch\f1\loch\f1\cf2\fs20\b Pharyngeal Phase Comments\plain\f0\fs20\jzfn4548\hich\f0\dbch\f0\loch\f0\fs20\cell  \pard\intbl\ssparaaux0\s0\ql\plain\f0\fs24\plain\f0\fs20\zyle0404\hich\f0\dbch\f0\loch\f0\fs20 Pharyngeal phase c/b latency in the swallow trigger to the level of the valleculae with inconsistent spillover to the pyriform sinuses w/ less viscous liquids.   Base of tongue/BoT retraction and pharyngeal constriction were reduced and resulted in reduced pharyngeal bolus propulsion. Mild-moderate vallecular residue present with partial to full clearance with repeat dry swallow.  Hyolaryngeal elevation and excursion   were reduced. Adequate epiglottic inversion. Adequate relaxation of UES. Inconsistent penetration which appeared to fully clear; trace.\cell  \intbl\row  \pard\ssparaaux0\s0\ql\plain\f0\fs24\plain\f0\fs20\jeyt0059\hich\f0\dbch\f0\loch\f0\fs20\par  \plain\f1\fs20\iakr3525\hich\f1\dbch\f1\loch\f1\cf2\fs20\b\ul{\field{\*\fldinst HYPERLINK [id number] }{\fldrslt Rosenbek's Penetration Aspiration Scale:}}\plain\f0\fs20\dmpe9262\hich\f0\dbch\f0\loch\f0\fs20\ql\par  \trowd\bdcurl30\lastrow\feakyrw08\trpaddfl3\qgzzkso89\trpaddfr3\trpaddt0\trpaddft3\trpaddb0\trpaddfb3\trleft0  \clvertalt\yxqpkn87\clpadft3\dhchuq04\clpadfr3\clpadl0\clpadfl3\clpadb0\clpadfb3\uybkd0385  \clvertalt\hbdjqt57\clpadft3\iwsxgg93\clpadfr3\clpadl0\clpadfl3\clpadb0\clpadfb3\bubnu1318  \pard\intbl\ssparaaux0\s0\fi-120\li120\ql\plain\f0\fs24{\*\bkmkstart ee0169742924}{\*\bkmkend er1365143667}\plain\f0\fs20\wfci0862\hich\f0\dbch\f0\loch\f0\fs20 \'b7 \plain\f1\fs20\moca0719\hich\f1\dbch\f1\loch\f1\cf2\fs20\b Rosenbek's Penetration Aspiration   Scale\plain\f0\fs20\gbwq0825\hich\f0\dbch\f0\loch\f0\fs20\cell  \pard\intbl\ssparaaux0\s0\ql\plain\f0\fs24\plain\f0\fs20\bvuv7603\hich\f0\dbch\f0\loch\f0\fs20 (2) contrast enters airway, remains above the vocal cords, no residue remains (penetration) \cell  \intbl\row  \pard\ssparaaux0\s0\ql\plain\f0\fs24\plain\f0\fs20\zctb0268\hich\f0\dbch\f0\loch\f0\fs20\par  \plain\f1\fs20\fghc5572\hich\f1\dbch\f1\loch\f1\cf2\fs20\b\ul{\field{\*\fldinst HYPERLINK [id number] }{\fldrslt VFSS/MBS Eval: Trial 3}}\plain\f0\fs20\txvv7547\hich\f0\dbch\f0\loch\f0\fs20\ql\par  \trowd\dbvzxh86\mwaoufw16\trpaddfl3\vwvekdf93\trpaddfr3\trpaddt0\trpaddft3\trpaddb0\trpaddfb3\trleft0  \clvertalt\oxyqnr23\clpadft3\geeizh47\clpadfr3\clpadl0\clpadfl3\clpadb0\clpadfb3\lwynl4565  \clvertalt\wvvoex14\clpadft3\mgyylv41\clpadfr3\clpadl0\clpadfl3\clpadb0\clpadfb3\bacuu8798  \pard\intbl\ssparaaux0\s0\fi-120\li120\ql\plain\f0\fs24{\*\bkmkstart [id number]}{\*\bkmkend ng2971577320}\plain\f0\fs20\yxpx7959\hich\f0\dbch\f0\loch\f0\fs20 \'b7 \plain\f1\fs20\vajd7909\hich\f1\dbch\f1\loch\f1\cf2\fs20\b Consistencies administered\plain\f0\fs20\mjwu5748\hich\f0\dbch\f0\loch\f0\fs20\cell  \pard\intbl\ssparaaux0\s0\ql\plain\f0\fs24\plain\f0\fs20\wysd3116\hich\f0\dbch\f0\loch\f0\fs20 mildly thick liquids via cup with self presentation of cup no cues, thin cup with cue to small single sips\cell  \intbl\row  \trowd\ibfrhv89\lastrow\acnsrbc60\trpaddfl3\swihzgh74\trpaddfr3\trpaddt0\trpaddft3\trpaddb0\trpaddfb3\trleft0  \clvertalt\uvygvg03\clpadft3\yidgxc93\clpadfr3\clpadl0\clpadfl3\clpadb0\clpadfb3\urgwk3802  \clvertalt\aidpxd33\clpadft3\xygrxu20\clpadfr3\clpadl0\clpadfl3\clpadb0\clpadfb3\xjtoa1916  \pard\intbl\ssparaaux0\s0\fi-120\li120\ql\plain\f0\fs24{\*\bkmkstart wz3952646266}{\*\bkmkend jq2414247761}\plain\f0\fs20\sfyk3230\hich\f0\dbch\f0\loch\f0\fs20 \'b7 \plain\f1\fs20\mbsz6259\hich\f1\dbch\f1\loch\f1\cf2\fs20\b Positioning\plain\f0\fs20\gopn3249\hich\f0\dbch\f0\loch\f0\fs20\cell  \pard\intbl\ssparaaux0\s0\ql\plain\f0\fs24\plain\f0\fs20\eizi2762\hich\f0\dbch\f0\loch\f0\fs20 Lateral\cell  \intbl\row  \pard\ssparaaux0\s0\ql\plain\f0\fs24\plain\f0\fs20\idua6917\hich\f0\dbch\f0\loch\f0\fs20\par  \plain\f1\fs20\howh7016\hich\f1\dbch\f1\loch\f1\cf2\fs20\b\ul{\field{\*\fldinst HYPERLINK [id number] }{\fldrslt Oral Prep Phase:}}\plain\f0\fs20\vmzn8240\hich\f0\dbch\f0\loch\f0\fs20\ql\par  \trowd\hijvcj29\lastrow\voqcxmg21\trpaddfl3\uhechql32\trpaddfr3\trpaddt0\trpaddft3\trpaddb0\trpaddfb3\trleft0  \clvertalt\ahekpi89\clpadft3\lwkwxn34\clpadfr3\clpadl0\clpadfl3\clpadb0\clpadfb3\qleua8460  \clvertalt\zqgjoo22\clpadft3\nkbibu56\clpadfr3\clpadl0\clpadfl3\clpadb0\clpadfb3\sctin7909  \pard\intbl\ssparaaux0\s0\fi-120\li120\ql\plain\f0\fs24{\*\bkmkstart la08340292727}{\*\bkmkend vk26596502737}\plain\f0\fs20\gpxo5356\hich\f0\dbch\f0\loch\f0\fs20 \'b7 \plain\f1\fs20\bqgg7131\hich\f1\dbch\f1\loch\f1\cf2\fs20\b Oral Prep Comments\plain\f0\fs20\ssyw8841\hich\f0\dbch\f0\loch\f0\fs20\cell  \pard\intbl\ssparaaux0\s0\ql\plain\f0\fs24\plain\f0\fs20\oupc6195\hich\f0\dbch\f0\loch\f0\fs20 no anterolateral loss\cell  \intbl\row  \pard\ssparaaux0\s0\ql\plain\f0\fs24\plain\f0\fs20\ivgr2375\hich\f0\dbch\f0\loch\f0\fs20\par  \plain\f1\fs20\utqi8834\hich\f1\dbch\f1\loch\f1\cf2\fs20\b\ul{\field{\*\fldinst HYPERLINK [id number] }{\fldrslt Oral Phase:}}\plain\f0\fs20\eiud6748\hich\f0\dbch\f0\loch\f0\fs20\ql\par  \trowd\drdabw45\lastrow\xiugcxb21\trpaddfl3\xpdyrft55\trpaddfr3\trpaddt0\trpaddft3\trpaddb0\trpaddfb3\trleft0  \clvertalt\fbanty73\clpadft3\zlhcss52\clpadfr3\clpadl0\clpadfl3\clpadb0\clpadfb3\vjklk0254  \clvertalt\ohqxtc13\clpadft3\dvkauk04\clpadfr3\clpadl0\clpadfl3\clpadb0\clpadfb3\wvqrq5052  \pard\intbl\ssparaaux0\s0\fi-120\li120\ql\plain\f0\fs24{\*\bkmkstart fo6340481493}{\*\bkmkend ho7177845303}\plain\f0\fs20\fmed7824\hich\f0\dbch\f0\loch\f0\fs20 \'b7 \plain\f1\fs20\nogl4909\hich\f1\dbch\f1\loch\f1\cf2\fs20\b Oral Phase\plain\f0\fs20\opnx8306\hich\f0\dbch\f0\loch\f0\fs20\cell  \pard\intbl\ssparaaux0\s0\ql\plain\f0\fs24\plain\f0\fs20\dakz4074\hich\f0\dbch\f0\loch\f0\fs20 Uncontrolled bolus / spillover in karrie-pharynx; Uncontrolled bolus / spillover in hypopharynx\cell  \intbl\row  \pard\ssparaaux0\s0\ql\plain\f0\fs24\plain\f0\fs20\ewwd5214\hich\f0\dbch\f0\loch\f0\fs20\par  \plain\f1\fs20\thgv0300\hich\f1\dbch\f1\loch\f1\cf2\fs20\b\ul{\field{\*\fldinst HYPERLINK [id number] }{\fldrslt Pharyngeal Phase:}}\plain\f0\fs20\iymr9496\hich\f0\dbch\f0\loch\f0\fs20\ql\par  \trowd\jgxrmt26\wtvzeol11\trpaddfl3\yajfide35\trpaddfr3\trpaddt0\trpaddft3\trpaddb0\trpaddfb3\trleft0  \clvertalt\adjolf89\clpadft3\qszmfo07\clpadfr3\clpadl0\clpadfl3\clpadb0\clpadfb3\buzej7110  \clvertalt\cmyjcc94\clpadft3\tkhgro41\clpadfr3\clpadl0\clpadfl3\clpadb0\clpadfb3\uxlga6980  \pard\intbl\ssparaaux0\s0\fi-120\li120\ql\plain\f0\fs24{\*\bkmkstart os09340375701}{\*\bkmkend ie40899937548}\plain\f0\fs20\vdld4283\hich\f0\dbch\f0\loch\f0\fs20 \'b7 \plain\f1\fs20\uxar4797\hich\f1\dbch\f1\loch\f1\cf2\fs20\b Intact\plain\f0\fs20\jncs1494\hich\f0\dbch\f0\loch\f0\fs20\cell  \pard\intbl\ssparaaux0\s0\ql\plain\f0\fs24\plain\f0\fs20\jkuz6214\hich\f0\dbch\f0\loch\f0\fs20 No \cell  \intbl\row  \trowd\hgadkt72\lastrow\yyrrbun07\trpaddfl3\eusvnfx99\trpaddfr3\trpaddt0\trpaddft3\trpaddb0\trpaddfb3\trleft0  \clvertalt\jzfvkt83\clpadft3\hydchx74\clpadfr3\clpadl0\clpadfl3\clpadb0\clpadfb3\cgksv7679  \clvertalt\emjggm22\clpadft3\qzahcd47\clpadfr3\clpadl0\clpadfl3\clpadb0\clpadfb3\omlsr0558  \pard\intbl\ssparaaux0\s0\fi-120\li120\ql\plain\f0\fs24{\*\bkmkstart [id number]}{\*\bkmkend [id number]}\plain\f0\fs20\twzo4072\hich\f0\dbch\f0\loch\f0\fs20 \'b7 \plain\f1\fs20\azka9388\hich\f1\dbch\f1\loch\f1\cf2\fs20\b Pharyngeal Phase Comments\plain\f0\fs20\erlt0955\hich\f0\dbch\f0\loch\f0\fs20\cell  \pard\intbl\ssparaaux0\s0\ql\plain\f0\fs24\plain\f0\fs20\ssqw0956\hich\f0\dbch\f0\loch\f0\fs20 Pharyngeal phase c/b latency in the swallow trigger to the level of the valleculae with notable spillover to the pyriform sinuses w/ less viscous liquids. Base   of tongue/BoT retraction and pharyngeal constriction were reduced and resulted in reduced pharyngeal bolus propulsion. Mild vallecular residue present with partial to full clearance with repeat dry swallow.  Hyolaryngeal elevation and excursion were reduced.   Adequate epiglottic inversion. Adequate relaxation of UES. Appreciated bolus navigation onto arytenoids and/or AE folds with subsequent airway invasion, latency in the cough response present with partial ejection from airway.\cell  \intbl\row  \pard\ssparaaux0\s0\ql\plain\f0\fs24\plain\f0\fs20\siqv2858\hich\f0\dbch\f0\loch\f0\fs20\par  \plain\f1\fs20\ldog7744\hich\f1\dbch\f1\loch\f1\cf2\fs20\b\ul{\field{\*\fldinst HYPERLINK [id number] }{\fldrslt Rosenbek's Penetration Aspiration Scale:}}\plain\f0\fs20\qoln2042\hich\f0\dbch\f0\loch\f0\fs20\ql\par  \trowd\gdddvy00\lastrow\gvjbafq97\trpaddfl3\rkmrytq36\trpaddfr3\trpaddt0\trpaddft3\trpaddb0\trpaddfb3\trleft0  \clvertalt\vrsses11\clpadft3\yftimf84\clpadfr3\clpadl0\clpadfl3\clpadb0\clpadfb3\fwhon9869  \clvertalt\ezzxfm42\clpadft3\gjoetv27\clpadfr3\clpadl0\clpadfl3\clpadb0\clpadfb3\jytaw6890  \pard\intbl\ssparaaux0\s0\fi-120\li120\ql\plain\f0\fs24{\*\bkmkstart [id number]}{\*\bkmkend [id number]}\plain\f0\fs20\yxur3946\hich\f0\dbch\f0\loch\f0\fs20 \'b7 \plain\f1\fs20\eutj4427\hich\f1\dbch\f1\loch\f1\cf2\fs20\b Rosenbek's Penetration Aspiration   Scale\plain\f0\fs20\mmrn3657\hich\f0\dbch\f0\loch\f0\fs20\cell  \pard\intbl\ssparaaux0\s0\ql\plain\f0\fs24\plain\f0\fs20\ywvq3637\hich\f0\dbch\f0\loch\f0\fs20 (8) contrast passes glottis, visible subglottic residue remains, absent patient response (aspiration) \cell  \intbl\row  \pard\ssparaaux0\s0\ql\plain\f0\fs24\plain\f0\fs20\lmpb3707\hich\f0\dbch\f0\loch\f0\fs20\par  {\*\bkmkstart [id number]}{\*\bkmkend [id number]}\plain\f1\fs20\cqww5730\hich\f1\dbch\f1\loch\f1\cf2\fs20\b\ul Recommendations:\plain\f0\fs20\jadm7040\hich\f0\dbch\f0\loch\f0\fs20  \par  \trowd\rnlfth00\qqwdgsu96\trpaddfl3\nwragqv12\trpaddfr3\trpaddt0\trpaddft3\trpaddb0\trpaddfb3\trleft0  \clvertalt\lfjeyo95\clpadft3\owmaln19\clpadfr3\clpadl0\clpadfl3\clpadb0\clpadfb3\bmsnb3151  \clvertalt\ptzxfa18\clpadft3\ydrqcx38\clpadfr3\clpadl0\clpadfl3\clpadb0\clpadfb3\grqrz6494  \pard\intbl\ssparaaux0\s0\fi-120\li120\ql\plain\f0\fs24{\*\bkmkstart [id number]}{\*\bkmkend [id number]}\plain\f0\fs20\qbov3636\hich\f0\dbch\f0\loch\f0\fs20 \'b7 \plain\f1\fs20\wlwv3562\hich\f1\dbch\f1\loch\f1\cf2\fs20\b Diagnostic Impressions\plain\f0\fs20\facf6167\hich\f0\dbch\f0\loch\f0\fs20\cell  \pard\intbl\ssparaaux0\s0\ql\plain\f0\fs24\plain\f0\fs20\fiei3332\hich\f0\dbch\f0\loch\f0\fs20 Mr. 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 May wish to pursue additional neurological work up vs other.   There is prolonged however adequate mastication and oral transfer of all textures. Appearing to have lingual pumping motion to facilitate a-p transport. Premature spillage is seen with less viscous liquids. Laryngeal vestibule closure is reduced, resulting   in laryngeal penetration of less viscous liquids. Material is retrieved during the swallow or during subsequent swallows with mildly thick liquids via tsp and thin liquids via tsp. It should be noted that depth/ amount of laryngeal penetration for less   viscous liquids is reduced with use of small, single cup sips HOWEVER PT CAN NOT CONSISTENTLY FOLLOW / COMPLETE this command as was evidenced by this study which resulted in GROSS ASPIRATION of thin liquids via cup sips and mildly thick liquids via cup   sips. Of note, pt with latent cough response, reduced supra/subglottic sensation apparent. \par  \par  Disorders:  reduced BOT to posterior pharyngeal wall contact, delay in trigger of the swallow reflex, reduced hyo-laryngeal excursion, reduced laryngeal closure, reduced pharyngeal contractility, reduced supraglottic sensation, reduced subglottic sensation.\par  \par  **Patient can not be advanced at the bedside. pt will require a repeat objective to advance solids and liquids.**\cell  \intbl\row  \pard\intbl\ssparaaux0\s0\ql\plain\f0\fs24\plain\f0\fs20\vmtc9734\hich\f0\dbch\f0\loch\f0\fs20\cell  \pard\intbl\ssparaaux0\s0\ql\plain\f0\fs24\plain\f1\fs20\rrtz6386\hich\f1\dbch\f1\loch\f1\cf2\fs20\strike\plain\f0\fs20\mowo5756\hich\f0\dbch\f0\loch\f0\fs20\cell  \intbl\row  \pard\intbl\ssparaaux0\s0\fi-120\li120\ql\plain\f0\fs24{\*\bkmkstart [id number]}{\*\bkmkend [id number]}\plain\f0\fs20\oqkb7342\hich\f0\dbch\f0\loch\f0\fs20 \'b7 \plain\f1\fs20\yolr3929\hich\f1\dbch\f1\loch\f1\cf2\fs20\b Recommended Consistencies\plain\f0\fs20\xokh0926\hich\f0\dbch\f0\loch\f0\fs20\cell  \pard\intbl\ssparaaux0\s0\ql\plain\f0\fs24\plain\f0\fs20\vkzp8024\hich\f0\dbch\f0\loch\f0\fs20 MILDLY THICK LIQUIDS TSP \par  NO CUP\par  NO STRAW \par  SOFT AND BITE SIZED SOLIDS\par  \par  **With SLP ONLY would allow thin liquids via tsp during therapy following oral care, not with other liquid or solids. This can be continued in the home environment with family/aide HOWEVER not during hospital course. Additional education to be provided   to family/aide during course. **\cell  \intbl\row  \pard\intbl\ssparaaux0\s0\ql\plain\f0\fs24\plain\f0\fs20\fhoz9271\hich\f0\dbch\f0\loch\f0\fs20\cell  \pard\intbl\ssparaaux0\s0\ql\plain\f0\fs24\plain\f1\fs20\veva8162\hich\f1\dbch\f1\loch\f1\cf2\fs20\strike\plain\f0\fs20\qbue0946\hich\f0\dbch\f0\loch\f0\fs20\cell  \intbl\row  \pard\intbl\ssparaaux0\s0\fi-120\li120\ql\plain\f0\fs24{\*\bkmkstart [id number]}{\*\bkmkend [id number]}\plain\f0\fs20\rvks9758\hich\f0\dbch\f0\loch\f0\fs20 \'b7 \plain\f1\fs20\kruk3494\hich\f1\dbch\f1\loch\f1\cf2\fs20\b Recommended Feeding/Eating   Techniques\plain\f0\fs20\vgnh5043\hich\f0\dbch\f0\loch\f0\fs20\cell  \pard\intbl\ssparaaux0\s0\ql\plain\f0\fs24\plain\f0\fs20\oegi3728\hich\f0\dbch\f0\loch\f0\fs20 crush medication (when feasible); no straws; oral hygiene\cell  \intbl\row  \pard\intbl\ssparaaux0\s0\fi-120\li120\ql\plain\f0\fs24{\*\bkmkstart [id number]}{\*\bkmkend fn1716870773}\plain\f0\fs20\qxja5187\hich\f0\dbch\f0\loch\f0\fs20 \'b7 \plain\f1\fs20\yxqj6492\hich\f1\dbch\f1\loch\f1\cf2\fs20\b Feeding Assistance\plain\f0\fs20\nklk5665\hich\f0\dbch\f0\loch\f0\fs20\cell  \pard\intbl\ssparaaux0\s0\ql\plain\f0\fs24\plain\f0\fs20\lyoz0305\hich\f0\dbch\f0\loch\f0\fs20 frequent cues/help required\cell  \intbl\row  \pard\intbl\ssparaaux0\s0\ql\plain\f0\fs24\plain\f0\fs20\vycu5744\hich\f0\dbch\f0\loch\f0\fs20\cell  \pard\intbl\ssparaaux0\s0\ql\plain\f0\fs24\plain\f1\fs20\xsop5220\hich\f1\dbch\f1\loch\f1\cf2\fs20\strike\plain\f0\fs20\pwos1209\hich\f0\dbch\f0\loch\f0\fs20\cell  \intbl\row  \pard\intbl\ssparaaux0\s0\fi-120\li120\ql\plain\f0\fs24{\*\bkmkstart [id number]}{\*\bkmkend [id number]}\plain\f0\fs20\sbhx6696\hich\f0\dbch\f0\loch\f0\fs20 \'b7 \plain\f1\fs20\psnb1638\hich\f1\dbch\f1\loch\f1\cf2\fs20\b Aspiration Precautions\plain\f0\fs20\ejgg7648\hich\f0\dbch\f0\loch\f0\fs20\cell  \pard\intbl\ssparaaux0\s0\ql\plain\f0\fs24\plain\f0\fs20\wgpj4045\hich\f0\dbch\f0\loch\f0\fs20 yes\cell  \intbl\row  \pard\intbl\ssparaaux0\s0\fi-120\li120\ql\plain\f0\fs24{\*\bkmkstart [id number]}{\*\bkmkend hp1673426233}\plain\f0\fs20\kgkm0949\hich\f0\dbch\f0\loch\f0\fs20 \'b7 \plain\f1\fs20\wlwi9942\hich\f1\dbch\f1\loch\f1\cf2\fs20\b Monitor for Signs of Aspiration\plain\f0\fs20\vgau0287\hich\f0\dbch\f0\loch\f0\fs20\cell  \pard\intbl\ssparaaux0\s0\ql\plain\f0\fs24\plain\f0\fs20\mlde0692\hich\f0\dbch\f0\loch\f0\fs20 oral hygiene; position upright (90Y); cough; gurgly voice; pneumonia; throat clearing\cell  \intbl\row  \pard\intbl\ssparaaux0\s0\fi-120\li120\ql\plain\f0\fs24{\*\bkmkstart [id number]}{\*\bkmkend [id number]}\plain\f0\fs20\uvbf0362\hich\f0\dbch\f0\loch\f0\fs20 \'b7 \plain\f1\fs20\nzry2079\hich\f1\dbch\f1\loch\f1\cf2\fs20\b Anticipated Discharge Disposition\plain\f0\fs20\tkzs9155\hich\f0\dbch\f0\loch\f0\fs20\cell  \pard\intbl\ssparaaux0\s0\ql\plain\f0\fs24\plain\f0\fs20\peke6736\hich\f0\dbch\f0\loch\f0\fs20 slp to continue at home \cell  \intbl\row  \pard\intbl\ssparaaux0\s0\fi-120\li120\ql\plain\f0\fs24{\*\bkmkstart [id number]}{\*\bkmkend hs2523064717}\plain\f0\fs20\mycw6490\hich\f0\dbch\f0\loch\f0\fs20 \'b7 \plain\f1\fs20\yjpo0136\hich\f1\dbch\f1\loch\f1\cf2\fs20\b Demonstrates Need for Referral   to Another Service\plain\f0\fs20\vzjs2017\hich\f0\dbch\f0\loch\f0\fs20\cell  \pard\intbl\ssparaaux0\s0\ql\plain\f0\fs24\plain\f0\fs20\zaaq7766\hich\f0\dbch\f0\loch\f0\fs20 Registered Dietitian; neurology{\*\bkmkstart bkcommentCR}{\*\bkmkend bkcommentCR}\cell  \intbl\row  \pard\intbl\ssparaaux0\s0\fi-120\li120\ql\plain\f0\fs24{\*\bkmkstart [id number]}{\*\bkmkend ew5185908681}\plain\f0\fs20\fctt3819\hich\f0\dbch\f0\loch\f0\fs20 \'b7 \plain\f1\fs20\wyfi8443\hich\f1\dbch\f1\loch\f1\cf2\fs20\b Additional Recommendations\plain\f0\fs20\cjhc6021\hich\f0\dbch\f0\loch\f0\fs20\cell  \pard\intbl\ssparaaux0\s0\ql\plain\f0\fs24\plain\f0\fs20\fvhg9822\hich\f0\dbch\f0\loch\f0\fs20 GOAL- Pt to tolerate recommended textures during course w/ no s/sx of aspiration Pt/family/caregiver will demonstrate understanding and carryover of dysphagia   management (safe swallow guidelines, dysphagia diet).\cell  \intbl\row  \trowd\uhvhbk36\lastrow\pwgmayv95\trpaddfl3\spujear53\trpaddfr3\trpaddt0\trpaddft3\trpaddb0\trpaddfb3\trleft0  \clvertalt\hmhrcb11\clpadft3\kuymvx82\clpadfr3\clpadl0\clpadfl3\clpadb0\clpadfb3\esxvy1763  \clvertalt\raxcad81\clpadft3\etbuaw33\clpadfr3\clpadl0\clpadfl3\clpadb0\clpadfb3\mplzo6365  \pard\intbl\ssparaaux0\s0\fi-120\li120\ql\plain\f0\fs24{\*\bkmkstart [id number]}{\*\bkmkend [id number]}\plain\f0\fs20\hyep3965\hich\f0\dbch\f0\loch\f0\fs20 \'b7 \plain\f1\fs20\dmni9605\hich\f1\dbch\f1\loch\f1\cf2\fs20\b The above findings were discussed   with\plain\f0\fs20\xroj1045\hich\f0\dbch\f0\loch\f0\fs20\cell  \pard\intbl\ssparaaux0\s0\ql\plain\f0\fs24\plain\f0\fs20\vypt3411\hich\f0\dbch\f0\loch\f0\fs20 Physician; Nursing; RN Leana, PA La\cell  \intbl\row  \pard\ssparaaux0\s0\ql\plain\f0\fs24\plain\f0\fs20\hsfv0717\hich\f0\dbch\f0\loch\f0\fs20\par  \par  {\*\bkmkstart bkClinDocSignatures}{\*\bkmkend bkClinDocSignatures}{\*\bkmkstart bkcommentSBK}{\*\bkmkend bkcommentSBK}\plain\f1\fs20\qmyy1705\hich\f1\dbch\f1\loch\f1\cf2\fs20\b Electronic Signatures:\plain\f0\fs20\hzmz9684\hich\f0\dbch\f0\loch\f0\fs20\par  \plain\f1\fs20\ynzb6787\hich\f1\dbch\f1\loch\f1\cf2\fs20\b\ul Rachael Robert (Speech Pathologist)\plain\f0\fs20\kbbu2916\hich\f0\dbch\f0\loch\f0\fs20   \plain\f1\fs18\ohdf5722\hich\f1\dbch\f1\loch\f1\cf2\fs18 (Signed 04-Jan-2024 10:11)\par  \fi-360\li720\plain\f0\fs24\plain\f1\fs18\ryxz2450\hich\f1\dbch\f1\loch\f1\cf2\fs18\tab\plain\f1\fs20\ptgt0070\hich\f1\dbch\f1\loch\f1\cf2\fs20\b\i Authored: \plain\f1\fs20\vnsd7582\hich\f1\dbch\f1\loch\f1\cf2\fs20\i Swallow VFSS/MBS Assessment Adult,   Recommendations\plain\f0\fs20\tdzv3404\hich\f0\dbch\f0\loch\f0\fs20\par  \fi0\li0\plain\f0\fs24\plain\f0\fs20\nxew5507\hich\f0\dbch\f0\loch\f0\fs20\par  \par  \plain\f1\fs20\qupg4784\hich\f1\dbch\f1\loch\f1\cf2\fs20\b\i Last Updated: \plain\f1\fs20\iovq0022\hich\f1\dbch\f1\loch\f1\cf2\fs20\i 04-Jan-2024 10:11 by Rachael Robert (Speech Pathologist)\plain\f0\fs20\sktx8737\hich\f0\dbch\f0\loch\f0\fs20\par  \pard\plain\f0\fs24\plain\f0\fs20\toez2140\hich\f0\dbch\f0\loch\f0\fs20\par  \par  \ql\plain\f0\fs24\plain\f0\fs20\hueb7015\hich\f0\dbch\f0\loch\f0\fs20\par  }   {\rtf1\ozflhw91535\ansi\tyjcvuo5629\ftnbj\uc1\deff0  {\fonttbl{\f0 \fnil Segoe UI;}{\f1 \fnil \fcharset0 Segoe UI;}{\f2 \fnil Times New John;}}  {\colortbl ;\egr360\xlqen194\juzb601 ;\red0\green0\blue0 ;\red0\green0\uojf420 ;\red0\green0\blue0 ;}  {\stylesheet{\f0\fs20 Normal;}{\cs1 Default Paragraph Font;}{\cs2\f0\fs16 Line Number;}{\cs3\f2\fs24\ul\cf3 Hyperlink;}}  {\*\revtbl{Unknown;}}  \zdiusl73301\orbqsf65540\cmlis3974\subur5835\yiezw2497\pznsw8484\edglboe783\mqomddh581\nogrowautofit\ckujay864\formshade\nofeaturethrottle1\dntblnsbdb\fet4\aendnotes\aftnnrlc\pgbrdrhead\pgbrdrfoot  \sectd\ezyabx34710\ztwdwn23862\guttersxn0\mhuutnib9814\fwdhmroj1039\oznaqkud6029\tvsrmvsf9460\ajgdlot473\zggqdfg558\sbkpage\pgncont\pgndec  \plain\plain\f0\fs24\pard\plain\f0\fs24\plain\f0\fs20\dhiq8122\hich\f0\dbch\f0\loch\f0\fs20 Subjective: Patient seen and examined. No new events except as noted. \par  wants to go h ome \par  \par  REVIEW OF SYSTEMS:\par  \par  CONSTITUTIONAL: + weakness, fevers or chills\par  EYES/ENT: No visual changes;  No vertigo or throat pain \par  NECK: No pain or stiffness\par  RESPIRATORY: No cough, wheezing, hemoptysis; No shortness of breath\par  CARDIOVASCULAR: No chest pain or palpitations\par  GASTROINTESTINAL: No abdominal or epigastric pain. No nausea, vomiting, or hematemesis; No diarrhea or constipation. No melena or hematochezia.\par  GENITOURINARY: No dysuria, frequency or hematuria\par  NEUROLOGICAL: No numbness or weakness\par  SKIN: No itching, burning, rashes, or lesions \par  All other review of systems is negative unless indicated above.\par  \par  MEDICATIONS:\par  MEDICATIONS  (STANDING):\par  albuterol/ipratropium for Nebulization 3 milliLiter(s) Nebulizer every 6 hours\par  apixaban 5 milliGRAM(s) Oral every 12 hours\par  atorvastatin 80 milliGRAM(s) Oral at bedtime\par  chlorhexidine 2% Cloths 1 Application(s) Topical daily\par  dextrose 5%. 1000 milliLiter(s) (50 mL/Hr) IV Continuous <Continuous>\par  dextrose 5%. 1000 milliLiter(s) (100 mL/Hr) IV Continuous <Continuous>\par  dextrose 50% Injectable 25 Gram(s) IV Push once\par  dextrose 50% Injectable 12.5 Gram(s) IV Push once\par  dextrose 50% Injectable 25 Gram(s) IV Push once\par  escitalopram 20 milliGRAM(s) Oral daily\par  glucagon  Injectable 1 milliGRAM(s) IntraMuscular once\par  insulin glargine Injectable (LANTUS) 10 Unit(s) SubCutaneous at bedtime\par  insulin lispro (ADMELOG) corrective regimen sliding scale   SubCutaneous three times a day before meals\par  insulin lispro (ADMELOG) corrective regimen sliding scale   SubCutaneous at bedtime\par  lisinopril 40 milliGRAM(s) Oral daily\par  metoclopramide 10 milliGRAM(s) Oral three times a day before meals\par  metoprolol succinate  milliGRAM(s) Oral daily\par  pantoprazole    Tablet 40 milliGRAM(s) Oral before breakfast\par  piperacillin/tazobactam IVPB.. 3.375 Gram(s) IV Intermittent every 8 hours\par  \par  \par  PHYSICAL EXAM:\par  T(C): 37 (01-04-24 @ 05:25), Max: 37 (01-04-24 @ 05:25)\par  HR: 60 (01-04-24 @ 05:25) (60 - 96)\par  BP: 188/68 (01-04-24 @ 05:25) (156/77 - 188/68)\par  RR: 18 (01-04-24 @ 05:25) (18 - 18)\par  SpO2: 93% (01-04-24 @ 05:25) (92% - 98%)\par  Wt(kg): --\par  I&O's Summary\par  \par  03 Jan 2024 07:01  -  04 Jan 2024 07:00\par  --------------------------------------------------------\par  IN: 940 mL / OUT: 1750 mL / NET: -810 mL\par  \par  \par  \par  \par  Appearance: NAD\par  HEENT:   Dry oral mucosa, PERRL, EOMI\tab\par  Lymphatic: No lymphadenopathy , no edema\par  Cardiovascular: Normal S1 S2, No JVD, No murmurs , Peripheral pulses palpable 2+ bilaterally\par  Respiratory: decreased bs\par  Gastrointestinal:  Soft, Non-tender, + BS\tab\par  Skin: No rashes, No ecchymoses, No cyanosis, warm to touch\par  Musculoskeletal: Normal range of motion, normal strength\par  Psychiatry:  Mood & affect appropriate\par  Ext: No edema\par  \par  \par  \par  LABS:\par  \par  CARDIAC MARKERS:\par  \par  \par  \par  \par           \par             11.2 \par  6.04  )-----------( 273      ( 03 Jan 2024 07:12 )\par             36.2 \par  \par  01-04\par  \par  139  |  100  |  45<H>\par  ----------------------------<  133<H>\par  4.1   |  31  |  2.38<H>\par  \par  Ca    9.3      04 Jan 2024 07:15\par  \par  TELEMETRY: \tab   \par  ECG:  \tab\par  RADIOLOGY: \par  DIAGNOSTIC TESTING:\par  [ ] Echocardiogram:\par  [ ]  Catheterization:\par  [ ] Stress Test:  \par  OTHER: \tab\par  \par  \ql\plain\f0\fs24{\*\bkmkstart [id number]}{\*\bkmkend fw1782492178}\plain\f1\fs20\kkqo6683\hich\f1\dbch\f1\loch\f1\cf2\fs20\b\ul Swallow VFSS/MBS Assessment Adult:\plain\f0\fs20\rciq0268\hich\f0\dbch\f0\loch\f0\fs20  \par  \plain\f1\fs20\pczs7089\hich\f1\dbch\f1\loch\f1\cf2\fs20\b\ul{\field{\*\fldinst HYPERLINK [id number] }{\fldrslt General Information:}}\plain\f0\fs20\cbbp7930\hich\f0\dbch\f0\loch\f0\fs20\ql\par  \trowd\cakizy66\haucnio84\trpaddfl3\vmuweoz44\trpaddfr3\trpaddt0\trpaddft3\trpaddb0\trpaddfb3\trleft0  \clvertalt\xcxpie50\clpadft3\ucadcx83\clpadfr3\clpadl0\clpadfl3\clpadb0\clpadfb3\biuoz8022  \clvertalt\zszvfm10\clpadft3\xxguhn50\clpadfr3\clpadl0\clpadfl3\clpadb0\clpadfb3\uoxvj3578  \pard\intbl\ssparaaux0\s0\fi-120\li120\ql\plain\f0\fs24{\*\bkmkstart [id number]}{\*\bkmkend [id number]}\plain\f0\fs20\letv8311\hich\f0\dbch\f0\loch\f0\fs20 \'b7 \plain\f1\fs20\fduq4044\hich\f1\dbch\f1\loch\f1\cf2\fs20\b Patient Profile Review\plain\f0\fs20\zysp5283\hich\f0\dbch\f0\loch\f0\fs20\cell  \pard\intbl\ssparaaux0\s0\ql\plain\f0\fs24\plain\f0\fs20\mvay3467\hich\f0\dbch\f0\loch\f0\fs20 yes\cell  \intbl\row  \pard\intbl\ssparaaux0\s0\fi-120\li120\ql\plain\f0\fs24{\*\bkmkstart [id number]}{\*\bkmkend lw36817727377}\plain\f0\fs20\fqqw2990\hich\f0\dbch\f0\loch\f0\fs20 \'b7 \plain\f1\fs20\odbl9462\hich\f1\dbch\f1\loch\f1\cf2\fs20\b H & P Review\plain\f0\fs20\jicf2674\hich\f0\dbch\f0\loch\f0\fs20\cell  \pard\intbl\ssparaaux0\s0\ql\plain\f0\fs24\plain\f0\fs20\txxe5412\hich\f0\dbch\f0\loch\f0\fs20 yes  FRANKO RUBI is a 73-year-old male with a past medical history of hypertension, hyperlipidemia, diabetes presenting difficulty breathing. Had generalized   weakness yesterday. Patient woke up in the middle the night with shortness of breath and pressed life alert button. Patient brought in by EMS with low oxygen saturation And increased work of breathing.\cell  \intbl\row  \pard\intbl\ssparaaux0\s0\fi-120\li120\ql\plain\f0\fs24{\*\bkmkstart [id number]}{\*\bkmkend si27297515159}\plain\f0\fs20\cjrq6883\hich\f0\dbch\f0\loch\f0\fs20 \'b7 \plain\f1\fs20\btpi1907\hich\f1\dbch\f1\loch\f1\cf2\fs20\b Specify reason(s)\plain\f0\fs20\eldh5201\hich\f0\dbch\f0\loch\f0\fs20\cell  \pard\intbl\ssparaaux0\s0\ql\plain\f0\fs24\plain\f0\fs20\mqjy7282\hich\f0\dbch\f0\loch\f0\fs20 to r/o silent aspiration\cell  \intbl\row  \pard\intbl\ssparaaux0\s0\ql\plain\f0\fs24\plain\f0\fs20\uzin4856\hich\f0\dbch\f0\loch\f0\fs20\cell  \pard\intbl\ssparaaux0\s0\ql\plain\f0\fs24\plain\f1\fs20\awlx8014\hich\f1\dbch\f1\loch\f1\cf2\fs20\strike\plain\f0\fs20\pgbb0679\hich\f0\dbch\f0\loch\f0\fs20\cell  \intbl\row  \pard\intbl\ssparaaux0\s0\fi-120\li120\ql\plain\f0\fs24{\*\bkmkstart [id number]}{\*\bkmkend qt10901168047}\plain\f0\fs20\eszj5196\hich\f0\dbch\f0\loch\f0\fs20 \'b7 \plain\f1\fs20\bije8988\hich\f1\dbch\f1\loch\f1\cf2\fs20\b General Observations\plain\f0\fs20\jtyk6999\hich\f0\dbch\f0\loch\f0\fs20\cell  \pard\intbl\ssparaaux0\s0\ql\plain\f0\fs24\plain\f0\fs20\xvou8244\hich\f0\dbch\f0\loch\f0\fs20 NC+. NAD.  Pt L-handed. Following discourse and one step commands.\cell  \intbl\row  \pard\intbl\ssparaaux0\s0\ql\plain\f0\fs24\plain\f0\fs20\eesx9169\hich\f0\dbch\f0\loch\f0\fs20\cell  \pard\intbl\ssparaaux0\s0\ql\plain\f0\fs24\plain\f1\fs20\ozpq9853\hich\f1\dbch\f1\loch\f1\cf2\fs20\strike\plain\f0\fs20\kcdz0449\hich\f0\dbch\f0\loch\f0\fs20\cell  \intbl\row  \pard\intbl\ssparaaux0\s0\fi-120\li120\ql\plain\f0\fs24{\*\bkmkstart jx58100524061}{\*\bkmkend bh27740532010}\plain\f0\fs20\mxjw3355\hich\f0\dbch\f0\loch\f0\fs20 \'b7 \plain\f1\fs20\kmnh0450\hich\f1\dbch\f1\loch\f1\cf2\fs20\b Precautions/Limitations:   Hearing\plain\f0\fs20\ifyy9517\hich\f0\dbch\f0\loch\f0\fs20\cell  \pard\intbl\ssparaaux0\s0\ql\plain\f0\fs24\plain\f0\fs20\evir0307\hich\f0\dbch\f0\loch\f0\fs20 adequate\cell  \intbl\row  \pard\intbl\ssparaaux0\s0\fi-120\li120\ql\plain\f0\fs24{\*\bkmkstart [id number]}{\*\bkmkend ke81729322236}\plain\f0\fs20\mjdr8244\hich\f0\dbch\f0\loch\f0\fs20 \'b7 \plain\f1\fs20\yalx8123\hich\f1\dbch\f1\loch\f1\cf2\fs20\b Precautions/Limitations:   Vision\plain\f0\fs20\wixp1695\hich\f0\dbch\f0\loch\f0\fs20\cell  \pard\intbl\ssparaaux0\s0\ql\plain\f0\fs24\plain\f0\fs20\kkzu8108\hich\f0\dbch\f0\loch\f0\fs20 adequate for  this exam\cell  \intbl\row  \trowd\tcvvye14\lastrow\fmjjfnv57\trpaddfl3\mdpyski35\trpaddfr3\trpaddt0\trpaddft3\trpaddb0\trpaddfb3\trleft0  \clvertalt\uuieql65\clpadft3\dtuosx03\clpadfr3\clpadl0\clpadfl3\clpadb0\clpadfb3\udrys9724  \clvertalt\vhmquk68\clpadft3\vrdzts20\clpadfr3\clpadl0\clpadfl3\clpadb0\clpadfb3\mojia6805  \pard\intbl\ssparaaux0\s0\fi-120\li120\ql\plain\f0\fs24{\*\bkmkstart ol82887482458}{\*\bkmkend jb92050920016}\plain\f0\fs20\tqxt4232\hich\f0\dbch\f0\loch\f0\fs20 \'b7 \plain\f1\fs20\amex5864\hich\f1\dbch\f1\loch\f1\cf2\fs20\b Comments\plain\f0\fs20\gceh2239\hich\f0\dbch\f0\loch\f0\fs20\cell  \pard\intbl\ssparaaux0\s0\ql\plain\f0\fs24\plain\f0\fs20\vfbk4166\hich\f0\dbch\f0\loch\f0\fs20 Continued history: \par  -CXR with patchy infiltrates c/f pna & pulm edema\par  12/31/23: Event note: "Pt admitted for Acute respiratory failure with hypoxia and hypercapnia transitioned to NC 6l NC however has multiple hypoxic episodes (mid to high 80's) during sleep but recovers when awaken."\par  \par  Speech & Swallow ; KNOWN TO DEPT. \par  -9/2023 for clinical bedside swallow evaluations with rec for objective testing\par  -9/22/2023-FEES, rec for puree and moderately thick liquids via tsp \par  -9/26/2023-MBS, regular solids, thin liquids " + Calcifications of the laryngeal structures\par  + ?CP bar vs other at level of C5/C6. Trace retention of material (across trials) noted above\par  + Cough throughout PO trials, similar to baseline cough, not consistently correlated to episodes of laryngeal penetration" 1) Liquids via single, small sips - no straws 2) Intermittent throat clear throughout meals 3) Repeat swallow. "Pt presents with   an oropharyngeal dysphagia, with swallow function improved from prior exam. There is adequate mastication and oral transfer of all textures. Premature spillage is seen with thin liquids. Laryngeal vestibule closure is reduced, resulting in laryngeal penetration   of solids and thin liquids. Material is retrieved during the swallow or during subsequent swallows. Depth/ amount of laryngeal penetration for thin liquids is reduced with use of small, single cup sips. No aspiration observed across exam. Moderate pharyngeal   residue with purees and solids is reduced with repeat swallow."\par  -12/7/23- speech and language evaluation completed. see report. \par  \par  Family reporting, during initial evaluation, pt with increase in dysphagia from prior admission with frequent coughing and concern for aspiration. Pt with limited ability to adhere to recommendations for single, small sips per family as typically will   take multiple large sips. Observed straw in bottle at bedside. Additionally, reports that SLP was initiated at home for dysphagia and requesting repeat MBS.\cell  \intbl\row  \pard\ssparaaux0\s0\ql\plain\f0\fs24\plain\f0\fs20\mtzp3615\hich\f0\dbch\f0\loch\f0\fs20\par  {\*\bkmkstart [id number]}{\*\bkmkend zd52234307079}\plain\f1\fs20\mdxk8852\hich\f1\dbch\f1\loch\f1\cf2\fs20\b\ul Past Medical History:\plain\f0\fs20\lkwh2520\hich\f0\dbch\f0\loch\f0\fs20  \par  \fi-360\li720\plain\f0\fs24{\*\bkmkstart kh53236206316-37961798173737}{\*\bkmkend pn20389448326-62285309729740}\plain\f0\fs20\dgon1464\hich\f0\dbch\f0\loch\f0\fs20 \'b7 \tab\plain\f1\fs20\jmyb4590\hich\f1\dbch\f1\loch\f1\cf2\fs20\b Stroke\plain\f0\fs20\iwqz5709\hich\f0\dbch\f0\loch\f0\fs20   : Description: 2017, 10/2021:  with right diplobia resolving in a week, Entered Date: 01-Nov-2021 15:56, Entered By: Jun Montejo, Last Modified By: Jun Montejo\par  {\*\bkmkstart [id number]}{\*\bkmkend bf08721002693-57464884489653}\'b7 \tab\plain\f1\fs20\jgnk9193\hich\f1\dbch\f1\loch\f1\cf2\fs20\b BPH with elevated PSA\plain\f0\fs20\fggw1881\hich\f0\dbch\f0\loch\f0\fs20 : Entered Date: 01-Nov-2021   15:55, Entered By: Jun Montejo, Last Modified By: Jun Montejo\par  {\*\bkmkstart [id number]}{\*\bkmkend uc87407344132-6768861588482}\'b7 \tab\plain\f1\fs20\astk3755\hich\f1\dbch\f1\loch\f1\cf2\fs20\b Hypercholesterolemia\plain\f0\fs20\alqr0031\hich\f0\dbch\f0\loch\f0\fs20 : Entered Date: 20-Jun-2011   07:23, Entered By: Renetta Robbins, Last Modified By: Renetta Robbins\par  {\*\bkmkstart [id number]}{\*\bkmkend wm39214575378-7585687332453}\'b7 \tab\plain\f1\fs20\coml0263\hich\f1\dbch\f1\loch\f1\cf2\fs20\b HTN (Hypertension)\plain\f0\fs20\qaom9200\hich\f0\dbch\f0\loch\f0\fs20 : Entered Date: 20-Jun-2011 07:23,   Entered By: Renetta Robbins, Last Modified By: Renetta Robbins\par  {\*\bkmkstart [id number]}{\*\bkmkend [id number]}\'b7 \tab\plain\f1\fs20\wzlb5962\hich\f1\dbch\f1\loch\f1\cf2\fs20\b Diabetes Mellitus Type II\plain\f0\fs20\fxtp3556\hich\f0\dbch\f0\loch\f0\fs20 : Entered Date: 20-Jun-2011   07:23, Entered By: Renetta Robbins, Last Modified By: Renetta Robbins\par  \fi0\li0\plain\f0\fs24\plain\f0\fs20\axod4780\hich\f0\dbch\f0\loch\f0\fs20\par  {\*\bkmkstart [id number]}{\*\bkmkend fw32342890849}\plain\f1\fs20\aovt1233\hich\f1\dbch\f1\loch\f1\cf2\fs20\b\ul Past Surgical History:\plain\f0\fs20\lpmc2437\hich\f0\dbch\f0\loch\f0\fs20  \par  \fi-360\li720\plain\f0\fs24{\*\bkmkstart hk23255600245-23604708375395}{\*\bkmkend [id number]}\plain\f0\fs20\sqvc2913\hich\f0\dbch\f0\loch\f0\fs20 \'b7 \tab\plain\f1\fs20\oztp0068\hich\f1\dbch\f1\loch\f1\cf2\fs20\b S/P colonoscopy\plain\f0\fs20\aipf5225\hich\f0\dbch\f0\loch\f0\fs20   : Entered By: Jun Montejo, Entered Date: 01-Nov-2021 16:12, Last Modified By: Jun Montejo\par  \fi0\li0\plain\f0\fs24\plain\f0\fs20\vivf0125\hich\f0\dbch\f0\loch\f0\fs20\par  \plain\f1\fs20\hpgb6840\hich\f1\dbch\f1\loch\f1\cf2\fs20\b\ul{\field{\*\fldinst HYPERLINK [id number] }{\fldrslt Preliminary Fluoroscopy:}}\plain\f0\fs20\scak3053\hich\f0\dbch\f0\loch\f0\fs20\ql\par  \trowd\jhvyak80\ikamquo96\trpaddfl3\jhrwzpr27\trpaddfr3\trpaddt0\trpaddft3\trpaddb0\trpaddfb3\trleft0  \clvertalt\jnmcib35\clpadft3\dnyhta19\clpadfr3\clpadl0\clpadfl3\clpadb0\clpadfb3\ejcqo2569  \clvertalt\xkcxxc00\clpadft3\xrjnhw38\clpadfr3\clpadl0\clpadfl3\clpadb0\clpadfb3\mvedy3896  \pard\intbl\ssparaaux0\s0\fi-120\li120\ql\plain\f0\fs24{\*\bkmkstart [id number]}{\*\bkmkend hb4567523612}\plain\f0\fs20\cqbz4656\hich\f0\dbch\f0\loch\f0\fs20 \'b7 \plain\f1\fs20\plkd2546\hich\f1\dbch\f1\loch\f1\cf2\fs20\b Baseline Swallow\plain\f0\fs20\dwqk3218\hich\f0\dbch\f0\loch\f0\fs20\cell  \pard\intbl\ssparaaux0\s0\ql\plain\f0\fs24\plain\f0\fs20\hvif6109\hich\f0\dbch\f0\loch\f0\fs20 Weak baseline swallow\cell  \intbl\row  \trowd\iijrim15\lastrow\ddlqfky31\trpaddfl3\ufxknpv20\trpaddfr3\trpaddt0\trpaddft3\trpaddb0\trpaddfb3\trleft0  \clvertalt\tbhixc97\clpadft3\upjyes00\clpadfr3\clpadl0\clpadfl3\clpadb0\clpadfb3\ufuas9127  \clvertalt\uizwck73\clpadft3\cauakk94\clpadfr3\clpadl0\clpadfl3\clpadb0\clpadfb3\lboiz5571  \pard\intbl\ssparaaux0\s0\fi-120\li120\ql\plain\f0\fs24{\*\bkmkstart pn8456301859}{\*\bkmkend bv7526208576}\plain\f0\fs20\pqsx3120\hich\f0\dbch\f0\loch\f0\fs20 \'b7 \plain\f1\fs20\navy8891\hich\f1\dbch\f1\loch\f1\cf2\fs20\b Additional Information\plain\f0\fs20\oeeg8659\hich\f0\dbch\f0\loch\f0\fs20\cell  \pard\intbl\ssparaaux0\s0\ql\plain\f0\fs24\plain\f0\fs20\zxxa3929\hich\f0\dbch\f0\loch\f0\fs20 + Calcifications of the laryngeal structures\par  + ?CP bar vs other at level of C5/C6. Trace retention of material (across trials) noted\cell  \intbl\row  \pard\ssparaaux0\s0\ql\plain\f0\fs24\plain\f0\fs20\iawa8312\hich\f0\dbch\f0\loch\f0\fs20\par  \plain\f1\fs20\vesd1467\hich\f1\dbch\f1\loch\f1\cf2\fs20\b\ul{\field{\*\fldinst HYPERLINK [id number] }{\fldrslt VFSS/MBS Eval: Trial 1}}\plain\f0\fs20\rudc0271\hich\f0\dbch\f0\loch\f0\fs20\ql\par  \trowd\obahdp20\vhmvtfp96\trpaddfl3\ceyqcij69\trpaddfr3\trpaddt0\trpaddft3\trpaddb0\trpaddfb3\trleft0  \clvertalt\johgwf23\clpadft3\curflo52\clpadfr3\clpadl0\clpadfl3\clpadb0\clpadfb3\omxgk0201  \clvertalt\dkogds27\clpadft3\ebxcau89\clpadfr3\clpadl0\clpadfl3\clpadb0\clpadfb3\khzkw5603  \pard\intbl\ssparaaux0\s0\fi-120\li120\ql\plain\f0\fs24{\*\bkmkstart [id number]}{\*\bkmkend ry4010994089}\plain\f0\fs20\yjjc3726\hich\f0\dbch\f0\loch\f0\fs20 \'b7 \plain\f1\fs20\bctw5194\hich\f1\dbch\f1\loch\f1\cf2\fs20\b Consistencies administered\plain\f0\fs20\hokr2001\hich\f0\dbch\f0\loch\f0\fs20\cell  \pard\intbl\ssparaaux0\s0\ql\plain\f0\fs24\plain\f0\fs20\xrgk9377\hich\f0\dbch\f0\loch\f0\fs20 soft & bite-sized; puree-thin, moderately thick liquids via cup, mildly thick liquids via cup with cue for small, single sip\cell  \intbl\row  \trowd\onurdq72\lastrow\clrozmj28\trpaddfl3\yoeducr05\trpaddfr3\trpaddt0\trpaddft3\trpaddb0\trpaddfb3\trleft0  \clvertalt\bezxkb24\clpadft3\ybduaw44\clpadfr3\clpadl0\clpadfl3\clpadb0\clpadfb3\avcoc6376  \clvertalt\kxkydz35\clpadft3\actpmj70\clpadfr3\clpadl0\clpadfl3\clpadb0\clpadfb3\oygee3513  \pard\intbl\ssparaaux0\s0\fi-120\li120\ql\plain\f0\fs24{\*\bkmkstart [id number]}{\*\bkmkend sh6309341508}\plain\f0\fs20\gjaf3393\hich\f0\dbch\f0\loch\f0\fs20 \'b7 \plain\f1\fs20\nrkz3287\hich\f1\dbch\f1\loch\f1\cf2\fs20\b Positioning\plain\f0\fs20\utcu1018\hich\f0\dbch\f0\loch\f0\fs20\cell  \pard\intbl\ssparaaux0\s0\ql\plain\f0\fs24\plain\f0\fs20\reov8452\hich\f0\dbch\f0\loch\f0\fs20 Lateral\cell  \intbl\row  \pard\ssparaaux0\s0\ql\plain\f0\fs24\plain\f0\fs20\bfsm2140\hich\f0\dbch\f0\loch\f0\fs20\par  \plain\f1\fs20\plbx9693\hich\f1\dbch\f1\loch\f1\cf2\fs20\b\ul{\field{\*\fldinst HYPERLINK [id number] }{\fldrslt Oral Prep Phase:}}\plain\f0\fs20\aybu3598\hich\f0\dbch\f0\loch\f0\fs20\ql\par  \trowd\wroseq17\lastrow\qfvhfqs62\trpaddfl3\rwslhbl70\trpaddfr3\trpaddt0\trpaddft3\trpaddb0\trpaddfb3\trleft0  \clvertalt\ybkeiv70\clpadft3\cvdhsm79\clpadfr3\clpadl0\clpadfl3\clpadb0\clpadfb3\bqsmn5483  \clvertalt\emhoqk73\clpadft3\rsduni84\clpadfr3\clpadl0\clpadfl3\clpadb0\clpadfb3\frivv1255  \pard\intbl\ssparaaux0\s0\fi-120\li120\ql\plain\f0\fs24{\*\bkmkstart be03210161516}{\*\bkmkend [id number]}\plain\f0\fs20\ortp9937\hich\f0\dbch\f0\loch\f0\fs20 \'b7 \plain\f1\fs20\cvwl2026\hich\f1\dbch\f1\loch\f1\cf2\fs20\b Oral Prep Comments\plain\f0\fs20\vblp4503\hich\f0\dbch\f0\loch\f0\fs20\cell  \pard\intbl\ssparaaux0\s0\ql\plain\f0\fs24\plain\f0\fs20\jjrq7640\hich\f0\dbch\f0\loch\f0\fs20 No anterolateral loss\par  prolonged, however functional mastication of solids\cell  \intbl\row  \pard\ssparaaux0\s0\ql\plain\f0\fs24\plain\f0\fs20\kylx0788\hich\f0\dbch\f0\loch\f0\fs20\par  \plain\f1\fs20\wxuj2180\hich\f1\dbch\f1\loch\f1\cf2\fs20\b\ul{\field{\*\fldinst HYPERLINK [id number] }{\fldrslt Oral Phase:}}\plain\f0\fs20\mpyt6117\hich\f0\dbch\f0\loch\f0\fs20\ql\par  \trowd\ujkbyt88\bootqro30\trpaddfl3\hxdwfal24\trpaddfr3\trpaddt0\trpaddft3\trpaddb0\trpaddfb3\trleft0  \clvertalt\olnamj11\clpadft3\aarilz58\clpadfr3\clpadl0\clpadfl3\clpadb0\clpadfb3\waxlv6707  \clvertalt\natbvu42\clpadft3\htmyfo02\clpadfr3\clpadl0\clpadfl3\clpadb0\clpadfb3\avfxb2035  \pard\intbl\ssparaaux0\s0\fi-120\li120\ql\plain\f0\fs24{\*\bkmkstart [id number]}{\*\bkmkend ei7537375452}\plain\f0\fs20\lcwi6925\hich\f0\dbch\f0\loch\f0\fs20 \'b7 \plain\f1\fs20\tvko3992\hich\f1\dbch\f1\loch\f1\cf2\fs20\b Oral Phase\plain\f0\fs20\qqxf7320\hich\f0\dbch\f0\loch\f0\fs20\cell  \pard\intbl\ssparaaux0\s0\ql\plain\f0\fs24\plain\f0\fs20\fjjo3755\hich\f0\dbch\f0\loch\f0\fs20 Delayed oral transit time; Uncontrolled bolus / spillover in karrie-pharynx; Uncontrolled bolus / spillover in hypopharynx\cell  \intbl\row  \trowd\nnmqfk00\lastrow\adhaaor42\trpaddfl3\vdkaafd58\trpaddfr3\trpaddt0\trpaddft3\trpaddb0\trpaddfb3\trleft0  \clvertalt\exsmos40\clpadft3\gtixbw27\clpadfr3\clpadl0\clpadfl3\clpadb0\clpadfb3\knnew8296  \clvertalt\apnojl11\clpadft3\ksejrs61\clpadfr3\clpadl0\clpadfl3\clpadb0\clpadfb3\gvxsp2982  \pard\intbl\ssparaaux0\s0\fi-120\li120\ql\plain\f0\fs24{\*\bkmkstart [id number]}{\*\bkmkend [id number]}\plain\f0\fs20\irik6835\hich\f0\dbch\f0\loch\f0\fs20 \'b7 \plain\f1\fs20\hfvw6969\hich\f1\dbch\f1\loch\f1\cf2\fs20\b Oral Phase Comments\plain\f0\fs20\ovyc2241\hich\f0\dbch\f0\loch\f0\fs20\cell  \pard\intbl\ssparaaux0\s0\ql\plain\f0\fs24\plain\f0\fs20\yyfa5074\hich\f0\dbch\f0\loch\f0\fs20 increase in reduced control with less viscous liquids and larger amounts/rate\cell  \intbl\row  \pard\ssparaaux0\s0\ql\plain\f0\fs24\plain\f0\fs20\boly5940\hich\f0\dbch\f0\loch\f0\fs20\par  \plain\f1\fs20\sxhg3524\hich\f1\dbch\f1\loch\f1\cf2\fs20\b\ul{\field{\*\fldinst HYPERLINK [id number] }{\fldrslt Pharyngeal Phase:}}\plain\f0\fs20\nere2164\hich\f0\dbch\f0\loch\f0\fs20\ql\par  \trowd\zgixpx58\zhyfywv89\trpaddfl3\xjdgcmd03\trpaddfr3\trpaddt0\trpaddft3\trpaddb0\trpaddfb3\trleft0  \clvertalt\fkefqa89\clpadft3\leswby37\clpadfr3\clpadl0\clpadfl3\clpadb0\clpadfb3\erqra0034  \clvertalt\eetyat36\clpadft3\zkewka62\clpadfr3\clpadl0\clpadfl3\clpadb0\clpadfb3\gaxxy6640  \pard\intbl\ssparaaux0\s0\fi-120\li120\ql\plain\f0\fs24{\*\bkmkstart [id number]}{\*\bkmkend [id number]}\plain\f0\fs20\ijxa7838\hich\f0\dbch\f0\loch\f0\fs20 \'b7 \plain\f1\fs20\mwsk9277\hich\f1\dbch\f1\loch\f1\cf2\fs20\b Intact\plain\f0\fs20\xrnb2867\hich\f0\dbch\f0\loch\f0\fs20\cell  \pard\intbl\ssparaaux0\s0\ql\plain\f0\fs24\plain\f0\fs20\cawy2522\hich\f0\dbch\f0\loch\f0\fs20 No \cell  \intbl\row  \trowd\mhsgdd69\lastrow\tlgloml79\trpaddfl3\lukateb13\trpaddfr3\trpaddt0\trpaddft3\trpaddb0\trpaddfb3\trleft0  \clvertalt\ptxunn40\clpadft3\gyczpb38\clpadfr3\clpadl0\clpadfl3\clpadb0\clpadfb3\txopu7389  \clvertalt\sbpnop54\clpadft3\oexbuq08\clpadfr3\clpadl0\clpadfl3\clpadb0\clpadfb3\dvinh2834  \pard\intbl\ssparaaux0\s0\fi-120\li120\ql\plain\f0\fs24{\*\bkmkstart [id number]}{\*\bkmkend [id number]}\plain\f0\fs20\vmse3272\hich\f0\dbch\f0\loch\f0\fs20 \'b7 \plain\f1\fs20\wbtn8035\hich\f1\dbch\f1\loch\f1\cf2\fs20\b Pharyngeal Phase Comments\plain\f0\fs20\lren2129\hich\f0\dbch\f0\loch\f0\fs20\cell  \pard\intbl\ssparaaux0\s0\ql\plain\f0\fs24\plain\f0\fs20\ybxu7110\hich\f0\dbch\f0\loch\f0\fs20 Pharyngeal phase c/b latency in the swallow trigger to the level of the valleculae with inconsistent spillover to the pyriform sinuses w/ less viscous liquids.   Base of tongue/BoT retraction and pharyngeal constriction were reduced and resulted in reduced pharyngeal bolus propulsion. Mild-moderate vallecular residue present with partial to full clearance with repeat dry swallow.  Hyolaryngeal elevation and excursion   were reduced. Adequate epiglottic inversion. Adequate relaxation of UES. No penetration or aspiration were visualized.\cell  \intbl\row  \pard\ssparaaux0\s0\ql\plain\f0\fs24\plain\f0\fs20\ljgq2671\hich\f0\dbch\f0\loch\f0\fs20\par  \plain\f1\fs20\xhwr0889\hich\f1\dbch\f1\loch\f1\cf2\fs20\b\ul{\field{\*\fldinst HYPERLINK [id number] }{\fldrslt Behavioral Modifications:}}\plain\f0\fs20\rgvk7131\hich\f0\dbch\f0\loch\f0\fs20\ql\par  \trowd\nyqsnf46\lastrow\kxftobb22\trpaddfl3\hlhmfnr91\trpaddfr3\trpaddt0\trpaddft3\trpaddb0\trpaddfb3\trleft0  \clvertalt\krljul35\clpadft3\qwcmef00\clpadfr3\clpadl0\clpadfl3\clpadb0\clpadfb3\doghi8843  \clvertalt\zievwo85\clpadft3\mzyttc90\clpadfr3\clpadl0\clpadfl3\clpadb0\clpadfb3\nqxwh2318  \pard\intbl\ssparaaux0\s0\fi-120\li120\ql\plain\f0\fs24{\*\bkmkstart [id number]}{\*\bkmkend [id number]}\plain\f0\fs20\xwno9318\hich\f0\dbch\f0\loch\f0\fs20 \'b7 \plain\f1\fs20\kcpy6416\hich\f1\dbch\f1\loch\f1\cf2\fs20\b Behavioral Modifications\plain\f0\fs20\qohx1312\hich\f0\dbch\f0\loch\f0\fs20\cell  \pard\intbl\ssparaaux0\s0\ql\plain\f0\fs24\plain\f0\fs20\bism9069\hich\f0\dbch\f0\loch\f0\fs20 inconsistently able to follow command for single small sips with liquids\cell  \intbl\row  \pard\ssparaaux0\s0\ql\plain\f0\fs24\plain\f0\fs20\iric9056\hich\f0\dbch\f0\loch\f0\fs20\par  \plain\f1\fs20\euwn2759\hich\f1\dbch\f1\loch\f1\cf2\fs20\b\ul{\field{\*\fldinst HYPERLINK [id number] }{\fldrslt Rosenbek's Penetration Aspiration Scale:}}\plain\f0\fs20\ybsg3815\hich\f0\dbch\f0\loch\f0\fs20\ql\par  \trowd\usuysq21\lastrow\jhoxxbr26\trpaddfl3\kglwlfe31\trpaddfr3\trpaddt0\trpaddft3\trpaddb0\trpaddfb3\trleft0  \clvertalt\dcsklw52\clpadft3\vjuzfh92\clpadfr3\clpadl0\clpadfl3\clpadb0\clpadfb3\fsjhp8985  \clvertalt\hocgud00\clpadft3\pjfhsr09\clpadfr3\clpadl0\clpadfl3\clpadb0\clpadfb3\ivifz6949  \pard\intbl\ssparaaux0\s0\fi-120\li120\ql\plain\f0\fs24{\*\bkmkstart [id number]}{\*\bkmkend ja6760024019}\plain\f0\fs20\zhqt2970\hich\f0\dbch\f0\loch\f0\fs20 \'b7 \plain\f1\fs20\rhtm1806\hich\f1\dbch\f1\loch\f1\cf2\fs20\b Rosenbek's Penetration Aspiration   Scale\plain\f0\fs20\fseq4771\hich\f0\dbch\f0\loch\f0\fs20\cell  \pard\intbl\ssparaaux0\s0\ql\plain\f0\fs24\plain\f0\fs20\upmu1777\hich\f0\dbch\f0\loch\f0\fs20 (1) no aspiration, contrast does not enter airway \cell  \intbl\row  \pard\ssparaaux0\s0\ql\plain\f0\fs24\plain\f0\fs20\ipku6592\hich\f0\dbch\f0\loch\f0\fs20\par  \plain\f1\fs20\vpuh9138\hich\f1\dbch\f1\loch\f1\cf2\fs20\b\ul{\field{\*\fldinst HYPERLINK [id number] }{\fldrslt VFSS/MBS Eval: Trial 2}}\plain\f0\fs20\nxqg2117\hich\f0\dbch\f0\loch\f0\fs20\ql\par  \trowd\trnqoy73\xlrayzh16\trpaddfl3\btajwid60\trpaddfr3\trpaddt0\trpaddft3\trpaddb0\trpaddfb3\trleft0  \clvertalt\bvwjll95\clpadft3\ixzdnb70\clpadfr3\clpadl0\clpadfl3\clpadb0\clpadfb3\memki5913  \clvertalt\qofddm79\clpadft3\xdmuca38\clpadfr3\clpadl0\clpadfl3\clpadb0\clpadfb3\vkvzh4429  \pard\intbl\ssparaaux0\s0\fi-120\li120\ql\plain\f0\fs24{\*\bkmkstart [id number]}{\*\bkmkend sb8807668168}\plain\f0\fs20\riai9575\hich\f0\dbch\f0\loch\f0\fs20 \'b7 \plain\f1\fs20\feys1179\hich\f1\dbch\f1\loch\f1\cf2\fs20\b Consistencies administered\plain\f0\fs20\qvjr8397\hich\f0\dbch\f0\loch\f0\fs20\cell  \pard\intbl\ssparaaux0\s0\ql\plain\f0\fs24\plain\f0\fs20\ehzh0726\hich\f0\dbch\f0\loch\f0\fs20 mildly thick liquids via tsp and thin liquids via tsp\cell  \intbl\row  \trowd\qxbteu16\lastrow\vxfyyzy81\trpaddfl3\ugrlrhb37\trpaddfr3\trpaddt0\trpaddft3\trpaddb0\trpaddfb3\trleft0  \clvertalt\vgkdbf30\clpadft3\fskkdt57\clpadfr3\clpadl0\clpadfl3\clpadb0\clpadfb3\sqzxk8191  \clvertalt\fykylk39\clpadft3\viqcqc42\clpadfr3\clpadl0\clpadfl3\clpadb0\clpadfb3\epeuo5846  \pard\intbl\ssparaaux0\s0\fi-120\li120\ql\plain\f0\fs24{\*\bkmkstart [id number]}{\*\bkmkend bq7333106840}\plain\f0\fs20\zdfy1941\hich\f0\dbch\f0\loch\f0\fs20 \'b7 \plain\f1\fs20\nwdc4678\hich\f1\dbch\f1\loch\f1\cf2\fs20\b Positioning\plain\f0\fs20\qedq6997\hich\f0\dbch\f0\loch\f0\fs20\cell  \pard\intbl\ssparaaux0\s0\ql\plain\f0\fs24\plain\f0\fs20\yjkp0376\hich\f0\dbch\f0\loch\f0\fs20 Lateral\cell  \intbl\row  \pard\ssparaaux0\s0\ql\plain\f0\fs24\plain\f0\fs20\eotv8431\hich\f0\dbch\f0\loch\f0\fs20\par  \plain\f1\fs20\tskn3962\hich\f1\dbch\f1\loch\f1\cf2\fs20\b\ul{\field{\*\fldinst HYPERLINK [id number] }{\fldrslt Oral Prep Phase:}}\plain\f0\fs20\ivdt2718\hich\f0\dbch\f0\loch\f0\fs20\ql\par  \trowd\cztwvl84\lastrow\rkukybe22\trpaddfl3\wmhimyq66\trpaddfr3\trpaddt0\trpaddft3\trpaddb0\trpaddfb3\trleft0  \clvertalt\gnkvpp54\clpadft3\rbmcup01\clpadfr3\clpadl0\clpadfl3\clpadb0\clpadfb3\szxtu7162  \clvertalt\brjcyy46\clpadft3\kjpbrs96\clpadfr3\clpadl0\clpadfl3\clpadb0\clpadfb3\twwlp5164  \pard\intbl\ssparaaux0\s0\fi-120\li120\ql\plain\f0\fs24{\*\bkmkstart yb0933726588}{\*\bkmkend gt9412662468}\plain\f0\fs20\gzux7234\hich\f0\dbch\f0\loch\f0\fs20 \'b7 \plain\f1\fs20\twyu9863\hich\f1\dbch\f1\loch\f1\cf2\fs20\b Oral Preparatory Phase\plain\f0\fs20\rymu6916\hich\f0\dbch\f0\loch\f0\fs20\cell  \pard\intbl\ssparaaux0\s0\ql\plain\f0\fs24\plain\f0\fs20\ftgh1341\hich\f0\dbch\f0\loch\f0\fs20 Functional\cell  \intbl\row  \pard\ssparaaux0\s0\ql\plain\f0\fs24\plain\f0\fs20\qxst9193\hich\f0\dbch\f0\loch\f0\fs20\par  \plain\f1\fs20\vbts1556\hich\f1\dbch\f1\loch\f1\cf2\fs20\b\ul{\field{\*\fldinst HYPERLINK [id number] }{\fldrslt Oral Phase:}}\plain\f0\fs20\zepp7519\hich\f0\dbch\f0\loch\f0\fs20\ql\par  \trowd\wakvne12\nxroqex56\trpaddfl3\ukkxqli72\trpaddfr3\trpaddt0\trpaddft3\trpaddb0\trpaddfb3\trleft0  \clvertalt\vtlnrn76\clpadft3\ftyvcu32\clpadfr3\clpadl0\clpadfl3\clpadb0\clpadfb3\kooqx5454  \clvertalt\bujcrq88\clpadft3\oypeld76\clpadfr3\clpadl0\clpadfl3\clpadb0\clpadfb3\onneq0268  \pard\intbl\ssparaaux0\s0\fi-120\li120\ql\plain\f0\fs24{\*\bkmkstart fv9217367636}{\*\bkmkend hj4063907189}\plain\f0\fs20\skcp3571\hich\f0\dbch\f0\loch\f0\fs20 \'b7 \plain\f1\fs20\ahbe3580\hich\f1\dbch\f1\loch\f1\cf2\fs20\b Oral Phase\plain\f0\fs20\vwtp2163\hich\f0\dbch\f0\loch\f0\fs20\cell  \pard\intbl\ssparaaux0\s0\ql\plain\f0\fs24\plain\f0\fs20\oios9906\hich\f0\dbch\f0\loch\f0\fs20 Delayed oral transit time; Uncontrolled bolus / spillover in karrie-pharynx; Uncontrolled bolus / spillover in hypopharynx\cell  \intbl\row  \trowd\oadbba52\lastrow\jhcykdf58\trpaddfl3\ehxivqq63\trpaddfr3\trpaddt0\trpaddft3\trpaddb0\trpaddfb3\trleft0  \clvertalt\eephan46\clpadft3\vzjyty56\clpadfr3\clpadl0\clpadfl3\clpadb0\clpadfb3\vwxbl7351  \clvertalt\mhutss23\clpadft3\anvvrc34\clpadfr3\clpadl0\clpadfl3\clpadb0\clpadfb3\prmag0483  \pard\intbl\ssparaaux0\s0\fi-120\li120\ql\plain\f0\fs24{\*\bkmkstart zr82337528726}{\*\bkmkend vq10253975799}\plain\f0\fs20\jfyb7292\hich\f0\dbch\f0\loch\f0\fs20 \'b7 \plain\f1\fs20\hrgz0505\hich\f1\dbch\f1\loch\f1\cf2\fs20\b Oral Phase Comments\plain\f0\fs20\flbb5594\hich\f0\dbch\f0\loch\f0\fs20\cell  \pard\intbl\ssparaaux0\s0\ql\plain\f0\fs24\plain\f0\fs20\pqwv6782\hich\f0\dbch\f0\loch\f0\fs20 inconsistent tongue pumping throughout\cell  \intbl\row  \pard\ssparaaux0\s0\ql\plain\f0\fs24\plain\f0\fs20\rues7675\hich\f0\dbch\f0\loch\f0\fs20\par  \plain\f1\fs20\wpbx0059\hich\f1\dbch\f1\loch\f1\cf2\fs20\b\ul{\field{\*\fldinst HYPERLINK [id number] }{\fldrslt Pharyngeal Phase:}}\plain\f0\fs20\ucsd9193\hich\f0\dbch\f0\loch\f0\fs20\ql\par  \trowd\qvmffc05\nstbppz61\trpaddfl3\bnqoviv83\trpaddfr3\trpaddt0\trpaddft3\trpaddb0\trpaddfb3\trleft0  \clvertalt\vfuhql55\clpadft3\ddtrxe55\clpadfr3\clpadl0\clpadfl3\clpadb0\clpadfb3\zaiam7469  \clvertalt\aqpvno46\clpadft3\aitkhz84\clpadfr3\clpadl0\clpadfl3\clpadb0\clpadfb3\xujcu6194  \pard\intbl\ssparaaux0\s0\fi-120\li120\ql\plain\f0\fs24{\*\bkmkstart [id number]}{\*\bkmkend ch92881288305}\plain\f0\fs20\lnmn7097\hich\f0\dbch\f0\loch\f0\fs20 \'b7 \plain\f1\fs20\ydgv0991\hich\f1\dbch\f1\loch\f1\cf2\fs20\b Intact\plain\f0\fs20\gjyg5286\hich\f0\dbch\f0\loch\f0\fs20\cell  \pard\intbl\ssparaaux0\s0\ql\plain\f0\fs24\plain\f0\fs20\wrbq4832\hich\f0\dbch\f0\loch\f0\fs20 No \cell  \intbl\row  \trowd\afwuwf69\lastrow\edizuap56\trpaddfl3\gqkffwt10\trpaddfr3\trpaddt0\trpaddft3\trpaddb0\trpaddfb3\trleft0  \clvertalt\rjewnw82\clpadft3\zxabbb77\clpadfr3\clpadl0\clpadfl3\clpadb0\clpadfb3\uulye5890  \clvertalt\kqxbvc66\clpadft3\odbivr63\clpadfr3\clpadl0\clpadfl3\clpadb0\clpadfb3\rbjqb9109  \pard\intbl\ssparaaux0\s0\fi-120\li120\ql\plain\f0\fs24{\*\bkmkstart [id number]}{\*\bkmkend vo38478306227}\plain\f0\fs20\fzfk0536\hich\f0\dbch\f0\loch\f0\fs20 \'b7 \plain\f1\fs20\uojw0083\hich\f1\dbch\f1\loch\f1\cf2\fs20\b Pharyngeal Phase Comments\plain\f0\fs20\rgwi7074\hich\f0\dbch\f0\loch\f0\fs20\cell  \pard\intbl\ssparaaux0\s0\ql\plain\f0\fs24\plain\f0\fs20\jizd0248\hich\f0\dbch\f0\loch\f0\fs20 Pharyngeal phase c/b latency in the swallow trigger to the level of the valleculae with inconsistent spillover to the pyriform sinuses w/ less viscous liquids.   Base of tongue/BoT retraction and pharyngeal constriction were reduced and resulted in reduced pharyngeal bolus propulsion. Mild-moderate vallecular residue present with partial to full clearance with repeat dry swallow.  Hyolaryngeal elevation and excursion   were reduced. Adequate epiglottic inversion. Adequate relaxation of UES. Inconsistent penetration which appeared to fully clear; trace.\cell  \intbl\row  \pard\ssparaaux0\s0\ql\plain\f0\fs24\plain\f0\fs20\olge9451\hich\f0\dbch\f0\loch\f0\fs20\par  \plain\f1\fs20\strm4546\hich\f1\dbch\f1\loch\f1\cf2\fs20\b\ul{\field{\*\fldinst HYPERLINK [id number] }{\fldrslt Rosenbek's Penetration Aspiration Scale:}}\plain\f0\fs20\avif3685\hich\f0\dbch\f0\loch\f0\fs20\ql\par  \trowd\vuagde21\lastrow\gtqgdyv89\trpaddfl3\biovoce02\trpaddfr3\trpaddt0\trpaddft3\trpaddb0\trpaddfb3\trleft0  \clvertalt\oammbv30\clpadft3\nycdnn53\clpadfr3\clpadl0\clpadfl3\clpadb0\clpadfb3\ssbov5720  \clvertalt\kgvjbb58\clpadft3\jfxsrx39\clpadfr3\clpadl0\clpadfl3\clpadb0\clpadfb3\zunnj3687  \pard\intbl\ssparaaux0\s0\fi-120\li120\ql\plain\f0\fs24{\*\bkmkstart [id number]}{\*\bkmkend nb0832069586}\plain\f0\fs20\rlft4683\hich\f0\dbch\f0\loch\f0\fs20 \'b7 \plain\f1\fs20\xdbv5910\hich\f1\dbch\f1\loch\f1\cf2\fs20\b Rosenbek's Penetration Aspiration   Scale\plain\f0\fs20\iiqa5443\hich\f0\dbch\f0\loch\f0\fs20\cell  \pard\intbl\ssparaaux0\s0\ql\plain\f0\fs24\plain\f0\fs20\ghdy1830\hich\f0\dbch\f0\loch\f0\fs20 (2) contrast enters airway, remains above the vocal cords, no residue remains (penetration) \cell  \intbl\row  \pard\ssparaaux0\s0\ql\plain\f0\fs24\plain\f0\fs20\oesb4959\hich\f0\dbch\f0\loch\f0\fs20\par  \plain\f1\fs20\gkxt8734\hich\f1\dbch\f1\loch\f1\cf2\fs20\b\ul{\field{\*\fldinst HYPERLINK [id number] }{\fldrslt VFSS/MBS Eval: Trial 3}}\plain\f0\fs20\ctjw8590\hich\f0\dbch\f0\loch\f0\fs20\ql\par  \trowd\hvsgue03\rpitgyo98\trpaddfl3\sgbinwq66\trpaddfr3\trpaddt0\trpaddft3\trpaddb0\trpaddfb3\trleft0  \clvertalt\tsiwex42\clpadft3\kbjfqd60\clpadfr3\clpadl0\clpadfl3\clpadb0\clpadfb3\khsvx7317  \clvertalt\qpatdp93\clpadft3\mfyfgo27\clpadfr3\clpadl0\clpadfl3\clpadb0\clpadfb3\vttjo3892  \pard\intbl\ssparaaux0\s0\fi-120\li120\ql\plain\f0\fs24{\*\bkmkstart [id number]}{\*\bkmkend bn9034038084}\plain\f0\fs20\xqdo6305\hich\f0\dbch\f0\loch\f0\fs20 \'b7 \plain\f1\fs20\gkwy6743\hich\f1\dbch\f1\loch\f1\cf2\fs20\b Consistencies administered\plain\f0\fs20\zsyj6194\hich\f0\dbch\f0\loch\f0\fs20\cell  \pard\intbl\ssparaaux0\s0\ql\plain\f0\fs24\plain\f0\fs20\ahtm7979\hich\f0\dbch\f0\loch\f0\fs20 mildly thick liquids via cup with self presentation of cup no cues, thin cup with cue to small single sips\cell  \intbl\row  \trowd\ofqocl16\lastrow\dduvhfy86\trpaddfl3\funvoxt54\trpaddfr3\trpaddt0\trpaddft3\trpaddb0\trpaddfb3\trleft0  \clvertalt\cxltqy26\clpadft3\yspwak21\clpadfr3\clpadl0\clpadfl3\clpadb0\clpadfb3\erkik6847  \clvertalt\qjowdj35\clpadft3\oykddy77\clpadfr3\clpadl0\clpadfl3\clpadb0\clpadfb3\oimag2760  \pard\intbl\ssparaaux0\s0\fi-120\li120\ql\plain\f0\fs24{\*\bkmkstart [id number]}{\*\bkmkend pb1211143833}\plain\f0\fs20\cftz9153\hich\f0\dbch\f0\loch\f0\fs20 \'b7 \plain\f1\fs20\txpn2101\hich\f1\dbch\f1\loch\f1\cf2\fs20\b Positioning\plain\f0\fs20\sebz5655\hich\f0\dbch\f0\loch\f0\fs20\cell  \pard\intbl\ssparaaux0\s0\ql\plain\f0\fs24\plain\f0\fs20\xyxd7296\hich\f0\dbch\f0\loch\f0\fs20 Lateral\cell  \intbl\row  \pard\ssparaaux0\s0\ql\plain\f0\fs24\plain\f0\fs20\cwal6023\hich\f0\dbch\f0\loch\f0\fs20\par  \plain\f1\fs20\xcqa8611\hich\f1\dbch\f1\loch\f1\cf2\fs20\b\ul{\field{\*\fldinst HYPERLINK [id number] }{\fldrslt Oral Prep Phase:}}\plain\f0\fs20\efma8014\hich\f0\dbch\f0\loch\f0\fs20\ql\par  \trowd\tnilaz05\lastrow\oniqgcp89\trpaddfl3\zojedon79\trpaddfr3\trpaddt0\trpaddft3\trpaddb0\trpaddfb3\trleft0  \clvertalt\pqjhkk89\clpadft3\snrloj86\clpadfr3\clpadl0\clpadfl3\clpadb0\clpadfb3\gdblq8405  \clvertalt\ayklsn30\clpadft3\izicny08\clpadfr3\clpadl0\clpadfl3\clpadb0\clpadfb3\mzfrc4917  \pard\intbl\ssparaaux0\s0\fi-120\li120\ql\plain\f0\fs24{\*\bkmkstart [id number]}{\*\bkmkend [id number]}\plain\f0\fs20\ocug7372\hich\f0\dbch\f0\loch\f0\fs20 \'b7 \plain\f1\fs20\hwlu3967\hich\f1\dbch\f1\loch\f1\cf2\fs20\b Oral Prep Comments\plain\f0\fs20\uhex8397\hich\f0\dbch\f0\loch\f0\fs20\cell  \pard\intbl\ssparaaux0\s0\ql\plain\f0\fs24\plain\f0\fs20\ulys5084\hich\f0\dbch\f0\loch\f0\fs20 no anterolateral loss\cell  \intbl\row  \pard\ssparaaux0\s0\ql\plain\f0\fs24\plain\f0\fs20\xboj5147\hich\f0\dbch\f0\loch\f0\fs20\par  \plain\f1\fs20\etoa9004\hich\f1\dbch\f1\loch\f1\cf2\fs20\b\ul{\field{\*\fldinst HYPERLINK [id number] }{\fldrslt Oral Phase:}}\plain\f0\fs20\pfji6559\hich\f0\dbch\f0\loch\f0\fs20\ql\par  \trowd\gzqlef63\lastrow\obhoeze16\trpaddfl3\nmqxlff60\trpaddfr3\trpaddt0\trpaddft3\trpaddb0\trpaddfb3\trleft0  \clvertalt\tbjree68\clpadft3\yibjay14\clpadfr3\clpadl0\clpadfl3\clpadb0\clpadfb3\vyvse5076  \clvertalt\ysqwov00\clpadft3\rijbcw61\clpadfr3\clpadl0\clpadfl3\clpadb0\clpadfb3\dsfht2785  \pard\intbl\ssparaaux0\s0\fi-120\li120\ql\plain\f0\fs24{\*\bkmkstart [id number]}{\*\bkmkend [id number]}\plain\f0\fs20\bfqu9340\hich\f0\dbch\f0\loch\f0\fs20 \'b7 \plain\f1\fs20\vhlc7142\hich\f1\dbch\f1\loch\f1\cf2\fs20\b Oral Phase\plain\f0\fs20\drpi3602\hich\f0\dbch\f0\loch\f0\fs20\cell  \pard\intbl\ssparaaux0\s0\ql\plain\f0\fs24\plain\f0\fs20\yixx9477\hich\f0\dbch\f0\loch\f0\fs20 Uncontrolled bolus / spillover in karrie-pharynx; Uncontrolled bolus / spillover in hypopharynx\cell  \intbl\row  \pard\ssparaaux0\s0\ql\plain\f0\fs24\plain\f0\fs20\qlai3365\hich\f0\dbch\f0\loch\f0\fs20\par  \plain\f1\fs20\htkj7577\hich\f1\dbch\f1\loch\f1\cf2\fs20\b\ul{\field{\*\fldinst HYPERLINK [id number] }{\fldrslt Pharyngeal Phase:}}\plain\f0\fs20\kaxn7439\hich\f0\dbch\f0\loch\f0\fs20\ql\par  \trowd\hpodwd22\wesdplj51\trpaddfl3\oxesnkh02\trpaddfr3\trpaddt0\trpaddft3\trpaddb0\trpaddfb3\trleft0  \clvertalt\gxmrvj76\clpadft3\plmrso18\clpadfr3\clpadl0\clpadfl3\clpadb0\clpadfb3\svgfi1360  \clvertalt\ccitet03\clpadft3\sjqkab09\clpadfr3\clpadl0\clpadfl3\clpadb0\clpadfb3\shcbf7584  \pard\intbl\ssparaaux0\s0\fi-120\li120\ql\plain\f0\fs24{\*\bkmkstart [id number]}{\*\bkmkend [id number]}\plain\f0\fs20\towf5804\hich\f0\dbch\f0\loch\f0\fs20 \'b7 \plain\f1\fs20\brzr3928\hich\f1\dbch\f1\loch\f1\cf2\fs20\b Intact\plain\f0\fs20\kpqe3757\hich\f0\dbch\f0\loch\f0\fs20\cell  \pard\intbl\ssparaaux0\s0\ql\plain\f0\fs24\plain\f0\fs20\vdjt1051\hich\f0\dbch\f0\loch\f0\fs20 No \cell  \intbl\row  \trowd\kpdfob30\lastrow\smslqfu89\trpaddfl3\cpnwokv51\trpaddfr3\trpaddt0\trpaddft3\trpaddb0\trpaddfb3\trleft0  \clvertalt\nbzqqq22\clpadft3\ofhpgw49\clpadfr3\clpadl0\clpadfl3\clpadb0\clpadfb3\updmq4705  \clvertalt\udknhj91\clpadft3\dofeae47\clpadfr3\clpadl0\clpadfl3\clpadb0\clpadfb3\wpmnb0714  \pard\intbl\ssparaaux0\s0\fi-120\li120\ql\plain\f0\fs24{\*\bkmkstart [id number]}{\*\bkmkend [id number]}\plain\f0\fs20\ndto0773\hich\f0\dbch\f0\loch\f0\fs20 \'b7 \plain\f1\fs20\rvnk9054\hich\f1\dbch\f1\loch\f1\cf2\fs20\b Pharyngeal Phase Comments\plain\f0\fs20\mdcf0848\hich\f0\dbch\f0\loch\f0\fs20\cell  \pard\intbl\ssparaaux0\s0\ql\plain\f0\fs24\plain\f0\fs20\jmtn7965\hich\f0\dbch\f0\loch\f0\fs20 Pharyngeal phase c/b latency in the swallow trigger to the level of the valleculae with notable spillover to the pyriform sinuses w/ less viscous liquids. Base   of tongue/BoT retraction and pharyngeal constriction were reduced and resulted in reduced pharyngeal bolus propulsion. Mild vallecular residue present with partial to full clearance with repeat dry swallow.  Hyolaryngeal elevation and excursion were reduced.   Adequate epiglottic inversion. Adequate relaxation of UES. Appreciated bolus navigation onto arytenoids and/or AE folds with subsequent airway invasion, latency in the cough response present with partial ejection from airway.\cell  \intbl\row  \pard\ssparaaux0\s0\ql\plain\f0\fs24\plain\f0\fs20\hvcj9927\hich\f0\dbch\f0\loch\f0\fs20\par  \plain\f1\fs20\qdqn1272\hich\f1\dbch\f1\loch\f1\cf2\fs20\b\ul{\field{\*\fldinst HYPERLINK [id number] }{\fldrslt Rosenbek's Penetration Aspiration Scale:}}\plain\f0\fs20\nyun9265\hich\f0\dbch\f0\loch\f0\fs20\ql\par  \trowd\fibjwn28\lastrow\nvtyslf98\trpaddfl3\rqrbgeu80\trpaddfr3\trpaddt0\trpaddft3\trpaddb0\trpaddfb3\trleft0  \clvertalt\esixhq22\clpadft3\zbuiec97\clpadfr3\clpadl0\clpadfl3\clpadb0\clpadfb3\ugosr4505  \clvertalt\vgeulp21\clpadft3\ipoqre68\clpadfr3\clpadl0\clpadfl3\clpadb0\clpadfb3\ykayl9761  \pard\intbl\ssparaaux0\s0\fi-120\li120\ql\plain\f0\fs24{\*\bkmkstart [id number]}{\*\bkmkend ym3176000976}\plain\f0\fs20\oeif6346\hich\f0\dbch\f0\loch\f0\fs20 \'b7 \plain\f1\fs20\bpfz0767\hich\f1\dbch\f1\loch\f1\cf2\fs20\b Rosenbek's Penetration Aspiration   Scale\plain\f0\fs20\lwtp3625\hich\f0\dbch\f0\loch\f0\fs20\cell  \pard\intbl\ssparaaux0\s0\ql\plain\f0\fs24\plain\f0\fs20\eurr2111\hich\f0\dbch\f0\loch\f0\fs20 (8) contrast passes glottis, visible subglottic residue remains, absent patient response (aspiration) \cell  \intbl\row  \pard\ssparaaux0\s0\ql\plain\f0\fs24\plain\f0\fs20\bkmt8276\hich\f0\dbch\f0\loch\f0\fs20\par  {\*\bkmkstart [id number]}{\*\bkmkend vt2150974485}\plain\f1\fs20\jgmv1449\hich\f1\dbch\f1\loch\f1\cf2\fs20\b\ul Recommendations:\plain\f0\fs20\pzdx8252\hich\f0\dbch\f0\loch\f0\fs20  \par  \trowd\lxexka93\nesdysc29\trpaddfl3\tmkeciz84\trpaddfr3\trpaddt0\trpaddft3\trpaddb0\trpaddfb3\trleft0  \clvertalt\jayvns18\clpadft3\hzjikn48\clpadfr3\clpadl0\clpadfl3\clpadb0\clpadfb3\cdocq0528  \clvertalt\deirgp80\clpadft3\fumhdb47\clpadfr3\clpadl0\clpadfl3\clpadb0\clpadfb3\qglmu6822  \pard\intbl\ssparaaux0\s0\fi-120\li120\ql\plain\f0\fs24{\*\bkmkstart [id number]}{\*\bkmkend [id number]}\plain\f0\fs20\irtz1162\hich\f0\dbch\f0\loch\f0\fs20 \'b7 \plain\f1\fs20\qxgn8639\hich\f1\dbch\f1\loch\f1\cf2\fs20\b Diagnostic Impressions\plain\f0\fs20\aeuj9766\hich\f0\dbch\f0\loch\f0\fs20\cell  \pard\intbl\ssparaaux0\s0\ql\plain\f0\fs24\plain\f0\fs20\pmbk0216\hich\f0\dbch\f0\loch\f0\fs20 Mr. 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 May wish to pursue additional neurological work up vs other.   There is prolonged however adequate mastication and oral transfer of all textures. Appearing to have lingual pumping motion to facilitate a-p transport. Premature spillage is seen with less viscous liquids. Laryngeal vestibule closure is reduced, resulting   in laryngeal penetration of less viscous liquids. Material is retrieved during the swallow or during subsequent swallows with mildly thick liquids via tsp and thin liquids via tsp. It should be noted that depth/ amount of laryngeal penetration for less   viscous liquids is reduced with use of small, single cup sips HOWEVER PT CAN NOT CONSISTENTLY FOLLOW / COMPLETE this command as was evidenced by this study which resulted in GROSS ASPIRATION of thin liquids via cup sips and mildly thick liquids via cup   sips. Of note, pt with latent cough response, reduced supra/subglottic sensation apparent. \par  \par  Disorders:  reduced BOT to posterior pharyngeal wall contact, delay in trigger of the swallow reflex, reduced hyo-laryngeal excursion, reduced laryngeal closure, reduced pharyngeal contractility, reduced supraglottic sensation, reduced subglottic sensation.\par  \par  **Patient can not be advanced at the bedside. pt will require a repeat objective to advance solids and liquids.**\cell  \intbl\row  \pard\intbl\ssparaaux0\s0\ql\plain\f0\fs24\plain\f0\fs20\elnk0667\hich\f0\dbch\f0\loch\f0\fs20\cell  \pard\intbl\ssparaaux0\s0\ql\plain\f0\fs24\plain\f1\fs20\gotg9569\hich\f1\dbch\f1\loch\f1\cf2\fs20\strike\plain\f0\fs20\rnwa8049\hich\f0\dbch\f0\loch\f0\fs20\cell  \intbl\row  \pard\intbl\ssparaaux0\s0\fi-120\li120\ql\plain\f0\fs24{\*\bkmkstart [id number]}{\*\bkmkend [id number]}\plain\f0\fs20\oyof5037\hich\f0\dbch\f0\loch\f0\fs20 \'b7 \plain\f1\fs20\pzda9723\hich\f1\dbch\f1\loch\f1\cf2\fs20\b Recommended Consistencies\plain\f0\fs20\ismn3404\hich\f0\dbch\f0\loch\f0\fs20\cell  \pard\intbl\ssparaaux0\s0\ql\plain\f0\fs24\plain\f0\fs20\mbrl5754\hich\f0\dbch\f0\loch\f0\fs20 MILDLY THICK LIQUIDS TSP \par  NO CUP\par  NO STRAW \par  SOFT AND BITE SIZED SOLIDS\par  \par  **With SLP ONLY would allow thin liquids via tsp during therapy following oral care, not with other liquid or solids. This can be continued in the home environment with family/aide HOWEVER not during hospital course. Additional education to be provided   to family/aide during course. **\cell  \intbl\row  \pard\intbl\ssparaaux0\s0\ql\plain\f0\fs24\plain\f0\fs20\zvsy4503\hich\f0\dbch\f0\loch\f0\fs20\cell  \pard\intbl\ssparaaux0\s0\ql\plain\f0\fs24\plain\f1\fs20\olur8491\hich\f1\dbch\f1\loch\f1\cf2\fs20\strike\plain\f0\fs20\vqut1362\hich\f0\dbch\f0\loch\f0\fs20\cell  \intbl\row  \pard\intbl\ssparaaux0\s0\fi-120\li120\ql\plain\f0\fs24{\*\bkmkstart [id number]}{\*\bkmkend [id number]}\plain\f0\fs20\dkmf0049\hich\f0\dbch\f0\loch\f0\fs20 \'b7 \plain\f1\fs20\bmdg3942\hich\f1\dbch\f1\loch\f1\cf2\fs20\b Recommended Feeding/Eating   Techniques\plain\f0\fs20\fbuu5755\hich\f0\dbch\f0\loch\f0\fs20\cell  \pard\intbl\ssparaaux0\s0\ql\plain\f0\fs24\plain\f0\fs20\xpeb9179\hich\f0\dbch\f0\loch\f0\fs20 crush medication (when feasible); no straws; oral hygiene\cell  \intbl\row  \pard\intbl\ssparaaux0\s0\fi-120\li120\ql\plain\f0\fs24{\*\bkmkstart [id number]}{\*\bkmkend [id number]}\plain\f0\fs20\vxxp2309\hich\f0\dbch\f0\loch\f0\fs20 \'b7 \plain\f1\fs20\tzit5888\hich\f1\dbch\f1\loch\f1\cf2\fs20\b Feeding Assistance\plain\f0\fs20\khbh1941\hich\f0\dbch\f0\loch\f0\fs20\cell  \pard\intbl\ssparaaux0\s0\ql\plain\f0\fs24\plain\f0\fs20\aspx7193\hich\f0\dbch\f0\loch\f0\fs20 frequent cues/help required\cell  \intbl\row  \pard\intbl\ssparaaux0\s0\ql\plain\f0\fs24\plain\f0\fs20\qxgz0038\hich\f0\dbch\f0\loch\f0\fs20\cell  \pard\intbl\ssparaaux0\s0\ql\plain\f0\fs24\plain\f1\fs20\zzrp1281\hich\f1\dbch\f1\loch\f1\cf2\fs20\strike\plain\f0\fs20\zwer1269\hich\f0\dbch\f0\loch\f0\fs20\cell  \intbl\row  \pard\intbl\ssparaaux0\s0\fi-120\li120\ql\plain\f0\fs24{\*\bkmkstart [id number]}{\*\bkmkend [id number]}\plain\f0\fs20\cjyj7885\hich\f0\dbch\f0\loch\f0\fs20 \'b7 \plain\f1\fs20\uhyl3897\hich\f1\dbch\f1\loch\f1\cf2\fs20\b Aspiration Precautions\plain\f0\fs20\hxzd4179\hich\f0\dbch\f0\loch\f0\fs20\cell  \pard\intbl\ssparaaux0\s0\ql\plain\f0\fs24\plain\f0\fs20\tmzl3486\hich\f0\dbch\f0\loch\f0\fs20 yes\cell  \intbl\row  \pard\intbl\ssparaaux0\s0\fi-120\li120\ql\plain\f0\fs24{\*\bkmkstart [id number]}{\*\bkmkend [id number]}\plain\f0\fs20\chsj4726\hich\f0\dbch\f0\loch\f0\fs20 \'b7 \plain\f1\fs20\kxgf2603\hich\f1\dbch\f1\loch\f1\cf2\fs20\b Monitor for Signs of Aspiration\plain\f0\fs20\rvfs8391\hich\f0\dbch\f0\loch\f0\fs20\cell  \pard\intbl\ssparaaux0\s0\ql\plain\f0\fs24\plain\f0\fs20\hzmy4406\hich\f0\dbch\f0\loch\f0\fs20 oral hygiene; position upright (90Y); cough; gurgly voice; pneumonia; throat clearing\cell  \intbl\row  \pard\intbl\ssparaaux0\s0\fi-120\li120\ql\plain\f0\fs24{\*\bkmkstart [id number]}{\*\bkmkend [id number]}\plain\f0\fs20\wvba8264\hich\f0\dbch\f0\loch\f0\fs20 \'b7 \plain\f1\fs20\uyxu1946\hich\f1\dbch\f1\loch\f1\cf2\fs20\b Anticipated Discharge Disposition\plain\f0\fs20\nqyx3694\hich\f0\dbch\f0\loch\f0\fs20\cell  \pard\intbl\ssparaaux0\s0\ql\plain\f0\fs24\plain\f0\fs20\bodh2402\hich\f0\dbch\f0\loch\f0\fs20 slp to continue at home \cell  \intbl\row  \pard\intbl\ssparaaux0\s0\fi-120\li120\ql\plain\f0\fs24{\*\bkmkstart [id number]}{\*\bkmkend [id number]}\plain\f0\fs20\olox8392\hich\f0\dbch\f0\loch\f0\fs20 \'b7 \plain\f1\fs20\ttzx4215\hich\f1\dbch\f1\loch\f1\cf2\fs20\b Demonstrates Need for Referral   to Another Service\plain\f0\fs20\phrm7234\hich\f0\dbch\f0\loch\f0\fs20\cell  \pard\intbl\ssparaaux0\s0\ql\plain\f0\fs24\plain\f0\fs20\yeom9602\hich\f0\dbch\f0\loch\f0\fs20 Registered Dietitian; neurology{\*\bkmkstart bkcommentCR}{\*\bkmkend bkcommentCR}\cell  \intbl\row  \pard\intbl\ssparaaux0\s0\fi-120\li120\ql\plain\f0\fs24{\*\bkmkstart [id number]}{\*\bkmkend [id number]}\plain\f0\fs20\iikd7595\hich\f0\dbch\f0\loch\f0\fs20 \'b7 \plain\f1\fs20\konq0948\hich\f1\dbch\f1\loch\f1\cf2\fs20\b Additional Recommendations\plain\f0\fs20\olmz6786\hich\f0\dbch\f0\loch\f0\fs20\cell  \pard\intbl\ssparaaux0\s0\ql\plain\f0\fs24\plain\f0\fs20\mevt8134\hich\f0\dbch\f0\loch\f0\fs20 GOAL- Pt to tolerate recommended textures during course w/ no s/sx of aspiration Pt/family/caregiver will demonstrate understanding and carryover of dysphagia   management (safe swallow guidelines, dysphagia diet).\cell  \intbl\row  \trowd\muuqme60\lastrow\qptbeyb85\trpaddfl3\kearxyh76\trpaddfr3\trpaddt0\trpaddft3\trpaddb0\trpaddfb3\trleft0  \clvertalt\ntgbil76\clpadft3\qnvzrz40\clpadfr3\clpadl0\clpadfl3\clpadb0\clpadfb3\hjxhz4500  \clvertalt\ucqewl15\clpadft3\uznzov91\clpadfr3\clpadl0\clpadfl3\clpadb0\clpadfb3\qoixs4107  \pard\intbl\ssparaaux0\s0\fi-120\li120\ql\plain\f0\fs24{\*\bkmkstart [id number]}{\*\bkmkend [id number]}\plain\f0\fs20\qkdj1849\hich\f0\dbch\f0\loch\f0\fs20 \'b7 \plain\f1\fs20\cqer7319\hich\f1\dbch\f1\loch\f1\cf2\fs20\b The above findings were discussed   with\plain\f0\fs20\kkor9805\hich\f0\dbch\f0\loch\f0\fs20\cell  \pard\intbl\ssparaaux0\s0\ql\plain\f0\fs24\plain\f0\fs20\qofi5654\hich\f0\dbch\f0\loch\f0\fs20 Physician; Nursing; RN Leana, PA [place]\cell  \intbl\row  \pard\ssparaaux0\s0\ql\plain\f0\fs24\plain\f0\fs20\bjtb5808\hich\f0\dbch\f0\loch\f0\fs20\par  \par  {\*\bkmkstart bkClinDocSignatures}{\*\bkmkend bkClinDocSignatures}{\*\bkmkstart bkcommentSBK}{\*\bkmkend bkcommentSBK}\plain\f1\fs20\njpv4699\hich\f1\dbch\f1\loch\f1\cf2\fs20\b Electronic Signatures:\plain\f0\fs20\wkxr8625\hich\f0\dbch\f0\loch\f0\fs20\par  \plain\f1\fs20\zbwc8521\hich\f1\dbch\f1\loch\f1\cf2\fs20\b\ul Rachael Robert (Speech Pathologist)\plain\f0\fs20\fyht2881\hich\f0\dbch\f0\loch\f0\fs20   \plain\f1\fs18\iijs8199\hich\f1\dbch\f1\loch\f1\cf2\fs18 (Signed 04-Jan-2024 10:11)\par  \fi-360\li720\plain\f0\fs24\plain\f1\fs18\batu2194\hich\f1\dbch\f1\loch\f1\cf2\fs18\tab\plain\f1\fs20\ffkm9775\hich\f1\dbch\f1\loch\f1\cf2\fs20\b\i Authored: \plain\f1\fs20\qyid2336\hich\f1\dbch\f1\loch\f1\cf2\fs20\i Swallow VFSS/MBS Assessment Adult,   Recommendations\plain\f0\fs20\otbw6417\hich\f0\dbch\f0\loch\f0\fs20\par  \fi0\li0\plain\f0\fs24\plain\f0\fs20\fsyz8843\hich\f0\dbch\f0\loch\f0\fs20\par  \par  \plain\f1\fs20\vmka5736\hich\f1\dbch\f1\loch\f1\cf2\fs20\b\i Last Updated: \plain\f1\fs20\fqpk1953\hich\f1\dbch\f1\loch\f1\cf2\fs20\i 04-Jan-2024 10:11 by Rachael Robert (Speech Pathologist)\plain\f0\fs20\vwvh0805\hich\f0\dbch\f0\loch\f0\fs20\par  \pard\plain\f0\fs24\plain\f0\fs20\ednd8808\hich\f0\dbch\f0\loch\f0\fs20\par  \par  \ql\plain\f0\fs24\plain\f0\fs20\toic9008\hich\f0\dbch\f0\loch\f0\fs20\par  }

## 2024-01-04 NOTE — PROGRESS NOTE ADULT - PROBLEM SELECTOR PLAN 2
creatinine up further today   appears euvolemic   will continue to hold furosemide for now and trend creatinine   cardiology help appreciated   strict I/O  daily wt
continues to be euvolemic   will continue to hold furosemide for now and trend creatinine   likely resume furosemide on discharge   cardiology help appreciated   strict I/O
on IV furosemide increased to 80 qd per cardiology   cardiology help appreciated   strict I/O  daily wts
continues to be euvolemic   will continue to hold furosemide for now and trend creatinine   cardiology help appreciated   strict I/O
creatinine up today  appears much more euvolemic   will hold furosemide for now and trend creatinine   cardiology help appreciated   strict I/O  daily wt
continues to be euvolemic   will continue to hold furosemide for now and trend creatinine   likely resume furosemide on discharge   cardiology help appreciated   strict I/O
continues to be euvolemic   will continue to hold furosemide for now and trend creatinine as outpatient   renal help appreciated

## 2024-01-04 NOTE — PROGRESS NOTE ADULT - ASSESSMENT
73-year-old male with a past medical history of hypertension, hyperlipidemia, diabetes presenting difficulty breathing.    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    (3)PNA - improving, with Zosyn  (4)CV - Hypertensive urgency     RECOMMEND:  (1)Start Norvasc for BP support and may need to change Toprol to Coreg   (2)     73-year-old male with a past medical history of hypertension, hyperlipidemia, diabetes presenting difficulty breathing.    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    (3)PNA - improving, with Zosyn  (4)CV - Hypertensive urgency     RECOMMEND:  (1)Start Norvasc for BP support and may need to change Toprol to Coreg as the next step   (2) trend renal function;  Diuretics on hold  (3)Do not restart Metformin     Outpt follow up will be needed     [facility]   [phone]      73-year-old male with a past medical history of hypertension, hyperlipidemia, diabetes presenting difficulty breathing.    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    (3)PNA - improving, with Zosyn  (4)CV - Hypertensive urgency     RECOMMEND:  (1)Start Norvasc for BP support and may need to change Toprol to Coreg as the next step   (2) trend renal function;  Diuretics on hold  (3)Do not restart Metformin     Outpt follow up will be needed     [facility]   [phone]

## 2024-01-04 NOTE — DISCHARGE NOTE PROVIDER - NSDCFUSCHEDAPPT_GEN_ALL_CORE_FT
[facility]  NEUROLOGY [address] [place] [address]  Scheduled Appointment: 03/18/2024    [facility]  NEUROLOGY [address] [place] [address]  Scheduled Appointment: 03/18/2024    Luciana Lance  [facility]  NEUROLOGY [address] [place] [address]  Scheduled Appointment: 03/18/2024     [facility]  NEUROLOGY [address] [place] [address]  Scheduled Appointment: 03/18/2024    [facility]  NEUROLOGY [address] [place] [address]  Scheduled Appointment: 03/18/2024    Luciana Lance  [facility]  NEUROLOGY [address] [place] [address]  Scheduled Appointment: 03/18/2024

## 2024-01-05 ENCOUNTER — NON-APPOINTMENT (OUTPATIENT)
Age: 74
End: 2024-01-05

## 2024-01-05 ENCOUNTER — TRANSCRIPTION ENCOUNTER (OUTPATIENT)
Age: 74
End: 2024-01-05

## 2024-01-05 NOTE — PROGRESS NOTE ADULT - SUBJECTIVE AND OBJECTIVE BOX
Subjective: Patient seen and examined. No new events except as noted.   wants to go home     REVIEW OF SYSTEMS:    CONSTITUTIONAL: +weakness, fevers or chills  EYES/ENT: No visual changes;  No vertigo or throat pain   NECK: No pain or stiffness  RESPIRATORY: No cough, wheezing, hemoptysis; No shortness of breath  CARDIOVASCULAR: No chest pain or palpitations  GASTROINTESTINAL: No abdominal or epigastric pain. No nausea, vomiting, or hematemesis; No diarrhea or constipation. No melena or hematochezia.  GENITOURINARY: No dysuria, frequency or hematuria  NEUROLOGICAL: No numbness or weakness  SKIN: No itching, burning, rashes, or lesions   All other review of systems is negative unless indicated above.    MEDICATIONS:  MEDICATIONS  (STANDING):      PHYSICAL EXAM:  T(C): 36.6 (01-04-24 @ 18:26), Max: 36.6 (01-04-24 @ 18:26)  HR: 72 (01-04-24 @ 18:26) (68 - 72)  BP: 157/74 (01-04-24 @ 18:26) (157/74 - 175/76)  RR: 18 (01-04-24 @ 18:26) (18 - 18)  SpO2: 97% (01-04-24 @ 18:26) (97% - 97%)  Wt(kg): --  I&O's Summary        Appearance: Normal	  HEENT:   Normal oral mucosa, PERRL, EOMI	  Lymphatic: No lymphadenopathy , no edema  Cardiovascular: Normal S1 S2, No JVD, No murmurs , Peripheral pulses palpable 2+ bilaterally  Respiratory: Lungs clear to auscultation, normal effort 	  Gastrointestinal:  Soft, Non-tender, + BS	  Skin: No rashes, No ecchymoses, No cyanosis, warm to touch  Musculoskeletal: Normal range of motion, normal strength  Psychiatry:  Mood & affect appropriate  Ext: No edema      LABS:    CARDIAC MARKERS:            01-04    139  |  100  |  45<H>  ----------------------------<  133<H>  4.1   |  31  |  2.38<H>    Ca    9.3      04 Jan 2024 07:15      proBNP:   Lipid Profile:   HgA1c:   TSH:             TELEMETRY: 	    ECG:  	  RADIOLOGY:   DIAGNOSTIC TESTING:  [ ] Echocardiogram:  [ ]  Catheterization:  [ ] Stress Test:    OTHER:

## 2024-01-05 NOTE — PROGRESS NOTE ADULT - REASON FOR ADMISSION

## 2024-01-05 NOTE — PROGRESS NOTE ADULT - PROVIDER SPECIALTY LIST ADULT
Cardiology
Cardiology
Pulmonology
Cardiology
Pulmonology
Pulmonology
Nephrology
Cardiology
Internal Medicine

## 2024-01-08 ENCOUNTER — TRANSCRIPTION ENCOUNTER (OUTPATIENT)
Age: 74
End: 2024-01-08

## 2024-01-10 ENCOUNTER — NON-APPOINTMENT (OUTPATIENT)
Age: 74
End: 2024-01-10

## 2024-01-22 ENCOUNTER — TRANSCRIPTION ENCOUNTER (OUTPATIENT)
Age: 74
End: 2024-01-22

## 2024-02-28 NOTE — ED ADULT NURSE NOTE - NSFALLDEVICES_ED_ALL_ED
Chief complaint:   Chief Complaint   Patient presents with   • Back Pain       Vitals:  Visit Vitals  /62   Pulse 77   Temp 98.3 °F (36.8 °C) (Temporal)   Resp 18   Wt 58.8 kg (129 lb 11.9 oz)   LMP 03/27/2022   SpO2 98%   BMI 21.75 kg/m²       HISTORY OF PRESENT ILLNESS     22-year-old female presents to the urgent care clinic with her spouse with chief complaint of left mid thoracic pain since yesterday.  At rest her pain is 7/10 and with movement pain increases to 9/10.  Patient states that she was at the grocery store yesterday trying to \"pop\" her back when she experienced left sided muscle spasm and pain.  The pain is over her left flank.  No hematuria.  No history of renal calculi.  No change in her diet.  When she got home she took 200 mg of Advil and went to bed.  She did not apply any ice or heat.  No direct trauma or injury to the area.  No numbness or tingling.  No fever.  No chills.  Patient is unemployed, is on disability.  No urinary urgency, frequency, or dysuria.    Past medical history, past surgical history, allergies, medications, and social history reviewed in the Epic chart as noted today.  Contraception:  Birth control pill      Other significant problems:  Patient Active Problem List    Diagnosis Date Noted   • Anxiety 09/19/2016     Priority: Low   • Depressive disorder, not elsewhere classified 03/25/2015     Priority: Low   • Attention deficit disorder 11/11/2009     Priority: Low     Formatting of this note might be different from the original.  [id number]  Formatting of this note might be different from the original.  Formatting of this note might be different from the original.  [id number]         PAST MEDICAL, FAMILY AND SOCIAL HISTORY     Medications:  Current Outpatient Medications   Medication Sig Dispense Refill   • clonazePAM (KlonoPIN) 0.5 MG tablet Take 0.5 mg by mouth in the morning and 0.5 mg before bedtime.     • lamoTRIgine (LaMICtal) 25 MG tablet Take 25 mg by mouth 
daily.     • cetirizine (ZyrTEC) 10 MG chewable tablet Chew 1 tablet by mouth daily as needed.     • minocycline (MINOCIN) 100 MG capsule Take 1 capsule by mouth 2 times daily. 60 capsule 2   • ALPRAZolam (XANAX) 0.5 MG tablet Take 0.5 mg by mouth daily as needed.     • Kurvelo 0.15-30 MG-MCG per tablet Take 1 tablet by mouth daily.     • ibuprofen (MOTRIN) 600 MG tablet Take 1 tablet by mouth every 8 hours as needed for Pain. 30 tablet 1     No current facility-administered medications for this visit.       Allergies:  ALLERGIES:   Allergen Reactions   • Pollen Extract Other (See Comments)   • Seasonal Other (See Comments)       Past Medical  History/Surgeries:  Past Medical History:   Diagnosis Date   • ADHD (attention deficit hyperactivity disorder)    • Anxiety    • Auditory hallucinations    • Depression        Past Surgical History:   Procedure Laterality Date   • Tonsillectomy     • [place] tooth extraction         Family History:  Family History   Problem Relation Age of Onset   • Systemic Lupus Erythematosus Mother    • Depression Mother    • Bipolar disorder Father    • Schizophrenia Father    • Heart Maternal Grandmother    • Hypertension Maternal Grandmother    • Cancer, Breast Paternal Grandmother    • Thyroid Paternal Grandmother    • Diabetes Paternal Grandfather         type II       Social History:  Social History     Tobacco Use   • Smoking status: Never   • Smokeless tobacco: Current   • Tobacco comments:     Vaping   Vaping Use   • Vaping status: Every Day     Substances: Nicotine     Devices: Pre-filled or refillable cartridge, Pre-filled pod   Substance Use Topics   • Alcohol use: No       REVIEW OF SYSTEMS     Review of Systems   Constitutional: Negative for activity change, appetite change and fever.   HENT: Negative for ear pain and postnasal drip.    Eyes: Negative for discharge.   Respiratory: Negative for cough, shortness of breath and wheezing.    Cardiovascular: Negative for chest pain, 
palpitations and leg swelling.   Gastrointestinal: Negative for abdominal pain.   Genitourinary: Positive for flank pain. Negative for decreased urine volume, difficulty urinating, dyspareunia, dysuria, enuresis, frequency, genital sores, hematuria, menstrual problem, pelvic pain, urgency, vaginal bleeding and vaginal pain.   Musculoskeletal: Positive for back pain and myalgias. Negative for gait problem, joint swelling, neck pain and neck stiffness.   Skin: Negative for rash.   Neurological: Negative for dizziness, facial asymmetry, light-headedness and headaches.       PHYSICAL EXAM     Physical Exam  Vitals and nursing note reviewed.   Constitutional:       General: She is not in acute distress.     Appearance: Normal appearance. She is not ill-appearing, toxic-appearing or diaphoretic.   HENT:      Head: Normocephalic.      Right Ear: External ear normal.      Left Ear: External ear normal.   Eyes:      Extraocular Movements: Extraocular movements intact.      Conjunctiva/sclera: Conjunctivae normal.   Cardiovascular:      Rate and Rhythm: Normal rate and regular rhythm.   Pulmonary:      Breath sounds: Normal breath sounds.   Abdominal:      General: There is no distension.      Palpations: Abdomen is soft.      Tenderness: There is no abdominal tenderness. There is left CVA tenderness. There is no right CVA tenderness or guarding.      Comments: Patient has tenderness in the paraspinal muscles in the left thoracic area, she also has mild tenderness over the left flank area.  Patient has normal range of motion on flexion, extension, side bending and rotation.  Motor and sensory grossly intact.  Equal grasp in both hands.  No suprapubic tenderness.   Musculoskeletal:         General: Tenderness present. No swelling or signs of injury.   Neurological:      Mental Status: She is alert.   Psychiatric:         Mood and Affect: Mood normal.         Behavior: Behavior normal.         Thought Content: Thought content 
normal.         Judgment: Judgment normal.       Results for orders placed or performed in visit on 05/02/23   POCT Urine Dip Auto   Result Value    POCT Color Yellow    POCT Appearance Clear    POCT Glucose Urine Negative    POCT Bilirubin Negative    POCT Ketones Negative    POCT Specific Gravity 1.020    POCT Occult Blood Moderate (A)    POCT pH 6.0    POCT Protein Negative    POCT Urobilinogen 0.2    Urine Nitrite Negative    WBC (Leukocyte) Esterase POC Moderate (A)     ASSESSMENT/PLAN     Lara was seen today for back pain.    Diagnoses and all orders for this visit:    Left flank pain  -     POCT Urine Dip Auto  -     Urine, Bacterial Culture    Paraspinal muscle spasm  -     meloxicam (MOBIC) 7.5 MG tablet; Take 1 tablet by mouth daily as needed for Pain. Do not take any other NSAIDs while on Meloxicam  -     cyclobenzaprine (FLEXERIL) 10 MG tablet; 1 tablet po at bedtime for muscle spasm  -     Urine, Bacterial Culture    Urine leukocytes  -     nitrofurantoin, macrocrystal-monohydrate, (MACROBID) 100 MG capsule; Take 1 capsule by mouth in the morning and 1 capsule in the evening.    Patient was advised to increase her water intake, 64 oz of water per day.  Although patient does not have any symptoms of UTI based on her urine dip results and pain in her left flank area she will be started on antibiotic while we await the results of the urine culture.  Side effects of medications reviewed. Patient was advised to follow-up with primary care provider in the next 1 to 2 days. Patient will return to the urgent care clinic or go to the ER if signs and symptoms are worsening or persist. Home care instructions reviewed with the patient, see AVS.    
None
COUGH

## 2024-03-18 ENCOUNTER — APPOINTMENT (OUTPATIENT)
Dept: NEUROLOGY | Facility: CLINIC | Age: 74
End: 2024-03-18

## 2024-04-10 ENCOUNTER — NON-APPOINTMENT (OUTPATIENT)
Age: 74
End: 2024-04-10

## 2024-04-22 NOTE — SWALLOW BEDSIDE ASSESSMENT ADULT - SWALLOW EVAL: PATIENT/FAMILY GOALS STATEMENT
Anesthesia Evaluation     history of anesthetic complications:  PONV  NPO Solid Status: > 8 hours  NPO Liquid Status: > 2 hours           Airway   Mallampati: II  TM distance: >3 FB  Dental - normal exam     Pulmonary - normal exam   Cardiovascular - normal exam    (+) hypertension      Neuro/Psych  (+) Parkinson's disease, psychiatric history Depression and Anxiety  GI/Hepatic/Renal/Endo    (+) morbid obesity, diabetes mellitus gestational using insulin    ROS Comment: Polycystic ovaries    Musculoskeletal     (+) myalgias  Abdominal    Substance History      OB/GYN    (+) Pregnant  (-) history of pregnancy induced hypertension    Comment: 38 wks 0 days      Other                          Anesthesia Plan    ASA 3     spinal       Anesthetic plan, risks, benefits, and alternatives have been provided, discussed and informed consent has been obtained with: patient and spouse/significant other.        CODE STATUS:    Level Of Support Discussed With: Patient  Code Status (Patient has no pulse and is not breathing): CPR (Attempt to Resuscitate)  Medical Interventions (Patient has pulse or is breathing): Full Support       to eat and drink safely

## 2024-05-09 ENCOUNTER — RX ONLY (RX ONLY)
Age: 74
End: 2024-05-09

## 2024-05-09 ENCOUNTER — OFFICE (OUTPATIENT)
Dept: URBAN - METROPOLITAN AREA CLINIC 77 | Facility: CLINIC | Age: 74
Setting detail: OPHTHALMOLOGY
End: 2024-05-09
Payer: MEDICARE

## 2024-05-09 DIAGNOSIS — H35.371: ICD-10-CM

## 2024-05-09 DIAGNOSIS — H35.372: ICD-10-CM

## 2024-05-09 DIAGNOSIS — E11.3511: ICD-10-CM

## 2024-05-09 DIAGNOSIS — H25.11: ICD-10-CM

## 2024-05-09 DIAGNOSIS — E11.3592: ICD-10-CM

## 2024-05-09 PROCEDURE — 92250 FUNDUS PHOTOGRAPHY W/I&R: CPT | Performed by: OPHTHALMOLOGY

## 2024-05-09 PROCEDURE — 99214 OFFICE O/P EST MOD 30 MIN: CPT | Mod: 25 | Performed by: OPHTHALMOLOGY

## 2024-05-09 PROCEDURE — 67028 INJECTION EYE DRUG: CPT | Mod: 50 | Performed by: OPHTHALMOLOGY

## 2024-05-09 PROCEDURE — 92235 FLUORESCEIN ANGRPH MLTIFRAME: CPT | Performed by: OPHTHALMOLOGY

## 2024-05-09 ASSESSMENT — CONFRONTATIONAL VISUAL FIELD TEST (CVF)
OD_FINDINGS: FULL
OS_FINDINGS: FULL

## 2024-05-16 ENCOUNTER — OFFICE (OUTPATIENT)
Dept: URBAN - METROPOLITAN AREA CLINIC 77 | Facility: CLINIC | Age: 74
Setting detail: OPHTHALMOLOGY
End: 2024-05-16
Payer: MEDICARE

## 2024-05-16 DIAGNOSIS — E11.3511: ICD-10-CM

## 2024-05-16 PROCEDURE — 67228 TREATMENT X10SV RETINOPATHY: CPT | Mod: RT | Performed by: OPHTHALMOLOGY

## 2024-05-16 ASSESSMENT — CONFRONTATIONAL VISUAL FIELD TEST (CVF)
OS_FINDINGS: FULL
OD_FINDINGS: FULL

## 2024-05-23 ENCOUNTER — OFFICE (OUTPATIENT)
Dept: URBAN - METROPOLITAN AREA CLINIC 77 | Facility: CLINIC | Age: 74
Setting detail: OPHTHALMOLOGY
End: 2024-05-23
Payer: MEDICARE

## 2024-05-23 DIAGNOSIS — E11.3592: ICD-10-CM

## 2024-05-23 PROBLEM — H35.372 EPIRETINAL MEMBRANE; RIGHT EYE, LEFT EYE: Status: ACTIVE | Noted: 2024-05-09

## 2024-05-23 PROBLEM — H25.12 CATARACT SENILE NUCLEAR SCLEROSIS; RIGHT EYE, LEFT EYE: Status: ACTIVE | Noted: 2024-05-09

## 2024-05-23 PROBLEM — E11.3511 DM TYPE 2; RIGHT PROLIFERATIVE WITH ME, LEFT PROLIFERATIVE WITHOUT ME: Status: ACTIVE | Noted: 2024-05-09

## 2024-05-23 PROBLEM — H25.11 CATARACT SENILE NUCLEAR SCLEROSIS; RIGHT EYE, LEFT EYE: Status: ACTIVE | Noted: 2024-05-09

## 2024-05-23 PROBLEM — H35.371 EPIRETINAL MEMBRANE; RIGHT EYE, LEFT EYE: Status: ACTIVE | Noted: 2024-05-09

## 2024-05-23 PROCEDURE — 67228 TREATMENT X10SV RETINOPATHY: CPT | Mod: 79,LT | Performed by: OPHTHALMOLOGY

## 2024-05-23 ASSESSMENT — CONFRONTATIONAL VISUAL FIELD TEST (CVF)
OD_FINDINGS: FULL
OS_FINDINGS: FULL

## 2024-06-18 ENCOUNTER — OFFICE (OUTPATIENT)
Dept: URBAN - METROPOLITAN AREA CLINIC 90 | Facility: CLINIC | Age: 74
Setting detail: OPHTHALMOLOGY
End: 2024-06-18
Payer: MEDICARE

## 2024-06-18 ENCOUNTER — RX ONLY (RX ONLY)
Age: 74
End: 2024-06-18

## 2024-06-18 DIAGNOSIS — I63.50: ICD-10-CM

## 2024-06-18 DIAGNOSIS — H35.373: ICD-10-CM

## 2024-06-18 DIAGNOSIS — E11.3592: ICD-10-CM

## 2024-06-18 DIAGNOSIS — H25.13: ICD-10-CM

## 2024-06-18 DIAGNOSIS — H16.223: ICD-10-CM

## 2024-06-18 DIAGNOSIS — H43.813: ICD-10-CM

## 2024-06-18 DIAGNOSIS — E11.3511: ICD-10-CM

## 2024-06-18 PROCEDURE — 92014 COMPRE OPH EXAM EST PT 1/>: CPT | Performed by: OPHTHALMOLOGY

## 2024-06-18 ASSESSMENT — CONFRONTATIONAL VISUAL FIELD TEST (CVF)
OD_FINDINGS: FULL
OS_FINDINGS: FULL

## 2024-09-20 NOTE — PROGRESS NOTE ADULT - SUBJECTIVE AND OBJECTIVE BOX
walked  in with c/o  generalized  abdomina pain started  last  night  +  vomiting.     Chief complaint    Patient is a 72y old  Male who presents with a chief complaint of sepsis (23 Sep 2023 10:01)   Review of systems  Patient appears comfortable.    Labs and Fingersticks  CAPILLARY BLOOD GLUCOSE      POCT Blood Glucose.: 116 mg/dL (23 Sep 2023 07:47)  POCT Blood Glucose.: 202 mg/dL (22 Sep 2023 21:31)  POCT Blood Glucose.: 125 mg/dL (22 Sep 2023 17:17)  POCT Blood Glucose.: 167 mg/dL (22 Sep 2023 11:55)      Anion Gap: 13 (09-23 @ 06:12)  Anion Gap: 14 (09-22 @ 06:43)      Calcium: 9.7 (09-23 @ 06:12)  Calcium: 9.4 (09-22 @ 06:43)          09-23    147<H>  |  108  |  61<H>  ----------------------------<  89  3.5   |  26  |  2.14<H>    Ca    9.7      23 Sep 2023 06:12                          13.0   15.70 )-----------( 274      ( 23 Sep 2023 06:12 )             40.9     Medications  MEDICATIONS  (STANDING):  acetylcysteine 20%  Inhalation 3 milliLiter(s) Inhalation every 6 hours  albuterol/ipratropium for Nebulization 3 milliLiter(s) Nebulizer every 6 hours  apixaban 5 milliGRAM(s) Oral every 12 hours  atorvastatin 80 milliGRAM(s) Oral at bedtime  azithromycin  IVPB      azithromycin  IVPB 500 milliGRAM(s) IV Intermittent every 24 hours  cefTRIAXone   IVPB 1000 milliGRAM(s) IV Intermittent every 24 hours  cefTRIAXone   IVPB      chlorhexidine 2% Cloths 1 Application(s) Topical <User Schedule>  clopidogrel Tablet 75 milliGRAM(s) Oral daily  dextrose 5%. 1000 milliLiter(s) (50 mL/Hr) IV Continuous <Continuous>  dextrose 5%. 1000 milliLiter(s) (100 mL/Hr) IV Continuous <Continuous>  dextrose 50% Injectable 12.5 Gram(s) IV Push once  dextrose 50% Injectable 25 Gram(s) IV Push once  dextrose 50% Injectable 25 Gram(s) IV Push once  glucagon  Injectable 1 milliGRAM(s) IntraMuscular once  insulin glargine Injectable (LANTUS) 12 Unit(s) SubCutaneous at bedtime  insulin lispro (ADMELOG) corrective regimen sliding scale   SubCutaneous three times a day before meals  metoprolol tartrate 50 milliGRAM(s) Oral four times a day  pantoprazole  Injectable 40 milliGRAM(s) IV Push daily  sodium chloride 0.9%. 1000 milliLiter(s) (50 mL/Hr) IV Continuous <Continuous>      Physical Exam  Culture - Sputum (collected 09-22-23 @ 10:30)  Source: .Sputum Sputum  Gram Stain (09-22-23 @ 23:04):    Rare polymorphonuclear leukocytes per low power field    Moderate Squamous epithelial cells per low power field    Moderate Gram Positive Rods per oil power field    Few Gram Negative Rods per oil power field    Few Gram Positive Cocci in Pairs and Chains per oil power field      General: Patient appears comfortable.  Vital Signs Last 12 Hrs  T(F): 98.2 (09-23-23 @ 06:10), Max: 98.5 (09-22-23 @ 23:40)  HR: 70 (09-23-23 @ 09:09) (70 - 92)  BP: 153/54 (09-23-23 @ 06:10) (153/54 - 170/82)  BP(mean): --  RR: 18 (09-23-23 @ 09:09) (18 - 18)  SpO2: 98% (09-23-23 @ 09:09) (97% - 98%)  Neck: No palpable thyroid nodules.  CVS: S1S2, No murmurs  Respiratory: No wheezing, no crepitations  GI: Abdomen soft, non tender.    Diagnostics    A1C with Estimated Average Glucose: Routine (09-21 @ 08:17)      Radiology:

## 2024-12-02 ENCOUNTER — OFFICE (OUTPATIENT)
Dept: URBAN - METROPOLITAN AREA CLINIC 77 | Facility: CLINIC | Age: 74
Setting detail: OPHTHALMOLOGY
End: 2024-12-02
Payer: MEDICARE

## 2024-12-02 DIAGNOSIS — H35.372: ICD-10-CM

## 2024-12-02 DIAGNOSIS — H16.223: ICD-10-CM

## 2024-12-02 DIAGNOSIS — E11.3511: ICD-10-CM

## 2024-12-02 PROCEDURE — 67028 INJECTION EYE DRUG: CPT | Mod: RT | Performed by: OPHTHALMOLOGY

## 2024-12-02 PROCEDURE — 99213 OFFICE O/P EST LOW 20 MIN: CPT | Mod: 25 | Performed by: OPHTHALMOLOGY

## 2024-12-02 PROCEDURE — 92134 CPTRZ OPH DX IMG PST SGM RTA: CPT | Performed by: OPHTHALMOLOGY

## 2024-12-02 ASSESSMENT — SUPERFICIAL PUNCTATE KERATITIS (SPK)
OD_SPK: 1+
OS_SPK: 1+

## 2024-12-02 ASSESSMENT — KERATOMETRY
OS_K1POWER_DIOPTERS: 43.00
OD_K2POWER_DIOPTERS: 43.75
OS_K2POWER_DIOPTERS: 43.75
OS_AXISANGLE_DEGREES: 171
OD_K1POWER_DIOPTERS: 43.25
OD_AXISANGLE_DEGREES: 013

## 2024-12-02 ASSESSMENT — REFRACTION_MANIFEST
OD_VA1: 20/50
OD_AXIS: 45
OD_SPHERE: +0.25
OD_CYLINDER: -1.00
OD_ADD: +2.50
OS_AXIS: 155
OD_AXIS: 80
OS_CYLINDER: -0.50
OD_SPHERE: +0.75
OD_CYLINDER: -1.25
OS_ADD: +2.50
OS_AXIS: 68
OS_ADD: +2.75
OS_SPHERE: -1.00
OS_SPHERE: -0.75
OD_VA1: 20/40
OS_CYLINDER: -1.00
OS_VA1: 20/50
OS_VA1: 20/40

## 2024-12-02 ASSESSMENT — REFRACTION_AUTOREFRACTION
OD_SPHERE: +1.50
OS_CYLINDER: -1.75
OS_SPHERE: +0.75
OD_AXIS: 090
OS_AXIS: 100
OD_CYLINDER: -1.75

## 2024-12-02 ASSESSMENT — CONFRONTATIONAL VISUAL FIELD TEST (CVF)
OD_FINDINGS: FULL
OS_FINDINGS: FULL

## 2024-12-02 ASSESSMENT — VISUAL ACUITY
OD_BCVA: 20/70
OS_BCVA: 20/80-

## 2024-12-17 ENCOUNTER — OFFICE (OUTPATIENT)
Dept: URBAN - METROPOLITAN AREA CLINIC 77 | Facility: CLINIC | Age: 74
Setting detail: OPHTHALMOLOGY
End: 2024-12-17
Payer: MEDICARE

## 2024-12-17 DIAGNOSIS — H25.13: ICD-10-CM

## 2024-12-17 PROBLEM — H53.10 SUBJECTIVE VISUAL DISTRUBANCES: Status: ACTIVE | Noted: 2024-12-02

## 2024-12-17 PROBLEM — H35.371 EPIRETINAL MEMBRANE; RIGHT EYE, LEFT EYE: Status: ACTIVE | Noted: 2024-12-02

## 2024-12-17 PROBLEM — H35.372 EPIRETINAL MEMBRANE; RIGHT EYE, LEFT EYE: Status: ACTIVE | Noted: 2024-12-02

## 2024-12-17 PROCEDURE — 92012 INTRM OPH EXAM EST PATIENT: CPT | Performed by: STUDENT IN AN ORGANIZED HEALTH CARE EDUCATION/TRAINING PROGRAM

## 2024-12-17 ASSESSMENT — SUPERFICIAL PUNCTATE KERATITIS (SPK)
OS_SPK: 1+
OD_SPK: 1+

## 2024-12-17 ASSESSMENT — CONFRONTATIONAL VISUAL FIELD TEST (CVF)
OD_FINDINGS: FULL
OS_FINDINGS: FULL

## 2024-12-19 ASSESSMENT — REFRACTION_MANIFEST
OS_CYLINDER: -1.00
OD_CYLINDER: -1.00
OD_VA1: 20/40
OS_SPHERE: -0.75
OS_AXIS: 68
OD_AXIS: 45
OD_ADD: +2.50
OS_VA1: 20/50
OD_SPHERE: +0.75
OD_AXIS: 80
OD_CYLINDER: -1.25
OS_SPHERE: -1.00
OS_AXIS: 155
OS_ADD: +2.75
OD_VA1: 20/50
OS_VA1: 20/40
OS_CYLINDER: -0.50
OS_ADD: +2.50
OD_SPHERE: +0.25

## 2024-12-19 ASSESSMENT — REFRACTION_AUTOREFRACTION
OD_CYLINDER: -1.75
OS_AXIS: 074
OS_SPHERE: -0.25
OD_SPHERE: +1.50
OS_CYLINDER: -0.75
OD_AXIS: 085

## 2024-12-19 ASSESSMENT — KERATOMETRY
OS_K1POWER_DIOPTERS: 43.00
OD_K1POWER_DIOPTERS: 42.75
OS_AXISANGLE_DEGREES: 166
OD_K2POWER_DIOPTERS: 43.50
OD_AXISANGLE_DEGREES: 170
OS_K2POWER_DIOPTERS: 43.75

## 2024-12-19 ASSESSMENT — VISUAL ACUITY
OD_BCVA: 20/70
OS_BCVA: 20/100

## 2025-01-07 ENCOUNTER — NON-APPOINTMENT (OUTPATIENT)
Age: 75
End: 2025-01-07

## 2025-01-07 ENCOUNTER — APPOINTMENT (OUTPATIENT)
Dept: ELECTROPHYSIOLOGY | Facility: CLINIC | Age: 75
End: 2025-01-07
Payer: MEDICARE

## 2025-01-07 VITALS
WEIGHT: 190 LBS | HEART RATE: 61 BPM | SYSTOLIC BLOOD PRESSURE: 163 MMHG | OXYGEN SATURATION: 97 % | DIASTOLIC BLOOD PRESSURE: 74 MMHG | BODY MASS INDEX: 29.47 KG/M2 | HEIGHT: 67.5 IN

## 2025-01-07 DIAGNOSIS — I44.7 LEFT BUNDLE-BRANCH BLOCK, UNSPECIFIED: ICD-10-CM

## 2025-01-07 DIAGNOSIS — I48.0 PAROXYSMAL ATRIAL FIBRILLATION: ICD-10-CM

## 2025-01-07 DIAGNOSIS — I10 ESSENTIAL (PRIMARY) HYPERTENSION: ICD-10-CM

## 2025-01-07 DIAGNOSIS — E78.5 HYPERLIPIDEMIA, UNSPECIFIED: ICD-10-CM

## 2025-01-07 PROCEDURE — 99203 OFFICE O/P NEW LOW 30 MIN: CPT

## 2025-01-07 PROCEDURE — 93000 ELECTROCARDIOGRAM COMPLETE: CPT

## 2025-02-04 ENCOUNTER — OFFICE (OUTPATIENT)
Dept: URBAN - METROPOLITAN AREA CLINIC 77 | Facility: CLINIC | Age: 75
Setting detail: OPHTHALMOLOGY
End: 2025-02-04
Payer: MEDICARE

## 2025-02-04 DIAGNOSIS — H25.13: ICD-10-CM

## 2025-02-04 PROCEDURE — 92136 OPHTHALMIC BIOMETRY: CPT | Performed by: STUDENT IN AN ORGANIZED HEALTH CARE EDUCATION/TRAINING PROGRAM

## 2025-02-04 PROCEDURE — 99214 OFFICE O/P EST MOD 30 MIN: CPT | Performed by: STUDENT IN AN ORGANIZED HEALTH CARE EDUCATION/TRAINING PROGRAM

## 2025-02-04 ASSESSMENT — REFRACTION_MANIFEST
OD_CYLINDER: -1.25
OS_AXIS: 155
OD_ADD: +2.50
OD_AXIS: 45
OS_SPHERE: -1.00
OD_VA1: 20/40
OS_VA1: 20/50
OD_AXIS: 80
OS_CYLINDER: -0.50
OS_AXIS: 68
OD_SPHERE: +0.25
OD_CYLINDER: -1.00
OS_CYLINDER: -1.00
OS_VA1: 20/40
OD_SPHERE: +0.75
OS_ADD: +2.75
OS_SPHERE: -0.75
OS_ADD: +2.50
OD_VA1: 20/50

## 2025-02-04 ASSESSMENT — REFRACTION_AUTOREFRACTION
OD_CYLINDER: -1.75
OS_AXIS: 074
OS_CYLINDER: -0.75
OD_AXIS: 085
OS_SPHERE: -0.25
OD_SPHERE: +1.50

## 2025-02-04 ASSESSMENT — CONFRONTATIONAL VISUAL FIELD TEST (CVF)
OD_FINDINGS: FULL
OS_FINDINGS: FULL

## 2025-02-04 ASSESSMENT — KERATOMETRY
OD_CYLAXISANGLE_DEGREES: 170
OS_AXISANGLE_DEGREES: 166
OS_AXISANGLE_DEGREES: 166
OD_K1K2_AVERAGE: 43.125
OS_CYLAXISANGLE_DEGREES: 166
OD_AXISANGLE_DEGREES: 170
OD_K2POWER_DIOPTERS: 43.50
OS_K1POWER_DIOPTERS: 43.00
OD_K1POWER_DIOPTERS: 42.75
OS_K1K2_AVERAGE: 43.375
OS_CYLPOWER_DEGREES: 0.75
OD_K2POWER_DIOPTERS: 43.50
OD_CYLPOWER_DEGREES: 0.75
OD_AXISANGLE_DEGREES: 170
OS_K2POWER_DIOPTERS: 43.75
OS_AXISANGLE2_DEGREES: 166
OD_AXISANGLE2_DEGREES: 170
OS_K1POWER_DIOPTERS: 43.00
OS_K2POWER_DIOPTERS: 43.75
OD_K1POWER_DIOPTERS: 42.75

## 2025-02-04 ASSESSMENT — VISUAL ACUITY
OD_BCVA: 20/70-
OS_BCVA: 20/100-

## 2025-02-04 ASSESSMENT — SUPERFICIAL PUNCTATE KERATITIS (SPK)
OD_SPK: 1+
OS_SPK: 1+

## 2025-02-18 ENCOUNTER — APPOINTMENT (OUTPATIENT)
Dept: NEUROLOGY | Facility: CLINIC | Age: 75
End: 2025-02-18
Payer: MEDICARE

## 2025-02-18 VITALS
BODY MASS INDEX: 29.47 KG/M2 | SYSTOLIC BLOOD PRESSURE: 207 MMHG | DIASTOLIC BLOOD PRESSURE: 78 MMHG | WEIGHT: 190 LBS | HEIGHT: 67.5 IN | HEART RATE: 60 BPM

## 2025-02-18 DIAGNOSIS — I65.1 OCCLUSION AND STENOSIS OF BASILAR ARTERY: ICD-10-CM

## 2025-02-18 DIAGNOSIS — I48.0 PAROXYSMAL ATRIAL FIBRILLATION: ICD-10-CM

## 2025-02-18 DIAGNOSIS — I63.9 CEREBRAL INFARCTION, UNSPECIFIED: ICD-10-CM

## 2025-02-18 PROCEDURE — 93880 EXTRACRANIAL BILAT STUDY: CPT

## 2025-02-18 PROCEDURE — 93892 TCD EMBOLI DETECT W/O INJ: CPT

## 2025-02-18 PROCEDURE — 93886 INTRACRANIAL COMPLETE STUDY: CPT

## 2025-02-18 PROCEDURE — G2211 COMPLEX E/M VISIT ADD ON: CPT

## 2025-02-18 PROCEDURE — 99214 OFFICE O/P EST MOD 30 MIN: CPT

## 2025-03-03 ENCOUNTER — OFFICE (OUTPATIENT)
Dept: URBAN - METROPOLITAN AREA CLINIC 77 | Facility: CLINIC | Age: 75
Setting detail: OPHTHALMOLOGY
End: 2025-03-03
Payer: MEDICARE

## 2025-03-03 DIAGNOSIS — H43.813: ICD-10-CM

## 2025-03-03 DIAGNOSIS — H35.372: ICD-10-CM

## 2025-03-03 DIAGNOSIS — I63.50: ICD-10-CM

## 2025-03-03 DIAGNOSIS — H35.371: ICD-10-CM

## 2025-03-03 DIAGNOSIS — H16.223: ICD-10-CM

## 2025-03-03 DIAGNOSIS — E11.3511: ICD-10-CM

## 2025-03-03 DIAGNOSIS — H53.10: ICD-10-CM

## 2025-03-03 DIAGNOSIS — E11.3592: ICD-10-CM

## 2025-03-03 DIAGNOSIS — H25.13: ICD-10-CM

## 2025-03-03 PROCEDURE — 92250 FUNDUS PHOTOGRAPHY W/I&R: CPT | Performed by: OPHTHALMOLOGY

## 2025-03-03 PROCEDURE — 99214 OFFICE O/P EST MOD 30 MIN: CPT | Performed by: OPHTHALMOLOGY

## 2025-03-03 ASSESSMENT — REFRACTION_MANIFEST
OD_CYLINDER: -1.00
OS_VA1: 20/50
OD_CYLINDER: -1.25
OS_AXIS: 155
OS_SPHERE: -1.00
OS_ADD: +2.75
OS_CYLINDER: -1.00
OD_SPHERE: +0.25
OD_SPHERE: +0.75
OS_SPHERE: -0.75
OD_VA1: 20/40
OS_ADD: +2.50
OD_ADD: +2.50
OD_AXIS: 45
OS_VA1: 20/40
OS_CYLINDER: -0.50
OD_AXIS: 80
OS_AXIS: 68
OD_VA1: 20/50

## 2025-03-03 ASSESSMENT — VISUAL ACUITY
OS_BCVA: 20/100
OD_BCVA: 20/60

## 2025-03-03 ASSESSMENT — KERATOMETRY
OS_AXISANGLE_DEGREES: 166
OD_AXISANGLE_DEGREES: 170
OS_K2POWER_DIOPTERS: 43.75
OS_K1POWER_DIOPTERS: 43.00
OD_K2POWER_DIOPTERS: 43.50
OD_K1POWER_DIOPTERS: 42.75

## 2025-03-03 ASSESSMENT — REFRACTION_AUTOREFRACTION
OS_AXIS: 074
OD_CYLINDER: -1.75
OD_AXIS: 085
OD_SPHERE: +1.50
OS_CYLINDER: -0.75
OS_SPHERE: -0.25

## 2025-03-03 ASSESSMENT — SUPERFICIAL PUNCTATE KERATITIS (SPK)
OD_SPK: 1+
OS_SPK: 1+

## 2025-03-03 ASSESSMENT — CONFRONTATIONAL VISUAL FIELD TEST (CVF)
OS_FINDINGS: FULL
OD_FINDINGS: FULL

## 2025-03-28 NOTE — DISCHARGE NOTE PROVIDER - [email or web address]_GEN_ALL_CORE
This writer attempted to contact patient on 03/28/25.    Reason for call elevated blood pressure and left message to call clinic back at [phone].    If patient calls back:   ADS provider requested sx to be further triaged.      HOLLY Palacios  [facility], [place]             My signature below certifies that the above stated patient is homebound and upon completion of the Face-To-Face encounter, has the need for intermittent skilled nursing, physical therapy and/or speech or occupational therapy services in their home for their current diagnosis as outlined in their initial plan of care. These services will continue to be monitored by myself or another physician.

## 2025-05-05 ENCOUNTER — OFFICE (OUTPATIENT)
Dept: URBAN - METROPOLITAN AREA CLINIC 77 | Facility: CLINIC | Age: 75
Setting detail: OPHTHALMOLOGY
End: 2025-05-05
Payer: MEDICARE

## 2025-05-05 DIAGNOSIS — E11.3592: ICD-10-CM

## 2025-05-05 DIAGNOSIS — H35.373: ICD-10-CM

## 2025-05-05 DIAGNOSIS — E11.3511: ICD-10-CM

## 2025-05-05 DIAGNOSIS — H25.13: ICD-10-CM

## 2025-05-05 PROCEDURE — 92134 CPTRZ OPH DX IMG PST SGM RTA: CPT | Performed by: OPHTHALMOLOGY

## 2025-05-05 PROCEDURE — 67028 INJECTION EYE DRUG: CPT | Mod: RT | Performed by: OPHTHALMOLOGY

## 2025-05-05 PROCEDURE — 99213 OFFICE O/P EST LOW 20 MIN: CPT | Mod: 25 | Performed by: OPHTHALMOLOGY

## 2025-05-05 ASSESSMENT — REFRACTION_MANIFEST
OS_AXIS: 155
OS_ADD: +2.50
OS_VA1: 20/40
OS_SPHERE: -0.75
OS_CYLINDER: -0.50
OD_SPHERE: +0.25
OS_SPHERE: -1.00
OS_VA1: 20/50
OD_VA1: 20/40
OD_CYLINDER: -1.00
OS_AXIS: 68
OD_AXIS: 80
OS_CYLINDER: -1.00
OD_CYLINDER: -1.25
OD_VA1: 20/50
OD_ADD: +2.50
OD_SPHERE: +0.75
OS_ADD: +2.75
OD_AXIS: 45

## 2025-05-05 ASSESSMENT — REFRACTION_AUTOREFRACTION
OS_SPHERE: -0.25
OS_AXIS: 074
OS_CYLINDER: -0.75
OD_SPHERE: +1.50
OD_CYLINDER: -1.75
OD_AXIS: 085

## 2025-05-05 ASSESSMENT — SUPERFICIAL PUNCTATE KERATITIS (SPK)
OD_SPK: 1+
OS_SPK: 1+

## 2025-05-05 ASSESSMENT — KERATOMETRY
OD_K2POWER_DIOPTERS: 43.50
OS_K1POWER_DIOPTERS: 43.00
OS_AXISANGLE_DEGREES: 166
OD_K1POWER_DIOPTERS: 42.75
OS_K2POWER_DIOPTERS: 43.75
OD_AXISANGLE_DEGREES: 170

## 2025-05-05 ASSESSMENT — CONFRONTATIONAL VISUAL FIELD TEST (CVF)
OD_FINDINGS: FULL
OS_FINDINGS: FULL

## 2025-05-05 ASSESSMENT — VISUAL ACUITY
OS_BCVA: 20/100
OD_BCVA: 20/60

## 2025-05-21 ENCOUNTER — OFFICE (OUTPATIENT)
Facility: LOCATION | Age: 75
Setting detail: OPHTHALMOLOGY
End: 2025-05-21
Payer: MEDICARE

## 2025-05-21 DIAGNOSIS — H25.13: ICD-10-CM

## 2025-05-21 PROCEDURE — 92012 INTRM OPH EXAM EST PATIENT: CPT | Performed by: STUDENT IN AN ORGANIZED HEALTH CARE EDUCATION/TRAINING PROGRAM

## 2025-05-21 ASSESSMENT — KERATOMETRY
OD_AXISANGLE_DEGREES: 176
OS_AXISANGLE_DEGREES: 172
OD_K2POWER_DIOPTERS: 43.50
OS_K1POWER_DIOPTERS: 43.00
OD_K1POWER_DIOPTERS: 43.00
OS_K2POWER_DIOPTERS: 43.50

## 2025-05-21 ASSESSMENT — REFRACTION_CURRENTRX
OS_SPHERE: +3.50
OD_CYLINDER: SPH
OS_CYLINDER: SPH
OD_SPHERE: +3.50
OS_OVR_VA: 20/
OD_OVR_VA: 20/
OS_VPRISM_DIRECTION: SV
OD_VPRISM_DIRECTION: SV

## 2025-05-21 ASSESSMENT — REFRACTION_AUTOREFRACTION
OS_CYLINDER: -1.75
OS_SPHERE: +1.25
OD_CYLINDER: -1.50
OD_AXIS: 089
OD_SPHERE: +2.00
OS_AXIS: 097

## 2025-05-21 ASSESSMENT — REFRACTION_MANIFEST
OS_CYLINDER: -0.50
OS_VA1: 20/40
OD_CYLINDER: -1.00
OS_VA1: 20/50
OD_ADD: +2.50
OD_SPHERE: +0.25
OD_AXIS: 80
OS_ADD: +2.50
OD_VA1: 20/50
OS_SPHERE: -1.00
OD_CYLINDER: -1.25
OS_CYLINDER: -1.00
OD_VA1: 20/40
OS_AXIS: 155
OD_SPHERE: +0.75
OS_ADD: +2.75
OS_SPHERE: -0.75
OD_AXIS: 45
OS_AXIS: 68

## 2025-05-21 ASSESSMENT — SUPERFICIAL PUNCTATE KERATITIS (SPK)
OD_SPK: 1+
OS_SPK: 1+

## 2025-05-21 ASSESSMENT — CONFRONTATIONAL VISUAL FIELD TEST (CVF)
OD_FINDINGS: FULL
OS_FINDINGS: FULL

## 2025-05-21 ASSESSMENT — VISUAL ACUITY
OS_BCVA: 20/80
OD_BCVA: 20/60+2

## 2025-06-09 ENCOUNTER — OFFICE (OUTPATIENT)
Facility: LOCATION | Age: 75
Setting detail: OPHTHALMOLOGY
End: 2025-06-09
Payer: MEDICARE

## 2025-06-09 DIAGNOSIS — H25.13: ICD-10-CM

## 2025-06-09 PROCEDURE — 92012 INTRM OPH EXAM EST PATIENT: CPT | Performed by: STUDENT IN AN ORGANIZED HEALTH CARE EDUCATION/TRAINING PROGRAM

## 2025-06-09 ASSESSMENT — CONFRONTATIONAL VISUAL FIELD TEST (CVF)
OD_FINDINGS: FULL
OS_FINDINGS: FULL

## 2025-06-10 ENCOUNTER — APPOINTMENT (OUTPATIENT)
Dept: NEUROLOGY | Facility: CLINIC | Age: 75
End: 2025-06-10

## 2025-06-10 ASSESSMENT — REFRACTION_MANIFEST
OD_AXIS: 45
OS_CYLINDER: -1.00
OS_AXIS: 155
OD_CYLINDER: -1.25
OD_SPHERE: +0.25
OD_SPHERE: +0.75
OS_ADD: +2.50
OS_SPHERE: -0.75
OS_VA1: 20/40
OD_VA1: 20/50
OS_ADD: +2.75
OD_ADD: +2.50
OS_VA1: 20/50
OS_CYLINDER: -0.50
OD_VA1: 20/40
OS_SPHERE: -1.00
OD_AXIS: 80
OD_CYLINDER: -1.00
OS_AXIS: 68

## 2025-06-10 ASSESSMENT — REFRACTION_AUTOREFRACTION
OS_SPHERE: +1.25
OD_SPHERE: +2.00
OS_CYLINDER: -1.75
OD_AXIS: 089
OS_AXIS: 097
OD_CYLINDER: -1.50

## 2025-06-10 ASSESSMENT — TONOMETRY
OD_IOP_MMHG: 20
OS_IOP_MMHG: 20

## 2025-06-10 ASSESSMENT — REFRACTION_CURRENTRX
OS_SPHERE: +3.50
OS_VPRISM_DIRECTION: SV
OD_SPHERE: +3.50
OD_OVR_VA: 20/
OS_CYLINDER: SPH
OS_OVR_VA: 20/
OD_CYLINDER: SPH
OD_VPRISM_DIRECTION: SV

## 2025-06-10 ASSESSMENT — KERATOMETRY
OD_K2POWER_DIOPTERS: 43.50
OD_K1POWER_DIOPTERS: 43.00
OD_AXISANGLE_DEGREES: 176
OS_K1POWER_DIOPTERS: 43.00
OS_AXISANGLE_DEGREES: 172
OS_K2POWER_DIOPTERS: 43.50

## 2025-06-10 ASSESSMENT — SUPERFICIAL PUNCTATE KERATITIS (SPK)
OS_SPK: 1+
OD_SPK: 1+

## 2025-06-10 ASSESSMENT — VISUAL ACUITY
OS_BCVA: 20/80
OD_BCVA: 20/60+2

## 2025-06-16 ENCOUNTER — AMBULATORY SURGERY CENTER (OUTPATIENT)
Dept: URBAN - METROPOLITAN AREA SURGERY 25 | Facility: SURGERY | Age: 75
Setting detail: OPHTHALMOLOGY
End: 2025-06-16
Payer: MEDICARE

## 2025-06-16 DIAGNOSIS — H25.11: ICD-10-CM

## 2025-06-16 PROCEDURE — 66984 XCAPSL CTRC RMVL W/O ECP: CPT | Mod: RT | Performed by: STUDENT IN AN ORGANIZED HEALTH CARE EDUCATION/TRAINING PROGRAM

## 2025-06-17 ENCOUNTER — OFFICE (OUTPATIENT)
Dept: URBAN - METROPOLITAN AREA CLINIC 77 | Facility: CLINIC | Age: 75
Setting detail: OPHTHALMOLOGY
End: 2025-06-17
Payer: MEDICARE

## 2025-06-17 DIAGNOSIS — Z96.1: ICD-10-CM

## 2025-06-17 PROCEDURE — 99024 POSTOP FOLLOW-UP VISIT: CPT | Performed by: STUDENT IN AN ORGANIZED HEALTH CARE EDUCATION/TRAINING PROGRAM

## 2025-06-17 ASSESSMENT — REFRACTION_MANIFEST
OD_SPHERE: +0.25
OD_AXIS: 80
OS_AXIS: 68
OS_ADD: +2.50
OD_AXIS: 45
OS_ADD: +2.75
OS_CYLINDER: -1.00
OS_VA1: 20/40
OS_SPHERE: -1.00
OS_CYLINDER: -0.50
OS_SPHERE: -0.75
OS_VA1: 20/50
OD_CYLINDER: -1.00
OD_ADD: +2.50
OD_SPHERE: +0.75
OD_CYLINDER: -1.25
OD_VA1: 20/40
OS_AXIS: 155
OD_VA1: 20/50

## 2025-06-17 ASSESSMENT — VISUAL ACUITY
OD_BCVA: 20/60
OS_BCVA: 20/60

## 2025-06-17 ASSESSMENT — REFRACTION_AUTOREFRACTION
OD_CYLINDER: -1.00
OS_AXIS: 026
OD_AXIS: 083
OD_SPHERE: +2.00
OS_SPHERE: -0.25
OS_CYLINDER: -0.50

## 2025-06-17 ASSESSMENT — REFRACTION_CURRENTRX
OD_OVR_VA: 20/
OS_CYLINDER: SPH
OS_OVR_VA: 20/
OS_VPRISM_DIRECTION: SV
OD_SPHERE: +3.50
OD_CYLINDER: SPH
OS_SPHERE: +3.50
OD_VPRISM_DIRECTION: SV

## 2025-06-17 ASSESSMENT — TONOMETRY
OS_IOP_MMHG: 14
OD_IOP_MMHG: 17

## 2025-06-17 ASSESSMENT — SUPERFICIAL PUNCTATE KERATITIS (SPK)
OS_SPK: 1+
OD_SPK: 1+

## 2025-06-17 ASSESSMENT — KERATOMETRY
OS_K2POWER_DIOPTERS: 43.75
OD_K2POWER_DIOPTERS: 43.50
OS_AXISANGLE_DEGREES: 176
OS_K1POWER_DIOPTERS: 43.25
OD_AXISANGLE_DEGREES: 161
OD_K1POWER_DIOPTERS: 42.75

## 2025-06-17 ASSESSMENT — CONFRONTATIONAL VISUAL FIELD TEST (CVF)
OS_FINDINGS: FULL
OD_FINDINGS: FULL

## 2025-06-23 ENCOUNTER — OFFICE (OUTPATIENT)
Facility: LOCATION | Age: 75
Setting detail: OPHTHALMOLOGY
End: 2025-06-23
Payer: MEDICARE

## 2025-06-23 DIAGNOSIS — Z96.1: ICD-10-CM

## 2025-06-23 PROBLEM — H25.12 CATARACT SENILE NUCLEAR SCLEROSIS;  , LEFT EYE: Status: ACTIVE | Noted: 2025-06-17

## 2025-06-23 PROCEDURE — 99024 POSTOP FOLLOW-UP VISIT: CPT | Performed by: STUDENT IN AN ORGANIZED HEALTH CARE EDUCATION/TRAINING PROGRAM

## 2025-06-23 ASSESSMENT — REFRACTION_MANIFEST
OS_ADD: +2.50
OD_AXIS: 45
OS_ADD: +2.75
OS_CYLINDER: -1.00
OD_CYLINDER: -1.00
OD_SPHERE: +0.25
OS_SPHERE: -1.00
OD_VA1: 20/40
OS_SPHERE: -0.75
OD_CYLINDER: -1.25
OS_VA1: 20/50
OS_CYLINDER: -0.50
OS_AXIS: 68
OD_AXIS: 80
OD_VA1: 20/50
OD_ADD: +2.50
OS_VA1: 20/40
OS_AXIS: 155
OD_SPHERE: +0.75

## 2025-06-23 ASSESSMENT — KERATOMETRY
OS_K2POWER_DIOPTERS: 43.75
OD_K2POWER_DIOPTERS: 43.25
OD_K1POWER_DIOPTERS: 43.00
OS_K1POWER_DIOPTERS: 43.00
OD_AXISANGLE_DEGREES: 175
OS_AXISANGLE_DEGREES: 161

## 2025-06-23 ASSESSMENT — REFRACTION_CURRENTRX
OD_VPRISM_DIRECTION: SV
OD_SPHERE: +3.50
OS_SPHERE: +3.50
OS_OVR_VA: 20/
OD_CYLINDER: SPH
OS_VPRISM_DIRECTION: SV
OS_CYLINDER: SPH
OD_OVR_VA: 20/

## 2025-06-23 ASSESSMENT — REFRACTION_AUTOREFRACTION
OD_CYLINDER: -1.00
OD_AXIS: 084
OS_CYLINDER: -1.75
OD_SPHERE: +2.00
OS_SPHERE: +1.00
OS_AXIS: 086

## 2025-06-23 ASSESSMENT — VISUAL ACUITY
OS_BCVA: 20/70
OD_BCVA: 20/50-2

## 2025-06-23 ASSESSMENT — CORNEAL EDEMA - FOLDS/STRIAE: OD_FOLDSSTRIAE: T 1+

## 2025-06-23 ASSESSMENT — SUPERFICIAL PUNCTATE KERATITIS (SPK)
OS_SPK: 1+
OD_SPK: 1+

## 2025-06-23 ASSESSMENT — TONOMETRY
OD_IOP_MMHG: 14
OS_IOP_MMHG: 14

## 2025-06-23 ASSESSMENT — CONFRONTATIONAL VISUAL FIELD TEST (CVF)
OD_FINDINGS: FULL
OS_FINDINGS: FULL

## 2025-07-16 ENCOUNTER — OFFICE (OUTPATIENT)
Facility: LOCATION | Age: 75
Setting detail: OPHTHALMOLOGY
End: 2025-07-16
Payer: MEDICARE

## 2025-07-16 DIAGNOSIS — H25.12: ICD-10-CM

## 2025-07-16 PROCEDURE — 92136 OPHTHALMIC BIOMETRY: CPT | Mod: 26,LT | Performed by: STUDENT IN AN ORGANIZED HEALTH CARE EDUCATION/TRAINING PROGRAM

## 2025-07-16 ASSESSMENT — REFRACTION_AUTOREFRACTION
OD_AXIS: 088
OD_CYLINDER: -1.25
OS_CYLINDER: -1.50
OD_SPHERE: +2.00
OS_AXIS: 104
OS_SPHERE: +0.75

## 2025-07-16 ASSESSMENT — KERATOMETRY
OD_AXISANGLE_DEGREES: 177
OS_K2POWER_DIOPTERS: 43.75
OD_K1POWER_DIOPTERS: 43.00
OS_K1POWER_DIOPTERS: 43.00
METHOD_AUTO_MANUAL: AUTO
OD_K2POWER_DIOPTERS: 43.50
OS_AXISANGLE_DEGREES: 164

## 2025-07-16 ASSESSMENT — REFRACTION_MANIFEST
OD_ADD: +2.50
OD_VA1: 20/50
OS_CYLINDER: -0.50
OD_CYLINDER: -1.00
OS_AXIS: 155
OS_ADD: +2.50
OS_ADD: +2.75
OD_AXIS: 45
OD_SPHERE: +0.75
OD_SPHERE: +0.25
OS_SPHERE: -0.75
OS_VA1: 20/50
OD_AXIS: 80
OS_CYLINDER: -1.00
OS_SPHERE: -1.00
OD_VA1: 20/40
OS_AXIS: 68
OD_CYLINDER: -1.25
OS_VA1: 20/40

## 2025-07-16 ASSESSMENT — REFRACTION_CURRENTRX
OS_SPHERE: +3.50
OS_OVR_VA: 20/
OD_OVR_VA: 20/
OD_VPRISM_DIRECTION: SV
OS_VPRISM_DIRECTION: SV
OD_CYLINDER: SPH
OD_SPHERE: +3.50
OS_CYLINDER: SPH

## 2025-07-16 ASSESSMENT — CONFRONTATIONAL VISUAL FIELD TEST (CVF)
OS_FINDINGS: FULL
OD_FINDINGS: FULL

## 2025-07-16 ASSESSMENT — VISUAL ACUITY
OD_BCVA: 20/50-1
OS_BCVA: 20/70-1

## 2025-07-16 ASSESSMENT — TONOMETRY
OS_IOP_MMHG: 18
OD_IOP_MMHG: 17

## 2025-07-29 ENCOUNTER — OFFICE (OUTPATIENT)
Dept: URBAN - METROPOLITAN AREA CLINIC 77 | Facility: CLINIC | Age: 75
Setting detail: OPHTHALMOLOGY
End: 2025-07-29
Payer: MEDICARE

## 2025-07-29 DIAGNOSIS — Z96.1: ICD-10-CM

## 2025-07-29 DIAGNOSIS — H25.12: ICD-10-CM

## 2025-07-29 PROCEDURE — 99024 POSTOP FOLLOW-UP VISIT: CPT | Performed by: STUDENT IN AN ORGANIZED HEALTH CARE EDUCATION/TRAINING PROGRAM

## 2025-07-29 ASSESSMENT — REFRACTION_MANIFEST
OS_AXIS: 68
OS_VA1: 20/40
OS_AXIS: 155
OS_CYLINDER: -0.50
OD_CYLINDER: -1.00
OS_CYLINDER: -1.00
OD_AXIS: 80
OD_AXIS: 45
OD_VA1: 20/40
OD_SPHERE: +0.75
OS_VA1: 20/50
OD_ADD: +2.50
OD_CYLINDER: -1.25
OS_SPHERE: -1.00
OS_ADD: +2.75
OS_ADD: +2.50
OD_VA1: 20/50
OS_SPHERE: -0.75
OD_SPHERE: +0.25

## 2025-07-29 ASSESSMENT — SUPERFICIAL PUNCTATE KERATITIS (SPK)
OS_SPK: 1+
OD_SPK: 1+

## 2025-07-29 ASSESSMENT — REFRACTION_CURRENTRX
OD_CYLINDER: SPH
OD_SPHERE: +3.50
OD_OVR_VA: 20/
OS_OVR_VA: 20/
OS_SPHERE: +3.50
OD_VPRISM_DIRECTION: SV
OS_CYLINDER: SPH
OS_VPRISM_DIRECTION: SV

## 2025-07-29 ASSESSMENT — KERATOMETRY
METHOD_AUTO_MANUAL: AUTO
OD_K1POWER_DIOPTERS: 43.25
OD_K2POWER_DIOPTERS: 43.50
OD_AXISANGLE_DEGREES: 152
OS_K1POWER_DIOPTERS: 43.00
OS_AXISANGLE_DEGREES: 169
OS_K2POWER_DIOPTERS: 43.50

## 2025-07-29 ASSESSMENT — VISUAL ACUITY
OD_BCVA: 20/60
OS_BCVA: 20/80

## 2025-07-29 ASSESSMENT — REFRACTION_AUTOREFRACTION
OD_CYLINDER: -0.75
OS_AXIS: 066
OS_CYLINDER: -0.75
OS_SPHERE: +0.75
OD_SPHERE: +1.25
OD_AXIS: 073

## 2025-07-29 ASSESSMENT — CONFRONTATIONAL VISUAL FIELD TEST (CVF)
OD_FINDINGS: FULL
OS_FINDINGS: FULL

## 2025-07-29 ASSESSMENT — TONOMETRY: OD_IOP_MMHG: 14

## 2025-07-29 ASSESSMENT — CORNEAL EDEMA CLINICAL DESCRIPTION: OS_CORNEALEDEMA: 1+

## 2025-07-30 ENCOUNTER — APPOINTMENT (OUTPATIENT)
Dept: NEUROLOGY | Facility: CLINIC | Age: 75
End: 2025-07-30
Payer: MEDICARE

## 2025-07-30 VITALS — HEIGHT: 67 IN | WEIGHT: 190 LBS | BODY MASS INDEX: 29.82 KG/M2

## 2025-07-30 VITALS — HEART RATE: 66 BPM | DIASTOLIC BLOOD PRESSURE: 72 MMHG | SYSTOLIC BLOOD PRESSURE: 180 MMHG

## 2025-07-30 DIAGNOSIS — I63.9 CEREBRAL INFARCTION, UNSPECIFIED: ICD-10-CM

## 2025-07-30 PROCEDURE — G2211 COMPLEX E/M VISIT ADD ON: CPT

## 2025-07-30 PROCEDURE — 99214 OFFICE O/P EST MOD 30 MIN: CPT

## 2025-08-12 ENCOUNTER — RX ONLY (RX ONLY)
Age: 75
End: 2025-08-12

## 2025-08-12 ENCOUNTER — OFFICE (OUTPATIENT)
Dept: URBAN - METROPOLITAN AREA CLINIC 77 | Facility: CLINIC | Age: 75
Setting detail: OPHTHALMOLOGY
End: 2025-08-12
Payer: MEDICARE

## 2025-08-12 DIAGNOSIS — H26.491: ICD-10-CM

## 2025-08-12 DIAGNOSIS — Z96.1: ICD-10-CM

## 2025-08-12 PROCEDURE — 99024 POSTOP FOLLOW-UP VISIT: CPT | Performed by: STUDENT IN AN ORGANIZED HEALTH CARE EDUCATION/TRAINING PROGRAM

## 2025-08-12 ASSESSMENT — KERATOMETRY
OD_K2POWER_DIOPTERS: 43.50
OS_AXISANGLE_DEGREES: 169
OD_K1POWER_DIOPTERS: 43.25
OD_AXISANGLE_DEGREES: 152
OS_K2POWER_DIOPTERS: 43.50
OS_K1POWER_DIOPTERS: 43.00
METHOD_AUTO_MANUAL: AUTO

## 2025-08-12 ASSESSMENT — REFRACTION_MANIFEST
OS_VA1: 20/40
OD_CYLINDER: -1.25
OD_AXIS: 45
OS_VA1: 20/50
OD_SPHERE: +0.25
OS_SPHERE: -1.00
OS_ADD: +2.50
OS_CYLINDER: -0.50
OS_ADD: +2.75
OD_VA1: 20/40
OD_ADD: +2.50
OS_AXIS: 155
OD_SPHERE: +0.75
OS_AXIS: 68
OS_CYLINDER: -1.00
OS_SPHERE: -0.75
OD_AXIS: 80
OD_VA1: 20/50
OD_CYLINDER: -1.00

## 2025-08-12 ASSESSMENT — REFRACTION_AUTOREFRACTION
OD_AXIS: 073
OD_CYLINDER: -0.75
OS_SPHERE: +0.75
OS_AXIS: 066
OD_SPHERE: +1.25
OS_CYLINDER: -0.75

## 2025-08-12 ASSESSMENT — REFRACTION_CURRENTRX
OS_VPRISM_DIRECTION: SV
OS_SPHERE: +3.50
OD_OVR_VA: 20/
OS_CYLINDER: SPH
OD_VPRISM_DIRECTION: SV
OD_CYLINDER: SPH
OD_SPHERE: +3.50
OS_OVR_VA: 20/

## 2025-08-12 ASSESSMENT — VISUAL ACUITY
OD_BCVA: 20/40-
OS_BCVA: 20/40-

## 2025-08-12 ASSESSMENT — SUPERFICIAL PUNCTATE KERATITIS (SPK)
OS_SPK: 1+
OD_SPK: 1+

## 2025-08-12 ASSESSMENT — CONFRONTATIONAL VISUAL FIELD TEST (CVF)
OD_FINDINGS: FULL
OS_FINDINGS: FULL

## 2025-08-12 ASSESSMENT — CORNEAL EDEMA CLINICAL DESCRIPTION: OS_CORNEALEDEMA: 1+

## 2025-08-18 ENCOUNTER — OFFICE (OUTPATIENT)
Dept: URBAN - METROPOLITAN AREA CLINIC 77 | Facility: CLINIC | Age: 75
Setting detail: OPHTHALMOLOGY
End: 2025-08-18
Payer: MEDICARE

## 2025-08-18 DIAGNOSIS — H35.372: ICD-10-CM

## 2025-08-18 DIAGNOSIS — E11.3511: ICD-10-CM

## 2025-08-18 DIAGNOSIS — E11.3592: ICD-10-CM

## 2025-08-18 DIAGNOSIS — H35.371: ICD-10-CM

## 2025-08-18 DIAGNOSIS — H26.491: ICD-10-CM

## 2025-08-18 PROCEDURE — 92134 CPTRZ OPH DX IMG PST SGM RTA: CPT | Performed by: OPHTHALMOLOGY

## 2025-08-18 PROCEDURE — 99213 OFFICE O/P EST LOW 20 MIN: CPT | Mod: 24,25 | Performed by: OPHTHALMOLOGY

## 2025-08-18 PROCEDURE — 67028 INJECTION EYE DRUG: CPT | Mod: 79,RT | Performed by: OPHTHALMOLOGY

## 2025-08-18 ASSESSMENT — CORNEAL EDEMA CLINICAL DESCRIPTION: OS_CORNEALEDEMA: 1+

## 2025-08-18 ASSESSMENT — REFRACTION_MANIFEST
OS_CYLINDER: -0.50
OS_AXIS: 68
OD_VA1: 20/50
OS_ADD: +2.50
OD_CYLINDER: -1.25
OD_CYLINDER: -1.00
OD_SPHERE: +0.25
OS_VA1: 20/40
OD_AXIS: 45
OS_ADD: +2.75
OD_ADD: +2.50
OS_CYLINDER: -1.00
OS_AXIS: 155
OS_SPHERE: -1.00
OS_VA1: 20/50
OD_SPHERE: +0.75
OS_SPHERE: -0.75
OD_VA1: 20/40
OD_AXIS: 80

## 2025-08-18 ASSESSMENT — SUPERFICIAL PUNCTATE KERATITIS (SPK)
OS_SPK: 1+
OD_SPK: 1+

## 2025-08-18 ASSESSMENT — REFRACTION_CURRENTRX
OS_VPRISM_DIRECTION: SV
OS_CYLINDER: SPH
OS_SPHERE: +3.50
OD_OVR_VA: 20/
OD_SPHERE: +3.50
OD_VPRISM_DIRECTION: SV
OD_CYLINDER: SPH
OS_OVR_VA: 20/

## 2025-08-18 ASSESSMENT — REFRACTION_AUTOREFRACTION
OS_AXIS: 066
OD_CYLINDER: -0.75
OD_AXIS: 073
OS_CYLINDER: -0.75
OS_SPHERE: +0.75
OD_SPHERE: +1.25

## 2025-08-18 ASSESSMENT — KERATOMETRY
OS_K2POWER_DIOPTERS: 43.50
OD_K2POWER_DIOPTERS: 43.50
OS_K1POWER_DIOPTERS: 43.00
OD_AXISANGLE_DEGREES: 152
OD_K1POWER_DIOPTERS: 43.25
OS_AXISANGLE_DEGREES: 169
METHOD_AUTO_MANUAL: AUTO

## 2025-08-18 ASSESSMENT — CONFRONTATIONAL VISUAL FIELD TEST (CVF)
OS_FINDINGS: FULL
OD_FINDINGS: FULL

## 2025-08-18 ASSESSMENT — VISUAL ACUITY
OD_BCVA: 20/40
OS_BCVA: 20/60

## 2025-08-25 ENCOUNTER — OUTPATIENT (OUTPATIENT)
Dept: OUTPATIENT SERVICES | Facility: HOSPITAL | Age: 75
LOS: 1 days | End: 2025-08-25

## 2025-08-25 ENCOUNTER — APPOINTMENT (OUTPATIENT)
Dept: RADIOLOGY | Facility: HOSPITAL | Age: 75
End: 2025-08-25
Payer: MEDICARE

## 2025-08-25 DIAGNOSIS — Z98.890 OTHER SPECIFIED POSTPROCEDURAL STATES: Chronic | ICD-10-CM

## 2025-08-25 DIAGNOSIS — J38.00 PARALYSIS OF VOCAL CORDS AND LARYNX, UNSPECIFIED: ICD-10-CM

## 2025-08-25 PROCEDURE — 74230 X-RAY XM SWLNG FUNCJ C+: CPT | Mod: 26
